# Patient Record
Sex: FEMALE | Race: OTHER | HISPANIC OR LATINO | URBAN - METROPOLITAN AREA
[De-identification: names, ages, dates, MRNs, and addresses within clinical notes are randomized per-mention and may not be internally consistent; named-entity substitution may affect disease eponyms.]

---

## 2018-01-01 ENCOUNTER — OUTPATIENT (OUTPATIENT)
Dept: OUTPATIENT SERVICES | Facility: HOSPITAL | Age: 78
LOS: 1 days | End: 2018-01-01
Payer: MEDICAID

## 2018-01-01 PROCEDURE — G9001: CPT

## 2018-01-03 ENCOUNTER — INPATIENT (INPATIENT)
Facility: HOSPITAL | Age: 78
LOS: 6 days | Discharge: ANOTHER HOSPITAL | End: 2018-01-10
Attending: HOSPITALIST | Admitting: HOSPITALIST
Payer: MEDICARE

## 2018-01-03 VITALS
OXYGEN SATURATION: 100 % | HEART RATE: 68 BPM | RESPIRATION RATE: 18 BRPM | WEIGHT: 134.92 LBS | HEIGHT: 62 IN | DIASTOLIC BLOOD PRESSURE: 67 MMHG | TEMPERATURE: 98 F | SYSTOLIC BLOOD PRESSURE: 160 MMHG

## 2018-01-03 LAB
ALBUMIN SERPL ELPH-MCNC: 2.8 G/DL — LOW (ref 3.3–5)
ALP SERPL-CCNC: 160 U/L — HIGH (ref 40–120)
ALT FLD-CCNC: 17 U/L — SIGNIFICANT CHANGE UP (ref 12–78)
ANION GAP SERPL CALC-SCNC: 8 MMOL/L — SIGNIFICANT CHANGE UP (ref 5–17)
AST SERPL-CCNC: 20 U/L — SIGNIFICANT CHANGE UP (ref 15–37)
BILIRUB SERPL-MCNC: 0.4 MG/DL — SIGNIFICANT CHANGE UP (ref 0.2–1.2)
BUN SERPL-MCNC: 13 MG/DL — SIGNIFICANT CHANGE UP (ref 7–23)
CALCIUM SERPL-MCNC: 8.4 MG/DL — LOW (ref 8.5–10.1)
CHLORIDE SERPL-SCNC: 104 MMOL/L — SIGNIFICANT CHANGE UP (ref 96–108)
CO2 SERPL-SCNC: 28 MMOL/L — SIGNIFICANT CHANGE UP (ref 22–31)
CREAT SERPL-MCNC: 1.29 MG/DL — SIGNIFICANT CHANGE UP (ref 0.5–1.3)
D DIMER BLD IA.RAPID-MCNC: 489 NG/ML DDU — HIGH
FLUAV SPEC QL CULT: NEGATIVE — SIGNIFICANT CHANGE UP
FLUBV AG SPEC QL IA: NEGATIVE — SIGNIFICANT CHANGE UP
GLUCOSE SERPL-MCNC: 101 MG/DL — HIGH (ref 70–99)
HCT VFR BLD CALC: 41.7 % — SIGNIFICANT CHANGE UP (ref 34.5–45)
HGB BLD-MCNC: 13.7 G/DL — SIGNIFICANT CHANGE UP (ref 11.5–15.5)
MCHC RBC-ENTMCNC: 29.9 PG — SIGNIFICANT CHANGE UP (ref 27–34)
MCHC RBC-ENTMCNC: 32.9 GM/DL — SIGNIFICANT CHANGE UP (ref 32–36)
MCV RBC AUTO: 91 FL — SIGNIFICANT CHANGE UP (ref 80–100)
NT-PROBNP SERPL-SCNC: 4257 PG/ML — HIGH (ref 0–450)
PLATELET # BLD AUTO: 245 K/UL — SIGNIFICANT CHANGE UP (ref 150–400)
POTASSIUM SERPL-MCNC: 3.8 MMOL/L — SIGNIFICANT CHANGE UP (ref 3.5–5.3)
POTASSIUM SERPL-SCNC: 3.8 MMOL/L — SIGNIFICANT CHANGE UP (ref 3.5–5.3)
PROT SERPL-MCNC: 7.9 GM/DL — SIGNIFICANT CHANGE UP (ref 6–8.3)
RBC # BLD: 4.58 M/UL — SIGNIFICANT CHANGE UP (ref 3.8–5.2)
RBC # FLD: 13 % — SIGNIFICANT CHANGE UP (ref 11–15)
SODIUM SERPL-SCNC: 140 MMOL/L — SIGNIFICANT CHANGE UP (ref 135–145)
TROPONIN I SERPL-MCNC: 0.05 NG/ML — HIGH (ref 0.01–0.04)
WBC # BLD: 13.2 K/UL — HIGH (ref 3.8–10.5)
WBC # FLD AUTO: 13.2 K/UL — HIGH (ref 3.8–10.5)

## 2018-01-03 PROCEDURE — 99284 EMERGENCY DEPT VISIT MOD MDM: CPT

## 2018-01-03 PROCEDURE — 71045 X-RAY EXAM CHEST 1 VIEW: CPT | Mod: 26

## 2018-01-03 RX ORDER — SODIUM CHLORIDE 9 MG/ML
1000 INJECTION INTRAMUSCULAR; INTRAVENOUS; SUBCUTANEOUS ONCE
Qty: 0 | Refills: 0 | Status: COMPLETED | OUTPATIENT
Start: 2018-01-03 | End: 2018-01-03

## 2018-01-03 RX ADMIN — SODIUM CHLORIDE 1000 MILLILITER(S): 9 INJECTION INTRAMUSCULAR; INTRAVENOUS; SUBCUTANEOUS at 18:54

## 2018-01-03 NOTE — ED PROVIDER NOTE - OBJECTIVE STATEMENT
78 yo F with sob for 3 hours, now, acute onset, no inciting event.  Pt. complains of throat pain (which son thinks is chronic--since prior stroke).  No other complaints, currently.   ROS: negative for fever, cough, headache, abd pain, nausea, vomiting, diarrhea, rash, paresthesia, and weakness.   PMH: HTN, prior CVA with residual L sided weakness, dementia; Meds: forgets names; SH: Denies smoking/drinking/drug use

## 2018-01-03 NOTE — ED PROVIDER NOTE - MEDICAL DECISION MAKING DETAILS
78 yo F with acute sob, concerning for ACS, PE, CHF  -labs, dimer, trop, bnp, ekg, monitor, iv ns  -f/u results, reeval, possible CTA chest

## 2018-01-03 NOTE — ED PROVIDER NOTE - PHYSICAL EXAMINATION
Vitals: WNL  Gen: AAOx3, NAD, sitting comfortably in stretcher, calm, cooperative, pleasant   Head: ncat, perrla, eomi b/l  Neck: supple, no lymphadenopathy, no midline deviation  Heart: rrr, no m/r/g  Lungs: CTA b/l, no rales/ronchi/wheezes  Abd: soft, nontender, non-distended, no rebound or guarding  Ext: no clubbing/cyanosis/edema  Neuro: sensation and muscle strength intact b/l

## 2018-01-03 NOTE — ED PROVIDER NOTE - PROGRESS NOTE DETAILS
Patient signed out by Dr. Borges pending CT. CT reviewed. patient received albuterol, solumedrol. magnesium, abx and lasix. patient now admitted to medicine for further management.

## 2018-01-03 NOTE — ED ADULT TRIAGE NOTE - CHIEF COMPLAINT QUOTE
patient c/o of difficulty breathing started today , patient c/o of cough also no fever , as per EMS patient was on respiratory distress sat was 80% O2, EMS give nebulizer x1 , patient denied chest pain , c/o of sore throat

## 2018-01-03 NOTE — ED PROVIDER NOTE - CARE PLAN
Principal Discharge DX:	Shortness of breath Principal Discharge DX:	PNA (pneumonia)  Secondary Diagnosis:	Pulmonary edema

## 2018-01-04 DIAGNOSIS — J69.0 PNEUMONITIS DUE TO INHALATION OF FOOD AND VOMIT: ICD-10-CM

## 2018-01-04 DIAGNOSIS — I50.9 HEART FAILURE, UNSPECIFIED: ICD-10-CM

## 2018-01-04 DIAGNOSIS — G30.9 ALZHEIMER'S DISEASE, UNSPECIFIED: ICD-10-CM

## 2018-01-04 DIAGNOSIS — I10 ESSENTIAL (PRIMARY) HYPERTENSION: ICD-10-CM

## 2018-01-04 LAB
ANION GAP SERPL CALC-SCNC: 11 MMOL/L — SIGNIFICANT CHANGE UP (ref 5–17)
APPEARANCE UR: CLEAR — SIGNIFICANT CHANGE UP
BACTERIA # UR AUTO: ABNORMAL
BILIRUB UR-MCNC: NEGATIVE — SIGNIFICANT CHANGE UP
BUN SERPL-MCNC: 15 MG/DL — SIGNIFICANT CHANGE UP (ref 7–23)
CALCIUM SERPL-MCNC: 8.1 MG/DL — LOW (ref 8.5–10.1)
CHLORIDE SERPL-SCNC: 104 MMOL/L — SIGNIFICANT CHANGE UP (ref 96–108)
CO2 SERPL-SCNC: 24 MMOL/L — SIGNIFICANT CHANGE UP (ref 22–31)
COLOR SPEC: YELLOW — SIGNIFICANT CHANGE UP
CREAT SERPL-MCNC: 1.41 MG/DL — HIGH (ref 0.5–1.3)
DIFF PNL FLD: ABNORMAL
EPI CELLS # UR: SIGNIFICANT CHANGE UP
GLUCOSE SERPL-MCNC: 185 MG/DL — HIGH (ref 70–99)
GLUCOSE UR QL: NEGATIVE MG/DL — SIGNIFICANT CHANGE UP
KETONES UR-MCNC: NEGATIVE — SIGNIFICANT CHANGE UP
LACTATE SERPL-SCNC: 1 MMOL/L — SIGNIFICANT CHANGE UP (ref 0.7–2)
LEUKOCYTE ESTERASE UR-ACNC: NEGATIVE — SIGNIFICANT CHANGE UP
NITRITE UR-MCNC: NEGATIVE — SIGNIFICANT CHANGE UP
PH UR: 6.5 — SIGNIFICANT CHANGE UP (ref 5–8)
POTASSIUM SERPL-MCNC: 3.4 MMOL/L — LOW (ref 3.5–5.3)
POTASSIUM SERPL-SCNC: 3.4 MMOL/L — LOW (ref 3.5–5.3)
PROT UR-MCNC: 500 MG/DL
SODIUM SERPL-SCNC: 139 MMOL/L — SIGNIFICANT CHANGE UP (ref 135–145)
SP GR SPEC: 1.01 — SIGNIFICANT CHANGE UP (ref 1.01–1.02)
TROPONIN I SERPL-MCNC: 0.04 NG/ML — SIGNIFICANT CHANGE UP (ref 0.01–0.04)
UROBILINOGEN FLD QL: NEGATIVE MG/DL — SIGNIFICANT CHANGE UP
WBC UR QL: SIGNIFICANT CHANGE UP

## 2018-01-04 PROCEDURE — 99223 1ST HOSP IP/OBS HIGH 75: CPT

## 2018-01-04 PROCEDURE — 12345: CPT | Mod: NC

## 2018-01-04 PROCEDURE — 71275 CT ANGIOGRAPHY CHEST: CPT | Mod: 26

## 2018-01-04 RX ORDER — FUROSEMIDE 40 MG
20 TABLET ORAL DAILY
Qty: 0 | Refills: 0 | Status: DISCONTINUED | OUTPATIENT
Start: 2018-01-04 | End: 2018-01-05

## 2018-01-04 RX ORDER — CARVEDILOL PHOSPHATE 80 MG/1
3.12 CAPSULE, EXTENDED RELEASE ORAL EVERY 12 HOURS
Qty: 0 | Refills: 0 | Status: DISCONTINUED | OUTPATIENT
Start: 2018-01-04 | End: 2018-01-10

## 2018-01-04 RX ORDER — FUROSEMIDE 40 MG
20 TABLET ORAL ONCE
Qty: 0 | Refills: 0 | Status: COMPLETED | OUTPATIENT
Start: 2018-01-04 | End: 2018-01-04

## 2018-01-04 RX ORDER — LOSARTAN POTASSIUM 100 MG/1
25 TABLET, FILM COATED ORAL DAILY
Qty: 0 | Refills: 0 | Status: DISCONTINUED | OUTPATIENT
Start: 2018-01-04 | End: 2018-01-08

## 2018-01-04 RX ORDER — BACLOFEN 100 %
10 POWDER (GRAM) MISCELLANEOUS DAILY
Qty: 0 | Refills: 0 | Status: DISCONTINUED | OUTPATIENT
Start: 2018-01-04 | End: 2018-01-10

## 2018-01-04 RX ORDER — CEFTRIAXONE 500 MG/1
1 INJECTION, POWDER, FOR SOLUTION INTRAMUSCULAR; INTRAVENOUS ONCE
Qty: 0 | Refills: 0 | Status: COMPLETED | OUTPATIENT
Start: 2018-01-04 | End: 2018-01-04

## 2018-01-04 RX ORDER — MAGNESIUM SULFATE 500 MG/ML
2 VIAL (ML) INJECTION ONCE
Qty: 0 | Refills: 0 | Status: COMPLETED | OUTPATIENT
Start: 2018-01-04 | End: 2018-01-04

## 2018-01-04 RX ORDER — PIPERACILLIN AND TAZOBACTAM 4; .5 G/20ML; G/20ML
3.38 INJECTION, POWDER, LYOPHILIZED, FOR SOLUTION INTRAVENOUS EVERY 12 HOURS
Qty: 0 | Refills: 0 | Status: DISCONTINUED | OUTPATIENT
Start: 2018-01-04 | End: 2018-01-10

## 2018-01-04 RX ORDER — IPRATROPIUM/ALBUTEROL SULFATE 18-103MCG
3 AEROSOL WITH ADAPTER (GRAM) INHALATION EVERY 6 HOURS
Qty: 0 | Refills: 0 | Status: DISCONTINUED | OUTPATIENT
Start: 2018-01-04 | End: 2018-01-10

## 2018-01-04 RX ORDER — PANTOPRAZOLE SODIUM 20 MG/1
40 TABLET, DELAYED RELEASE ORAL
Qty: 0 | Refills: 0 | Status: DISCONTINUED | OUTPATIENT
Start: 2018-01-04 | End: 2018-01-10

## 2018-01-04 RX ORDER — PIPERACILLIN AND TAZOBACTAM 4; .5 G/20ML; G/20ML
3.38 INJECTION, POWDER, LYOPHILIZED, FOR SOLUTION INTRAVENOUS ONCE
Qty: 0 | Refills: 0 | Status: COMPLETED | OUTPATIENT
Start: 2018-01-04 | End: 2018-01-04

## 2018-01-04 RX ORDER — ATORVASTATIN CALCIUM 80 MG/1
20 TABLET, FILM COATED ORAL AT BEDTIME
Qty: 0 | Refills: 0 | Status: DISCONTINUED | OUTPATIENT
Start: 2018-01-04 | End: 2018-01-10

## 2018-01-04 RX ORDER — ASPIRIN/CALCIUM CARB/MAGNESIUM 324 MG
325 TABLET ORAL ONCE
Qty: 0 | Refills: 0 | Status: COMPLETED | OUTPATIENT
Start: 2018-01-04 | End: 2018-01-04

## 2018-01-04 RX ORDER — ENOXAPARIN SODIUM 100 MG/ML
30 INJECTION SUBCUTANEOUS DAILY
Qty: 0 | Refills: 0 | Status: DISCONTINUED | OUTPATIENT
Start: 2018-01-04 | End: 2018-01-07

## 2018-01-04 RX ORDER — IPRATROPIUM/ALBUTEROL SULFATE 18-103MCG
3 AEROSOL WITH ADAPTER (GRAM) INHALATION
Qty: 0 | Refills: 0 | Status: COMPLETED | OUTPATIENT
Start: 2018-01-04 | End: 2018-01-04

## 2018-01-04 RX ORDER — AZITHROMYCIN 500 MG/1
500 TABLET, FILM COATED ORAL EVERY 24 HOURS
Qty: 0 | Refills: 0 | Status: DISCONTINUED | OUTPATIENT
Start: 2018-01-04 | End: 2018-01-10

## 2018-01-04 RX ORDER — ASPIRIN/CALCIUM CARB/MAGNESIUM 324 MG
81 TABLET ORAL DAILY
Qty: 0 | Refills: 0 | Status: DISCONTINUED | OUTPATIENT
Start: 2018-01-04 | End: 2018-01-07

## 2018-01-04 RX ORDER — AMLODIPINE BESYLATE 2.5 MG/1
2.5 TABLET ORAL DAILY
Qty: 0 | Refills: 0 | Status: DISCONTINUED | OUTPATIENT
Start: 2018-01-04 | End: 2018-01-05

## 2018-01-04 RX ORDER — DONEPEZIL HYDROCHLORIDE 10 MG/1
10 TABLET, FILM COATED ORAL AT BEDTIME
Qty: 0 | Refills: 0 | Status: DISCONTINUED | OUTPATIENT
Start: 2018-01-04 | End: 2018-01-10

## 2018-01-04 RX ADMIN — PANTOPRAZOLE SODIUM 40 MILLIGRAM(S): 20 TABLET, DELAYED RELEASE ORAL at 06:13

## 2018-01-04 RX ADMIN — Medication 40 MILLIGRAM(S): at 13:37

## 2018-01-04 RX ADMIN — Medication 3 MILLILITER(S): at 23:11

## 2018-01-04 RX ADMIN — Medication 125 MILLIGRAM(S): at 01:19

## 2018-01-04 RX ADMIN — Medication 325 MILLIGRAM(S): at 02:05

## 2018-01-04 RX ADMIN — PIPERACILLIN AND TAZOBACTAM 200 GRAM(S): 4; .5 INJECTION, POWDER, LYOPHILIZED, FOR SOLUTION INTRAVENOUS at 05:09

## 2018-01-04 RX ADMIN — Medication 20 MILLIGRAM(S): at 05:10

## 2018-01-04 RX ADMIN — CEFTRIAXONE 100 GRAM(S): 500 INJECTION, POWDER, FOR SOLUTION INTRAMUSCULAR; INTRAVENOUS at 02:36

## 2018-01-04 RX ADMIN — Medication 3 MILLILITER(S): at 01:19

## 2018-01-04 RX ADMIN — DONEPEZIL HYDROCHLORIDE 10 MILLIGRAM(S): 10 TABLET, FILM COATED ORAL at 22:29

## 2018-01-04 RX ADMIN — Medication 3 MILLILITER(S): at 00:58

## 2018-01-04 RX ADMIN — Medication 40 MILLIGRAM(S): at 05:10

## 2018-01-04 RX ADMIN — ENOXAPARIN SODIUM 30 MILLIGRAM(S): 100 INJECTION SUBCUTANEOUS at 12:01

## 2018-01-04 RX ADMIN — PIPERACILLIN AND TAZOBACTAM 25 GRAM(S): 4; .5 INJECTION, POWDER, LYOPHILIZED, FOR SOLUTION INTRAVENOUS at 18:44

## 2018-01-04 RX ADMIN — Medication 3 MILLILITER(S): at 05:10

## 2018-01-04 RX ADMIN — AZITHROMYCIN 255 MILLIGRAM(S): 500 TABLET, FILM COATED ORAL at 02:30

## 2018-01-04 RX ADMIN — Medication 10 MILLIGRAM(S): at 12:01

## 2018-01-04 RX ADMIN — Medication 20 MILLIGRAM(S): at 02:36

## 2018-01-04 RX ADMIN — Medication 50 GRAM(S): at 02:31

## 2018-01-04 RX ADMIN — Medication 3 MILLILITER(S): at 12:43

## 2018-01-04 RX ADMIN — Medication 3 MILLILITER(S): at 01:15

## 2018-01-04 RX ADMIN — ATORVASTATIN CALCIUM 20 MILLIGRAM(S): 80 TABLET, FILM COATED ORAL at 22:29

## 2018-01-04 RX ADMIN — Medication 81 MILLIGRAM(S): at 12:01

## 2018-01-04 NOTE — CONSULT NOTE ADULT - SUBJECTIVE AND OBJECTIVE BOX
CARDIOLOGY CONSULT NOTE    Patient is a 77y Female with a known history of :  Essential hypertension (I10)  Alzheimer's dementia without behavioral disturbance, unspecified timing of dementia onset (G30.9)  Acute on chronic congestive heart failure, unspecified congestive heart failure type (I50.9)  Aspiration pneumonia, unspecified aspiration pneumonia type, unspecified laterality, unspecified part of lung (J69.0)    HPI:  76 y/o Female PMH HTN, prior CVA with residual L sided weakness, dementia with SOB for 3 hours, with acute onset, no inciting event. Pt has had SOB on and off for past few months, according to family. They have also noted occasional difficulty with swallowing.  Family denies fever, cough, headache, abd pain, nausea, vomiting, diarrhea, rash, paresthesia, and weakness.  Denied chest pain/palpitations/syncope.  EKG with inferior and anterolateral TWIs.  On Coreg for ?reason.    < from: CT Angio Chest w/ IV Cont (01.04.18 @ 01:06) >  Suboptimal evaluation of the subsegmental pulmonary arteries. No obvious   pulmonary embolus to the level of the segmental pulmonary arteries.     Nonspecific interlobular septal thickening and groundglass opacities,   predominantly in the upper lobes, which may represent pulmonary edema   given cardiomegaly and pleural effusion. Recommend clinical correlation   to assess underlying pneumonia. Small tree-in-bud/nodular opacities in   the left lobe, suggesting mucus impactedairways.    Marked fluid/air distended esophagus. Differential diagnosis includes   achalasia although distal esophagus lesion/neoplasm cannot be excluded.   Recommend follow-up.      REVIEW OF SYSTEMS:    CONSTITUTIONAL: No fever, weight loss, or fatigue  EYES: No eye pain, visual disturbances, or discharge  ENMT:  No difficulty hearing, tinnitus, vertigo; No sinus or throat pain  NECK: No pain or stiffness  BREASTS: No pain, masses, or nipple discharge  RESPIRATORY: No cough, wheezing, chills or hemoptysis; +shortness of breath  CARDIOVASCULAR: No chest pain, palpitations, dizziness, or leg swelling  GASTROINTESTINAL: difficulty swallowing  GENITOURINARY: No dysuria, frequency, hematuria, or incontinence  NEUROLOGICAL: No headaches, memory loss, loss of strength, numbness, or tremors  SKIN: No itching, burning, rashes, or lesions   LYMPH NODES: No enlarged glands  ENDOCRINE: No heat or cold intolerance; No hair loss  MUSCULOSKELETAL: No joint pain or swelling; No muscle, back, or extremity pain  PSYCHIATRIC: No depression, anxiety, mood swings, or difficulty sleeping  HEME/LYMPH: No easy bruising, or bleeding gums  ALLERGY AND IMMUNOLOGIC: No hives or eczema    MEDICATIONS  (STANDING):  ALBUTerol/ipratropium for Nebulization 3 milliLiter(s) Nebulizer every 6 hours  amLODIPine   Tablet 2.5 milliGRAM(s) Oral daily  aspirin enteric coated 81 milliGRAM(s) Oral daily  atorvastatin 20 milliGRAM(s) Oral at bedtime  azithromycin  IVPB 500 milliGRAM(s) IV Intermittent every 24 hours  baclofen 10 milliGRAM(s) Oral daily  carvedilol 3.125 milliGRAM(s) Oral every 12 hours  donepezil 10 milliGRAM(s) Oral at bedtime  enoxaparin Injectable 30 milliGRAM(s) SubCutaneous daily  furosemide   Injectable 20 milliGRAM(s) IV Push daily  losartan 25 milliGRAM(s) Oral daily  methylPREDNISolone sodium succinate Injectable 40 milliGRAM(s) IV Push every 8 hours  pantoprazole    Tablet 40 milliGRAM(s) Oral before breakfast  piperacillin/tazobactam IVPB. 3.375 Gram(s) IV Intermittent every 12 hours    MEDICATIONS  (PRN):      ALLERGIES: No Known Allergies      FAMILY HISTORY:  No pertinent family history in first degree relatives      PHYSICAL EXAMINATION:  -----------------------------  T(C): 36.9 (01-04-18 @ 11:30), Max: 37.1 (01-04-18 @ 07:27)  HR: 62 (01-04-18 @ 11:30) (62 - 70)  BP: 115/52 (01-04-18 @ 11:30) (98/43 - 160/67)  RR: 18 (01-04-18 @ 11:30) (15 - 18)  SpO2: 98% (01-04-18 @ 11:30) (96% - 100%)  Wt(kg): --    Height (cm): 157.48 (01-03 @ 17:14)  Weight (kg): 61.2 (01-03 @ 17:14)  BMI (kg/m2): 24.7 (01-03 @ 17:14)  BSA (m2): 1.62 (01-03 @ 17:14)    Constitutional: well developed, normal appearance, well groomed, well nourished, no deformities and no acute distress.   Eyes: the conjunctiva exhibited no abnormalities and the eyelids demonstrated no xanthelasmas.   HEENT: normal oral mucosa, no oral pallor and no oral cyanosis.   Neck: normal jugular venous A waves present, normal jugular venous V waves present and no jugular venous stuart A waves.   Pulmonary: decreased BS at bases   Cardiovascular: heart rate and rhythm were normal, normal S1 and S2 and no murmur, gallop, rub, heave or thrill are present.   Abdomen: soft, non-tender, no hepato-splenomegaly and no abdominal mass palpated.   Musculoskeletal: the gait could not be assessed..   Extremities: no clubbing of the fingernails, no localized cyanosis, no petechial hemorrhages and no ischemic changes.   Skin: normal skin color and pigmentation, no rash, no venous stasis, no skin lesions, no skin ulcer and no xanthoma was observed.   Psychiatric: oriented to person, place, and time, the affect was normal, the mood was normal and not feeling anxious.     LABS:   --------  01-04    139  |  104  |  15  ----------------------------<  185<H>  3.4<L>   |  24  |  1.41<H>    Ca    8.1<L>      04 Jan 2018 08:39    TPro  7.9  /  Alb  2.8<L>  /  TBili  0.4  /  DBili  x   /  AST  20  /  ALT  17  /  AlkPhos  160<H>  01-03                         13.7   13.2  )-----------( 245      ( 03 Jan 2018 18:48 )             41.7       01-03 @ 20:10 BNP: 4257 pg/mL    01-04 @ 08:39 CPK total:--, CKMB --, Troponin I - .035 ng/mL  01-03 @ 19:18 CPK total:--, CKMB --, Troponin I - .046 ng/mL<H>

## 2018-01-04 NOTE — H&P ADULT - ASSESSMENT
78 y/o woman with dementia, HTN, CAD, CVA  presents with SOB; per family she has had intermittent SOB, but they deny cough, on exam she is wheezing. Imaging shows CHF and possible pneumonia; she has a distended esophagus which suggests possibility of aspiration.  Will keep pt NPO and she may need EGD in future.

## 2018-01-04 NOTE — CONSULT NOTE ADULT - ASSESSMENT
78 y/o Female PMH HTN, prior CVA with residual L sided weakness, dementia; admitted with SOB on and off for past few months, according to family. They have also noted occasional difficulty with swallowing.  Denied chest pain/palpitations/syncope.  EKG with inferior and anterolateral TWIs.  On Coreg for ?reason.    < from: CT Angio Chest w/ IV Cont (01.04.18 @ 01:06) >  Suboptimal evaluation of the subsegmental pulmonary arteries. No obvious   pulmonary embolus to the level of the segmental pulmonary arteries.     Nonspecific interlobular septal thickening and groundglass opacities,   predominantly in the upper lobes, which may represent pulmonary edema   given cardiomegaly and pleural effusion. Recommend clinical correlation   to assess underlying pneumonia. Small tree-in-bud/nodular opacities in   the left lobe, suggesting mucus impactedairways.    Marked fluid/air distended esophagus. Differential diagnosis includes   achalasia although distal esophagus lesion/neoplasm cannot be excluded.   Recommend follow-up.    -monitor on tele  -trend Saúl  -2D echo to eval LVEF with hx of ?CHF/on Coreg (unknown type)  -cont abx for ?pneumonia noted on chest CT  -recommend GI eval with hx of difficulty swallowing and noted findings on CT  -for ischemic eval when stable

## 2018-01-04 NOTE — H&P ADULT - PROBLEM SELECTOR PLAN 1
cultures blood and sputum, broad spectrum antibiotics, keep NPO for now, head of bed elevated, trend VS, CBC

## 2018-01-04 NOTE — H&P ADULT - HISTORY OF PRESENT ILLNESS
78 y/o Female PMH HTN, prior CVA with residual L sided weakness, dementia with SOB for 3 hours, with acute onset, no inciting event. Pt has had SOB on and off for past few months, according to family. They have also noted occasional difficulty with swallowing.  Family denies fever, cough, headache, abd pain, nausea, vomiting, diarrhea, rash, paresthesia, and weakness.

## 2018-01-04 NOTE — H&P ADULT - NSHPLABSRESULTS_GEN_ALL_CORE
LABS:                        13.7   13.2  )-----------( 245      ( 03 Jan 2018 18:48 )             41.7     01-03    140  |  104  |  13  ----------------------------<  101<H>  3.8   |  28  |  1.29    Ca    8.4<L>      03 Jan 2018 19:18    TPro  7.9  /  Alb  2.8<L>  /  TBili  0.4  /  DBili  x   /  AST  20  /  ALT  17  /  AlkPhos  160<H>  01-03  Troponin I, Serum: .046ng/mL (01.03.18 @ 19:18)  Serum Pro-Brain Natriuretic Peptide (01.03.18 @ 20:10)    Serum Pro-Brain Natriuretic Peptide: 4257 pg/mL  Lactate, Blood: 1.0 mmol/L (01.04.18 @ 02:27)        CAPILLARY BLOOD GLUCOSE      RADIOLOGY & ADDITIONAL TESTS:  < from: CT Angio Chest w/ IV Cont (01.04.18 @ 01:06) >    Suboptimal evaluation of the subsegmental pulmonary arteries. No obvious   pulmonary embolus to the level of the segmental pulmonary arteries.     Nonspecific interlobular septal thickening and ground glass opacities,   predominantly in the upper lobes, which may represent pulmonary edema   given cardiomegaly and pleural effusion. Recommend clinical correlation   to assess underlying pneumonia. Small tree-in-bud/nodular opacities in   the left lobe, suggesting mucus impacted airways.  Marked fluid/air distended esophagus. Differential diagnosis includes   achalasia although distal esophagus lesion/neoplasm cannot be excluded.     < from: Xray Chest 1 View AP- PORTABLE-Urgent (01.03.18 @ 19:06) >    IMPRESSION:  Mild prominence of interstitial markings. Mild cardiomegaly.    Imaging Personally Reviewed:  [ x] YES  [ ] NO  EKG: sinus@62 LVH

## 2018-01-04 NOTE — H&P ADULT - NSHPPHYSICALEXAM_GEN_ALL_CORE
T(C): 36.8 (04 Jan 2018 00:13), Max: 36.9 (03 Jan 2018 20:56)  T(F): 98.2 (04 Jan 2018 00:13), Max: 98.5 (03 Jan 2018 20:56)  HR: 64 (04 Jan 2018 00:13) (64 - 69)  BP: 145/60 (04 Jan 2018 00:13) (145/60 - 160/67)  BP(mean): --  RR: 17 (04 Jan 2018 00:13) (17 - 18)  SpO2: 100% (04 Jan 2018 00:13) (100% - 100%)    PHYSICAL EXAM:  GENERAL: NAD, well-groomed, well-developed  HEAD:  Atraumatic, Normocephalic  EYES: EOMI, PERRLA, conjunctiva and sclera clear  ENMT: No tonsillar erythema, exudates, or enlargement; No lesions  NECK: Supple, + JVD, Normal thyroid  NERVOUS SYSTEM:  Alert non verbal, CN 2-12 intact; Mild left hemiparesis  CHEST/LUNG: bilateral wheezing  HEART: Regular rate and rhythm; No murmurs, rubs, or gallops  ABDOMEN: Soft, Nontender, Nondistended; Bowel sounds present  EXTREMITIES:  + Peripheral Pulses, No clubbing, cyanosis, or edema  LYMPH: No lymphadenopathy noted  SKIN: No rashes or lesions

## 2018-01-04 NOTE — ED ADULT NURSE REASSESSMENT NOTE - NS ED NURSE REASSESS COMMENT FT1
asleep on rounds resp unlabored droplet precautions posted
pt endorsed to [facility] pt A[facility] with hx left sided weakness
pt alert and confused refusing to eat told staff in Malay not hungry
assumed care at this time- patient is awake, refused to answer any of my question, no distress/discomfort observed, seen lying on bed comfortably, with g 22 on the R and infusing well
assumed back the care at this time, patient IV access noted to be swollen on the R upper arm, as per covering MANOLO Griggs no IV dye administered on this IV access during cat scan,  Dr. Lopez aware with order to put ice pack- carried out.
patient BP 98/43- Dr. paige aware with order to hold BP meds due in am- carried out

## 2018-01-04 NOTE — CHART NOTE - NSCHARTNOTEFT_GEN_A_CORE
MEDICINE ATTENDING  Pt adm by lalo this AM; Pt seen/examined at bedside; pt is 77F, dementia, HTN, CAD, hx CVA; adm w/SOB, wheezing, WBC 13.2, abnl trop 0.046, pBNP 4257, UA neg, CTA chest w/no PE, but changes c/f pulm edema, possible PNA, and marked fluid/air distended esophagus - being tx'd aspiration PNA, acute on chronic CHF, for r/o cardiac ischemia  -continue IV abxs w/zosyn, azithromycin, IV Lasix, nebs; will d/c IV steroids as no hx pulm dz  -continue chronic cardiac regimen of asa, Lipitor  -d/c losartan, amlodipine as bp on low side now, and in setting of acute infection MEDICINE ATTENDING  Pt adm by lalo this AM; Pt seen/examined at bedside; pt is 77F, dementia, HTN, CAD, hx CVA; adm w/SOB, wheezing, WBC 13.2, abnl trop 0.046, pBNP 4257, UA neg, CTA chest w/no PE, but changes c/f pulm edema, possible PNA, and marked fluid/air distended esophagus - being tx'd aspiration PNA, acute on chronic CHF, for r/o cardiac ischemia  -continue IV abxs w/zosyn, azithromycin, IV Lasix, nebs; will d/c IV steroids as no hx pulm dz  -continue chronic cardiac regimen of asa, Lipitor - Cardiology consulted  -d/c losartan, amlodipine as bp on low side now, and in setting of acute infection  -maintain NPO status given dilated esophagus on imaging - obtain speech/swallow eval, obtain GI consult - [place] MEDICINE ATTENDING  Pt adm by ambrocioist this AM; Pt seen/examined at bedside; pt is 77F, dementia, HTN, CAD, hx CVA; adm w/SOB, wheezing, WBC 13.2, abnl trop 0.046, pBNP 4257, UA neg, CTA chest w/no PE, but changes c/f pulm edema, possible PNA, and marked fluid/air distended esophagus - being tx'd aspiration PNA, acute on chronic CHF, for r/o cardiac ischemia  -continue IV abxs w/zosyn, azithromycin, IV Lasix, nebs; will d/c IV steroids as no hx pulm dz  -continue chronic cardiac regimen of asa, Lipitor - Cardiology consulted  -d/c losartan, amlodipine as bp on low side now, and in setting of acute infection  -increased Cr - hold losartan, gentle IVFs - repeat labs in AM  -maintain NPO status given dilated esophagus on imaging - obtain speech/swallow eval, obtain GI consult - [place] to see MEDICINE ATTENDING  Pt adm by ambrocioist this AM; Pt seen/examined at bedside; pt is 77F, dementia, HTN, CAD, hx CVA; adm w/SOB, wheezing, WBC 13.2, abnl trop 0.046, pBNP 4257, UA neg, CTA chest w/no PE, but changes c/f pulm edema, possible PNA, and marked fluid/air distended esophagus - being tx'd aspiration PNA, acute on chronic CHF, for r/o cardiac ischemia  -continue IV abxs w/zosyn, azithromycin, IV Lasix, nebs; will d/c IV steroids as no hx pulm dz  -continue chronic cardiac regimen of asa, Lipitor - Cardiology/[facility] following - recomms ischemic w/u when stable  -d/c losartan, amlodipine as bp on low side now, and in setting of acute infection  -increased Cr - hold losartan, gentle IVFs - repeat labs in AM  -maintain NPO status given dilated esophagus on imaging - obtain speech/swallow eval, obtain GI consult - [place] to see

## 2018-01-05 DIAGNOSIS — J69.0 PNEUMONITIS DUE TO INHALATION OF FOOD AND VOMIT: ICD-10-CM

## 2018-01-05 DIAGNOSIS — N17.9 ACUTE KIDNEY FAILURE, UNSPECIFIED: ICD-10-CM

## 2018-01-05 DIAGNOSIS — E87.6 HYPOKALEMIA: ICD-10-CM

## 2018-01-05 DIAGNOSIS — K22.8 OTHER SPECIFIED DISEASES OF ESOPHAGUS: ICD-10-CM

## 2018-01-05 DIAGNOSIS — B34.9 VIRAL INFECTION, UNSPECIFIED: ICD-10-CM

## 2018-01-05 DIAGNOSIS — R74.8 ABNORMAL LEVELS OF OTHER SERUM ENZYMES: ICD-10-CM

## 2018-01-05 LAB — GLUCOSE BLDC GLUCOMTR-MCNC: 99 MG/DL — SIGNIFICANT CHANGE UP (ref 70–99)

## 2018-01-05 PROCEDURE — 74220 X-RAY XM ESOPHAGUS 1CNTRST: CPT | Mod: 26

## 2018-01-05 PROCEDURE — 99233 SBSQ HOSP IP/OBS HIGH 50: CPT

## 2018-01-05 PROCEDURE — 99232 SBSQ HOSP IP/OBS MODERATE 35: CPT

## 2018-01-05 RX ORDER — SODIUM CHLORIDE 9 MG/ML
1000 INJECTION, SOLUTION INTRAVENOUS
Qty: 0 | Refills: 0 | Status: DISCONTINUED | OUTPATIENT
Start: 2018-01-05 | End: 2018-01-08

## 2018-01-05 RX ORDER — FUROSEMIDE 40 MG
20 TABLET ORAL EVERY 12 HOURS
Qty: 0 | Refills: 0 | Status: DISCONTINUED | OUTPATIENT
Start: 2018-01-06 | End: 2018-01-08

## 2018-01-05 RX ORDER — FUROSEMIDE 40 MG
40 TABLET ORAL ONCE
Qty: 0 | Refills: 0 | Status: COMPLETED | OUTPATIENT
Start: 2018-01-05 | End: 2018-01-05

## 2018-01-05 RX ORDER — ACETYLCYSTEINE 200 MG/ML
3 VIAL (ML) MISCELLANEOUS EVERY 6 HOURS
Qty: 0 | Refills: 0 | Status: COMPLETED | OUTPATIENT
Start: 2018-01-05 | End: 2018-01-08

## 2018-01-05 RX ORDER — POTASSIUM CHLORIDE 20 MEQ
10 PACKET (EA) ORAL
Qty: 0 | Refills: 0 | Status: COMPLETED | OUTPATIENT
Start: 2018-01-05 | End: 2018-01-05

## 2018-01-05 RX ADMIN — Medication 100 MILLIEQUIVALENT(S): at 14:55

## 2018-01-05 RX ADMIN — Medication 3 MILLILITER(S): at 18:31

## 2018-01-05 RX ADMIN — SODIUM CHLORIDE 30 MILLILITER(S): 9 INJECTION, SOLUTION INTRAVENOUS at 15:50

## 2018-01-05 RX ADMIN — PIPERACILLIN AND TAZOBACTAM 25 GRAM(S): 4; .5 INJECTION, POWDER, LYOPHILIZED, FOR SOLUTION INTRAVENOUS at 18:11

## 2018-01-05 RX ADMIN — Medication 81 MILLIGRAM(S): at 11:20

## 2018-01-05 RX ADMIN — Medication 3 MILLILITER(S): at 05:46

## 2018-01-05 RX ADMIN — Medication 10 MILLIGRAM(S): at 12:43

## 2018-01-05 RX ADMIN — PANTOPRAZOLE SODIUM 40 MILLIGRAM(S): 20 TABLET, DELAYED RELEASE ORAL at 06:20

## 2018-01-05 RX ADMIN — CARVEDILOL PHOSPHATE 3.12 MILLIGRAM(S): 80 CAPSULE, EXTENDED RELEASE ORAL at 18:12

## 2018-01-05 RX ADMIN — Medication 3 MILLILITER(S): at 11:44

## 2018-01-05 RX ADMIN — PIPERACILLIN AND TAZOBACTAM 25 GRAM(S): 4; .5 INJECTION, POWDER, LYOPHILIZED, FOR SOLUTION INTRAVENOUS at 06:20

## 2018-01-05 RX ADMIN — Medication 40 MILLIGRAM(S): at 18:00

## 2018-01-05 RX ADMIN — ENOXAPARIN SODIUM 30 MILLIGRAM(S): 100 INJECTION SUBCUTANEOUS at 11:20

## 2018-01-05 RX ADMIN — LOSARTAN POTASSIUM 25 MILLIGRAM(S): 100 TABLET, FILM COATED ORAL at 06:20

## 2018-01-05 RX ADMIN — Medication 3 MILLILITER(S): at 18:29

## 2018-01-05 RX ADMIN — SODIUM CHLORIDE 30 MILLILITER(S): 9 INJECTION, SOLUTION INTRAVENOUS at 14:55

## 2018-01-05 RX ADMIN — AMLODIPINE BESYLATE 2.5 MILLIGRAM(S): 2.5 TABLET ORAL at 06:20

## 2018-01-05 RX ADMIN — Medication 100 MILLIEQUIVALENT(S): at 12:39

## 2018-01-05 RX ADMIN — Medication 20 MILLIGRAM(S): at 06:20

## 2018-01-05 RX ADMIN — AZITHROMYCIN 255 MILLIGRAM(S): 500 TABLET, FILM COATED ORAL at 00:35

## 2018-01-05 RX ADMIN — CARVEDILOL PHOSPHATE 3.12 MILLIGRAM(S): 80 CAPSULE, EXTENDED RELEASE ORAL at 06:20

## 2018-01-05 NOTE — CONSULT NOTE ADULT - SUBJECTIVE AND OBJECTIVE BOX
CHIEF COMPLAINT:  Patient is a 77y old  Female who presents with a chief complaint of SOB today. (2018 03:36)      HPI:  76 y/o Female PMH HTN, prior CVA with residual L sided weakness, dementia with SOB for 3 hours, with acute onset, no inciting event. Pt has had SOB on and off for past few months, according to family. They have also noted occasional difficulty with swallowing.  Family denies fever, cough, headache, abd pain, nausea, vomiting, diarrhea, rash, paresthesia, and weakness. Finding of dilated esophagus with possible achalasia. Low-grade troponin release likely from ischemic demand.    ALLERGIES:  No Known Allergies    Home Medications:  amlodipine-benazepril 5 mg-10 mg oral capsule: 1 cap(s) orally once a day (2018 19:01)  Aspirin Low Dose 81 mg oral delayed release tablet: 2 tab(s) orally once a day (2018 19:01)  atorvastatin 10 mg oral tablet: 1 tab(s) orally once a day (2018 19:01)  baclofen 10 mg oral tablet: orally once a day (2018 19:01)  donepezil 10 mg oral tablet: 1 tab(s) orally once a day (at bedtime) (2018 19:01)  metoprolol tartrate 100 mg oral tablet: orally once a day (2018 19:01)  omeprazole 20 mg oral delayed release tablet: 1 tab(s) orally once a day (2018 19:01)  sertraline 50 mg oral tablet: 1 tab(s) orally once a day (2018 19:01)      PAST MEDICAL & SURGICAL HISTORY:  Alzheimers disease  MI, old  CVA (cerebral vascular accident)  HTN (hypertension)  No significant past surgical history      FAMILY HISTORY:  No pertinent family history in first degree relatives      SOCIAL HISTORY:  Nonsmoker    REVIEW OF SYSTEMS:  General:  No wt loss, fevers, chills, night sweats  Eyes:  Good vision, no reported pain  ENT:  No sore throat, pain, runny nose, dysphagia  CV:  No pain, palpitations, hypo/hypertension  Resp:  No cough, tachypnea, wheezing; positive for dyspnea   GI:  No pain, nausea, vomiting, diarrhea, constipation  :  No pain, bleeding, incontinence, nocturia  Muscle:  No pain, weakness  Breast:  No pain, abscess, mass, discharge  Neuro:  No weakness, tingling, memory problems  Psych:  No fatigue, insomnia, mood problems, depression  Endocrine:  No polyuria, polydipsia, cold/heat intolerance  Heme:  No petechiae, ecchymosis, easy bruisability  Skin:  No rash, edema    PHYSICAL EXAM:  Vital Signs:  Vital Signs Last 24 Hrs  T(C): 36.1 (2018 11:52), Max: 37.2 (2018 18:05)  T(F): 97 (2018 11:52), Max: 98.9 (2018 18:05)  HR: 68 (2018 11:52) (68 - 78)  BP: 155/75 (2018 11:52) (108/51 - 155/75)  RR: 18 (2018 11:52) (18 - 21)  SpO2: 100% (2018 11:52) (94% - 100%)  I&O's Summary    2018 07:01  -  2018 07:00  --------------------------------------------------------  IN: 300 mL / OUT: 600 mL / NET: -300 mL    Tele: SR, PVCs  General:  Appears stated age, well-groomed, well-nourished, no distress  HEENT:  NC/AT, patent nares w/ pink mucosa, OP clear w/o lesions, EOMI, conjunctivae clear, no thyromegaly, nodules, adenopathy, no JVD  Chest:  Full & symmetric excursion, no increased effort, breath sounds clear  Cardiovascular:  Regular rhythm, S1, S2, no murmur/rub/S3/S4, no carotid/femoral/abdominal bruit, radial/pedal pulses 2+  Abdomen:  Soft, non-tender, non-distended, normoactive bowel sounds  Extremities: no edema  Skin:  No rash/erythema. Skin is warm/dry  Musculoskeletal:  N/A  Neuro/Psych:  Awake    LABORATORY:                          13.7   13.2  )-----------( 245      ( 2018 18:48 )             41.7     -04    139  |  104  |  15  ----------------------------<  185<H>  3.4<L>   |  24  |  1.41<H>    Ca    8.1<L>      2018 08:39    TPro  7.9  /  Alb  2.8<L>  /  TBili  0.4  /  DBili  x   /  AST  20  /  ALT  17  /  AlkPhos  160<H>  01-03      CARDIAC MARKERS ( 2018 08:39 )  .035 ng/mL / x     / x     / x     / x      CARDIAC MARKERS ( 2018 19:18 )  .046 ng/mL / x     / x     / x     / x          LIVER FUNCTIONS - ( 2018 19:18 )  Alb: 2.8 g/dL / Pro: 7.9 gm/dL / ALK PHOS: 160 U/L / ALT: 17 U/L / AST: 20 U/L / GGT: x             Urinalysis Basic - ( 2018 04:16 )    Color: Yellow / Appearance: Clear / S.010 / pH: x  Gluc: x / Ketone: Negative  / Bili: Negative / Urobili: Negative mg/dL   Blood: x / Protein: 500 mg/dL / Nitrite: Negative   Leuk Esterase: Negative / RBC: x / WBC 0-2   Sq Epi: x / Non Sq Epi: Few / Bacteria: Few      IMAGING:  ecg:    < from: CT Angio Chest w/ IV Cont (18 @ 01:06) >  Suboptimal evaluation of the subsegmental pulmonary arteries. No obvious   pulmonary embolus to the level of the segmental pulmonary arteries.     Nonspecific interlobular septal thickening and groundglass opacities,   predominantly in the upper lobes, which may represent pulmonary edema   given cardiomegaly and pleural effusion. Recommend clinical correlation   to assess underlying pneumonia. Small tree-in-bud/nodular opacities in   the left lobe, suggesting mucus impactedairways.    Marked fluid/air distended esophagus. Differential diagnosis includes   achalasia although distal esophagus lesion/neoplasm cannot be excluded.   Recommend follow-up.    < end of copied text >    < from: Xray Esophagram (18 @ 10:32) >  Dilated esophagus which tapers abruptly at the GE junction. Differential   diagnosis includes achalasia. Aspiration precautions are advised.     These findings were discussed with Dr. Laird at the completion of the   examination.    < end of copied text >    ASSESSMENT:  76 y/o Female PMH HTN, prior CVA with residual L sided weakness, dementia with SOB for 3 hours, with acute onset, no inciting event. Pt has had SOB on and off for past few months, according to family. They have also noted occasional difficulty with swallowing.  Family denies fever, cough, headache, abd pain, nausea, vomiting, diarrhea, rash, paresthesia, and weakness. Finding of dilated esophagus with possible achalasia. Low-grade troponin release likely from ischemic demand.    PLAN:     Tele monitoring.    MEDICATIONS  (STANDING):  ALBUTerol/ipratropium for Nebulization 3 milliLiter(s) Nebulizer every 6 hours  amLODIPine   Tablet 2.5 milliGRAM(s) Oral daily  aspirin enteric coated 81 milliGRAM(s) Oral daily  atorvastatin 20 milliGRAM(s) Oral at bedtime  azithromycin  IVPB 500 milliGRAM(s) IV Intermittent every 24 hours  baclofen 10 milliGRAM(s) Oral daily  carvedilol 3.125 milliGRAM(s) Oral every 12 hours  donepezil 10 milliGRAM(s) Oral at bedtime  enoxaparin Injectable 30 milliGRAM(s) SubCutaneous daily  furosemide   Injectable 20 milliGRAM(s) IV Push daily  losartan 25 milliGRAM(s) Oral daily  pantoprazole    Tablet 40 milliGRAM(s) Oral before breakfast  piperacillin/tazobactam IVPB. 3.375 Gram(s) IV Intermittent every 12 hours  potassium chloride  10 mEq/100 mL IVPB 10 milliEquivalent(s) IV Intermittent every 1 hour    GI management and supportive care.    Marcell Corbin MD, FACC, TIEN, PILY, FACP  Director, Heart Failure Services  [facility]  , Department of Cardiology  [facility] CHIEF COMPLAINT:  Patient is a 77y old  Female who presents with a chief complaint of SOB today. (2018 03:36)      HPI:  76 y/o Female PMH HTN, prior CVA with residual L sided weakness, dementia with SOB for 3 hours, with acute onset, no inciting event. Pt has had SOB on and off for past few months, according to family. They have also noted occasional difficulty with swallowing.  Family denies fever, cough, headache, abd pain, nausea, vomiting, diarrhea, rash, paresthesia, and weakness. Finding of dilated esophagus with possible achalasia. Low-grade troponin release likely from ischemic demand. Resting and now appears more comfortable. Confused at times.    ALLERGIES:  No Known Allergies    Home Medications:  amlodipine-benazepril 5 mg-10 mg oral capsule: 1 cap(s) orally once a day (2018 19:01)  Aspirin Low Dose 81 mg oral delayed release tablet: 2 tab(s) orally once a day (2018 19:01)  atorvastatin 10 mg oral tablet: 1 tab(s) orally once a day (2018 19:01)  baclofen 10 mg oral tablet: orally once a day (2018 19:01)  donepezil 10 mg oral tablet: 1 tab(s) orally once a day (at bedtime) (2018 19:01)  metoprolol tartrate 100 mg oral tablet: orally once a day (2018 19:01)  omeprazole 20 mg oral delayed release tablet: 1 tab(s) orally once a day (2018 19:01)  sertraline 50 mg oral tablet: 1 tab(s) orally once a day (2018 19:01)      PAST MEDICAL & SURGICAL HISTORY:  Alzheimers disease  MI, old  CVA (cerebral vascular accident)  HTN (hypertension)  No significant past surgical history      FAMILY HISTORY:  No pertinent family history in first degree relatives      SOCIAL HISTORY:  Nonsmoker    REVIEW OF SYSTEMS:  General:  No wt loss, fevers, chills, night sweats  Eyes:  Good vision, no reported pain  ENT:  No sore throat, pain, runny nose, dysphagia  CV:  No pain, palpitations, hypo/hypertension  Resp:  No cough, tachypnea, wheezing; positive for dyspnea   GI:  No pain, nausea, vomiting, diarrhea, constipation  :  No pain, bleeding, incontinence, nocturia  Muscle:  No pain, weakness  Breast:  No pain, abscess, mass, discharge  Neuro:  No weakness, tingling, memory problems  Psych:  No fatigue, insomnia, mood problems, depression  Endocrine:  No polyuria, polydipsia, cold/heat intolerance  Heme:  No petechiae, ecchymosis, easy bruisability  Skin:  No rash, edema    PHYSICAL EXAM:  Vital Signs:  Vital Signs Last 24 Hrs  T(C): 36.1 (2018 11:52), Max: 37.2 (2018 18:05)  T(F): 97 (2018 11:52), Max: 98.9 (2018 18:05)  HR: 68 (2018 11:52) (68 - 78)  BP: 155/75 (2018 11:52) (108/51 - 155/75)  RR: 18 (2018 11:52) (18 - 21)  SpO2: 100% (2018 11:52) (94% - 100%)  I&O's Summary    2018 07:01  -  2018 07:00  --------------------------------------------------------  IN: 300 mL / OUT: 600 mL / NET: -300 mL    Tele: SR, PVCs  General:  Appears stated age, well-groomed, well-nourished, no distress  HEENT:  NC/AT, patent nares w/ pink mucosa, OP clear w/o lesions, EOMI, conjunctivae clear, no thyromegaly, nodules, adenopathy, no JVD  Chest:  Full & symmetric excursion, no increased effort, breath sounds clear  Cardiovascular:  Regular rhythm, S1, S2, no murmur/rub/S3/S4, no carotid/femoral/abdominal bruit, radial/pedal pulses 2+  Abdomen:  Soft, non-tender, non-distended, normoactive bowel sounds  Extremities: no edema  Skin:  No rash/erythema. Skin is warm/dry  Musculoskeletal:  N/A  Neuro/Psych:  Awake    LABORATORY:                          13.7   13.2  )-----------( 245      ( 2018 18:48 )             41.7     -04    139  |  104  |  15  ----------------------------<  185<H>  3.4<L>   |  24  |  1.41<H>    Ca    8.1<L>      2018 08:39    TPro  7.9  /  Alb  2.8<L>  /  TBili  0.4  /  DBili  x   /  AST  20  /  ALT  17  /  AlkPhos  160<H>  -03      CARDIAC MARKERS ( 2018 08:39 )  .035 ng/mL / x     / x     / x     / x      CARDIAC MARKERS ( 2018 19:18 )  .046 ng/mL / x     / x     / x     / x          LIVER FUNCTIONS - ( 2018 19:18 )  Alb: 2.8 g/dL / Pro: 7.9 gm/dL / ALK PHOS: 160 U/L / ALT: 17 U/L / AST: 20 U/L / GGT: x             Urinalysis Basic - ( 2018 04:16 )    Color: Yellow / Appearance: Clear / S.010 / pH: x  Gluc: x / Ketone: Negative  / Bili: Negative / Urobili: Negative mg/dL   Blood: x / Protein: 500 mg/dL / Nitrite: Negative   Leuk Esterase: Negative / RBC: x / WBC 0-2   Sq Epi: x / Non Sq Epi: Few / Bacteria: Few      IMAGING:  ecg: SR, inferolateral ST T abnormalities.    < from: CT Angio Chest w/ IV Cont (18 @ 01:06) >  Suboptimal evaluation of the subsegmental pulmonary arteries. No obvious   pulmonary embolus to the level of the segmental pulmonary arteries.     Nonspecific interlobular septal thickening and groundglass opacities,   predominantly in the upper lobes, which may represent pulmonary edema   given cardiomegaly and pleural effusion. Recommend clinical correlation   to assess underlying pneumonia. Small tree-in-bud/nodular opacities in   the left lobe, suggesting mucus impactedairways.    Marked fluid/air distended esophagus. Differential diagnosis includes   achalasia although distal esophagus lesion/neoplasm cannot be excluded.   Recommend follow-up.    < end of copied text >    < from: Xray Esophagram (18 @ 10:32) >  Dilated esophagus which tapers abruptly at the GE junction. Differential   diagnosis includes achalasia. Aspiration precautions are advised.     These findings were discussed with Dr. Laird at the completion of the   examination.    < end of copied text >      ASSESSMENT:  76 y/o Female PMH HTN, prior CVA with residual L sided weakness, dementia with SOB for 3 hours, with acute onset, no inciting event. Pt has had SOB on and off for past few months, according to family. They have also noted occasional difficulty with swallowing.  Family denies fever, cough, headache, abd pain, nausea, vomiting, diarrhea, rash, paresthesia, and weakness. Finding of dilated esophagus with possible achalasia. Low-grade troponin release likely from ischemic demand. Resting and now appears more comfortable. Confused at times.    PLAN:     Tele monitoring.    MEDICATIONS  (STANDING):  ALBUTerol/ipratropium for Nebulization 3 milliLiter(s) Nebulizer every 6 hours  amLODIPine   Tablet 2.5 milliGRAM(s) Oral daily  aspirin enteric coated 81 milliGRAM(s) Oral daily  atorvastatin 20 milliGRAM(s) Oral at bedtime  azithromycin  IVPB 500 milliGRAM(s) IV Intermittent every 24 hours  baclofen 10 milliGRAM(s) Oral daily  carvedilol 3.125 milliGRAM(s) Oral every 12 hours  donepezil 10 milliGRAM(s) Oral at bedtime  enoxaparin Injectable 30 milliGRAM(s) SubCutaneous daily  furosemide   Injectable 20 milliGRAM(s) IV Push daily  losartan 25 milliGRAM(s) Oral daily  pantoprazole    Tablet 40 milliGRAM(s) Oral before breakfast  piperacillin/tazobactam IVPB. 3.375 Gram(s) IV Intermittent every 12 hours  potassium chloride  10 mEq/100 mL IVPB 10 milliEquivalent(s) IV Intermittent every 1 hour    GI management and supportive care.  Echo is pending.    Marcell Corbin MD, FACC, FASE, FASNC, FACP  Director, Heart Failure Services  [facility]  , Department of Cardiology  [employer] [facility] [employer] CHIEF COMPLAINT:  Patient is a 77y old  Female who presents with a chief complaint of SOB today. (2018 03:36)      HPI:  78 y/o Female PMH HTN, prior CVA with residual L sided weakness, dementia with SOB for 3 hours, with acute onset, no inciting event. Pt has had SOB on and off for past few months, according to family. They have also noted occasional difficulty with swallowing.  Family denies fever, cough, headache, abd pain, nausea, vomiting, diarrhea, rash, paresthesia, and weakness. Finding of dilated esophagus with possible achalasia. Low-grade troponin release likely from ischemic demand. Resting and now appears more comfortable. Confused at times.    ALLERGIES:  No Known Allergies    Home Medications:  amlodipine-benazepril 5 mg-10 mg oral capsule: 1 cap(s) orally once a day (2018 19:01)  Aspirin Low Dose 81 mg oral delayed release tablet: 2 tab(s) orally once a day (2018 19:01)  atorvastatin 10 mg oral tablet: 1 tab(s) orally once a day (2018 19:01)  baclofen 10 mg oral tablet: orally once a day (2018 19:01)  donepezil 10 mg oral tablet: 1 tab(s) orally once a day (at bedtime) (2018 19:01)  metoprolol tartrate 100 mg oral tablet: orally once a day (2018 19:01)  omeprazole 20 mg oral delayed release tablet: 1 tab(s) orally once a day (2018 19:01)  sertraline 50 mg oral tablet: 1 tab(s) orally once a day (2018 19:01)      PAST MEDICAL & SURGICAL HISTORY:  Alzheimers disease  MI, old  CVA (cerebral vascular accident)  HTN (hypertension)  No significant past surgical history      FAMILY HISTORY:  No pertinent family history in first degree relatives      SOCIAL HISTORY:  Nonsmoker    REVIEW OF SYSTEMS:  Confused and unable to assess ROS at present.    PHYSICAL EXAM:  Vital Signs:  Vital Signs Last 24 Hrs  T(C): 36.1 (2018 11:52), Max: 37.2 (2018 18:05)  T(F): 97 (2018 11:52), Max: 98.9 (2018 18:05)  HR: 68 (2018 11:52) (68 - 78)  BP: 155/75 (2018 11:52) (108/51 - 155/75)  RR: 18 (2018 11:52) (18 - 21)  SpO2: 100% (2018 11:52) (94% - 100%)  I&O's Summary    2018 07:01  -  2018 07:00  --------------------------------------------------------  IN: 300 mL / OUT: 600 mL / NET: -300 mL    Tele: SR, PVCs  General:  Appears stated age, well-groomed, well-nourished, no distress  HEENT:  NC/AT, patent nares w/ pink mucosa, OP clear w/o lesions, EOMI, conjunctivae clear, no thyromegaly, nodules, adenopathy, no JVD  Chest:  Full & symmetric excursion, no increased effort, breath sounds clear  Cardiovascular:  Regular rhythm, S1, S2, no murmur/rub/S3/S4, no carotid/femoral/abdominal bruit, radial/pedal pulses 2+  Abdomen:  Soft, non-tender, non-distended, normoactive bowel sounds  Extremities: no edema  Skin:  No rash/erythema. Skin is warm/dry  Musculoskeletal:  N/A  Neuro/Psych:  Resting    LABORATORY:                          13.7   13.2  )-----------( 245      ( 2018 18:48 )             41.7     -    139  |  104  |  15  ----------------------------<  185<H>  3.4<L>   |  24  |  1.41<H>    Ca    8.1<L>      2018 08:39    TPro  7.9  /  Alb  2.8<L>  /  TBili  0.4  /  DBili  x   /  AST  20  /  ALT  17  /  AlkPhos  160<H>  01-03      CARDIAC MARKERS ( 2018 08:39 )  .035 ng/mL / x     / x     / x     / x      CARDIAC MARKERS ( 2018 19:18 )  .046 ng/mL / x     / x     / x     / x          LIVER FUNCTIONS - ( 2018 19:18 )  Alb: 2.8 g/dL / Pro: 7.9 gm/dL / ALK PHOS: 160 U/L / ALT: 17 U/L / AST: 20 U/L / GGT: x             Urinalysis Basic - ( 2018 04:16 )    Color: Yellow / Appearance: Clear / S.010 / pH: x  Gluc: x / Ketone: Negative  / Bili: Negative / Urobili: Negative mg/dL   Blood: x / Protein: 500 mg/dL / Nitrite: Negative   Leuk Esterase: Negative / RBC: x / WBC 0-2   Sq Epi: x / Non Sq Epi: Few / Bacteria: Few      IMAGING:  ecg: SR, inferolateral ST T abnormalities.    < from: CT Angio Chest w/ IV Cont (18 @ 01:06) >  Suboptimal evaluation of the subsegmental pulmonary arteries. No obvious   pulmonary embolus to the level of the segmental pulmonary arteries.     Nonspecific interlobular septal thickening and groundglass opacities,   predominantly in the upper lobes, which may represent pulmonary edema   given cardiomegaly and pleural effusion. Recommend clinical correlation   to assess underlying pneumonia. Small tree-in-bud/nodular opacities in   the left lobe, suggesting mucus impactedairways.    Marked fluid/air distended esophagus. Differential diagnosis includes   achalasia although distal esophagus lesion/neoplasm cannot be excluded.   Recommend follow-up.    < end of copied text >    < from: Xray Esophagram (18 @ 10:32) >  Dilated esophagus which tapers abruptly at the GE junction. Differential   diagnosis includes achalasia. Aspiration precautions are advised.     These findings were discussed with Dr. Laird at the completion of the   examination.    < end of copied text >      ASSESSMENT:  78 y/o Female PMH HTN, prior CVA with residual L sided weakness, dementia with SOB for 3 hours, with acute onset, no inciting event. Pt has had SOB on and off for past few months, according to family. They have also noted occasional difficulty with swallowing.  Family denies fever, cough, headache, abd pain, nausea, vomiting, diarrhea, rash, paresthesia, and weakness. Finding of dilated esophagus with possible achalasia. Low-grade troponin release likely from ischemic demand. Resting and now appears more comfortable. Confused at times.    PLAN:     Tele monitoring.    MEDICATIONS  (STANDING):  ALBUTerol/ipratropium for Nebulization 3 milliLiter(s) Nebulizer every 6 hours  amLODIPine   Tablet 2.5 milliGRAM(s) Oral daily  aspirin enteric coated 81 milliGRAM(s) Oral daily  atorvastatin 20 milliGRAM(s) Oral at bedtime  azithromycin  IVPB 500 milliGRAM(s) IV Intermittent every 24 hours  baclofen 10 milliGRAM(s) Oral daily  carvedilol 3.125 milliGRAM(s) Oral every 12 hours  donepezil 10 milliGRAM(s) Oral at bedtime  enoxaparin Injectable 30 milliGRAM(s) SubCutaneous daily  furosemide   Injectable 20 milliGRAM(s) IV Push daily  losartan 25 milliGRAM(s) Oral daily  pantoprazole    Tablet 40 milliGRAM(s) Oral before breakfast  piperacillin/tazobactam IVPB. 3.375 Gram(s) IV Intermittent every 12 hours  potassium chloride  10 mEq/100 mL IVPB 10 milliEquivalent(s) IV Intermittent every 1 hour    GI management and supportive care.  Echo is pending.    Marcell Corbin MD, FACC, PILY CHAUHAN, FACP  Director, Heart Failure Services  [facility]  , Department of Cardiology  [facility] CHIEF COMPLAINT:  Patient is a 77y old  Female who presents with a chief complaint of SOB today. (2018 03:36)      HPI:  78 y/o Female PMH HTN, prior CVA with residual L sided weakness, dementia with SOB for 3 hours, with acute onset, no inciting event. Pt has had SOB on and off for past few months, according to family. They have also noted occasional difficulty with swallowing.  Family denies fever, cough, headache, abd pain, nausea, vomiting, diarrhea, rash, paresthesia, and weakness. Finding of dilated esophagus with possible achalasia. Low-grade troponin release likely from ischemic demand. Resting and now appears more comfortable. Confused at times. No PE by CTA    ALLERGIES:  No Known Allergies    Home Medications:  amlodipine-benazepril 5 mg-10 mg oral capsule: 1 cap(s) orally once a day (2018 19:01)  Aspirin Low Dose 81 mg oral delayed release tablet: 2 tab(s) orally once a day (2018 19:01)  atorvastatin 10 mg oral tablet: 1 tab(s) orally once a day (2018 19:01)  baclofen 10 mg oral tablet: orally once a day (2018 19:01)  donepezil 10 mg oral tablet: 1 tab(s) orally once a day (at bedtime) (2018 19:01)  metoprolol tartrate 100 mg oral tablet: orally once a day (2018 19:01)  omeprazole 20 mg oral delayed release tablet: 1 tab(s) orally once a day (2018 19:01)  sertraline 50 mg oral tablet: 1 tab(s) orally once a day (2018 19:01)      PAST MEDICAL & SURGICAL HISTORY:  Alzheimers disease  MI, old  CVA (cerebral vascular accident)  HTN (hypertension)  No significant past surgical history      FAMILY HISTORY:  No pertinent family history in first degree relatives      SOCIAL HISTORY:  Nonsmoker    REVIEW OF SYSTEMS:  Confused and unable to assess ROS at present.    PHYSICAL EXAM:  Vital Signs:  Vital Signs Last 24 Hrs  T(C): 36.1 (2018 11:52), Max: 37.2 (2018 18:05)  T(F): 97 (2018 11:52), Max: 98.9 (2018 18:05)  HR: 68 (2018 11:52) (68 - 78)  BP: 155/75 (2018 11:52) (108/51 - 155/75)  RR: 18 (2018 11:52) (18 - 21)  SpO2: 100% (2018 11:52) (94% - 100%)  I&O's Summary    2018 07:01  -  2018 07:00  --------------------------------------------------------  IN: 300 mL / OUT: 600 mL / NET: -300 mL    Tele: SR, PVCs  General:  Appears stated age, well-groomed, well-nourished, no distress  HEENT:  NC/AT, patent nares w/ pink mucosa, OP clear w/o lesions, EOMI, conjunctivae clear, no thyromegaly, nodules, adenopathy, no JVD  Chest:  Full & symmetric excursion, no increased effort, breath sounds clear  Cardiovascular:  Regular rhythm, S1, S2, no murmur/rub/S3/S4, no carotid/femoral/abdominal bruit, radial/pedal pulses 2+  Abdomen:  Soft, non-tender, non-distended, normoactive bowel sounds  Extremities: no edema  Skin:  No rash/erythema. Skin is warm/dry  Musculoskeletal:  N/A  Neuro/Psych:  Resting    LABORATORY:                          13.7   13.2  )-----------( 245      ( 2018 18:48 )             41.7     -04    139  |  104  |  15  ----------------------------<  185<H>  3.4<L>   |  24  |  1.41<H>    Ca    8.1<L>      2018 08:39    TPro  7.9  /  Alb  2.8<L>  /  TBili  0.4  /  DBili  x   /  AST  20  /  ALT  17  /  AlkPhos  160<H>  01-03      CARDIAC MARKERS ( 2018 08:39 )  .035 ng/mL / x     / x     / x     / x      CARDIAC MARKERS ( 2018 19:18 )  .046 ng/mL / x     / x     / x     / x          LIVER FUNCTIONS - ( 2018 19:18 )  Alb: 2.8 g/dL / Pro: 7.9 gm/dL / ALK PHOS: 160 U/L / ALT: 17 U/L / AST: 20 U/L / GGT: x             Urinalysis Basic - ( 2018 04:16 )    Color: Yellow / Appearance: Clear / S.010 / pH: x  Gluc: x / Ketone: Negative  / Bili: Negative / Urobili: Negative mg/dL   Blood: x / Protein: 500 mg/dL / Nitrite: Negative   Leuk Esterase: Negative / RBC: x / WBC 0-2   Sq Epi: x / Non Sq Epi: Few / Bacteria: Few      IMAGING:  ecg: SR, inferolateral ST T abnormalities.    < from: CT Angio Chest w/ IV Cont (18 @ 01:06) >  Suboptimal evaluation of the subsegmental pulmonary arteries. No obvious   pulmonary embolus to the level of the segmental pulmonary arteries.     Nonspecific interlobular septal thickening and groundglass opacities,   predominantly in the upper lobes, which may represent pulmonary edema   given cardiomegaly and pleural effusion. Recommend clinical correlation   to assess underlying pneumonia. Small tree-in-bud/nodular opacities in   the left lobe, suggesting mucus impactedairways.    Marked fluid/air distended esophagus. Differential diagnosis includes   achalasia although distal esophagus lesion/neoplasm cannot be excluded.   Recommend follow-up.    < end of copied text >    < from: Xray Esophagram (18 @ 10:32) >  Dilated esophagus which tapers abruptly at the GE junction. Differential   diagnosis includes achalasia. Aspiration precautions are advised.     These findings were discussed with Dr. Laird at the completion of the   examination.    < end of copied text >      < from: CT Angio Chest w/ IV Cont (18 @ 01:06) >  Suboptimal evaluation of the subsegmental pulmonary arteries. No obvious   pulmonary embolus to the level of the segmental pulmonary arteries.     Nonspecific interlobular septal thickening and groundglass opacities,   predominantly in the upper lobes, which may represent pulmonary edema   given cardiomegaly and pleural effusion. Recommend clinical correlation   to assess underlying pneumonia. Small tree-in-bud/nodular opacities in   the left lobe, suggesting mucus impactedairways.    Marked fluid/air distended esophagus. Differential diagnosis includes   achalasia although distal esophagus lesion/neoplasm cannot be excluded.   Recommend follow-up.    < end of copied text >  ASSESSMENT:  78 y/o Female PMH HTN, prior CVA with residual L sided weakness, dementia with SOB for 3 hours, with acute onset, no inciting event. Pt has had SOB on and off for past few months, according to family. They have also noted occasional difficulty with swallowing.  Family denies fever, cough, headache, abd pain, nausea, vomiting, diarrhea, rash, paresthesia, and weakness. Finding of dilated esophagus with possible achalasia. Low-grade troponin release likely from ischemic demand. Resting and now appears more comfortable. Confused at times.    PLAN:     Tele monitoring.    MEDICATIONS  (STANDING):  ALBUTerol/ipratropium for Nebulization 3 milliLiter(s) Nebulizer every 6 hours  amLODIPine   Tablet 2.5 milliGRAM(s) Oral daily  aspirin enteric coated 81 milliGRAM(s) Oral daily  atorvastatin 20 milliGRAM(s) Oral at bedtime  azithromycin  IVPB 500 milliGRAM(s) IV Intermittent every 24 hours  baclofen 10 milliGRAM(s) Oral daily  carvedilol 3.125 milliGRAM(s) Oral every 12 hours  donepezil 10 milliGRAM(s) Oral at bedtime  enoxaparin Injectable 30 milliGRAM(s) SubCutaneous daily  furosemide   Injectable 20 milliGRAM(s) IV Push daily  losartan 25 milliGRAM(s) Oral daily  pantoprazole    Tablet 40 milliGRAM(s) Oral before breakfast  piperacillin/tazobactam IVPB. 3.375 Gram(s) IV Intermittent every 12 hours  potassium chloride  10 mEq/100 mL IVPB 10 milliEquivalent(s) IV Intermittent every 1 hour    GI management and supportive care.  Echo is pending.    Marcell Corbin MD, FACC, FASARUN, PIPERNC, FACP  Director, Heart Failure Services  [facility]  , Department of Cardiology  [employer] This is a Progress Note:    CHIEF COMPLAINT:  Patient is a 77y old  Female who presents with a chief complaint of SOB today. (2018 03:36)      HPI:  76 y/o Female PMH HTN, prior CVA with residual L sided weakness, dementia with SOB for 3 hours, with acute onset, no inciting event. Pt has had SOB on and off for past few months, according to family. They have also noted occasional difficulty with swallowing.  Family denies fever, cough, headache, abd pain, nausea, vomiting, diarrhea, rash, paresthesia, and weakness. Finding of dilated esophagus with possible achalasia. Low-grade troponin release likely from ischemic demand. Resting and now appears more comfortable. Confused at times. No PE by CTA    ALLERGIES:  No Known Allergies    Home Medications:  amlodipine-benazepril 5 mg-10 mg oral capsule: 1 cap(s) orally once a day (2018 19:01)  Aspirin Low Dose 81 mg oral delayed release tablet: 2 tab(s) orally once a day (2018 19:01)  atorvastatin 10 mg oral tablet: 1 tab(s) orally once a day (2018 19:01)  baclofen 10 mg oral tablet: orally once a day (2018 19:01)  donepezil 10 mg oral tablet: 1 tab(s) orally once a day (at bedtime) (2018 19:01)  metoprolol tartrate 100 mg oral tablet: orally once a day (2018 19:01)  omeprazole 20 mg oral delayed release tablet: 1 tab(s) orally once a day (2018 19:01)  sertraline 50 mg oral tablet: 1 tab(s) orally once a day (2018 19:01)      PAST MEDICAL & SURGICAL HISTORY:  Alzheimers disease  MI, old  CVA (cerebral vascular accident)  HTN (hypertension)  No significant past surgical history      FAMILY HISTORY:  No pertinent family history in first degree relatives      SOCIAL HISTORY:  Nonsmoker    REVIEW OF SYSTEMS:  Confused and unable to assess ROS at present.    PHYSICAL EXAM:  Vital Signs:  Vital Signs Last 24 Hrs  T(C): 36.1 (2018 11:52), Max: 37.2 (2018 18:05)  T(F): 97 (2018 11:52), Max: 98.9 (2018 18:05)  HR: 68 (2018 11:52) (68 - 78)  BP: 155/75 (2018 11:52) (108/51 - 155/75)  RR: 18 (2018 11:52) (18 - 21)  SpO2: 100% (2018 11:52) (94% - 100%)  I&O's Summary    2018 07:01  -  2018 07:00  --------------------------------------------------------  IN: 300 mL / OUT: 600 mL / NET: -300 mL    Tele: SR, PVCs  General:  Appears stated age, well-groomed, well-nourished, no distress  HEENT:  NC/AT, patent nares w/ pink mucosa, OP clear w/o lesions, EOMI, conjunctivae clear, no thyromegaly, nodules, adenopathy, no JVD  Chest:  Full & symmetric excursion, no increased effort, breath sounds clear  Cardiovascular:  Regular rhythm, S1, S2, no murmur/rub/S3/S4, no carotid/femoral/abdominal bruit, radial/pedal pulses 2+  Abdomen:  Soft, non-tender, non-distended, normoactive bowel sounds  Extremities: no edema  Skin:  No rash/erythema. Skin is warm/dry  Musculoskeletal:  N/A  Neuro/Psych:  Resting    LABORATORY:                          13.7   13.2  )-----------( 245      ( 2018 18:48 )             41.7     -    139  |  104  |  15  ----------------------------<  185<H>  3.4<L>   |  24  |  1.41<H>    Ca    8.1<L>      2018 08:39    TPro  7.9  /  Alb  2.8<L>  /  TBili  0.4  /  DBili  x   /  AST  20  /  ALT  17  /  AlkPhos  160<H>  01-03      CARDIAC MARKERS ( 2018 08:39 )  .035 ng/mL / x     / x     / x     / x      CARDIAC MARKERS ( 2018 19:18 )  .046 ng/mL / x     / x     / x     / x          LIVER FUNCTIONS - ( 2018 19:18 )  Alb: 2.8 g/dL / Pro: 7.9 gm/dL / ALK PHOS: 160 U/L / ALT: 17 U/L / AST: 20 U/L / GGT: x             Urinalysis Basic - ( 2018 04:16 )    Color: Yellow / Appearance: Clear / S.010 / pH: x  Gluc: x / Ketone: Negative  / Bili: Negative / Urobili: Negative mg/dL   Blood: x / Protein: 500 mg/dL / Nitrite: Negative   Leuk Esterase: Negative / RBC: x / WBC 0-2   Sq Epi: x / Non Sq Epi: Few / Bacteria: Few      IMAGING:  ecg: SR, inferolateral ST T abnormalities.    < from: CT Angio Chest w/ IV Cont (18 @ 01:06) >  Suboptimal evaluation of the subsegmental pulmonary arteries. No obvious   pulmonary embolus to the level of the segmental pulmonary arteries.     Nonspecific interlobular septal thickening and groundglass opacities,   predominantly in the upper lobes, which may represent pulmonary edema   given cardiomegaly and pleural effusion. Recommend clinical correlation   to assess underlying pneumonia. Small tree-in-bud/nodular opacities in   the left lobe, suggesting mucus impactedairways.    Marked fluid/air distended esophagus. Differential diagnosis includes   achalasia although distal esophagus lesion/neoplasm cannot be excluded.   Recommend follow-up.    < end of copied text >    < from: Xray Esophagram (18 @ 10:32) >  Dilated esophagus which tapers abruptly at the GE junction. Differential   diagnosis includes achalasia. Aspiration precautions are advised.     These findings were discussed with Dr. Laird at the completion of the   examination.    < end of copied text >      < from: CT Angio Chest w/ IV Cont (18 @ 01:06) >  Suboptimal evaluation of the subsegmental pulmonary arteries. No obvious   pulmonary embolus to the level of the segmental pulmonary arteries.     Nonspecific interlobular septal thickening and groundglass opacities,   predominantly in the upper lobes, which may represent pulmonary edema   given cardiomegaly and pleural effusion. Recommend clinical correlation   to assess underlying pneumonia. Small tree-in-bud/nodular opacities in   the left lobe, suggesting mucus impactedairways.    Marked fluid/air distended esophagus. Differential diagnosis includes   achalasia although distal esophagus lesion/neoplasm cannot be excluded.   Recommend follow-up.    < end of copied text >  ASSESSMENT:  76 y/o Female PMH HTN, prior CVA with residual L sided weakness, dementia with SOB for 3 hours, with acute onset, no inciting event. Pt has had SOB on and off for past few months, according to family. They have also noted occasional difficulty with swallowing.  Family denies fever, cough, headache, abd pain, nausea, vomiting, diarrhea, rash, paresthesia, and weakness. Finding of dilated esophagus with possible achalasia. Low-grade troponin release likely from ischemic demand. Resting and now appears more comfortable. Confused at times.    PLAN:     Tele monitoring.    MEDICATIONS  (STANDING):  ALBUTerol/ipratropium for Nebulization 3 milliLiter(s) Nebulizer every 6 hours  amLODIPine   Tablet 2.5 milliGRAM(s) Oral daily  aspirin enteric coated 81 milliGRAM(s) Oral daily  atorvastatin 20 milliGRAM(s) Oral at bedtime  azithromycin  IVPB 500 milliGRAM(s) IV Intermittent every 24 hours  baclofen 10 milliGRAM(s) Oral daily  carvedilol 3.125 milliGRAM(s) Oral every 12 hours  donepezil 10 milliGRAM(s) Oral at bedtime  enoxaparin Injectable 30 milliGRAM(s) SubCutaneous daily  furosemide   Injectable 20 milliGRAM(s) IV Push daily  losartan 25 milliGRAM(s) Oral daily  pantoprazole    Tablet 40 milliGRAM(s) Oral before breakfast  piperacillin/tazobactam IVPB. 3.375 Gram(s) IV Intermittent every 12 hours  potassium chloride  10 mEq/100 mL IVPB 10 milliEquivalent(s) IV Intermittent every 1 hour    GI management and supportive care.  Echo is pending.    Marcell Corbin MD, FACC, FASE, FASNC, FACP  Director, Heart Failure Services  [facility]  , Department of Cardiology  [employer]

## 2018-01-05 NOTE — PROGRESS NOTE ADULT - PROBLEM SELECTOR PLAN 3
- as per cards -likely ischemic demand - as per cards -likely ischemic demand  - on ASA,statin,  TTE pending  -d/c lasix 2/2 to worsening renal status, monitor

## 2018-01-05 NOTE — CONSULT NOTE ADULT - ASSESSMENT
HPI:  78 y/o Female PMH HTN, prior CVA with residual L sided weakness, dementia with SOB for 3 hours, with acute onset, no inciting event. Pt has had SOB on and off for past few months, according to family. They have also noted occasional difficulty with swallowing.  Family denies fever, cough, headache, abd pain, nausea, vomiting, diarrhea, rash, paresthesia, and weakness. (04 Jan 2018 03:36)  ----------------------------------------------------------------------------------------------------------------------------------------------------------------------  Consult called for dilated Esophagus. History obtained from daughter The patient has a history of dysphagia with certain solids. It started after the first CVA and worsened after the second CVA in June of 2016. The daughter  denies melena, hematochezia, hematemesis, nausea, vomiting, abdominal pain, constipation, diarrhea, or change in bowel movements. No history of esophageal problems. No weight loss. See Chest CT, Esophogram    ------dilated esophagus c/w  achalasia 1) EGD  2) Keep NPO except meds 3) will need dilatation HPI:  78 y/o Female PMH HTN, prior CVA with residual L sided weakness, dementia with SOB for 3 hours, with acute onset, no inciting event. Pt has had SOB on and off for past few months, according to family. They have also noted occasional difficulty with swallowing.  Family denies fever, cough, headache, abd pain, nausea, vomiting, diarrhea, rash, paresthesia, and weakness. (04 Jan 2018 03:36)  ----------------------------------------------------------------------------------------------------------------------------------------------------------------------  Consult called for dilated Esophagus. History obtained from daughter The patient has a history of dysphagia with certain solids. It started after the first CVA and worsened after the second CVA in June of 2016. The daughter  denies melena, hematochezia, hematemesis, nausea, vomiting, abdominal pain, constipation, diarrhea, or change in bowel movements. No history of esophageal problems. No weight loss. See Chest CT, Esophogram    ------dilated esophagus c/w  achalasia 1) EGD to confirm diagnosis  2) Keep NPO except meds 3) will need dilatation vs myomectomy if EGD confirms the diagnosis.

## 2018-01-05 NOTE — PROGRESS NOTE ADULT - SUBJECTIVE AND OBJECTIVE BOX
Patient is a 77y old  Female who presents with a chief complaint of SOB today. (2018 03:36)       OVERNIGHT EVENTS: difficulty swallowing contrast    MEDICATIONS  (STANDING):  ALBUTerol/ipratropium for Nebulization 3 milliLiter(s) Nebulizer every 6 hours  amLODIPine   Tablet 2.5 milliGRAM(s) Oral daily  aspirin enteric coated 81 milliGRAM(s) Oral daily  atorvastatin 20 milliGRAM(s) Oral at bedtime  azithromycin  IVPB 500 milliGRAM(s) IV Intermittent every 24 hours  baclofen 10 milliGRAM(s) Oral daily  carvedilol 3.125 milliGRAM(s) Oral every 12 hours  donepezil 10 milliGRAM(s) Oral at bedtime  enoxaparin Injectable 30 milliGRAM(s) SubCutaneous daily  furosemide   Injectable 20 milliGRAM(s) IV Push daily  losartan 25 milliGRAM(s) Oral daily  pantoprazole    Tablet 40 milliGRAM(s) Oral before breakfast  piperacillin/tazobactam IVPB. 3.375 Gram(s) IV Intermittent every 12 hours  potassium chloride  10 mEq/100 mL IVPB 10 milliEquivalent(s) IV Intermittent every 1 hour    MEDICATIONS  (PRN):         Vital Signs Last 24 Hrs  T(C): 36.1 (2018 11:52), Max: 37.2 (2018 18:05)  T(F): 97 (2018 11:52), Max: 98.9 (2018 18:05)  HR: 68 (2018 11:52) (68 - 78)  BP: 155/75 (2018 11:52) (108/51 - 155/75)  BP(mean): --  RR: 18 (2018 11:52) (18 - 21)  SpO2: 100% (2018 11:52) (94% - 100%)    PHYSICAL EXAM:  GENERAL: NAD, well-groomed, sitting in chair  HEAD:  Atraumatic, Normocephalic  EYES: EOMI, PERRLA, conjunctiva and sclera clear  ENMT: No tonsillar erythema, exudates, or enlargement; Moist mucous membranes   NECK: Supple, No JVD   NERVOUS SYSTEM:  Alert ,awake, Oriented to herself, confused  CHEST/LUNG: Clear to auscultation  bilaterally; No rales, rhonchi, wheezing, or rubs  HEART: Regular rate and rhythm; No murmurs, rubs, or gallops  ABDOMEN: Soft, Nontender, Nondistended; Bowel sounds present  EXTREMITIES:  2+ Peripheral Pulses, No clubbing, cyanosis, or edema  LYMPH: No lymphadenopathy noted  SKIN: No rashes or lesions    LABS:                        13.7   13.2  )-----------( 245      ( 2018 18:48 )             41.7     -    139  |  104  |  15  ----------------------------<  185<H>  3.4<L>   |  24  |  1.41<H>    Ca    8.1<L>      2018 08:39    TPro  7.9  /  Alb  2.8<L>  /  TBili  0.4  /  DBili  x   /  AST  20  /  ALT  17  /  AlkPhos  160<H>  -       cardiac markers   Urinalysis Basic - ( 2018 04:16 )    Color: Yellow / Appearance: Clear / S.010 / pH: x  Gluc: x / Ketone: Negative  / Bili: Negative / Urobili: Negative mg/dL   Blood: x / Protein: 500 mg/dL / Nitrite: Negative   Leuk Esterase: Negative / RBC: x / WBC 0-2   Sq Epi: x / Non Sq Epi: Few / Bacteria: Few      CAPILLARY BLOOD GLUCOSE        Cultures    RADIOLOGY & ADDITIONAL TESTS: < from: Xray Esophagram (18 @ 10:32) >  Dilated esophagus which tapers abruptly at the GE junction. Differential   diagnosis includes achalasia. Aspiration precautions are advised.       < end of copied text >      Imaging Personally Reviewed:  [ x] YES  [ ] NO    Consultant(s) Notes Reviewed:  [x ] YES  [ ] NO    Care Discussed with Consultants/Other Providers [x ] YES  [ ] NO

## 2018-01-05 NOTE — PROGRESS NOTE ADULT - ASSESSMENT
76 y/o Female PMH HTN, prior CVA with residual L sided weakness, dementia with SOB for 3 hours, with acute onset, no inciting event. Pt has had SOB on and off for past few months, according to family. They have also noted occasional difficulty with swallowing.

## 2018-01-05 NOTE — CONSULT NOTE ADULT - SUBJECTIVE AND OBJECTIVE BOX
HPI:  78 y/o Female PMH HTN, prior CVA with residual L sided weakness, dementia with SOB for 3 hours, with acute onset, no inciting event. Pt has had SOB on and off for past few months, according to family. They have also noted occasional difficulty with swallowing.  Family denies fever, cough, headache, abd pain, nausea, vomiting, diarrhea, rash, paresthesia, and weakness. (2018 03:36)  ----------------------------------------------------------------------------------------------------------------------------------------------------------------------  Consult called for dilated Esophagus. History obtained from daughter The patient has a history of dysphagia with certain solids. It started after the first CVA and worsened after the second CVA in 2016. The daughter  denies melena, hematochezia, hematemesis, nausea, vomiting, abdominal pain, constipation, diarrhea, or change in bowel movements. No history of esophageal problems. No weight loss. See Chest CT, Esophogram        PAST MEDICAL & SURGICAL HISTORY:  Alzheimers disease  MI, old  CVA (cerebral vascular accident)  HTN (hypertension)  No significant past surgical history      MEDICATIONS  (STANDING):  ALBUTerol/ipratropium for Nebulization 3 milliLiter(s) Nebulizer every 6 hours  amLODIPine   Tablet 2.5 milliGRAM(s) Oral daily  aspirin enteric coated 81 milliGRAM(s) Oral daily  atorvastatin 20 milliGRAM(s) Oral at bedtime  azithromycin  IVPB 500 milliGRAM(s) IV Intermittent every 24 hours  baclofen 10 milliGRAM(s) Oral daily  carvedilol 3.125 milliGRAM(s) Oral every 12 hours  donepezil 10 milliGRAM(s) Oral at bedtime  enoxaparin Injectable 30 milliGRAM(s) SubCutaneous daily  furosemide   Injectable 20 milliGRAM(s) IV Push daily  losartan 25 milliGRAM(s) Oral daily  pantoprazole    Tablet 40 milliGRAM(s) Oral before breakfast  piperacillin/tazobactam IVPB. 3.375 Gram(s) IV Intermittent every 12 hours    MEDICATIONS  (PRN):      Allergies    No Known Allergies    Intolerances        FAMILY HISTORY:  No pertinent family history in first degree relatives      REVIEW OF SYSTEMS:  Patient could not give any information - severe confusion    CONSTITUTIONAL: No fever, weight loss, or fatigue  EYES: No eye pain, visual disturbances, or discharge  ENMT:  No difficulty hearing, tinnitus, vertigo; No sinus or throat pain  NECK: No pain or stiffness  BREASTS: No pain, masses, or nipple discharge  RESPIRATORY: No cough, wheezing, chills or hemoptysis; No shortness of breath  CARDIOVASCULAR: No chest pain, palpitations, dizziness, or leg swelling  GASTROINTESTINAL: See above  GENITOURINARY: No dysuria, frequency, hematuria, or incontinence  NEUROLOGICAL: No headaches, memory loss, loss of strength, numbness, or tremors  SKIN: No itching, burning, rashes, or lesions   LYMPH NODES: No enlarged glands  ENDOCRINE: No heat or cold intolerance; No hair loss  MUSCULOSKELETAL: No joint pain or swelling; No muscle, back, or extremity pain  PSYCHIATRIC: No depression, anxiety, mood swings, or difficulty sleeping  HEME/LYMPH: No easy bruising, or bleeding gums  ALLERGY AND IMMUNOLOGIC: No hives or eczema          SOCIAL HISTORY:    FAMILY HISTORY:  No pertinent family history in first degree relatives      Vital Signs Last 24 Hrs  T(C): 36.1 (2018 04:36), Max: 37.2 (2018 18:05)  T(F): 97 (2018 04:36), Max: 98.9 (2018 18:05)  HR: 69 (2018 05:59) (62 - 78)  BP: 118/56 (2018 04:36) (108/51 - 118/56)  BP(mean): --  RR: 18 (2018 04:36) (18 - 21)  SpO2: 96% (2018 05:59) (94% - 98%)    PHYSICAL EXAM:    GENERAL: NAD, well-groomed, well-developed  HEAD:  Atraumatic, Normocephalic  EYES: EOMI, PERRLA, conjunctiva and sclera clear  NECK: Supple, No JVD, Normal thyroid  NERVOUS SYSTEM:  Alert but confused  CHEST/LUNG: Clear to percussion bilaterally; No rales, rhonchi, wheezing, or rubs  HEART: Regular rate and rhythm; No murmurs, rubs, or gallops  ABDOMEN: Soft, Nontender, Nondistended; Bowel sounds present  EXTREMITIES:  2+ Peripheral Pulses, No clubbing, cyanosis, or edema  LYMPH: No lymphadenopathy noted   RECTAL: deferred    SKIN: No rashes or lesions    LABS:                        13.7   13.2  )-----------( 245      ( 2018 18:48 )             41.7       CBC:   @ 18:48  WBC  13.2  HGB 13.7  HCT 41.7 Plate 245  MCV 91.0           2018 08:39    139    |  104    |  15     ----------------------------<  185    3.4     |  24     |  1.41   2018 19:18    140    |  104    |  13     ----------------------------<  101    3.8     |  28     |  1.29     Ca    8.1        2018 08:39  Ca    8.4        2018 19:18    TPro  7.9    /  Alb  2.8    /  TBili  0.4    /  DBili  x      /  AST  20     /  ALT  17     /  AlkPhos  160    2018 19:18      Urinalysis Basic - ( 2018 04:16 )    Color: Yellow / Appearance: Clear / S.010 / pH: x  Gluc: x / Ketone: Negative  / Bili: Negative / Urobili: Negative mg/dL   Blood: x / Protein: 500 mg/dL / Nitrite: Negative   Leuk Esterase: Negative / RBC: x / WBC 0-2   Sq Epi: x / Non Sq Epi: Few / Bacteria: Few          RADIOLOGY & ADDITIONAL STUDIES:  < from: CT Angio Chest w/ IV Cont (18 @ 01:06) >    EXAM:  CT ANGIO CHEST (W)AW IC                            PROCEDURE DATE:  2018          INTERPRETATION:  CLINICAL INFORMATION: Shortness of breath, positive   d-dimer, assess PE.     PROCEDURE: CT angiography of the chest was performed with intravenous   contrast utilizing dedicated PE protocol. 80 mls of Omnipaque-350   administered without complication. 20 ml discarded. Coronal and sagittal   reconstruction images were obtained. Axial MIP images were obtained from   a separate workstation.      COMPARISON: None.    FINDINGS: Artifact from the patient's respiratory motion and arms   degrading images.    LUNGS AND AIRWAYS: Peribronchial thickening with diffuse narrowed   airways. Compressive atelectasis of the lower lobes. Nonspecific   interlobular septal thickening and groundglass opacities, predominantly   in the upper lobes. Small tree-in-bud/nodular opacities in the left lobe,   suggesting mucus impacted airways.  PLEURA: No pneumothorax. Small left > right pleural effusion.  HEART: Mild cardiomegaly. No pericardial effusion. Aortic valve   calcification.  VESSELS:  Suboptimal contrast opacification of the subsegmental pulmonary   arteries. No obvious embolus to the level of the segmental pulmonary   arteries. Atherosclerotic change of the thoracic aorta, great vessel   origin and coronary arteries.  CHEST WALL AND LOWER NECK: Within normal limits.   MEDIASTINUM AND TONY: Subcentimeter mediastinal lymph nodes without   lymphadenopathy. Marked fluid/air distended esophagus.  UPPER ABDOMEN: Diverticulosis near the hepatic flexure. Atrophic right   kidney.  BONES: Degenerative changes/interventional ligament ossification of the   spine. Degenerative changes in bilateral glenohumeral and right   acromioclavicular joints.     IMPRESSION:    Suboptimal evaluation of the subsegmental pulmonary arteries. No obvious   pulmonary embolus to the level of the segmental pulmonary arteries.     Nonspecific interlobular septal thickening and groundglass opacities,   predominantly in the upper lobes, which may represent pulmonary edema   given cardiomegaly and pleural effusion. Recommend clinical correlation   to assess underlying pneumonia. Small tree-in-bud/nodular opacities in   the left lobe, suggesting mucus impactedairways.    Marked fluid/air distended esophagus. Differential diagnosis includes   achalasia although distal esophagus lesion/neoplasm cannot be excluded.   Recommend follow-up.                KEITH GARCIA M.D., ATTENDING RADIOLOGIST  This document has been electronically signed. 2018  1:46AM

## 2018-01-06 LAB
ANION GAP SERPL CALC-SCNC: 10 MMOL/L — SIGNIFICANT CHANGE UP (ref 5–17)
BUN SERPL-MCNC: 18 MG/DL — SIGNIFICANT CHANGE UP (ref 7–23)
CALCIUM SERPL-MCNC: 7.8 MG/DL — LOW (ref 8.5–10.1)
CHLORIDE SERPL-SCNC: 101 MMOL/L — SIGNIFICANT CHANGE UP (ref 96–108)
CO2 SERPL-SCNC: 31 MMOL/L — SIGNIFICANT CHANGE UP (ref 22–31)
CREAT SERPL-MCNC: 1.41 MG/DL — HIGH (ref 0.5–1.3)
GLUCOSE SERPL-MCNC: 90 MG/DL — SIGNIFICANT CHANGE UP (ref 70–99)
HCT VFR BLD CALC: 33 % — LOW (ref 34.5–45)
HGB BLD-MCNC: 11.3 G/DL — LOW (ref 11.5–15.5)
MAGNESIUM SERPL-MCNC: 2.1 MG/DL — SIGNIFICANT CHANGE UP (ref 1.6–2.6)
MCHC RBC-ENTMCNC: 31.1 PG — SIGNIFICANT CHANGE UP (ref 27–34)
MCHC RBC-ENTMCNC: 34.4 GM/DL — SIGNIFICANT CHANGE UP (ref 32–36)
MCV RBC AUTO: 90.5 FL — SIGNIFICANT CHANGE UP (ref 80–100)
PLATELET # BLD AUTO: 220 K/UL — SIGNIFICANT CHANGE UP (ref 150–400)
POTASSIUM SERPL-MCNC: 3 MMOL/L — LOW (ref 3.5–5.3)
POTASSIUM SERPL-SCNC: 3 MMOL/L — LOW (ref 3.5–5.3)
RBC # BLD: 3.64 M/UL — LOW (ref 3.8–5.2)
RBC # FLD: 13.1 % — SIGNIFICANT CHANGE UP (ref 11–15)
SODIUM SERPL-SCNC: 142 MMOL/L — SIGNIFICANT CHANGE UP (ref 135–145)
TROPONIN I SERPL-MCNC: 0.05 NG/ML — HIGH (ref 0.01–0.04)
WBC # BLD: 11.1 K/UL — HIGH (ref 3.8–10.5)
WBC # FLD AUTO: 11.1 K/UL — HIGH (ref 3.8–10.5)

## 2018-01-06 PROCEDURE — 93010 ELECTROCARDIOGRAM REPORT: CPT

## 2018-01-06 PROCEDURE — 71045 X-RAY EXAM CHEST 1 VIEW: CPT | Mod: 26

## 2018-01-06 PROCEDURE — 99233 SBSQ HOSP IP/OBS HIGH 50: CPT

## 2018-01-06 RX ORDER — POTASSIUM CHLORIDE 20 MEQ
20 PACKET (EA) ORAL
Qty: 0 | Refills: 0 | Status: COMPLETED | OUTPATIENT
Start: 2018-01-06 | End: 2018-01-06

## 2018-01-06 RX ADMIN — Medication 3 MILLILITER(S): at 05:59

## 2018-01-06 RX ADMIN — Medication 3 MILLILITER(S): at 00:31

## 2018-01-06 RX ADMIN — Medication 50 MILLIEQUIVALENT(S): at 17:41

## 2018-01-06 RX ADMIN — LOSARTAN POTASSIUM 25 MILLIGRAM(S): 100 TABLET, FILM COATED ORAL at 05:46

## 2018-01-06 RX ADMIN — SODIUM CHLORIDE 30 MILLILITER(S): 9 INJECTION, SOLUTION INTRAVENOUS at 12:12

## 2018-01-06 RX ADMIN — Medication 3 MILLILITER(S): at 17:05

## 2018-01-06 RX ADMIN — Medication 20 MILLIGRAM(S): at 05:45

## 2018-01-06 RX ADMIN — PIPERACILLIN AND TAZOBACTAM 25 GRAM(S): 4; .5 INJECTION, POWDER, LYOPHILIZED, FOR SOLUTION INTRAVENOUS at 17:42

## 2018-01-06 RX ADMIN — Medication 50 MILLIEQUIVALENT(S): at 21:40

## 2018-01-06 RX ADMIN — PIPERACILLIN AND TAZOBACTAM 25 GRAM(S): 4; .5 INJECTION, POWDER, LYOPHILIZED, FOR SOLUTION INTRAVENOUS at 05:46

## 2018-01-06 RX ADMIN — ENOXAPARIN SODIUM 30 MILLIGRAM(S): 100 INJECTION SUBCUTANEOUS at 12:12

## 2018-01-06 RX ADMIN — PANTOPRAZOLE SODIUM 40 MILLIGRAM(S): 20 TABLET, DELAYED RELEASE ORAL at 05:47

## 2018-01-06 RX ADMIN — Medication 3 MILLILITER(S): at 11:06

## 2018-01-06 RX ADMIN — AZITHROMYCIN 255 MILLIGRAM(S): 500 TABLET, FILM COATED ORAL at 00:55

## 2018-01-06 RX ADMIN — Medication 50 MILLIEQUIVALENT(S): at 12:11

## 2018-01-06 RX ADMIN — Medication 3 MILLILITER(S): at 00:35

## 2018-01-06 RX ADMIN — CARVEDILOL PHOSPHATE 3.12 MILLIGRAM(S): 80 CAPSULE, EXTENDED RELEASE ORAL at 05:46

## 2018-01-06 RX ADMIN — Medication 20 MILLIGRAM(S): at 17:44

## 2018-01-06 NOTE — DIETITIAN INITIAL EVALUATION ADULT. - NS AS NUTRI INTERV MEALS SNACK
Adv po diet when medically feasible pending ED 1/8/Other (specify) Adv po diet when medically feasible pending EGD 1/8/Other (specify)

## 2018-01-06 NOTE — DIETITIAN INITIAL EVALUATION ADULT. - OTHER INFO
Pt seen for RN consult 1/4 for difficulty chewing/swallowing. Pt with dilated esophagus & with hx of dysphagia with certain solid food started after 1st CVA & worsened after 2nd CVA in June 2016. Pt presents with dilated Esophagus & pending EGD Monday 1/8.  Unable to obtain wt hx & previous diet hx.  Last BM x 1(1/5).

## 2018-01-06 NOTE — PROGRESS NOTE ADULT - SUBJECTIVE AND OBJECTIVE BOX
Patient is a 77y old  Female who presents with a chief complaint of SOB today. (04 Jan 2018 03:36)      HPI:  76 y/o Female PMH HTN, prior CVA with residual L sided weakness, dementia with SOB for 3 hours, with acute onset, no inciting event. Pt has had SOB on and off for past few months, according to family. They have also noted occasional difficulty with swallowing.  Family denies fever, cough, headache, abd pain, nausea, vomiting, diarrhea, rash, paresthesia, and weakness. (04 Jan 2018 03:36)      INTERVAL HPI/OVERNIGHT EVENTS:  No new c/o    MEDICATIONS  (STANDING):  acetylcysteine 10% Inhalation 3 milliLiter(s) Inhalation every 6 hours  ALBUTerol/ipratropium for Nebulization 3 milliLiter(s) Nebulizer every 6 hours  aspirin enteric coated 81 milliGRAM(s) Oral daily  atorvastatin 20 milliGRAM(s) Oral at bedtime  azithromycin  IVPB 500 milliGRAM(s) IV Intermittent every 24 hours  baclofen 10 milliGRAM(s) Oral daily  carvedilol 3.125 milliGRAM(s) Oral every 12 hours  dextrose 5%. 1000 milliLiter(s) (30 mL/Hr) IV Continuous <Continuous>  donepezil 10 milliGRAM(s) Oral at bedtime  enoxaparin Injectable 30 milliGRAM(s) SubCutaneous daily  furosemide   Injectable 20 milliGRAM(s) IV Push every 12 hours  losartan 25 milliGRAM(s) Oral daily  pantoprazole    Tablet 40 milliGRAM(s) Oral before breakfast  piperacillin/tazobactam IVPB. 3.375 Gram(s) IV Intermittent every 12 hours  potassium chloride  20 mEq/100 mL IVPB 20 milliEquivalent(s) IV Intermittent every 2 hours    MEDICATIONS  (PRN):      FAMILY HISTORY:  No pertinent family history in first degree relatives      Allergies    No Known Allergies    Intolerances        PMH/PSH:  Alzheimers disease  MI, old  CVA (cerebral vascular accident)  HTN (hypertension)  No pertinent past medical history  No significant past surgical history        REVIEW OF SYSTEMS:  Confused, poor historian  CONSTITUTIONAL: No fever, weight loss, or fatigue  EYES: No eye pain, visual disturbances, or discharge  ENMT:  No difficulty hearing, tinnitus, vertigo; No sinus or throat pain  NECK: No pain or stiffness  BREASTS: No pain, masses, or nipple discharge  RESPIRATORY: No cough, wheezing, chills or hemoptysis; No shortness of breath  CARDIOVASCULAR: No chest pain, palpitations, dizziness, or leg swelling  GASTROINTESTINAL: No abdominal or epigastric pain. No nausea, vomiting, or hematemesis; No diarrhea or constipation. No melena or hematochezia.  GENITOURINARY: No dysuria, frequency, hematuria, or incontinence  NEUROLOGICAL: No headaches, memory loss, loss of strength, numbness, or tremors  SKIN: No itching, burning, rashes, or lesions   LYMPH NODES: No enlarged glands  ENDOCRINE: No heat or cold intolerance; No hair loss  MUSCULOSKELETAL: No joint pain or swelling; No muscle, back, or extremity pain  PSYCHIATRIC: No depression, anxiety, mood swings, or difficulty sleeping  HEME/LYMPH: No easy bruising, or bleeding gums  ALLERGY AND IMMUNOLOGIC: No hives or eczema    Vital Signs Last 24 Hrs  T(C): 36.9 (06 Jan 2018 17:10), Max: 37.8 (05 Jan 2018 23:28)  T(F): 98.4 (06 Jan 2018 17:10), Max: 100 (05 Jan 2018 23:28)  HR: 88 (06 Jan 2018 17:10) (70 - 89)  BP: 142/84 (06 Jan 2018 17:10) (131/60 - 167/72)  BP(mean): --  RR: 16 (06 Jan 2018 17:10) (16 - 18)  SpO2: 99% (06 Jan 2018 17:10) (97% - 100%)    PHYSICAL EXAM:  GENERAL: NAD, well-groomed, well-developed  HEAD:  Atraumatic, Normocephalic  EYES: EOMI, PERRLA, conjunctiva and sclera clear  ENMT: No tonsillar erythema, exudates, or enlargement; Moist mucous membranes, Good dentition, No lesions  NECK: Supple, No JVD, Normal thyroid  NERVOUS SYSTEM:  Alert & Oriented X3, Good concentration; Motor Strength 5/5 B/L upper and lower extremities; DTRs 2+ intact and symmetric  CHEST/LUNG: Clear to percussion bilaterally; No rales, rhonchi, wheezing, or rubs  HEART: Regular rate and rhythm; No murmurs, rubs, or gallops  ABDOMEN: Soft, Nontender, Nondistended; Bowel sounds present  EXTREMITIES:  2+ Peripheral Pulses, No clubbing, cyanosis, or edema  LYMPH: No lymphadenopathy noted  SKIN: No rashes or lesions    LAB                          11.3   11.1  )-----------( 220      ( 06 Jan 2018 08:47 )             33.0       CBC:  01-06 @ 08:47  WBC 11.1   Hgb 11.3   Hct 33.0   Plts 220  MCV 90.5  01-03 @ 18:48  WBC 13.2   Hgb 13.7   Hct 41.7   Plts 245  MCV 91.0      Chemistry:  01-06 @ 08:47  Na+ 142  K+ 3.0  Cl- 101  CO2 31  BUN 18  Cr 1.41     01-04 @ 08:39  Na+ 139  K+ 3.4  Cl- 104  CO2 24  BUN 15  Cr 1.41     01-03 @ 19:18  Na+ 140  K+ 3.8  Cl- 104  CO2 28  BUN 13  Cr 1.29         Glucose, Serum: 90 mg/dL (01-06 @ 08:47)  Glucose, Serum: 185 mg/dL (01-04 @ 08:39)  Glucose, Serum: 101 mg/dL (01-03 @ 19:18)      06 Jan 2018 08:47    142    |  101    |  18     ----------------------------<  90     3.0     |  31     |  1.41   04 Jan 2018 08:39    139    |  104    |  15     ----------------------------<  185    3.4     |  24     |  1.41   03 Jan 2018 19:18    140    |  104    |  13     ----------------------------<  101    3.8     |  28     |  1.29     Ca    7.8        06 Jan 2018 08:47  Ca    8.1        04 Jan 2018 08:39  Ca    8.4        03 Jan 2018 19:18  Mg     2.1       06 Jan 2018 08:47    TPro  7.9    /  Alb  2.8    /  TBili  0.4    /  DBili  x      /  AST  20     /  ALT  17     /  AlkPhos  160    03 Jan 2018 19:18              CAPILLARY BLOOD GLUCOSE              RADIOLOGY & ADDITIONAL TESTS:    Imaging Personally Reviewed:  [ ] YES  [ ] NO    Consultant(s) Notes Reviewed:  [ ] YES  [ ] NO    Care Discussed with Consultants/Other Providers [ ] YES  [ ] NO

## 2018-01-06 NOTE — PROGRESS NOTE ADULT - ASSESSMENT
HPI:  76 y/o Female PMH HTN, prior CVA with residual L sided weakness, dementia with SOB for 3 hours, with acute onset, no inciting event. Pt has had SOB on and off for past few months, according to family. They have also noted occasional difficulty with swallowing.  Family denies fever, cough, headache, abd pain, nausea, vomiting, diarrhea, rash, paresthesia, and weakness. (04 Jan 2018 03:36)  ----------------------------------------------------------------------------------------------------------------------------------------------------------------------  Consult called for dilated Esophagus. History obtained from daughter The patient has a history of dysphagia with certain solids. It started after the first CVA and worsened after the second CVA in June of 2016. The daughter  denies melena, hematochezia, hematemesis, nausea, vomiting, abdominal pain, constipation, diarrhea, or change in bowel movements. No history of esophageal problems. No weight loss. See Chest CT, Esophogram    ------dilated esophagus c/w  achalasia 1) EGD to confirm diagnosis  2) Keep NPO except meds 3) will need dilatation vs myomectomy if EGD confirms the diagnosis.  [id number]  --------------------------As above.  Discussed diagnosis and treatment with daughter   Recommended EGD - Discussed R/B/A Consent obtained, DNR rescinded for procedure / PACU only.

## 2018-01-06 NOTE — PROGRESS NOTE ADULT - SUBJECTIVE AND OBJECTIVE BOX
Patient is a 77y old  Female who presents with a chief complaint of SOB today. (04 Jan 2018 03:36)       OVERNIGHT EVENTS: RRT last night for dyspnea     MEDICATIONS  (STANDING):  acetylcysteine 10% Inhalation 3 milliLiter(s) Inhalation every 6 hours  ALBUTerol/ipratropium for Nebulization 3 milliLiter(s) Nebulizer every 6 hours  aspirin enteric coated 81 milliGRAM(s) Oral daily  atorvastatin 20 milliGRAM(s) Oral at bedtime  azithromycin  IVPB 500 milliGRAM(s) IV Intermittent every 24 hours  baclofen 10 milliGRAM(s) Oral daily  carvedilol 3.125 milliGRAM(s) Oral every 12 hours  dextrose 5%. 1000 milliLiter(s) (30 mL/Hr) IV Continuous <Continuous>  donepezil 10 milliGRAM(s) Oral at bedtime  enoxaparin Injectable 30 milliGRAM(s) SubCutaneous daily  furosemide   Injectable 20 milliGRAM(s) IV Push every 12 hours  losartan 25 milliGRAM(s) Oral daily  pantoprazole    Tablet 40 milliGRAM(s) Oral before breakfast  piperacillin/tazobactam IVPB. 3.375 Gram(s) IV Intermittent every 12 hours    MEDICATIONS  (PRN):       Vital Signs Last 24 Hrs  T(C): 36.6 (06 Jan 2018 10:50), Max: 37.8 (05 Jan 2018 23:28)  T(F): 97.9 (06 Jan 2018 10:50), Max: 100 (05 Jan 2018 23:28)  HR: 70 (06 Jan 2018 10:50) (68 - 89)  BP: 131/60 (06 Jan 2018 10:50) (131/60 - 167/72)  BP(mean): --  RR: 17 (06 Jan 2018 10:50) (17 - 18)  SpO2: 100% (06 Jan 2018 10:50) (95% - 100%)     PHYSICAL EXAM:  GENERAL: NAD, well-groomed, sleeping, easily arousable  HEAD:  Atraumatic, Normocephalic  EYES: EOMI, PERRLA, conjunctiva and sclera clear  ENMT: No tonsillar erythema, exudates, or enlargement; Moist mucous membranes   NECK: Supple, No JVD   NERVOUS SYSTEM:  Alert ,awake, Oriented to herself, confused  CHEST/LUNG: +rales   HEART: Regular rate and rhythm; No murmurs, rubs, or gallops  ABDOMEN: Soft, Nontender, Nondistended; Bowel sounds present  EXTREMITIES:  2+ Peripheral Pulses, No clubbing, cyanosis, or edema  LYMPH: No lymphadenopathy noted  SKIN: No rashes or lesions    LABS:                        11.3   11.1  )-----------( 220      ( 06 Jan 2018 08:47 )             33.0     01-06    142  |  101  |  18  ----------------------------<  90  3.0<L>   |  31  |  1.41<H>    Ca    7.8<L>      06 Jan 2018 08:47         cardiac markers Troponin .054  CK --      CAPILLARY BLOOD GLUCOSE      POCT Blood Glucose.: 99 mg/dL (05 Jan 2018 17:46)    Cultures    RADIOLOGY & ADDITIONAL TESTS:    Imaging Personally Reviewed:  [ ] YES  [ ] NO    Consultant(s) Notes Reviewed:  [ ] YES  [ ] NO    Care Discussed with Consultants/Other Providers [ ] YES  [ ] NO

## 2018-01-06 NOTE — DIETITIAN INITIAL EVALUATION ADULT. - PERTINENT LABORATORY DATA
01-06 Na 142 mmol/L Glu 90 mg/dL K+ 3.0 mmol/L<L> Cr  1.41 mg/dL<H> BUN 18 mg/dL Phos n/a   Alb 2.8(1/3)   PAB n/a   Hgb 11.3 g/dL<L> Hct 33.0 %<L>, WBC=11.1(1/6), WVJ=[id number], +RVP

## 2018-01-06 NOTE — PROGRESS NOTE ADULT - PROBLEM SELECTOR PLAN 3
- as per cards -likely ischemic demand  - on ASA,statin,  TTE pending  -on  lasix  20mg IV , monitor renal status

## 2018-01-06 NOTE — DIETITIAN INITIAL EVALUATION ADULT. - PERTINENT MEDS FT
Lovenox, Ecotrin, Zosyn, IVF D5 @30ml/hr, Lasix, Zithromax, Cozaar, Mucomyst, Coreg, Lioresal, Aricept, Protonix, Lipitor, Albuterol

## 2018-01-07 LAB
ANION GAP SERPL CALC-SCNC: 8 MMOL/L — SIGNIFICANT CHANGE UP (ref 5–17)
APTT BLD: 26.3 SEC — LOW (ref 27.5–37.4)
BUN SERPL-MCNC: 15 MG/DL — SIGNIFICANT CHANGE UP (ref 7–23)
CALCIUM SERPL-MCNC: 8.6 MG/DL — SIGNIFICANT CHANGE UP (ref 8.5–10.1)
CHLORIDE SERPL-SCNC: 97 MMOL/L — SIGNIFICANT CHANGE UP (ref 96–108)
CO2 SERPL-SCNC: 33 MMOL/L — HIGH (ref 22–31)
CREAT SERPL-MCNC: 1.4 MG/DL — HIGH (ref 0.5–1.3)
GLUCOSE SERPL-MCNC: 98 MG/DL — SIGNIFICANT CHANGE UP (ref 70–99)
HCT VFR BLD CALC: 38.6 % — SIGNIFICANT CHANGE UP (ref 34.5–45)
HGB BLD-MCNC: 12.9 G/DL — SIGNIFICANT CHANGE UP (ref 11.5–15.5)
INR BLD: 1.18 RATIO — HIGH (ref 0.88–1.16)
LACTATE SERPL-SCNC: 0.8 MMOL/L — SIGNIFICANT CHANGE UP (ref 0.7–2)
MCHC RBC-ENTMCNC: 30.1 PG — SIGNIFICANT CHANGE UP (ref 27–34)
MCHC RBC-ENTMCNC: 33.3 GM/DL — SIGNIFICANT CHANGE UP (ref 32–36)
MCV RBC AUTO: 90.3 FL — SIGNIFICANT CHANGE UP (ref 80–100)
PLATELET # BLD AUTO: 243 K/UL — SIGNIFICANT CHANGE UP (ref 150–400)
POTASSIUM SERPL-MCNC: 3.4 MMOL/L — LOW (ref 3.5–5.3)
POTASSIUM SERPL-SCNC: 3.4 MMOL/L — LOW (ref 3.5–5.3)
PROTHROM AB SERPL-ACNC: 12.9 SEC — HIGH (ref 9.8–12.7)
RBC # BLD: 4.28 M/UL — SIGNIFICANT CHANGE UP (ref 3.8–5.2)
RBC # FLD: 13 % — SIGNIFICANT CHANGE UP (ref 11–15)
SODIUM SERPL-SCNC: 138 MMOL/L — SIGNIFICANT CHANGE UP (ref 135–145)
WBC # BLD: 7.8 K/UL — SIGNIFICANT CHANGE UP (ref 3.8–10.5)
WBC # FLD AUTO: 7.8 K/UL — SIGNIFICANT CHANGE UP (ref 3.8–10.5)

## 2018-01-07 PROCEDURE — 99233 SBSQ HOSP IP/OBS HIGH 50: CPT

## 2018-01-07 RX ORDER — ACETAMINOPHEN 500 MG
650 TABLET ORAL EVERY 6 HOURS
Qty: 0 | Refills: 0 | Status: DISCONTINUED | OUTPATIENT
Start: 2018-01-07 | End: 2018-01-10

## 2018-01-07 RX ORDER — POTASSIUM CHLORIDE 20 MEQ
20 PACKET (EA) ORAL ONCE
Qty: 0 | Refills: 0 | Status: COMPLETED | OUTPATIENT
Start: 2018-01-07 | End: 2018-01-07

## 2018-01-07 RX ADMIN — Medication 3 MILLILITER(S): at 17:08

## 2018-01-07 RX ADMIN — Medication 20 MILLIGRAM(S): at 05:42

## 2018-01-07 RX ADMIN — Medication 3 MILLILITER(S): at 05:48

## 2018-01-07 RX ADMIN — Medication 3 MILLILITER(S): at 05:49

## 2018-01-07 RX ADMIN — Medication 650 MILLIGRAM(S): at 05:37

## 2018-01-07 RX ADMIN — Medication 3 MILLILITER(S): at 00:00

## 2018-01-07 RX ADMIN — AZITHROMYCIN 255 MILLIGRAM(S): 500 TABLET, FILM COATED ORAL at 00:05

## 2018-01-07 RX ADMIN — Medication 50 MILLIEQUIVALENT(S): at 13:05

## 2018-01-07 RX ADMIN — ENOXAPARIN SODIUM 30 MILLIGRAM(S): 100 INJECTION SUBCUTANEOUS at 13:05

## 2018-01-07 RX ADMIN — PIPERACILLIN AND TAZOBACTAM 25 GRAM(S): 4; .5 INJECTION, POWDER, LYOPHILIZED, FOR SOLUTION INTRAVENOUS at 05:42

## 2018-01-07 RX ADMIN — Medication 20 MILLIGRAM(S): at 17:34

## 2018-01-07 RX ADMIN — PIPERACILLIN AND TAZOBACTAM 25 GRAM(S): 4; .5 INJECTION, POWDER, LYOPHILIZED, FOR SOLUTION INTRAVENOUS at 17:35

## 2018-01-07 NOTE — PROGRESS NOTE ADULT - SUBJECTIVE AND OBJECTIVE BOX
Patient is a 77y old  Female who presents with a chief complaint of SOB today. (04 Jan 2018 03:36)       OVERNIGHT EVENTS:    MEDICATIONS  (STANDING):  acetylcysteine 10% Inhalation 3 milliLiter(s) Inhalation every 6 hours  ALBUTerol/ipratropium for Nebulization 3 milliLiter(s) Nebulizer every 6 hours  aspirin enteric coated 81 milliGRAM(s) Oral daily  atorvastatin 20 milliGRAM(s) Oral at bedtime  azithromycin  IVPB 500 milliGRAM(s) IV Intermittent every 24 hours  baclofen 10 milliGRAM(s) Oral daily  carvedilol 3.125 milliGRAM(s) Oral every 12 hours  dextrose 5%. 1000 milliLiter(s) (30 mL/Hr) IV Continuous <Continuous>  donepezil 10 milliGRAM(s) Oral at bedtime  enoxaparin Injectable 30 milliGRAM(s) SubCutaneous daily  furosemide   Injectable 20 milliGRAM(s) IV Push every 12 hours  losartan 25 milliGRAM(s) Oral daily  pantoprazole    Tablet 40 milliGRAM(s) Oral before breakfast  piperacillin/tazobactam IVPB. 3.375 Gram(s) IV Intermittent every 12 hours    MEDICATIONS  (PRN):  acetaminophen  Suppository 650 milliGRAM(s) Rectal every 6 hours PRN For Temp greater than 38 C (100.4 F)     Vital Signs Last 24 Hrs  T(C): 38.1 (07 Jan 2018 05:10), Max: 38.1 (07 Jan 2018 05:10)  T(F): 100.5 (07 Jan 2018 05:10), Max: 100.5 (07 Jan 2018 05:10)  HR: 89 (07 Jan 2018 05:49) (83 - 93)  BP: 150/65 (07 Jan 2018 05:10) (142/84 - 151/68)  BP(mean): --  RR: 17 (07 Jan 2018 05:10) (16 - 17)  SpO2: 99% (07 Jan 2018 05:49) (96% - 99%)     PHYSICAL EXAM:  GENERAL: NAD, well-groomed, sleeping, easily arousable  HEAD:  Atraumatic, Normocephalic  EYES: EOMI, PERRLA, conjunctiva and sclera clear  ENMT: No tonsillar erythema, exudates, or enlargement; Moist mucous membranes   NECK: Supple, No JVD   NERVOUS SYSTEM:  Alert ,awake, Oriented to herself, confused  CHEST/LUNG: CTA b/l   HEART: Regular rate and rhythm; No murmurs, rubs, or gallops  ABDOMEN: Soft, Nontender, Nondistended; Bowel sounds present  EXTREMITIES:  2+ Peripheral Pulses, No clubbing, cyanosis, or edema  LYMPH: No lymphadenopathy noted  SKIN: No rashes or lesions        LABS:                        12.9   7.8   )-----------( 243      ( 07 Jan 2018 07:14 )             38.6     01-07    138  |  97  |  15  ----------------------------<  98  3.4<L>   |  33<H>  |  1.40<H>    Ca    8.6      07 Jan 2018 07:14  Mg     2.1     01-06      PT/INR - ( 07 Jan 2018 07:14 )   PT: 12.9 sec;   INR: 1.18 ratio         PTT - ( 07 Jan 2018 07:14 )  PTT:26.3 sec   cardiac markers     CAPILLARY BLOOD GLUCOSE        Cultures    RADIOLOGY & ADDITIONAL TESTS:    Imaging Personally Reviewed:  [x ] YES  [ ] NO    Consultant(s) Notes Reviewed:  [x ] YES  [ ] NO    Care Discussed with Consultants/Other Providers [x ] YES  [ ] NO

## 2018-01-07 NOTE — PROGRESS NOTE ADULT - ASSESSMENT
HPI:  76 y/o Female PMH HTN, prior CVA with residual L sided weakness, dementia with SOB for 3 hours, with acute onset, no inciting event. Pt has had SOB on and off for past few months, according to family. They have also noted occasional difficulty with swallowing.  Family denies fever, cough, headache, abd pain, nausea, vomiting, diarrhea, rash, paresthesia, and weakness. (04 Jan 2018 03:36)  ----------------------------------------------------------------------------------------------------------------------------------------------------------------------  Consult called for dilated Esophagus. History obtained from daughter The patient has a history of dysphagia with certain solids. It started after the first CVA and worsened after the second CVA in June of 2016. The daughter  denies melena, hematochezia, hematemesis, nausea, vomiting, abdominal pain, constipation, diarrhea, or change in bowel movements. No history of esophageal problems. No weight loss. See Chest CT, Esophogram    ------dilated esophagus c/w  achalasia 1) EGD to confirm diagnosis  2) Keep NPO except meds 3) will need dilatation vs myomectomy if EGD confirms the diagnosis.  [id number]  --------------------------As above.  Discussed diagnosis and treatment with daughter Saturday   Recommended EGD - Discussed R/B/A Consent obtained, DNR rescinded for procedure / PACU only.  PT/INR minimally elevated. Tmax 100.5, For EGD in AM. Anti coagulation held.

## 2018-01-07 NOTE — PROGRESS NOTE ADULT - SUBJECTIVE AND OBJECTIVE BOX
Patient is a 77y old  Female who presents with a chief complaint of SOB today. (04 Jan 2018 03:36)      HPI:  76 y/o Female PMH HTN, prior CVA with residual L sided weakness, dementia with SOB for 3 hours, with acute onset, no inciting event. Pt has had SOB on and off for past few months, according to family. They have also noted occasional difficulty with swallowing.  Family denies fever, cough, headache, abd pain, nausea, vomiting, diarrhea, rash, paresthesia, and weakness. (04 Jan 2018 03:36)      INTERVAL HPI/OVERNIGHT EVENTS:  NPO, no new GI complaints.    MEDICATIONS  (STANDING):  acetylcysteine 10% Inhalation 3 milliLiter(s) Inhalation every 6 hours  ALBUTerol/ipratropium for Nebulization 3 milliLiter(s) Nebulizer every 6 hours  atorvastatin 20 milliGRAM(s) Oral at bedtime  azithromycin  IVPB 500 milliGRAM(s) IV Intermittent every 24 hours  baclofen 10 milliGRAM(s) Oral daily  carvedilol 3.125 milliGRAM(s) Oral every 12 hours  dextrose 5%. 1000 milliLiter(s) (30 mL/Hr) IV Continuous <Continuous>  donepezil 10 milliGRAM(s) Oral at bedtime  furosemide   Injectable 20 milliGRAM(s) IV Push every 12 hours  losartan 25 milliGRAM(s) Oral daily  pantoprazole    Tablet 40 milliGRAM(s) Oral before breakfast  piperacillin/tazobactam IVPB. 3.375 Gram(s) IV Intermittent every 12 hours    MEDICATIONS  (PRN):  acetaminophen  Suppository 650 milliGRAM(s) Rectal every 6 hours PRN For Temp greater than 38 C (100.4 F)      FAMILY HISTORY:  No pertinent family history in first degree relatives      Allergies    No Known Allergies    Intolerances        PMH/PSH:  Alzheimers disease  MI, old  CVA (cerebral vascular accident)  HTN (hypertension)  No pertinent past medical history  No significant past surgical history        REVIEW OF SYSTEMS:  Confused, can't give history  CONSTITUTIONAL: No fever, weight loss, or fatigue  EYES: No eye pain, visual disturbances, or discharge  ENMT:  No difficulty hearing, tinnitus, vertigo; No sinus or throat pain  NECK: No pain or stiffness  BREASTS: No pain, masses, or nipple discharge  RESPIRATORY: No cough, wheezing, chills or hemoptysis; No shortness of breath  CARDIOVASCULAR: No chest pain, palpitations, dizziness, or leg swelling  GASTROINTESTINAL: No abdominal or epigastric pain. No nausea, vomiting, or hematemesis; No diarrhea or constipation. No melena or hematochezia.  GENITOURINARY: No dysuria, frequency, hematuria, or incontinence  NEUROLOGICAL: No headaches, memory loss, loss of strength, numbness, or tremors  SKIN: No itching, burning, rashes, or lesions   LYMPH NODES: No enlarged glands      Vital Signs Last 24 Hrs  T(C): 36.4 (07 Jan 2018 17:05), Max: 38.1 (07 Jan 2018 05:10)  T(F): 97.6 (07 Jan 2018 17:05), Max: 100.5 (07 Jan 2018 05:10)  HR: 82 (07 Jan 2018 17:08) (82 - 95)  BP: 182/85 (07 Jan 2018 17:05) (150/65 - 182/85)  BP(mean): --  RR: 17 (07 Jan 2018 17:05) (17 - 17)  SpO2: 97% (07 Jan 2018 17:08) (96% - 100%)    PHYSICAL EXAM:  GENERAL: NAD, well-groomed, well-developed  HEAD:  Atraumatic, Normocephalic  EYES: EOMI, PERRLA, conjunctiva and sclera clear  NECK: Supple, No JVD, Normal thyroid  NERVOUS SYSTEM:  Alert but confused  CHEST/LUNG: Clear to percussion bilaterally; No rales, rhonchi, wheezing, or rubs  HEART: Regular rate and rhythm; No murmurs, rubs, or gallops  ABDOMEN: Soft, Nontender, Nondistended; Bowel sounds present      LAB                          12.9   7.8   )-----------( 243      ( 07 Jan 2018 07:14 )             38.6       CBC:  01-07 @ 07:14  WBC 7.8   Hgb 12.9   Hct 38.6   Plts 243  MCV 90.3  01-06 @ 08:47  WBC 11.1   Hgb 11.3   Hct 33.0   Plts 220  MCV 90.5  01-03 @ 18:48  WBC 13.2   Hgb 13.7   Hct 41.7   Plts 245  MCV 91.0      Chemistry:  01-07 @ 07:14  Na+ 138  K+ 3.4  Cl- 97  CO2 33  BUN 15  Cr 1.40     01-06 @ 08:47  Na+ 142  K+ 3.0  Cl- 101  CO2 31  BUN 18  Cr 1.41     01-04 @ 08:39  Na+ 139  K+ 3.4  Cl- 104  CO2 24  BUN 15  Cr 1.41     01-03 @ 19:18  Na+ 140  K+ 3.8  Cl- 104  CO2 28  BUN 13  Cr 1.29         Glucose, Serum: 98 mg/dL (01-07 @ 07:14)  Glucose, Serum: 90 mg/dL (01-06 @ 08:47)  Glucose, Serum: 185 mg/dL (01-04 @ 08:39)  Glucose, Serum: 101 mg/dL (01-03 @ 19:18)      07 Jan 2018 07:14    138    |  97     |  15     ----------------------------<  98     3.4     |  33     |  1.40   06 Jan 2018 08:47    142    |  101    |  18     ----------------------------<  90     3.0     |  31     |  1.41   04 Jan 2018 08:39    139    |  104    |  15     ----------------------------<  185    3.4     |  24     |  1.41   03 Jan 2018 19:18    140    |  104    |  13     ----------------------------<  101    3.8     |  28     |  1.29     Ca    8.6        07 Jan 2018 07:14  Ca    7.8        06 Jan 2018 08:47  Ca    8.1        04 Jan 2018 08:39  Ca    8.4        03 Jan 2018 19:18  Mg     2.1       06 Jan 2018 08:47    TPro  7.9    /  Alb  2.8    /  TBili  0.4    /  DBili  x      /  AST  20     /  ALT  17     /  AlkPhos  160    03 Jan 2018 19:18      PT/INR - ( 07 Jan 2018 07:14 )   PT: 12.9 sec;   INR: 1.18 ratio         PTT - ( 07 Jan 2018 07:14 )  PTT:26.3 sec        CAPILLARY BLOOD GLUCOSE              RADIOLOGY & ADDITIONAL TESTS:    Imaging Personally Reviewed:  [ ] YES  [ ] NO    Consultant(s) Notes Reviewed:  [ ] YES  [ ] NO    Care Discussed with Consultants/Other Providers [ ] YES  [ ] NO

## 2018-01-08 DIAGNOSIS — R69 ILLNESS, UNSPECIFIED: ICD-10-CM

## 2018-01-08 LAB
ANION GAP SERPL CALC-SCNC: 9 MMOL/L — SIGNIFICANT CHANGE UP (ref 5–17)
BUN SERPL-MCNC: 23 MG/DL — SIGNIFICANT CHANGE UP (ref 7–23)
CALCIUM SERPL-MCNC: 8.6 MG/DL — SIGNIFICANT CHANGE UP (ref 8.5–10.1)
CHLORIDE SERPL-SCNC: 95 MMOL/L — LOW (ref 96–108)
CO2 SERPL-SCNC: 31 MMOL/L — SIGNIFICANT CHANGE UP (ref 22–31)
CREAT SERPL-MCNC: 2.14 MG/DL — HIGH (ref 0.5–1.3)
GLUCOSE SERPL-MCNC: 106 MG/DL — HIGH (ref 70–99)
HCT VFR BLD CALC: 42 % — SIGNIFICANT CHANGE UP (ref 34.5–45)
HGB BLD-MCNC: 13.8 G/DL — SIGNIFICANT CHANGE UP (ref 11.5–15.5)
MCHC RBC-ENTMCNC: 29.5 PG — SIGNIFICANT CHANGE UP (ref 27–34)
MCHC RBC-ENTMCNC: 32.8 GM/DL — SIGNIFICANT CHANGE UP (ref 32–36)
MCV RBC AUTO: 89.7 FL — SIGNIFICANT CHANGE UP (ref 80–100)
PLATELET # BLD AUTO: 231 K/UL — SIGNIFICANT CHANGE UP (ref 150–400)
POTASSIUM SERPL-MCNC: 3.4 MMOL/L — LOW (ref 3.5–5.3)
POTASSIUM SERPL-SCNC: 3.4 MMOL/L — LOW (ref 3.5–5.3)
RBC # BLD: 4.68 M/UL — SIGNIFICANT CHANGE UP (ref 3.8–5.2)
RBC # FLD: 12.8 % — SIGNIFICANT CHANGE UP (ref 11–15)
SODIUM SERPL-SCNC: 135 MMOL/L — SIGNIFICANT CHANGE UP (ref 135–145)
WBC # BLD: 7.4 K/UL — SIGNIFICANT CHANGE UP (ref 3.8–10.5)
WBC # FLD AUTO: 7.4 K/UL — SIGNIFICANT CHANGE UP (ref 3.8–10.5)

## 2018-01-08 PROCEDURE — 99232 SBSQ HOSP IP/OBS MODERATE 35: CPT

## 2018-01-08 PROCEDURE — 99233 SBSQ HOSP IP/OBS HIGH 50: CPT

## 2018-01-08 RX ORDER — SODIUM CHLORIDE 9 MG/ML
1000 INJECTION, SOLUTION INTRAVENOUS
Qty: 0 | Refills: 0 | Status: DISCONTINUED | OUTPATIENT
Start: 2018-01-08 | End: 2018-01-10

## 2018-01-08 RX ORDER — IPRATROPIUM/ALBUTEROL SULFATE 18-103MCG
3 AEROSOL WITH ADAPTER (GRAM) INHALATION ONCE
Qty: 0 | Refills: 0 | Status: COMPLETED | OUTPATIENT
Start: 2018-01-08 | End: 2018-01-08

## 2018-01-08 RX ORDER — POTASSIUM CHLORIDE 20 MEQ
10 PACKET (EA) ORAL
Qty: 0 | Refills: 0 | Status: COMPLETED | OUTPATIENT
Start: 2018-01-08 | End: 2018-01-08

## 2018-01-08 RX ORDER — HEPARIN SODIUM 5000 [USP'U]/ML
5000 INJECTION INTRAVENOUS; SUBCUTANEOUS EVERY 12 HOURS
Qty: 0 | Refills: 0 | Status: DISCONTINUED | OUTPATIENT
Start: 2018-01-08 | End: 2018-01-10

## 2018-01-08 RX ORDER — POTASSIUM CHLORIDE 20 MEQ
20 PACKET (EA) ORAL ONCE
Qty: 0 | Refills: 0 | Status: DISCONTINUED | OUTPATIENT
Start: 2018-01-08 | End: 2018-01-08

## 2018-01-08 RX ADMIN — Medication 3 MILLILITER(S): at 18:10

## 2018-01-08 RX ADMIN — Medication 100 MILLIEQUIVALENT(S): at 18:13

## 2018-01-08 RX ADMIN — Medication 650 MILLIGRAM(S): at 00:05

## 2018-01-08 RX ADMIN — PIPERACILLIN AND TAZOBACTAM 25 GRAM(S): 4; .5 INJECTION, POWDER, LYOPHILIZED, FOR SOLUTION INTRAVENOUS at 05:41

## 2018-01-08 RX ADMIN — Medication 3 MILLILITER(S): at 14:24

## 2018-01-08 RX ADMIN — AZITHROMYCIN 255 MILLIGRAM(S): 500 TABLET, FILM COATED ORAL at 00:00

## 2018-01-08 RX ADMIN — PIPERACILLIN AND TAZOBACTAM 25 GRAM(S): 4; .5 INJECTION, POWDER, LYOPHILIZED, FOR SOLUTION INTRAVENOUS at 18:17

## 2018-01-08 RX ADMIN — Medication 20 MILLIGRAM(S): at 05:41

## 2018-01-08 RX ADMIN — Medication 3 MILLILITER(S): at 06:22

## 2018-01-08 RX ADMIN — AZITHROMYCIN 255 MILLIGRAM(S): 500 TABLET, FILM COATED ORAL at 23:05

## 2018-01-08 RX ADMIN — Medication 100 MILLIEQUIVALENT(S): at 23:02

## 2018-01-08 RX ADMIN — Medication 3 MILLILITER(S): at 23:25

## 2018-01-08 RX ADMIN — SODIUM CHLORIDE 60 MILLILITER(S): 9 INJECTION, SOLUTION INTRAVENOUS at 23:03

## 2018-01-08 RX ADMIN — Medication 3 MILLILITER(S): at 11:21

## 2018-01-08 RX ADMIN — Medication 3 MILLILITER(S): at 00:22

## 2018-01-08 NOTE — PROGRESS NOTE ADULT - SUBJECTIVE AND OBJECTIVE BOX
Patient is a 77y old  Female who presents with a chief complaint of SOB today. (04 Jan 2018 03:36)      HPI:  76 y/o Female PMH HTN, prior CVA with residual L sided weakness, dementia with SOB for 3 hours, with acute onset, no inciting event. Pt has had SOB on and off for past few months, according to family. They have also noted occasional difficulty with swallowing.  Family denies fever, cough, headache, abd pain, nausea, vomiting, diarrhea, rash, paresthesia, and weakness. (04 Jan 2018 03:36)      INTERVAL HPI/OVERNIGHT EVENTS:  MS unchanged. EGD cancelled by anesthesia because of SOB. No new GI symptons.    MEDICATIONS  (STANDING):  ALBUTerol/ipratropium for Nebulization 3 milliLiter(s) Nebulizer every 6 hours  atorvastatin 20 milliGRAM(s) Oral at bedtime  azithromycin  IVPB 500 milliGRAM(s) IV Intermittent every 24 hours  baclofen 10 milliGRAM(s) Oral daily  carvedilol 3.125 milliGRAM(s) Oral every 12 hours  dextrose 5%. 1000 milliLiter(s) (30 mL/Hr) IV Continuous <Continuous>  donepezil 10 milliGRAM(s) Oral at bedtime  losartan 25 milliGRAM(s) Oral daily  pantoprazole    Tablet 40 milliGRAM(s) Oral before breakfast  piperacillin/tazobactam IVPB. 3.375 Gram(s) IV Intermittent every 12 hours  potassium chloride  10 mEq/100 mL IVPB 10 milliEquivalent(s) IV Intermittent every 1 hour    MEDICATIONS  (PRN):  acetaminophen  Suppository 650 milliGRAM(s) Rectal every 6 hours PRN For Temp greater than 38 C (100.4 F)      FAMILY HISTORY:  No pertinent family history in first degree relatives      Allergies    No Known Allergies    Intolerances        PMH/PSH:  Alzheimers disease  MI, old  CVA (cerebral vascular accident)  HTN (hypertension)  No pertinent past medical history  No significant past surgical history        REVIEW OF SYSTEMS:  Confused, hard to obtain information  CONSTITUTIONAL: No fever, weight loss, or fatigue  EYES: No eye pain, visual disturbances, or discharge  ENMT:  No difficulty hearing, tinnitus, vertigo; No sinus or throat pain  NECK: No pain or stiffness  BREASTS: No pain, masses, or nipple discharge  RESPIRATORY: No cough, wheezing, chills or hemoptysis; No shortness of breath  CARDIOVASCULAR: No chest pain, palpitations, dizziness, or leg swelling  GASTROINTESTINAL: See above  GENITOURINARY: No dysuria, frequency, hematuria, or incontinence  NEUROLOGICAL: No headaches, memory loss, loss of strength, numbness, or tremors  SKIN: No itching, burning, rashes, or lesions   LYMPH NODES: No enlarged glands  ENDOCRINE: No heat or cold intolerance; No hair loss  MUSCULOSKELETAL: No joint pain or swelling; No muscle, back, or extremity pain  PSYCHIATRIC: No depression, anxiety, mood swings, or difficulty sleeping  HEME/LYMPH: No easy bruising, or bleeding gums  ALLERGY AND IMMUNOLOGIC: No hives or eczema    Vital Signs Last 24 Hrs  T(C): 37.1 (08 Jan 2018 16:27), Max: 38.1 (07 Jan 2018 23:24)  T(F): 98.8 (08 Jan 2018 16:27), Max: 100.6 (07 Jan 2018 23:24)  HR: 114 (08 Jan 2018 18:12) (82 - 114)  BP: 177/69 (08 Jan 2018 16:27) (147/89 - 177/69)  BP(mean): --  RR: 17 (08 Jan 2018 16:27) (17 - 18)  SpO2: 96% (08 Jan 2018 18:12) (94% - 100%)    PHYSICAL EXAM:  GENERAL: NAD, well-groomed, well-developed  HEAD:  Atraumatic, Normocephalic  EYES: EOMI, PERRLA, conjunctiva and sclera clear  NECK: Supple, No JVD, Normal thyroid  NERVOUS SYSTEM:  Alert but confused  CHEST/LUNG: Clear to percussion bilaterally; No rales, rhonchi, wheezing, or rubs  HEART: Regular rate and rhythm; No murmurs, rubs, or gallops  ABDOMEN: Soft, Nontender, Nondistended; Bowel sounds present  EXTREMITIES:  2+ Peripheral Pulses, No clubbing, cyanosis, or edema  LYMPH: No lymphadenopathy noted  SKIN: No rashes or lesions    LAB                          13.8   7.4   )-----------( 231      ( 08 Jan 2018 08:32 )             42.0       CBC:  01-08 @ 08:32  WBC 7.4   Hgb 13.8   Hct 42.0   Plts 231  MCV 89.7  01-07 @ 07:14  WBC 7.8   Hgb 12.9   Hct 38.6   Plts 243  MCV 90.3  01-06 @ 08:47  WBC 11.1   Hgb 11.3   Hct 33.0   Plts 220  MCV 90.5  01-03 @ 18:48  WBC 13.2   Hgb 13.7   Hct 41.7   Plts 245  MCV 91.0      Chemistry:  01-08 @ 08:32  Na+ 135  K+ 3.4  Cl- 95  CO2 31  BUN 23  Cr 2.14     01-07 @ 07:14  Na+ 138  K+ 3.4  Cl- 97  CO2 33  BUN 15  Cr 1.40     01-06 @ 08:47  Na+ 142  K+ 3.0  Cl- 101  CO2 31  BUN 18  Cr 1.41     01-04 @ 08:39  Na+ 139  K+ 3.4  Cl- 104  CO2 24  BUN 15  Cr 1.41     01-03 @ 19:18  Na+ 140  K+ 3.8  Cl- 104  CO2 28  BUN 13  Cr 1.29         Glucose, Serum: 106 mg/dL (01-08 @ 08:32)  Glucose, Serum: 98 mg/dL (01-07 @ 07:14)  Glucose, Serum: 90 mg/dL (01-06 @ 08:47)  Glucose, Serum: 185 mg/dL (01-04 @ 08:39)  Glucose, Serum: 101 mg/dL (01-03 @ 19:18)      08 Jan 2018 08:32    135    |  95     |  23     ----------------------------<  106    3.4     |  31     |  2.14   07 Jan 2018 07:14    138    |  97     |  15     ----------------------------<  98     3.4     |  33     |  1.40   06 Jan 2018 08:47    142    |  101    |  18     ----------------------------<  90     3.0     |  31     |  1.41   04 Jan 2018 08:39    139    |  104    |  15     ----------------------------<  185    3.4     |  24     |  1.41   03 Jan 2018 19:18    140    |  104    |  13     ----------------------------<  101    3.8     |  28     |  1.29     Ca    8.6        08 Jan 2018 08:32  Ca    8.6        07 Jan 2018 07:14  Ca    7.8        06 Jan 2018 08:47  Ca    8.1        04 Jan 2018 08:39  Ca    8.4        03 Jan 2018 19:18  Mg     2.1       06 Jan 2018 08:47    TPro  7.9    /  Alb  2.8    /  TBili  0.4    /  DBili  x      /  AST  20     /  ALT  17     /  AlkPhos  160    03 Jan 2018 19:18      PT/INR - ( 07 Jan 2018 07:14 )   PT: 12.9 sec;   INR: 1.18 ratio         PTT - ( 07 Jan 2018 07:14 )  PTT:26.3 sec        CAPILLARY BLOOD GLUCOSE              RADIOLOGY & ADDITIONAL TESTS:    Imaging Personally Reviewed:  [ ] YES  [ ] NO    Consultant(s) Notes Reviewed:  [ ] YES  [ ] NO    Care Discussed with Consultants/Other Providers [ ] YES  [ ] NO

## 2018-01-08 NOTE — PROGRESS NOTE ADULT - ASSESSMENT
78 y/o Female PMH HTN, prior CVA with residual L sided weakness, dementia with SOB for 3 hours, with acute onset, no inciting event. Pt has had SOB on and off for past few months, according to family. They have also noted occasional difficulty with swallowing.

## 2018-01-08 NOTE — PROGRESS NOTE ADULT - ASSESSMENT
76 y/o Female PMH HTN, prior CVA with residual L sided weakness, dementia with SOB for 3 hours, with acute onset, no inciting event. Pt has had SOB on and off for past few months, according to family. They have also noted occasional difficulty with swallowing.   Finding of dilated esophagus with possible achalasia.    -GI management and supportive care, awaiting endoscopy, NPO, IV hydration    Low-grade troponin release likely from ischemic demand.   - c/w statin, holding asa for endoscopy, c/w bbl   -ECHO: normal EF  HTN   - hold off on losartan 2/2 JONY, creat 2.14 today, increased from 1.40  - increase coreg to 6. 25, adjust dose as needed  - add Norvasc, increase dose as needed  JONY   - hold ARB/diuretics for now  Hypokalemia   - supplemented 76 y/o Female PMH HTN, prior CVA with residual L sided weakness, dementia with SOB for 3 hours, with acute onset, no inciting event. Pt has had SOB on and off for past few months, according to family. They have also noted occasional difficulty with swallowing.   Finding of dilated esophagus with possible achalasia.    -GI management and supportive care, awaiting endoscopy, NPO, IV hydration    Low-grade troponin release likely from ischemic demand.   - c/w statin, holding asa for endoscopy, c/w bbl   -ECHO: normal EF  HTN   - hold off on losartan 2/2 JONY, creat 2.14 today, increased from 1.40  - currently not taking any oral meds  - use IV meds for BP control as needed while NPO  JONY   - hold ARB/diuretics for now  Hypokalemia   - supplemented  Viral syndrome.   corona virus +  - nebulizer Rx, supplemental O2 78 y/o Female PMH HTN, prior CVA with residual L sided weakness, dementia.  Admitted with acute on chronic dyspnea and occasional difficulty with swallowing.Likely sec to viral illness, not CHF.     Finding of dilated esophagus with possible achalasia.    -GI management and supportive care.  - Endoscopy deferred due to low grade temps and persistent dyspnea, Continue NPO, holding asa for now at behest of GI.      Low-grade troponin release likely from ischemic demand.   - c/w statin, holding asa for endoscopy, c/w bbl   -ECHO: normal EF  - Patient not candidate for further diagnostic cardiac evaluation if asymptomatic.    HTN   - holding ARB 2/2 JONY, creat 2.14 today, increased from 1.40  - currently NPO await  endo eval.  - use IV bbl and or ACEI  as needed for BP control as needed while NPO    JONY   - hold ARB, d/c diuretics for now    Hypokalemia   - supplemented    Viral syndrome.   corona virus +  - nebulizer Rx, supplemental O2

## 2018-01-08 NOTE — PROGRESS NOTE ADULT - PROBLEM SELECTOR PLAN 3
- as per cards -likely ischemic demand  - on ASA,statin,  TTE pending  -d/c d lasix  20mg IV2/2 to increased Cr , monitor renal status

## 2018-01-08 NOTE — PROGRESS NOTE ADULT - NSHPATTENDINGPLANDISCUSS_GEN_ALL_CORE
pt, daughter
pt's daughter Katelyn Henderson [phone] in length over the phone and answered all Qs
pt

## 2018-01-08 NOTE — PROGRESS NOTE ADULT - ASSESSMENT
HPI:  76 y/o Female PMH HTN, prior CVA with residual L sided weakness, dementia with SOB for 3 hours, with acute onset, no inciting event. Pt has had SOB on and off for past few months, according to family. They have also noted occasional difficulty with swallowing.  Family denies fever, cough, headache, abd pain, nausea, vomiting, diarrhea, rash, paresthesia, and weakness. (04 Jan 2018 03:36)  ----------------------------------------------------------------------------------------------------------------------------------------------------------------------  Consult called for dilated Esophagus. History obtained from daughter The patient has a history of dysphagia with certain solids. It started after the first CVA and worsened after the second CVA in June of 2016. The daughter  denies melena, hematochezia, hematemesis, nausea, vomiting, abdominal pain, constipation, diarrhea, or change in bowel movements. No history of esophageal problems. No weight loss. See Chest CT, Esophogram    ------dilated esophagus c/w  achalasia 1) EGD to confirm diagnosis  2) Keep NPO except meds 3) will need dilatation vs myomectomy if EGD confirms the diagnosis.  [id number]  --------------------------As above.  Discussed diagnosis and treatment with daughter Saturday   Recommended EGD - Discussed R/B/A Consent obtained, DNR rescinded for procedure / PACU only.  PT/INR minimally elevated. Tmax 100.6, EGD postponed because of SOB For EGD in AM. Anti coagulation held.

## 2018-01-08 NOTE — PROGRESS NOTE ADULT - SUBJECTIVE AND OBJECTIVE BOX
Patient is a 77y old  Female who presents with a chief complaint of SOB today. (04 Jan 2018 03:36)       OVERNIGHT EVENTS: egd today    MEDICATIONS  (STANDING):  ALBUTerol/ipratropium for Nebulization 3 milliLiter(s) Nebulizer every 6 hours  atorvastatin 20 milliGRAM(s) Oral at bedtime  azithromycin  IVPB 500 milliGRAM(s) IV Intermittent every 24 hours  baclofen 10 milliGRAM(s) Oral daily  carvedilol 3.125 milliGRAM(s) Oral every 12 hours  dextrose 5%. 1000 milliLiter(s) (30 mL/Hr) IV Continuous <Continuous>  donepezil 10 milliGRAM(s) Oral at bedtime  losartan 25 milliGRAM(s) Oral daily  pantoprazole    Tablet 40 milliGRAM(s) Oral before breakfast  piperacillin/tazobactam IVPB. 3.375 Gram(s) IV Intermittent every 12 hours  potassium chloride  10 mEq/100 mL IVPB 10 milliEquivalent(s) IV Intermittent every 1 hour    MEDICATIONS  (PRN):  acetaminophen  Suppository 650 milliGRAM(s) Rectal every 6 hours PRN For Temp greater than 38 C (100.4 F)    Vital Signs Last 24 Hrs  T(C): 36.6 (08 Jan 2018 11:57), Max: 38.1 (07 Jan 2018 23:24)  T(F): 97.8 (08 Jan 2018 11:57), Max: 100.6 (07 Jan 2018 23:24)  HR: 86 (08 Jan 2018 14:25) (82 - 106)  BP: 157/78 (08 Jan 2018 11:57) (147/89 - 182/85)  BP(mean): --  RR: 17 (08 Jan 2018 11:57) (17 - 18)  SpO2: 95% (08 Jan 2018 14:25) (94% - 100%)   PHYSICAL EXAM:  GENERAL: NAD, well-groomed, sleeping, easily arousable  HEAD:  Atraumatic, Normocephalic  EYES: EOMI, PERRLA, conjunctiva and sclera clear  ENMT: No tonsillar erythema, exudates, or enlargement; Moist mucous membranes   NECK: Supple, No JVD   NERVOUS SYSTEM:  Alert ,awake, Oriented to herself, confused  CHEST/LUNG: scattered rales, coarse breath sounds  HEART: Regular rate and rhythm; No murmurs, rubs, or gallops  ABDOMEN: Soft, Nontender, Nondistended; Bowel sounds present  EXTREMITIES:  2+ Peripheral Pulses, No clubbing, cyanosis, or edema  LYMPH: No lymphadenopathy noted  SKIN: No rashes or lesions      LABS:                        13.8   7.4   )-----------( 231      ( 08 Jan 2018 08:32 )             42.0     01-08    135  |  95<L>  |  23  ----------------------------<  106<H>  3.4<L>   |  31  |  2.14<H>    Ca    8.6      08 Jan 2018 08:32      PT/INR - ( 07 Jan 2018 07:14 )   PT: 12.9 sec;   INR: 1.18 ratio         PTT - ( 07 Jan 2018 07:14 )  PTT:26.3 sec   cardiac markers     CAPILLARY BLOOD GLUCOSE        Cultures    RADIOLOGY & ADDITIONAL TESTS:    Imaging Personally Reviewed:  [ x] YES  [ ] NO    Consultant(s) Notes Reviewed:  [x ] YES  [ ] NO    Care Discussed with Consultants/Other Providers [ x] YES  [ ] NO

## 2018-01-08 NOTE — PROGRESS NOTE ADULT - SUBJECTIVE AND OBJECTIVE BOX
Patient is a 77y old  Female who presents with a chief complaint of SOB today. (04 Jan 2018 03:36)      PAST MEDICAL & SURGICAL HISTORY:  Alzheimers disease  MI, old  CVA (cerebral vascular accident)  HTN (hypertension)  No significant past surgical history      INTERVAL HISTORY: Resting in bed, not in any acute distress   	  MEDICATIONS:  MEDICATIONS  (STANDING):  ALBUTerol/ipratropium for Nebulization 3 milliLiter(s) Nebulizer every 6 hours  atorvastatin 20 milliGRAM(s) Oral at bedtime  azithromycin  IVPB 500 milliGRAM(s) IV Intermittent every 24 hours  baclofen 10 milliGRAM(s) Oral daily  carvedilol 3.125 milliGRAM(s) Oral every 12 hours  dextrose 5%. 1000 milliLiter(s) (30 mL/Hr) IV Continuous <Continuous>  donepezil 10 milliGRAM(s) Oral at bedtime  losartan 25 milliGRAM(s) Oral daily  pantoprazole    Tablet 40 milliGRAM(s) Oral before breakfast  piperacillin/tazobactam IVPB. 3.375 Gram(s) IV Intermittent every 12 hours  potassium chloride  10 mEq/100 mL IVPB 10 milliEquivalent(s) IV Intermittent every 1 hour    MEDICATIONS  (PRN):  acetaminophen  Suppository 650 milliGRAM(s) Rectal every 6 hours PRN For Temp greater than 38 C (100.4 F)      Vitals:  T(F): 98.8 (01-08-18 @ 16:27), Max: 100.6 (01-07-18 @ 23:24)  HR: 98 (01-08-18 @ 16:27) (82 - 106)  BP: 177/69 (01-08-18 @ 16:27) (147/89 - 177/69)  RR: 17 (01-08-18 @ 16:27) (17 - 18)  SpO2: 98% (01-08-18 @ 16:27) (94% - 100%)  Wt(kg): -- 56.6 kg    01-07 @ 07:01  -  01-08 @ 07:00  --------------------------------------------------------  IN:  Total IN: 0 mL    OUT:    Voided: 200 mL  Total OUT: 200 mL    Total NET: -200 mL      PHYSICAL EXAM:  Neuro: Awake, responsive  CV: S1 S2 RRR  Lungs: CTABL  GI: Soft, BS +, ND, NT  Extremities: No edema    TELEMETRY:   	    RADIOLOGY: < from: Xray Chest 1 View AP- PORTABLE-Urgent (01.06.18 @ 08:13) >  The heart size and vascular markings are within normal limits for   technique.      The berenice are prominent. .     There is oral contrast from a recent the esophagram..     IMPRESSION:  Patchy airspace disease in both lungs predominantly perihilar.    < end of copied text >  < from: Xray Esophagram (01.05.18 @ 10:32) >  Dilated esophagus which tapers abruptly at the GE junction. Differential   diagnosis includes achalasia. Aspiration precautions are advised.     < end of copied text >      DIAGNOSTIC TESTING:    [x ] Echocardiogram: < from: TTE Echo Doppler w/o Cont (01.07.18 @ 20:31) >   1. Left ventricular ejection fraction, by visual estimation, is 50 to   55%.   2. Normal left ventricular size and wall thicknesses, with normal   systolic and diastolic function.   3. Normal right ventricular size and function.   4. Mild mitral valve regurgitation.   5. Normal trileaflet aortic valve with normal opening.   6. Sclerotic aortic valve with normal opening.   7. Mildly enlarged left atrium.    < end of copied text >        LABS:	 	    CARDIAC MARKERS:  Troponin I, Serum: .054 ng/mL (01-06 @ 08:47)          08 Jan 2018 08:32    135    |  95     |  23     ----------------------------<  106    3.4     |  31     |  2.14   07 Jan 2018 07:14    138    |  97     |  15     ----------------------------<  98     3.4     |  33     |  1.40   06 Jan 2018 08:47    142    |  101    |  18     ----------------------------<  90     3.0     |  31     |  1.41     Ca    8.6        08 Jan 2018 08:32                            13.8   7.4   )-----------( 231      ( 08 Jan 2018 08:32 )             42.0 ,                       12.9   7.8   )-----------( 243      ( 07 Jan 2018 07:14 )             38.6 ,                       11.3   11.1  )-----------( 220      ( 06 Jan 2018 08:47 )             33.0       INR: 1.18 ratio (01-07 @ 07:14) Patient is a 77y old  Female who presents with a chief complaint of SOB today. (04 Jan 2018 03:36)      PAST MEDICAL & SURGICAL HISTORY:  Alzheimers disease  MI, old  CVA (cerebral vascular accident)  HTN (hypertension)  No significant past surgical history      INTERVAL HISTORY: Resting in bed, with respiratory distress with wheezing   	  MEDICATIONS:  MEDICATIONS  (STANDING):  ALBUTerol/ipratropium for Nebulization 3 milliLiter(s) Nebulizer every 6 hours  atorvastatin 20 milliGRAM(s) Oral at bedtime  azithromycin  IVPB 500 milliGRAM(s) IV Intermittent every 24 hours  baclofen 10 milliGRAM(s) Oral daily  carvedilol 3.125 milliGRAM(s) Oral every 12 hours  dextrose 5%. 1000 milliLiter(s) (30 mL/Hr) IV Continuous <Continuous>  donepezil 10 milliGRAM(s) Oral at bedtime  losartan 25 milliGRAM(s) Oral daily  pantoprazole    Tablet 40 milliGRAM(s) Oral before breakfast  piperacillin/tazobactam IVPB. 3.375 Gram(s) IV Intermittent every 12 hours  potassium chloride  10 mEq/100 mL IVPB 10 milliEquivalent(s) IV Intermittent every 1 hour    MEDICATIONS  (PRN):  acetaminophen  Suppository 650 milliGRAM(s) Rectal every 6 hours PRN For Temp greater than 38 C (100.4 F)      Vitals:  T(F): 98.8 (01-08-18 @ 16:27), Max: 100.6 (01-07-18 @ 23:24)  HR: 98 (01-08-18 @ 16:27) (82 - 106)  BP: 177/69 (01-08-18 @ 16:27) (147/89 - 177/69)  RR: 17 (01-08-18 @ 16:27) (17 - 18)  SpO2: 98% (01-08-18 @ 16:27) (94% - 100%)  Wt(kg): -- 56.6 kg    01-07 @ 07:01  -  01-08 @ 07:00  --------------------------------------------------------  IN:  Total IN: 0 mL    OUT:    Voided: 200 mL  Total OUT: 200 mL    Total NET: -200 mL      PHYSICAL EXAM:  Neuro: Awake, responsive  CV: S1 S2 RRR  Lungs: coarse breath sounds/B/L rhonchi and wheezing  GI: Soft, BS +, ND, NT  Extremities: No edema    TELEMETRY: RSR   	    RADIOLOGY: < from: Xray Chest 1 View AP- PORTABLE-Urgent (01.06.18 @ 08:13) >  The heart size and vascular markings are within normal limits for   technique.      The berenice are prominent. .     There is oral contrast from a recent the esophagram..     IMPRESSION:  Patchy airspace disease in both lungs predominantly perihilar.    < end of copied text >  < from: Xray Esophagram (01.05.18 @ 10:32) >  Dilated esophagus which tapers abruptly at the GE junction. Differential   diagnosis includes achalasia. Aspiration precautions are advised.     < end of copied text >      DIAGNOSTIC TESTING:    [x ] Echocardiogram: < from: TTE Echo Doppler w/o Cont (01.07.18 @ 20:31) >   1. Left ventricular ejection fraction, by visual estimation, is 50 to   55%.   2. Normal left ventricular size and wall thicknesses, with normal   systolic and diastolic function.   3. Normal right ventricular size and function.   4. Mild mitral valve regurgitation.   5. Normal trileaflet aortic valve with normal opening.   6. Sclerotic aortic valve with normal opening.   7. Mildly enlarged left atrium.    < end of copied text >        LABS:	 	    CARDIAC MARKERS:  Troponin I, Serum: .054 ng/mL (01-06 @ 08:47)          08 Jan 2018 08:32    135    |  95     |  23     ----------------------------<  106    3.4     |  31     |  2.14   07 Jan 2018 07:14    138    |  97     |  15     ----------------------------<  98     3.4     |  33     |  1.40   06 Jan 2018 08:47    142    |  101    |  18     ----------------------------<  90     3.0     |  31     |  1.41     Ca    8.6        08 Jan 2018 08:32                            13.8   7.4   )-----------( 231      ( 08 Jan 2018 08:32 )             42.0 ,                       12.9   7.8   )-----------( 243      ( 07 Jan 2018 07:14 )             38.6 ,                       11.3   11.1  )-----------( 220      ( 06 Jan 2018 08:47 )             33.0       INR: 1.18 ratio (01-07 @ 07:14)

## 2018-01-08 NOTE — CONSULT NOTE ADULT - SUBJECTIVE AND OBJECTIVE BOX
Patient                           EBENEZER MCKEON  [id number]   CONTACT Child Blas Mancilla  Self   ALLERGY nka  REASON FOR VISIT   Initial evaluation/Pulmonary consultation requested  1/8/2018 by Dr Rosette Cooper from Dr Metz  Patient examined chart reviewed  HOSPITAL ADMISSION [facility]  Dr Rosette Cooper 1/4/2018   PATIENT CAME  FROM (if information available)    VITALS/LABS  1/8/2017 99f 113 137/70 17 56  1/8/2018 W 7.4 Hb 13.8 Plt 231 Na 135 K 3.4 CO2 31 Cr 2.1   REVIEW OF SYMPTOMS    Able to give ROS  No   PHYSICAL EXAM    HEENT Unremarkable PERRLA atraumatic   RESP Fair air entry EXP prolonged    Harsh breath sound Resp distres mild   CARDIAC S1 S2 No S3     NO JVD    ABDOMEN SOFT BS PRESENT NOT DISTENDED No hepatosplenomegaly PEDAL EDEMA present No calf tenderness  NO rash   GENERAL Not TOXIC looking  HOSPITAL COURSE PROBLEM ASSESSMENT PLAN 1/4/2018 ADMISSION [facility]                                           CORONAVIRUS RESP TRACT INFECTION SUPERIMPOSED ON RECURRENT ASPIRATION PNEUMONIA   1/4 RVP detected           1/4 bc n 1/6 bc n   1/6/2018 CXR Patchy airspace dis both lungs pred perihilar       1/6-1/7-1/8 W 11.1 -7.8-7.4   1/4/2018 CTA Ch 1) Peribronchial thickening with diffuse narrowed airways 2) Compr atelectasis lower lobes 3) Nonsp interlobular septal thickening and ggo lizeth upper lobes 4) small tib  nodular opac l lobe suggesting impacted mucus 5) small l greater pl effsn 6) Mild cm 7) Marked fluid air distended esophagus   Azithro (1/4) Zosyn (1/4)   COPD   Duoneb.4 (1/8)   CAD   atorvastat 20 (1/4) coreg 3.125x2 (1/8)   ESOPHAGEAL STRICTURE AND DYSPHAGIA  1/5/2018 Esophagogram Dilated esophagus which tapers abruptly at the GE junction dd achalasia        RO MI V CLEARANCE RELATED TROPONONIMEA V DEMAND ISCHEMIA   1/3-1/4-1/6 Tr 1 .046 (i) -.035 -.054 (i)   RO CHF  1/7/2018 ECHO ef 50% N r and l vsf Mild mr    JONY   1/3-1/4-1/6-1/7-1/8/2018 Cr 1.29-1.41-1.41-1.40-2.14  GLOBAL ISSUE/BEST PRACTICE:        PROBLEM: Analgesia:     na                        PROBLEM: Sedation:     na               PROBLEM: HOB elevation:   y             PROBLEM: Stress ulcer proph:    protonix 40 (1/4)                      PROBLEM: VTE prophylaxis:      intermittent pneumatic (1/8)                  PROBLEM: Glycemic control:    na   PROBLEM: Nutrition:    npo (1/4)          PROBLEM: Advanced directive: na     PROBLEM: Allergies:  na      TIME SPENT Over 55 minutes aggregate care time spent on encounter; activities included   direct patient care, counseling and/or coordinating care reviewing notes, lab data/ imaging , discussion with multidisciplinary team/ patient  /family. Risks, benefits, alternatives  discussed in detail. Proper antibiotic use issues addressed Questions/concerns  were addressed  as  best as possible   SUMMARY ASSESSMENT PLAN  1/4/2018 ADMISSION [facility]                                      76 y/o Female PMH Alzheimers MI HTN, prior CVA with residual L sided weakness, dementia was admitted 1/4/2018  with SOB for 3 hours, with acute onset, no inciting event. Pt has had SOB on and off for past few months, according to family. They have also noted occasional difficulty with swallowing lizeth after latest cva in 2016  HOSPITAL COURSE   RESP TRACT INFECTION  1) Coronavirus detected 1/4  2) CTA Ch 1/4 cw dialted fluid air level  esophagus and upper lobe ggo  and tib and nodular  l lung opac suggestive of aspiration pneumonia constellation   Pulm consulted 1/8/2018   ACHALASIA EGD planned to confirm diagnosis and plan dilation or myomectomy but was cancelled due to dyspnea   IMPRESSION/RECOMMENDATIONS   PNEUMONIA 1/8 Check pct and Leg If leg neg may DC zithro Doubt atypical Zosyn will cover hcap and aspiration pneumonia Supportive care for coronavirus   ACAHALASIA GI MORALES   RESP Check ABG in am and monitor PO   DVT P 1/8/2018 Suggest hsc   JONY Possibly prerenal dhydration 1/8/2018 Start ivf

## 2018-01-09 ENCOUNTER — RESULT REVIEW (OUTPATIENT)
Age: 78
End: 2018-01-09

## 2018-01-09 LAB
BASE EXCESS BLDA CALC-SCNC: 7.7 MMOL/L — HIGH (ref -2–2)
BLOOD GAS COMMENTS: SIGNIFICANT CHANGE UP
BLOOD GAS COMMENTS: SIGNIFICANT CHANGE UP
BLOOD GAS SOURCE: SIGNIFICANT CHANGE UP
CULTURE RESULTS: SIGNIFICANT CHANGE UP
CULTURE RESULTS: SIGNIFICANT CHANGE UP
HCO3 BLDA-SCNC: 31 MMOL/L — HIGH (ref 21–29)
HCT VFR BLD CALC: 37.3 % — SIGNIFICANT CHANGE UP (ref 34.5–45)
HGB BLD-MCNC: 12.5 G/DL — SIGNIFICANT CHANGE UP (ref 11.5–15.5)
HOROWITZ INDEX BLDA+IHG-RTO: 24 — SIGNIFICANT CHANGE UP
INR BLD: 1.13 RATIO — SIGNIFICANT CHANGE UP (ref 0.88–1.16)
MCHC RBC-ENTMCNC: 29.9 PG — SIGNIFICANT CHANGE UP (ref 27–34)
MCHC RBC-ENTMCNC: 33.6 GM/DL — SIGNIFICANT CHANGE UP (ref 32–36)
MCV RBC AUTO: 88.9 FL — SIGNIFICANT CHANGE UP (ref 80–100)
PCO2 BLDA: 41 MMHG — SIGNIFICANT CHANGE UP (ref 32–46)
PH BLD: 7.5 — HIGH (ref 7.35–7.45)
PLATELET # BLD AUTO: 234 K/UL — SIGNIFICANT CHANGE UP (ref 150–400)
PO2 BLDA: 88 MMHG — SIGNIFICANT CHANGE UP (ref 74–108)
PROCALCITONIN SERPL-MCNC: 0.18 NG/ML — HIGH (ref 0–0.04)
PROTHROM AB SERPL-ACNC: 12.3 SEC — SIGNIFICANT CHANGE UP (ref 9.8–12.7)
RBC # BLD: 4.2 M/UL — SIGNIFICANT CHANGE UP (ref 3.8–5.2)
RBC # FLD: 12.5 % — SIGNIFICANT CHANGE UP (ref 11–15)
SAO2 % BLDA: 96 % — SIGNIFICANT CHANGE UP (ref 92–96)
SPECIMEN SOURCE: SIGNIFICANT CHANGE UP
SPECIMEN SOURCE: SIGNIFICANT CHANGE UP
WBC # BLD: 7.1 K/UL — SIGNIFICANT CHANGE UP (ref 3.8–10.5)
WBC # FLD AUTO: 7.1 K/UL — SIGNIFICANT CHANGE UP (ref 3.8–10.5)

## 2018-01-09 PROCEDURE — 88312 SPECIAL STAINS GROUP 1: CPT | Mod: 26

## 2018-01-09 PROCEDURE — 71045 X-RAY EXAM CHEST 1 VIEW: CPT | Mod: 26

## 2018-01-09 PROCEDURE — 99232 SBSQ HOSP IP/OBS MODERATE 35: CPT

## 2018-01-09 PROCEDURE — 88305 TISSUE EXAM BY PATHOLOGIST: CPT | Mod: 26

## 2018-01-09 PROCEDURE — 99233 SBSQ HOSP IP/OBS HIGH 50: CPT

## 2018-01-09 RX ORDER — SODIUM CHLORIDE 9 MG/ML
1000 INJECTION INTRAMUSCULAR; INTRAVENOUS; SUBCUTANEOUS
Qty: 0 | Refills: 0 | Status: DISCONTINUED | OUTPATIENT
Start: 2018-01-09 | End: 2018-01-09

## 2018-01-09 RX ORDER — HYDRALAZINE HCL 50 MG
5 TABLET ORAL ONCE
Qty: 0 | Refills: 0 | Status: COMPLETED | OUTPATIENT
Start: 2018-01-09 | End: 2018-01-09

## 2018-01-09 RX ORDER — AMLODIPINE BESYLATE 2.5 MG/1
5 TABLET ORAL DAILY
Qty: 0 | Refills: 0 | Status: DISCONTINUED | OUTPATIENT
Start: 2018-01-09 | End: 2018-01-10

## 2018-01-09 RX ORDER — HYDRALAZINE HCL 50 MG
5 TABLET ORAL ONCE
Qty: 0 | Refills: 0 | Status: DISCONTINUED | OUTPATIENT
Start: 2018-01-09 | End: 2018-01-09

## 2018-01-09 RX ADMIN — DONEPEZIL HYDROCHLORIDE 10 MILLIGRAM(S): 10 TABLET, FILM COATED ORAL at 21:54

## 2018-01-09 RX ADMIN — PIPERACILLIN AND TAZOBACTAM 25 GRAM(S): 4; .5 INJECTION, POWDER, LYOPHILIZED, FOR SOLUTION INTRAVENOUS at 18:40

## 2018-01-09 RX ADMIN — Medication 3 MILLILITER(S): at 11:21

## 2018-01-09 RX ADMIN — ATORVASTATIN CALCIUM 20 MILLIGRAM(S): 80 TABLET, FILM COATED ORAL at 21:54

## 2018-01-09 RX ADMIN — Medication 3 MILLILITER(S): at 06:06

## 2018-01-09 RX ADMIN — PANTOPRAZOLE SODIUM 40 MILLIGRAM(S): 20 TABLET, DELAYED RELEASE ORAL at 08:59

## 2018-01-09 RX ADMIN — AZITHROMYCIN 255 MILLIGRAM(S): 500 TABLET, FILM COATED ORAL at 23:28

## 2018-01-09 RX ADMIN — CARVEDILOL PHOSPHATE 3.12 MILLIGRAM(S): 80 CAPSULE, EXTENDED RELEASE ORAL at 05:33

## 2018-01-09 RX ADMIN — HEPARIN SODIUM 5000 UNIT(S): 5000 INJECTION INTRAVENOUS; SUBCUTANEOUS at 05:34

## 2018-01-09 RX ADMIN — Medication 650 MILLIGRAM(S): at 00:47

## 2018-01-09 RX ADMIN — PIPERACILLIN AND TAZOBACTAM 25 GRAM(S): 4; .5 INJECTION, POWDER, LYOPHILIZED, FOR SOLUTION INTRAVENOUS at 05:34

## 2018-01-09 RX ADMIN — HEPARIN SODIUM 5000 UNIT(S): 5000 INJECTION INTRAVENOUS; SUBCUTANEOUS at 18:36

## 2018-01-09 RX ADMIN — Medication 5 MILLIGRAM(S): at 15:10

## 2018-01-09 RX ADMIN — Medication 10 MILLIGRAM(S): at 13:02

## 2018-01-09 RX ADMIN — CARVEDILOL PHOSPHATE 3.12 MILLIGRAM(S): 80 CAPSULE, EXTENDED RELEASE ORAL at 18:36

## 2018-01-09 NOTE — PROGRESS NOTE ADULT - SUBJECTIVE AND OBJECTIVE BOX
Patient is a 77y old  Female who presents with a chief complaint of SOB today. (04 Jan 2018 03:36)      PAST MEDICAL & SURGICAL HISTORY:  Alzheimers disease  MI, old  CVA (cerebral vascular accident)  HTN (hypertension)  No significant past surgical history      INTERVAL HISTORY: resting in bed, not in any acute distress, breathing much better today, s/p endoscopy   	  MEDICATIONS:  MEDICATIONS  (STANDING):  ALBUTerol/ipratropium for Nebulization 3 milliLiter(s) Nebulizer every 6 hours  atorvastatin 20 milliGRAM(s) Oral at bedtime  azithromycin  IVPB 500 milliGRAM(s) IV Intermittent every 24 hours  baclofen 10 milliGRAM(s) Oral daily  carvedilol 3.125 milliGRAM(s) Oral every 12 hours  dextrose 5% + sodium chloride 0.45%. 1000 milliLiter(s) (60 mL/Hr) IV Continuous <Continuous>  donepezil 10 milliGRAM(s) Oral at bedtime  heparin  Injectable 5000 Unit(s) SubCutaneous every 12 hours  pantoprazole    Tablet 40 milliGRAM(s) Oral before breakfast  piperacillin/tazobactam IVPB. 3.375 Gram(s) IV Intermittent every 12 hours    MEDICATIONS  (PRN):  acetaminophen  Suppository 650 milliGRAM(s) Rectal every 6 hours PRN For Temp greater than 38 C (100.4 F)      Vitals:  T(F): 97.9 (01-09-18 @ 17:10), Max: 101 (01-09-18 @ 00:15)  HR: 78 (01-09-18 @ 17:10) (73 - 117)  BP: 145/67 (01-09-18 @ 17:10) (90/49 - 185/75)  RR: 20 (01-09-18 @ 17:10) (16 - 20)  SpO2: 96% (01-09-18 @ 17:10) (95% - 100%)  Wt(kg): -- 56.8 kg    01-08 @ 07:01  -  01-09 @ 07:00  --------------------------------------------------------  IN:    dextrose 5% + sodium chloride 0.45%.: 600 mL    Solution: 250 mL    Solution: 100 mL  Total IN: 950 mL    OUT:  Total OUT: 0 mL    Total NET: 950 mL      PHYSICAL EXAM:  Neuro: Awake, responsive  CV: S1 S2 RRR, + systolic soft murmur   Lungs: coarse breath sounds  GI: Soft, BS +, ND, NT  Extremities: No edema    TELEMETRY: RSR  	      RADIOLOGY:  < from: Xray Chest 1 View AP -PORTABLE-Routine (01.09.18 @ 08:16) >  On present examination poor inspiration crowds the chest. Heart is   magnified by technique.    Perihilar infiltrates again noted but the show significant improvement.    IMPRESSION: Improved perihilar infiltrates.    < end of copied text >      DIAGNOSTIC TESTING:    [ x] Echocardiogram: < from: TTE Echo Doppler w/o Cont (01.07.18 @ 20:31) >   1. Left ventricular ejection fraction, by visual estimation, is 50 to   55%.   2. Normal left ventricular size and wall thicknesses, with normal   systolic and diastolic function.   3. Normal right ventricular size and function.   4. Mild mitral valve regurgitation.   5. Normal trileaflet aortic valve with normal opening.   6. Sclerotic aortic valve with normal opening.   7. Mildly enlarged left atrium.    < end of copied text >        LABS:	 	      08 Jan 2018 08:32    135    |  95     |  23     ----------------------------<  106    3.4     |  31     |  2.14   07 Jan 2018 07:14    138    |  97     |  15     ----------------------------<  98     3.4     |  33     |  1.40     Ca    8.6        08 Jan 2018 08:32                            12.5   7.1   )-----------( 234      ( 09 Jan 2018 08:18 )             37.3 ,                       13.8   7.4   )-----------( 231      ( 08 Jan 2018 08:32 )             42.0 ,                       12.9   7.8   )-----------( 243      ( 07 Jan 2018 07:14 )             38.6     PT/PTT- ( 09 Jan 2018 08:18 )   PT: 12.3 sec;  PTT: x         INR: 1.13 ratio (01-09 @ 08:18)  INR: 1.18 ratio (01-07 @ 07:14)

## 2018-01-09 NOTE — PROGRESS NOTE ADULT - ASSESSMENT
78 y/o Female PMH HTN, prior CVA with residual L sided weakness, dementia.  Admitted with acute on chronic dyspnea and occasional difficulty with swallowing. Likely sec to viral illness     Finding of dilated esophagus with possible achalasia.    -GI management and supportive care.  - s/p Endoscopy, NPO except meds for now    Low-grade troponin release likely from ischemic demand.   - c/w statin, holding asa for endoscopy, ? restart, c/w bbl   -ECHO: normal EF  - Patient not candidate for further diagnostic cardiac evaluation if asymptomatic.    HTN   - holding ARB 2/2 JONY, creat 2.14 today, increased from 1.40  - currently NPO except meds, s/p endoscopy  -  add Norvasc for BP control along with coreg  JONY   - hold ARB and diuretics for now  - BMP in am    Viral syndrome.   corona virus +  - nebulizer Rx, supplemental O2

## 2018-01-09 NOTE — PROGRESS NOTE ADULT - PROBLEM SELECTOR PLAN 5
repleted, monitor electrolytes

## 2018-01-09 NOTE — PROGRESS NOTE ADULT - PROBLEM SELECTOR PLAN 6
-gentle hydration given elevated BNP   -held lasix 2/2 to the same   -monitor renal status
-gentle hydration given elevated BNP   -monitor renal status
-gentle hydration given elevated BNP   -held lasix 2/2 to the same   -monitor renal status
-gentle hydration given elevated BNP   -monitor renal status
-gentle hydration given elevated BNP   -monitor renal status

## 2018-01-09 NOTE — PROGRESS NOTE ADULT - PROBLEM SELECTOR PROBLEM 4
Alzheimer's dementia without behavioral disturbance, unspecified timing of dementia onset

## 2018-01-09 NOTE — PROGRESS NOTE ADULT - PROBLEM SELECTOR PROBLEM 1
Esophageal dilatation

## 2018-01-09 NOTE — PROGRESS NOTE ADULT - SUBJECTIVE AND OBJECTIVE BOX
VITALS/LABS  1/9/2018 afeb 83 176/74 16 97% 56  1/9/2018 W 7.1 Hb 12.5 Plt 234    1/8/2018 W 7.4 Hb 13.8 Plt 231 Na 135 K 3.4 CO2 31 Cr 2.1     REVIEW OF SYMPTOMS    Able to give ROS  No   PHYSICAL EXAM    HEENT Unremarkable PERRLA atraumatic   RESP Fair air entry EXP prolonged    Harsh breath sound Resp distres mild   CARDIAC S1 S2 No S3     NO JVD    ABDOMEN SOFT BS PRESENT NOT DISTENDED No hepatosplenomegaly PEDAL EDEMA present No calf tenderness  NO rash   GENERAL Not TOXIC looking  HOSPITAL COURSE PROBLEM ASSESSMENT PLAN 1/4/2018 ADMISSION [facility]       RESP   1/9/2018 8a 1l 750/41/88                                      CORONAVIRUS RESP TRACT INFECTION SUPERIMPOSED ON RECURRENT ASPIRATION PNEUMONIA   1/4 RVP detected   [place]        1/4 bc n 1/6 bc n 1/7 bc n 1/9 pct .18   1/6/2018 CXR Patchy airspace dis both lungs pred perihilar       1/6-1/7-1/8-1/9 W 11.1 -7.8-7.4-7.1   1/4/2018 CTA Ch 1) Peribronchial thickening with diffuse narrowed airways 2) Compr atelectasis lower lobes 3) Nonsp interlobular septal thickening and ggo lizeth upper lobes 4) small tib  nodular opac l lobe suggesting impacted mucus 5) small l greater pl effsn 6) Mild cm 7) Marked fluid air distended esophagus   1/9/2018 CXR Improved periilar infiltr   Azithro (1/4) Zosyn (1/4)   COPD   Duoneb.4 (1/8)   CAD   atorvastat 20 (1/4) coreg 3.125x2 (1/8)   ESOPHAGEAL STRICTURE AND DYSPHAGIA  1/5/2018 Esophagogram Dilated esophagus which tapers abruptly at the GE junction dd achalasia       1/9 for egd   RO MI V CLEARANCE RELATED TROPONONIMEA V DEMAND ISCHEMIA   1/3-1/4-1/6 Tr 1 .046 (i) -.035 -.054 (i)   RO CHF  1/7/2018 ECHO ef 50% N r and l vsf Mild mr    JONY   1/3-1/4-1/6-1/7-1/8/2018 Cr 1.29-1.41-1.41-1.40-2.14  GLOBAL ISSUE/BEST PRACTICE:        PROBLEM: Analgesia:     na                        PROBLEM: Sedation:     na               PROBLEM: HOB elevation:   y             PROBLEM: Stress ulcer proph:    protonix 40 (1/4)                      PROBLEM: VTE prophylaxis:      intermittent pneumatic (1/8)                  PROBLEM: Glycemic control:    na   PROBLEM: Nutrition:    npo (1/4)          PROBLEM: Advanced directive: na     PROBLEM: Allergies:  na      TIME SPENT Over 25 minutes aggregate care time spent on encounter; activities included   direct patient care, counseling and/or coordinating care reviewing notes, lab data/ imaging , discussion with multidisciplinary team/ patient  /family. Risks, benefits, alternatives  discussed in detail. Proper antibiotic use issues addressed Questions/concerns  were addressed  as  best as possible   SUMMARY ASSESSMENT PLAN  1/4/2018 ADMISSION [facility]                                      76 y/o Female PMH Alzheimers MI HTN, prior CVA with residual L sided weakness, dementia was admitted 1/4/2018  with SOB for 3 hours, with acute onset, no inciting event. Pt has had SOB on and off for past few months, according to family. They have also noted occasional difficulty with swallowing lizeth after latest cva in 2016  HOSPITAL COURSE   RESP TRACT INFECTION  1) Coronavirus detected 1/4  2) CTA Ch 1/4 cw dialted fluid air level  esophagus and upper lobe ggo  and tib and nodular  l lung opac suggestive of aspiration pneumonia constellation   Pulm consulted 1/8/2018   ACHALASIA EGD planned to confirm diagnosis and plan dilation or myomectomy but was cancelled due to dyspnea   IMPRESSION/RECOMMENDATIONS   PNEUMONIA 1/8 Check pct and Leg If leg neg may DC zithro Doubt atypical Zosyn will cover hcap and aspiration pneumonia Supportive care for coronavirus   ACAHALASIA GI MORALES   RESP 1/8/2018 Check ABG in am and monitor PO   DVT P 1/8/2018 Suggest hsc   JONY Possibly prerenal dhydration 1/8/2018 Start ivf  PLAN   1/9 Check leg and dc zithro if neg Cleared for EGD In optimal pulm status to help resolve cause of esophageal dilation as that is causing asp pneumonia Follow renal labs

## 2018-01-09 NOTE — PROGRESS NOTE ADULT - PROBLEM SELECTOR PLAN 1
- Barium swallow results as above  - pt had difficulty swallowing contrast for barium swallow   - Discussed w/ - plan for EGD on monday  - NPO till then , gentle hydration for now D5@30ML/HR
- Barium swallow results as above  - pt had difficulty swallowing contrast for barium swallow   - Discussed w/ - plan for EGD on monday,will need dilatation vs myomectomy if EGD confirms the diagnosis as per GI   - NPO till then , gentle hydration for now D5@30ML/HR
- Barium swallow results as above  - pt had difficulty swallowing contrast for barium swallow   - Discussed w/ - plan for EGD today ,will need dilatation vs myomectomy if EGD confirms the diagnosis as per GI   - NPO till then , gentle hydration for now D5@30ML/HR
- Barium swallow results as above  - pt had difficulty swallowing contrast today AM  - Discussed w/ - plan for EGD on monday  - NPO till then , gentle hydration for now D5@30ML/HR
- Barium swallow results as above  - pt had difficulty swallowing contrast for barium swallow   - Discussed w/ - plan for EGD today ,will need dilatation vs myomectomy if EGD confirms the diagnosis as per GI   - NPO till then , gentle hydration for now D5@30ML/HR

## 2018-01-09 NOTE — PROGRESS NOTE ADULT - PROBLEM SELECTOR PLAN 2
- likely 2/2 to esophageal dilation , also likely cause for dyspnea episode last night(causing ?aspiration )  , responded well to duonebs as per RN report, no distress since then . currently has no complaints  - on Zosyn and zithromax IV  - NPO for now , plan for EGD on monday
- likely 2/2 to esophageal dilation   - on Zosyn and zithromax IV  - NPO for now , plan for EGD on monday
- likely 2/2 to esophageal dilation , also likely cause for dyspnea episode previous night(causing ?aspiration )  , responded well to duonebs, no distress since then . currently has no complaints  - appreciate  (pulm) input  - on Zosyn and zithromax IV  - NPO for now , plan for EGD
- likely 2/2 to esophageal dilation , also likely cause for dyspnea episode previous night(causing ?aspiration )  , responded well to duonebs, no distress since then . currently has no complaints  - on Zosyn and zithromax IV  - NPO for now , plan for EGD on monday
- likely 2/2 to esophageal dilation , also likely cause for dyspnea episode previous night(causing ?aspiration )  , responded well to duonebs, no distress since then . currently has no complaints  - Consulted  (pulm)  - on Zosyn and zithromax IV  - NPO for now , plan for EGD on monday

## 2018-01-09 NOTE — PROGRESS NOTE ADULT - PROBLEM SELECTOR PLAN 7
- corona virus +  - supportive care

## 2018-01-09 NOTE — PROGRESS NOTE ADULT - PROBLEM SELECTOR PLAN 3
- as per cards -likely ischemic demand  - on ASA,statin,  TTE results noted  -d/c d lasix  20mg IV2/2 to increased Cr , monitor renal status

## 2018-01-09 NOTE — PROGRESS NOTE ADULT - PROBLEM SELECTOR PROBLEM 6
JONY (acute kidney injury)

## 2018-01-09 NOTE — PROGRESS NOTE ADULT - SUBJECTIVE AND OBJECTIVE BOX
Upper esophagogastroduodenoscopy/ENDOSCOPY with biopsy    -Informed consent obtained from patient prior to exam.     Indication:  r/o achalasia    Anesthesia: as per anesthesiologist  provided by:    Esophogus: Filled with white liquid c/w oral contrast. GE junction looks normal. Biopsy of distal esophagus taken    Stomach:  mild antral gastritis s/p biopsy    Duodenum:  wnl    The patient tolerated the procedure well.    Findings:  as above, c/w achalasia    Plan:  transfer to [facility]

## 2018-01-09 NOTE — PROGRESS NOTE ADULT - SUBJECTIVE AND OBJECTIVE BOX
Patient is a 77y old  Female who presents with a chief complaint of SOB today. (04 Jan 2018 03:36)       OVERNIGHT EVENTS:    MEDICATIONS  (STANDING):  ALBUTerol/ipratropium for Nebulization 3 milliLiter(s) Nebulizer every 6 hours  atorvastatin 20 milliGRAM(s) Oral at bedtime  azithromycin  IVPB 500 milliGRAM(s) IV Intermittent every 24 hours  baclofen 10 milliGRAM(s) Oral daily  carvedilol 3.125 milliGRAM(s) Oral every 12 hours  dextrose 5% + sodium chloride 0.45%. 1000 milliLiter(s) (60 mL/Hr) IV Continuous <Continuous>  donepezil 10 milliGRAM(s) Oral at bedtime  heparin  Injectable 5000 Unit(s) SubCutaneous every 12 hours  pantoprazole    Tablet 40 milliGRAM(s) Oral before breakfast  piperacillin/tazobactam IVPB. 3.375 Gram(s) IV Intermittent every 12 hours    MEDICATIONS  (PRN):  acetaminophen  Suppository 650 milliGRAM(s) Rectal every 6 hours PRN For Temp greater than 38 C (100.4 F)       Vital Signs Last 24 Hrs  T(C): 36.7 (09 Jan 2018 15:12), Max: 38.3 (09 Jan 2018 00:15)  T(F): 98.1 (09 Jan 2018 15:12), Max: 101 (09 Jan 2018 00:15)  HR: 83 (09 Jan 2018 15:12) (73 - 117)  BP: 176/74 (09 Jan 2018 15:12) (116/66 - 185/75)  BP(mean): --  RR: 16 (09 Jan 2018 15:12) (16 - 18)  SpO2: 97% (09 Jan 2018 15:12) (95% - 99%)       PHYSICAL EXAM:  GENERAL: NAD, well-groomed, sleeping, easily arousable  HEAD:  Atraumatic, Normocephalic  EYES: EOMI, PERRLA, conjunctiva and sclera clear  ENMT: No tonsillar erythema, exudates, or enlargement; Moist mucous membranes   NECK: Supple, No JVD   NERVOUS SYSTEM:  Alert ,awake, Oriented to herself, confused  CHEST/LUNG: scattered rales, coarse breath sounds  HEART: Regular rate and rhythm; No murmurs, rubs, or gallops  ABDOMEN: Soft, Nontender, Nondistended; Bowel sounds present  EXTREMITIES:  2+ Peripheral Pulses, No clubbing, cyanosis, or edema  LYMPH: No lymphadenopathy noted  SKIN: No rashes or lesions      LABS:                        12.5   7.1   )-----------( 234      ( 09 Jan 2018 08:18 )             37.3     01-08    135  |  95<L>  |  23  ----------------------------<  106<H>  3.4<L>   |  31  |  2.14<H>    Ca    8.6      08 Jan 2018 08:32      PT/INR - ( 09 Jan 2018 08:18 )   PT: 12.3 sec;   INR: 1.13 ratio            cardiac markers     CAPILLARY BLOOD GLUCOSE        Cultures    RADIOLOGY & ADDITIONAL TESTS: < from: TTE Echo Doppler w/o Cont (01.07.18 @ 20:31) >   1. Left ventricular ejection fraction, by visual estimation, is 50 to   55%.   2. Normal left ventricular size and wall thicknesses, with normal   systolic and diastolic function.   3. Normal right ventricular size and function.   4. Mild mitral valve regurgitation.   5. Normal trileaflet aortic valve with normal opening.   6. Sclerotic aortic valve with normal opening.   7. Mildly enlarged left atrium.          Imaging Personally Reviewed:  [x ] YES  [ ] NO    Consultant(s) Notes Reviewed:  [x ] YES  [ ] NO    Care Discussed with Consultants/Other Providers [x ] YES  [ ] NO

## 2018-01-09 NOTE — PROGRESS NOTE ADULT - PROBLEM SELECTOR PROBLEM 2
Aspiration pneumonia of both upper lobes, unspecified aspiration pneumonia type

## 2018-01-10 ENCOUNTER — INPATIENT (INPATIENT)
Facility: HOSPITAL | Age: 78
LOS: 14 days | Discharge: HOME HEALTH | End: 2018-01-25
Attending: HOSPITALIST | Admitting: HOSPITALIST
Payer: MEDICARE

## 2018-01-10 VITALS
WEIGHT: 120.37 LBS | OXYGEN SATURATION: 90 % | TEMPERATURE: 98 F | RESPIRATION RATE: 18 BRPM | HEART RATE: 72 BPM | DIASTOLIC BLOOD PRESSURE: 61 MMHG | HEIGHT: 63 IN | SYSTOLIC BLOOD PRESSURE: 154 MMHG

## 2018-01-10 VITALS — WEIGHT: 129.19 LBS

## 2018-01-10 DIAGNOSIS — I63.9 CEREBRAL INFARCTION, UNSPECIFIED: ICD-10-CM

## 2018-01-10 DIAGNOSIS — B34.2 CORONAVIRUS INFECTION, UNSPECIFIED: ICD-10-CM

## 2018-01-10 DIAGNOSIS — J69.0 PNEUMONITIS DUE TO INHALATION OF FOOD AND VOMIT: ICD-10-CM

## 2018-01-10 DIAGNOSIS — Z29.9 ENCOUNTER FOR PROPHYLACTIC MEASURES, UNSPECIFIED: ICD-10-CM

## 2018-01-10 DIAGNOSIS — R09.02 HYPOXEMIA: ICD-10-CM

## 2018-01-10 DIAGNOSIS — E87.6 HYPOKALEMIA: ICD-10-CM

## 2018-01-10 DIAGNOSIS — K22.0 ACHALASIA OF CARDIA: ICD-10-CM

## 2018-01-10 DIAGNOSIS — N17.9 ACUTE KIDNEY FAILURE, UNSPECIFIED: ICD-10-CM

## 2018-01-10 DIAGNOSIS — I10 ESSENTIAL (PRIMARY) HYPERTENSION: ICD-10-CM

## 2018-01-10 DIAGNOSIS — J18.9 PNEUMONIA, UNSPECIFIED ORGANISM: ICD-10-CM

## 2018-01-10 PROBLEM — G30.9 ALZHEIMER'S DISEASE, UNSPECIFIED: Chronic | Status: ACTIVE | Noted: 2018-01-03

## 2018-01-10 PROBLEM — I25.2 OLD MYOCARDIAL INFARCTION: Chronic | Status: ACTIVE | Noted: 2018-01-03

## 2018-01-10 LAB
ALBUMIN SERPL ELPH-MCNC: 2.2 G/DL — LOW (ref 3.3–5)
ALP SERPL-CCNC: 87 U/L — SIGNIFICANT CHANGE UP (ref 40–120)
ALT FLD-CCNC: 12 U/L — SIGNIFICANT CHANGE UP (ref 12–78)
ANION GAP SERPL CALC-SCNC: 8 MMOL/L — SIGNIFICANT CHANGE UP (ref 5–17)
ANION GAP SERPL CALC-SCNC: 9 MMOL/L — SIGNIFICANT CHANGE UP (ref 5–17)
AST SERPL-CCNC: 21 U/L — SIGNIFICANT CHANGE UP (ref 15–37)
BILIRUB SERPL-MCNC: 0.4 MG/DL — SIGNIFICANT CHANGE UP (ref 0.2–1.2)
BUN SERPL-MCNC: 32 MG/DL — HIGH (ref 7–23)
BUN SERPL-MCNC: 32 MG/DL — HIGH (ref 7–23)
CALCIUM SERPL-MCNC: 8.2 MG/DL — LOW (ref 8.5–10.1)
CALCIUM SERPL-MCNC: 8.3 MG/DL — LOW (ref 8.5–10.1)
CHLORIDE SERPL-SCNC: 99 MMOL/L — SIGNIFICANT CHANGE UP (ref 96–108)
CHLORIDE SERPL-SCNC: 99 MMOL/L — SIGNIFICANT CHANGE UP (ref 96–108)
CO2 SERPL-SCNC: 30 MMOL/L — SIGNIFICANT CHANGE UP (ref 22–31)
CO2 SERPL-SCNC: 31 MMOL/L — SIGNIFICANT CHANGE UP (ref 22–31)
CREAT SERPL-MCNC: 2.61 MG/DL — HIGH (ref 0.5–1.3)
CREAT SERPL-MCNC: 2.63 MG/DL — HIGH (ref 0.5–1.3)
GLUCOSE SERPL-MCNC: 104 MG/DL — HIGH (ref 70–99)
GLUCOSE SERPL-MCNC: 108 MG/DL — HIGH (ref 70–99)
HCT VFR BLD CALC: 37.2 % — SIGNIFICANT CHANGE UP (ref 34.5–45)
HGB BLD-MCNC: 12.5 G/DL — SIGNIFICANT CHANGE UP (ref 11.5–15.5)
INR BLD: 1.1 RATIO — SIGNIFICANT CHANGE UP (ref 0.88–1.16)
MCHC RBC-ENTMCNC: 30.2 PG — SIGNIFICANT CHANGE UP (ref 27–34)
MCHC RBC-ENTMCNC: 33.7 GM/DL — SIGNIFICANT CHANGE UP (ref 32–36)
MCV RBC AUTO: 89.8 FL — SIGNIFICANT CHANGE UP (ref 80–100)
PHOSPHATE SERPL-MCNC: 3.4 MG/DL — SIGNIFICANT CHANGE UP (ref 2.5–4.5)
PLATELET # BLD AUTO: 239 K/UL — SIGNIFICANT CHANGE UP (ref 150–400)
POTASSIUM SERPL-MCNC: 3.2 MMOL/L — LOW (ref 3.5–5.3)
POTASSIUM SERPL-MCNC: 3.3 MMOL/L — LOW (ref 3.5–5.3)
POTASSIUM SERPL-SCNC: 3.2 MMOL/L — LOW (ref 3.5–5.3)
POTASSIUM SERPL-SCNC: 3.3 MMOL/L — LOW (ref 3.5–5.3)
PROT SERPL-MCNC: 6.8 GM/DL — SIGNIFICANT CHANGE UP (ref 6–8.3)
PROTHROM AB SERPL-ACNC: 12 SEC — SIGNIFICANT CHANGE UP (ref 9.8–12.7)
RBC # BLD: 4.15 M/UL — SIGNIFICANT CHANGE UP (ref 3.8–5.2)
RBC # FLD: 12.6 % — SIGNIFICANT CHANGE UP (ref 11–15)
SODIUM SERPL-SCNC: 138 MMOL/L — SIGNIFICANT CHANGE UP (ref 135–145)
SODIUM SERPL-SCNC: 138 MMOL/L — SIGNIFICANT CHANGE UP (ref 135–145)
WBC # BLD: 7.4 K/UL — SIGNIFICANT CHANGE UP (ref 3.8–10.5)
WBC # FLD AUTO: 7.4 K/UL — SIGNIFICANT CHANGE UP (ref 3.8–10.5)

## 2018-01-10 PROCEDURE — 99223 1ST HOSP IP/OBS HIGH 75: CPT | Mod: GC

## 2018-01-10 PROCEDURE — 99239 HOSP IP/OBS DSCHRG MGMT >30: CPT

## 2018-01-10 RX ORDER — PIPERACILLIN AND TAZOBACTAM 4; .5 G/20ML; G/20ML
3.38 INJECTION, POWDER, LYOPHILIZED, FOR SOLUTION INTRAVENOUS EVERY 12 HOURS
Qty: 0 | Refills: 0 | Status: COMPLETED | OUTPATIENT
Start: 2018-01-10 | End: 2018-01-12

## 2018-01-10 RX ORDER — ASPIRIN/CALCIUM CARB/MAGNESIUM 324 MG
81 TABLET ORAL DAILY
Qty: 0 | Refills: 0 | Status: DISCONTINUED | OUTPATIENT
Start: 2018-01-10 | End: 2018-01-10

## 2018-01-10 RX ORDER — PANTOPRAZOLE SODIUM 20 MG/1
40 TABLET, DELAYED RELEASE ORAL DAILY
Qty: 0 | Refills: 0 | Status: DISCONTINUED | OUTPATIENT
Start: 2018-01-10 | End: 2018-01-17

## 2018-01-10 RX ORDER — POTASSIUM CHLORIDE 20 MEQ
10 PACKET (EA) ORAL
Qty: 0 | Refills: 0 | Status: COMPLETED | OUTPATIENT
Start: 2018-01-10 | End: 2018-01-10

## 2018-01-10 RX ORDER — BACLOFEN 100 %
0 POWDER (GRAM) MISCELLANEOUS
Qty: 0 | Refills: 0 | COMMUNITY

## 2018-01-10 RX ORDER — AMLODIPINE BESYLATE AND BENAZEPRIL HYDROCHLORIDE 10; 20 MG/1; MG/1
1 CAPSULE ORAL
Qty: 0 | Refills: 0 | COMMUNITY

## 2018-01-10 RX ORDER — SERTRALINE 25 MG/1
1 TABLET, FILM COATED ORAL
Qty: 0 | Refills: 0 | COMMUNITY

## 2018-01-10 RX ORDER — PANTOPRAZOLE SODIUM 20 MG/1
40 TABLET, DELAYED RELEASE ORAL
Qty: 0 | Refills: 0 | Status: DISCONTINUED | OUTPATIENT
Start: 2018-01-10 | End: 2018-01-10

## 2018-01-10 RX ORDER — HEPARIN SODIUM 5000 [USP'U]/ML
5000 INJECTION INTRAVENOUS; SUBCUTANEOUS EVERY 8 HOURS
Qty: 0 | Refills: 0 | Status: DISCONTINUED | OUTPATIENT
Start: 2018-01-10 | End: 2018-01-10

## 2018-01-10 RX ORDER — ATORVASTATIN CALCIUM 80 MG/1
1 TABLET, FILM COATED ORAL
Qty: 0 | Refills: 0 | COMMUNITY

## 2018-01-10 RX ORDER — DONEPEZIL HYDROCHLORIDE 10 MG/1
1 TABLET, FILM COATED ORAL
Qty: 0 | Refills: 0 | DISCHARGE
Start: 2018-01-10

## 2018-01-10 RX ORDER — SODIUM CHLORIDE 9 MG/ML
1000 INJECTION INTRAMUSCULAR; INTRAVENOUS; SUBCUTANEOUS
Qty: 0 | Refills: 0 | Status: DISCONTINUED | OUTPATIENT
Start: 2018-01-10 | End: 2018-01-11

## 2018-01-10 RX ORDER — OMEPRAZOLE 10 MG/1
1 CAPSULE, DELAYED RELEASE ORAL
Qty: 0 | Refills: 0 | COMMUNITY

## 2018-01-10 RX ORDER — POTASSIUM CHLORIDE 20 MEQ
10 PACKET (EA) ORAL
Qty: 0 | Refills: 0 | Status: DISCONTINUED | OUTPATIENT
Start: 2018-01-10 | End: 2018-01-10

## 2018-01-10 RX ORDER — ASPIRIN/CALCIUM CARB/MAGNESIUM 324 MG
2 TABLET ORAL
Qty: 0 | Refills: 0 | COMMUNITY

## 2018-01-10 RX ORDER — POTASSIUM CHLORIDE 20 MEQ
20 PACKET (EA) ORAL
Qty: 0 | Refills: 0 | Status: DISCONTINUED | OUTPATIENT
Start: 2018-01-10 | End: 2018-01-10

## 2018-01-10 RX ORDER — IPRATROPIUM/ALBUTEROL SULFATE 18-103MCG
3 AEROSOL WITH ADAPTER (GRAM) INHALATION EVERY 6 HOURS
Qty: 0 | Refills: 0 | Status: DISCONTINUED | OUTPATIENT
Start: 2018-01-10 | End: 2018-01-25

## 2018-01-10 RX ORDER — DONEPEZIL HYDROCHLORIDE 10 MG/1
1 TABLET, FILM COATED ORAL
Qty: 0 | Refills: 0 | COMMUNITY

## 2018-01-10 RX ORDER — DONEPEZIL HYDROCHLORIDE 10 MG/1
10 TABLET, FILM COATED ORAL AT BEDTIME
Qty: 0 | Refills: 0 | Status: DISCONTINUED | OUTPATIENT
Start: 2018-01-10 | End: 2018-01-10

## 2018-01-10 RX ORDER — ATORVASTATIN CALCIUM 80 MG/1
10 TABLET, FILM COATED ORAL AT BEDTIME
Qty: 0 | Refills: 0 | Status: DISCONTINUED | OUTPATIENT
Start: 2018-01-10 | End: 2018-01-10

## 2018-01-10 RX ORDER — METOPROLOL TARTRATE 50 MG
0 TABLET ORAL
Qty: 0 | Refills: 0 | COMMUNITY

## 2018-01-10 RX ADMIN — CARVEDILOL PHOSPHATE 3.12 MILLIGRAM(S): 80 CAPSULE, EXTENDED RELEASE ORAL at 05:19

## 2018-01-10 RX ADMIN — AMLODIPINE BESYLATE 5 MILLIGRAM(S): 2.5 TABLET ORAL at 05:19

## 2018-01-10 RX ADMIN — SODIUM CHLORIDE 75 MILLILITER(S): 9 INJECTION INTRAMUSCULAR; INTRAVENOUS; SUBCUTANEOUS at 22:45

## 2018-01-10 RX ADMIN — Medication 3 MILLILITER(S): at 11:25

## 2018-01-10 RX ADMIN — DONEPEZIL HYDROCHLORIDE 10 MILLIGRAM(S): 10 TABLET, FILM COATED ORAL at 21:19

## 2018-01-10 RX ADMIN — PIPERACILLIN AND TAZOBACTAM 25 GRAM(S): 4; .5 INJECTION, POWDER, LYOPHILIZED, FOR SOLUTION INTRAVENOUS at 05:19

## 2018-01-10 RX ADMIN — HEPARIN SODIUM 5000 UNIT(S): 5000 INJECTION INTRAVENOUS; SUBCUTANEOUS at 21:19

## 2018-01-10 RX ADMIN — Medication 100 MILLIEQUIVALENT(S): at 22:30

## 2018-01-10 RX ADMIN — ATORVASTATIN CALCIUM 10 MILLIGRAM(S): 80 TABLET, FILM COATED ORAL at 21:18

## 2018-01-10 RX ADMIN — Medication 3 MILLILITER(S): at 23:54

## 2018-01-10 RX ADMIN — Medication 3 MILLILITER(S): at 05:31

## 2018-01-10 RX ADMIN — Medication 3 MILLILITER(S): at 00:27

## 2018-01-10 RX ADMIN — HEPARIN SODIUM 5000 UNIT(S): 5000 INJECTION INTRAVENOUS; SUBCUTANEOUS at 05:19

## 2018-01-10 NOTE — H&P ADULT - NSHPSOCIALHISTORY_GEN_ALL_CORE
Lives at home with her daughters and grandchildren. Was previously in a nursing home from June to November of 2017. Ambulates at home with a walker or a wheelchair. Has a HHA for 8 hours on weekdays. No smoking history or recent alcohol use. From the [place], came to the [place] in the [place]. No recent travel.

## 2018-01-10 NOTE — H&P ADULT - PROBLEM SELECTOR PLAN 8
DVT ppx: HSQ  Diet: NPO for now  Dispo: PT evaluation pending Will replete with IV potassium 10mEq x3 and repeat in AM Symptom management with Duoneb PRN and IVF

## 2018-01-10 NOTE — H&P ADULT - PMH
Alzheimers disease    CVA (cerebral vascular accident)    Depression    HTN (hypertension)    MI, old

## 2018-01-10 NOTE — DISCHARGE NOTE ADULT - HOSPITAL COURSE
78 y/o Female PMH HTN, prior CVA with residual L sided weakness, dementia with SOB for 3 hours, with acute onset, no inciting event. Pt has had SOB on and off for past few months, according to family. They have also noted occasional difficulty with swallowing.   Esophageal dilatation.   : - Barium swallow done   - pt had difficulty swallowing contrast for barium swallow   - Discussed w/ - EGD done   - NPO till then , gentle hydration for now D5@30ML/HR.   Aspiration pneumonia of both upper lobes, unspecified aspiration pneumonia type.   : - likely 2/2 to esophageal dilation , also likely cause for dyspnea episode previous night(causing ?aspiration )  , responded well to duonebs, no distress since then . currently has no complaints  - appreciate  (pul) input  - on Zosyn and zithromax IV  - NPO for now ,s/p EGD. =achlasia    Transfer to [facility] for manometry and Myomectomy by   you will be admitted to

## 2018-01-10 NOTE — H&P ADULT - FAMILY HISTORY
Mother  Still living? Unknown  Family history of stroke, Age at diagnosis: Age Unknown     Child  Still living? Unknown  Family history of essential hypertension, Age at diagnosis: Age Unknown

## 2018-01-10 NOTE — H&P ADULT - GASTROINTESTINAL DETAILS
no distention/no rebound tenderness/bowel sounds normal/nontender/no masses palpable/no rigidity/no guarding/soft

## 2018-01-10 NOTE — DISCHARGE NOTE ADULT - PATIENT PORTAL LINK FT
“You can access the [facility] Patient Portal, offered by [facility], by registering with the following website: [email or web address]”

## 2018-01-10 NOTE — H&P ADULT - PROBLEM SELECTOR PLAN 1
Pt with EGD on January 9th showing findings consistent with achalasia per Dr. Laird at [facility]  - GI evaluation in AM for possible myotomy  - NPO for now  - Aspiration precautions Pt with EGD on January 9th showing findings consistent with achalasia per Dr. Laird at [facility]  - GI evaluation in AM for possible myotomy  - NPO for now, holding non-essential oral medications  - Aspiration precautions Pt with EGD on January 9th showing findings consistent with achalasia per Dr. Laird at [facility]  - GI evaluation in AM for possible myotomy  - NPO for now, holding all non-essential oral medications  - Aspiration precautions

## 2018-01-10 NOTE — H&P ADULT - ASSESSMENT
78 yo F with a history of dementia (AAOx0-1), CVA in 1993 and 2017 with residual L-sided weakness, HTN and depression transferred from [facility] for further management of achalasia. 78 yo F with a history of dementia (AAOx0-1), CVA in 1993 and 2017 with residual L-sided weakness, HTN and depression transferred from [facility] for further management of achalasia. Multifactorial hypoxemia, likely contrast induced nephropathy;

## 2018-01-10 NOTE — DISCHARGE NOTE ADULT - SECONDARY DIAGNOSIS.
Esophageal dilatation Elevated troponin Alzheimer's dementia without behavioral disturbance, unspecified timing of dementia onset JONY (acute kidney injury) Viral syndrome Hypokalemia

## 2018-01-10 NOTE — H&P ADULT - PROBLEM SELECTOR PLAN 7
Will replete with IV potassium 10mEq x3 and repeat in AM Symptom management with Duonebs PRN and IVF BP currently not at goal  - Holding amlodipine 2.5mg for now, can restart once on PO diet

## 2018-01-10 NOTE — H&P ADULT - PROBLEM SELECTOR PLAN 6
Symptom management with Robitussin, Duonebs PRN and IVF BP currently not at goal  - Holding amlodipine 2.5mg for now, can restart once on PO diet Pt with possible vascular dementia from history of 2 prior strokes  - Holding atorvastatin and ASA for now  -Restart ASA if no objections from GI team  -Fall, aspiration precautions

## 2018-01-10 NOTE — PROGRESS NOTE ADULT - SUBJECTIVE AND OBJECTIVE BOX
Chart reviewed: Assessment plan is as below Note will be updated as more  patient data is gathered Chart reviewed: Assessment plan is as below Note will be updated as more  patient data is gathered     VITALS/LABS  1/10/2018 afeb 74 150/60 18 99% 58  1/10/2018 W 7.4 Hb 12.5 Plt 239 INR 1.1 Na 138 K 3.3 CO2 30 Cr 2.6   REVIEW OF SYMPTOMS    Able to give ROS  No   PHYSICAL EXAM    HEENT Unremarkable PERRLA atraumatic   RESP Fair air entry EXP prolonged    Harsh breath sound Resp distres mild   CARDIAC S1 S2 No S3     NO JVD    ABDOMEN SOFT BS PRESENT NOT DISTENDED No hepatosplenomegaly PEDAL EDEMA present No calf tenderness  NO rash   GENERAL Not TOXIC looking  HOSPITAL COURSE PROBLEM ASSESSMENT PLAN 1/4/2018 ADMISSION [facility]       RESP   1/9/2018 8a 1l 750/41/88                                      CORONAVIRUS RESP TRACT INFECTION SUPERIMPOSED ON RECURRENT ASPIRATION PNEUMONIA   1/4 RVP detected   [place]        1/4 bc n 1/6 bc n 1/7 bc n 1/9 pct .18   1/6/2018 CXR Patchy airspace dis both lungs pred perihilar       1/6-1/7-1/8-1/9 W 11.1 -7.8-7.4-7.1   1/4/2018 CTA Ch 1) Peribronchial thickening with diffuse narrowed airways 2) Compr atelectasis lower lobes 3) Nonsp interlobular septal thickening and ggo lizeth upper lobes 4) small tib  nodular opac l lobe suggesting impacted mucus 5) small l greater pl effsn 6) Mild cm 7) Marked fluid air distended esophagus   1/9/2018 CXR Improved periilar infiltr   Azithro (1/4) Zosyn (1/4)   COPD   Duoneb.4 (1/8)   CAD   atorvastat 20 (1/4) coreg 3.125x2 (1/8)   ESOPHAGEAL STRICTURE AND DYSPHAGIA  1/5/2018 Esophagogram Dilated esophagus which tapers abruptly at the GE junction dd achalasia       1/9 for egd  1/9/2018 EGD confirmed Achalasia  plan dilation or myomectomy was done 1/9 and plan is to tx to [facility] for esophageal myomectomy on 1/10    RO MI V CLEARANCE RELATED TROPONONIMEA V DEMAND ISCHEMIA   1/3-1/4-1/6 Tr 1 .046 (i) -.035 -.054 (i)   RO CHF  1/7/2018 ECHO ef 50% N r and l vsf Mild mr    JONY   1/3-1/4-1/6-1/7-1/8-1/10/2018 Cr 1.29-1.41-1.41-1.40-2.14-2.6  GLOBAL ISSUE/BEST PRACTICE:        PROBLEM: Analgesia:     na                        PROBLEM: Sedation:     na               PROBLEM: HOB elevation:   y             PROBLEM: Stress ulcer proph:    protonix 40 (1/4)                      PROBLEM: VTE prophylaxis:      intermittent pneumatic (1/8)                  PROBLEM: Glycemic control:    na   PROBLEM: Nutrition:    npo (1/4)          PROBLEM: Advanced directive: na     PROBLEM: Allergies:  na      TIME SPENT Over 25 minutes aggregate care time spent on encounter; activities included   direct patient care, counseling and/or coordinating care reviewing notes, lab data/ imaging , discussion with multidisciplinary team/ patient  /family. Risks, benefits, alternatives  discussed in detail. Proper antibiotic use issues addressed Questions/concerns  were addressed  as  best as possible   SUMMARY ASSESSMENT PLAN  1/4/2018 ADMISSION [facility]                                      78 y/o Female PMH Alzheimers MI HTN, prior CVA with residual L sided weakness, dementia was admitted 1/4/2018  with SOB for 3 hours, with acute onset, no inciting event. Pt has had SOB on and off for past few months, according to family. They have also noted occasional difficulty with swallowing lizeth after latest cva in 2016  HOSPITAL COURSE   RESP TRACT INFECTION  1) Coronavirus detected 1/4  2) CTA Ch 1/4 cw dialted fluid air level  esophagus and upper lobe ggo  and tib and nodular  l lung opac suggestive of aspiration pneumonia constellation   Pulm consulted 1/8/2018   ACHALASIA EGD  to confirm diagnosis and plan dilation or myomectomy was done 1/9 and plan is to tx to [facility] for esophageal myomectomy  IMPRESSION/RECOMMENDATIONS   PNEUMONIA 1/8 Check pct and Leg If leg neg may DC zithro Doubt atypical Zosyn will cover hcap and aspiration pneumonia Supportive care for coronavirus   ACAHALASIA GI MORALES   RESP 1/8/2018 Check ABG in am and monitor PO 1.9 ABG gas exchange good    DVT P 1/8/2018 Suggest hsc   JONY Possibly prerenal dhydration 1/8/2018 Start ivf  PLAN   1/9 Check leg and dc zithro if neg Cleared for EGD In optimal pulm status to help resolve cause of esophageal dilation as that is causing asp pneumonia Follow renal labs

## 2018-01-10 NOTE — H&P ADULT - PROBLEM SELECTOR PLAN 2
Pt hypoxic to 90% on 2L nasal cannula, not on any home oxygen  - May be 2/2 coronavirus infection versus continued aspiration pneumonitis  - Pt completed treatment with azithromycin and Zosyn at [facility] for possible aspiration pneumonia- will continue to monitor clinically, if pt develops leukocytosis with fever will consider restarting abx  - CTA negative for PE  - Pt does not clinically appear volume overloaded, TTE with normal LV function- low suspicion for CHF  - Supplemental oxygen as needed to keep O2 sat >92% Pt hypoxic to 90% on 2L nasal cannula, not on any home oxygen  - May be 2/2 coronavirus infection versus continued aspiration pneumonitis  - Pt treated with 5 days azithromycin and Zosyn at [facility] for possible aspiration pneumonia- will continue with Zosyn for 2 more days to complete 7 days  - Pt with diffuse rhonchi on lung exam- may be 2/2 coronavirus infection, less likely multifocal pneumonia or pulmonary edema  - CTA negative for PE  - Pt does not clinically appear volume overloaded, TTE with normal LV function- low suspicion for CHF  - Supplemental oxygen as needed to keep O2 sat >92% Pt hypoxic to 90% on 2L nasal cannula, not on any home oxygen  - May be 2/2 coronavirus infection causing viral pneumonia versus superimposed aspiration pneumonitis  - Pt treated with 5 days azithromycin and Zosyn at [facility] for possible aspiration pneumonia- will continue with Zosyn for 2 more days to complete 7 days  - Pt with diffuse rhonchi on lung exam- may be 2/2 coronavirus infection, less likely multifocal pneumonia or pulmonary edema  - CTA negative for PE  - Pt does not clinically appear volume overloaded, TTE with normal LV function- low suspicion for CHF  - Supplemental oxygen as needed to keep O2 sat >92% Pt hypoxemic to 90%, now on 2L nasal cannula sat 97%, not on any home oxygen  - Multifactorial due to coronavirus bronchitis/pneumonia as well as superimposed aspiration PNA/pneumonitis and mucus plugging as evidenced on CTA chest 1/4/18;   - Pt treated with Azithromycin and Zosyn at [facility] since 1/4 for possible superimposed aspiration pneumonia/CAP will continue with Zosyn for 2 more days to complete 7 days; completed 5 days of Azithromycin   - Pt with diffuse rhonchi on lung exam likely due to mucous plugging   - CTA negative for PE  - Pt does not clinically appear volume overloaded, TTE with normal LV function- low suspicion for acute CHF  - Supplemental oxygen as needed to keep O2 sat >92%  - Pulm c/s in am Pt hypoxemic to 90%, now on 2L nasal cannula sat 97%, not on any home oxygen  - Multifactorial due to coronavirus bronchitis/pneumonia as well as superimposed aspiration PNA/pneumonitis and mucus plugging as evidenced on CTA chest 1/4/18  - Pt treated with Azithromycin and Zosyn at [facility] since 1/4 for possible superimposed aspiration pneumonia/CAP will continue with Zosyn for 2 more days to complete 7 days; completed 5 days of Azithromycin   - Recommend pulmonary evaluation in AM  - Pt with diffuse rhonchi on lung exam likely due to mucous plugging   - CTA negative for PE  - Pt does not clinically appear volume overloaded, TTE with normal LV function- low suspicion for acute CHF  - Supplemental oxygen as needed to keep O2 sat >92%  - Pulm c/s in am

## 2018-01-10 NOTE — H&P ADULT - NSHPLABSRESULTS_GEN_ALL_CORE
12.5   7.4   )-----------( 239      ( 10 Ousmane 2018 09:00 )             37.2   01-10    138  |  99  |  32<H>  ----------------------------<  104<H>  3.2<L>   |  31  |  2.63<H>    Ca    8.3<L>      10 Ousmane 2018 10:49  Phos  3.4     01-10    TPro  6.8  /  Alb  2.2<L>  /  TBili  0.4  /  DBili  x   /  AST  21  /  ALT  12  /  AlkPhos  87  01-10    RVP: Coronavirus positive    UA: trace blood, 500 protein, few bacteria    pro-BNP 4257    Blood cultures 1/4 and 1/7 no growth    < from: CT Angio Chest w/ IV Cont (01.04.18 @ 01:06) >      EXAM:  CT ANGIO CHEST (W)AW IC                            PROCEDURE DATE:  01/04/2018          INTERPRETATION:  CLINICAL INFORMATION: Shortness of breath, positive   d-dimer, assess PE.     PROCEDURE: CT angiography of the chest was performed with intravenous   contrast utilizing dedicated PE protocol. 80 mls of Omnipaque-350   administered without complication. 20 ml discarded. Coronal and sagittal   reconstruction images were obtained. Axial MIP images were obtained from   a separate workstation.      COMPARISON: None.    FINDINGS: Artifact from the patient's respiratory motion and arms   degrading images.    LUNGS AND AIRWAYS: Peribronchial thickening with diffuse narrowed   airways. Compressive atelectasis of the lower lobes. Nonspecific   interlobular septal thickening and groundglass opacities, predominantly   in the upper lobes. Small tree-in-bud/nodular opacities in the left lobe,   suggesting mucus impacted airways.  PLEURA: No pneumothorax. Small left > right pleural effusion.  HEART: Mild cardiomegaly. No pericardial effusion. Aortic valve   calcification.  VESSELS:  Suboptimal contrast opacification of the subsegmental pulmonary   arteries. No obvious embolus to the level of the segmental pulmonary   arteries. Atherosclerotic change of the thoracic aorta, great vessel   origin and coronary arteries.  CHEST WALL AND LOWER NECK: Within normal limits.   MEDIASTINUM AND TONY: Subcentimeter mediastinal lymph nodes without   lymphadenopathy. Marked fluid/air distended esophagus.  UPPER ABDOMEN: Diverticulosis near the hepatic flexure. Atrophic right   kidney.  BONES: Degenerative changes/interventional ligament ossification of the   spine. Degenerative changes in bilateral glenohumeral and right   acromioclavicular joints.     IMPRESSION:    Suboptimal evaluation of the subsegmental pulmonary arteries. No obvious   pulmonary embolus to the level of the segmental pulmonary arteries.     Nonspecific interlobular septal thickening and groundglass opacities,   predominantly in the upper lobes, which may represent pulmonary edema   given cardiomegaly and pleural effusion. Recommend clinical correlation   to assess underlying pneumonia. Small tree-in-bud/nodular opacities in   the left lobe, suggesting mucus impactedairways.    Marked fluid/air distended esophagus. Differential diagnosis includes   achalasia although distal esophagus lesion/neoplasm cannot be excluded.   Recommend follow-up. EKG, NSR, 1/6/2017, [facility], 65bpm, qtc 447, Tw inv V2-V6, I, II, aVL, aVF - my reading     RVP: Coronavirus positive    UA: trace blood, 500 protein, few bacteria    pro-BNP 4257    Blood cultures 1/4 and 1/7 no growth    < from: CT Angio Chest w/ IV Cont (01.04.18 @ 01:06) >    EXAM:  CT ANGIO CHEST (W)AW IC 01/04/2018    INTERPRETATION:  CLINICAL INFORMATION: Shortness of breath, positive   d-dimer, assess PE.   PROCEDURE: CT angiography of the chest was performed with intravenous   contrast utilizing dedicated PE protocol. 80 mls of Omnipaque-350   administered without complication. 20 ml discarded. Coronal and sagittal   reconstruction images were obtained. Axial MIP images were obtained from   a separate workstation.    COMPARISON: None.  FINDINGS: Artifact from the patient's respiratory motion and arms   degrading images.  LUNGS AND AIRWAYS: Peribronchial thickening with diffuse narrowed   airways. Compressive atelectasis of the lower lobes. Nonspecific   interlobular septal thickening and groundglass opacities, predominantly   in the upper lobes. Small tree-in-bud/nodular opacities in the left lobe,   suggesting mucus impacted airways.  PLEURA: No pneumothorax. Small left > right pleural effusion.  HEART: Mild cardiomegaly. No pericardial effusion. Aortic valve   calcification.  VESSELS:  Suboptimal contrast opacification of the subsegmental pulmonary   arteries. No obvious embolus to the level of the segmental pulmonary   arteries. Atherosclerotic change of the thoracic aorta, great vessel   origin and coronary arteries.  CHEST WALL AND LOWER NECK: Within normal limits.   MEDIASTINUM AND TONY: Subcentimeter mediastinal lymph nodes without   lymphadenopathy. Marked fluid/air distended esophagus.  UPPER ABDOMEN: Diverticulosis near the hepatic flexure. Atrophic right   kidney.  BONES: Degenerative changes/interventional ligament ossification of the   spine. Degenerative changes in bilateral glenohumeral and right   acromioclavicular joints.   IMPRESSION:  Suboptimal evaluation of the subsegmental pulmonary arteries. No obvious   pulmonary embolus to the level of the segmental pulmonary arteries.   Nonspecific interlobular septal thickening and groundglass opacities,   predominantly in the upper lobes, which may represent pulmonary edema   given cardiomegaly and pleural effusion. Recommend clinical correlation   to assess underlying pneumonia. Small tree-in-bud/nodular opacities in   the left lobe, suggesting mucus impactedairways.  Marked fluid/air distended esophagus. Differential diagnosis includes   achalasia although distal esophagus lesion/neoplasm cannot be excluded.   Recommend follow-up.    TTE, 1/7/18  PHYSICIAN INTERPRETATION:  Left Ventricle: Normal left ventricular size and wall thicknesses, with   normal systolic and diastolic function.  Left ventricular ejection fraction, by visual estimation, is 50 to 55%.  Right Ventricle: Normal right ventricular size and function.  Left Atrium: Mildly enlarged left atrium.  Right Atrium: The right atrium is normal in size.  Mitral Valve: Mild mitral valve regurgitation is seen.  Tricuspid Valve: Mild tricuspid regurgitation is visualized.  Aortic Valve: Normal trileaflet aortic valve with normal opening.   Sclerotic aortic valve with normal opening.  Pulmonic Valve: The pulmonic valve was not well visualized.  Aorta: The aortic root is normal in size and structure.   Summary:   1. Left ventricular ejection fraction, by visual estimation, is 50 to   55%.   2. Normal left ventricular size and wall thicknesses, with normal   systolic and diastolic function.   3. Normal right ventricular size and function.   4. Mild mitral valve regurgitation.   5. Normal trileaflet aortic valve with normal opening.   6. Sclerotic aortic valve with normal opening.   7. Mildly enlarged left atrium.

## 2018-01-10 NOTE — H&P ADULT - HISTORY OF PRESENT ILLNESS
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 Pt is unable to answer questions appropriate and most of the history of obtained from the pt's daughter at bedside.     Pt initially presented to [facility] on January 4th after having acute worsening SOB. Pt had been having SOB on and off for the past few months prior to admission. Pt tested positive for coronavirus and was treated with Lasix for possible pulmonary edema on CXR and Duonebs PRN. CTA angion of the chest was negative for PE but did show a markedly air and fluid filled esophagus Pt had a CXR showing severe narrowing of the esophagus at the GE junction suspicious for achlasia. Pt was also treated with azithromycin and Zosyn for aspiration pneumonia. GI was consulted and pt had an EGD done on 1/10 that was consistent with achalasia and pt was transferred to [facility] for possible myotomy with Dr. Tierney.     Floor vitals: Temp 97.9 HR 72 /61 RR 18 O2 90% on 2L NC. 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 Pt is unable to answer questions appropriate and most of the history of obtained from the pt's daughter at bedside.     Pt initially presented to [facility] on January 4th after having acute worsening SOB. Pt had been having SOB on and off for the past few months prior to admission. Pt tested positive for coronavirus and was treated with Lasix for possible pulmonary edema on CXR and Duonebs PRN. CTA of the chest was negative for PE but did show a markedly air and fluid filled esophagus Pt had a CXR showing severe narrowing of the esophagus at the GE junction suspicious for achlasia. Pt was also treated with Azithromycin and Zosyn for aspiration pneumonia. GI was consulted and pt had an EGD done on 1/10 that was consistent with achalasia and pt was transferred to [facility] for possible myotomy with Dr. Tierney.     Floor vitals: Temp 97.9 HR 72 /61 RR 18 O2 90% on 2L NC. 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 Pt is unable to answer questions appropriate and most of the history of obtained from the pt's daughter at bedside.     Pt initially presented to [facility] on January 4th after having acute worsening SOB. Pt had been having SOB on and off for the past few months prior to admission. Pt tested positive for coronavirus and was treated with Lasix for possible pulmonary edema on CXR and Duonebs PRN. CTA of the chest was negative for PE but did show a markedly air and fluid filled esophagus Pt had a CXR showing severe narrowing of the esophagus at the GE junction suspicious for achlasia. Pt was also treated with Azithromycin and Zosyn for aspiration pneumonia. GI was consulted and pt had an EGD done on 1/10 that was consistent with achalasia and pt was transferred to [facility] for possible myotomy with Dr. Tierney.     Floor vitals: Temp 97.9 HR 72 /61 RR 18 O2 90% on 2L NC.

## 2018-01-10 NOTE — H&P ADULT - MOTOR
5/5 strength RUE, 4/5 strength LUE, 4/5 strength LLE, 5/5 strength RLE grossly 5/5 flexion-extension Rt upper/lower extrs  grossly +4/5 flexion-extension Lt upper extr and 5/5 lower extr

## 2018-01-10 NOTE — PROGRESS NOTE ADULT - ASSESSMENT
HPI:  78 y/o Female PMH HTN, prior CVA with residual L sided weakness, dementia with SOB for 3 hours, with acute onset, no inciting event. Pt has had SOB on and off for past few months, according to family. They have also noted occasional difficulty with swallowing.  Family denies fever, cough, headache, abd pain, nausea, vomiting, diarrhea, rash, paresthesia, and weakness. (04 Jan 2018 03:36)  ----------------------------------------------------------------------------------------------------------------------------------------------------------------------  Consult called for dilated Esophagus. History obtained from daughter The patient has a history of dysphagia with certain solids. It started after the first CVA and worsened after the second CVA in June of 2016. The daughter  denies melena, hematochezia, hematemesis, nausea, vomiting, abdominal pain, constipation, diarrhea, or change in bowel movements. No history of esophageal problems. No weight loss. See Chest CT, Esophogram    ------dilated esophagus c/w  achalasia 1) EGD to confirm diagnosis  2) Keep NPO except meds 3) will need dilatation vs myomectomy if EGD confirms the diagnosis.  [id number]  --------------------------As above.  Discussed diagnosis and treatment with daughter Saturday   Recommended EGD - Discussed R/B/A Consent obtained, DNR rescinded for procedure / PACU only.  PT/INR minimally elevated. Tmax 100.6, EGD postponed because of SOB For EGD in AM. Anti coagulation held.  [id number]---------------------------[id number] c/w Shreya.. Awaiting transfer

## 2018-01-10 NOTE — DISCHARGE NOTE ADULT - PLAN OF CARE
resolve [facility] for [facility] and Myomectomy by   you will be admitted to  Transfer to [facility] for manometry and Myomectomy by   you will be admitted to  supportive care monitor repleted

## 2018-01-10 NOTE — H&P ADULT - PROBLEM SELECTOR PLAN 9
DVT ppx: SCDs for now pending possible GI procedure tomorrow  Diet: NPO for now  Dispo: PT evaluation pending Will replete with IV potassium 10mEq x3 and repeat in AM

## 2018-01-10 NOTE — DISCHARGE NOTE ADULT - MEDICATION SUMMARY - MEDICATIONS TO TAKE
I will START or STAY ON the medications listed below when I get home from the hospital:    atorvastatin 10 mg oral tablet  -- 1 tab(s) by mouth once a day  -- Indication: For Hld    amlodipine-benazepril 5 mg-10 mg oral capsule  -- 1 cap(s) by mouth once a day  -- Indication: For HTN     donepezil 10 mg oral tablet  -- 1 tab(s) by mouth once a day (at bedtime)  -- Indication: For dementia    omeprazole 20 mg oral delayed release tablet  -- 1 tab(s) by mouth once a day  -- Indication: For gerd

## 2018-01-10 NOTE — H&P ADULT - PROBLEM SELECTOR PROBLEM 4
Cerebrovascular accident (CVA), unspecified mechanism Aspiration pneumonia, unspecified aspiration pneumonia type, unspecified laterality, unspecified part of lung

## 2018-01-10 NOTE — H&P ADULT - PROBLEM SELECTOR PLAN 4
Pt with possible vascular dementia from history of 2 prior strokes  - C/w atorvastatin  - ASA listed as previous med, unclear why it was held- will restart Will continue with Zosyn q12h for 2 more days to complete 7 days of treatment  - Monitor for leukocytosis and fever

## 2018-01-10 NOTE — H&P ADULT - MUSCULOSKELETAL
detailed exam no joint swelling/no joint warmth/calf tenderness/diminished strength/no joint erythema/ROM intact/Left calf tenderness details…

## 2018-01-10 NOTE — H&P ADULT - MENTAL STATUS
AAOx0- unable to say her name, knows she is in a hospital, does not know what month or year it is AAO to self, not lethargic, follows simple one-step commands

## 2018-01-10 NOTE — H&P ADULT - PROBLEM SELECTOR PLAN 5
BP currently not at goal  - Will start amlodipine 2.5mg, lower than home dose and uptitrate as needed Pt with possible vascular dementia from history of 2 prior strokes  - Holding atorvastatin and ASA for now Trop elevated in [facility] 1/3 - 1/6; EKG abnormal with Tw inv as above; Pt was evaluated by Cardiology at [facility], per the c/s note, 1/8, Low-grade troponin release likely from ischemic demand, Patient not candidate for further diagnostic cardiac evaluation if asymptomatic.  -TTE, 1/7/18, no acute findings, EF= 50-55%  -Restart ASA if no objections from GI team Trop elevated in [facility] 1/3 - 1/6; EKG abnormal with Tw inv as above; Pt was evaluated by Cardiology at [facility], per the c/s note, 1/8, Low-grade troponin release likely from ischemic demand, Patient not candidate for further diagnostic cardiac evaluation if asymptomatic.  -TTE, 1/7/18, no acute findings, EF= 50-55%  -Restart ASA if no objections from GI team  -Would consider Cardiology c/s regarding post discharge Cardiologic f/u and possible outpt vs inpt w/up vs conservative mgt Trop elevated in [facility] 1/3 - 1/6; EKG abnormal with Tw inv as above; Pt was evaluated by Cardiology at [facility], per the c/s note, 1/8, Low-grade troponin release likely from ischemic demand, Patient not candidate for further diagnostic cardiac evaluation if asymptomatic.  -TTE, 1/7/18, no acute findings, EF= 50-55%  -Restart ASA if no objections from GI team  -Would consider Cardiology c/s regarding post discharge Cardiologic f/u

## 2018-01-10 NOTE — PROGRESS NOTE ADULT - SUBJECTIVE AND OBJECTIVE BOX
Patient is a 77y old  Female who presents with a chief complaint of SOB today. (04 Jan 2018 03:36)      HPI:  76 y/o Female PMH HTN, prior CVA with residual L sided weakness, dementia with SOB for 3 hours, with acute onset, no inciting event. Pt has had SOB on and off for past few months, according to family. They have also noted occasional difficulty with swallowing.  Family denies fever, cough, headache, abd pain, nausea, vomiting, diarrhea, rash, paresthesia, and weakness. (04 Jan 2018 03:36)      INTERVAL HPI/OVERNIGHT EVENTS:  No GI complaints. Had EGD yesterday. Awaiting transfer    MEDICATIONS  (STANDING):  ALBUTerol/ipratropium for Nebulization 3 milliLiter(s) Nebulizer every 6 hours  amLODIPine   Tablet 5 milliGRAM(s) Oral daily  atorvastatin 20 milliGRAM(s) Oral at bedtime  azithromycin  IVPB 500 milliGRAM(s) IV Intermittent every 24 hours  baclofen 10 milliGRAM(s) Oral daily  carvedilol 3.125 milliGRAM(s) Oral every 12 hours  dextrose 5% + sodium chloride 0.45%. 1000 milliLiter(s) (60 mL/Hr) IV Continuous <Continuous>  donepezil 10 milliGRAM(s) Oral at bedtime  heparin  Injectable 5000 Unit(s) SubCutaneous every 12 hours  pantoprazole    Tablet 40 milliGRAM(s) Oral before breakfast  piperacillin/tazobactam IVPB. 3.375 Gram(s) IV Intermittent every 12 hours    MEDICATIONS  (PRN):  acetaminophen  Suppository 650 milliGRAM(s) Rectal every 6 hours PRN For Temp greater than 38 C (100.4 F)      FAMILY HISTORY:  No pertinent family history in first degree relatives      Allergies    No Known Allergies    Intolerances        PMH/PSH:  Alzheimers disease  MI, old  CVA (cerebral vascular accident)  HTN (hypertension)  No pertinent past medical history  No significant past surgical history        REVIEW OF SYSTEMS:  Confused  CONSTITUTIONAL: No fever, weight loss, or fatigue  EYES: No eye pain, visual disturbances, or discharge  ENMT:  No difficulty hearing, tinnitus, vertigo; No sinus or throat pain  NECK: No pain or stiffness  BREASTS: No pain, masses, or nipple discharge  RESPIRATORY: No cough, wheezing, chills or hemoptysis; No shortness of breath  CARDIOVASCULAR: No chest pain, palpitations, dizziness, or leg swelling  GASTROINTESTINAL: See above.  GENITOURINARY: No dysuria, frequency, hematuria, or incontinence  NEUROLOGICAL: No headaches, numbness, or tremors  SKIN: No itching, burning, rashes, or lesions   LYMPH NODES: No enlarged glands  ENDOCRINE: No heat or cold intolerance; No hair loss  MUSCULOSKELETAL: No joint pain or swelling; No muscle, back, or extremity pain  PSYCHIATRIC: No depression, anxiety, mood swings, or difficulty sleeping  HEME/LYMPH: No easy bruising, or bleeding gums  ALLERGY AND IMMUNOLOGIC: No hives or eczema    Vital Signs Last 24 Hrs  T(C): 36.2 (10 Ousmane 2018 05:03), Max: 36.8 (10 Ousmane 2018 00:15)  T(F): 97.2 (10 Ousmane 2018 05:03), Max: 98.2 (10 Ousmane 2018 00:15)  HR: 76 (10 Ousmane 2018 05:32) (73 - 98)  BP: 151/67 (10 Ousmane 2018 05:03) (90/49 - 185/75)  BP(mean): --  RR: 18 (10 Ousmane 2018 05:03) (16 - 20)  SpO2: 96% (10 Ousmane 2018 05:32) (94% - 100%)    PHYSICAL EXAM:  GENERAL: NAD, well-groomed, well-developed  HEAD:  Atraumatic, Normocephalic  EYES: EOMI, PERRLA, conjunctiva and sclera clear  NECK: Supple, No JVD, Normal thyroid  NERVOUS SYSTEM:  Alert but confused   CHEST/LUNG: Clear to percussion bilaterally; No rales, rhonchi, wheezing, or rubs  HEART: Regular rate and rhythm; No murmurs, rubs, or gallops  ABDOMEN: Soft, Nontender, Nondistended; Bowel sounds present  EXTREMITIES:  2+ Peripheral Pulses, No clubbing, cyanosis, or edema  LYMPH: No lymphadenopathy noted  SKIN: No rashes or lesions    LAB                          12.5   7.4   )-----------( 239      ( 10 Ousmane 2018 09:00 )             37.2       CBC:  01-10 @ 09:00  WBC 7.4   Hgb 12.5   Hct 37.2   Plts 239  MCV 89.8  01-09 @ 08:18  WBC 7.1   Hgb 12.5   Hct 37.3   Plts 234  MCV 88.9  01-08 @ 08:32  WBC 7.4   Hgb 13.8   Hct 42.0   Plts 231  MCV 89.7  01-07 @ 07:14  WBC 7.8   Hgb 12.9   Hct 38.6   Plts 243  MCV 90.3  01-06 @ 08:47  WBC 11.1   Hgb 11.3   Hct 33.0   Plts 220  MCV 90.5  01-03 @ 18:48  WBC 13.2   Hgb 13.7   Hct 41.7   Plts 245  MCV 91.0      Chemistry:  01-10 @ 09:00  Na+ 138  K+ 3.3  Cl- 99  CO2 30  BUN 32  Cr 2.61     01-08 @ 08:32  Na+ 135  K+ 3.4  Cl- 95  CO2 31  BUN 23  Cr 2.14     01-07 @ 07:14  Na+ 138  K+ 3.4  Cl- 97  CO2 33  BUN 15  Cr 1.40     01-06 @ 08:47  Na+ 142  K+ 3.0  Cl- 101  CO2 31  BUN 18  Cr 1.41     01-04 @ 08:39  Na+ 139  K+ 3.4  Cl- 104  CO2 24  BUN 15  Cr 1.41     01-03 @ 19:18  Na+ 140  K+ 3.8  Cl- 104  CO2 28  BUN 13  Cr 1.29         Glucose, Serum: 108 mg/dL (01-10 @ 09:00)  Glucose, Serum: 106 mg/dL (01-08 @ 08:32)  Glucose, Serum: 98 mg/dL (01-07 @ 07:14)  Glucose, Serum: 90 mg/dL (01-06 @ 08:47)  Glucose, Serum: 185 mg/dL (01-04 @ 08:39)  Glucose, Serum: 101 mg/dL (01-03 @ 19:18)      10 Ousmane 2018 09:00    138    |  99     |  32     ----------------------------<  108    3.3     |  30     |  2.61   08 Jan 2018 08:32    135    |  95     |  23     ----------------------------<  106    3.4     |  31     |  2.14   07 Jan 2018 07:14    138    |  97     |  15     ----------------------------<  98     3.4     |  33     |  1.40   06 Jan 2018 08:47    142    |  101    |  18     ----------------------------<  90     3.0     |  31     |  1.41   04 Jan 2018 08:39    139    |  104    |  15     ----------------------------<  185    3.4     |  24     |  1.41   03 Jan 2018 19:18    140    |  104    |  13     ----------------------------<  101    3.8     |  28     |  1.29     Ca    8.2        10 Ousmane 2018 09:00  Ca    8.6        08 Jan 2018 08:32  Ca    8.6        07 Jan 2018 07:14  Ca    7.8        06 Jan 2018 08:47  Ca    8.1        04 Jan 2018 08:39  Ca    8.4        03 Jan 2018 19:18  Phos  3.4       10 Ousmane 2018 09:00  Mg     2.1       06 Jan 2018 08:47    TPro  6.8    /  Alb  2.2    /  TBili  0.4    /  DBili  x      /  AST  21     /  ALT  12     /  AlkPhos  87     10 Ousmane 2018 09:00  TPro  7.9    /  Alb  2.8    /  TBili  0.4    /  DBili  x      /  AST  20     /  ALT  17     /  AlkPhos  160    03 Jan 2018 19:18      PT/INR - ( 10 Ousmane 2018 09:00 )   PT: 12.0 sec;   INR: 1.10 ratio                 CAPILLARY BLOOD GLUCOSE              RADIOLOGY & ADDITIONAL TESTS:    Imaging Personally Reviewed:  [ ] YES  [ ] NO    Consultant(s) Notes Reviewed:  [ ] YES  [ ] NO    Care Discussed with Consultants/Other Providers [ ] YES  [ ] NO

## 2018-01-10 NOTE — DISCHARGE NOTE ADULT - CARE PLAN
Principal Discharge DX:	Aspiration pneumonia of both upper lobes, unspecified aspiration pneumonia type  Goal:	resolve  Instructions for follow-up, activity and diet:	Transfer to [facility] for manometry and Myomectomy by   you will be admitted to   Secondary Diagnosis:	Esophageal dilatation  Instructions for follow-up, activity and diet:	Transfer to [facility] for manometry and Myomectomy by   you will be admitted to   Secondary Diagnosis:	Elevated troponin  Secondary Diagnosis:	Alzheimer's dementia without behavioral disturbance, unspecified timing of dementia onset  Instructions for follow-up, activity and diet:	supportive care  Secondary Diagnosis:	JONY (acute kidney injury)  Instructions for follow-up, activity and diet:	monitor  Secondary Diagnosis:	Viral syndrome  Instructions for follow-up, activity and diet:	supportive care  Secondary Diagnosis:	Hypokalemia  Instructions for follow-up, activity and diet:	repleted

## 2018-01-11 DIAGNOSIS — R74.8 ABNORMAL LEVELS OF OTHER SERUM ENZYMES: ICD-10-CM

## 2018-01-11 LAB
BUN SERPL-MCNC: 23 MG/DL — SIGNIFICANT CHANGE UP (ref 7–23)
CALCIUM SERPL-MCNC: 8.4 MG/DL — SIGNIFICANT CHANGE UP (ref 8.4–10.5)
CHLORIDE SERPL-SCNC: 102 MMOL/L — SIGNIFICANT CHANGE UP (ref 98–107)
CO2 SERPL-SCNC: 27 MMOL/L — SIGNIFICANT CHANGE UP (ref 22–31)
CREAT SERPL-MCNC: 1.83 MG/DL — HIGH (ref 0.5–1.3)
GLUCOSE SERPL-MCNC: 82 MG/DL — SIGNIFICANT CHANGE UP (ref 70–99)
HCT VFR BLD CALC: 37.9 % — SIGNIFICANT CHANGE UP (ref 34.5–45)
HGB BLD-MCNC: 12.2 G/DL — SIGNIFICANT CHANGE UP (ref 11.5–15.5)
INR BLD: 1.05 — SIGNIFICANT CHANGE UP (ref 0.88–1.17)
MAGNESIUM SERPL-MCNC: 2.2 MG/DL — SIGNIFICANT CHANGE UP (ref 1.6–2.6)
MCHC RBC-ENTMCNC: 29 PG — SIGNIFICANT CHANGE UP (ref 27–34)
MCHC RBC-ENTMCNC: 32.2 % — SIGNIFICANT CHANGE UP (ref 32–36)
MCV RBC AUTO: 90 FL — SIGNIFICANT CHANGE UP (ref 80–100)
NRBC # FLD: 0 — SIGNIFICANT CHANGE UP
PHOSPHATE SERPL-MCNC: 3.2 MG/DL — SIGNIFICANT CHANGE UP (ref 2.5–4.5)
PLATELET # BLD AUTO: 240 K/UL — SIGNIFICANT CHANGE UP (ref 150–400)
PMV BLD: 9.9 FL — SIGNIFICANT CHANGE UP (ref 7–13)
POTASSIUM SERPL-MCNC: 3.7 MMOL/L — SIGNIFICANT CHANGE UP (ref 3.5–5.3)
POTASSIUM SERPL-SCNC: 3.7 MMOL/L — SIGNIFICANT CHANGE UP (ref 3.5–5.3)
PROTHROM AB SERPL-ACNC: 11.7 SEC — SIGNIFICANT CHANGE UP (ref 9.8–13.1)
RBC # BLD: 4.21 M/UL — SIGNIFICANT CHANGE UP (ref 3.8–5.2)
RBC # FLD: 13 % — SIGNIFICANT CHANGE UP (ref 10.3–14.5)
SODIUM SERPL-SCNC: 143 MMOL/L — SIGNIFICANT CHANGE UP (ref 135–145)
SURGICAL PATHOLOGY FINAL REPORT - CH: SIGNIFICANT CHANGE UP
WBC # BLD: 7.69 K/UL — SIGNIFICANT CHANGE UP (ref 3.8–10.5)
WBC # FLD AUTO: 7.69 K/UL — SIGNIFICANT CHANGE UP (ref 3.8–10.5)

## 2018-01-11 PROCEDURE — 76775 US EXAM ABDO BACK WALL LIM: CPT | Mod: 26

## 2018-01-11 PROCEDURE — 99222 1ST HOSP IP/OBS MODERATE 55: CPT | Mod: GC

## 2018-01-11 PROCEDURE — 76770 US EXAM ABDO BACK WALL COMP: CPT | Mod: 26

## 2018-01-11 PROCEDURE — 99223 1ST HOSP IP/OBS HIGH 75: CPT | Mod: GC

## 2018-01-11 PROCEDURE — 99233 SBSQ HOSP IP/OBS HIGH 50: CPT

## 2018-01-11 RX ORDER — SODIUM CHLORIDE 9 MG/ML
1000 INJECTION, SOLUTION INTRAVENOUS
Qty: 0 | Refills: 0 | Status: DISCONTINUED | OUTPATIENT
Start: 2018-01-11 | End: 2018-01-12

## 2018-01-11 RX ORDER — ASPIRIN/CALCIUM CARB/MAGNESIUM 324 MG
1 TABLET ORAL
Qty: 0 | Refills: 0 | COMMUNITY

## 2018-01-11 RX ORDER — SODIUM CHLORIDE 9 MG/ML
3 INJECTION INTRAMUSCULAR; INTRAVENOUS; SUBCUTANEOUS EVERY 6 HOURS
Qty: 0 | Refills: 0 | Status: DISCONTINUED | OUTPATIENT
Start: 2018-01-11 | End: 2018-01-25

## 2018-01-11 RX ORDER — ATORVASTATIN CALCIUM 80 MG/1
20 TABLET, FILM COATED ORAL AT BEDTIME
Qty: 0 | Refills: 0 | Status: DISCONTINUED | OUTPATIENT
Start: 2018-01-11 | End: 2018-01-25

## 2018-01-11 RX ORDER — SODIUM CHLORIDE 9 MG/ML
1000 INJECTION INTRAMUSCULAR; INTRAVENOUS; SUBCUTANEOUS
Qty: 0 | Refills: 0 | Status: DISCONTINUED | OUTPATIENT
Start: 2018-01-11 | End: 2018-01-11

## 2018-01-11 RX ORDER — METOPROLOL TARTRATE 50 MG
5 TABLET ORAL EVERY 6 HOURS
Qty: 0 | Refills: 0 | Status: DISCONTINUED | OUTPATIENT
Start: 2018-01-11 | End: 2018-01-12

## 2018-01-11 RX ADMIN — Medication 100 MILLIEQUIVALENT(S): at 00:18

## 2018-01-11 RX ADMIN — SODIUM CHLORIDE 50 MILLILITER(S): 9 INJECTION, SOLUTION INTRAVENOUS at 23:24

## 2018-01-11 RX ADMIN — Medication 3 MILLILITER(S): at 03:20

## 2018-01-11 RX ADMIN — SODIUM CHLORIDE 3 MILLILITER(S): 9 INJECTION INTRAMUSCULAR; INTRAVENOUS; SUBCUTANEOUS at 23:02

## 2018-01-11 RX ADMIN — Medication 3 MILLILITER(S): at 22:55

## 2018-01-11 RX ADMIN — Medication 5 MILLIGRAM(S): at 23:23

## 2018-01-11 RX ADMIN — ATORVASTATIN CALCIUM 20 MILLIGRAM(S): 80 TABLET, FILM COATED ORAL at 23:22

## 2018-01-11 RX ADMIN — Medication 3 MILLILITER(S): at 16:24

## 2018-01-11 RX ADMIN — PIPERACILLIN AND TAZOBACTAM 25 GRAM(S): 4; .5 INJECTION, POWDER, LYOPHILIZED, FOR SOLUTION INTRAVENOUS at 05:00

## 2018-01-11 RX ADMIN — Medication 5 MILLIGRAM(S): at 05:00

## 2018-01-11 RX ADMIN — Medication 100 MILLIEQUIVALENT(S): at 03:00

## 2018-01-11 RX ADMIN — Medication 5 MILLIGRAM(S): at 11:56

## 2018-01-11 RX ADMIN — PIPERACILLIN AND TAZOBACTAM 25 GRAM(S): 4; .5 INJECTION, POWDER, LYOPHILIZED, FOR SOLUTION INTRAVENOUS at 17:19

## 2018-01-11 RX ADMIN — SODIUM CHLORIDE 75 MILLILITER(S): 9 INJECTION INTRAMUSCULAR; INTRAVENOUS; SUBCUTANEOUS at 11:56

## 2018-01-11 RX ADMIN — Medication 5 MILLIGRAM(S): at 01:03

## 2018-01-11 RX ADMIN — PANTOPRAZOLE SODIUM 40 MILLIGRAM(S): 20 TABLET, DELAYED RELEASE ORAL at 11:55

## 2018-01-11 RX ADMIN — Medication 5 MILLIGRAM(S): at 17:19

## 2018-01-11 NOTE — PROGRESS NOTE ADULT - ASSESSMENT
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 for management of achalasia, found to have JONY likely 2/2 DELFIN.

## 2018-01-11 NOTE — PHYSICAL THERAPY INITIAL EVALUATION ADULT - GENERAL OBSERVATIONS, REHAB EVAL
Pt encountered in semisupine position, no distress, AxOx1, with +IV, and 2L of O2 through nasal cannula

## 2018-01-11 NOTE — PHYSICAL THERAPY INITIAL EVALUATION ADULT - RANGE OF MOTION EXAMINATION, REHAB EVAL
bilateral lower extremity ROM was WFL (within functional limits)/Right UE ROM was WFL (within functional limits)

## 2018-01-11 NOTE — CONSULT NOTE ADULT - ATTENDING COMMENTS
Patient seen and examined with the GI fellow. Agree with the note as outlined above. Pt with imaging of the esophagus suspicious for achalasia.  Plan for speech and swallow evaluation to ensure her swallowing function is OK. If Ok, then we can consider endoscopic therapy for possible achalasia which includes EGD with BoTox (least invasive) vs dilation vs myotomy.  The issue is that patient will not tolerate a esophageal motility study and thus truly diagnosing achalasia may be difficult. We can do endo-flip and measure the lower esophageal pressure. If speech and swallow functioning is not normal, then we should consider a PEG. Thank you for allowing us to care for this patient.
R atrophic kidney. likely CKD otherwise improving DELFIN. can DC NS.

## 2018-01-11 NOTE — PROGRESS NOTE ADULT - PROBLEM SELECTOR PLAN 2
Pt hypoxemic to 90%, now on 2L nasal cannula sat 97%, not on any home oxygen  - Multifactorial due to coronavirus bronchitis/pneumonia as well as superimposed aspiration PNA/pneumonitis and mucus plugging as evidenced on CTA chest 1/4/18  - Pt treated with Azithromycin and Zosyn at [facility] since 1/4 for possible superimposed aspiration pneumonia/CAP will continue with Zosyn for 1 more days to complete 7 days; completed 5 days of Azithromycin   - Pt with diffuse rhonchi on lung exam likely due to mucous plugging, will start pulm toilet with robutossin, chest PT, hypertonic nebs  - CTA negative for PE  - Pt does not clinically appear volume overloaded, TTE with normal LV function- low suspicion for acute CHF  - Supplemental oxygen as needed to keep O2 sat >92% Pt hypoxemic to 90%, now on 2L nasal cannula sat 97%, not on any home oxygen  - Multifactorial due to coronavirus bronchitis/pneumonia as well as superimposed aspiration PNA/pneumonitis and mucus plugging as evidenced on CTA chest 1/4/18  - Pt treated with Azithromycin and Zosyn at [facility] since 1/4 for possible superimposed aspiration pneumonia/CAP will continue with Zosyn for 1 more days to complete 7 days; completed 5 days of Azithromycin   - Pt with diffuse rhonchi on lung exam likely due to mucous plugging, will start pulm toilet with robutossin (once no longer NPO), chest PT, hypertonic nebs  - CTA negative for PE  - Pt does not clinically appear volume overloaded, TTE with normal LV function- low suspicion for acute CHF  - Supplemental oxygen as needed to keep O2 sat >92%  - S&S eval pending GI recs

## 2018-01-11 NOTE — CONSULT NOTE ADULT - SUBJECTIVE AND OBJECTIVE BOX
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 Pt is unable to answer questions appropriate and most of the history of obtained from the pt's daughter at bedside.     Pt initially presented to [facility] on January 4th after having acute worsening SOB. Pt had been having SOB on and off for the past few months prior to admission. Pt tested positive for coronavirus and was treated with lasix for possible pulmonary edema on CXR and Duonebs PRN. CTA of the chest was negative for PE but did show a markedly air and fluid filled esophagus. Pt had a CXR showing severe narrowing of the esophagus at the GE junction suspicious for achlasia. Pt was also treated with Azithromycin and Zosyn for aspiration pneumonia. GI was consulted and pt had an EGD done on 1/10 that was consistent with achalasia and pt was transferred to [facility] for possible myotomy with Dr. Tierney. Pt found to have JONY on 1/8 following CTA on 1/4 as well as lasix for pulmonary edema at [place]. Unclear urine output, given pt poorly responsive to questioning and family not available by phone.    PAST HISTORY  --------------------------------------------------------------------------------  PAST MEDICAL & SURGICAL HISTORY:  Depression  Alzheimers disease  MI, old  CVA (cerebral vascular accident)  HTN (hypertension)  No significant past surgical history    FAMILY HISTORY:  Family history of essential hypertension (Child)  Family history of stroke (Mother)    PAST SOCIAL HISTORY:    ALLERGIES & MEDICATIONS  --------------------------------------------------------------------------------  Allergies    No Known Allergies    Intolerances    Standing Inpatient Medications  ALBUTerol/ipratropium for Nebulization 3 milliLiter(s) Nebulizer every 6 hours  atorvastatin 20 milliGRAM(s) Oral at bedtime  metoprolol    tartrate Injectable 5 milliGRAM(s) IV Push every 6 hours  pantoprazole  Injectable 40 milliGRAM(s) IV Push daily  piperacillin/tazobactam IVPB. 3.375 Gram(s) IV Intermittent every 12 hours  sodium chloride 0.9%. 1000 milliLiter(s) IV Continuous <Continuous>  sodium chloride 0.9%. 1000 milliLiter(s) IV Continuous <Continuous>  sodium chloride 3%  Inhalation 3 milliLiter(s) Inhalation every 6 hours    PRN Inpatient Medications    REVIEW OF SYSTEMS  --------------------------------------------------------------------------------  Unable to obtain ROS given pt inappropriately responsive to questioning    VITALS/PHYSICAL EXAM  --------------------------------------------------------------------------------  T(C): 36.6 (01-11-18 @ 09:45), Max: 36.8 (01-10-18 @ 20:53)  HR: 68 (01-11-18 @ 11:52) (63 - 83)  BP: 135/72 (01-11-18 @ 11:52) (128/57 - 188/67)  RR: 18 (01-11-18 @ 09:45) (17 - 18)  SpO2: 100% (01-11-18 @ 09:45) (90% - 100%)  Wt(kg): --  Height (cm): 160.02 (01-10-18 @ 16:08)  Weight (kg): 54.6 (01-10-18 @ 16:08)  BMI (kg/m2): 21.3 (01-10-18 @ 16:08)  BSA (m2): 1.56 (01-10-18 @ 16:08)    Physical Exam:  	Gen: NAD, sitting comfortably in chair  	HEENT: MMM  	Pulm: diffuse bilateral crackles  	CV: S1S2, no murmurs/rubs/gallops  	Abd: Soft, +BS   	Ext: no LE edema B/L, limited ROM of the LUE and LLE  	Neuro: awake, A&Ox1  	Skin: warm and dry    LABS/STUDIES  --------------------------------------------------------------------------------              12.2   7.69  >-----------<  240      [01-11-18 @ 06:20]              37.9     143  |  102  |  23  ----------------------------<  82      [01-11-18 @ 06:20]  3.7   |  27  |  1.83        Ca     8.4     [01-11-18 @ 06:20]      Mg     2.2     [01-11-18 @ 06:20]      Phos  3.2     [01-11-18 @ 06:20]    TPro  6.8  /  Alb  2.2  /  TBili  0.4  /  DBili  x   /  AST  21  /  ALT  12  /  AlkPhos  87  [01-10-18 @ 09:00]    PT/INR: PT 11.7 , INR 1.05       [01-11-18 @ 06:20]    Creatinine Trend:  SCr 1.83 [01-11 @ 06:20]  SCr 2.63 [01-10 @ 10:49]  SCr 2.61 [01-10 @ 09:00]  SCr 2.14 [01-08 @ 08:32]  SCr 1.40 [01-07 @ 07:14]    Urinalysis - [01-04-18 @ 04:16]      Color Yellow / Appearance Clear / SG 1.010 / pH 6.5      Gluc Negative / Ketone Negative  / Bili Negative / Urobili Negative       Blood Trace / Protein 500 / Leuk Est Negative / Nitrite Negative      RBC  / WBC 0-2 / Hyaline  / Gran  / Sq Epi  / Non Sq Epi Few / Bacteria Few    Radiology:    US Renal (01.11.18 @ 10:27)  EXAM:  US KIDNEY(S)      PROCEDURE DATE:  Jan 11 2018     INTERPRETATION:  CLINICAL INFORMATION: Acute kidney injury. Aspiration   pneumonia.    COMPARISON: None available.    TECHNIQUE: Sonography of the kidneys and bladder.     FINDINGS:    Right kidney:  5.4 cm. Echogenic parenchyma. 2.3 cm lower pole cyst. No   renal mass, hydronephrosis, or calculi.    Left kidney:  9.3 cm. No renal mass, hydronephrosis or calculi.    Urinary bladder: Within normal limits.    IMPRESSION:     Left kidney within normal limits. Right kidney chronically atrophic.    ARCADIO STUART M.D., ATTENDING RADIOLOGIST  This document has been electronically signed. 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 Pt is unable to answer questions appropriate and most of the history of obtained from the pt's daughter at bedside.     Pt initially presented to [facility] on January 4th after having acute worsening SOB. Pt had been having SOB on and off for the past few months prior to admission. Pt tested positive for coronavirus and was treated with lasix for possible pulmonary edema on CXR and Duonebs PRN. CTA of the chest was negative for PE but did show a markedly air and fluid filled esophagus. Pt had a CXR showing severe narrowing of the esophagus at the GE junction suspicious for achlasia. Pt was also treated with Azithromycin and Zosyn for aspiration pneumonia. GI was consulted and pt had an EGD done on 1/10 that was consistent with achalasia and pt was transferred to [facility] for possible myotomy with Dr. Tierney. Pt found to have JONY on 1/8 following CTA on 1/4 as well as lasix for pulmonary edema at [place]. Unclear urine output, given pt poorly responsive to questioning and family not available by phone.    PAST HISTORY  --------------------------------------------------------------------------------  PAST MEDICAL & SURGICAL HISTORY:  Depression  Alzheimers disease  MI, old  CVA (cerebral vascular accident)  HTN (hypertension)  No significant past surgical history    FAMILY HISTORY:  Family history of essential hypertension (Child)  Family history of stroke (Mother)    PAST SOCIAL HISTORY:    Lives at home with her daughters and grandchildren. Was previously in a nursing home from June to November of 2017. Ambulates at home with a walker or a wheelchair. Has a HHA for 8 hours on weekdays. No smoking history or recent alcohol use. From the [place], came [place] the [place] in the [place]. No recent travel.    ALLERGIES & MEDICATIONS  --------------------------------------------------------------------------------  Allergies    No Known Allergies    Intolerances    Standing Inpatient Medications  ALBUTerol/ipratropium for Nebulization 3 milliLiter(s) Nebulizer every 6 hours  atorvastatin 20 milliGRAM(s) Oral at bedtime  metoprolol    tartrate Injectable 5 milliGRAM(s) IV Push every 6 hours  pantoprazole  Injectable 40 milliGRAM(s) IV Push daily  piperacillin/tazobactam IVPB. 3.375 Gram(s) IV Intermittent every 12 hours  sodium chloride 0.9%. 1000 milliLiter(s) IV Continuous <Continuous>  sodium chloride 0.9%. 1000 milliLiter(s) IV Continuous <Continuous>  sodium chloride 3%  Inhalation 3 milliLiter(s) Inhalation every 6 hours    PRN Inpatient Medications    REVIEW OF SYSTEMS  --------------------------------------------------------------------------------  Unable to obtain ROS given pt inappropriately responsive to questioning    VITALS/PHYSICAL EXAM  --------------------------------------------------------------------------------  T(C): 36.6 (01-11-18 @ 09:45), Max: 36.8 (01-10-18 @ 20:53)  HR: 68 (01-11-18 @ 11:52) (63 - 83)  BP: 135/72 (01-11-18 @ 11:52) (128/57 - 188/67)  RR: 18 (01-11-18 @ 09:45) (17 - 18)  SpO2: 100% (01-11-18 @ 09:45) (90% - 100%)  Wt(kg): --  Height (cm): 160.02 (01-10-18 @ 16:08)  Weight (kg): 54.6 (01-10-18 @ 16:08)  BMI (kg/m2): 21.3 (01-10-18 @ 16:08)  BSA (m2): 1.56 (01-10-18 @ 16:08)    Physical Exam:  	Gen: NAD, sitting comfortably in chair  	HEENT: MMM  	Pulm: diffuse bilateral crackles  	CV: S1S2, no murmurs/rubs/gallops  	Abd: Soft, +BS   	Ext: no LE edema B/L, limited ROM of the LUE and LLE  	Neuro: awake, A&Ox1  	Skin: warm and dry    LABS/STUDIES  --------------------------------------------------------------------------------              12.2   7.69  >-----------<  240      [01-11-18 @ 06:20]              37.9     143  |  102  |  23  ----------------------------<  82      [01-11-18 @ 06:20]  3.7   |  27  |  1.83        Ca     8.4     [01-11-18 @ 06:20]      Mg     2.2     [01-11-18 @ 06:20]      Phos  3.2     [01-11-18 @ 06:20]    TPro  6.8  /  Alb  2.2  /  TBili  0.4  /  DBili  x   /  AST  21  /  ALT  12  /  AlkPhos  87  [01-10-18 @ 09:00]    PT/INR: PT 11.7 , INR 1.05       [01-11-18 @ 06:20]    Creatinine Trend:  SCr 1.83 [01-11 @ 06:20]  SCr 2.63 [01-10 @ 10:49]  SCr 2.61 [01-10 @ 09:00]  SCr 2.14 [01-08 @ 08:32]  SCr 1.40 [01-07 @ 07:14]    Urinalysis - [01-04-18 @ 04:16]      Color Yellow / Appearance Clear / SG 1.010 / pH 6.5      Gluc Negative / Ketone Negative  / Bili Negative / Urobili Negative       Blood Trace / Protein 500 / Leuk Est Negative / Nitrite Negative      RBC  / WBC 0-2 / Hyaline  / Gran  / Sq Epi  / Non Sq Epi Few / Bacteria Few    Radiology:    US Renal (01.11.18 @ 10:27)  EXAM:  US KIDNEY(S)      PROCEDURE DATE:  Jan 11 2018     INTERPRETATION:  CLINICAL INFORMATION: Acute kidney injury. Aspiration   pneumonia.    COMPARISON: None available.    TECHNIQUE: Sonography of the kidneys and bladder.     FINDINGS:    Right kidney:  5.4 cm. Echogenic parenchyma. 2.3 cm lower pole cyst. No   renal mass, hydronephrosis, or calculi.    Left kidney:  9.3 cm. No renal mass, hydronephrosis or calculi.    Urinary bladder: Within normal limits.    IMPRESSION:     Left kidney within normal limits. Right kidney chronically atrophic.    ARCADIO STUART M.D., ATTENDING RADIOLOGIST  This document has been electronically signed. Jan 11 2018 10:56AM

## 2018-01-11 NOTE — PROGRESS NOTE ADULT - PROBLEM SELECTOR PLAN 6
BP currently not at goal  - Holding amlodipine 2.5mg for now, can restart once on PO diet  - on IV metoprolol standing q6h while NPO

## 2018-01-11 NOTE — PHYSICAL THERAPY INITIAL EVALUATION ADULT - ADDITIONAL COMMENTS
Pt is a poor historian 2/2 to cognitive impairment. According to H and P report "Pt Lives at home with her daughters and grandchildren. Was previously in a nursing home from June to November of 2017. Ambulates at home with a cane or a wheelchair. Has a HHA for 8 hours on weekdays" Pt reports that she does not ambulate at home alone.    Pt left comfortable in chair, NAD, all lines intact, all precautions maintained, with call bell in reach, chair alarm on, and RN aware of PT evaluation.

## 2018-01-11 NOTE — PHYSICAL THERAPY INITIAL EVALUATION ADULT - MANUAL MUSCLE TESTING RESULTS, REHAB EVAL
grossly assessed due to/cognitive impairment; Right UE at least 3/5, Right LE at least 3/5, Left LE at least 3-/5, Left shoulder 2/5, Left elbow3-/5, left hand/wrist 2-/5

## 2018-01-11 NOTE — CONSULT NOTE ADULT - SUBJECTIVE AND OBJECTIVE BOX
ADVANCED ENDOSCOPY CONSULT    Chief Complaint:  Patient is a 77y old  Female who presents with a chief complaint of Transfer from [place] for achalasia management (10 Ousmane 2018 20:19)    HPI: 77 year old female with dementia (AAOx1), CVA in 1993 and June 2017 with residual L-sided weakness, HTN and depression presents as a transfer from [facility] for management of achalasia. Pt is unable to answer questions appropriate and most of the history of obtained from the pt's daughter over the phone and the chart. Patient initially presented to [facility] on January 4th after having acute worsening SOB. Pt had been having SOB on and off for the past few months prior to admission. Pt tested positive for coronavirus and was treated with Lasix for possible pulmonary edema on CXR and Duonebs PRN. CTA of the chest was negative for PE but did show a markedly air and fluid filled esophagus Pt had a CXR showing severe narrowing of the esophagus at the GE junction suspicious for achalasia. Pt was also treated with Azithromycin and Zosyn for aspiration pneumonia. GI was consulted and pt had an EGD done on 1/10 that was consistent with "achalasia" and was transferred to [facility] for further care.     Spoke to patient's daughter Katelyn over the phone, who is not the best historian. When I first spoke to her she was eating fine at home, but upon further questioning she states patient is having trouble eating. She eats a regular diet at home. She has had multiple swallowing tests in the past and there has never been any abnormalities after her strokes. She has not lost any weight except in the last week when she was not feeling well, but then upon further questioning the daughter states she have lost weight earlier when she was in a nursing home. The daughter first said there was no pain, then the patient seems to have chest pain when she eats. When she eats she has not had any regurgitation or vomiting but seems to be "choking" when she eats food "without texture."    Spoke to patient in [place] - she reports says "Yo no se" to most questions.    Allergies:  No Known Allergies    Hospital Medications:  ALBUTerol/ipratropium for Nebulization 3 milliLiter(s) Nebulizer every 6 hours  atorvastatin 20 milliGRAM(s) Oral at bedtime  metoprolol    tartrate Injectable 5 milliGRAM(s) IV Push every 6 hours  pantoprazole  Injectable 40 milliGRAM(s) IV Push daily  piperacillin/tazobactam IVPB. 3.375 Gram(s) IV Intermittent every 12 hours  sodium chloride 0.9%. 1000 milliLiter(s) IV Continuous <Continuous>  sodium chloride 0.9%. 1000 milliLiter(s) IV Continuous <Continuous>  sodium chloride 3%  Inhalation 3 milliLiter(s) Inhalation every 6 hours      PMHX/PSHX:  Depression  Alzheimers disease  MI, old  CVA (cerebral vascular accident)  HTN (hypertension)    No significant past surgical history    Family history:  Family history of essential hypertension (Child)  Family history of stroke (Mother)    Social History: No ETOH, smoking or illicit drugs    ROS:     General:  ?wt loss, fevers, chills, night sweats, fatigue,   Eyes:  Good vision, no reported pain  ENT:  No sore throat, pain, runny nose, dysphagia  CV:  No pain, palpitations, hypo/hypertension  Resp:  No dyspnea, cough, tachypnea, wheezing  GI:  See HPI  :  No pain, bleeding, incontinence, nocturia  Muscle:  No pain, weakness  Neuro:  No weakness, tingling, memory problems  Psych:  No fatigue, insomnia, mood problems, depression  Endocrine:  No polyuria, polydipsia, cold/heat intolerance  Heme:  No petechiae, ecchymosis, easy bruisability  Skin:  No rash, edema      PHYSICAL EXAM:     GENERAL:  Appears stated age, well-groomed, well-nourished, no distress  HEENT:  NC/AT,  conjunctivae clear and pink,  no JVD  CHEST: Rhonchi and rales in bilateral lungs  HEART:  Regular rhythm, S1, S2, no murmur  ABDOMEN:  Soft, non-tender, non-distended, normoactive bowel sounds,  no masses  EXTREMITIES:  no cyanosis,clubbing or edema  SKIN:  No rash/erythema   NEURO:  Alert, oriented x 0 - she is unable to tell me her name, where she is, or the year    Vital Signs:  Vital Signs Last 24 Hrs  T(C): 36.6 (11 Jan 2018 09:45), Max: 36.8 (10 Ousmane 2018 20:53)  T(F): 97.9 (11 Jan 2018 09:45), Max: 98.3 (10 Ousmane 2018 20:53)  HR: 68 (11 Jan 2018 11:52) (66 - 83)  BP: 135/72 (11 Jan 2018 11:52) (135/72 - 188/67)  BP(mean): --  RR: 18 (11 Jan 2018 09:45) (17 - 18)  SpO2: 100% (11 Jan 2018 09:45) (90% - 100%)  Daily Height in cm: 160.02 (10 Ousmane 2018 16:08)    Daily     LABS:                        12.2   7.69  )-----------( 240      ( 11 Jan 2018 06:20 )             37.9     01-11    143  |  102  |  23  ----------------------------<  82  3.7   |  27  |  1.83<H>    Ca    8.4      11 Jan 2018 06:20  Phos  3.2     01-11  Mg     2.2     01-11    TPro  6.8  /  Alb  2.2<L>  /  TBili  0.4  /  DBili  x   /  AST  21  /  ALT  12  /  AlkPhos  87  01-10    LIVER FUNCTIONS - ( 10 Ousmane 2018 09:00 )  Alb: 2.2 g/dL / Pro: 6.8 gm/dL / ALK PHOS: 87 U/L / ALT: 12 U/L / AST: 21 U/L / GGT: x           PT/INR - ( 11 Jan 2018 06:20 )   PT: 11.7 SEC;   INR: 1.05             Imaging:  < from: CT Angio Chest w/ IV Cont (01.04.18 @ 01:06) >    EXAM:  CT ANGIO CHEST (W)AW IC                            PROCEDURE DATE:  01/04/2018          INTERPRETATION:  CLINICAL INFORMATION: Shortness of breath, positive   d-dimer, assess PE.     PROCEDURE: CT angiography of the chest was performed with intravenous   contrast utilizing dedicated PE protocol. 80 mls of Omnipaque-350   administered without complication. 20 ml discarded. Coronal and sagittal   reconstruction images were obtained. Axial MIP images were obtained from   a separate workstation.      COMPARISON: None.    FINDINGS: Artifact from the patient's respiratory motion and arms   degrading images.    LUNGS AND AIRWAYS: Peribronchial thickening with diffuse narrowed   airways. Compressive atelectasis of the lower lobes. Nonspecific   interlobular septal thickening and groundglass opacities, predominantly   in the upper lobes. Small tree-in-bud/nodular opacities in the left lobe,   suggesting mucus impacted airways.  PLEURA: No pneumothorax. Small left > right pleural effusion.  HEART: Mild cardiomegaly. No pericardial effusion. Aortic valve   calcification.  VESSELS:  Suboptimal contrast opacification of the subsegmental pulmonary   arteries. No obvious embolus to the level of the segmental pulmonary   arteries. Atherosclerotic change of the thoracic aorta, great vessel   origin and coronary arteries.  CHEST WALL AND LOWER NECK: Within normal limits.   MEDIASTINUM AND TONY: Subcentimeter mediastinal lymph nodes without   lymphadenopathy. Marked fluid/air distended esophagus.  UPPER ABDOMEN: Diverticulosis near the hepatic flexure. Atrophic right   kidney.  BONES: Degenerative changes/interventional ligament ossification of the   spine. Degenerative changes in bilateral glenohumeral and right   acromioclavicular joints.     IMPRESSION:    Suboptimal evaluation of the subsegmental pulmonary arteries. No obvious   pulmonary embolus to the level of the segmental pulmonary arteries.     Nonspecific interlobular septal thickening and groundglass opacities,   predominantly in the upper lobes, which may represent pulmonary edema   given cardiomegaly and pleural effusion. Recommend clinical correlation   to assess underlying pneumonia. Small tree-in-bud/nodular opacities in   the left lobe, suggesting mucus impactedairways.    Marked fluid/air distended esophagus. Differential diagnosis includes   achalasia although distal esophagus lesion/neoplasm cannot be excluded.   Recommend follow-up.                KEITH GARCIA M.D., ATTENDING RADIOLOGIST  This document has been electronically signed. Jan 4 2018  1:46AM                < end of copied text >      < from: TTE Echo Doppler w/o Cont (01.07.18 @ 20:31) >     EXAM:  TTE WO CON COMPLETE W DOPPL         PROCEDURE DATE:  01/07/2018        INTERPRETATION:  REPORT:    TRANSTHORACIC ECHOCARDIOGRAM REPORT           Patient Name:   LINDA SOL Patient Location: [facility] Rec #:  [id number]     Accession #:     [id number]  Account #:                     Height:           61.8 in 157.0 cm  YOB: 1940     Weight:           140.2 lb 63.60 kg  Patient Age:    77 years       BSA:              1.64 m²  Patient Gender: F              BP:      167 /72 mmHg        Date of Exam: 1/7/2018 11:53:59 AM  Sonographer:  Asim Gonzalez     Procedure:   2D Echo/Doppler/Color Doppler Complete.  Indications: Heart failure, unspecified - I50.9  Diagnosis:   Heart failure, unspecified -I50.9           2D AND M-MODE MEASUREMENTS (normal ranges within parentheses):  Left Ventricle:                  Normal   Aorta/Left Atrium:            Normal  IVSd (2D):              0.91 cm (0.7-1.1) Aortic Root (2D):  2.77 cm   (2.4-3.7)  LVPWd (2D):             0.91 cm (0.7-1.1) Left Atrium (2D):  3.17 cm   (1.9-4.0)  LVIDd (2D):             3.17 cm (3.4-5.7)  LVIDs (2D):             2.18 cm  LV FS (2D):             31.2 %   (>25%)  Relative Wall Thickness  0.57    (<0.42)     SPECTRAL DOPPLER ANALYSIS (where applicable):  LVOT Vmax:  LVOT VTI:  LVOT Diameter: 1.78 cm        PHYSICIAN INTERPRETATION:  Left Ventricle: Normal left ventricular size and wall thicknesses, with   normal systolic and diastolic function.  Left ventricular ejection fraction, by visual estimation, is 50 to 55%.  Right Ventricle: Normal right ventricular size and function.  Left Atrium: Mildly enlarged left atrium.  Right Atrium: The right atrium is normal in size.  Mitral Valve: Mild mitral valve regurgitation is seen.  Tricuspid Valve: Mild tricuspid regurgitation is visualized.  Aortic Valve: Normal trileaflet aortic valve with normal opening.   Sclerotic aortic valve with normal opening.  Pulmonic Valve: The pulmonic valve was not well visualized.  Aorta: The aortic root is normal in size and structure.        Summary:   1. Left ventricular ejection fraction, by visual estimation, is 50 to   55%.   2. Normal left ventricular size and wall thicknesses, with normal   systolic and diastolic function.   3. Normal right ventricular size and function.   4. Mild mitral valve regurgitation.   5. Normal trileaflet aortic valve with normal opening.   6. Sclerotic aortic valve with normal opening.   7. Mildly enlarged left atrium.     [id number] David Lopez MD  Electronically signed on 1/8/2018 at 9:11:35 AM          *** Final ***                    DAVID LOPEZ M.D.; Attending Cardiologist  This document has been electronically signed. Jan 7 2018 11:53AM                < end of copied text >

## 2018-01-11 NOTE — PHYSICAL THERAPY INITIAL EVALUATION ADULT - PLANNED THERAPY INTERVENTIONS, PT EVAL
stretching/balance training/bed mobility training/gait training/transfer training/motor coordination training/neuromuscular re-education/ROM/strengthening

## 2018-01-11 NOTE — PROGRESS NOTE ADULT - ASSESSMENT
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 Multifactorial hypoxemia, likely contrast induced nephropathy;

## 2018-01-11 NOTE — PHYSICAL THERAPY INITIAL EVALUATION ADULT - DISCHARGE DISPOSITION, PT EVAL
anticipating home with home PT; family assist; please follow PT notes carefully for further functional assessment; information regarding pt's prior level of function needs to be verified by family member

## 2018-01-11 NOTE — PHYSICAL THERAPY INITIAL EVALUATION ADULT - ACTIVE RANGE OF MOTION EXAMINATION, REHAB EVAL
Left shoulder ~30 degrees, Left elbow lacking ~40 degrees of extension, contracted Left hand at MCP, PIP, DIP joints/Right UE Active ROM was WFL (within functional limits)/bilateral  lower extremity Active ROM was WFL (within functional limits)

## 2018-01-11 NOTE — CONSULT NOTE ADULT - PROBLEM SELECTOR RECOMMENDATION 9
- likely 2/2 contrast induced nephropathy given pt received contrast on 1/4, developed JONY to 2.19 on 1/8 and peaked on 1/10 to 2.6 with improvement to 1.8 today  - c/w maintenance IVF while pt NPO  - avoid nephrotoxic agents  - BMP daily to monitor for improvement. - likely 2/2 contrast induced nephropathy given pt received contrast on 1/4, developed JONY to 2.19 on 1/8 and peaked on 1/10 to 2.6 with improvement to 1.8 today  - maintenance IVF w/ D5W at 50cc/hr while NPO  - avoid nephrotoxic agents  - BMP daily to monitor for improvement

## 2018-01-11 NOTE — CONSULT NOTE ADULT - ASSESSMENT
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 for management of achalasia, found to have JONY likely 2/2 DELFIN, improving.

## 2018-01-11 NOTE — CONSULT NOTE ADULT - ASSESSMENT
Impression:  1. Dysphagia with dilated sigmoid esophagus - it is unclear if patient has oropharyngeal dysphagia (given her strokes and "choking" when eating) vs purely just a motility issue such as achalasia; differential also includes tumor in esophagus (unclear if EGD was complete at OSH)  2. Dyspnea due to URI vs pneumonia    Recommend:  -History is extremely limited due to patient's advanced dementia  -would obtain speech and swallow here to evaluate her upper oral pharyngeal swallowing mechanism; if she has oropharyngeal dysphagia, then she cannot swallow whether or not if she has achalasia. I looked back onto sunrise from last year, no speech/swallow evaluation is available.  -If she passes speech/swallow evaluation, would obtain upper GI esophagram as the patient will be unable to tolerate esophageal motility testing  -Would keep NPO pending speech and swallow evaluation  -If she passes speech and swallow, pending barium study we can offer patient/family options of treatment including Botox (which is temporary), POEM which is more invasive  -Continue Antibiotics for PNA

## 2018-01-11 NOTE — PHYSICAL THERAPY INITIAL EVALUATION ADULT - PERTINENT HX OF CURRENT PROBLEM, REHAB EVAL
78 yo F with a history of dementia (AAOx0-1), CVA in 1993 and 2017 with residual Left-sided weakness, HTN and depression transferred from [facility] for further management of achalasia.

## 2018-01-11 NOTE — PHYSICAL THERAPY INITIAL EVALUATION ADULT - PATIENT PROFILE REVIEW, REHAB EVAL
ACTIVITY: OOB with Assistance; spoke with MANOLO Alberto prior to PT evaluation--> Pt OK for OOB activity/yes

## 2018-01-11 NOTE — PHYSICAL THERAPY INITIAL EVALUATION ADULT - PASSIVE RANGE OF MOTION EXAMINATION, REHAB EVAL
Right UE Passive ROM was WFL (within functional limits)/bilateral lower extremity Passive ROM was WFL (within functional limits)/Left shoulder ~70 degrees, Left elbow lacking ~35 degrees of extension, contracted Left hand at MCP, PIP, DIP joint

## 2018-01-12 DIAGNOSIS — I10 ESSENTIAL (PRIMARY) HYPERTENSION: ICD-10-CM

## 2018-01-12 DIAGNOSIS — J18.9 PNEUMONIA, UNSPECIFIED ORGANISM: ICD-10-CM

## 2018-01-12 DIAGNOSIS — E87.0 HYPEROSMOLALITY AND HYPERNATREMIA: ICD-10-CM

## 2018-01-12 LAB
BUN SERPL-MCNC: 22 MG/DL — SIGNIFICANT CHANGE UP (ref 7–23)
CALCIUM SERPL-MCNC: 8.6 MG/DL — SIGNIFICANT CHANGE UP (ref 8.4–10.5)
CHLORIDE SERPL-SCNC: 106 MMOL/L — SIGNIFICANT CHANGE UP (ref 98–107)
CO2 SERPL-SCNC: 26 MMOL/L — SIGNIFICANT CHANGE UP (ref 22–31)
CREAT SERPL-MCNC: 1.62 MG/DL — HIGH (ref 0.5–1.3)
CULTURE RESULTS: SIGNIFICANT CHANGE UP
CULTURE RESULTS: SIGNIFICANT CHANGE UP
GLUCOSE SERPL-MCNC: 81 MG/DL — SIGNIFICANT CHANGE UP (ref 70–99)
POTASSIUM SERPL-MCNC: 3.6 MMOL/L — SIGNIFICANT CHANGE UP (ref 3.5–5.3)
POTASSIUM SERPL-SCNC: 3.6 MMOL/L — SIGNIFICANT CHANGE UP (ref 3.5–5.3)
SODIUM SERPL-SCNC: 147 MMOL/L — HIGH (ref 135–145)
SPECIMEN SOURCE: SIGNIFICANT CHANGE UP
SPECIMEN SOURCE: SIGNIFICANT CHANGE UP

## 2018-01-12 PROCEDURE — 99233 SBSQ HOSP IP/OBS HIGH 50: CPT | Mod: GC

## 2018-01-12 PROCEDURE — 74230 X-RAY XM SWLNG FUNCJ C+: CPT | Mod: 26

## 2018-01-12 PROCEDURE — 99233 SBSQ HOSP IP/OBS HIGH 50: CPT

## 2018-01-12 PROCEDURE — 74241: CPT | Mod: 26

## 2018-01-12 RX ORDER — LABETALOL HCL 100 MG
10 TABLET ORAL EVERY 6 HOURS
Qty: 0 | Refills: 0 | Status: DISCONTINUED | OUTPATIENT
Start: 2018-01-12 | End: 2018-01-12

## 2018-01-12 RX ORDER — SODIUM CHLORIDE 9 MG/ML
1000 INJECTION, SOLUTION INTRAVENOUS
Qty: 0 | Refills: 0 | Status: DISCONTINUED | OUTPATIENT
Start: 2018-01-12 | End: 2018-01-13

## 2018-01-12 RX ORDER — LABETALOL HCL 100 MG
5 TABLET ORAL EVERY 6 HOURS
Qty: 0 | Refills: 0 | Status: DISCONTINUED | OUTPATIENT
Start: 2018-01-12 | End: 2018-01-12

## 2018-01-12 RX ORDER — CARVEDILOL PHOSPHATE 80 MG/1
3.12 CAPSULE, EXTENDED RELEASE ORAL EVERY 12 HOURS
Qty: 0 | Refills: 0 | Status: DISCONTINUED | OUTPATIENT
Start: 2018-01-12 | End: 2018-01-16

## 2018-01-12 RX ORDER — AMLODIPINE BESYLATE 2.5 MG/1
5 TABLET ORAL DAILY
Qty: 0 | Refills: 0 | Status: DISCONTINUED | OUTPATIENT
Start: 2018-01-12 | End: 2018-01-25

## 2018-01-12 RX ADMIN — SODIUM CHLORIDE 3 MILLILITER(S): 9 INJECTION INTRAMUSCULAR; INTRAVENOUS; SUBCUTANEOUS at 16:23

## 2018-01-12 RX ADMIN — SODIUM CHLORIDE 3 MILLILITER(S): 9 INJECTION INTRAMUSCULAR; INTRAVENOUS; SUBCUTANEOUS at 04:53

## 2018-01-12 RX ADMIN — Medication 10 MILLIGRAM(S): at 15:03

## 2018-01-12 RX ADMIN — SODIUM CHLORIDE 75 MILLILITER(S): 9 INJECTION, SOLUTION INTRAVENOUS at 15:08

## 2018-01-12 RX ADMIN — SODIUM CHLORIDE 3 MILLILITER(S): 9 INJECTION INTRAMUSCULAR; INTRAVENOUS; SUBCUTANEOUS at 09:33

## 2018-01-12 RX ADMIN — PANTOPRAZOLE SODIUM 40 MILLIGRAM(S): 20 TABLET, DELAYED RELEASE ORAL at 15:05

## 2018-01-12 RX ADMIN — SODIUM CHLORIDE 3 MILLILITER(S): 9 INJECTION INTRAMUSCULAR; INTRAVENOUS; SUBCUTANEOUS at 22:51

## 2018-01-12 RX ADMIN — Medication 5 MILLIGRAM(S): at 04:40

## 2018-01-12 RX ADMIN — PIPERACILLIN AND TAZOBACTAM 25 GRAM(S): 4; .5 INJECTION, POWDER, LYOPHILIZED, FOR SOLUTION INTRAVENOUS at 04:43

## 2018-01-12 RX ADMIN — PIPERACILLIN AND TAZOBACTAM 25 GRAM(S): 4; .5 INJECTION, POWDER, LYOPHILIZED, FOR SOLUTION INTRAVENOUS at 18:05

## 2018-01-12 RX ADMIN — Medication 3 MILLILITER(S): at 04:45

## 2018-01-12 RX ADMIN — ATORVASTATIN CALCIUM 20 MILLIGRAM(S): 80 TABLET, FILM COATED ORAL at 22:03

## 2018-01-12 RX ADMIN — Medication 3 MILLILITER(S): at 09:33

## 2018-01-12 RX ADMIN — Medication 3 MILLILITER(S): at 22:45

## 2018-01-12 RX ADMIN — CARVEDILOL PHOSPHATE 3.12 MILLIGRAM(S): 80 CAPSULE, EXTENDED RELEASE ORAL at 18:05

## 2018-01-12 RX ADMIN — Medication 3 MILLILITER(S): at 16:23

## 2018-01-12 NOTE — PROGRESS NOTE ADULT - PROBLEM SELECTOR PLAN 6
BP currently not at goal  - Holding amlodipine 2.5mg for now, can restart once on PO diet  - change to IV labetolol given poorly controlled HTN on metoprolol, uptitrate as indicated

## 2018-01-12 NOTE — DIETITIAN INITIAL EVALUATION ADULT. - PROBLEM SELECTOR PLAN 10
DVT ppx: SCDs for now pending possible  invasive GI procedure tomorrow  Diet: NPO for now  Dispo: PT evaluation pending

## 2018-01-12 NOTE — SWALLOW VFSS/MBS ASSESSMENT ADULT - DIAGNOSTIC IMPRESSIONS
The patient demonstrates a Mild Oropharyngeal Dysphagia characterized by slow mastication and delayed anterior-posterior transport of the bolus for regular solids. Functional oral management noted for puree, nectar-thick, and thin liquids. Intermittent piecemeal deglutition with adequate oral clearance noted. The patient demonstrates delayed initiation of the pharyngeal swallow (with intermittent spillage to the pyriform sinus for thin and nectar-thick liquids), decreased base of tongue retraction, reduced laryngeal elevation, and decreased pharyngeal constriction with areas of trace-mild residue in the hypopharynx (lateral pharyngeal walls vs pyriform sinus) post primary swallows for puree and solids; liquid wash was shown to reduce residue in the hypopharynx. There was no evidence of laryngeal penetration/aspiration before, during or after the swallow for puree, solids, nectar-thick, and thin liquids.

## 2018-01-12 NOTE — SWALLOW VFSS/MBS ASSESSMENT ADULT - RECOMMENDED CONSISTENCY
1) Initiate Mechanical Soft with Thin Liquids pending GI clearance given known h/o achalasia.  2) Aspiration Precautions  3) Dysphagia Precautions: Upright position (90 degrees) during/after eating for 30 minutes; Small bites; Small single cup sips; No straws; Alternate liquids/solids.  4) SLP intervention deemed not warranted at this time.

## 2018-01-12 NOTE — SWALLOW VFSS/MBS ASSESSMENT ADULT - NS SWALLOW VFSS REC ASPIR MON
change of breathing pattern/oral hygiene/fever/position upright (90Y)/cough/gurgly voice/pneumonia/throat clearing/upper respiratory infection

## 2018-01-12 NOTE — PROGRESS NOTE ADULT - ASSESSMENT
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

## 2018-01-12 NOTE — PROGRESS NOTE ADULT - PROBLEM SELECTOR PLAN 2
- Multifactorial due to coronavirus bronchitis as well as superimposed aspiration PNA/pneumonitis and mucus plugging as evidenced on CTA chest 1/4/18  - Pt treated with Azithromycin and Zosyn at [facility] since 1/4 for possible superimposed aspiration pneumonia/CAP will continue with Zosyn for 1 more days to complete 7 days; completed 5 days of Azithromycin   - Pt with diffuse rhonchi on lung exam likely due to mucous plugging, will start pulm toilet with Robitussin (once no longer NPO), chest PT, hypertonic nebs  - CTA negative for PE  - Pt does not clinically appear volume overloaded, TTE with normal LV function- low suspicion for acute CHF  - Supplemental oxygen as needed to keep O2 sat >92%  - S&S eval pending GI recs

## 2018-01-12 NOTE — PROGRESS NOTE ADULT - ASSESSMENT
Impression:  1. Dysphagia with dilated sigmoid esophagus - it is unclear if patient has oropharyngeal dysphagia (given her strokes and "choking" when eating) vs purely just a motility issue such as achalasia; differential also includes tumor in esophagus (unclear if EGD was complete at OSH)  2. Dyspnea due to URI vs pneumonia    Recommend:  -Follow-up speech and swallow evaluation to evaluate upper swallow mechanism for oropharyngeal dysphagia  -If she passes speech and swallow evaluation, will then consider further testing/therapy for likely achalasia (Botox vs POEM, etc).   -Would keep NPO pending speech and swallow evaluation  -Continue Antibiotics for PNA

## 2018-01-12 NOTE — DIETITIAN INITIAL EVALUATION ADULT. - PROBLEM SELECTOR PLAN 5
Trop elevated in [facility] 1/3 - 1/6; EKG abnormal with Tw inv as above; Pt was evaluated by Cardiology at [facility], per the c/s note, 1/8, Low-grade troponin release likely from ischemic demand, Patient not candidate for further diagnostic cardiac evaluation if asymptomatic.  -TTE, 1/7/18, no acute findings, EF= 50-55%  -Restart ASA if no objections from GI team  -Would consider Cardiology c/s regarding post discharge Cardiologic f/u

## 2018-01-12 NOTE — DIETITIAN INITIAL EVALUATION ADULT. - OTHER INFO
Patient seen for chewing and swallowing consult.  Patient observed with daughter at bedside.  Patient currently NPO and pending swallow evaluation.  Patient noted with achalasia.  Daughter reports that patient had long term difficulty swallowing foods, since before second stroke in June 2017.  Bedscale weight of 130.7 pounds, is ~14 pounds (9.7%) lower than UBW from November. Patient is also noted with 1+ edema of the left arm.  Based on these two criteria, patient meets the criteria for malnutrition.  No known food allergies. Patient seen for chewing and swallowing consult.  Patient observed with daughter at bedside.  Patient currently NPO and pending swallow evaluation.  Patient noted with achalasia.  Daughter reports that patient had long term difficulty swallowing foods, since before second stroke in June 2017.  Bedscale weight today of 130.7 pounds, is ~14 pounds (9.7%) lower than UBW from November. Patient is also noted with 1+ edema of the left arm.  Based reduced intake, edema and weight loss, patient meets the criteria for chronic moderate malnutrition.  No known food allergies.

## 2018-01-12 NOTE — DIETITIAN INITIAL EVALUATION ADULT. - PROBLEM SELECTOR PLAN 2
Pt hypoxemic to 90%, now on 2L nasal cannula sat 97%, not on any home oxygen  - Multifactorial due to coronavirus bronchitis/pneumonia as well as superimposed aspiration PNA/pneumonitis and mucus plugging as evidenced on CTA chest 1/4/18  - Pt treated with Azithromycin and Zosyn at [facility] since 1/4 for possible superimposed aspiration pneumonia/CAP will continue with Zosyn for 2 more days to complete 7 days; completed 5 days of Azithromycin   - Recommend pulmonary evaluation in AM  - Pt with diffuse rhonchi on lung exam likely due to mucous plugging   - CTA negative for PE  - Pt does not clinically appear volume overloaded, TTE with normal LV function- low suspicion for acute CHF  - Supplemental oxygen as needed to keep O2 sat >92%  - Pulm c/s in am

## 2018-01-12 NOTE — SWALLOW VFSS/MBS ASSESSMENT ADULT - PHARYNGEAL PHASE COMMENTS
Delayed swallow trigger with the bolus head at the level of the valleculae; Decreased base of tongue retraction; Reduced laryngeal elevation; Decreased pharyngeal constriction. Trace-Mild residue in the hypopharynx post primary swallow. Delayed swallow trigger with the bolus head at the level of the valleculae and inconsistently spilling to the pyriform sinus; Decreased base of tongue retraction; Reduced laryngeal elevation; Decreased pharyngeal constriction. Trace residue in the hypopharynx post primary swallow.

## 2018-01-12 NOTE — PROGRESS NOTE ADULT - ASSESSMENT
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 Multifactorial hypoxemia, likely contrast induced nephropathy;

## 2018-01-12 NOTE — CHART NOTE - NSCHARTNOTEFT_GEN_A_CORE
NUTRITION SERVICES     Upon Nutritional Assessment by the Registered Dietitian your patient was determined to meet criteria/ has evidence of the following diagnosis/diagnoses:  [ ] Mild Protein Calorie Malnutrition   [x ] Moderate Protein Calorie Malnutrition   [ ] Severe Protein Calorie Malnutrition   [ ] Unspecified Protein Calorie Malnutrition   [ ] Underweight / BMI <19  [ ] Morbid Obesity / BMI >40    Findings as based on:  [ x] Comprehensive nutrition assessment   [ ] Nutrition Focused Physical Exam  [ ] Other:       Nutrition Plan/Recommendations:    1)  pending results of swallow evaluation, advance to diet recommended by SLP, 2) if advanced to oral diet, ensure total assistance and feeding encouragement, 3) if patient is advanced to Tube Feeding, recommend Jevity 1.2 building 10ml every 4-6 hours to a goal rate of 51ml/hr for 24 hours, to provide 1224 ml formula, 1468 kcalsm 68g protein and 988 ml free water, with additional flushes per medical team 3) monitor for TF or oral diet tolerance, 4) Monitor weights, labs, BM's, skin integrity, p.o. intake.  5) Please take patient's standing weight when medically feasible and if weights are dramatically different than bedscale, please re-consult to evaluate TF recommendation.    Refer to Initial Dietitian Evaluation or Nutrition Follow-Up Documentation for Nutritional Recommendations.     PROVIDER Section:     By signing this assessment you are acknowledging and agree with the diagnosis/diagnoses assigned by the Registered Dietitian    Comments: NUTRITION SERVICES     Upon Nutritional Assessment by the Registered Dietitian your patient was determined to meet criteria/ has evidence of the following diagnosis/diagnoses:  [ ] Mild Protein Calorie Malnutrition   [x ] Moderate Protein Calorie Malnutrition   [ ] Severe Protein Calorie Malnutrition   [ ] Unspecified Protein Calorie Malnutrition   [ ] Underweight / BMI <19  [ ] Morbid Obesity / BMI >40    Findings as based on:  [ x] Comprehensive nutrition assessment   [ ] Nutrition Focused Physical Exam  [ ] Other:       Nutrition Plan/Recommendations:    1)  pending results of swallow evaluation, advance to diet recommended by SLP, 2) if advanced to oral diet, ensure total assistance and feeding encouragement, 3) if patient is advanced to Tube Feeding, recommend Jevity 1.2 building 10ml every 4-6 hours to a goal rate of 51ml/hr for 24 hours, to provide 1224 ml formula, 1468 kcals, 68g protein and 988 ml free water, with additional flushes per medical team 3) monitor for TF or oral diet tolerance, 4) Monitor weights, labs, BM's, skin integrity, p.o. intake.  5) Please take patient's standing weight when medically feasible and if weights are dramatically different than bedscale, please re-consult to evaluate TF recommendation.    Refer to Initial Dietitian Evaluation or Nutrition Follow-Up Documentation for Nutritional Recommendations.     PROVIDER Section:     By signing this assessment you are acknowledging and agree with the diagnosis/diagnoses assigned by the Registered Dietitian    Comments:

## 2018-01-12 NOTE — DIETITIAN INITIAL EVALUATION ADULT. - ENERGY NEEDS
Ht:  63", Wt:  130.7 pounds,  pounds, %%, BMI 21.1%  1+ Edema of the left arm, skin intact, Wily Score 15  Fluids per medical team.

## 2018-01-12 NOTE — DIETITIAN INITIAL EVALUATION ADULT. - PROBLEM SELECTOR PLAN 3
Creatinine elevated to 2.6, up from baseline of 1.29 on admission to [facility] contrast induced nephropathy;  - Creatinine appears to have acutely worsened 4 days after receiving IV contrast for CT angio of the chest (1/4/18), also Lasix regimen;  - Renal US ordered  - Renal consult in AM  - Pt also previously on Lasix for pulmonary edema  - Maintenance fluids with NS @ 75cc for 12 hours  - Monitor creatinine daily  - Avoid nephrotoxic agents  - Renally dose medications

## 2018-01-12 NOTE — PROGRESS NOTE ADULT - PROBLEM SELECTOR PLAN 2
- likely 2/2 prolonged NPO status  - c/w maintenance IVF w/ D5W at 75cc/hr while NPO  - BMP daily to monitor for improvement

## 2018-01-12 NOTE — SWALLOW BEDSIDE ASSESSMENT ADULT - SWALLOW EVAL: DIAGNOSIS
1. Functional oral stage of the swallow given puree, regular solids, and thin fluids marked by adequate collection, formation, and transfer of the bolus.  2.  Mild-moderate pharyngeal dysphagia given above stated consistencies marked by delay in the trigger of the swallow and reduced laryngeal excursion.  However, no overt s/s of laryngeal penetration or aspiration was noted.

## 2018-01-12 NOTE — DIETITIAN INITIAL EVALUATION ADULT. - FACTORS AFF FOOD INTAKE
persistent constipation/difficulty swallowing/Daughter reports that since before second stroke, patient has had difficulty swallowing foods with consistency of yogurt and applesauce, and better tolerates food such as bread.  Daughter also reports regular constipation and that patient does not have regular bowel movements.

## 2018-01-12 NOTE — DIETITIAN INITIAL EVALUATION ADULT. - PROBLEM SELECTOR PLAN 6
Pt with possible vascular dementia from history of 2 prior strokes  - Holding atorvastatin and ASA for now  -Restart ASA if no objections from GI team  -Fall, aspiration precautions

## 2018-01-12 NOTE — DIETITIAN INITIAL EVALUATION ADULT. - PROBLEM SELECTOR PLAN 4
Will continue with Zosyn q12h for 2 more days to complete 7 days of treatment  - Monitor for leukocytosis and fever

## 2018-01-12 NOTE — DIETITIAN INITIAL EVALUATION ADULT. - NS AS NUTRI INTERV MEALS SNACK
General/healthful diet/1)  pending results of swallow evaluation, advance to diet recommended by SLP, 2) if advanced to oral diet, ensure total assistance and feeding encouragement, 3) if patient is advanced to Tube Feeding, recommend Jevity 1.2 building 10ml every 4-6 hours to a goal rate of 51ml/hr for 24 hours, to provide 1224 ml formula, 1468 kcalsm 68g protein and 988 ml free water, with additional flushes per medical team 3) monitor for TF or oral diet tolerance, 4) Monitor weights, labs, BM's, skin integrity, p.o. intake.  5) Please take patient's standing weight when medically feasible and if weights are dramatically different than bedscale, please re-consult to evaluate TF recommendation. General/healthful diet/1)  pending results of swallow evaluation, advance to diet recommended by SLP, 2) if advanced to oral diet, ensure total assistance and feeding encouragement, 3) if patient is advanced to Tube Feeding, recommend Jevity 1.2 building 10ml every 4-6 hours to a goal rate of 51ml/hr for 24 hours, to provide 1224 ml formula, 1468 kcals, 68g protein and 988 ml free water, with additional flushes per medical team 3) monitor for TF or oral diet tolerance, 4) Monitor weights, labs, BM's, skin integrity, p.o. intake.  5) Please take patient's standing weight when medically feasible and if weights are dramatically different than bedscale, please re-consult to evaluate TF recommendation.

## 2018-01-13 LAB
BUN SERPL-MCNC: 13 MG/DL — SIGNIFICANT CHANGE UP (ref 7–23)
CALCIUM SERPL-MCNC: 8.4 MG/DL — SIGNIFICANT CHANGE UP (ref 8.4–10.5)
CHLORIDE SERPL-SCNC: 97 MMOL/L — LOW (ref 98–107)
CO2 SERPL-SCNC: 27 MMOL/L — SIGNIFICANT CHANGE UP (ref 22–31)
CREAT SERPL-MCNC: 1.44 MG/DL — HIGH (ref 0.5–1.3)
GLUCOSE SERPL-MCNC: 97 MG/DL — SIGNIFICANT CHANGE UP (ref 70–99)
HCT VFR BLD CALC: 33.8 % — LOW (ref 34.5–45)
HGB BLD-MCNC: 11.2 G/DL — LOW (ref 11.5–15.5)
MCHC RBC-ENTMCNC: 28.6 PG — SIGNIFICANT CHANGE UP (ref 27–34)
MCHC RBC-ENTMCNC: 33.1 % — SIGNIFICANT CHANGE UP (ref 32–36)
MCV RBC AUTO: 86.4 FL — SIGNIFICANT CHANGE UP (ref 80–100)
NRBC # FLD: 0 — SIGNIFICANT CHANGE UP
PLATELET # BLD AUTO: 214 K/UL — SIGNIFICANT CHANGE UP (ref 150–400)
PMV BLD: 9.5 FL — SIGNIFICANT CHANGE UP (ref 7–13)
POTASSIUM SERPL-MCNC: 3.1 MMOL/L — LOW (ref 3.5–5.3)
POTASSIUM SERPL-SCNC: 3.1 MMOL/L — LOW (ref 3.5–5.3)
RBC # BLD: 3.91 M/UL — SIGNIFICANT CHANGE UP (ref 3.8–5.2)
RBC # FLD: 12.6 % — SIGNIFICANT CHANGE UP (ref 10.3–14.5)
SODIUM SERPL-SCNC: 135 MMOL/L — SIGNIFICANT CHANGE UP (ref 135–145)
WBC # BLD: 7.23 K/UL — SIGNIFICANT CHANGE UP (ref 3.8–10.5)
WBC # FLD AUTO: 7.23 K/UL — SIGNIFICANT CHANGE UP (ref 3.8–10.5)

## 2018-01-13 PROCEDURE — 99233 SBSQ HOSP IP/OBS HIGH 50: CPT

## 2018-01-13 RX ORDER — ASPIRIN/CALCIUM CARB/MAGNESIUM 324 MG
81 TABLET ORAL DAILY
Qty: 0 | Refills: 0 | Status: DISCONTINUED | OUTPATIENT
Start: 2018-01-13 | End: 2018-01-25

## 2018-01-13 RX ORDER — POTASSIUM CHLORIDE 20 MEQ
40 PACKET (EA) ORAL ONCE
Qty: 0 | Refills: 0 | Status: COMPLETED | OUTPATIENT
Start: 2018-01-13 | End: 2018-01-13

## 2018-01-13 RX ORDER — HEPARIN SODIUM 5000 [USP'U]/ML
5000 INJECTION INTRAVENOUS; SUBCUTANEOUS EVERY 8 HOURS
Qty: 0 | Refills: 0 | Status: DISCONTINUED | OUTPATIENT
Start: 2018-01-13 | End: 2018-01-21

## 2018-01-13 RX ORDER — POTASSIUM CHLORIDE 20 MEQ
10 PACKET (EA) ORAL
Qty: 0 | Refills: 0 | Status: COMPLETED | OUTPATIENT
Start: 2018-01-13 | End: 2018-01-13

## 2018-01-13 RX ADMIN — SODIUM CHLORIDE 3 MILLILITER(S): 9 INJECTION INTRAMUSCULAR; INTRAVENOUS; SUBCUTANEOUS at 17:00

## 2018-01-13 RX ADMIN — AMLODIPINE BESYLATE 5 MILLIGRAM(S): 2.5 TABLET ORAL at 04:15

## 2018-01-13 RX ADMIN — SODIUM CHLORIDE 3 MILLILITER(S): 9 INJECTION INTRAMUSCULAR; INTRAVENOUS; SUBCUTANEOUS at 03:44

## 2018-01-13 RX ADMIN — Medication 3 MILLILITER(S): at 16:44

## 2018-01-13 RX ADMIN — CARVEDILOL PHOSPHATE 3.12 MILLIGRAM(S): 80 CAPSULE, EXTENDED RELEASE ORAL at 04:15

## 2018-01-13 RX ADMIN — Medication 100 MILLIEQUIVALENT(S): at 11:33

## 2018-01-13 RX ADMIN — CARVEDILOL PHOSPHATE 3.12 MILLIGRAM(S): 80 CAPSULE, EXTENDED RELEASE ORAL at 17:10

## 2018-01-13 RX ADMIN — PANTOPRAZOLE SODIUM 40 MILLIGRAM(S): 20 TABLET, DELAYED RELEASE ORAL at 11:34

## 2018-01-13 RX ADMIN — HEPARIN SODIUM 5000 UNIT(S): 5000 INJECTION INTRAVENOUS; SUBCUTANEOUS at 22:32

## 2018-01-13 RX ADMIN — Medication 3 MILLILITER(S): at 09:41

## 2018-01-13 RX ADMIN — SODIUM CHLORIDE 3 MILLILITER(S): 9 INJECTION INTRAMUSCULAR; INTRAVENOUS; SUBCUTANEOUS at 21:41

## 2018-01-13 RX ADMIN — ATORVASTATIN CALCIUM 20 MILLIGRAM(S): 80 TABLET, FILM COATED ORAL at 22:32

## 2018-01-13 RX ADMIN — Medication 3 MILLILITER(S): at 21:37

## 2018-01-13 RX ADMIN — SODIUM CHLORIDE 3 MILLILITER(S): 9 INJECTION INTRAMUSCULAR; INTRAVENOUS; SUBCUTANEOUS at 09:51

## 2018-01-13 RX ADMIN — Medication 40 MILLIEQUIVALENT(S): at 17:10

## 2018-01-13 RX ADMIN — Medication 100 MILLIEQUIVALENT(S): at 10:03

## 2018-01-13 RX ADMIN — Medication 3 MILLILITER(S): at 03:40

## 2018-01-13 RX ADMIN — Medication 100 MILLIEQUIVALENT(S): at 08:05

## 2018-01-13 NOTE — PROGRESS NOTE ADULT - PROBLEM SELECTOR PLAN 2
- Multifactorial due to coronavirus bronchitis as well as superimposed aspiration PNA/pneumonitis and mucus plugging as evidenced on CTA chest 1/4/18  - Pt treated with Azithromycin and Zosyn at [facility] since 1/4 for possible superimposed aspiration pneumonia/CAP completed Zosyn today to complete 7 days; completed 5 days of Azithromycin   - Pt with diffuse rhonchi on lung exam likely due to mucous plugging, will start pulm toilet with Robitussin (once no longer NPO), chest PT, hypertonic nebs  - CTA negative for PE  - Pt does not clinically appear volume overloaded, TTE with normal LV function- low suspicion for acute CHF  - Supplemental oxygen as needed to keep O2 sat >92%  - S&S eval pending GI recs

## 2018-01-13 NOTE — PROGRESS NOTE ADULT - ASSESSMENT
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 Multifactorial hypoxemia, likely contrast induced nephropathy;

## 2018-01-14 LAB
BUN SERPL-MCNC: 9 MG/DL — SIGNIFICANT CHANGE UP (ref 7–23)
CALCIUM SERPL-MCNC: 8.9 MG/DL — SIGNIFICANT CHANGE UP (ref 8.4–10.5)
CHLORIDE SERPL-SCNC: 107 MMOL/L — SIGNIFICANT CHANGE UP (ref 98–107)
CO2 SERPL-SCNC: 25 MMOL/L — SIGNIFICANT CHANGE UP (ref 22–31)
CREAT SERPL-MCNC: 1.64 MG/DL — HIGH (ref 0.5–1.3)
GLUCOSE SERPL-MCNC: 87 MG/DL — SIGNIFICANT CHANGE UP (ref 70–99)
HCT VFR BLD CALC: 36.3 % — SIGNIFICANT CHANGE UP (ref 34.5–45)
HGB BLD-MCNC: 11.9 G/DL — SIGNIFICANT CHANGE UP (ref 11.5–15.5)
MCHC RBC-ENTMCNC: 29 PG — SIGNIFICANT CHANGE UP (ref 27–34)
MCHC RBC-ENTMCNC: 32.8 % — SIGNIFICANT CHANGE UP (ref 32–36)
MCV RBC AUTO: 88.5 FL — SIGNIFICANT CHANGE UP (ref 80–100)
NRBC # FLD: 0 — SIGNIFICANT CHANGE UP
PLATELET # BLD AUTO: 243 K/UL — SIGNIFICANT CHANGE UP (ref 150–400)
PMV BLD: 10.1 FL — SIGNIFICANT CHANGE UP (ref 7–13)
POTASSIUM SERPL-MCNC: 3.9 MMOL/L — SIGNIFICANT CHANGE UP (ref 3.5–5.3)
POTASSIUM SERPL-SCNC: 3.9 MMOL/L — SIGNIFICANT CHANGE UP (ref 3.5–5.3)
RBC # BLD: 4.1 M/UL — SIGNIFICANT CHANGE UP (ref 3.8–5.2)
RBC # FLD: 12.8 % — SIGNIFICANT CHANGE UP (ref 10.3–14.5)
SODIUM SERPL-SCNC: 145 MMOL/L — SIGNIFICANT CHANGE UP (ref 135–145)
WBC # BLD: 7.31 K/UL — SIGNIFICANT CHANGE UP (ref 3.8–10.5)
WBC # FLD AUTO: 7.31 K/UL — SIGNIFICANT CHANGE UP (ref 3.8–10.5)

## 2018-01-14 PROCEDURE — 99233 SBSQ HOSP IP/OBS HIGH 50: CPT

## 2018-01-14 RX ORDER — HYDRALAZINE HCL 50 MG
10 TABLET ORAL ONCE
Qty: 0 | Refills: 0 | Status: COMPLETED | OUTPATIENT
Start: 2018-01-14 | End: 2018-01-14

## 2018-01-14 RX ADMIN — HEPARIN SODIUM 5000 UNIT(S): 5000 INJECTION INTRAVENOUS; SUBCUTANEOUS at 14:05

## 2018-01-14 RX ADMIN — Medication 3 MILLILITER(S): at 16:40

## 2018-01-14 RX ADMIN — Medication 3 MILLILITER(S): at 03:34

## 2018-01-14 RX ADMIN — PANTOPRAZOLE SODIUM 40 MILLIGRAM(S): 20 TABLET, DELAYED RELEASE ORAL at 12:05

## 2018-01-14 RX ADMIN — Medication 3 MILLILITER(S): at 10:46

## 2018-01-14 RX ADMIN — SODIUM CHLORIDE 3 MILLILITER(S): 9 INJECTION INTRAMUSCULAR; INTRAVENOUS; SUBCUTANEOUS at 10:58

## 2018-01-14 RX ADMIN — CARVEDILOL PHOSPHATE 3.12 MILLIGRAM(S): 80 CAPSULE, EXTENDED RELEASE ORAL at 05:11

## 2018-01-14 RX ADMIN — Medication 81 MILLIGRAM(S): at 12:05

## 2018-01-14 RX ADMIN — HEPARIN SODIUM 5000 UNIT(S): 5000 INJECTION INTRAVENOUS; SUBCUTANEOUS at 05:11

## 2018-01-14 RX ADMIN — AMLODIPINE BESYLATE 5 MILLIGRAM(S): 2.5 TABLET ORAL at 05:11

## 2018-01-14 RX ADMIN — Medication 10 MILLIGRAM(S): at 21:52

## 2018-01-14 RX ADMIN — Medication 3 MILLILITER(S): at 21:15

## 2018-01-14 RX ADMIN — ATORVASTATIN CALCIUM 20 MILLIGRAM(S): 80 TABLET, FILM COATED ORAL at 21:53

## 2018-01-14 RX ADMIN — SODIUM CHLORIDE 3 MILLILITER(S): 9 INJECTION INTRAMUSCULAR; INTRAVENOUS; SUBCUTANEOUS at 03:36

## 2018-01-14 RX ADMIN — CARVEDILOL PHOSPHATE 3.12 MILLIGRAM(S): 80 CAPSULE, EXTENDED RELEASE ORAL at 17:53

## 2018-01-14 RX ADMIN — SODIUM CHLORIDE 3 MILLILITER(S): 9 INJECTION INTRAMUSCULAR; INTRAVENOUS; SUBCUTANEOUS at 21:15

## 2018-01-14 RX ADMIN — HEPARIN SODIUM 5000 UNIT(S): 5000 INJECTION INTRAVENOUS; SUBCUTANEOUS at 21:54

## 2018-01-14 RX ADMIN — SODIUM CHLORIDE 3 MILLILITER(S): 9 INJECTION INTRAMUSCULAR; INTRAVENOUS; SUBCUTANEOUS at 16:50

## 2018-01-14 NOTE — PROGRESS NOTE ADULT - ASSESSMENT
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 Multifactorial hypoxemia, likely contrast induced nephropathy;

## 2018-01-14 NOTE — PROGRESS NOTE ADULT - PROBLEM SELECTOR PLAN 2
- Multifactorial due to coronavirus bronchitis as well as superimposed aspiration PNA/pneumonitis and mucus plugging as evidenced on CTA chest 1/4/18  - Pt treated with Azithromycin and Zosyn at [facility] since 1/4 for possible superimposed aspiration pneumonia/CAP completed Zosyn today to complete 7 days; completed 5 days of Azithromycin   - Pt with diffuse rhonchi on lung exam likely due to mucous plugging, will start pulm toilet with Robitussin (once no longer NPO), chest PT, hypertonic nebs  - CTA negative for PE  - Pt does not clinically appear volume overloaded, TTE with normal LV function- low suspicion for acute CHF  - Supplemental oxygen as needed to keep O2 sat >92%  - S&S eval recommends mechanical soft with thin liquid

## 2018-01-15 LAB
BUN SERPL-MCNC: 11 MG/DL — SIGNIFICANT CHANGE UP (ref 7–23)
CALCIUM SERPL-MCNC: 9.2 MG/DL — SIGNIFICANT CHANGE UP (ref 8.4–10.5)
CHLORIDE SERPL-SCNC: 103 MMOL/L — SIGNIFICANT CHANGE UP (ref 98–107)
CO2 SERPL-SCNC: 28 MMOL/L — SIGNIFICANT CHANGE UP (ref 22–31)
CREAT SERPL-MCNC: 1.71 MG/DL — HIGH (ref 0.5–1.3)
GLUCOSE SERPL-MCNC: 92 MG/DL — SIGNIFICANT CHANGE UP (ref 70–99)
HCT VFR BLD CALC: 38.7 % — SIGNIFICANT CHANGE UP (ref 34.5–45)
HGB BLD-MCNC: 12.5 G/DL — SIGNIFICANT CHANGE UP (ref 11.5–15.5)
MCHC RBC-ENTMCNC: 28.3 PG — SIGNIFICANT CHANGE UP (ref 27–34)
MCHC RBC-ENTMCNC: 32.3 % — SIGNIFICANT CHANGE UP (ref 32–36)
MCV RBC AUTO: 87.8 FL — SIGNIFICANT CHANGE UP (ref 80–100)
NRBC # FLD: 0 — SIGNIFICANT CHANGE UP
PLATELET # BLD AUTO: 268 K/UL — SIGNIFICANT CHANGE UP (ref 150–400)
PMV BLD: 9.9 FL — SIGNIFICANT CHANGE UP (ref 7–13)
POTASSIUM SERPL-MCNC: 4.3 MMOL/L — SIGNIFICANT CHANGE UP (ref 3.5–5.3)
POTASSIUM SERPL-SCNC: 4.3 MMOL/L — SIGNIFICANT CHANGE UP (ref 3.5–5.3)
RBC # BLD: 4.41 M/UL — SIGNIFICANT CHANGE UP (ref 3.8–5.2)
RBC # FLD: 13 % — SIGNIFICANT CHANGE UP (ref 10.3–14.5)
SODIUM SERPL-SCNC: 141 MMOL/L — SIGNIFICANT CHANGE UP (ref 135–145)
WBC # BLD: 8.78 K/UL — SIGNIFICANT CHANGE UP (ref 3.8–10.5)
WBC # FLD AUTO: 8.78 K/UL — SIGNIFICANT CHANGE UP (ref 3.8–10.5)

## 2018-01-15 PROCEDURE — 99233 SBSQ HOSP IP/OBS HIGH 50: CPT

## 2018-01-15 RX ORDER — SODIUM CHLORIDE 9 MG/ML
1000 INJECTION INTRAMUSCULAR; INTRAVENOUS; SUBCUTANEOUS
Qty: 0 | Refills: 0 | Status: DISCONTINUED | OUTPATIENT
Start: 2018-01-15 | End: 2018-01-15

## 2018-01-15 RX ORDER — SODIUM CHLORIDE 9 MG/ML
1000 INJECTION INTRAMUSCULAR; INTRAVENOUS; SUBCUTANEOUS
Qty: 0 | Refills: 0 | Status: DISCONTINUED | OUTPATIENT
Start: 2018-01-15 | End: 2018-01-25

## 2018-01-15 RX ADMIN — CARVEDILOL PHOSPHATE 3.12 MILLIGRAM(S): 80 CAPSULE, EXTENDED RELEASE ORAL at 05:11

## 2018-01-15 RX ADMIN — Medication 3 MILLILITER(S): at 03:12

## 2018-01-15 RX ADMIN — Medication 81 MILLIGRAM(S): at 12:00

## 2018-01-15 RX ADMIN — CARVEDILOL PHOSPHATE 3.12 MILLIGRAM(S): 80 CAPSULE, EXTENDED RELEASE ORAL at 17:19

## 2018-01-15 RX ADMIN — HEPARIN SODIUM 5000 UNIT(S): 5000 INJECTION INTRAVENOUS; SUBCUTANEOUS at 12:00

## 2018-01-15 RX ADMIN — SODIUM CHLORIDE 3 MILLILITER(S): 9 INJECTION INTRAMUSCULAR; INTRAVENOUS; SUBCUTANEOUS at 22:19

## 2018-01-15 RX ADMIN — Medication 3 MILLILITER(S): at 10:09

## 2018-01-15 RX ADMIN — ATORVASTATIN CALCIUM 20 MILLIGRAM(S): 80 TABLET, FILM COATED ORAL at 21:19

## 2018-01-15 RX ADMIN — PANTOPRAZOLE SODIUM 40 MILLIGRAM(S): 20 TABLET, DELAYED RELEASE ORAL at 12:00

## 2018-01-15 RX ADMIN — SODIUM CHLORIDE 3 MILLILITER(S): 9 INJECTION INTRAMUSCULAR; INTRAVENOUS; SUBCUTANEOUS at 16:30

## 2018-01-15 RX ADMIN — SODIUM CHLORIDE 75 MILLILITER(S): 9 INJECTION INTRAMUSCULAR; INTRAVENOUS; SUBCUTANEOUS at 15:09

## 2018-01-15 RX ADMIN — HEPARIN SODIUM 5000 UNIT(S): 5000 INJECTION INTRAVENOUS; SUBCUTANEOUS at 05:10

## 2018-01-15 RX ADMIN — Medication 3 MILLILITER(S): at 22:13

## 2018-01-15 RX ADMIN — Medication 3 MILLILITER(S): at 16:30

## 2018-01-15 RX ADMIN — SODIUM CHLORIDE 3 MILLILITER(S): 9 INJECTION INTRAMUSCULAR; INTRAVENOUS; SUBCUTANEOUS at 03:14

## 2018-01-15 RX ADMIN — AMLODIPINE BESYLATE 5 MILLIGRAM(S): 2.5 TABLET ORAL at 05:11

## 2018-01-15 RX ADMIN — SODIUM CHLORIDE 3 MILLILITER(S): 9 INJECTION INTRAMUSCULAR; INTRAVENOUS; SUBCUTANEOUS at 10:09

## 2018-01-15 RX ADMIN — HEPARIN SODIUM 5000 UNIT(S): 5000 INJECTION INTRAVENOUS; SUBCUTANEOUS at 21:19

## 2018-01-15 RX ADMIN — SODIUM CHLORIDE 75 MILLILITER(S): 9 INJECTION INTRAMUSCULAR; INTRAVENOUS; SUBCUTANEOUS at 07:54

## 2018-01-15 RX ADMIN — SODIUM CHLORIDE 75 MILLILITER(S): 9 INJECTION INTRAMUSCULAR; INTRAVENOUS; SUBCUTANEOUS at 21:36

## 2018-01-15 NOTE — PROGRESS NOTE ADULT - ASSESSMENT
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 Multifactorial hypoxemia, likely contrast induced nephropathy;

## 2018-01-15 NOTE — PROGRESS NOTE ADULT - PROBLEM SELECTOR PLAN 2
- Multifactorial due to coronavirus bronchitis as well as superimposed aspiration PNA/pneumonitis and mucus plugging as evidenced on CTA chest 1/4/18  - Pt treated with Azithromycin and Zosyn at [facility] since 1/4 for possible superimposed aspiration pneumonia/CAP completed Zosyn 7 days; completed 5 days of Azithromycin   - Pt with improved lung exam today was started on pulm toilet with Robitussin (once no longer NPO), chest PT, hypertonic nebs earlier this visit   - CTA negative for PE  - Pt does not clinically appear volume overloaded, TTE with normal LV function- low suspicion for acute CHF  - Supplemental oxygen as needed to keep O2 sat >92%  - S&S eval recommends mechanical soft with thin liquid

## 2018-01-15 NOTE — PROGRESS NOTE ADULT - PROBLEM SELECTOR PLAN 6
BP currently not at goal  -restarting amlodipine 2.5mg with PRN IV labetalol BP currently not at goal  -Amlodipine 5mg may need to be uptitrated if remains elevated or a second agent may be added, PRN IV labetalol

## 2018-01-16 LAB
BUN SERPL-MCNC: 8 MG/DL — SIGNIFICANT CHANGE UP (ref 7–23)
CALCIUM SERPL-MCNC: 8.6 MG/DL — SIGNIFICANT CHANGE UP (ref 8.4–10.5)
CHLORIDE SERPL-SCNC: 104 MMOL/L — SIGNIFICANT CHANGE UP (ref 98–107)
CO2 SERPL-SCNC: 22 MMOL/L — SIGNIFICANT CHANGE UP (ref 22–31)
CREAT SERPL-MCNC: 1.59 MG/DL — HIGH (ref 0.5–1.3)
GLUCOSE SERPL-MCNC: 91 MG/DL — SIGNIFICANT CHANGE UP (ref 70–99)
HCT VFR BLD CALC: 38 % — SIGNIFICANT CHANGE UP (ref 34.5–45)
HGB BLD-MCNC: 12.5 G/DL — SIGNIFICANT CHANGE UP (ref 11.5–15.5)
MCHC RBC-ENTMCNC: 28.7 PG — SIGNIFICANT CHANGE UP (ref 27–34)
MCHC RBC-ENTMCNC: 32.9 % — SIGNIFICANT CHANGE UP (ref 32–36)
MCV RBC AUTO: 87.4 FL — SIGNIFICANT CHANGE UP (ref 80–100)
NRBC # FLD: 0 — SIGNIFICANT CHANGE UP
PLATELET # BLD AUTO: 256 K/UL — SIGNIFICANT CHANGE UP (ref 150–400)
PMV BLD: 10.2 FL — SIGNIFICANT CHANGE UP (ref 7–13)
POTASSIUM SERPL-MCNC: 3.8 MMOL/L — SIGNIFICANT CHANGE UP (ref 3.5–5.3)
POTASSIUM SERPL-SCNC: 3.8 MMOL/L — SIGNIFICANT CHANGE UP (ref 3.5–5.3)
RBC # BLD: 4.35 M/UL — SIGNIFICANT CHANGE UP (ref 3.8–5.2)
RBC # FLD: 13 % — SIGNIFICANT CHANGE UP (ref 10.3–14.5)
SODIUM SERPL-SCNC: 142 MMOL/L — SIGNIFICANT CHANGE UP (ref 135–145)
WBC # BLD: 8.71 K/UL — SIGNIFICANT CHANGE UP (ref 3.8–10.5)
WBC # FLD AUTO: 8.71 K/UL — SIGNIFICANT CHANGE UP (ref 3.8–10.5)

## 2018-01-16 PROCEDURE — 99232 SBSQ HOSP IP/OBS MODERATE 35: CPT

## 2018-01-16 PROCEDURE — 99233 SBSQ HOSP IP/OBS HIGH 50: CPT | Mod: GC

## 2018-01-16 RX ORDER — CARVEDILOL PHOSPHATE 80 MG/1
6.25 CAPSULE, EXTENDED RELEASE ORAL EVERY 12 HOURS
Qty: 0 | Refills: 0 | Status: DISCONTINUED | OUTPATIENT
Start: 2018-01-16 | End: 2018-01-25

## 2018-01-16 RX ADMIN — HEPARIN SODIUM 5000 UNIT(S): 5000 INJECTION INTRAVENOUS; SUBCUTANEOUS at 14:41

## 2018-01-16 RX ADMIN — Medication 81 MILLIGRAM(S): at 11:50

## 2018-01-16 RX ADMIN — CARVEDILOL PHOSPHATE 3.12 MILLIGRAM(S): 80 CAPSULE, EXTENDED RELEASE ORAL at 05:22

## 2018-01-16 RX ADMIN — SODIUM CHLORIDE 3 MILLILITER(S): 9 INJECTION INTRAMUSCULAR; INTRAVENOUS; SUBCUTANEOUS at 10:09

## 2018-01-16 RX ADMIN — Medication 3 MILLILITER(S): at 21:21

## 2018-01-16 RX ADMIN — SODIUM CHLORIDE 3 MILLILITER(S): 9 INJECTION INTRAMUSCULAR; INTRAVENOUS; SUBCUTANEOUS at 21:21

## 2018-01-16 RX ADMIN — SODIUM CHLORIDE 75 MILLILITER(S): 9 INJECTION INTRAMUSCULAR; INTRAVENOUS; SUBCUTANEOUS at 05:21

## 2018-01-16 RX ADMIN — HEPARIN SODIUM 5000 UNIT(S): 5000 INJECTION INTRAVENOUS; SUBCUTANEOUS at 05:21

## 2018-01-16 RX ADMIN — SODIUM CHLORIDE 3 MILLILITER(S): 9 INJECTION INTRAMUSCULAR; INTRAVENOUS; SUBCUTANEOUS at 15:03

## 2018-01-16 RX ADMIN — HEPARIN SODIUM 5000 UNIT(S): 5000 INJECTION INTRAVENOUS; SUBCUTANEOUS at 21:04

## 2018-01-16 RX ADMIN — AMLODIPINE BESYLATE 5 MILLIGRAM(S): 2.5 TABLET ORAL at 05:22

## 2018-01-16 RX ADMIN — ATORVASTATIN CALCIUM 20 MILLIGRAM(S): 80 TABLET, FILM COATED ORAL at 21:03

## 2018-01-16 RX ADMIN — Medication 3 MILLILITER(S): at 10:07

## 2018-01-16 RX ADMIN — Medication 3 MILLILITER(S): at 04:47

## 2018-01-16 RX ADMIN — PANTOPRAZOLE SODIUM 40 MILLIGRAM(S): 20 TABLET, DELAYED RELEASE ORAL at 11:50

## 2018-01-16 RX ADMIN — SODIUM CHLORIDE 3 MILLILITER(S): 9 INJECTION INTRAMUSCULAR; INTRAVENOUS; SUBCUTANEOUS at 04:52

## 2018-01-16 RX ADMIN — CARVEDILOL PHOSPHATE 6.25 MILLIGRAM(S): 80 CAPSULE, EXTENDED RELEASE ORAL at 17:29

## 2018-01-16 RX ADMIN — Medication 3 MILLILITER(S): at 15:01

## 2018-01-16 NOTE — PROGRESS NOTE ADULT - ASSESSMENT
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

## 2018-01-16 NOTE — PROGRESS NOTE ADULT - ASSESSMENT
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 Multifactorial hypoxemia, likely contrast induced nephropathy.

## 2018-01-16 NOTE — CHART NOTE - NSCHARTNOTEFT_GEN_A_CORE
GI Chart Note    Will discuss with pt's family regarding POEMS procedure for therapy of achalasia. Definitive plan pending discussion with pt's family.     Douglas Armijo   GI Fellow

## 2018-01-16 NOTE — PROGRESS NOTE ADULT - PROBLEM SELECTOR PLAN 2
- resolved; likely 2/2 prolonged NPO status, pt presently tolerating full liquid diet  - c/w maintenance IVF w/ NS at 75cc/hr for now  - BMP daily to monitor for improvement

## 2018-01-17 LAB
BUN SERPL-MCNC: 10 MG/DL — SIGNIFICANT CHANGE UP (ref 7–23)
CALCIUM SERPL-MCNC: 8.8 MG/DL — SIGNIFICANT CHANGE UP (ref 8.4–10.5)
CHLORIDE SERPL-SCNC: 104 MMOL/L — SIGNIFICANT CHANGE UP (ref 98–107)
CO2 SERPL-SCNC: 23 MMOL/L — SIGNIFICANT CHANGE UP (ref 22–31)
CREAT SERPL-MCNC: 1.62 MG/DL — HIGH (ref 0.5–1.3)
GLUCOSE SERPL-MCNC: 79 MG/DL — SIGNIFICANT CHANGE UP (ref 70–99)
POTASSIUM SERPL-MCNC: 3.8 MMOL/L — SIGNIFICANT CHANGE UP (ref 3.5–5.3)
POTASSIUM SERPL-SCNC: 3.8 MMOL/L — SIGNIFICANT CHANGE UP (ref 3.5–5.3)
SODIUM SERPL-SCNC: 142 MMOL/L — SIGNIFICANT CHANGE UP (ref 135–145)

## 2018-01-17 PROCEDURE — 99232 SBSQ HOSP IP/OBS MODERATE 35: CPT

## 2018-01-17 RX ORDER — PANTOPRAZOLE SODIUM 20 MG/1
40 TABLET, DELAYED RELEASE ORAL
Qty: 0 | Refills: 0 | Status: DISCONTINUED | OUTPATIENT
Start: 2018-01-17 | End: 2018-01-24

## 2018-01-17 RX ORDER — SODIUM CHLORIDE 9 MG/ML
1000 INJECTION INTRAMUSCULAR; INTRAVENOUS; SUBCUTANEOUS
Qty: 0 | Refills: 0 | Status: DISCONTINUED | OUTPATIENT
Start: 2018-01-17 | End: 2018-01-20

## 2018-01-17 RX ADMIN — HEPARIN SODIUM 5000 UNIT(S): 5000 INJECTION INTRAVENOUS; SUBCUTANEOUS at 14:19

## 2018-01-17 RX ADMIN — Medication 3 MILLILITER(S): at 16:46

## 2018-01-17 RX ADMIN — CARVEDILOL PHOSPHATE 6.25 MILLIGRAM(S): 80 CAPSULE, EXTENDED RELEASE ORAL at 17:27

## 2018-01-17 RX ADMIN — SODIUM CHLORIDE 75 MILLILITER(S): 9 INJECTION INTRAMUSCULAR; INTRAVENOUS; SUBCUTANEOUS at 11:42

## 2018-01-17 RX ADMIN — SODIUM CHLORIDE 3 MILLILITER(S): 9 INJECTION INTRAMUSCULAR; INTRAVENOUS; SUBCUTANEOUS at 03:25

## 2018-01-17 RX ADMIN — HEPARIN SODIUM 5000 UNIT(S): 5000 INJECTION INTRAVENOUS; SUBCUTANEOUS at 23:07

## 2018-01-17 RX ADMIN — HEPARIN SODIUM 5000 UNIT(S): 5000 INJECTION INTRAVENOUS; SUBCUTANEOUS at 05:24

## 2018-01-17 RX ADMIN — Medication 3 MILLILITER(S): at 10:51

## 2018-01-17 RX ADMIN — CARVEDILOL PHOSPHATE 6.25 MILLIGRAM(S): 80 CAPSULE, EXTENDED RELEASE ORAL at 05:24

## 2018-01-17 RX ADMIN — PANTOPRAZOLE SODIUM 40 MILLIGRAM(S): 20 TABLET, DELAYED RELEASE ORAL at 11:33

## 2018-01-17 RX ADMIN — Medication 81 MILLIGRAM(S): at 11:33

## 2018-01-17 RX ADMIN — ATORVASTATIN CALCIUM 20 MILLIGRAM(S): 80 TABLET, FILM COATED ORAL at 23:07

## 2018-01-17 RX ADMIN — SODIUM CHLORIDE 3 MILLILITER(S): 9 INJECTION INTRAMUSCULAR; INTRAVENOUS; SUBCUTANEOUS at 16:47

## 2018-01-17 RX ADMIN — Medication 3 MILLILITER(S): at 22:38

## 2018-01-17 RX ADMIN — SODIUM CHLORIDE 3 MILLILITER(S): 9 INJECTION INTRAMUSCULAR; INTRAVENOUS; SUBCUTANEOUS at 22:40

## 2018-01-17 RX ADMIN — Medication 3 MILLILITER(S): at 03:25

## 2018-01-17 RX ADMIN — PANTOPRAZOLE SODIUM 40 MILLIGRAM(S): 20 TABLET, DELAYED RELEASE ORAL at 17:27

## 2018-01-17 RX ADMIN — SODIUM CHLORIDE 3 MILLILITER(S): 9 INJECTION INTRAMUSCULAR; INTRAVENOUS; SUBCUTANEOUS at 11:18

## 2018-01-17 RX ADMIN — AMLODIPINE BESYLATE 5 MILLIGRAM(S): 2.5 TABLET ORAL at 05:24

## 2018-01-17 NOTE — PROGRESS NOTE ADULT - ASSESSMENT
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 Multifactorial hypoxemia, likely contrast induced nephropathy.

## 2018-01-17 NOTE — CHART NOTE - NSCHARTNOTEFT_GEN_A_CORE
GI Chart Note    Spoke with pt's daughter Katelyn at length over the phone regarding POEMS procedure.  Plan for POEMS on Friday (1/19) with Dr. Gregory Magaña.  Full liquid diet today, clear liquid diet tomorrow, NPO after midnight Thursday midnight  PPI BID IV  Anesthesia eval prior to POEMS    Douglas Armijo  GI Fellow

## 2018-01-17 NOTE — PROGRESS NOTE ADULT - PROBLEM SELECTOR PLAN 2
- Multifactorial due to coronavirus bronchitis as well as superimposed aspiration PNA/pneumonitis and mucus plugging as evidenced on CTA chest 1/4/18  - Pt treated with Azithromycin and Zosyn at [facility] since 1/4 for possible superimposed aspiration pneumonia/CAP completed Zosyn 7 days; completed 5 days of Azithromycin   - Pt with improved lung exam s/p pulm toilet   - CTA negative for PE  - Pt does not clinically appear volume overloaded, TTE with normal LV function- low suspicion for acute CHF

## 2018-01-18 LAB
BLD GP AB SCN SERPL QL: NEGATIVE — SIGNIFICANT CHANGE UP
BUN SERPL-MCNC: 9 MG/DL — SIGNIFICANT CHANGE UP (ref 7–23)
CALCIUM SERPL-MCNC: 8.6 MG/DL — SIGNIFICANT CHANGE UP (ref 8.4–10.5)
CHLORIDE SERPL-SCNC: 105 MMOL/L — SIGNIFICANT CHANGE UP (ref 98–107)
CK MB BLD-MCNC: 1.3 NG/ML — SIGNIFICANT CHANGE UP (ref 1–4.7)
CK MB BLD-MCNC: 1.45 NG/ML — SIGNIFICANT CHANGE UP (ref 1–4.7)
CK SERPL-CCNC: 31 U/L — SIGNIFICANT CHANGE UP (ref 25–170)
CK SERPL-CCNC: 41 U/L — SIGNIFICANT CHANGE UP (ref 25–170)
CO2 SERPL-SCNC: 24 MMOL/L — SIGNIFICANT CHANGE UP (ref 22–31)
CREAT SERPL-MCNC: 1.46 MG/DL — HIGH (ref 0.5–1.3)
GLUCOSE SERPL-MCNC: 84 MG/DL — SIGNIFICANT CHANGE UP (ref 70–99)
POTASSIUM SERPL-MCNC: 3.6 MMOL/L — SIGNIFICANT CHANGE UP (ref 3.5–5.3)
POTASSIUM SERPL-SCNC: 3.6 MMOL/L — SIGNIFICANT CHANGE UP (ref 3.5–5.3)
RH IG SCN BLD-IMP: POSITIVE — SIGNIFICANT CHANGE UP
RH IG SCN BLD-IMP: POSITIVE — SIGNIFICANT CHANGE UP
SODIUM SERPL-SCNC: 142 MMOL/L — SIGNIFICANT CHANGE UP (ref 135–145)
TROPONIN T SERPL-MCNC: < 0.06 NG/ML — SIGNIFICANT CHANGE UP (ref 0–0.06)
TROPONIN T SERPL-MCNC: < 0.06 NG/ML — SIGNIFICANT CHANGE UP (ref 0–0.06)

## 2018-01-18 PROCEDURE — 99233 SBSQ HOSP IP/OBS HIGH 50: CPT

## 2018-01-18 PROCEDURE — 99232 SBSQ HOSP IP/OBS MODERATE 35: CPT | Mod: GC

## 2018-01-18 RX ADMIN — CARVEDILOL PHOSPHATE 6.25 MILLIGRAM(S): 80 CAPSULE, EXTENDED RELEASE ORAL at 06:45

## 2018-01-18 RX ADMIN — Medication 3 MILLILITER(S): at 22:42

## 2018-01-18 RX ADMIN — PANTOPRAZOLE SODIUM 40 MILLIGRAM(S): 20 TABLET, DELAYED RELEASE ORAL at 17:35

## 2018-01-18 RX ADMIN — Medication 3 MILLILITER(S): at 04:33

## 2018-01-18 RX ADMIN — SODIUM CHLORIDE 3 MILLILITER(S): 9 INJECTION INTRAMUSCULAR; INTRAVENOUS; SUBCUTANEOUS at 16:18

## 2018-01-18 RX ADMIN — AMLODIPINE BESYLATE 5 MILLIGRAM(S): 2.5 TABLET ORAL at 06:47

## 2018-01-18 RX ADMIN — SODIUM CHLORIDE 75 MILLILITER(S): 9 INJECTION INTRAMUSCULAR; INTRAVENOUS; SUBCUTANEOUS at 06:45

## 2018-01-18 RX ADMIN — CARVEDILOL PHOSPHATE 6.25 MILLIGRAM(S): 80 CAPSULE, EXTENDED RELEASE ORAL at 17:35

## 2018-01-18 RX ADMIN — SODIUM CHLORIDE 3 MILLILITER(S): 9 INJECTION INTRAMUSCULAR; INTRAVENOUS; SUBCUTANEOUS at 22:43

## 2018-01-18 RX ADMIN — HEPARIN SODIUM 5000 UNIT(S): 5000 INJECTION INTRAVENOUS; SUBCUTANEOUS at 21:25

## 2018-01-18 RX ADMIN — HEPARIN SODIUM 5000 UNIT(S): 5000 INJECTION INTRAVENOUS; SUBCUTANEOUS at 06:46

## 2018-01-18 RX ADMIN — Medication 3 MILLILITER(S): at 16:18

## 2018-01-18 RX ADMIN — ATORVASTATIN CALCIUM 20 MILLIGRAM(S): 80 TABLET, FILM COATED ORAL at 21:26

## 2018-01-18 RX ADMIN — Medication 81 MILLIGRAM(S): at 14:40

## 2018-01-18 RX ADMIN — SODIUM CHLORIDE 3 MILLILITER(S): 9 INJECTION INTRAMUSCULAR; INTRAVENOUS; SUBCUTANEOUS at 04:33

## 2018-01-18 RX ADMIN — SODIUM CHLORIDE 3 MILLILITER(S): 9 INJECTION INTRAMUSCULAR; INTRAVENOUS; SUBCUTANEOUS at 10:30

## 2018-01-18 RX ADMIN — PANTOPRAZOLE SODIUM 40 MILLIGRAM(S): 20 TABLET, DELAYED RELEASE ORAL at 06:45

## 2018-01-18 RX ADMIN — HEPARIN SODIUM 5000 UNIT(S): 5000 INJECTION INTRAVENOUS; SUBCUTANEOUS at 14:40

## 2018-01-18 RX ADMIN — Medication 3 MILLILITER(S): at 10:30

## 2018-01-18 NOTE — PROGRESS NOTE ADULT - PROBLEM SELECTOR PLAN 2
- Multifactorial due to coronavirus bronchitis as well as superimposed aspiration PNA/pneumonitis and mucus plugging as evidenced on CTA chest 1/4/18  - Pt treated with Azithromycin and Zosyn at [facility] since 1/4 for possible superimposed aspiration pneumonia/CAP completed Zosyn 7 days; completed 5 days of Azithromycin   - Pt with improved lung exam s/p pulm toilet   - CTA negative for PE  - Pt does not clinically appear volume overloaded, TTE with normal LV function

## 2018-01-18 NOTE — PROGRESS NOTE ADULT - ASSESSMENT
Impression:    1. Dysphagia with dilated esophagus and GE junction narrowing on imaging - likely due to achalasia, has mild oropharyngeal dysphagia on MBS  2. Pneumonia s/p antibiotic course, resp status improved     Recommend:  - clear liquid diet today, NPO after midnight  - PPI BID IV  - anesthesia eval prior to scheduled POEMS procedure tomorrow   - plan discussed with pt's daughter - Katelyn

## 2018-01-18 NOTE — CONSULT NOTE ADULT - SUBJECTIVE AND OBJECTIVE BOX
CHIEF COMPLAINT:Patient is a 77y old  Female who presents with a chief complaint of Transfer from [place] for achalasia management (10 Ousmane 2018 20:19)      HISTORY OF PRESENT ILLNESS:  This is a pleasant gentleman with history as below presented with     PAST MEDICAL & SURGICAL HISTORY:  Depression  Alzheimers disease  MI, old  CVA (cerebral vascular accident)  HTN (hypertension)  No significant past surgical history          MEDICATIONS:  amLODIPine   Tablet 5 milliGRAM(s) Oral daily  aspirin  chewable 81 milliGRAM(s) Oral daily  carvedilol 6.25 milliGRAM(s) Oral every 12 hours  heparin  Injectable 5000 Unit(s) SubCutaneous every 8 hours      ALBUTerol/ipratropium for Nebulization 3 milliLiter(s) Nebulizer every 6 hours  sodium chloride 3%  Inhalation 3 milliLiter(s) Inhalation every 6 hours      pantoprazole  Injectable 40 milliGRAM(s) IV Push two times a day    atorvastatin 20 milliGRAM(s) Oral at bedtime    sodium chloride 0.9%. 1000 milliLiter(s) IV Continuous <Continuous>  sodium chloride 0.9%. 1000 milliLiter(s) IV Continuous <Continuous>      FAMILY HISTORY:  Family history of essential hypertension (Child)  Family history of stroke (Mother)      Non-contributory    SOCIAL HISTORY:    No tobacco, drugs or etoh    Allergies    No Known Allergies    Intolerances    	    REVIEW OF SYSTEMS:  as above  The rest of the 14 points ROS reviewed and except above they are unremarkable.        PHYSICAL EXAM:  T(C): 36.7 (01-18-18 @ 14:27), Max: 36.9 (01-17-18 @ 23:01)  HR: 72 (01-18-18 @ 16:19) (59 - 74)  BP: 115/66 (01-18-18 @ 14:27) (115/66 - 180/78)  RR: 18 (01-18-18 @ 14:27) (18 - 18)  SpO2: 98% (01-18-18 @ 16:19) (94% - 100%)  Wt(kg): --  I&O's Summary      JVP: Normal  Neck: supple  Lung: clear   CV: S1 S2 , Murmur:  Abd: soft  Ext: No edema  neuro: Awake / alert  Psych: flat affect  Skin: normal     LABS/DATA:    TELEMETRY: 	    ECG:  	   	  CARDIAC MARKERS:  Troponin T, Serum: < 0.06 ng/mL (01-18 @ 11:36)      CKMB: 1.30 ng/mL (01-18 @ 11:36)          01-18    142  |  105  |  9   ----------------------------<  84  3.6   |  24  |  1.46<H>    Ca    8.6      18 Jan 2018 05:48      proBNP:   Lipid Profile:   HgA1c:   TSH:     < from: TTE Echo Doppler w/o Cont (01.07.18 @ 20:31) >  Summary:   1. Left ventricular ejection fraction, by visual estimation, is 50 to   55%.   2. Normal left ventricular size and wall thicknesses, with normal   systolic and diastolic function.   3. Normal right ventricular size and function.   4. Mild mitral valve regurgitation.   5. Normal trileaflet aortic valve with normal opening.   6. Sclerotic aortic valve with normal opening.   7. Mildly enlarged left atrium.    < end of copied text >

## 2018-01-18 NOTE — PROGRESS NOTE ADULT - ASSESSMENT
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

## 2018-01-18 NOTE — PROGRESS NOTE ADULT - ASSESSMENT
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 Multifactorial hypoxemia, likely contrast induced nephropathy.

## 2018-01-18 NOTE — CONSULT NOTE ADULT - ASSESSMENT
HTN  stable  cont current meds     elevated trop  resolved  likely in setting of chika and sepsis/pna     Pre Op   Based on current ACC/AHA guidelines, patient history and physical exam, the patient is considered to have elevated risk given her recent elevated trop and abnormal EKG  check 2 sets of enzymes if neg, then will need pharm nuc stress test

## 2018-01-19 LAB
APTT BLD: 42.6 SEC — HIGH (ref 27.5–37.4)
BUN SERPL-MCNC: 10 MG/DL — SIGNIFICANT CHANGE UP (ref 7–23)
CALCIUM SERPL-MCNC: 8.6 MG/DL — SIGNIFICANT CHANGE UP (ref 8.4–10.5)
CHLORIDE SERPL-SCNC: 103 MMOL/L — SIGNIFICANT CHANGE UP (ref 98–107)
CO2 SERPL-SCNC: 21 MMOL/L — LOW (ref 22–31)
CREAT SERPL-MCNC: 1.63 MG/DL — HIGH (ref 0.5–1.3)
GLUCOSE SERPL-MCNC: 90 MG/DL — SIGNIFICANT CHANGE UP (ref 70–99)
HCT VFR BLD CALC: 35 % — SIGNIFICANT CHANGE UP (ref 34.5–45)
HGB BLD-MCNC: 11.8 G/DL — SIGNIFICANT CHANGE UP (ref 11.5–15.5)
INR BLD: 1.09 — SIGNIFICANT CHANGE UP (ref 0.88–1.17)
MCHC RBC-ENTMCNC: 29.9 PG — SIGNIFICANT CHANGE UP (ref 27–34)
MCHC RBC-ENTMCNC: 33.7 % — SIGNIFICANT CHANGE UP (ref 32–36)
MCV RBC AUTO: 88.8 FL — SIGNIFICANT CHANGE UP (ref 80–100)
NRBC # FLD: 0 — SIGNIFICANT CHANGE UP
PLATELET # BLD AUTO: 270 K/UL — SIGNIFICANT CHANGE UP (ref 150–400)
PMV BLD: 10.2 FL — SIGNIFICANT CHANGE UP (ref 7–13)
POTASSIUM SERPL-MCNC: 3.8 MMOL/L — SIGNIFICANT CHANGE UP (ref 3.5–5.3)
POTASSIUM SERPL-SCNC: 3.8 MMOL/L — SIGNIFICANT CHANGE UP (ref 3.5–5.3)
PROTHROM AB SERPL-ACNC: 12.1 SEC — SIGNIFICANT CHANGE UP (ref 9.8–13.1)
RBC # BLD: 3.94 M/UL — SIGNIFICANT CHANGE UP (ref 3.8–5.2)
RBC # FLD: 13.2 % — SIGNIFICANT CHANGE UP (ref 10.3–14.5)
SODIUM SERPL-SCNC: 139 MMOL/L — SIGNIFICANT CHANGE UP (ref 135–145)
WBC # BLD: 9.92 K/UL — SIGNIFICANT CHANGE UP (ref 3.8–10.5)
WBC # FLD AUTO: 9.92 K/UL — SIGNIFICANT CHANGE UP (ref 3.8–10.5)

## 2018-01-19 PROCEDURE — 78452 HT MUSCLE IMAGE SPECT MULT: CPT | Mod: 26

## 2018-01-19 PROCEDURE — 99232 SBSQ HOSP IP/OBS MODERATE 35: CPT

## 2018-01-19 PROCEDURE — 93016 CV STRESS TEST SUPVJ ONLY: CPT | Mod: GC

## 2018-01-19 PROCEDURE — 93018 CV STRESS TEST I&R ONLY: CPT | Mod: GC

## 2018-01-19 RX ORDER — HYDRALAZINE HCL 50 MG
10 TABLET ORAL ONCE
Qty: 0 | Refills: 0 | Status: COMPLETED | OUTPATIENT
Start: 2018-01-19 | End: 2018-01-19

## 2018-01-19 RX ADMIN — HEPARIN SODIUM 5000 UNIT(S): 5000 INJECTION INTRAVENOUS; SUBCUTANEOUS at 21:32

## 2018-01-19 RX ADMIN — AMLODIPINE BESYLATE 5 MILLIGRAM(S): 2.5 TABLET ORAL at 05:32

## 2018-01-19 RX ADMIN — PANTOPRAZOLE SODIUM 40 MILLIGRAM(S): 20 TABLET, DELAYED RELEASE ORAL at 17:44

## 2018-01-19 RX ADMIN — Medication 3 MILLILITER(S): at 22:01

## 2018-01-19 RX ADMIN — PANTOPRAZOLE SODIUM 40 MILLIGRAM(S): 20 TABLET, DELAYED RELEASE ORAL at 05:34

## 2018-01-19 RX ADMIN — CARVEDILOL PHOSPHATE 6.25 MILLIGRAM(S): 80 CAPSULE, EXTENDED RELEASE ORAL at 05:32

## 2018-01-19 RX ADMIN — Medication 10 MILLIGRAM(S): at 01:53

## 2018-01-19 RX ADMIN — CARVEDILOL PHOSPHATE 6.25 MILLIGRAM(S): 80 CAPSULE, EXTENDED RELEASE ORAL at 17:44

## 2018-01-19 RX ADMIN — Medication 3 MILLILITER(S): at 04:14

## 2018-01-19 RX ADMIN — SODIUM CHLORIDE 3 MILLILITER(S): 9 INJECTION INTRAMUSCULAR; INTRAVENOUS; SUBCUTANEOUS at 04:15

## 2018-01-19 RX ADMIN — SODIUM CHLORIDE 3 MILLILITER(S): 9 INJECTION INTRAMUSCULAR; INTRAVENOUS; SUBCUTANEOUS at 22:12

## 2018-01-19 RX ADMIN — ATORVASTATIN CALCIUM 20 MILLIGRAM(S): 80 TABLET, FILM COATED ORAL at 21:32

## 2018-01-19 NOTE — PROGRESS NOTE ADULT - ASSESSMENT
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 Multifactorial hypoxemia, likely contrast induced nephropathy.

## 2018-01-19 NOTE — PROGRESS NOTE ADULT - ASSESSMENT
HTN  change norvasc to nifedipine 90 daily     elevated trop  resolved  likely in setting of chika and sepsis/pna     Pre Op   Based on current ACC/AHA guidelines, patient history and physical exam, the patient is considered to have elevated risk given her recent elevated trop and abnormal EKG  has now neg 2 sets of enzymes; obtain pharm nuc stress test , if no sig ischemia , may proceed to procedure

## 2018-01-19 NOTE — CHART NOTE - NSCHARTNOTEFT_GEN_A_CORE
GI Chart Note    Pt is s/p nuclear stress test this morning. Pre-shrestha report by cardiology fellow - questionable ST changes. Will defer elective POEMS procedure till officially cleared by cardiology (tentatively early next week). Plan was discussed with pt's daughter Katelyn - she is in agreement. Please resume full liquid diet.    Douglas Armijo  GI Fellow GI Chart Note    Pt is s/p nuclear stress test this morning. Pre-shrestha report by cardiology fellow - questionable ST changes. Will defer elective POEMS procedure till officially cleared by cardiology (tentatively early next week). Plan was discussed with pt's daughter Katelyn - she is in agreement. Please resume full liquid diet. Make NPO after midnight Sunday for tentative POEMs procedure on Monday.     Douglas Armijo  GI Fellow

## 2018-01-19 NOTE — PROGRESS NOTE ADULT - PROBLEM SELECTOR PLAN 2
- Multifactorial due to coronavirus bronchitis as well as superimposed aspiration PNA/pneumonitis and mucus plugging as evidenced on CTA chest 1/4/18  - Pt treated with Azithromycin and Zosyn at [facility] since 1/4 for possible superimposed aspiration pneumonia/CAP completed Zosyn 7 days; completed 5 days of Azithromycin   - Pt with improved lung exam s/p pulm toilet   - CTA negative for PE  - Pt does not clinically appear volume overloaded, TTE with normal LV function, stress neg per cards

## 2018-01-20 LAB
BUN SERPL-MCNC: 12 MG/DL — SIGNIFICANT CHANGE UP (ref 7–23)
CALCIUM SERPL-MCNC: 8.7 MG/DL — SIGNIFICANT CHANGE UP (ref 8.4–10.5)
CHLORIDE SERPL-SCNC: 104 MMOL/L — SIGNIFICANT CHANGE UP (ref 98–107)
CO2 SERPL-SCNC: 23 MMOL/L — SIGNIFICANT CHANGE UP (ref 22–31)
CREAT SERPL-MCNC: 2 MG/DL — HIGH (ref 0.5–1.3)
GLUCOSE SERPL-MCNC: 92 MG/DL — SIGNIFICANT CHANGE UP (ref 70–99)
HCT VFR BLD CALC: 35.4 % — SIGNIFICANT CHANGE UP (ref 34.5–45)
HGB BLD-MCNC: 11.7 G/DL — SIGNIFICANT CHANGE UP (ref 11.5–15.5)
MAGNESIUM SERPL-MCNC: 1.4 MG/DL — LOW (ref 1.6–2.6)
MCHC RBC-ENTMCNC: 28.5 PG — SIGNIFICANT CHANGE UP (ref 27–34)
MCHC RBC-ENTMCNC: 33.1 % — SIGNIFICANT CHANGE UP (ref 32–36)
MCV RBC AUTO: 86.3 FL — SIGNIFICANT CHANGE UP (ref 80–100)
NRBC # FLD: 0 — SIGNIFICANT CHANGE UP
PHOSPHATE SERPL-MCNC: 3.3 MG/DL — SIGNIFICANT CHANGE UP (ref 2.5–4.5)
PLATELET # BLD AUTO: 279 K/UL — SIGNIFICANT CHANGE UP (ref 150–400)
PMV BLD: 9.7 FL — SIGNIFICANT CHANGE UP (ref 7–13)
POTASSIUM SERPL-MCNC: 3.9 MMOL/L — SIGNIFICANT CHANGE UP (ref 3.5–5.3)
POTASSIUM SERPL-SCNC: 3.9 MMOL/L — SIGNIFICANT CHANGE UP (ref 3.5–5.3)
RBC # BLD: 4.1 M/UL — SIGNIFICANT CHANGE UP (ref 3.8–5.2)
RBC # FLD: 13.2 % — SIGNIFICANT CHANGE UP (ref 10.3–14.5)
SODIUM SERPL-SCNC: 139 MMOL/L — SIGNIFICANT CHANGE UP (ref 135–145)
WBC # BLD: 7.91 K/UL — SIGNIFICANT CHANGE UP (ref 3.8–10.5)
WBC # FLD AUTO: 7.91 K/UL — SIGNIFICANT CHANGE UP (ref 3.8–10.5)

## 2018-01-20 PROCEDURE — 99232 SBSQ HOSP IP/OBS MODERATE 35: CPT

## 2018-01-20 RX ORDER — MAGNESIUM SULFATE 500 MG/ML
1 VIAL (ML) INJECTION ONCE
Qty: 0 | Refills: 0 | Status: COMPLETED | OUTPATIENT
Start: 2018-01-20 | End: 2018-01-20

## 2018-01-20 RX ORDER — SODIUM CHLORIDE 9 MG/ML
1000 INJECTION INTRAMUSCULAR; INTRAVENOUS; SUBCUTANEOUS
Qty: 0 | Refills: 0 | Status: DISCONTINUED | OUTPATIENT
Start: 2018-01-20 | End: 2018-01-24

## 2018-01-20 RX ADMIN — HEPARIN SODIUM 5000 UNIT(S): 5000 INJECTION INTRAVENOUS; SUBCUTANEOUS at 05:09

## 2018-01-20 RX ADMIN — HEPARIN SODIUM 5000 UNIT(S): 5000 INJECTION INTRAVENOUS; SUBCUTANEOUS at 14:51

## 2018-01-20 RX ADMIN — SODIUM CHLORIDE 75 MILLILITER(S): 9 INJECTION INTRAMUSCULAR; INTRAVENOUS; SUBCUTANEOUS at 14:52

## 2018-01-20 RX ADMIN — PANTOPRAZOLE SODIUM 40 MILLIGRAM(S): 20 TABLET, DELAYED RELEASE ORAL at 17:26

## 2018-01-20 RX ADMIN — AMLODIPINE BESYLATE 5 MILLIGRAM(S): 2.5 TABLET ORAL at 05:09

## 2018-01-20 RX ADMIN — HEPARIN SODIUM 5000 UNIT(S): 5000 INJECTION INTRAVENOUS; SUBCUTANEOUS at 21:57

## 2018-01-20 RX ADMIN — CARVEDILOL PHOSPHATE 6.25 MILLIGRAM(S): 80 CAPSULE, EXTENDED RELEASE ORAL at 17:26

## 2018-01-20 RX ADMIN — PANTOPRAZOLE SODIUM 40 MILLIGRAM(S): 20 TABLET, DELAYED RELEASE ORAL at 05:09

## 2018-01-20 RX ADMIN — Medication 100 GRAM(S): at 12:56

## 2018-01-20 RX ADMIN — ATORVASTATIN CALCIUM 20 MILLIGRAM(S): 80 TABLET, FILM COATED ORAL at 21:56

## 2018-01-20 RX ADMIN — CARVEDILOL PHOSPHATE 6.25 MILLIGRAM(S): 80 CAPSULE, EXTENDED RELEASE ORAL at 05:09

## 2018-01-20 RX ADMIN — Medication 3 MILLILITER(S): at 03:37

## 2018-01-20 RX ADMIN — SODIUM CHLORIDE 3 MILLILITER(S): 9 INJECTION INTRAMUSCULAR; INTRAVENOUS; SUBCUTANEOUS at 03:40

## 2018-01-20 RX ADMIN — Medication 81 MILLIGRAM(S): at 12:59

## 2018-01-20 NOTE — PROGRESS NOTE ADULT - PROBLEM SELECTOR PLAN 2
- Multifactorial due to coronavirus bronchitis as well as superimposed aspiration PNA/pneumonitis and mucus plugging as evidenced on CTA chest 1/4/18  - Pt treated with Azithromycin and Zosyn at [facility] since 1/4 for possible superimposed aspiration pneumonia/CAP completed Zosyn 7 days; completed 5 days of Azithromycin   - Pt with improved lung exam s/p pulm toilet   - CTA negative for PE  - Pt does not clinically appear volume overloaded, TTE with normal LV function, stress neg per cards

## 2018-01-20 NOTE — PROGRESS NOTE ADULT - ASSESSMENT
HTN  stable     elevated trop  resolved  likely in setting of chika and sepsis/pna     Pre Op   Based on current ACC/AHA guidelines, patient history and physical exam, the patient is considered to have elevated risk given her recent elevated trop and abnormal EKG  however stress test is normal   may proceed to planned procedure

## 2018-01-20 NOTE — PROGRESS NOTE ADULT - ASSESSMENT
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 Multifactorial hypoxemia, likely contrast induced nephropathy.

## 2018-01-21 LAB
APTT BLD: 32.1 SEC — SIGNIFICANT CHANGE UP (ref 27.5–37.4)
BUN SERPL-MCNC: 12 MG/DL — SIGNIFICANT CHANGE UP (ref 7–23)
CALCIUM SERPL-MCNC: 8.6 MG/DL — SIGNIFICANT CHANGE UP (ref 8.4–10.5)
CHLORIDE SERPL-SCNC: 106 MMOL/L — SIGNIFICANT CHANGE UP (ref 98–107)
CO2 SERPL-SCNC: 22 MMOL/L — SIGNIFICANT CHANGE UP (ref 22–31)
CREAT SERPL-MCNC: 1.8 MG/DL — HIGH (ref 0.5–1.3)
GLUCOSE SERPL-MCNC: 87 MG/DL — SIGNIFICANT CHANGE UP (ref 70–99)
HCT VFR BLD CALC: 35.1 % — SIGNIFICANT CHANGE UP (ref 34.5–45)
HGB BLD-MCNC: 11.5 G/DL — SIGNIFICANT CHANGE UP (ref 11.5–15.5)
INR BLD: 1.06 — SIGNIFICANT CHANGE UP (ref 0.88–1.17)
MAGNESIUM SERPL-MCNC: 1.7 MG/DL — SIGNIFICANT CHANGE UP (ref 1.6–2.6)
MCHC RBC-ENTMCNC: 28.5 PG — SIGNIFICANT CHANGE UP (ref 27–34)
MCHC RBC-ENTMCNC: 32.8 % — SIGNIFICANT CHANGE UP (ref 32–36)
MCV RBC AUTO: 86.9 FL — SIGNIFICANT CHANGE UP (ref 80–100)
NRBC # FLD: 0 — SIGNIFICANT CHANGE UP
PHOSPHATE SERPL-MCNC: 3.2 MG/DL — SIGNIFICANT CHANGE UP (ref 2.5–4.5)
PLATELET # BLD AUTO: 270 K/UL — SIGNIFICANT CHANGE UP (ref 150–400)
PMV BLD: 10.7 FL — SIGNIFICANT CHANGE UP (ref 7–13)
POTASSIUM SERPL-MCNC: 3.8 MMOL/L — SIGNIFICANT CHANGE UP (ref 3.5–5.3)
POTASSIUM SERPL-SCNC: 3.8 MMOL/L — SIGNIFICANT CHANGE UP (ref 3.5–5.3)
PROTHROM AB SERPL-ACNC: 11.8 SEC — SIGNIFICANT CHANGE UP (ref 9.8–13.1)
RBC # BLD: 4.04 M/UL — SIGNIFICANT CHANGE UP (ref 3.8–5.2)
RBC # FLD: 13.2 % — SIGNIFICANT CHANGE UP (ref 10.3–14.5)
SODIUM SERPL-SCNC: 141 MMOL/L — SIGNIFICANT CHANGE UP (ref 135–145)
WBC # BLD: 8.58 K/UL — SIGNIFICANT CHANGE UP (ref 3.8–10.5)
WBC # FLD AUTO: 8.58 K/UL — SIGNIFICANT CHANGE UP (ref 3.8–10.5)

## 2018-01-21 PROCEDURE — 99232 SBSQ HOSP IP/OBS MODERATE 35: CPT

## 2018-01-21 RX ORDER — HEPARIN SODIUM 5000 [USP'U]/ML
5000 INJECTION INTRAVENOUS; SUBCUTANEOUS EVERY 8 HOURS
Qty: 0 | Refills: 0 | Status: DISCONTINUED | OUTPATIENT
Start: 2018-01-21 | End: 2018-01-21

## 2018-01-21 RX ORDER — HEPARIN SODIUM 5000 [USP'U]/ML
5000 INJECTION INTRAVENOUS; SUBCUTANEOUS EVERY 8 HOURS
Qty: 0 | Refills: 0 | Status: DISCONTINUED | OUTPATIENT
Start: 2018-01-21 | End: 2018-01-22

## 2018-01-21 RX ADMIN — HEPARIN SODIUM 5000 UNIT(S): 5000 INJECTION INTRAVENOUS; SUBCUTANEOUS at 13:12

## 2018-01-21 RX ADMIN — HEPARIN SODIUM 5000 UNIT(S): 5000 INJECTION INTRAVENOUS; SUBCUTANEOUS at 22:09

## 2018-01-21 RX ADMIN — ATORVASTATIN CALCIUM 20 MILLIGRAM(S): 80 TABLET, FILM COATED ORAL at 22:09

## 2018-01-21 RX ADMIN — CARVEDILOL PHOSPHATE 6.25 MILLIGRAM(S): 80 CAPSULE, EXTENDED RELEASE ORAL at 17:26

## 2018-01-21 RX ADMIN — CARVEDILOL PHOSPHATE 6.25 MILLIGRAM(S): 80 CAPSULE, EXTENDED RELEASE ORAL at 05:36

## 2018-01-21 RX ADMIN — Medication 81 MILLIGRAM(S): at 13:12

## 2018-01-21 RX ADMIN — AMLODIPINE BESYLATE 5 MILLIGRAM(S): 2.5 TABLET ORAL at 05:37

## 2018-01-21 RX ADMIN — PANTOPRAZOLE SODIUM 40 MILLIGRAM(S): 20 TABLET, DELAYED RELEASE ORAL at 05:37

## 2018-01-21 RX ADMIN — PANTOPRAZOLE SODIUM 40 MILLIGRAM(S): 20 TABLET, DELAYED RELEASE ORAL at 17:26

## 2018-01-21 RX ADMIN — HEPARIN SODIUM 5000 UNIT(S): 5000 INJECTION INTRAVENOUS; SUBCUTANEOUS at 05:37

## 2018-01-21 NOTE — PROGRESS NOTE ADULT - ASSESSMENT
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 Multifactorial hypoxemia, likely contrast induced nephropathy.

## 2018-01-21 NOTE — PROGRESS NOTE ADULT - PROBLEM SELECTOR PLAN 2
- Multifactorial due to coronavirus bronchitis as well as superimposed aspiration PNA/pneumonitis and mucus plugging as evidenced on CTA chest 1/4/18  - Pt treated with Azithromycin and Zosyn at [facility] since 1/4 for possible superimposed aspiration pneumonia/CAP completed Zosyn 7 days; completed 5 days of Azithromycin   - Pt with improved lung exam s/p pulm toilet   - CTA negative for PE  - Pt does not clinically appear volume overloaded, TTE with normal LV function, stress neg

## 2018-01-22 LAB
BLD GP AB SCN SERPL QL: NEGATIVE — SIGNIFICANT CHANGE UP
BUN SERPL-MCNC: 12 MG/DL — SIGNIFICANT CHANGE UP (ref 7–23)
CALCIUM SERPL-MCNC: 8.7 MG/DL — SIGNIFICANT CHANGE UP (ref 8.4–10.5)
CHLORIDE SERPL-SCNC: 103 MMOL/L — SIGNIFICANT CHANGE UP (ref 98–107)
CO2 SERPL-SCNC: 24 MMOL/L — SIGNIFICANT CHANGE UP (ref 22–31)
CREAT SERPL-MCNC: 1.84 MG/DL — HIGH (ref 0.5–1.3)
GLUCOSE SERPL-MCNC: 82 MG/DL — SIGNIFICANT CHANGE UP (ref 70–99)
HCT VFR BLD CALC: 35 % — SIGNIFICANT CHANGE UP (ref 34.5–45)
HGB BLD-MCNC: 11.1 G/DL — LOW (ref 11.5–15.5)
INR BLD: 1.05 — SIGNIFICANT CHANGE UP (ref 0.88–1.17)
MAGNESIUM SERPL-MCNC: 1.6 MG/DL — SIGNIFICANT CHANGE UP (ref 1.6–2.6)
MCHC RBC-ENTMCNC: 28.2 PG — SIGNIFICANT CHANGE UP (ref 27–34)
MCHC RBC-ENTMCNC: 31.7 % — LOW (ref 32–36)
MCV RBC AUTO: 89.1 FL — SIGNIFICANT CHANGE UP (ref 80–100)
NRBC # FLD: 0 — SIGNIFICANT CHANGE UP
PHOSPHATE SERPL-MCNC: 3.2 MG/DL — SIGNIFICANT CHANGE UP (ref 2.5–4.5)
PLATELET # BLD AUTO: 255 K/UL — SIGNIFICANT CHANGE UP (ref 150–400)
PMV BLD: 10.6 FL — SIGNIFICANT CHANGE UP (ref 7–13)
POTASSIUM SERPL-MCNC: 4.2 MMOL/L — SIGNIFICANT CHANGE UP (ref 3.5–5.3)
POTASSIUM SERPL-SCNC: 4.2 MMOL/L — SIGNIFICANT CHANGE UP (ref 3.5–5.3)
PROTHROM AB SERPL-ACNC: 12.1 SEC — SIGNIFICANT CHANGE UP (ref 9.8–13.1)
RBC # BLD: 3.93 M/UL — SIGNIFICANT CHANGE UP (ref 3.8–5.2)
RBC # FLD: 13.3 % — SIGNIFICANT CHANGE UP (ref 10.3–14.5)
RH IG SCN BLD-IMP: POSITIVE — SIGNIFICANT CHANGE UP
SODIUM SERPL-SCNC: 138 MMOL/L — SIGNIFICANT CHANGE UP (ref 135–145)
WBC # BLD: 9.3 K/UL — SIGNIFICANT CHANGE UP (ref 3.8–10.5)
WBC # FLD AUTO: 9.3 K/UL — SIGNIFICANT CHANGE UP (ref 3.8–10.5)

## 2018-01-22 PROCEDURE — 43245 EGD DILATE STRICTURE: CPT | Mod: 59,GC

## 2018-01-22 PROCEDURE — 99233 SBSQ HOSP IP/OBS HIGH 50: CPT

## 2018-01-22 PROCEDURE — 43499A: CUSTOM

## 2018-01-22 RX ADMIN — CARVEDILOL PHOSPHATE 6.25 MILLIGRAM(S): 80 CAPSULE, EXTENDED RELEASE ORAL at 05:25

## 2018-01-22 RX ADMIN — PANTOPRAZOLE SODIUM 40 MILLIGRAM(S): 20 TABLET, DELAYED RELEASE ORAL at 05:26

## 2018-01-22 RX ADMIN — AMLODIPINE BESYLATE 5 MILLIGRAM(S): 2.5 TABLET ORAL at 05:25

## 2018-01-22 RX ADMIN — ATORVASTATIN CALCIUM 20 MILLIGRAM(S): 80 TABLET, FILM COATED ORAL at 21:48

## 2018-01-22 NOTE — CHART NOTE - NSCHARTNOTEFT_GEN_A_CORE
GI Procedure Note (Full note to follow on sunrise)    Procedure: POEMS    Indication: Achalasia    Findings:  - Dilated and tortuous esophagus with tight GE junction consistent with achalasia    - A mild stenosis was found at the pylorus. Balloon dilation with CRE balloon up to 18mm was performed.   - Preparations were made for peroral endoscopic myotomy (POEM).  The esophagus was cleaned and irrigated using water and suctioned.  Mucosotomy followed by myotomy were performed in a posterior orientation.  First, a submucosal injection of a solution of methylene blue and saline was used to lift the mucosa at the site of the initial mucosotomy.  The initial mucosal incision was made longitudinally starting at 26 cm from the incisors using a HybridKnife I-Type.  Next, the endoscope with a clear cap was used to enter into the submucosal tunnel.  The submucosal tunnel was then further created by continued dissection.  The myotomy was started using an ITknife2 to perform a full thickness myotomy.  Intra procedure bleeding was minimal.  After completion of the myotomy, there was no evidence of bleeding noted on the inspection of the myotomy edges and submucosal tunnel.  The mucosal entrance to the submucosal tunnel was closed using endoscopic clips.     Impression:  - Pyloric stenosis s/p balloon dilatation   - Peroral endoscopic myotomy (POEM) was performed.      Recommendations:  - [facility].   - NPO today.  - PPI BID IV.  - Empiric antibiotics with vanc and zosyn.  - Avoid imaging studies unless signs of clinical decompensation.     oDuglas Armijo  GI fellow

## 2018-01-22 NOTE — PROVIDER CONTACT NOTE (OTHER) - SITUATION
Patients AM medications, NPO
/111
/64
/65
/68
/72
/84
Bp 176/62. due for morning BP meds but is NPO
Patient had high BP
Pt bp 164/70
patients blood pressure elevated to 180/78
pt b/p high after metoprolol IV was given

## 2018-01-22 NOTE — PROVIDER CONTACT NOTE (OTHER) - RECOMMENDATIONS
give BP medications
ADS  made aware
Give due AM medications; continue to monitor; reassess bp 1-2 hrs
No new order; will continue to monitor.
OK to give morning BP meds now
Recheck BP in 1 hr
Will administer Coreg oral.
continue to monitor
give labetalol IV push as ordered, Ng to come to bedside to assess
give morning BP meds
give morning meds with a sip of water
will order hydralazine

## 2018-01-22 NOTE — PROVIDER CONTACT NOTE (OTHER) - ASSESSMENT
asymptomatic
/111. no signs of acute distress
/62. no signs of acute distress
/64. HR 67. no signs of acute distress
/68. no signs of acute distress
/72. no signs of acute distress
BP electronic 179/68
Pt bp 180/84, COREG medication given.
Pt denies any pain or discomfort.
no distress, b/p repeated manually
no distress, b/p repeated manually
patient is alert and awake. denies symptoms of headache or dizziness.

## 2018-01-22 NOTE — PROGRESS NOTE ADULT - ASSESSMENT
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

## 2018-01-22 NOTE — PROVIDER CONTACT NOTE (OTHER) - BACKGROUND
patient admitted with achlasia
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
77 y.o female with a history of dementia, CVA in 1993 and 2017 with residual L sided weakness, HTN and depression.
Admitted with Pneumonia
CVA, PNA, HTN HF
CVA, PNA, HTN HF
admitted for pna
admitted for pna .
admitted with pneumonia
patient admitted pneumonia. history of depression, alzheimer, MI, CVA.

## 2018-01-22 NOTE — PROVIDER CONTACT NOTE (OTHER) - ACTION/TREATMENT ORDERED:
give BP medications
No new order; will continue to monitor.
Will administer Coreg oral.
Will continue to monitor.
Will recheck
as above
continue to monitor and reassess
give due am medications; reassess in 1-2 hrs
give morning BP meds now
give morning bp meds and reassess
give morning meds with a sip of water
will order hydralazine

## 2018-01-22 NOTE — PROGRESS NOTE ADULT - PROBLEM SELECTOR PLAN 2
Resolved, Pt  saturating 100% on RA  Multifactorial due to coronavirus bronchitis as well as superimposed aspiration PNA/pneumonitis and mucus plugging as evidenced on CTA chest 1/4/18  - Pt treated with Azithromycin and Zosyn at [facility] since 1/4 for possible superimposed aspiration pneumonia/CAP completed Zosyn 7 days; completed 5 days of Azithromycin   - Pt with improved lung exam s/p pulm toilet   - CTA negative for PE  - Pt does not clinically appear volume overloaded, TTE with normal LV function, stress neg

## 2018-01-23 LAB
BUN SERPL-MCNC: 13 MG/DL — SIGNIFICANT CHANGE UP (ref 7–23)
CALCIUM SERPL-MCNC: 8.7 MG/DL — SIGNIFICANT CHANGE UP (ref 8.4–10.5)
CHLORIDE SERPL-SCNC: 101 MMOL/L — SIGNIFICANT CHANGE UP (ref 98–107)
CO2 SERPL-SCNC: 24 MMOL/L — SIGNIFICANT CHANGE UP (ref 22–31)
CREAT SERPL-MCNC: 1.74 MG/DL — HIGH (ref 0.5–1.3)
GLUCOSE SERPL-MCNC: 131 MG/DL — HIGH (ref 70–99)
HCT VFR BLD CALC: 35.2 % — SIGNIFICANT CHANGE UP (ref 34.5–45)
HGB BLD-MCNC: 11.7 G/DL — SIGNIFICANT CHANGE UP (ref 11.5–15.5)
MAGNESIUM SERPL-MCNC: 1.5 MG/DL — LOW (ref 1.6–2.6)
MCHC RBC-ENTMCNC: 29 PG — SIGNIFICANT CHANGE UP (ref 27–34)
MCHC RBC-ENTMCNC: 33.2 % — SIGNIFICANT CHANGE UP (ref 32–36)
MCV RBC AUTO: 87.3 FL — SIGNIFICANT CHANGE UP (ref 80–100)
NRBC # FLD: 0 — SIGNIFICANT CHANGE UP
PHOSPHATE SERPL-MCNC: 4.4 MG/DL — SIGNIFICANT CHANGE UP (ref 2.5–4.5)
PLATELET # BLD AUTO: 259 K/UL — SIGNIFICANT CHANGE UP (ref 150–400)
PMV BLD: 10.5 FL — SIGNIFICANT CHANGE UP (ref 7–13)
POTASSIUM SERPL-MCNC: 4.1 MMOL/L — SIGNIFICANT CHANGE UP (ref 3.5–5.3)
POTASSIUM SERPL-SCNC: 4.1 MMOL/L — SIGNIFICANT CHANGE UP (ref 3.5–5.3)
RBC # BLD: 4.03 M/UL — SIGNIFICANT CHANGE UP (ref 3.8–5.2)
RBC # FLD: 13.2 % — SIGNIFICANT CHANGE UP (ref 10.3–14.5)
SODIUM SERPL-SCNC: 139 MMOL/L — SIGNIFICANT CHANGE UP (ref 135–145)
WBC # BLD: 9.6 K/UL — SIGNIFICANT CHANGE UP (ref 3.8–10.5)
WBC # FLD AUTO: 9.6 K/UL — SIGNIFICANT CHANGE UP (ref 3.8–10.5)

## 2018-01-23 PROCEDURE — 99233 SBSQ HOSP IP/OBS HIGH 50: CPT

## 2018-01-23 RX ORDER — MAGNESIUM SULFATE 500 MG/ML
2 VIAL (ML) INJECTION ONCE
Qty: 0 | Refills: 0 | Status: COMPLETED | OUTPATIENT
Start: 2018-01-23 | End: 2018-01-23

## 2018-01-23 RX ORDER — HEPARIN SODIUM 5000 [USP'U]/ML
5000 INJECTION INTRAVENOUS; SUBCUTANEOUS EVERY 8 HOURS
Qty: 0 | Refills: 0 | Status: DISCONTINUED | OUTPATIENT
Start: 2018-01-23 | End: 2018-01-25

## 2018-01-23 RX ORDER — VANCOMYCIN HCL 1 G
750 VIAL (EA) INTRAVENOUS EVERY 24 HOURS
Qty: 0 | Refills: 0 | Status: DISCONTINUED | OUTPATIENT
Start: 2018-01-23 | End: 2018-01-24

## 2018-01-23 RX ORDER — PIPERACILLIN AND TAZOBACTAM 4; .5 G/20ML; G/20ML
3.38 INJECTION, POWDER, LYOPHILIZED, FOR SOLUTION INTRAVENOUS EVERY 12 HOURS
Qty: 0 | Refills: 0 | Status: DISCONTINUED | OUTPATIENT
Start: 2018-01-23 | End: 2018-01-24

## 2018-01-23 RX ADMIN — AMLODIPINE BESYLATE 5 MILLIGRAM(S): 2.5 TABLET ORAL at 05:18

## 2018-01-23 RX ADMIN — HEPARIN SODIUM 5000 UNIT(S): 5000 INJECTION INTRAVENOUS; SUBCUTANEOUS at 21:33

## 2018-01-23 RX ADMIN — HEPARIN SODIUM 5000 UNIT(S): 5000 INJECTION INTRAVENOUS; SUBCUTANEOUS at 13:11

## 2018-01-23 RX ADMIN — ATORVASTATIN CALCIUM 20 MILLIGRAM(S): 80 TABLET, FILM COATED ORAL at 21:33

## 2018-01-23 RX ADMIN — Medication 150 MILLIGRAM(S): at 17:06

## 2018-01-23 RX ADMIN — CARVEDILOL PHOSPHATE 6.25 MILLIGRAM(S): 80 CAPSULE, EXTENDED RELEASE ORAL at 17:07

## 2018-01-23 RX ADMIN — Medication 50 GRAM(S): at 10:07

## 2018-01-23 RX ADMIN — CARVEDILOL PHOSPHATE 6.25 MILLIGRAM(S): 80 CAPSULE, EXTENDED RELEASE ORAL at 05:19

## 2018-01-23 RX ADMIN — PIPERACILLIN AND TAZOBACTAM 25 GRAM(S): 4; .5 INJECTION, POWDER, LYOPHILIZED, FOR SOLUTION INTRAVENOUS at 18:09

## 2018-01-23 RX ADMIN — PANTOPRAZOLE SODIUM 40 MILLIGRAM(S): 20 TABLET, DELAYED RELEASE ORAL at 05:19

## 2018-01-23 RX ADMIN — Medication 81 MILLIGRAM(S): at 11:13

## 2018-01-23 RX ADMIN — PANTOPRAZOLE SODIUM 40 MILLIGRAM(S): 20 TABLET, DELAYED RELEASE ORAL at 17:06

## 2018-01-23 NOTE — PROGRESS NOTE ADULT - ASSESSMENT
HTN  stable     elevated trop  resolved  likely in setting of chika and sepsis/pna , normal stress test

## 2018-01-23 NOTE — PROGRESS NOTE ADULT - ASSESSMENT
IMP:    1. Achalasia s/p POEMS 1/22  2. Pyloric stenosis s/p balloon dilation    PLAN:  - Keep on clears for now  - continue PPI BID IV  - continue vanc/zosyn for now (will treat for 5 days total, can switch over PO antibiotics in next few days)  - avoid imaging studies unless signs of clinical decompensation  - keep head of bed elevated at all times

## 2018-01-23 NOTE — CHART NOTE - NSCHARTNOTEFT_GEN_A_CORE
Spoke with GI, will start on clear diet and advance slowly over the next few days. Will start on Vanco/ Zosyn post-procedure prophylaxis. Will follow GI regarding duration of Abx.     ADS  84114

## 2018-01-23 NOTE — PROGRESS NOTE ADULT - PROBLEM SELECTOR PLAN 2
Resolved, Pt  saturating 100% on RA  Multifactorial due to coronavirus bronchitis as well as superimposed aspiration PNA/pneumonitis and mucus plugging as evidenced on CTA chest 1/4/18  - Pt treated with Azithromycin and Zosyn at [facility] since 1/4 for possible superimposed aspiration pneumonia/CAP completed Zosyn 7 days; completed 5 days of Azithromycin   - Pt with improved lung exam s/p pulm toilet   - CTA negative for PE  - Pt does not clinically appear volume overloaded, TTE with normal LV function, stress neg

## 2018-01-24 ENCOUNTER — TRANSCRIPTION ENCOUNTER (OUTPATIENT)
Age: 78
End: 2018-01-24

## 2018-01-24 LAB
BUN SERPL-MCNC: 17 MG/DL — SIGNIFICANT CHANGE UP (ref 7–23)
CALCIUM SERPL-MCNC: 8.8 MG/DL — SIGNIFICANT CHANGE UP (ref 8.4–10.5)
CHLORIDE SERPL-SCNC: 99 MMOL/L — SIGNIFICANT CHANGE UP (ref 98–107)
CO2 SERPL-SCNC: 24 MMOL/L — SIGNIFICANT CHANGE UP (ref 22–31)
CREAT SERPL-MCNC: 1.92 MG/DL — HIGH (ref 0.5–1.3)
GLUCOSE SERPL-MCNC: 86 MG/DL — SIGNIFICANT CHANGE UP (ref 70–99)
HCT VFR BLD CALC: 32.5 % — LOW (ref 34.5–45)
HGB BLD-MCNC: 10.9 G/DL — LOW (ref 11.5–15.5)
MAGNESIUM SERPL-MCNC: 2.2 MG/DL — SIGNIFICANT CHANGE UP (ref 1.6–2.6)
MCHC RBC-ENTMCNC: 29.4 PG — SIGNIFICANT CHANGE UP (ref 27–34)
MCHC RBC-ENTMCNC: 33.5 % — SIGNIFICANT CHANGE UP (ref 32–36)
MCV RBC AUTO: 87.6 FL — SIGNIFICANT CHANGE UP (ref 80–100)
NRBC # FLD: 0 — SIGNIFICANT CHANGE UP
PHOSPHATE SERPL-MCNC: 3.4 MG/DL — SIGNIFICANT CHANGE UP (ref 2.5–4.5)
PLATELET # BLD AUTO: 236 K/UL — SIGNIFICANT CHANGE UP (ref 150–400)
PMV BLD: 10.4 FL — SIGNIFICANT CHANGE UP (ref 7–13)
POTASSIUM SERPL-MCNC: 4 MMOL/L — SIGNIFICANT CHANGE UP (ref 3.5–5.3)
POTASSIUM SERPL-SCNC: 4 MMOL/L — SIGNIFICANT CHANGE UP (ref 3.5–5.3)
RBC # BLD: 3.71 M/UL — LOW (ref 3.8–5.2)
RBC # FLD: 13.3 % — SIGNIFICANT CHANGE UP (ref 10.3–14.5)
SODIUM SERPL-SCNC: 137 MMOL/L — SIGNIFICANT CHANGE UP (ref 135–145)
WBC # BLD: 9.33 K/UL — SIGNIFICANT CHANGE UP (ref 3.8–10.5)
WBC # FLD AUTO: 9.33 K/UL — SIGNIFICANT CHANGE UP (ref 3.8–10.5)

## 2018-01-24 PROCEDURE — 99232 SBSQ HOSP IP/OBS MODERATE 35: CPT

## 2018-01-24 RX ORDER — PANTOPRAZOLE SODIUM 20 MG/1
1 TABLET, DELAYED RELEASE ORAL
Qty: 0 | Refills: 0 | COMMUNITY

## 2018-01-24 RX ORDER — SODIUM CHLORIDE 9 MG/ML
1000 INJECTION, SOLUTION INTRAVENOUS
Qty: 0 | Refills: 0 | Status: DISCONTINUED | OUTPATIENT
Start: 2018-01-24 | End: 2018-01-25

## 2018-01-24 RX ORDER — AZITHROMYCIN 500 MG/1
500 TABLET, FILM COATED ORAL
Qty: 0 | Refills: 0 | COMMUNITY

## 2018-01-24 RX ORDER — PIPERACILLIN AND TAZOBACTAM 4; .5 G/20ML; G/20ML
3.38 INJECTION, POWDER, LYOPHILIZED, FOR SOLUTION INTRAVENOUS
Qty: 0 | Refills: 0 | COMMUNITY

## 2018-01-24 RX ORDER — PANTOPRAZOLE SODIUM 20 MG/1
40 TABLET, DELAYED RELEASE ORAL
Qty: 0 | Refills: 0 | Status: DISCONTINUED | OUTPATIENT
Start: 2018-01-24 | End: 2018-01-25

## 2018-01-24 RX ORDER — PANTOPRAZOLE SODIUM 20 MG/1
1 TABLET, DELAYED RELEASE ORAL
Qty: 60 | Refills: 0 | OUTPATIENT
Start: 2018-01-24 | End: 2018-02-22

## 2018-01-24 RX ORDER — SODIUM CHLORIDE 9 MG/ML
1000 INJECTION INTRAMUSCULAR; INTRAVENOUS; SUBCUTANEOUS
Qty: 0 | Refills: 0 | Status: DISCONTINUED | OUTPATIENT
Start: 2018-01-24 | End: 2018-01-25

## 2018-01-24 RX ORDER — SODIUM CHLORIDE 9 MG/ML
1000 INJECTION, SOLUTION INTRAVENOUS
Qty: 0 | Refills: 0 | Status: DISCONTINUED | OUTPATIENT
Start: 2018-01-24 | End: 2018-01-24

## 2018-01-24 RX ADMIN — HEPARIN SODIUM 5000 UNIT(S): 5000 INJECTION INTRAVENOUS; SUBCUTANEOUS at 21:07

## 2018-01-24 RX ADMIN — AMLODIPINE BESYLATE 5 MILLIGRAM(S): 2.5 TABLET ORAL at 05:20

## 2018-01-24 RX ADMIN — HEPARIN SODIUM 5000 UNIT(S): 5000 INJECTION INTRAVENOUS; SUBCUTANEOUS at 05:21

## 2018-01-24 RX ADMIN — SODIUM CHLORIDE 50 MILLILITER(S): 9 INJECTION, SOLUTION INTRAVENOUS at 15:13

## 2018-01-24 RX ADMIN — PANTOPRAZOLE SODIUM 40 MILLIGRAM(S): 20 TABLET, DELAYED RELEASE ORAL at 05:21

## 2018-01-24 RX ADMIN — Medication 3 MILLILITER(S): at 22:17

## 2018-01-24 RX ADMIN — SODIUM CHLORIDE 3 MILLILITER(S): 9 INJECTION INTRAMUSCULAR; INTRAVENOUS; SUBCUTANEOUS at 10:20

## 2018-01-24 RX ADMIN — Medication 3 MILLILITER(S): at 10:22

## 2018-01-24 RX ADMIN — SODIUM CHLORIDE 3 MILLILITER(S): 9 INJECTION INTRAMUSCULAR; INTRAVENOUS; SUBCUTANEOUS at 10:21

## 2018-01-24 RX ADMIN — Medication 81 MILLIGRAM(S): at 11:22

## 2018-01-24 RX ADMIN — SODIUM CHLORIDE 3 MILLILITER(S): 9 INJECTION INTRAMUSCULAR; INTRAVENOUS; SUBCUTANEOUS at 06:06

## 2018-01-24 RX ADMIN — SODIUM CHLORIDE 3 MILLILITER(S): 9 INJECTION INTRAMUSCULAR; INTRAVENOUS; SUBCUTANEOUS at 22:32

## 2018-01-24 RX ADMIN — CARVEDILOL PHOSPHATE 6.25 MILLIGRAM(S): 80 CAPSULE, EXTENDED RELEASE ORAL at 17:34

## 2018-01-24 RX ADMIN — HEPARIN SODIUM 5000 UNIT(S): 5000 INJECTION INTRAVENOUS; SUBCUTANEOUS at 13:45

## 2018-01-24 RX ADMIN — CARVEDILOL PHOSPHATE 6.25 MILLIGRAM(S): 80 CAPSULE, EXTENDED RELEASE ORAL at 05:21

## 2018-01-24 RX ADMIN — SODIUM CHLORIDE 75 MILLILITER(S): 9 INJECTION INTRAMUSCULAR; INTRAVENOUS; SUBCUTANEOUS at 11:21

## 2018-01-24 RX ADMIN — PIPERACILLIN AND TAZOBACTAM 25 GRAM(S): 4; .5 INJECTION, POWDER, LYOPHILIZED, FOR SOLUTION INTRAVENOUS at 05:20

## 2018-01-24 RX ADMIN — PANTOPRAZOLE SODIUM 40 MILLIGRAM(S): 20 TABLET, DELAYED RELEASE ORAL at 15:59

## 2018-01-24 RX ADMIN — ATORVASTATIN CALCIUM 20 MILLIGRAM(S): 80 TABLET, FILM COATED ORAL at 21:08

## 2018-01-24 RX ADMIN — Medication 3 MILLILITER(S): at 06:03

## 2018-01-24 NOTE — DISCHARGE NOTE ADULT - PROVIDER TOKENS
TOKEN:'[id number]',TOKEN:'[id number]' TOKEN:'[id number]:'[id number]',FREE:[LAST:[Josr],FIRST:[Kailash],PHONE:[(   )    -],FAX:[(   )    -],ADDRESS:[PCP]]

## 2018-01-24 NOTE — PROGRESS NOTE ADULT - ASSESSMENT
HTN  stable     elevated trop  resolved  likely in setting of chika and sepsis/pna , normal stress test   cont to monitor clinically

## 2018-01-24 NOTE — DISCHARGE NOTE ADULT - MEDICATION SUMMARY - MEDICATIONS TO CHANGE
I will SWITCH the dose or number of times a day I take the medications listed below when I get home from the hospital:    amLODIPine 2.5 mg oral tablet  -- 1 tab(s) by mouth once a day    Protonix 40 mg oral delayed release tablet  -- 1 tab(s) by mouth once a day    carvedilol 3.125 mg oral tablet  -- 1 tab(s) by mouth 2 times a day

## 2018-01-24 NOTE — DISCHARGE NOTE ADULT - HOSPITAL COURSE
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 Multifactorial hypoxemia, likely contrast induced nephropathy.     Achalasia.    Outpatient EGD on 1/9 showing findings consistent with achalasia per Dr. Laird at [facility]  -Aspiration precautions.   -Speech & swallow - passed   -CINE Smooth tapering of the distal esophagus along with absent peristalsis is compatible with achalasia.   If not ready performed, further evaluation with manometry would likely be informative.  -Upper GI series - consistent with achalasia  -1/13 - mechanical soft diet  -1/22 - S/P POEMS with GI   -1/23 - started on post-procedure - Vanco/ Zosyn x 1 day ----> continue with Levaquin to complete 5 day course     Hypoxemia.    -Multifactorial due to Coronavirus bronchitis/ PNA as well as superimposed aspiration PNA/pneumonitis and mucus plugging as evidenced on CTA chest 1/4/18  -CTA negative for PE  -TTE with normal LV function - low suspicion for acute CHF  -Abx since 1/4 (Azithromycin/Zosyn)    -Zosyn x 2 days  -Stable, F/U outpatient PCP     JONY (acute kidney injury).    -Baseline around 1.29  -Cr appears to have acutely worsened 4 days after receiving IV contrast for CT angio of the chest (1/4/18), also Lasix regimen  -Renal US - chronic atrophic kidney   -Nephro Cx - contrast-induced nephropathy (1/4) - IVF, avoid nephrotoxins, BMP daily  -Stable, F/U outpatient PCP     Aspiration pneumonia, unspecified aspiration pneumonia type, unspecified laterality, unspecified part of lung.   -Abx since 1/4 (Azithromycin/Zosyn)    -Zosyn x 2 days  -Stable, F/U outpatient PCP     Elevated troponin.    -Trop elevated in [facility] 1/3 - 1/6; EKG abnormal with Tw inversion as above   -As per Cardio Cx from [facility], low-grade troponin release likely from ischemic demand, Patient not candidate for further diagnostic cardiac evaluation if asymptomatic.  -TTE (1/7) - EF= 50-55%  -1/13 - restart ASA  -Stable, F/U outpatient PCP     Cerebrovascular accident (CVA), unspecified mechanism.   -Possible vascular dementia from Hx of 2 prior strokes  -Fall, aspiration precautions.  -Statin, ASA  -Stable, F/U outpatient PCP     Essential hypertension.    -Norvasc, Coreg   -Stable, F/U outpatient PCP     Coronavirus infection.    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 Multifactorial hypoxemia, likely contrast induced nephropathy.     Achalasia.    Outpatient EGD on 1/9 showing findings consistent with achalasia per Dr. Laird at [facility]  -Aspiration precautions.   -Speech & swallow - passed   -CINE Smooth tapering of the distal esophagus along with absent peristalsis is compatible with achalasia.   If not ready performed, further evaluation with manometry would likely be informative.  -Upper GI series - consistent with achalasia  -1/13 - mechanical soft diet  -1/22 - S/P POEMS with GI   -1/23 - started on post-procedure - Vanco/ Zosyn x 1 day ----> continue with Levaquin to complete 5 day course   -f/u Dr. Martinez outpt     Hypoxemia.    -Multifactorial due to Coronavirus bronchitis/ PNA as well as superimposed aspiration PNA/pneumonitis and mucus plugging as evidenced on CTA chest 1/4/18  -CTA negative for PE  -TTE with normal LV function - low suspicion for acute CHF  -Abx since 1/4 (Azithromycin/Zosyn)    -Zosyn x 2 days  -Stable, F/U outpatient PCP     JONY (acute kidney injury).    -Baseline around 1.29  -Cr appears to have acutely worsened 4 days after receiving IV contrast for CT angio of the chest (1/4/18), also Lasix regimen  -Renal US - chronic atrophic kidney   -Nephro Cx - contrast-induced nephropathy (1/4) - IVF, avoid nephrotoxins, BMP daily  -Stable, F/U outpatient PCP     Aspiration pneumonia, unspecified aspiration pneumonia type, unspecified laterality, unspecified part of lung.   -Abx since 1/4 (Azithromycin/Zosyn)    -Zosyn x 2 days  -Stable, F/U outpatient PCP     Elevated troponin.    -Trop elevated in [facility] 1/3 - 1/6; EKG abnormal with Tw inversion as above   -As per Cardio Cx from [facility], low-grade troponin release likely from ischemic demand, Patient not candidate for further diagnostic cardiac evaluation if asymptomatic.  -TTE (1/7) - EF= 50-55%  -1/13 - restart ASA  -Stable, F/U outpatient PCP     Cerebrovascular accident (CVA), unspecified mechanism.   -Possible vascular dementia from Hx of 2 prior strokes  -Fall, aspiration precautions.  -Statin, ASA  -Stable, F/U outpatient PCP     Essential hypertension.    -Norvasc, Coreg   -Stable, F/U outpatient PCP     Coronavirus infection.    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 Multifactorial hypoxemia, likely contrast induced nephropathy.     Achalasia.    Outpatient EGD on 1/9 showing findings consistent with achalasia per Dr. Laird at [facility]  -Aspiration precautions.   -Speech & swallow - passed   -CINE Smooth tapering of the distal esophagus along with absent peristalsis is compatible with achalasia.   If not ready performed, further evaluation with manometry would likely be informative.  -Upper GI series - consistent with achalasia  -1/13 - mechanical soft diet  -1/22 - S/P POEMS with GI   -1/23 - started on post-procedure - Vanco/ Zosyn x 1 day ----> continue with Levaquin to complete 5 day course   -f/u Dr. Martinez outpt     Hypoxemia.    -Multifactorial due to Coronavirus bronchitis/ PNA as well as superimposed aspiration PNA/pneumonitis and mucus plugging as evidenced on CTA chest 1/4/18  -CTA negative for PE  -TTE with normal LV function - low suspicion for acute CHF  -Abx since 1/4 (Azithromycin/Zosyn)    -Zosyn x 2 days  -Stable, F/U outpatient PCP     JONY (acute kidney injury).    -Baseline around 1.29  -Cr appears to have acutely worsened 4 days after receiving IV contrast for CT angio of the chest (1/4/18), also Lasix regimen  -Renal US - chronic atrophic kidney   -Nephro Cx - contrast-induced nephropathy (1/4) - IVF, avoid nephrotoxins, BMP daily  -Stable, F/U outpatient PCP     Aspiration pneumonia, unspecified aspiration pneumonia type, unspecified laterality, unspecified part of lung.   -Abx since 1/4 (Azithromycin/Zosyn)    -Zosyn x 2 days  -Stable, F/U outpatient PCP     Elevated troponin.    -Trop elevated in [facility] 1/3 - 1/6; EKG abnormal with Tw inversion as above   -As per Cardio Cx from [facility], low-grade troponin release likely from ischemic demand, Patient not candidate for further diagnostic cardiac evaluation if asymptomatic.  -TTE (1/7) - EF= 50-55%  -1/13 - restart ASA  -Stable, F/U outpatient PCP     Cerebrovascular accident (CVA), unspecified mechanism.   -Possible vascular dementia from Hx of 2 prior strokes  -Fall, aspiration precautions.  -Statin, ASA  -Stable, F/U outpatient PCP     Essential hypertension.    -Norvasc, Coreg   -doses increased inpt   -Stable, F/U outpatient PCP     Coronavirus infection.    -Duonebs  -IVF   -Stable, F/U outpatient PCP     Discharge to home with home PT

## 2018-01-24 NOTE — DISCHARGE NOTE ADULT - MEDICATION SUMMARY - MEDICATIONS TO TAKE
I will START or STAY ON the medications listed below when I get home from the hospital:    Aspirin Enteric Coated 325 mg oral delayed release tablet  -- 1 tab(s) by mouth once a day  -- Indication: For Need for prophylactic measure    atorvastatin 20 mg oral tablet  -- 1 tab(s) by mouth once a day  -- Indication: For Hyperlipidemia    carvedilol 6.25 mg oral tablet  -- 1 tab(s) by mouth every 12 hours  -- Indication: For Essential hypertension    ipratropium-albuterol 0.5 mg-2.5 mg/3 mLinhalation solution  -- 3 milliliter(s) inhaled 4 times a day, As Needed  -- Indication: For respiratory assist    amLODIPine 5 mg oral tablet  -- 1 tab(s) by mouth once a day  -- Indication: For Essential hypertension    donepezil 10 mg oral tablet  -- 1 tab(s) by mouth once a day (at bedtime)  -- Indication: For Essential hypertension    baclofen 10 mg oral tablet  -- 1 tab(s) by mouth once a day  -- Indication: For muscle relaxant    pantoprazole 40 mg oral delayed release tablet  -- 1 tab(s) by mouth 2 times a day (before meals)  -- Indication: For gi protection    levoFLOXacin 250 mg oral tablet  -- 1 tab(s) by mouth every 24 hours  -- Indication: For Achalasia

## 2018-01-24 NOTE — DISCHARGE NOTE ADULT - CARE PROVIDER_API CALL
Gregory Magaña), Gastroenterology; Internal Medicine  [address] [place] [address]  Phone: [phone]  Fax: [phone]    Zheng Martinez), Gastroenterology; Internal Medicine  Division of Gastroenterology  [address] [place] [address]  Phone: [phone]  Fax: [phone] Gregory Magaña), Gastroenterology; Internal Medicine  [address] [place] [address]  Phone: [phone]  Fax: [phone]    Zheng Martinez), Gastroenterology; Internal Medicine  Division of Gastroenterology  [address] [place] [address]  Phone: [phone]  Fax: [phone]    Kailash Ovalles  Phone: (   )    -  Fax: (   )    -

## 2018-01-24 NOTE — PROGRESS NOTE ADULT - PROBLEM SELECTOR PLAN 2
Resolved, Pt  saturating 100% on RA  Multifactorial due to coronavirus bronchitis as well as superimposed aspiration PNA/pneumonitis and mucus plugging as evidenced on CTA chest 1/4/18  - Pt treated with Azithromycin and Zosyn at [facility] since 1/4 for possible superimposed aspiration pneumonia/CAP completed Zosyn 7 days; completed 5 days of Azithromycin   - Pt with improved lung exam s/p pulm toilet   - CTA negative for PE  - Pt does not clinically appear volume overloaded, TTE with normal LV function, stress neg

## 2018-01-24 NOTE — PROGRESS NOTE ADULT - ASSESSMENT
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

## 2018-01-24 NOTE — DISCHARGE NOTE ADULT - CARE PROVIDERS DIRECT ADDRESSES
[email or web address],DirectAddress_Unknown

## 2018-01-24 NOTE — DISCHARGE NOTE ADULT - SECONDARY DIAGNOSIS.
JONY (acute kidney injury) Essential hypertension Coronavirus infection Cerebrovascular accident (CVA), unspecified mechanism

## 2018-01-24 NOTE — DISCHARGE NOTE ADULT - PATIENT PORTAL LINK FT
“You can access the Follow[facility] Patient Portal, offered by [facility], by registering with the following website: [email or web address]”

## 2018-01-24 NOTE — DISCHARGE NOTE ADULT - PLAN OF CARE
S/P POEMS 1/22, advance diet as recommended You presented for further management of your dysphagia. You were seen by Gastroenterology and was confirmed to have achalasia. You had a POEMS procedure done 1/22. Continue with antibiotics and Pantoprazole as prescribed. As per Gastroenterology, continue with clear liquid diet x 1 day then change to full liquid diet x 3 days and then slowly advance. Follow up Gastroenterology (Dr. Zheng Martinez or Dr. Gregory Magaña) within 1 week for further management. You presented with abnormal kidney function tests. This may be due to poor oral intake or contrast-induced. You were given IV fluids with improvement. Follow up PCP within 1-2 weeks for repeat CBC/BMP for further management. Stable. Continue blood pressure medication regimen as directed. Monitor for any visual changes, headaches or dizziness.  Monitor blood pressure regularly.  Follow up with your PCP for further management for high blood pressure. You were found to be positive for Coronavirus infection. You were treated symptomatically with improvement. Follow up PCP within 1-2 weeks for further management. Stable. Continue medications as prescribed. Follow up PCP within 1-2 weeks for further management. You were positive for Coronavirus infection. You were treated symptomatically with improvement. Follow up PCP within 1-2 weeks for further management. You presented with abnormal kidney function tests. This may be due to poor oral intake or contrast-induced. You were given IV fluids with improvement. We switched your amlodipine benazapril to amlodipine due to renal functions. Please Follow up PCP Dr. Ovalles within 1-2 weeks for repeat CBC/BMP for further management.

## 2018-01-24 NOTE — DISCHARGE NOTE ADULT - CARE PLAN
Principal Discharge DX:	Achalasia  Goal:	S/P POEMS 1/22, advance diet as recommended  Assessment and plan of treatment:	You presented for further management of your dysphagia. You were seen by Gastroenterology and was confirmed to have achalasia. You had a POEMS procedure done 1/22. Continue with antibiotics and Pantoprazole as prescribed. As per Gastroenterology, continue with clear liquid diet x 1 day then change to full liquid diet x 3 days and then slowly advance. Follow up Gastroenterology (Dr. Zheng Martinez or Dr. Gregory Magaña) within 1 week for further management.  Secondary Diagnosis:	JONY (acute kidney injury)  Assessment and plan of treatment:	You presented with abnormal kidney function tests. This may be due to poor oral intake or contrast-induced. You were given IV fluids with improvement. Follow up PCP within 1-2 weeks for repeat CBC/BMP for further management.  Secondary Diagnosis:	Essential hypertension  Assessment and plan of treatment:	Stable. Continue blood pressure medication regimen as directed. Monitor for any visual changes, headaches or dizziness.  Monitor blood pressure regularly.  Follow up with your PCP for further management for high blood pressure.  Secondary Diagnosis:	Coronavirus infection  Assessment and plan of treatment:	You were found to be positive for Coronavirus infection. You were treated symptomatically with improvement. Follow up PCP within 1-2 weeks for further management.  Secondary Diagnosis:	Cerebrovascular accident (CVA), unspecified mechanism  Assessment and plan of treatment:	Stable. Continue medications as prescribed. Follow up PCP within 1-2 weeks for further management. Principal Discharge DX:	Achalasia  Goal:	S/P POEMS 1/22, advance diet as recommended  Assessment and plan of treatment:	You presented for further management of your dysphagia. You were seen by Gastroenterology and was confirmed to have achalasia. You had a POEMS procedure done 1/22. Continue with antibiotics and Pantoprazole as prescribed. As per Gastroenterology, continue with clear liquid diet x 1 day then change to full liquid diet x 3 days and then slowly advance. Follow up Gastroenterology (Dr. Zheng Martinez or Dr. Gregory Magaña) within 1 week for further management.  Secondary Diagnosis:	JONY (acute kidney injury)  Assessment and plan of treatment:	You presented with abnormal kidney function tests. This may be due to poor oral intake or contrast-induced. You were given IV fluids with improvement. Follow up PCP within 1-2 weeks for repeat CBC/BMP for further management.  Secondary Diagnosis:	Essential hypertension  Assessment and plan of treatment:	Stable. Continue blood pressure medication regimen as directed. Monitor for any visual changes, headaches or dizziness.  Monitor blood pressure regularly.  Follow up with your PCP for further management for high blood pressure.  Secondary Diagnosis:	Coronavirus infection  Assessment and plan of treatment:	You were positive for Coronavirus infection. You were treated symptomatically with improvement. Follow up PCP within 1-2 weeks for further management.  Secondary Diagnosis:	Cerebrovascular accident (CVA), unspecified mechanism  Assessment and plan of treatment:	Stable. Continue medications as prescribed. Follow up PCP within 1-2 weeks for further management. Principal Discharge DX:	Achalasia  Goal:	S/P POEMS 1/22, advance diet as recommended  Assessment and plan of treatment:	You presented for further management of your dysphagia. You were seen by Gastroenterology and was confirmed to have achalasia. You had a POEMS procedure done 1/22. Continue with antibiotics and Pantoprazole as prescribed. As per Gastroenterology, continue with clear liquid diet x 1 day then change to full liquid diet x 3 days and then slowly advance. Follow up Gastroenterology (Dr. Zheng Martinez or Dr. Gregory Magaña) within 1 week for further management.  Secondary Diagnosis:	JONY (acute kidney injury)  Assessment and plan of treatment:	You presented with abnormal kidney function tests. This may be due to poor oral intake or contrast-induced. You were given IV fluids with improvement. We switched your amlodipine benazapril to amlodipine due to renal functions. Please Follow up PCP Dr. Ovalles within 1-2 weeks for repeat CBC/BMP for further management.  Secondary Diagnosis:	Essential hypertension  Assessment and plan of treatment:	Stable. Continue blood pressure medication regimen as directed. Monitor for any visual changes, headaches or dizziness.  Monitor blood pressure regularly.  Follow up with your PCP for further management for high blood pressure.  Secondary Diagnosis:	Coronavirus infection  Assessment and plan of treatment:	You were positive for Coronavirus infection. You were treated symptomatically with improvement. Follow up PCP within 1-2 weeks for further management.  Secondary Diagnosis:	Cerebrovascular accident (CVA), unspecified mechanism  Assessment and plan of treatment:	Stable. Continue medications as prescribed. Follow up PCP within 1-2 weeks for further management.

## 2018-01-24 NOTE — DISCHARGE NOTE ADULT - MEDICATION SUMMARY - MEDICATIONS TO STOP TAKING
I will STOP taking the medications listed below when I get home from the hospital:    amlodipine-benazepril 5 mg-10 mg oral capsule  -- 1 cap(s) by mouth once a day    Zosyn  -- 3.375 gram(s) intravenously every 12 hours    Lovenox 30 mg/0.3 mL injectable solution  -- 30 milligram(s) subcutaneous once a day    azithromycin 500 mg intravenous injection  -- 500 milligram(s) intravenously once a day

## 2018-01-25 VITALS
SYSTOLIC BLOOD PRESSURE: 142 MMHG | TEMPERATURE: 98 F | HEART RATE: 81 BPM | DIASTOLIC BLOOD PRESSURE: 81 MMHG | RESPIRATION RATE: 18 BRPM | OXYGEN SATURATION: 100 %

## 2018-01-25 PROBLEM — Z00.00 ENCOUNTER FOR PREVENTIVE HEALTH EXAMINATION: Status: ACTIVE | Noted: 2018-01-25

## 2018-01-25 LAB
BUN SERPL-MCNC: 12 MG/DL — SIGNIFICANT CHANGE UP (ref 7–23)
CALCIUM SERPL-MCNC: 8.2 MG/DL — LOW (ref 8.4–10.5)
CHLORIDE SERPL-SCNC: 102 MMOL/L — SIGNIFICANT CHANGE UP (ref 98–107)
CO2 SERPL-SCNC: 25 MMOL/L — SIGNIFICANT CHANGE UP (ref 22–31)
CREAT SERPL-MCNC: 1.78 MG/DL — HIGH (ref 0.5–1.3)
GLUCOSE SERPL-MCNC: 87 MG/DL — SIGNIFICANT CHANGE UP (ref 70–99)
HCT VFR BLD CALC: 31.8 % — LOW (ref 34.5–45)
HGB BLD-MCNC: 10.4 G/DL — LOW (ref 11.5–15.5)
MAGNESIUM SERPL-MCNC: 1.8 MG/DL — SIGNIFICANT CHANGE UP (ref 1.6–2.6)
MCHC RBC-ENTMCNC: 29 PG — SIGNIFICANT CHANGE UP (ref 27–34)
MCHC RBC-ENTMCNC: 32.7 % — SIGNIFICANT CHANGE UP (ref 32–36)
MCV RBC AUTO: 88.6 FL — SIGNIFICANT CHANGE UP (ref 80–100)
NRBC # FLD: 0 — SIGNIFICANT CHANGE UP
PHOSPHATE SERPL-MCNC: 2.6 MG/DL — SIGNIFICANT CHANGE UP (ref 2.5–4.5)
PLATELET # BLD AUTO: 223 K/UL — SIGNIFICANT CHANGE UP (ref 150–400)
PMV BLD: 10.3 FL — SIGNIFICANT CHANGE UP (ref 7–13)
POTASSIUM SERPL-MCNC: 4 MMOL/L — SIGNIFICANT CHANGE UP (ref 3.5–5.3)
POTASSIUM SERPL-SCNC: 4 MMOL/L — SIGNIFICANT CHANGE UP (ref 3.5–5.3)
RBC # BLD: 3.59 M/UL — LOW (ref 3.8–5.2)
RBC # FLD: 13.3 % — SIGNIFICANT CHANGE UP (ref 10.3–14.5)
SODIUM SERPL-SCNC: 137 MMOL/L — SIGNIFICANT CHANGE UP (ref 135–145)
WBC # BLD: 8.88 K/UL — SIGNIFICANT CHANGE UP (ref 3.8–10.5)
WBC # FLD AUTO: 8.88 K/UL — SIGNIFICANT CHANGE UP (ref 3.8–10.5)

## 2018-01-25 PROCEDURE — 99239 HOSP IP/OBS DSCHRG MGMT >30: CPT

## 2018-01-25 RX ORDER — ENOXAPARIN SODIUM 100 MG/ML
30 INJECTION SUBCUTANEOUS
Qty: 0 | Refills: 0 | COMMUNITY

## 2018-01-25 RX ORDER — PANTOPRAZOLE SODIUM 20 MG/1
1 TABLET, DELAYED RELEASE ORAL
Qty: 60 | Refills: 0 | OUTPATIENT
Start: 2018-01-25 | End: 2018-02-23

## 2018-01-25 RX ORDER — CARVEDILOL PHOSPHATE 80 MG/1
1 CAPSULE, EXTENDED RELEASE ORAL
Qty: 0 | Refills: 0 | COMMUNITY

## 2018-01-25 RX ORDER — AMLODIPINE BESYLATE 2.5 MG/1
1 TABLET ORAL
Qty: 30 | Refills: 0
Start: 2018-01-25 | End: 2018-02-23

## 2018-01-25 RX ORDER — CARVEDILOL PHOSPHATE 80 MG/1
1 CAPSULE, EXTENDED RELEASE ORAL
Qty: 60 | Refills: 0
Start: 2018-01-25 | End: 2018-02-23

## 2018-01-25 RX ORDER — PANTOPRAZOLE SODIUM 20 MG/1
1 TABLET, DELAYED RELEASE ORAL
Qty: 0 | Refills: 0 | DISCHARGE
Start: 2018-01-25 | End: 2018-02-23

## 2018-01-25 RX ORDER — AMLODIPINE BESYLATE 2.5 MG/1
1 TABLET ORAL
Qty: 0 | Refills: 0 | COMMUNITY

## 2018-01-25 RX ADMIN — HEPARIN SODIUM 5000 UNIT(S): 5000 INJECTION INTRAVENOUS; SUBCUTANEOUS at 05:29

## 2018-01-25 RX ADMIN — PANTOPRAZOLE SODIUM 40 MILLIGRAM(S): 20 TABLET, DELAYED RELEASE ORAL at 16:07

## 2018-01-25 RX ADMIN — CARVEDILOL PHOSPHATE 6.25 MILLIGRAM(S): 80 CAPSULE, EXTENDED RELEASE ORAL at 05:29

## 2018-01-25 RX ADMIN — Medication 81 MILLIGRAM(S): at 11:57

## 2018-01-25 RX ADMIN — Medication 3 MILLILITER(S): at 10:09

## 2018-01-25 RX ADMIN — Medication 3 MILLILITER(S): at 04:15

## 2018-01-25 RX ADMIN — SODIUM CHLORIDE 3 MILLILITER(S): 9 INJECTION INTRAMUSCULAR; INTRAVENOUS; SUBCUTANEOUS at 10:07

## 2018-01-25 RX ADMIN — SODIUM CHLORIDE 3 MILLILITER(S): 9 INJECTION INTRAMUSCULAR; INTRAVENOUS; SUBCUTANEOUS at 04:29

## 2018-01-25 RX ADMIN — AMLODIPINE BESYLATE 5 MILLIGRAM(S): 2.5 TABLET ORAL at 05:28

## 2018-01-25 RX ADMIN — PANTOPRAZOLE SODIUM 40 MILLIGRAM(S): 20 TABLET, DELAYED RELEASE ORAL at 05:29

## 2018-01-25 RX ADMIN — HEPARIN SODIUM 5000 UNIT(S): 5000 INJECTION INTRAVENOUS; SUBCUTANEOUS at 11:57

## 2018-01-25 NOTE — PROGRESS NOTE ADULT - PROBLEM SELECTOR PROBLEM 1
Achalasia
JONY (acute kidney injury)
Achalasia

## 2018-01-25 NOTE — PROGRESS NOTE ADULT - PROBLEM SELECTOR PROBLEM 3
JONY (acute kidney injury)

## 2018-01-25 NOTE — PROGRESS NOTE ADULT - PROBLEM SELECTOR PROBLEM 7
Coronavirus infection

## 2018-01-25 NOTE — PROGRESS NOTE ADULT - PROBLEM SELECTOR PLAN 4
Trop elevated in [facility] 1/3 - 1/6; EKG abnormal with Tw inv per OSH, Pt was evaluated by Cardiology at [facility], per the c/s note, 1/8, Low-grade troponin release likely from ischemic demand, Patient not candidate for further diagnostic cardiac evaluation if asymptomatic.  -TTE, 1/7/18, no acute findings, EF= 50-55%, Per cards stress without ischemia  - c/w ASA  - NST without e/o ischemia, appreciate cards input
Trop elevated in [facility] 1/3 - 1/6; EKG abnormal with Tw inv per OSH, Pt was evaluated by Cardiology at [facility], per the c/s note, 1/8, Low-grade troponin release likely from ischemic demand, Patient not candidate for further diagnostic cardiac evaluation if asymptomatic.  -TTE, 1/7/18, no acute findings, EF= 50-55%  -Restart ASA  -OP cards fu
Trop elevated in [facility] 1/3 - 1/6; EKG abnormal with Tw inv per OSH, Pt was evaluated by Cardiology at [facility], per the c/s note, 1/8, Low-grade troponin release likely from ischemic demand, Patient not candidate for further diagnostic cardiac evaluation if asymptomatic.  -TTE, 1/7/18, no acute findings, EF= 50-55%  -Restart ASA  -OP cards fu
Trop elevated in [facility] 1/3 - 1/6; EKG abnormal with Tw inv per OSH, Pt was evaluated by Cardiology at [facility], per the c/s note, 1/8, Low-grade troponin release likely from ischemic demand, Patient not candidate for further diagnostic cardiac evaluation if asymptomatic.  -TTE, 1/7/18, no acute findings, EF= 50-55%  -Restart ASA  - fu cards clearance
Trop elevated in [facility] 1/3 - 1/6; EKG abnormal with Tw inv per OSH, Pt was evaluated by Cardiology at [facility], per the c/s note, 1/8, Low-grade troponin release likely from ischemic demand, Patient not candidate for further diagnostic cardiac evaluation if asymptomatic.  -TTE, 1/7/18, no acute findings, EF= 50-55%  -Restart ASA if no objections from GI team  -OP cards fu
Trop elevated in [facility] 1/3 - 1/6; EKG abnormal with Tw inv per OSH, Pt was evaluated by Cardiology at [facility], per the c/s note, 1/8, Low-grade troponin release likely from ischemic demand, Patient not candidate for further diagnostic cardiac evaluation if asymptomatic.  -TTE, 1/7/18, no acute findings, EF= 50-55%, NST without ischemia  - c/w ASA,coreg, Lipitor
Trop elevated in [facility] 1/3 - 1/6; EKG abnormal with Tw inv per OSH, Pt was evaluated by Cardiology at [facility], per the c/s note, 1/8, Low-grade troponin release likely from ischemic demand, Patient not candidate for further diagnostic cardiac evaluation if asymptomatic.  -TTE, 1/7/18, no acute findings, EF= 50-55%, NST without ischemia  - c/w ASA
Trop elevated in [facility] 1/3 - 1/6; EKG abnormal with Tw inv per OSH, Pt was evaluated by Cardiology at [facility], per the c/s note, 1/8, Low-grade troponin release likely from ischemic demand, Patient not candidate for further diagnostic cardiac evaluation if asymptomatic.  -TTE, 1/7/18, no acute findings, EF= 50-55%, NST without ischemia  - c/w ASA
Trop elevated in [facility] 1/3 - 1/6; EKG abnormal with Tw inv per OSH, Pt was evaluated by Cardiology at [facility], per the c/s note, 1/8, Low-grade troponin release likely from ischemic demand, Patient not candidate for further diagnostic cardiac evaluation if asymptomatic.  -TTE, 1/7/18, no acute findings, EF= 50-55%, NST without ischemia  - c/w ASA
Trop elevated in [facility] 1/3 - 1/6; EKG abnormal with Tw inv per OSH, Pt was evaluated by Cardiology at [facility], per the c/s note, 1/8, Low-grade troponin release likely from ischemic demand, Patient not candidate for further diagnostic cardiac evaluation if asymptomatic.  -TTE, 1/7/18, no acute findings, EF= 50-55%, NST without ischemia  - c/w ASA
Trop elevated in [facility] 1/3 - 1/6; EKG abnormal with Tw inv per OSH, Pt was evaluated by Cardiology at [facility], per the c/s note, 1/8, Low-grade troponin release likely from ischemic demand, Patient not candidate for further diagnostic cardiac evaluation if asymptomatic.  -TTE, 1/7/18, no acute findings, EF= 50-55%, Per cards stress without ischemia  - c/w ASA
Trop elevated in [facility] 1/3 - 1/6; EKG abnormal with Tw inv per OSH, Pt was evaluated by Cardiology at [facility], per the c/s note, 1/8, Low-grade troponin release likely from ischemic demand, Patient not candidate for further diagnostic cardiac evaluation if asymptomatic.  -TTE, 1/7/18, no acute findings, EF= 50-55%  -Restart ASA if no objections from GI team  -OP cards fu
Trop elevated in [facility] 1/3 - 1/6; EKG abnormal with Tw inv per OSH, Pt was evaluated by Cardiology at [facility], per the c/s note, 1/8, Low-grade troponin release likely from ischemic demand, Patient not candidate for further diagnostic cardiac evaluation if asymptomatic.  -TTE, 1/7/18, no acute findings, EF= 50-55%  -Restart ASA  -OP cards fu
Trop elevated in [facility] 1/3 - 1/6; EKG abnormal with Tw inv per OSH, Pt was evaluated by Cardiology at [facility], per the c/s note, 1/8, Low-grade troponin release likely from ischemic demand, Patient not candidate for further diagnostic cardiac evaluation if asymptomatic.  -TTE, 1/7/18, no acute findings, EF= 50-55%  -Restart ASA if no objections from GI team  -OP cards fu
Trop elevated in [facility] 1/3 - 1/6; EKG abnormal with Tw inv per OSH, Pt was evaluated by Cardiology at [facility], per the c/s note, 1/8, Low-grade troponin release likely from ischemic demand, Patient not candidate for further diagnostic cardiac evaluation if asymptomatic.  -TTE, 1/7/18, no acute findings, EF= 50-55%  -Restart ASA  -OP cards fu

## 2018-01-25 NOTE — PROGRESS NOTE ADULT - PROBLEM SELECTOR PLAN 8
DVT ppx: HSQ TID
DVT ppx: HSQ TID
DVT ppx: SRI MINA, d/w GI fellow and do not have to hold for procedure    Ankur Fox M.D.  Medicine Attending  [phone] ([place]) [phone] ([facility])
DVT ppx: HSQ TID
DVT ppx: HSQ TID  PT- home with home PT
DVT ppx: HSQ TID  PT- home with home PT
DVT ppx: HSQ TID  PT- home with home PT  DC Planning 40 mins
DVT ppx: SCDs for now pending possible  invasive GI procedure tomorrow  Diet: NPO for now
DVT ppx: SCDs for now pending possible  invasive GI procedure tomorrow  Diet: NPO for now  Dispo: PT evaluation pending
DVT ppx: SCDs for now pending possible invasive GI procedure, will d/w GI fellow    Ankur Fox M.D.  Medicine Attending  [phone] ([place]) [phone] ([facility])
DVT ppx: SRI MINA, d/w GI fellow and do not have to hold for procedure    Ankur Fox M.D.  Medicine Attending  [phone] ([place]) [phone] ([facility])

## 2018-01-25 NOTE — PROGRESS NOTE ADULT - ASSESSMENT
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

## 2018-01-25 NOTE — PROGRESS NOTE ADULT - PROBLEM SELECTOR PROBLEM 5
Cerebrovascular accident (CVA), unspecified mechanism

## 2018-01-25 NOTE — PROGRESS NOTE ADULT - PROBLEM SELECTOR PLAN 3
- mild inc in setting of NPO  - gentle IVF  - Avoid nephrotoxic agents  - Renally dose medications  - trend BMP daily
Creatinine stable, was suspected secondary DELFIN  - Avoid nephrotoxic agents  - Renally dose medications  -trend BMP daily
- Creatinine 1.78 , on IVF  now near baseline CDK3  s/p  IVF 1/2 NS at 50/hr   - Avoid nephrotoxic agents  - Renally dose medications  - trend BMP daily  Encourage PO liquids
- Creatinine 1.8, on IVF  now near baseline CDK3  - Avoid nephrotoxic agents  - Renally dose medications  - trend BMP daily
- Creatinine 1.8, on IVF  now near baseline CDK3  - Avoid nephrotoxic agents  - Renally dose medications  - trend BMP daily
- Creatinine 1.8, on IVF  now near baseline CDK3  On IVF 1/2 NS at 50/hr   - Avoid nephrotoxic agents  - Renally dose medications  - trend BMP daily
- mild inc in setting of NPO, improved with IVF, now near baseline CDK3  - Avoid nephrotoxic agents  - Renally dose medications  - trend BMP daily
Creatinine at baseline, was suspected secondary DELFIN  - Avoid nephrotoxic agents  - Renally dose medications  - trend BMP daily
Creatinine at baseline, was suspected secondary DELFIN  - Avoid nephrotoxic agents  - Renally dose medications  - trend BMP daily
Creatinine elevated to 2.6, up from baseline of 1.29 on admission to [facility] contrast induced nephropathy; renal US with right kidney atrophy  - Creatinine  improved with IVF, will continue gentle hydration  - Monitor creatinine daily  - Avoid nephrotoxic agents  - Renally dose medications
Creatinine elevated to 2.6, up from baseline of 1.29 on admission to [facility] contrast induced nephropathy; renal US with right kidney atrophy  - Creatinine  improved with IVF, will continue gentle hydration  - Monitor creatinine daily  - Avoid nephrotoxic agents  - Renally dose medications
Creatinine improving, was suspected secondary DELFIN  - Avoid nephrotoxic agents  - Renally dose medications  -trend BMP daily
Creatinine now downtrending, likely contrast induced nephropathy, nephrology recommendations appreciated  - Avoid nephrotoxic agents  - Renally dose medications
Creatininenow downtrending, likely contrast induced nephropathy, nephrology recommendations appreciated  - Avoid nephrotoxic agents  - Renally dose medications
Creatinine improving, was suspected secondary DELFIN  - Avoid nephrotoxic agents  - Renally dose medications  -trend BMP daily

## 2018-01-25 NOTE — PROGRESS NOTE ADULT - PROBLEM SELECTOR PLAN 5
Pt with possible vascular dementia from history of 2 prior strokes  - Atorvastatin and ASA   -Fall, aspiration precautions
Pt with possible vascular dementia from history of 2 prior strokes  - Atorvastatin and ASA   -Fall, aspiration precautions
Pt with probably vascular dementia from history of 2 prior strokes  - Atorvastatin and ASA   - Fall, aspiration precautions
Pt with possible vascular dementia from history of 2 prior strokes  - Atorvastatin and ASA   -Fall, aspiration precautions
Pt with possible vascular dementia from history of 2 prior strokes  - Atorvastatin and ASA   -Fall, aspiration precautions
Pt with possible vascular dementia from history of 2 prior strokes  - Holding atorvastatin and ASA for now while NPO  -Restart ASA if no objections from GI team  -Fall, aspiration precautions
Pt with possible vascular dementia from history of 2 prior strokes  - Holding atorvastatin and ASA for now while NPO  -Restart ASA if no objections from GI team  -Fall, aspiration precautions
Pt with possible vascular dementia from history of 2 prior strokes  - Holding atorvastatin and ASA for now while NPO  -Restart ASA if no objections from GI team, will d/w fellow  -Fall, aspiration precautions
Pt with probably vascular dementia from history of 2 prior strokes  - Atorvastatin and ASA   - Fall, aspiration precautions

## 2018-01-25 NOTE — PROGRESS NOTE ADULT - PROBLEM SELECTOR PROBLEM 4
Elevated troponin

## 2018-01-25 NOTE — PROGRESS NOTE ADULT - PROVIDER SPECIALTY LIST ADULT
Cardiology
Gastroenterology
Hospitalist
Internal Medicine
Nephrology
Cardiology
Cardiology
Hospitalist
Hospitalist

## 2018-01-25 NOTE — PROGRESS NOTE ADULT - PROBLEM SELECTOR PROBLEM 2
Hypoxemia
Hypoxemia
Hypernatremia
Hypoxemia

## 2018-01-25 NOTE — PROGRESS NOTE ADULT - SUBJECTIVE AND OBJECTIVE BOX
Subjective: Patient seen and examined. No new events except as noted.     SUBJECTIVE/ROS:  No chest pain, dyspnea, palpitation, or dizziness.       MEDICATIONS:  MEDICATIONS  (STANDING):  ALBUTerol/ipratropium for Nebulization 3 milliLiter(s) Nebulizer every 6 hours  amLODIPine   Tablet 5 milliGRAM(s) Oral daily  aspirin  chewable 81 milliGRAM(s) Oral daily  atorvastatin 20 milliGRAM(s) Oral at bedtime  carvedilol 6.25 milliGRAM(s) Oral every 12 hours  heparin  Injectable 5000 Unit(s) SubCutaneous every 8 hours  levoFLOXacin  Tablet 500 milliGRAM(s) Oral every 24 hours  pantoprazole  Injectable 40 milliGRAM(s) IV Push two times a day  sodium chloride 0.9%. 1000 milliLiter(s) (75 mL/Hr) IV Continuous <Continuous>  sodium chloride 3%  Inhalation 3 milliLiter(s) Inhalation every 6 hours      PHYSICAL EXAM:  T(C): 36.3 (01-24-18 @ 04:54), Max: 36.6 (01-23-18 @ 14:15)  HR: 70 (01-24-18 @ 10:22) (63 - 70)  BP: 126/61 (01-24-18 @ 04:54) (126/61 - 130/53)  RR: 17 (01-24-18 @ 04:54) (17 - 18)  SpO2: 97% (01-24-18 @ 10:22) (97% - 100%)  Wt(kg): --  I&O's Summary        JVP: Normal  Neck: supple  Lung: clear   CV: S1 S2 , Murmur:  Abd: soft  Ext: No edema  neuro: Awake / alert  Psych: flat affect  Skin: normal       LABS/DATA:    CARDIAC MARKERS:                                10.9   9.33  )-----------( 236      ( 24 Jan 2018 05:00 )             32.5     01-24    137  |  99  |  17  ----------------------------<  86  4.0   |  24  |  1.92<H>    Ca    8.8      24 Jan 2018 05:00  Phos  3.4     01-24  Mg     2.2     01-24      proBNP:   Lipid Profile:   HgA1c:   TSH:     TELE:  EKG:
Subjective: Patient seen and examined. No new events except as noted.     SUBJECTIVE/ROS:  No chest pain, dyspnea, palpitation, or dizziness.       MEDICATIONS:  MEDICATIONS  (STANDING):  ALBUTerol/ipratropium for Nebulization 3 milliLiter(s) Nebulizer every 6 hours  amLODIPine   Tablet 5 milliGRAM(s) Oral daily  aspirin  chewable 81 milliGRAM(s) Oral daily  atorvastatin 20 milliGRAM(s) Oral at bedtime  carvedilol 6.25 milliGRAM(s) Oral every 12 hours  heparin  Injectable 5000 Unit(s) SubCutaneous every 8 hours  pantoprazole  Injectable 40 milliGRAM(s) IV Push two times a day  sodium chloride 0.9%. 1000 milliLiter(s) (75 mL/Hr) IV Continuous <Continuous>  sodium chloride 0.9%. 1000 milliLiter(s) (75 mL/Hr) IV Continuous <Continuous>  sodium chloride 3%  Inhalation 3 milliLiter(s) Inhalation every 6 hours      PHYSICAL EXAM:  T(C): 36.8 (01-20-18 @ 05:06), Max: 37 (01-19-18 @ 21:16)  HR: 71 (01-20-18 @ 05:06) (65 - 77)  BP: 134/59 (01-20-18 @ 05:06) (133/56 - 153/72)  RR: 18 (01-20-18 @ 05:06) (18 - 18)  SpO2: 98% (01-20-18 @ 05:06) (96% - 99%)  Wt(kg): --  I&O's Summary    Height (cm): 160.02 (01-19 @ 12:48)  Weight (kg): 54.6 (01-19 @ 12:48)  BMI (kg/m2): 21.3 (01-19 @ 12:48)  BSA (m2): 1.56 (01-19 @ 12:48)  JVP: Normal  Neck: supple  Lung: clear   CV: S1 S2 , Murmur:  Abd: soft  Ext: No edema  neuro: Awake / alert  Psych: flat affect  Skin: normal       LABS/DATA:    CARDIAC MARKERS:  CARDIAC MARKERS ( 18 Jan 2018 17:21 )  x     / < 0.06 ng/mL / 41 u/L / 1.45 ng/mL / x      CARDIAC MARKERS ( 18 Jan 2018 11:36 )  x     / < 0.06 ng/mL / 31 u/L / 1.30 ng/mL / x                                    11.7   7.91  )-----------( 279      ( 20 Jan 2018 10:17 )             35.4     01-20    139  |  104  |  12  ----------------------------<  92  3.9   |  23  |  2.00<H>    Ca    8.7      20 Jan 2018 10:17  Phos  3.3     01-20  Mg     1.4     01-20      proBNP:   Lipid Profile:   HgA1c:   TSH:     TELE:  EKG:
ADVANCED ENDOSCOPY PROGRESS NOTE      Chief Complaint:  Patient is a 77y old  Female who presents with a chief complaint of Transfer from [place] for achalasia management (10 Ousmane 2018 20:19)      Interval Events: No events overnight. Speech/swallow evaluation is pending.    Allergies:  No Known Allergies      Hospital Medications:  ALBUTerol/ipratropium for Nebulization 3 milliLiter(s) Nebulizer every 6 hours  atorvastatin 20 milliGRAM(s) Oral at bedtime  dextrose 5%. 1000 milliLiter(s) IV Continuous <Continuous>  metoprolol    tartrate Injectable 5 milliGRAM(s) IV Push every 6 hours  pantoprazole  Injectable 40 milliGRAM(s) IV Push daily  piperacillin/tazobactam IVPB. 3.375 Gram(s) IV Intermittent every 12 hours  sodium chloride 3%  Inhalation 3 milliLiter(s) Inhalation every 6 hours      PMHX/PSHX:  Depression  Alzheimers disease  MI, old  CVA (cerebral vascular accident)  HTN (hypertension)  No pertinent past medical history  No significant past surgical history      Family history:  Family history of essential hypertension (Child)  Family history of stroke (Mother)  No pertinent family history in first degree relatives      ROS:   Unabel to obtain full ROS given dementia      PHYSICAL EXAM:   Vital Signs:  Vital Signs Last 24 Hrs  T(C): 36.4 (12 Jan 2018 04:50), Max: 37.1 (11 Jan 2018 23:20)  T(F): 97.6 (12 Jan 2018 04:50), Max: 98.7 (11 Jan 2018 23:20)  HR: 80 (12 Jan 2018 06:28) (60 - 80)  BP: 172/60 (12 Jan 2018 06:28) (129/58 - 199/63)  BP(mean): --  RR: 18 (12 Jan 2018 04:50) (17 - 20)  SpO2: 100% (12 Jan 2018 04:53) (97% - 100%)  Daily     Daily     GENERAL:  Appears stated age, well-groomed, well-nourished, no distress  HEENT:  NC/AT,  conjunctivae clear, sclera -anicteric  CHEST: Poor effort, rhonchi b/l  HEART:  Regular rhythm, S1, S2, no murmur  ABDOMEN:  Soft, non-tender, non-distended, normoactive bowel sounds  EXTREMITIES:  no cyanosis,clubbing or edema  SKIN:  No rash/erythema  NEURO:  Alert, oriented x 0-1      LABS:                        12.2   7.69  )-----------( 240      ( 11 Jan 2018 06:20 )             37.9     01-12    147<H>  |  106  |  22  ----------------------------<  81  3.6   |  26  |  1.62<H>    Ca    8.6      12 Jan 2018 05:08  Phos  3.2     01-11  Mg     2.2     01-11        PT/INR - ( 11 Jan 2018 06:20 )   PT: 11.7 SEC;   INR: 1.05                  Imaging:
Chief Complaint:  Patient is a 77y old  Female who presents with a chief complaint of Achalasia (24 Jan 2018 13:32)      Interval Events: Afebrile. N abdominal pain/nausea/vomiting. Tolerating clears.     Allergies:  No Known Allergies      Home Medications:    Hospital Medications:  ALBUTerol/ipratropium for Nebulization 3 milliLiter(s) Nebulizer every 6 hours  amLODIPine   Tablet 5 milliGRAM(s) Oral daily  aspirin  chewable 81 milliGRAM(s) Oral daily  atorvastatin 20 milliGRAM(s) Oral at bedtime  carvedilol 6.25 milliGRAM(s) Oral every 12 hours  heparin  Injectable 5000 Unit(s) SubCutaneous every 8 hours  levoFLOXacin  Tablet 250 milliGRAM(s) Oral every 24 hours  pantoprazole    Tablet 40 milliGRAM(s) Oral two times a day before meals  sodium chloride 0.45%. 1000 milliLiter(s) IV Continuous <Continuous>  sodium chloride 0.9%. 1000 milliLiter(s) IV Continuous <Continuous>  sodium chloride 0.9%. 1000 milliLiter(s) IV Continuous <Continuous>  sodium chloride 3%  Inhalation 3 milliLiter(s) Inhalation every 6 hours      PMHX/PSHX:  Depression  Alzheimers disease  MI, old  CVA (cerebral vascular accident)  HTN (hypertension)  No pertinent past medical history  No significant past surgical history      Family history:  Family history of essential hypertension (Child)  Family history of stroke (Mother)  No pertinent family history in first degree relatives      ROS: as per HPI       PHYSICAL EXAM:   Vital Signs:  Vital Signs Last 24 Hrs  T(C): 36.8 (25 Jan 2018 04:39), Max: 36.8 (24 Jan 2018 20:48)  T(F): 98.3 (25 Jan 2018 04:39), Max: 98.3 (25 Jan 2018 04:39)  HR: 68 (25 Jan 2018 04:39) (60 - 73)  BP: 119/61 (25 Jan 2018 04:39) (108/57 - 142/53)  BP(mean): --  RR: 18 (25 Jan 2018 04:39) (17 - 18)  SpO2: 100% (25 Jan 2018 04:39) (97% - 100%)  Daily     Daily     GENERAL:  Appears stated age, well-groomed, well-nourished, no distress  HEENT:  NC/AT,  conjunctivae clear and pink, no thyromegaly, nodules, adenopathy, no JVD, sclera -anicteric  CHEST:  Full & symmetric excursion, no increased effort, breath sounds clear  HEART:  Regular rhythm, S1, S2, no murmur/rub/S3/S4, no abdominal bruit, no edema  ABDOMEN:  Soft, non-tender, non-distended, normoactive bowel sounds,  no masses ,no hepato-splenomegaly, no signs of chronic liver disease  EXTEREMITIES:  no cyanosis,clubbing or edema  SKIN:  No rash/erythema/ecchymoses/petechiae/wounds/abscess/warm/dry  NEURO:  Alert    LABS:                        10.4   8.88  )-----------( 223      ( 25 Jan 2018 05:01 )             31.8     01-25    137  |  102  |  12  ----------------------------<  87  4.0   |  25  |  1.78<H>    Ca    8.2<L>      25 Jan 2018 05:01  Phos  2.6     01-25  Mg     1.8     01-25                Imaging:
Chief Complaint:  Patient is a 77y old  Female who presents with a chief complaint of Transfer from [place] for achalasia management (10 Ousmane 2018 20:19)      Interval Events: Afebrile. N abdominal pain/nausea/vomiting. Tolerating clears.     Allergies:  No Known Allergies      Home Medications:    Hospital Medications:  ALBUTerol/ipratropium for Nebulization 3 milliLiter(s) Nebulizer every 6 hours  amLODIPine   Tablet 5 milliGRAM(s) Oral daily  aspirin  chewable 81 milliGRAM(s) Oral daily  atorvastatin 20 milliGRAM(s) Oral at bedtime  carvedilol 6.25 milliGRAM(s) Oral every 12 hours  heparin  Injectable 5000 Unit(s) SubCutaneous every 8 hours  pantoprazole  Injectable 40 milliGRAM(s) IV Push two times a day  piperacillin/tazobactam IVPB. 3.375 Gram(s) IV Intermittent every 12 hours  sodium chloride 0.9%. 1000 milliLiter(s) IV Continuous <Continuous>  sodium chloride 0.9%. 1000 milliLiter(s) IV Continuous <Continuous>  sodium chloride 3%  Inhalation 3 milliLiter(s) Inhalation every 6 hours  vancomycin  IVPB 750 milliGRAM(s) IV Intermittent every 24 hours      PMHX/PSHX:  Depression  Alzheimers disease  MI, old  CVA (cerebral vascular accident)  HTN (hypertension)  No pertinent past medical history  No significant past surgical history      Family history:  Family history of essential hypertension (Child)  Family history of stroke (Mother)  No pertinent family history in first degree relatives      ROS: as per HPI       PHYSICAL EXAM:   Vital Signs:  Vital Signs Last 24 Hrs  T(C): 36.3 (2018 04:54), Max: 36.6 (2018 14:15)  T(F): 97.4 (2018 04:54), Max: 97.9 (2018 14:15)  HR: 68 (2018 06:14) (63 - 70)  BP: 126/61 (2018 04:54) (126/61 - 130/53)  BP(mean): --  RR: 17 (2018 04:54) (17 - 18)  SpO2: 99% (2018 06:14) (99% - 100%)  Daily     Daily Weight in k.3 (2018 00:29)    GENERAL: Appears stated age, well-groomed, well-nourished, no distress  HEENT:  NC/AT,  conjunctivae clear and pink, no thyromegaly, nodules, adenopathy, no JVD, sclera -anicteric  CHEST:  Full & symmetric excursion, no increased effort, breath sounds clear  HEART:  Regular rhythm, S1, S2, no murmur/rub/S3/S4, no abdominal bruit, no edema  ABDOMEN:  Soft, non-tender, non-distended, normoactive bowel sounds,  no masses ,no hepato-splenomegaly, no signs of chronic liver disease  EXTEREMITIES:  no cyanosis,clubbing or edema  SKIN:  No rash/erythema/ecchymoses/petechiae/wounds/abscess/warm/dry  NEURO:  Alert,    LABS:                        10.9   9.33  )-----------( 236      ( 2018 05:00 )             32.5         137  |  99  |  17  ----------------------------<  86  4.0   |  24  |  1.92<H>    Ca    8.8      2018 05:00  Phos  3.4       Mg     2.2                     Imaging:
Chief Complaint:  Patient is a 77y old  Female who presents with a chief complaint of Transfer from [place] for achalasia management (10 Ousmane 2018 20:19)      Interval Events: Afebrile. No abdominal pain. Tolerating full liquid diet. No nausea/vomiting.     Allergies:  No Known Allergies      Home Medications:    Hospital Medications:  ALBUTerol/ipratropium for Nebulization 3 milliLiter(s) Nebulizer every 6 hours  amLODIPine   Tablet 5 milliGRAM(s) Oral daily  aspirin  chewable 81 milliGRAM(s) Oral daily  atorvastatin 20 milliGRAM(s) Oral at bedtime  carvedilol 6.25 milliGRAM(s) Oral every 12 hours  heparin  Injectable 5000 Unit(s) SubCutaneous every 8 hours  pantoprazole  Injectable 40 milliGRAM(s) IV Push two times a day  sodium chloride 0.9%. 1000 milliLiter(s) IV Continuous <Continuous>  sodium chloride 0.9%. 1000 milliLiter(s) IV Continuous <Continuous>  sodium chloride 3%  Inhalation 3 milliLiter(s) Inhalation every 6 hours      PMHX/PSHX:  Depression  Alzheimers disease  MI, old  CVA (cerebral vascular accident)  HTN (hypertension)  No pertinent past medical history  No significant past surgical history      Family history:  Family history of essential hypertension (Child)  Family history of stroke (Mother)  No pertinent family history in first degree relatives      ROS: as per HPI       PHYSICAL EXAM:   Vital Signs:  Vital Signs Last 24 Hrs  T(C): 36.7 (18 Jan 2018 06:43), Max: 36.9 (17 Jan 2018 14:29)  T(F): 98.1 (18 Jan 2018 06:43), Max: 98.5 (17 Jan 2018 14:29)  HR: 68 (18 Jan 2018 06:43) (59 - 72)  BP: 180/78 (18 Jan 2018 06:43) (128/68 - 180/78)  BP(mean): --  RR: 18 (18 Jan 2018 06:43) (18 - 18)  SpO2: 98% (18 Jan 2018 06:43) (94% - 100%)  Daily     Daily     GENERAL:  NAD  HEENT: sclera -anicteric  CHEST:  breath sounds clear  HEART:  Regular rhythm  ABDOMEN:  Soft, non-tender, non-distended, normoactive bowel sounds  SKIN:  No rash  NEURO:  Alert    LABS:    01-18    142  |  105  |  9   ----------------------------<  84  3.6   |  24  |  1.46<H>    Ca    8.6      18 Jan 2018 05:48                Imaging:
Chief Complaint:  Patient is a 77y old  Female who presents with a chief complaint of Transfer from [place] for achalasia management (10 Ousmane 2018 20:19)      Interval Events: s/p POEMS yesterday. Afebrile. No abdominal pain/nausea/vomiting     Allergies:  No Known Allergies      Home Medications:    Hospital Medications:  ALBUTerol/ipratropium for Nebulization 3 milliLiter(s) Nebulizer every 6 hours  amLODIPine   Tablet 5 milliGRAM(s) Oral daily  aspirin  chewable 81 milliGRAM(s) Oral daily  atorvastatin 20 milliGRAM(s) Oral at bedtime  carvedilol 6.25 milliGRAM(s) Oral every 12 hours  heparin  Injectable 5000 Unit(s) SubCutaneous every 8 hours  pantoprazole  Injectable 40 milliGRAM(s) IV Push two times a day  piperacillin/tazobactam IVPB. 3.375 Gram(s) IV Intermittent every 12 hours  sodium chloride 0.9%. 1000 milliLiter(s) IV Continuous <Continuous>  sodium chloride 0.9%. 1000 milliLiter(s) IV Continuous <Continuous>  sodium chloride 3%  Inhalation 3 milliLiter(s) Inhalation every 6 hours  vancomycin  IVPB 750 milliGRAM(s) IV Intermittent every 24 hours      PMHX/PSHX:  Depression  Alzheimers disease  MI, old  CVA (cerebral vascular accident)  HTN (hypertension)  No pertinent past medical history  No significant past surgical history      Family history:  Family history of essential hypertension (Child)  Family history of stroke (Mother)  No pertinent family history in first degree relatives      ROS: as per HPI       PHYSICAL EXAM:   Vital Signs:  Vital Signs Last 24 Hrs  T(C): 36.6 (23 Jan 2018 14:15), Max: 36.8 (23 Jan 2018 05:17)  T(F): 97.9 (23 Jan 2018 14:15), Max: 98.3 (23 Jan 2018 05:17)  HR: 66 (23 Jan 2018 14:15) (66 - 70)  BP: 130/53 (23 Jan 2018 14:15) (130/53 - 146/75)  BP(mean): --  RR: 18 (23 Jan 2018 14:15) (16 - 18)  SpO2: 100% (23 Jan 2018 14:15) (100% - 100%)  Daily     Daily     GENERAL:  Appears stated age, well-groomed, well-nourished, no distress  HEENT:  NC/AT,  conjunctivae clear and pink, no thyromegaly, nodules, adenopathy, no JVD, sclera -anicteric  CHEST:  Full & symmetric excursion, no increased effort, breath sounds clear  HEART:  Regular rhythm, S1, S2, no murmur/rub/S3/S4, no abdominal bruit, no edema  ABDOMEN:  Soft, non-tender, non-distended, normoactive bowel sounds,  no masses ,no hepato-splenomegaly, no signs of chronic liver disease  EXTEREMITIES:  no cyanosis,clubbing or edema  SKIN:  No rash/erythema/ecchymoses/petechiae/wounds/abscess/warm/dry  NEURO:  Alert, oriented, no asterixis, no tremor, no encephalopathy    LABS:                        11.7   9.60  )-----------( 259      ( 23 Jan 2018 05:30 )             35.2     01-23    139  |  101  |  13  ----------------------------<  131<H>  4.1   |  24  |  1.74<H>    Ca    8.7      23 Jan 2018 05:30  Phos  4.4     01-23  Mg     1.5     01-23        PT/INR - ( 22 Jan 2018 05:15 )   PT: 12.1 SEC;   INR: 1.05                  Imaging:    GI Procedure Note (Full note to follow on sunrise)    Procedure: POEMS    Indication: Achalasia    Findings:  - Dilated and tortuous esophagus with tight GE junction consistent with achalasia    - A mild stenosis was found at the pylorus. Balloon dilation with CRE balloon up to 18mm was performed.   - Preparations were made for peroral endoscopic myotomy (POEM).  The esophagus was cleaned and irrigated using water and suctioned.  Mucosotomy followed by myotomy were performed in a posterior orientation.  First, a submucosal injection of a solution of methylene blue and saline was used to lift the mucosa at the site of the initial mucosotomy.  The initial mucosal incision was made longitudinally starting at 26 cm from the incisors using a HybridKnife I-Type.  Next, the endoscope with a clear cap was used to enter into the submucosal tunnel.  The submucosal tunnel was then further created by continued dissection.  The myotomy was started using an ITknife2 to perform a full thickness myotomy.  Intra procedure bleeding was minimal.  After completion of the myotomy, there was no evidence of bleeding noted on the inspection of the myotomy edges and submucosal tunnel.  The mucosal entrance to the submucosal tunnel was closed using endoscopic clips.     Impression:  - Pyloric stenosis s/p balloon dilatation   - Peroral endoscopic myotomy (POEM) was performed.      Recommendations:  - [facility].   - NPO today.  - PPI BID IV.  - Empiric antibiotics with vanc and zosyn.  - Avoid imaging studies unless signs of clinical decompensation.
Chief Complaint: Patient is a 77y old  Female who presents with a chief complaint of Transfer from [place] for achalasia management (10 Ousmane 2018 20:19)    INTERVAL HPI/OVERNIGHT EVENTS:   [place] translation through [employer] ID [id number] Valente, patient unable to provide HPI due to dementia and encephalopathy.     MEDICATIONS  (STANDING):  ALBUTerol/ipratropium for Nebulization 3 milliLiter(s) Nebulizer every 6 hours  amLODIPine   Tablet 5 milliGRAM(s) Oral daily  atorvastatin 20 milliGRAM(s) Oral at bedtime  carvedilol 3.125 milliGRAM(s) Oral every 12 hours  dextrose 5%. 1000 milliLiter(s) (75 mL/Hr) IV Continuous <Continuous>  pantoprazole  Injectable 40 milliGRAM(s) IV Push daily  potassium chloride    Tablet ER 40 milliEquivalent(s) Oral once  sodium chloride 3%  Inhalation 3 milliLiter(s) Inhalation every 6 hours    Vital Signs Last 24 Hrs  T(C): 36.9 (13 Jan 2018 08:41), Max: 37.2 (12 Jan 2018 20:24)  T(F): 98.5 (13 Jan 2018 08:41), Max: 99 (12 Jan 2018 20:24)  HR: 65 (13 Jan 2018 09:41) (64 - 82)  BP: 152/72 (13 Jan 2018 08:41) (125/84 - 212/65)  BP(mean): --  RR: 18 (13 Jan 2018 08:41) (18 - 18)  SpO2: 98% (13 Jan 2018 09:41) (98% - 100%)    I&O's Detail  CAPILLARY BLOOD GLUCOSE    PHYSICAL EXAM:  GENERAL: NAD  Pulm: CTA, poor inspiratory effort  CV: S1&S2+, RRR without R/M/G  ABDOMEN: Soft, ND/NT, BS+  EXTREMITIES:  2+ peripheral pulses, no appreciable edema in b/l LE  Neuro: Awake, encephalopathic oriented x0-1    LABS:                        11.2   7.23  )-----------( 214      ( 13 Jan 2018 06:00 )             33.8     01-13    135  |  97<L>  |  13  ----------------------------<  97  3.1<L>   |  27  |  1.44<H>    Ca    8.4      13 Jan 2018 06:00    LIVER FUNCTIONS - ( 10 Ousmane 2018 09:00 )  Alb: 2.2 g/dL / Pro: 6.8 gm/dL / ALK PHOS: 87 U/L / ALT: 12 U/L / AST: 21 U/L / GGT: x     / T. Bili 0.4 mg/dL / D. Bili x       Microbiology:    RADIOLOGY & ADDITIONAL TESTS:    Imaging Personally Reviewed:     Consultant(s) Notes Reviewed:    Gastroenterology - will follow up regarding possible procedure for severe achalasia  Nephrology - continue to monitor BMP given hypernatremia yesterday and DELFIN    Care Discussed with Consultants/Other Providers   Gastroenterology fellow Shannon Magana - will resume aspirin and heparin, should not delay GI procedure
Chief Complaint: Patient is a 77y old  Female who presents with a chief complaint of Transfer from [place] for achalasia management (10 Ousmane 2018 20:19)    INTERVAL HPI/OVERNIGHT EVENTS:   Patient states she feels better today, denies active pain or shortness of breath    MEDICATIONS  (STANDING):  ALBUTerol/ipratropium for Nebulization 3 milliLiter(s) Nebulizer every 6 hours  amLODIPine   Tablet 5 milliGRAM(s) Oral daily  atorvastatin 20 milliGRAM(s) Oral at bedtime  carvedilol 3.125 milliGRAM(s) Oral every 12 hours  dextrose 5%. 1000 milliLiter(s) (75 mL/Hr) IV Continuous <Continuous>  pantoprazole  Injectable 40 milliGRAM(s) IV Push daily  potassium chloride    Tablet ER 40 milliEquivalent(s) Oral once  sodium chloride 3%  Inhalation 3 milliLiter(s) Inhalation every 6 hours    Vital Signs Last 24 Hrs  T(C): 36.9 (13 Jan 2018 08:41), Max: 37.2 (12 Jan 2018 20:24)  T(F): 98.5 (13 Jan 2018 08:41), Max: 99 (12 Jan 2018 20:24)  HR: 65 (13 Jan 2018 09:41) (64 - 82)  BP: 152/72 (13 Jan 2018 08:41) (125/84 - 212/65)  BP(mean): --  RR: 18 (13 Jan 2018 08:41) (18 - 18)  SpO2: 98% (13 Jan 2018 09:41) (98% - 100%)    I&O's Detail  CAPILLARY BLOOD GLUCOSE    PHYSICAL EXAM:  GENERAL: NAD  Pulm: CTA +wheeze bilaterally   CV: S1&S2+, RRR without R/M/G  ABDOMEN: Soft, ND/NT, BS+  EXTREMITIES:  2+ peripheral pulses, no appreciable edema in b/l LE  Neuro: Awake, encephalopathic oriented x1    LABS:                        11.2   7.23  )-----------( 214      ( 13 Jan 2018 06:00 )             33.8     01-13    135  |  97<L>  |  13  ----------------------------<  97  3.1<L>   |  27  |  1.44<H>    Ca    8.4      13 Jan 2018 06:00    LIVER FUNCTIONS - ( 10 Ousmane 2018 09:00 )  Alb: 2.2 g/dL / Pro: 6.8 gm/dL / ALK PHOS: 87 U/L / ALT: 12 U/L / AST: 21 U/L / GGT: x     / T. Bili 0.4 mg/dL / D. Bili x       Microbiology:    RADIOLOGY & ADDITIONAL TESTS:    Imaging Personally Reviewed:     Consultant(s) Notes Reviewed:    Gastroenterology - will follow up regarding possible procedure for severe achalasia  Nephrology - continue to monitor BMP given hypernatremia yesterday and DELFIN    Care Discussed with Consultants/Other Providers   Gastroenterology fellow Shannon Magana
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 Patient pending EGD with POEMS procedure.     24 hour events/subjective:  Patient seen and examined at bedside. Says she is only eating a little. Denies any SOB or chest pain.      PAST HISTORY  --------------------------------------------------------------------------------  No significant changes to PMH, PSH, FHx, SHx, unless otherwise noted    ALLERGIES & MEDICATIONS  --------------------------------------------------------------------------------  Allergies    No Known Allergies    Intolerances      Standing Inpatient Medications  ALBUTerol/ipratropium for Nebulization 3 milliLiter(s) Nebulizer every 6 hours  amLODIPine   Tablet 5 milliGRAM(s) Oral daily  aspirin  chewable 81 milliGRAM(s) Oral daily  atorvastatin 20 milliGRAM(s) Oral at bedtime  carvedilol 6.25 milliGRAM(s) Oral every 12 hours  heparin  Injectable 5000 Unit(s) SubCutaneous every 8 hours  pantoprazole  Injectable 40 milliGRAM(s) IV Push two times a day  sodium chloride 0.9%. 1000 milliLiter(s) IV Continuous <Continuous>  sodium chloride 0.9%. 1000 milliLiter(s) IV Continuous <Continuous>  sodium chloride 3%  Inhalation 3 milliLiter(s) Inhalation every 6 hours    PRN Inpatient Medications      REVIEW OF SYSTEMS  --------------------------------------------------------------------------------  Gen: no fatigue  Skin: No rashes  Respiratory: No dyspnea, cough  CV: No chest pain  GI: No abdominal pain    VITALS/PHYSICAL EXAM  --------------------------------------------------------------------------------  T(C): 36.7 (01-18-18 @ 06:43), Max: 36.9 (01-17-18 @ 14:29)  HR: 63 (01-18-18 @ 10:31) (59 - 68)  BP: 180/78 (01-18-18 @ 06:43) (128/68 - 180/78)  RR: 18 (01-18-18 @ 06:43) (18 - 18)  SpO2: 98% (01-18-18 @ 10:31) (94% - 100%)  Wt(kg): --        Physical Exam:  	Gen: NAD, thin female, sitting up in chair  	Pulm: breath sounds CTA b/l  	CV: RRR, S1, S2, no rubs  	Abd: soft, nontender, nondistended  	LE: Warm, no edema  	Neuro: A&ox3  	Skin: Warm, without rashes    LABS/STUDIES  --------------------------------------------------------------------------------    142  |  105  |  9   ----------------------------<  84      [01-18-18 @ 05:48]  3.6   |  24  |  1.46        Ca     8.6     [01-18-18 @ 05:48]          Troponin < 0.06      [01-18-18 @ 11:36]  CK 31      [01-18-18 @ 11:36]    Creatinine Trend:  SCr 1.46 [01-18 @ 05:48]  SCr 1.62 [01-17 @ 05:15]  SCr 1.59 [01-16 @ 05:44]  SCr 1.71 [01-15 @ 06:12]  SCr 1.64 [01-14 @ 06:36]    Urinalysis - [01-04-18 @ 04:16]      Color Yellow / Appearance Clear / SG 1.010 / pH 6.5      Gluc Negative / Ketone Negative  / Bili Negative / Urobili Negative       Blood Trace / Protein 500 / Leuk Est Negative / Nitrite Negative      RBC  / WBC 0-2 / Hyaline  / Gran  / Sq Epi  / Non Sq Epi Few / Bacteria Few
[facility] DIVISION OF KIDNEY DISEASES AND HYPERTENSION -- FOLLOW UP NOTE  --------------------------------------------------------------------------------  Isidra Wright  PAGER [phone]  --------------------------------------------------------------------------------  Chief Complaint: JONY    24 hour events/subjective:    Pt endorses dry cough this AM. No fever, CP, NVD, abd pain, or constipation. Tolerating full liquid diet. Urinating well.    PAST HISTORY  --------------------------------------------------------------------------------  No significant changes to PMH, PSH, FHx, SHx, unless otherwise noted    ALLERGIES & MEDICATIONS  --------------------------------------------------------------------------------  Allergies    No Known Allergies    Intolerances      Standing Inpatient Medications  ALBUTerol/ipratropium for Nebulization 3 milliLiter(s) Nebulizer every 6 hours  amLODIPine   Tablet 5 milliGRAM(s) Oral daily  aspirin  chewable 81 milliGRAM(s) Oral daily  atorvastatin 20 milliGRAM(s) Oral at bedtime  carvedilol 3.125 milliGRAM(s) Oral every 12 hours  heparin  Injectable 5000 Unit(s) SubCutaneous every 8 hours  pantoprazole  Injectable 40 milliGRAM(s) IV Push daily  sodium chloride 0.9%. 1000 milliLiter(s) IV Continuous <Continuous>  sodium chloride 3%  Inhalation 3 milliLiter(s) Inhalation every 6 hours    PRN Inpatient Medications      REVIEW OF SYSTEMS  --------------------------------------------------------------------------------  Review Of Systems:  See HPI. ROS otherwise negative.    VITALS/PHYSICAL EXAM  --------------------------------------------------------------------------------  T(C): 36.8 (18 @ 05:18), Max: 36.8 (01-15-18 @ 14:20)  HR: 66 (18 @ 10:09) (66 - 77)  BP: 150/70 (18 @ 06:22) (148/72 - 180/75)  RR: 17 (18 @ 05:18) (17 - 18)  SpO2: 96% (18 @ 10:09) (96% - 100%)  Wt(kg): --      Daily     Daily Weight in k.9 (2018 05:18)  I&O's Summary          Physical Exam:              Gen: NAD , laying comfortably in bed  	HEENT: anicteric  	Pulm: breath sounds CTA b/l  	CV: RRR, no murmurs/rubs/gallops  	Back: No dependent edema  	Abd: soft, nontender, nondistended  	: No lee  	LE: Warm, no edema  	Neuro: no asterixis  	Skin: Warm, without rashes    LABS/STUDIES  --------------------------------------------------------------------------------              12.5   8.71  >-----------<  256      [18 @ 05:44]              38.0     142  |  104  |  8   ----------------------------<  91      [18 @ 05:44]  3.8   |  22  |  1.59        Ca     8.6     [18 @ 05:44]            Creatinine Trend:  SCr 1.59 [:44]  SCr 1.71 [01-15 @ 06:12]  SCr 1.64 [ @ 06:36]  SCr 1.44 [ @ 06:00]  SCr 1.62 [ 05:08]    Urinalysis - [18 @ 04:16]      Color Yellow / Appearance Clear / SG 1.010 / pH 6.5      Gluc Negative / Ketone Negative  / Bili Negative / Urobili Negative       Blood Trace / Protein 500 / Leuk Est Negative / Nitrite Negative      RBC  / WBC 0-2 / Hyaline  / Gran  / Sq Epi  / Non Sq Epi Few / Bacteria Few              Radiology    < from: US Renal (18 @ 10:27) >  EXAM:  US KIDNEY(S)        PROCEDURE DATE:  2018         INTERPRETATION:  CLINICAL INFORMATION: Acute kidney injury. Aspiration   pneumonia.    COMPARISON: None available.    TECHNIQUE: Sonography of the kidneys and bladder.     FINDINGS:    Right kidney:  5.4 cm. Echogenic parenchyma. 2.3 cm lower pole cyst. No   renal mass, hydronephrosis, or calculi.    Left kidney:  9.3 cm. No renal mass, hydronephrosis or calculi.    Urinary bladder: Within normal limits.    IMPRESSION:     Left kidney within normal limits. Right kidney chronically atrophic.    Isidra Wright  PAGER [phone]
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 Pt is unable to answer questions appropriate and most of the history of obtained from the pt's daughter at bedside.     Pt initially presented to [facility] on January 4th after having acute worsening SOB. Pt had been having SOB on and off for the past few months prior to admission. Pt tested positive for coronavirus and was treated with lasix for possible pulmonary edema on CXR and Duonebs PRN. CTA of the chest was negative for PE but did show a markedly air and fluid filled esophagus. Pt had a CXR showing severe narrowing of the esophagus at the GE junction suspicious for achlasia. Pt was also treated with Azithromycin and Zosyn for aspiration pneumonia. GI was consulted and pt had an EGD done on 1/10 that was consistent with achalasia and pt was transferred to [facility] for possible myotomy with Dr. Tierney. Pt found to have JONY on 1/8 following CTA on 1/4 as well as lasix for pulmonary edema at [place]. Unclear urine output, given pt poorly responsive to questioning and family not available by phone.    PAST HISTORY  --------------------------------------------------------------------------------  PAST MEDICAL & SURGICAL HISTORY:  Depression  Alzheimers disease  MI, old  CVA (cerebral vascular accident)  HTN (hypertension)  No significant past surgical history    FAMILY HISTORY:  Family history of essential hypertension (Child)  Family history of stroke (Mother)    PAST SOCIAL HISTORY:    ALLERGIES & MEDICATIONS  --------------------------------------------------------------------------------  Allergies    No Known Allergies    Intolerances    Standing Inpatient Medications  ALBUTerol/ipratropium for Nebulization 3 milliLiter(s) Nebulizer every 6 hours  atorvastatin 20 milliGRAM(s) Oral at bedtime  metoprolol    tartrate Injectable 5 milliGRAM(s) IV Push every 6 hours  pantoprazole  Injectable 40 milliGRAM(s) IV Push daily  piperacillin/tazobactam IVPB. 3.375 Gram(s) IV Intermittent every 12 hours  sodium chloride 0.9%. 1000 milliLiter(s) IV Continuous <Continuous>  sodium chloride 0.9%. 1000 milliLiter(s) IV Continuous <Continuous>  sodium chloride 3%  Inhalation 3 milliLiter(s) Inhalation every 6 hours    PRN Inpatient Medications    REVIEW OF SYSTEMS  --------------------------------------------------------------------------------  Unable to obtain ROS given pt inappropriately responsive to questioning    VITALS/PHYSICAL EXAM  --------------------------------------------------------------------------------  T(C): 36.6 (01-11-18 @ 09:45), Max: 36.8 (01-10-18 @ 20:53)  HR: 68 (01-11-18 @ 11:52) (63 - 83)  BP: 135/72 (01-11-18 @ 11:52) (128/57 - 188/67)  RR: 18 (01-11-18 @ 09:45) (17 - 18)  SpO2: 100% (01-11-18 @ 09:45) (90% - 100%)  Wt(kg): --  Height (cm): 160.02 (01-10-18 @ 16:08)  Weight (kg): 54.6 (01-10-18 @ 16:08)  BMI (kg/m2): 21.3 (01-10-18 @ 16:08)  BSA (m2): 1.56 (01-10-18 @ 16:08)    Physical Exam:  	Gen: NAD, sitting comfortably in chair  	HEENT: MMM  	Pulm: diffuse bilateral crackles  	CV: S1S2, no murmurs/rubs/gallops  	Abd: Soft, +BS   	Ext: no LE edema B/L, limited ROM of the LUE and LLE  	Neuro: awake, A&Ox1  	Skin: warm and dry    LABS/STUDIES  --------------------------------------------------------------------------------              12.2   7.69  >-----------<  240      [01-11-18 @ 06:20]              37.9     143  |  102  |  23  ----------------------------<  82      [01-11-18 @ 06:20]  3.7   |  27  |  1.83        Ca     8.4     [01-11-18 @ 06:20]      Mg     2.2     [01-11-18 @ 06:20]      Phos  3.2     [01-11-18 @ 06:20]    TPro  6.8  /  Alb  2.2  /  TBili  0.4  /  DBili  x   /  AST  21  /  ALT  12  /  AlkPhos  87  [01-10-18 @ 09:00]    PT/INR: PT 11.7 , INR 1.05       [01-11-18 @ 06:20]    Creatinine Trend:  SCr 1.83 [01-11 @ 06:20]  SCr 2.63 [01-10 @ 10:49]  SCr 2.61 [01-10 @ 09:00]  SCr 2.14 [01-08 @ 08:32]  SCr 1.40 [01-07 @ 07:14]    Urinalysis - [01-04-18 @ 04:16]      Color Yellow / Appearance Clear / SG 1.010 / pH 6.5      Gluc Negative / Ketone Negative  / Bili Negative / Urobili Negative       Blood Trace / Protein 500 / Leuk Est Negative / Nitrite Negative      RBC  / WBC 0-2 / Hyaline  / Gran  / Sq Epi  / Non Sq Epi Few / Bacteria Few    Radiology:    US Renal (01.11.18 @ 10:27)  EXAM:  US KIDNEY(S)      PROCEDURE DATE:  Jan 11 2018     INTERPRETATION:  CLINICAL INFORMATION: Acute kidney injury. Aspiration   pneumonia.    COMPARISON: None available.    TECHNIQUE: Sonography of the kidneys and bladder.     FINDINGS:    Right kidney:  5.4 cm. Echogenic parenchyma. 2.3 cm lower pole cyst. No   renal mass, hydronephrosis, or calculi.    Left kidney:  9.3 cm. No renal mass, hydronephrosis or calculi.    Urinary bladder: Within normal limits.    IMPRESSION:     Left kidney within normal limits. Right kidney chronically atrophic.    ARCADIO STUART M.D., ATTENDING RADIOLOGIST  This document has been electronically signed. Jan 11 2018 10:56AM
Patient is a 77y old  Female who presents with a chief complaint of Transfer from [place] for achalasia management (10 Ousmane 2018 20:19)      SUBJECTIVE / OVERNIGHT EVENTS: Pt without complaints, pleaseant, aox1.    MEDICATIONS  (STANDING):  ALBUTerol/ipratropium for Nebulization 3 milliLiter(s) Nebulizer every 6 hours  atorvastatin 20 milliGRAM(s) Oral at bedtime  metoprolol    tartrate Injectable 5 milliGRAM(s) IV Push every 6 hours  pantoprazole  Injectable 40 milliGRAM(s) IV Push daily  piperacillin/tazobactam IVPB. 3.375 Gram(s) IV Intermittent every 12 hours  sodium chloride 0.9%. 1000 milliLiter(s) (75 mL/Hr) IV Continuous <Continuous>  sodium chloride 0.9%. 1000 milliLiter(s) (75 mL/Hr) IV Continuous <Continuous>    MEDICATIONS  (PRN):      Vital Signs Last 24 Hrs  T(C): 36.6 (01-11-18 @ 09:45), Max: 36.8 (01-10-18 @ 20:53)  T(F): 97.9 (01-11-18 @ 09:45), Max: 98.3 (01-10-18 @ 20:53)  HR: 74 (01-11-18 @ 09:45) (63 - 83)  BP: 168/72 (01-11-18 @ 09:45) (128/57 - 188/67)  BP(mean): --  RR: 18 (01-11-18 @ 09:45) (17 - 18)  SpO2: 100% (01-11-18 @ 09:45) (90% - 100%)  CAPILLARY BLOOD GLUCOSE        I&O's Summary      PHYSICAL EXAM:  GENERAL: NAD, well-nourished  HEENT: mmm, no JVD  CHEST/LUNG: diffuse rhonchi throughout  HEART: RRR, no m/r/g  ABDOMEN: soft, nt, nd  EXTREMITIES:  wwp, no c/c/e  PSYCH: AAOx1  NEUROLOGY: left sided weakness  SKIN: No rashes or lesions    LABS:                        12.2   7.69  )-----------( 240      ( 11 Jan 2018 06:20 )             37.9     01-11    143  |  102  |  23  ----------------------------<  82  3.7   |  27  |  1.83<H>    Ca    8.4      11 Jan 2018 06:20  Phos  3.2     01-11  Mg     2.2     01-11    TPro  6.8  /  Alb  2.2<L>  /  TBili  0.4  /  DBili  x   /  AST  21  /  ALT  12  /  AlkPhos  87  01-10    PT/INR - ( 11 Jan 2018 06:20 )   PT: 11.7 SEC;   INR: 1.05                    RADIOLOGY & ADDITIONAL TESTS:    Imaging Personally Reviewed:    Consultant(s) Notes Reviewed:      Care Discussed with Consultants/Other Providers:
Patient is a 77y old  Female who presents with a chief complaint of Transfer from [place] for achalasia management (10 Ousmane 2018 20:19)      SUBJECTIVE / OVERNIGHT EVENTS:pt seen and examined by me  S/p Pyloric stenosis balloon dilatation and  Peroral endoscopic myotomy yestderday  Diet advanced to full liquid diet  Denies abdominal pain/nausea/vomiting     MEDICATIONS  (STANDING):  ALBUTerol/ipratropium for Nebulization 3 milliLiter(s) Nebulizer every 6 hours  amLODIPine   Tablet 5 milliGRAM(s) Oral daily  aspirin  chewable 81 milliGRAM(s) Oral daily  atorvastatin 20 milliGRAM(s) Oral at bedtime  carvedilol 6.25 milliGRAM(s) Oral every 12 hours  heparin  Injectable 5000 Unit(s) SubCutaneous every 8 hours  pantoprazole    Tablet 40 milliGRAM(s) Oral two times a day before meals  sodium chloride 0.9%. 1000 milliLiter(s) (75 mL/Hr) IV Continuous <Continuous>  sodium chloride 0.9%. 1000 milliLiter(s) (75 mL/Hr) IV Continuous <Continuous>  sodium chloride 3%  Inhalation 3 milliLiter(s) Inhalation every 6 hours    MEDICATIONS  (PRN):      Vital Signs Last 24 Hrs  T(C): 36.3 (24 Jan 2018 04:54), Max: 36.6 (23 Jan 2018 14:15)  T(F): 97.4 (24 Jan 2018 04:54), Max: 97.9 (23 Jan 2018 14:15)  HR: 70 (24 Jan 2018 10:22) (63 - 70)  BP: 126/61 (24 Jan 2018 04:54) (126/61 - 130/53)  BP(mean): --  RR: 17 (24 Jan 2018 04:54) (17 - 18)  SpO2: 97% (24 Jan 2018 10:22) (97% - 100%)    CAPILLARY BLOOD GLUCOSE    PHYSICAL EXAM:  GENERAL: NAD, well-nourished  HEENT: mmm  CHEST/LUNG: CTABL  HEART: RRR, no m/r/g  ABDOMEN: soft, nt, nd  EXTREMITIES:  wwp, no c/c/e  PSYCH: AAOx1  NEUROLOGY: left sided weakness, 3/5, left hand contractures   SKIN: Ecchymoses    LABS:                                          10.9   9.33  )-----------( 236      ( 24 Jan 2018 05:00 )             32.5       01-24    137  |  99  |  17  ----------------------------<  86  4.0   |  24  |  1.92<H>    Ca    8.8      24 Jan 2018 05:00  Phos  3.4     01-24  Mg     2.2     01-24      RADIOLOGY & ADDITIONAL TESTS:    Imaging Personally Reviewed:    Consultant(s) Notes Reviewed:      Care Discussed with Consultants/Other Providers: GI
Patient is a 77y old  Female who presents with a chief complaint of Transfer from [place] for achalasia management (10 Ousmane 2018 20:19)      SUBJECTIVE / OVERNIGHT EVENTS: Pt without complaints, no events. Awaitng procedure today    MEDICATIONS  (STANDING):  ALBUTerol/ipratropium for Nebulization 3 milliLiter(s) Nebulizer every 6 hours  amLODIPine   Tablet 5 milliGRAM(s) Oral daily  aspirin  chewable 81 milliGRAM(s) Oral daily  atorvastatin 20 milliGRAM(s) Oral at bedtime  carvedilol 6.25 milliGRAM(s) Oral every 12 hours  pantoprazole  Injectable 40 milliGRAM(s) IV Push two times a day  sodium chloride 0.9%. 1000 milliLiter(s) (75 mL/Hr) IV Continuous <Continuous>  sodium chloride 0.9%. 1000 milliLiter(s) (75 mL/Hr) IV Continuous <Continuous>  sodium chloride 3%  Inhalation 3 milliLiter(s) Inhalation every 6 hours    MEDICATIONS  (PRN):    Vital Signs Last 24 Hrs  T(C): 36.8 (22 Jan 2018 05:24), Max: 37.1 (21 Jan 2018 15:26)  T(F): 98.2 (22 Jan 2018 05:24), Max: 98.8 (21 Jan 2018 15:26)  HR: 68 (22 Jan 2018 05:24) (67 - 77)  BP: 111/61 (22 Jan 2018 05:24) (111/61 - 139/60)  BP(mean): --  RR: 18 (22 Jan 2018 05:24) (17 - 18)  SpO2: 100% (22 Jan 2018 05:24) (100% - 100%)    CAPILLARY BLOOD GLUCOSE    PHYSICAL EXAM:  GENERAL: NAD, well-nourished  HEENT: mmm  CHEST/LUNG: CTABL  HEART: RRR, no m/r/g  ABDOMEN: soft, nt, nd  EXTREMITIES:  wwp, no c/c/e  PSYCH: AAOx1  NEUROLOGY: left sided weakness, 3/5, left hand contractures   SKIN: Ecchymoses    LABS:                                             11.1   9.30  )-----------( 255      ( 22 Jan 2018 05:15 )             35.0    01-22    138  |  103  |  12  ----------------------------<  82  4.2   |  24  |  1.84<H>    Ca    8.7      22 Jan 2018 05:15  Phos  3.2     01-22  Mg     1.6     01-22          RADIOLOGY & ADDITIONAL TESTS:    Imaging Personally Reviewed:    Consultant(s) Notes Reviewed:      Care Discussed with Consultants/Other Providers:
Patient is a 77y old  Female who presents with a chief complaint of Transfer from [place] for achalasia management (10 Ousmane 2018 20:19)      SUBJECTIVE / OVERNIGHT EVENTS: Pt without complaints, no events.    MEDICATIONS  (STANDING):  ALBUTerol/ipratropium for Nebulization 3 milliLiter(s) Nebulizer every 6 hours  amLODIPine   Tablet 5 milliGRAM(s) Oral daily  aspirin  chewable 81 milliGRAM(s) Oral daily  atorvastatin 20 milliGRAM(s) Oral at bedtime  carvedilol 6.25 milliGRAM(s) Oral every 12 hours  heparin  Injectable 5000 Unit(s) SubCutaneous every 8 hours  pantoprazole  Injectable 40 milliGRAM(s) IV Push daily  sodium chloride 0.9%. 1000 milliLiter(s) (75 mL/Hr) IV Continuous <Continuous>  sodium chloride 0.9%. 1000 milliLiter(s) (75 mL/Hr) IV Continuous <Continuous>  sodium chloride 3%  Inhalation 3 milliLiter(s) Inhalation every 6 hours    MEDICATIONS  (PRN):      Vital Signs Last 24 Hrs  T(C): 36.9 (01-17-18 @ 14:29), Max: 36.9 (01-17-18 @ 14:29)  T(F): 98.5 (01-17-18 @ 14:29), Max: 98.5 (01-17-18 @ 14:29)  HR: 66 (01-17-18 @ 14:29) (66 - 75)  BP: 128/68 (01-17-18 @ 14:29) (128/68 - 179/68)  BP(mean): --  RR: 18 (01-17-18 @ 14:29) (17 - 18)  SpO2: 99% (01-17-18 @ 14:29) (94% - 100%)  CAPILLARY BLOOD GLUCOSE        I&O's Summary      PHYSICAL EXAM:  GENERAL: NAD, well-nourished  HEENT: mmm  CHEST/LUNG: CTABL  HEART: RRR, no m/r/g  ABDOMEN: soft, nt, nd  EXTREMITIES:  wwp, no c/c/e  PSYCH: AAOx1  NEUROLOGY: non-focal  SKIN: No rashes or lesions      LABS:                        12.5   8.71  )-----------( 256      ( 16 Jan 2018 05:44 )             38.0     01-17    142  |  104  |  10  ----------------------------<  79  3.8   |  23  |  1.62<H>    Ca    8.8      17 Jan 2018 05:15                RADIOLOGY & ADDITIONAL TESTS:    Imaging Personally Reviewed:    Consultant(s) Notes Reviewed:      Care Discussed with Consultants/Other Providers:
Patient is a 77y old  Female who presents with a chief complaint of Transfer from [place] for achalasia management (10 Ousmane 2018 20:19)      SUBJECTIVE / OVERNIGHT EVENTS: Pt without complaints, no events.    MEDICATIONS  (STANDING):  ALBUTerol/ipratropium for Nebulization 3 milliLiter(s) Nebulizer every 6 hours  amLODIPine   Tablet 5 milliGRAM(s) Oral daily  aspirin  chewable 81 milliGRAM(s) Oral daily  atorvastatin 20 milliGRAM(s) Oral at bedtime  carvedilol 6.25 milliGRAM(s) Oral every 12 hours  heparin  Injectable 5000 Unit(s) SubCutaneous every 8 hours  pantoprazole  Injectable 40 milliGRAM(s) IV Push two times a day  sodium chloride 0.9%. 1000 milliLiter(s) (75 mL/Hr) IV Continuous <Continuous>  sodium chloride 0.9%. 1000 milliLiter(s) (75 mL/Hr) IV Continuous <Continuous>  sodium chloride 3%  Inhalation 3 milliLiter(s) Inhalation every 6 hours    MEDICATIONS  (PRN):      Vital Signs Last 24 Hrs  T(C): 36.9 (01-21-18 @ 05:34), Max: 36.9 (01-20-18 @ 21:21)  T(F): 98.4 (01-21-18 @ 05:34), Max: 98.5 (01-20-18 @ 21:21)  HR: 79 (01-21-18 @ 05:34) (61 - 79)  BP: 149/64 (01-21-18 @ 05:34) (149/64 - 155/71)  BP(mean): --  RR: 18 (01-21-18 @ 05:34) (18 - 18)  SpO2: 100% (01-21-18 @ 05:34) (98% - 100%)  CAPILLARY BLOOD GLUCOSE        I&O's Summary      PHYSICAL EXAM:  GENERAL: NAD, well-nourished  HEENT: mmm  CHEST/LUNG: CTABL  HEART: RRR, no m/r/g  ABDOMEN: soft, nt, nd  EXTREMITIES:  wwp, no c/c/e  PSYCH: AAOx1  NEUROLOGY: non-focal  SKIN: No rashes or lesions    LABS:                        11.5   8.58  )-----------( 270      ( 21 Jan 2018 06:25 )             35.1     01-21    141  |  106  |  12  ----------------------------<  87  3.8   |  22  |  1.80<H>    Ca    8.6      21 Jan 2018 06:25  Phos  3.2     01-21  Mg     1.7     01-21      PT/INR - ( 21 Jan 2018 06:25 )   PT: 11.8 SEC;   INR: 1.06          PTT - ( 21 Jan 2018 06:25 )  PTT:32.1 SEC          RADIOLOGY & ADDITIONAL TESTS:    Imaging Personally Reviewed:    Consultant(s) Notes Reviewed:      Care Discussed with Consultants/Other Providers:
Patient is a 77y old  Female who presents with a chief complaint of Transfer from [place] for achalasia management (10 Ousmane 2018 20:19)      SUBJECTIVE / OVERNIGHT EVENTS:pt seen and examined by me  S/p Pyloric stenosis balloon dilatation and  Peroral endoscopic myotomy yestderday  Pt on clear liquid diet and asking if her diet can be advanced   Denies abdominal pain/nausea/vomiting     MEDICATIONS  (STANDING):  ALBUTerol/ipratropium for Nebulization 3 milliLiter(s) Nebulizer every 6 hours  amLODIPine   Tablet 5 milliGRAM(s) Oral daily  aspirin  chewable 81 milliGRAM(s) Oral daily  atorvastatin 20 milliGRAM(s) Oral at bedtime  carvedilol 6.25 milliGRAM(s) Oral every 12 hours  heparin  Injectable 5000 Unit(s) SubCutaneous every 8 hours  pantoprazole  Injectable 40 milliGRAM(s) IV Push two times a day  sodium chloride 0.9%. 1000 milliLiter(s) (75 mL/Hr) IV Continuous <Continuous>  sodium chloride 0.9%. 1000 milliLiter(s) (75 mL/Hr) IV Continuous <Continuous>  sodium chloride 3%  Inhalation 3 milliLiter(s) Inhalation every 6 hours    MEDICATIONS  (PRN):    Vital Signs Last 24 Hrs  T(C): 36.8 (23 Jan 2018 05:17), Max: 37 (22 Jan 2018 13:44)  T(F): 98.3 (23 Jan 2018 05:17), Max: 98.6 (22 Jan 2018 13:44)  HR: 70 (23 Jan 2018 05:17) (70 - 85)  BP: 146/75 (23 Jan 2018 05:17) (111/58 - 146/75)  BP(mean): --  RR: 16 (23 Jan 2018 05:17) (16 - 18)  SpO2: 100% (23 Jan 2018 05:17) (100% - 100%)  CAPILLARY BLOOD GLUCOSE    PHYSICAL EXAM:  GENERAL: NAD, well-nourished  HEENT: mmm  CHEST/LUNG: CTABL  HEART: RRR, no m/r/g  ABDOMEN: soft, nt, nd  EXTREMITIES:  wwp, no c/c/e  PSYCH: AAOx1  NEUROLOGY: left sided weakness, 3/5, left hand contractures   SKIN: Ecchymoses    LABS:                                      11.7   9.60  )-----------( 259      ( 23 Jan 2018 05:30 )             35.2     01-23    139  |  101  |  13  ----------------------------<  131<H>  4.1   |  24  |  1.74<H>    Ca    8.7      23 Jan 2018 05:30  Phos  4.4     01-23  Mg     1.5     01-23          RADIOLOGY & ADDITIONAL TESTS:    Imaging Personally Reviewed:    Consultant(s) Notes Reviewed:      Care Discussed with Consultants/Other Providers: GI
Patient is a 77y old  Female who presents with a chief complaint of Transfer from [place] for achalasia management (10 Ousmane 2018 20:19)      SUBJECTIVE / OVERNIGHT EVENTS: Pt without complaints, daughter at bedside, anxious for cine to be completed and plan to be decided.    MEDICATIONS  (STANDING):  ALBUTerol/ipratropium for Nebulization 3 milliLiter(s) Nebulizer every 6 hours  atorvastatin 20 milliGRAM(s) Oral at bedtime  dextrose 5%. 1000 milliLiter(s) (75 mL/Hr) IV Continuous <Continuous>  labetalol Injectable 10 milliGRAM(s) IV Push every 6 hours  pantoprazole  Injectable 40 milliGRAM(s) IV Push daily  piperacillin/tazobactam IVPB. 3.375 Gram(s) IV Intermittent every 12 hours  sodium chloride 3%  Inhalation 3 milliLiter(s) Inhalation every 6 hours    MEDICATIONS  (PRN):      Vital Signs Last 24 Hrs  T(C): 36.4 (01-12-18 @ 04:50), Max: 37.1 (01-11-18 @ 23:20)  T(F): 97.6 (01-12-18 @ 04:50), Max: 98.7 (01-11-18 @ 23:20)  HR: 63 (01-12-18 @ 09:33) (60 - 80)  BP: 172/60 (01-12-18 @ 06:28) (129/58 - 199/63)  BP(mean): --  RR: 18 (01-12-18 @ 04:50) (17 - 20)  SpO2: 100% (01-12-18 @ 04:53) (97% - 100%)  CAPILLARY BLOOD GLUCOSE        I&O's Summary    PHYSICAL EXAM:  GENERAL: NAD, well-nourished  HEENT: mmm, no JVD  CHEST/LUNG: diffuse rhonchi throughout, unchanged  HEART: RRR, no m/r/g  ABDOMEN: soft, nt, nd  EXTREMITIES:  wwp, no c/c/e  PSYCH: AAOx1  NEUROLOGY: left sided weakness  SKIN: No rashes or lesions    LABS:                        12.2   7.69  )-----------( 240      ( 11 Jan 2018 06:20 )             37.9     01-12    147<H>  |  106  |  22  ----------------------------<  81  3.6   |  26  |  1.62<H>    Ca    8.6      12 Jan 2018 05:08  Phos  3.2     01-11  Mg     2.2     01-11      PT/INR - ( 11 Jan 2018 06:20 )   PT: 11.7 SEC;   INR: 1.05                    RADIOLOGY & ADDITIONAL TESTS:    Imaging Personally Reviewed:    Consultant(s) Notes Reviewed:      Care Discussed with Consultants/Other Providers:
Patient is a 77y old  Female who presents with a chief complaint of Transfer from [place] for achalasia management (10 Ousmane 2018 20:19)      SUBJECTIVE / OVERNIGHT EVENTS: Pt without complaints, denies SOB, CP.    MEDICATIONS  (STANDING):  ALBUTerol/ipratropium for Nebulization 3 milliLiter(s) Nebulizer every 6 hours  amLODIPine   Tablet 5 milliGRAM(s) Oral daily  aspirin  chewable 81 milliGRAM(s) Oral daily  atorvastatin 20 milliGRAM(s) Oral at bedtime  carvedilol 6.25 milliGRAM(s) Oral every 12 hours  heparin  Injectable 5000 Unit(s) SubCutaneous every 8 hours  pantoprazole  Injectable 40 milliGRAM(s) IV Push two times a day  sodium chloride 0.9%. 1000 milliLiter(s) (75 mL/Hr) IV Continuous <Continuous>  sodium chloride 0.9%. 1000 milliLiter(s) (75 mL/Hr) IV Continuous <Continuous>  sodium chloride 3%  Inhalation 3 milliLiter(s) Inhalation every 6 hours    MEDICATIONS  (PRN):      Vital Signs Last 24 Hrs  T(C): 36.7 (01-18-18 @ 06:43), Max: 36.9 (01-17-18 @ 14:29)  T(F): 98.1 (01-18-18 @ 06:43), Max: 98.5 (01-17-18 @ 14:29)  HR: 63 (01-18-18 @ 10:31) (59 - 68)  BP: 180/78 (01-18-18 @ 06:43) (128/68 - 180/78)  BP(mean): --  RR: 18 (01-18-18 @ 06:43) (18 - 18)  SpO2: 98% (01-18-18 @ 10:31) (94% - 100%)  CAPILLARY BLOOD GLUCOSE        I&O's Summary      PHYSICAL EXAM:  GENERAL: NAD, well-nourished  HEENT: mmm  CHEST/LUNG: CTABL  HEART: RRR, no m/r/g  ABDOMEN: soft, nt, nd  EXTREMITIES:  wwp, no c/c/e  PSYCH: AAOx1  NEUROLOGY: non-focal  SKIN: No rashes or lesions    LABS:    01-18    142  |  105  |  9   ----------------------------<  84  3.6   |  24  |  1.46<H>    Ca    8.6      18 Jan 2018 05:48                RADIOLOGY & ADDITIONAL TESTS:    Imaging Personally Reviewed:    Consultant(s) Notes Reviewed:      Care Discussed with Consultants/Other Providers:
Patient is a 77y old  Female who presents with a chief complaint of Transfer from [place] for achalasia management (10 Ousmane 2018 20:19)      SUBJECTIVE / OVERNIGHT EVENTS:pt seen and examined by me   S/p Pyloric stenosis balloon dilatation and  Peroral endoscopic myotomy   On clear liquid diet  Tolerating liquid diet   Denies abdominal pain/nausea/vomiting     MEDICATIONS  (STANDING):  ALBUTerol/ipratropium for Nebulization 3 milliLiter(s) Nebulizer every 6 hours  amLODIPine   Tablet 5 milliGRAM(s) Oral daily  aspirin  chewable 81 milliGRAM(s) Oral daily  atorvastatin 20 milliGRAM(s) Oral at bedtime  carvedilol 6.25 milliGRAM(s) Oral every 12 hours  heparin  Injectable 5000 Unit(s) SubCutaneous every 8 hours  levoFLOXacin  Tablet 250 milliGRAM(s) Oral every 24 hours  pantoprazole    Tablet 40 milliGRAM(s) Oral two times a day before meals  sodium chloride 0.45%. 1000 milliLiter(s) (50 mL/Hr) IV Continuous <Continuous>  sodium chloride 0.9%. 1000 milliLiter(s) (75 mL/Hr) IV Continuous <Continuous>  sodium chloride 0.9%. 1000 milliLiter(s) (75 mL/Hr) IV Continuous <Continuous>  sodium chloride 3%  Inhalation 3 milliLiter(s) Inhalation every 6 hours    MEDICATIONS  (PRN):      Vital Signs Last 24 Hrs  T(C): 36.3 (24 Jan 2018 04:54), Max: 36.6 (23 Jan 2018 14:15)  T(F): 97.4 (24 Jan 2018 04:54), Max: 97.9 (23 Jan 2018 14:15)  HR: 70 (24 Jan 2018 10:22) (63 - 70)  BP: 126/61 (24 Jan 2018 04:54) (126/61 - 130/53)  BP(mean): --  RR: 17 (24 Jan 2018 04:54) (17 - 18)  SpO2: 97% (24 Jan 2018 10:22) (97% - 100%)    CAPILLARY BLOOD GLUCOSE    PHYSICAL EXAM:  GENERAL: NAD, well-nourished  HEENT: mmm  CHEST/LUNG: CTABL  HEART: RRR, no m/r/g  ABDOMEN: soft, nt, nd  EXTREMITIES:  wwp, no c/c/e  PSYCH: AAOx1  NEUROLOGY: left sided weakness, 3/5, left hand contractures   SKIN: Ecchymoses    LABS:                                                             10.4   8.88  )-----------( 223      ( 25 Jan 2018 05:01 )             31.8       01-25    137  |  102  |  12  ----------------------------<  87  4.0   |  25  |  1.78<H>    Ca    8.2<L>      25 Jan 2018 05:01  Phos  2.6     01-25  Mg     1.8     01-25      RADIOLOGY & ADDITIONAL TESTS:    Imaging Personally Reviewed:    Consultant(s) Notes Reviewed:      Care Discussed with Consultants/Other Providers: GI
Patient is a 77y old  Female who presents with a chief complaint of Transfer from [place] for achalasia management (10 Ousmane 2018 20:19)      SUBJECTIVE / OVERNIGHT EVENTS: Pt without complaints. Per cards, stress without e/o ischemia, proceeding with procedure.    MEDICATIONS  (STANDING):  ALBUTerol/ipratropium for Nebulization 3 milliLiter(s) Nebulizer every 6 hours  amLODIPine   Tablet 5 milliGRAM(s) Oral daily  aspirin  chewable 81 milliGRAM(s) Oral daily  atorvastatin 20 milliGRAM(s) Oral at bedtime  carvedilol 6.25 milliGRAM(s) Oral every 12 hours  heparin  Injectable 5000 Unit(s) SubCutaneous every 8 hours  pantoprazole  Injectable 40 milliGRAM(s) IV Push two times a day  sodium chloride 0.9%. 1000 milliLiter(s) (75 mL/Hr) IV Continuous <Continuous>  sodium chloride 0.9%. 1000 milliLiter(s) (75 mL/Hr) IV Continuous <Continuous>  sodium chloride 3%  Inhalation 3 milliLiter(s) Inhalation every 6 hours    MEDICATIONS  (PRN):      Vital Signs Last 24 Hrs  T(C): 36.8 (01-19-18 @ 13:06), Max: 36.9 (01-19-18 @ 05:15)  T(F): 98.3 (01-19-18 @ 13:06), Max: 98.4 (01-19-18 @ 05:15)  HR: 65 (01-19-18 @ 13:06) (62 - 74)  BP: 153/72 (01-19-18 @ 13:06) (115/66 - 181/61)  BP(mean): --  RR: 18 (01-19-18 @ 13:06) (18 - 18)  SpO2: 99% (01-19-18 @ 13:06) (97% - 100%)  CAPILLARY BLOOD GLUCOSE        I&O's Summary      PHYSICAL EXAM:  GENERAL: NAD, well-nourished  HEENT: mmm  CHEST/LUNG: CTABL  HEART: RRR, no m/r/g  ABDOMEN: soft, nt, nd  EXTREMITIES:  wwp, no c/c/e  NEUROLOGY: non-focal  SKIN: No rashes or lesions    LABS:                        11.8   9.92  )-----------( 270      ( 19 Jan 2018 05:49 )             35.0     01-19    139  |  103  |  10  ----------------------------<  90  3.8   |  21<L>  |  1.63<H>    Ca    8.6      19 Jan 2018 05:49      PT/INR - ( 19 Jan 2018 05:49 )   PT: 12.1 SEC;   INR: 1.09          PTT - ( 19 Jan 2018 05:49 )  PTT:42.6 SEC  CARDIAC MARKERS ( 18 Jan 2018 17:21 )  x     / < 0.06 ng/mL / 41 u/L / 1.45 ng/mL / x      CARDIAC MARKERS ( 18 Jan 2018 11:36 )  x     / < 0.06 ng/mL / 31 u/L / 1.30 ng/mL / x              RADIOLOGY & ADDITIONAL TESTS:    Imaging Personally Reviewed:    Consultant(s) Notes Reviewed:      Care Discussed with Consultants/Other Providers:
Subjective: Patient seen and examined. No new events except as noted.     SUBJECTIVE/ROS:  No chest pain, dyspnea, palpitation, or dizziness.       MEDICATIONS:  MEDICATIONS  (STANDING):  ALBUTerol/ipratropium for Nebulization 3 milliLiter(s) Nebulizer every 6 hours  amLODIPine   Tablet 5 milliGRAM(s) Oral daily  aspirin  chewable 81 milliGRAM(s) Oral daily  atorvastatin 20 milliGRAM(s) Oral at bedtime  carvedilol 6.25 milliGRAM(s) Oral every 12 hours  heparin  Injectable 5000 Unit(s) SubCutaneous every 8 hours  pantoprazole  Injectable 40 milliGRAM(s) IV Push two times a day  sodium chloride 0.9%. 1000 milliLiter(s) (75 mL/Hr) IV Continuous <Continuous>  sodium chloride 0.9%. 1000 milliLiter(s) (75 mL/Hr) IV Continuous <Continuous>  sodium chloride 3%  Inhalation 3 milliLiter(s) Inhalation every 6 hours      PHYSICAL EXAM:  T(C): 36.9 (01-21-18 @ 05:34), Max: 36.9 (01-20-18 @ 21:21)  HR: 79 (01-21-18 @ 05:34) (61 - 79)  BP: 149/64 (01-21-18 @ 05:34) (149/64 - 155/71)  RR: 18 (01-21-18 @ 05:34) (18 - 18)  SpO2: 100% (01-21-18 @ 05:34) (98% - 100%)  Wt(kg): --  I&O's Summary        Appearance: Normal	  HEENT:   Normal oral mucosa, PERRL, EOMI	  Cardiovascular: Normal S1 S2,    Murmur:   Neck: JVP normal  Respiratory: Lungs clear to auscultation  Gastrointestinal:  Soft, Non-tender, + BS	  Skin: normal   Neuro: No gross deficits.   Psychiatry:  Mood & affect appropriate  Ext: No edema      LABS/DATA:    CARDIAC MARKERS:  CARDIAC MARKERS ( 18 Jan 2018 17:21 )  x     / < 0.06 ng/mL / 41 u/L / 1.45 ng/mL / x      CARDIAC MARKERS ( 18 Jan 2018 11:36 )  x     / < 0.06 ng/mL / 31 u/L / 1.30 ng/mL / x                                    11.5   8.58  )-----------( 270      ( 21 Jan 2018 06:25 )             35.1     01-21    141  |  106  |  12  ----------------------------<  87  3.8   |  22  |  1.80<H>    Ca    8.6      21 Jan 2018 06:25  Phos  3.2     01-21  Mg     1.7     01-21      proBNP:   Lipid Profile:   HgA1c:   TSH:     TELE:  EKG:
Subjective: Patient seen and examined. No new events except as noted.     SUBJECTIVE/ROS:  No chest pain, dyspnea, palpitation, or dizziness.       MEDICATIONS:  MEDICATIONS  (STANDING):  ALBUTerol/ipratropium for Nebulization 3 milliLiter(s) Nebulizer every 6 hours  amLODIPine   Tablet 5 milliGRAM(s) Oral daily  aspirin  chewable 81 milliGRAM(s) Oral daily  atorvastatin 20 milliGRAM(s) Oral at bedtime  carvedilol 6.25 milliGRAM(s) Oral every 12 hours  pantoprazole  Injectable 40 milliGRAM(s) IV Push two times a day  sodium chloride 0.9%. 1000 milliLiter(s) (75 mL/Hr) IV Continuous <Continuous>  sodium chloride 0.9%. 1000 milliLiter(s) (75 mL/Hr) IV Continuous <Continuous>  sodium chloride 3%  Inhalation 3 milliLiter(s) Inhalation every 6 hours      PHYSICAL EXAM:  T(C): 36.8 (01-22-18 @ 05:24), Max: 37.1 (01-21-18 @ 15:26)  HR: 68 (01-22-18 @ 05:24) (67 - 77)  BP: 111/61 (01-22-18 @ 05:24) (111/61 - 139/60)  RR: 18 (01-22-18 @ 05:24) (17 - 18)  SpO2: 100% (01-22-18 @ 05:24) (100% - 100%)  Wt(kg): --  I&O's Summary        Appearance: Normal	  HEENT:   Normal oral mucosa, PERRL, EOMI	  Cardiovascular: Normal S1 S2,    Murmur:   Neck: JVP normal  Respiratory: Lungs clear to auscultation  Gastrointestinal:  Soft, Non-tender, + BS	  Skin: normal   Neuro: No gross deficits.   Psychiatry:  Mood & affect appropriate  Ext: No edema      LABS/DATA:    CARDIAC MARKERS:                                11.1   9.30  )-----------( 255      ( 22 Jan 2018 05:15 )             35.0     01-22    138  |  103  |  12  ----------------------------<  82  4.2   |  24  |  1.84<H>    Ca    8.7      22 Jan 2018 05:15  Phos  3.2     01-22  Mg     1.6     01-22      proBNP:   Lipid Profile:   HgA1c:   TSH:     TELE:  EKG:
Subjective: Patient seen and examined. No new events except as noted.     SUBJECTIVE/ROS:  feels ok       MEDICATIONS:  MEDICATIONS  (STANDING):  ALBUTerol/ipratropium for Nebulization 3 milliLiter(s) Nebulizer every 6 hours  amLODIPine   Tablet 5 milliGRAM(s) Oral daily  aspirin  chewable 81 milliGRAM(s) Oral daily  atorvastatin 20 milliGRAM(s) Oral at bedtime  carvedilol 6.25 milliGRAM(s) Oral every 12 hours  heparin  Injectable 5000 Unit(s) SubCutaneous every 8 hours  pantoprazole  Injectable 40 milliGRAM(s) IV Push two times a day  sodium chloride 0.9%. 1000 milliLiter(s) (75 mL/Hr) IV Continuous <Continuous>  sodium chloride 0.9%. 1000 milliLiter(s) (75 mL/Hr) IV Continuous <Continuous>  sodium chloride 3%  Inhalation 3 milliLiter(s) Inhalation every 6 hours      PHYSICAL EXAM:  T(C): 36.9 (01-19-18 @ 05:15), Max: 36.9 (01-19-18 @ 05:15)  HR: 62 (01-19-18 @ 05:15) (62 - 74)  BP: 178/60 (01-19-18 @ 06:57) (115/66 - 181/61)  RR: 18 (01-19-18 @ 05:15) (18 - 18)  SpO2: 100% (01-19-18 @ 05:15) (97% - 100%)  Wt(kg): --  I&O's Summary      JVP: Normal  Neck: supple  Lung: clear   CV: S1 S2 , Murmur:  Abd: soft  Ext: No edema  neuro: Awake / alert  Psych: flat affect  Skin: normal       LABS/DATA:    CARDIAC MARKERS:  CARDIAC MARKERS ( 18 Jan 2018 17:21 )  x     / < 0.06 ng/mL / 41 u/L / 1.45 ng/mL / x      CARDIAC MARKERS ( 18 Jan 2018 11:36 )  x     / < 0.06 ng/mL / 31 u/L / 1.30 ng/mL / x                                    11.8   9.92  )-----------( 270      ( 19 Jan 2018 05:49 )             35.0     01-19    139  |  103  |  10  ----------------------------<  90  3.8   |  21<L>  |  1.63<H>    Ca    8.6      19 Jan 2018 05:49      proBNP:   Lipid Profile:   HgA1c:   TSH:     TELE:  EKG:
Subjective: Patient seen and examined. No new events except as noted.     SUBJECTIVE/ROS:  s/p POEMs  No chest pain, dyspnea, palpitation, or dizziness.       MEDICATIONS:  MEDICATIONS  (STANDING):  ALBUTerol/ipratropium for Nebulization 3 milliLiter(s) Nebulizer every 6 hours  amLODIPine   Tablet 5 milliGRAM(s) Oral daily  aspirin  chewable 81 milliGRAM(s) Oral daily  atorvastatin 20 milliGRAM(s) Oral at bedtime  carvedilol 6.25 milliGRAM(s) Oral every 12 hours  pantoprazole  Injectable 40 milliGRAM(s) IV Push two times a day  sodium chloride 0.9%. 1000 milliLiter(s) (75 mL/Hr) IV Continuous <Continuous>  sodium chloride 0.9%. 1000 milliLiter(s) (75 mL/Hr) IV Continuous <Continuous>  sodium chloride 3%  Inhalation 3 milliLiter(s) Inhalation every 6 hours      PHYSICAL EXAM:  T(C): 36.8 (01-23-18 @ 05:17), Max: 37 (01-22-18 @ 13:44)  HR: 70 (01-23-18 @ 05:17) (70 - 85)  BP: 146/75 (01-23-18 @ 05:17) (111/58 - 146/75)  RR: 16 (01-23-18 @ 05:17) (16 - 18)  SpO2: 100% (01-23-18 @ 05:17) (100% - 100%)  Wt(kg): --  I&O's Summary        Appearance: Normal	  HEENT:   Normal oral mucosa, PERRL, EOMI	  Cardiovascular: Normal S1 S2,    Murmur:   Neck: JVP normal  Respiratory: Lungs clear to auscultation  Gastrointestinal:  Soft, Non-tender, + BS	  Skin: normal   Neuro: No gross deficits.   Psychiatry:  Mood & affect appropriate  Ext: No edema      LABS/DATA:    CARDIAC MARKERS:                                11.7   9.60  )-----------( 259      ( 23 Jan 2018 05:30 )             35.2     01-23    139  |  101  |  13  ----------------------------<  131<H>  4.1   |  24  |  1.74<H>    Ca    8.7      23 Jan 2018 05:30  Phos  4.4     01-23  Mg     1.5     01-23      proBNP:   Lipid Profile:   HgA1c:   TSH:     TELE:  EKG:
[facility] DIVISION OF KIDNEY DISEASES AND HYPERTENSION -- FOLLOW UP NOTE  --------------------------------------------------------------------------------  Chief Complaint: OJNY    24 hour events/subjective:    Pt continues to have phlegm and cough. Daughter at bedside frustrated because she wants myotomy done, explained that there is some further testing that GI is planning on doing. No CP, N/V, abd pain, fevers/chills. Pt still NPO, but urinating well.    PAST HISTORY  --------------------------------------------------------------------------------  No significant changes to PMH, PSH, FHx, SHx, unless otherwise noted    ALLERGIES & MEDICATIONS  --------------------------------------------------------------------------------  Allergies    No Known Allergies    Standing Inpatient Medications  ALBUTerol/ipratropium for Nebulization 3 milliLiter(s) Nebulizer every 6 hours  atorvastatin 20 milliGRAM(s) Oral at bedtime  dextrose 5%. 1000 milliLiter(s) IV Continuous <Continuous>  labetalol Injectable 10 milliGRAM(s) IV Push every 6 hours  pantoprazole  Injectable 40 milliGRAM(s) IV Push daily  piperacillin/tazobactam IVPB. 3.375 Gram(s) IV Intermittent every 12 hours  sodium chloride 3%  Inhalation 3 milliLiter(s) Inhalation every 6 hours    REVIEW OF SYSTEMS  --------------------------------------------------------------------------------  Review Of Systems: (obtained from daughter as she has been with patient and explains that patient has relayed information to her)  Constitutional: [ ] Fever [ ] Chills [ ] Fatigue [ ] Weight change   HEENT: [ ] Blurred vision [ ] Eye Pain [ ] Headache [ ] Runny nose [ ] Sore Throat   Respiratory: [x] Cough [x] Wheezing [x] Shortness of breath  Cardiovascular: [ ] Chest Pain [ ] Palpitations [ ] SCHNEIDER [ ] PND [ ] Orthopnea  Gastrointestinal: [ ] Abdominal Pain [ ] Diarrhea [ ] Constipation [ ] Hemorrhoids [ ] Nausea [ ] Vomiting  Genitourinary: [ ] Nocturia [ ] Dysuria [ ] Incontinence  Extremities: [ ] Swelling [ ] Joint Pain  Neurologic: [ ] Focal deficit [ ] Paresthesias [ ] Syncope  Lymphatic: [ ] Swelling [ ] Lymphadenopathy   Skin: [ ] Rash [ ] Ecchymoses [ ] Wounds [ ] Lesions  Psychiatry: [ ] Depression [ ] Suicidal/Homicidal Ideation [ ] Anxiety [ ] Sleep Disturbances  [x] 10 point review of systems is otherwise negative except as mentioned above  [ ]Unable to obtain    All other systems were reviewed and are negative, except as noted.    VITALS/PHYSICAL EXAM  --------------------------------------------------------------------------------  T(C): 36.4 (01-12-18 @ 04:50), Max: 37.1 (01-11-18 @ 23:20)  HR: 63 (01-12-18 @ 09:33) (60 - 80)  BP: 172/60 (01-12-18 @ 06:28) (129/58 - 199/63)  RR: 18 (01-12-18 @ 04:50) (17 - 20)  SpO2: 100% (01-12-18 @ 04:53) (97% - 100%)  Wt(kg): --    Height (cm): 160.02 (01-10-18 @ 16:08)  Weight (kg): 54.6 (01-10-18 @ 16:08)  BMI (kg/m2): 21.3 (01-10-18 @ 16:08)  BSA (m2): 1.56 (01-10-18 @ 16:08)  Daily     Daily   I&O's Summary    Physical Exam:              Gen: NAD , sitting comfortably in bed  	HEENT: anicteric  	Pulm: bilateral crackles and expiratory wheezing  	CV: RRR, no murmurs/rubs/gallops  	Back: No dependent edema  	Abd: soft, nontender, nondistended  	: No lee  	LE: Warm, no edema  	Neuro: no asterixis  	Skin: Warm, without rashes    LABS/STUDIES  --------------------------------------------------------------------------------              12.2   7.69  >-----------<  240      [01-11-18 @ 06:20]              37.9     147  |  106  |  22  ----------------------------<  81      [01-12-18 @ 05:08]  3.6   |  26  |  1.62        Ca     8.6     [01-12-18 @ 05:08]      Mg     2.2     [01-11-18 @ 06:20]      Phos  3.2     [01-11-18 @ 06:20]    PT/INR: PT 11.7 , INR 1.05       [01-11-18 @ 06:20]    Creatinine Trend:  SCr 1.62 [01-12 @ 05:08]  SCr 1.83 [01-11 @ 06:20]  SCr 2.63 [01-10 @ 10:49]  SCr 2.61 [01-10 @ 09:00]  SCr 2.14 [01-08 @ 08:32]    Urinalysis - [01-04-18 @ 04:16]      Color Yellow / Appearance Clear / SG 1.010 / pH 6.5      Gluc Negative / Ketone Negative  / Bili Negative / Urobili Negative       Blood Trace / Protein 500 / Leuk Est Negative / Nitrite Negative      RBC  / WBC 0-2 / Hyaline  / Gran  / Sq Epi  / Non Sq Epi Few / Bacteria Few    Radiology    US Renal (01.11.18 @ 10:27)  EXAM:  US KIDNEY(S)      PROCEDURE DATE:  Jan 11 2018     INTERPRETATION:  CLINICAL INFORMATION: Acute kidney injury. Aspiration   pneumonia.    COMPARISON: None available.    TECHNIQUE: Sonography of the kidneys and bladder.     FINDINGS:    Right kidney:  5.4 cm. Echogenic parenchyma. 2.3 cm lower pole cyst. No   renal mass, hydronephrosis, or calculi.    Left kidney:  9.3 cm. No renal mass, hydronephrosis or calculi.    Urinary bladder: Within normal limits.    IMPRESSION:     Left kidney within normal limits. Right kidney chronically atrophic.
Patient is a 77y old  Female who presents with a chief complaint of Transfer from [place] for achalasia management (10 Ousmane 2018 20:19)      SUBJECTIVE / OVERNIGHT EVENTS: Pt without complaints, denies pain or SOB.    MEDICATIONS  (STANDING):  ALBUTerol/ipratropium for Nebulization 3 milliLiter(s) Nebulizer every 6 hours  amLODIPine   Tablet 5 milliGRAM(s) Oral daily  aspirin  chewable 81 milliGRAM(s) Oral daily  atorvastatin 20 milliGRAM(s) Oral at bedtime  carvedilol 6.25 milliGRAM(s) Oral every 12 hours  heparin  Injectable 5000 Unit(s) SubCutaneous every 8 hours  pantoprazole  Injectable 40 milliGRAM(s) IV Push daily  sodium chloride 0.9%. 1000 milliLiter(s) (75 mL/Hr) IV Continuous <Continuous>  sodium chloride 3%  Inhalation 3 milliLiter(s) Inhalation every 6 hours    MEDICATIONS  (PRN):      Vital Signs Last 24 Hrs  T(C): 36.7 (01-16-18 @ 13:54), Max: 36.8 (01-15-18 @ 14:20)  T(F): 98.1 (01-16-18 @ 13:54), Max: 98.2 (01-15-18 @ 14:20)  HR: 79 (01-16-18 @ 13:54) (66 - 79)  BP: 143/69 (01-16-18 @ 13:54) (143/69 - 180/75)  BP(mean): --  RR: 18 (01-16-18 @ 13:54) (17 - 18)  SpO2: 99% (01-16-18 @ 13:54) (96% - 100%)  CAPILLARY BLOOD GLUCOSE        I&O's Summary      PHYSICAL EXAM:  GENERAL: NAD, well-nourished  HEENT: mmm  CHEST/LUNG: CTABL improved from prior  HEART: RRR, no m/r/g  ABDOMEN: soft, nt, nd  EXTREMITIES:  wwp, no c/c/e  PSYCH: AAOx1  NEUROLOGY: non-focal  SKIN: No rashes or lesions    LABS:                        12.5   8.71  )-----------( 256      ( 16 Jan 2018 05:44 )             38.0     01-16    142  |  104  |  8   ----------------------------<  91  3.8   |  22  |  1.59<H>    Ca    8.6      16 Jan 2018 05:44                RADIOLOGY & ADDITIONAL TESTS:    Imaging Personally Reviewed:    Consultant(s) Notes Reviewed:      Care Discussed with Consultants/Other Providers:
Chief Complaint: Patient is a 77y old  Female who presents with a chief complaint of Transfer from [place] for achalasia management (10 Ousmane 2018 20:19)    INTERVAL HPI/OVERNIGHT EVENTS:   Patient denies any acute issues, does endorse occasional odynophagia    MEDICATIONS  (STANDING):  ALBUTerol/ipratropium for Nebulization 3 milliLiter(s) Nebulizer every 6 hours  amLODIPine   Tablet 5 milliGRAM(s) Oral daily  atorvastatin 20 milliGRAM(s) Oral at bedtime  carvedilol 3.125 milliGRAM(s) Oral every 12 hours  dextrose 5%. 1000 milliLiter(s) (75 mL/Hr) IV Continuous <Continuous>  pantoprazole  Injectable 40 milliGRAM(s) IV Push daily  potassium chloride    Tablet ER 40 milliEquivalent(s) Oral once  sodium chloride 3%  Inhalation 3 milliLiter(s) Inhalation every 6 hours    Vital Signs Last 24 Hrs  T(C): 36.9 (13 Jan 2018 08:41), Max: 37.2 (12 Jan 2018 20:24)  T(F): 98.5 (13 Jan 2018 08:41), Max: 99 (12 Jan 2018 20:24)  HR: 65 (13 Jan 2018 09:41) (64 - 82)  BP: 152/72 (13 Jan 2018 08:41) (125/84 - 212/65)  BP(mean): --  RR: 18 (13 Jan 2018 08:41) (18 - 18)  SpO2: 98% (13 Jan 2018 09:41) (98% - 100%)    I&O's Detail  CAPILLARY BLOOD GLUCOSE    PHYSICAL EXAM:  GENERAL: NAD  Pulm: CTA b/l no longer wheezing  CV: S1&S2+, RRR without R/M/G  ABDOMEN: Soft, ND/NT, BS+  EXTREMITIES:  2+ peripheral pulses, no appreciable edema in b/l LE  Neuro: Awake, encephalopathic oriented x1    LABS:                        11.2   7.23  )-----------( 214      ( 13 Jan 2018 06:00 )             33.8     01-13    135  |  97<L>  |  13  ----------------------------<  97  3.1<L>   |  27  |  1.44<H>    Ca    8.4      13 Jan 2018 06:00    LIVER FUNCTIONS - ( 10 Ousmane 2018 09:00 )  Alb: 2.2 g/dL / Pro: 6.8 gm/dL / ALK PHOS: 87 U/L / ALT: 12 U/L / AST: 21 U/L / GGT: x     / T. Bili 0.4 mg/dL / D. Bili x       Microbiology:    RADIOLOGY & ADDITIONAL TESTS:    Imaging Personally Reviewed:     Consultant(s) Notes Reviewed:      Care Discussed with Consultants/Other Providers   Gastroenterology fellow Shannon Magana
Patient is a 77y old  Female who presents with a chief complaint of Transfer from [place] for achalasia management (10 Ousmane 2018 20:19)      SUBJECTIVE / OVERNIGHT EVENTS: Pt without complaints. No events.    MEDICATIONS  (STANDING):  ALBUTerol/ipratropium for Nebulization 3 milliLiter(s) Nebulizer every 6 hours  amLODIPine   Tablet 5 milliGRAM(s) Oral daily  aspirin  chewable 81 milliGRAM(s) Oral daily  atorvastatin 20 milliGRAM(s) Oral at bedtime  carvedilol 6.25 milliGRAM(s) Oral every 12 hours  heparin  Injectable 5000 Unit(s) SubCutaneous every 8 hours  pantoprazole  Injectable 40 milliGRAM(s) IV Push two times a day  sodium chloride 0.9%. 1000 milliLiter(s) (75 mL/Hr) IV Continuous <Continuous>  sodium chloride 0.9%. 1000 milliLiter(s) (75 mL/Hr) IV Continuous <Continuous>  sodium chloride 3%  Inhalation 3 milliLiter(s) Inhalation every 6 hours    MEDICATIONS  (PRN):      Vital Signs Last 24 Hrs  T(C): 36.8 (01-20-18 @ 05:06), Max: 37 (01-19-18 @ 21:16)  T(F): 98.2 (01-20-18 @ 05:06), Max: 98.6 (01-19-18 @ 21:16)  HR: 71 (01-20-18 @ 05:06) (70 - 77)  BP: 134/59 (01-20-18 @ 05:06) (133/56 - 144/71)  BP(mean): --  RR: 18 (01-20-18 @ 05:06) (18 - 18)  SpO2: 98% (01-20-18 @ 05:06) (96% - 98%)  CAPILLARY BLOOD GLUCOSE        I&O's Summary      PHYSICAL EXAM:  GENERAL: NAD, well-nourished  HEENT: mmm  CHEST/LUNG: CTABL  HEART: RRR, no m/r/g  ABDOMEN: soft, nt, nd  EXTREMITIES:  wwp, no c/c/e  NEUROLOGY: non-focal  SKIN: No rashes or lesions    LABS:                        11.7   7.91  )-----------( 279      ( 20 Jan 2018 10:17 )             35.4     01-20    139  |  104  |  12  ----------------------------<  92  3.9   |  23  |  2.00<H>    Ca    8.7      20 Jan 2018 10:17  Phos  3.3     01-20  Mg     1.4     01-20      PT/INR - ( 19 Jan 2018 05:49 )   PT: 12.1 SEC;   INR: 1.09          PTT - ( 19 Jan 2018 05:49 )  PTT:42.6 SEC  CARDIAC MARKERS ( 18 Jan 2018 17:21 )  x     / < 0.06 ng/mL / 41 u/L / 1.45 ng/mL / x              RADIOLOGY & ADDITIONAL TESTS:    Imaging Personally Reviewed:    Consultant(s) Notes Reviewed:      Care Discussed with Consultants/Other Providers:

## 2018-01-25 NOTE — PROGRESS NOTE ADULT - PROBLEM SELECTOR PROBLEM 6
Essential hypertension

## 2018-01-25 NOTE — PROGRESS NOTE ADULT - PROBLEM SELECTOR PLAN 7
Symptom management with Duoneb PRN and IVF
Improved  Symptom management with Duoneb PRN
Symptom management with Duoneb PRN and IVF

## 2018-01-25 NOTE — PROGRESS NOTE ADULT - ASSESSMENT
IMP:    1. Achalasia s/p POEMS 1/22  2. Pyloric stenosis s/p balloon dilation    PLAN:  - keep on clear liquid diet today, advance to full liquids tomorrow x 3 days, then advance gradually as tolerated  - PPI BID PO  - Levaquin 500mg daily - total antibiotic course 5 days   - IV hydration  - avoid imaging studies unless signs of clinical decompensation  - keep head of bed elevated at all times   - followup outpatient with Dr.Calvin Martinez/Dr.Petros Magaña next Tuesday

## 2018-01-25 NOTE — PROGRESS NOTE ADULT - PROBLEM SELECTOR PLAN 2
Resolved, Pt  saturating 100% on RA  Multifactorial due to coronavirus bronchitis as well as superimposed aspiration PNA/pneumonitis and mucus plugging as evidenced on CTA chest 1/4/18  - Pt treated with Azithromycin and Zosyn at [facility] since 1/4 for possible superimposed aspiration pneumonia/CAP completed Zosyn 7 days; completed 5 days of Azithromycin   - Pt with improved lung exam s/p pulm toilet   - CTA negative for PE  - Pt does not clinically appear volume overloaded, TTE with normal LV function, stress neg

## 2018-01-30 ENCOUNTER — APPOINTMENT (OUTPATIENT)
Dept: GASTROENTEROLOGY | Facility: CLINIC | Age: 78
End: 2018-01-30
Payer: MEDICAID

## 2018-01-30 VITALS
HEART RATE: 68 BPM | OXYGEN SATURATION: 100 % | DIASTOLIC BLOOD PRESSURE: 76 MMHG | TEMPERATURE: 97.5 F | RESPIRATION RATE: 16 BRPM | SYSTOLIC BLOOD PRESSURE: 146 MMHG | WEIGHT: 121 LBS

## 2018-01-30 DIAGNOSIS — F01.50 VASCULAR DEMENTIA W/OUT BEHAVIORAL DISTURBANCE: ICD-10-CM

## 2018-01-30 DIAGNOSIS — K31.1 ADULT HYPERTROPHIC PYLORIC STENOSIS: ICD-10-CM

## 2018-01-30 DIAGNOSIS — I10 ESSENTIAL (PRIMARY) HYPERTENSION: ICD-10-CM

## 2018-01-30 DIAGNOSIS — K22.0 ACHALASIA OF CARDIA: ICD-10-CM

## 2018-01-30 PROCEDURE — 99204 OFFICE O/P NEW MOD 45 MIN: CPT

## 2018-02-16 PROBLEM — K31.1 PYLORIC STENOSIS: Status: ACTIVE | Noted: 2018-02-16

## 2018-02-16 PROBLEM — F01.50 VASCULAR DEMENTIA: Status: ACTIVE | Noted: 2018-02-16

## 2018-02-16 PROBLEM — K22.0 ACHALASIA: Status: ACTIVE | Noted: 2018-02-16

## 2018-02-16 RX ORDER — PANTOPRAZOLE 40 MG/1
40 TABLET, DELAYED RELEASE ORAL
Refills: 0 | Status: ACTIVE | COMMUNITY
Start: 2018-02-16

## 2018-02-16 RX ORDER — CARVEDILOL 3.12 MG/1
3.12 TABLET, FILM COATED ORAL
Refills: 0 | Status: ACTIVE | COMMUNITY
Start: 2018-02-16

## 2018-02-16 RX ORDER — ATORVASTATIN CALCIUM 20 MG/1
20 TABLET, FILM COATED ORAL
Refills: 0 | Status: ACTIVE | COMMUNITY
Start: 2018-02-16

## 2018-02-16 RX ORDER — AMLODIPINE BESYLATE 5 MG/1
5 TABLET ORAL
Refills: 0 | Status: ACTIVE | COMMUNITY
Start: 2018-02-16

## 2018-02-16 RX ORDER — DONEPEZIL HYDROCHLORIDE 10 MG/1
10 TABLET ORAL
Refills: 0 | Status: ACTIVE | COMMUNITY
Start: 2018-02-16

## 2018-02-16 RX ORDER — BACLOFEN 10 MG/1
10 TABLET ORAL
Refills: 0 | Status: ACTIVE | COMMUNITY
Start: 2018-02-16

## 2018-02-16 RX ORDER — LEVOFLOXACIN 250 MG/1
250 TABLET, FILM COATED ORAL
Refills: 0 | Status: ACTIVE | COMMUNITY
Start: 2018-02-16

## 2018-04-25 NOTE — PATIENT PROFILE ADULT. - MEDICATION ADMINISTRATION INFO, PROFILE
Wears Reading Glasses/Partially impaired: cannot see medication labels or newsprint, but can see obstacles in path, and the surrounding layout; can count fingers at arm's length difficulty swallowing pills

## 2018-05-01 ENCOUNTER — OUTPATIENT (OUTPATIENT)
Dept: OUTPATIENT SERVICES | Facility: HOSPITAL | Age: 78
LOS: 1 days | End: 2018-05-01
Payer: MEDICAID

## 2018-05-01 PROCEDURE — G9001: CPT

## 2018-05-03 DIAGNOSIS — R69 ILLNESS, UNSPECIFIED: ICD-10-CM

## 2018-06-28 NOTE — PROGRESS NOTE ADULT - PROBLEM SELECTOR PLAN 6
Patient called and wants something in place of Metoprolol. Patient states to expensive. BP currently not at goal  - Holding amlodipine 2.5mg for now, can restart once on PO diet  - change to IV labetolol given poorly controlled HTN on metoprolol, uptitrate as indicated

## 2019-04-03 ENCOUNTER — INPATIENT (INPATIENT)
Facility: HOSPITAL | Age: 79
LOS: 1 days | Discharge: HOME | End: 2019-04-05
Attending: INTERNAL MEDICINE | Admitting: INTERNAL MEDICINE
Payer: MEDICARE

## 2019-04-03 VITALS
DIASTOLIC BLOOD PRESSURE: 93 MMHG | TEMPERATURE: 99 F | OXYGEN SATURATION: 98 % | SYSTOLIC BLOOD PRESSURE: 158 MMHG | HEART RATE: 76 BPM | RESPIRATION RATE: 18 BRPM

## 2019-04-03 DIAGNOSIS — R06.02 SHORTNESS OF BREATH: ICD-10-CM

## 2019-04-03 DIAGNOSIS — I63.9 CEREBRAL INFARCTION, UNSPECIFIED: ICD-10-CM

## 2019-04-03 DIAGNOSIS — Z29.9 ENCOUNTER FOR PROPHYLACTIC MEASURES, UNSPECIFIED: ICD-10-CM

## 2019-04-03 DIAGNOSIS — I10 ESSENTIAL (PRIMARY) HYPERTENSION: ICD-10-CM

## 2019-04-03 DIAGNOSIS — J96.92 RESPIRATORY FAILURE, UNSPECIFIED WITH HYPERCAPNIA: ICD-10-CM

## 2019-04-03 PROBLEM — F32.9 MAJOR DEPRESSIVE DISORDER, SINGLE EPISODE, UNSPECIFIED: Chronic | Status: ACTIVE | Noted: 2018-01-10

## 2019-04-03 LAB
ALBUMIN SERPL ELPH-MCNC: 3.3 G/DL — SIGNIFICANT CHANGE UP (ref 3.3–5)
ALP SERPL-CCNC: 148 U/L — HIGH (ref 40–120)
ALT FLD-CCNC: 13 U/L — SIGNIFICANT CHANGE UP (ref 4–33)
ANION GAP SERPL CALC-SCNC: 10 MMO/L — SIGNIFICANT CHANGE UP (ref 7–14)
AST SERPL-CCNC: 51 U/L — HIGH (ref 4–32)
B PERT DNA SPEC QL NAA+PROBE: NOT DETECTED — SIGNIFICANT CHANGE UP
BASE EXCESS BLDV CALC-SCNC: 1.5 MMOL/L — SIGNIFICANT CHANGE UP
BASOPHILS # BLD AUTO: 0.03 K/UL — SIGNIFICANT CHANGE UP (ref 0–0.2)
BASOPHILS NFR BLD AUTO: 0.3 % — SIGNIFICANT CHANGE UP (ref 0–2)
BILIRUB SERPL-MCNC: 0.3 MG/DL — SIGNIFICANT CHANGE UP (ref 0.2–1.2)
BLOOD GAS VENOUS - CREATININE: 1 MG/DL — SIGNIFICANT CHANGE UP (ref 0.5–1.3)
BUN SERPL-MCNC: 14 MG/DL — SIGNIFICANT CHANGE UP (ref 7–23)
C PNEUM DNA SPEC QL NAA+PROBE: NOT DETECTED — SIGNIFICANT CHANGE UP
CALCIUM SERPL-MCNC: 8.8 MG/DL — SIGNIFICANT CHANGE UP (ref 8.4–10.5)
CHLORIDE BLDV-SCNC: 102 MMOL/L — SIGNIFICANT CHANGE UP (ref 96–108)
CHLORIDE SERPL-SCNC: 99 MMOL/L — SIGNIFICANT CHANGE UP (ref 98–107)
CO2 SERPL-SCNC: 25 MMOL/L — SIGNIFICANT CHANGE UP (ref 22–31)
CREAT SERPL-MCNC: 1.17 MG/DL — SIGNIFICANT CHANGE UP (ref 0.5–1.3)
EOSINOPHIL # BLD AUTO: 0.45 K/UL — SIGNIFICANT CHANGE UP (ref 0–0.5)
EOSINOPHIL NFR BLD AUTO: 4.4 % — SIGNIFICANT CHANGE UP (ref 0–6)
FLUAV H1 2009 PAND RNA SPEC QL NAA+PROBE: NOT DETECTED — SIGNIFICANT CHANGE UP
FLUAV H1 RNA SPEC QL NAA+PROBE: NOT DETECTED — SIGNIFICANT CHANGE UP
FLUAV H3 RNA SPEC QL NAA+PROBE: NOT DETECTED — SIGNIFICANT CHANGE UP
FLUAV SUBTYP SPEC NAA+PROBE: NOT DETECTED — SIGNIFICANT CHANGE UP
FLUBV RNA SPEC QL NAA+PROBE: NOT DETECTED — SIGNIFICANT CHANGE UP
GAS PNL BLDV: 133 MMOL/L — LOW (ref 136–146)
GLUCOSE BLDV-MCNC: 109 — HIGH (ref 70–99)
GLUCOSE SERPL-MCNC: 108 MG/DL — HIGH (ref 70–99)
HADV DNA SPEC QL NAA+PROBE: NOT DETECTED — SIGNIFICANT CHANGE UP
HCO3 BLDV-SCNC: 24 MMOL/L — SIGNIFICANT CHANGE UP (ref 20–27)
HCOV PNL SPEC NAA+PROBE: SIGNIFICANT CHANGE UP
HCT VFR BLD CALC: 36.4 % — SIGNIFICANT CHANGE UP (ref 34.5–45)
HCT VFR BLDV CALC: 33.8 % — LOW (ref 34.5–45)
HGB BLD-MCNC: 11.1 G/DL — LOW (ref 11.5–15.5)
HGB BLDV-MCNC: 10.9 G/DL — LOW (ref 11.5–15.5)
HMPV RNA SPEC QL NAA+PROBE: NOT DETECTED — SIGNIFICANT CHANGE UP
HPIV1 RNA SPEC QL NAA+PROBE: NOT DETECTED — SIGNIFICANT CHANGE UP
HPIV2 RNA SPEC QL NAA+PROBE: NOT DETECTED — SIGNIFICANT CHANGE UP
HPIV3 RNA SPEC QL NAA+PROBE: NOT DETECTED — SIGNIFICANT CHANGE UP
HPIV4 RNA SPEC QL NAA+PROBE: NOT DETECTED — SIGNIFICANT CHANGE UP
IMM GRANULOCYTES NFR BLD AUTO: 0.4 % — SIGNIFICANT CHANGE UP (ref 0–1.5)
LACTATE BLDV-MCNC: 1.6 MMOL/L — SIGNIFICANT CHANGE UP (ref 0.5–2)
LYMPHOCYTES # BLD AUTO: 1.04 K/UL — SIGNIFICANT CHANGE UP (ref 1–3.3)
LYMPHOCYTES # BLD AUTO: 10.1 % — LOW (ref 13–44)
MCHC RBC-ENTMCNC: 26.1 PG — LOW (ref 27–34)
MCHC RBC-ENTMCNC: 30.5 % — LOW (ref 32–36)
MCV RBC AUTO: 85.6 FL — SIGNIFICANT CHANGE UP (ref 80–100)
MONOCYTES # BLD AUTO: 1.23 K/UL — HIGH (ref 0–0.9)
MONOCYTES NFR BLD AUTO: 11.9 % — SIGNIFICANT CHANGE UP (ref 2–14)
NEUTROPHILS # BLD AUTO: 7.51 K/UL — HIGH (ref 1.8–7.4)
NEUTROPHILS NFR BLD AUTO: 72.9 % — SIGNIFICANT CHANGE UP (ref 43–77)
NRBC # FLD: 0 K/UL — SIGNIFICANT CHANGE UP (ref 0–0)
NT-PROBNP SERPL-SCNC: 1466 PG/ML — SIGNIFICANT CHANGE UP
PCO2 BLDV: 54 MMHG — HIGH (ref 41–51)
PH BLDV: 7.32 PH — SIGNIFICANT CHANGE UP (ref 7.32–7.43)
PLATELET # BLD AUTO: 301 K/UL — SIGNIFICANT CHANGE UP (ref 150–400)
PMV BLD: 10.4 FL — SIGNIFICANT CHANGE UP (ref 7–13)
PO2 BLDV: 31 MMHG — LOW (ref 35–40)
POTASSIUM BLDV-SCNC: 5.3 MMOL/L — HIGH (ref 3.4–4.5)
POTASSIUM SERPL-MCNC: 5.9 MMOL/L — HIGH (ref 3.5–5.3)
POTASSIUM SERPL-SCNC: 5.9 MMOL/L — HIGH (ref 3.5–5.3)
PROT SERPL-MCNC: 7.7 G/DL — SIGNIFICANT CHANGE UP (ref 6–8.3)
RBC # BLD: 4.25 M/UL — SIGNIFICANT CHANGE UP (ref 3.8–5.2)
RBC # FLD: 14.9 % — HIGH (ref 10.3–14.5)
RSV RNA SPEC QL NAA+PROBE: NOT DETECTED — SIGNIFICANT CHANGE UP
RV+EV RNA SPEC QL NAA+PROBE: DETECTED — HIGH
SAO2 % BLDV: 44.7 % — LOW (ref 60–85)
SODIUM SERPL-SCNC: 134 MMOL/L — LOW (ref 135–145)
TROPONIN T, HIGH SENSITIVITY: 12 NG/L — SIGNIFICANT CHANGE UP (ref ?–14)
TROPONIN T, HIGH SENSITIVITY: 13 NG/L — SIGNIFICANT CHANGE UP (ref ?–14)
WBC # BLD: 10.3 K/UL — SIGNIFICANT CHANGE UP (ref 3.8–10.5)
WBC # FLD AUTO: 10.3 K/UL — SIGNIFICANT CHANGE UP (ref 3.8–10.5)

## 2019-04-03 PROCEDURE — 71045 X-RAY EXAM CHEST 1 VIEW: CPT | Mod: 26

## 2019-04-03 PROCEDURE — 99223 1ST HOSP IP/OBS HIGH 75: CPT | Mod: GC

## 2019-04-03 RX ORDER — PANTOPRAZOLE SODIUM 20 MG/1
40 TABLET, DELAYED RELEASE ORAL
Qty: 0 | Refills: 0 | Status: DISCONTINUED | OUTPATIENT
Start: 2019-04-03 | End: 2019-04-05

## 2019-04-03 RX ORDER — AMLODIPINE BESYLATE 2.5 MG/1
5 TABLET ORAL DAILY
Qty: 0 | Refills: 0 | Status: DISCONTINUED | OUTPATIENT
Start: 2019-04-03 | End: 2019-04-05

## 2019-04-03 RX ORDER — DONEPEZIL HYDROCHLORIDE 10 MG/1
10 TABLET, FILM COATED ORAL AT BEDTIME
Qty: 0 | Refills: 0 | Status: DISCONTINUED | OUTPATIENT
Start: 2019-04-03 | End: 2019-04-05

## 2019-04-03 RX ORDER — BACLOFEN 100 %
10 POWDER (GRAM) MISCELLANEOUS DAILY
Qty: 0 | Refills: 0 | Status: DISCONTINUED | OUTPATIENT
Start: 2019-04-03 | End: 2019-04-03

## 2019-04-03 RX ORDER — BACLOFEN 100 %
1 POWDER (GRAM) MISCELLANEOUS
Qty: 0 | Refills: 0 | COMMUNITY

## 2019-04-03 RX ORDER — IPRATROPIUM/ALBUTEROL SULFATE 18-103MCG
3 AEROSOL WITH ADAPTER (GRAM) INHALATION EVERY 6 HOURS
Qty: 0 | Refills: 0 | Status: DISCONTINUED | OUTPATIENT
Start: 2019-04-03 | End: 2019-04-05

## 2019-04-03 RX ORDER — ATORVASTATIN CALCIUM 80 MG/1
20 TABLET, FILM COATED ORAL AT BEDTIME
Qty: 0 | Refills: 0 | Status: DISCONTINUED | OUTPATIENT
Start: 2019-04-03 | End: 2019-04-05

## 2019-04-03 RX ORDER — ENOXAPARIN SODIUM 100 MG/ML
40 INJECTION SUBCUTANEOUS DAILY
Qty: 0 | Refills: 0 | Status: DISCONTINUED | OUTPATIENT
Start: 2019-04-03 | End: 2019-04-05

## 2019-04-03 RX ORDER — IPRATROPIUM/ALBUTEROL SULFATE 18-103MCG
3 AEROSOL WITH ADAPTER (GRAM) INHALATION ONCE
Qty: 0 | Refills: 0 | Status: COMPLETED | OUTPATIENT
Start: 2019-04-03 | End: 2019-04-03

## 2019-04-03 RX ORDER — ASPIRIN/CALCIUM CARB/MAGNESIUM 324 MG
324 TABLET ORAL ONCE
Qty: 0 | Refills: 0 | Status: COMPLETED | OUTPATIENT
Start: 2019-04-03 | End: 2019-04-03

## 2019-04-03 RX ORDER — CARVEDILOL PHOSPHATE 80 MG/1
6.25 CAPSULE, EXTENDED RELEASE ORAL EVERY 12 HOURS
Qty: 0 | Refills: 0 | Status: DISCONTINUED | OUTPATIENT
Start: 2019-04-03 | End: 2019-04-05

## 2019-04-03 RX ORDER — HYDRALAZINE HCL 50 MG
10 TABLET ORAL ONCE
Qty: 0 | Refills: 0 | Status: DISCONTINUED | OUTPATIENT
Start: 2019-04-03 | End: 2019-04-03

## 2019-04-03 RX ORDER — ALBUTEROL 90 UG/1
2 AEROSOL, METERED ORAL EVERY 6 HOURS
Qty: 0 | Refills: 0 | Status: DISCONTINUED | OUTPATIENT
Start: 2019-04-03 | End: 2019-04-03

## 2019-04-03 RX ORDER — ESCITALOPRAM OXALATE 10 MG/1
10 TABLET, FILM COATED ORAL DAILY
Qty: 0 | Refills: 0 | Status: DISCONTINUED | OUTPATIENT
Start: 2019-04-03 | End: 2019-04-05

## 2019-04-03 RX ORDER — ASPIRIN/CALCIUM CARB/MAGNESIUM 324 MG
325 TABLET ORAL DAILY
Qty: 0 | Refills: 0 | Status: DISCONTINUED | OUTPATIENT
Start: 2019-04-03 | End: 2019-04-05

## 2019-04-03 RX ADMIN — Medication 3 MILLILITER(S): at 16:01

## 2019-04-03 RX ADMIN — Medication 40 MILLIGRAM(S): at 16:01

## 2019-04-03 RX ADMIN — Medication 324 MILLIGRAM(S): at 16:01

## 2019-04-03 RX ADMIN — Medication 3 MILLILITER(S): at 15:12

## 2019-04-03 NOTE — CONSULT NOTE ADULT - ASSESSMENT
Assessment    78-F with no allergy or asthma history here with acute shortness of breath and stridor without clear etiology  Distressed and tachypneic off bipap even momentarily    COncern for vocal cord dysfunction or edema though no tongue or lip lynda    Recs  ENT for urgent eval   Patient on bipap and feels very uncomfortable off it  Would not admit to floor with pulse ox at this time given lack of clarity in the etiology of her dyspnea  Expect RCU bed to be available tonight vs MICU depending on clinical course and ENT findings.     d/w MICU overnight team.

## 2019-04-03 NOTE — CONSULT NOTE ADULT - ATTENDING COMMENTS
79 yo woman with h/o dementia and CVA's, presenting with resp distress and stridor, found to have L vc paralysis by ENT eval.  Pt's daughter notes pt has had a "cold" over the last couple of days.  Pt seen and examined on BiPAP; resting in bed, comfortable in no resp distress, baseline MS as per family; oxygenating well; VSS  + entero/rhino virus  Resp distress and stridor likely 2/2 chronic L vc paralysis, exascerbated by rhinoviral infection  She is comfortable and stable on BiPAP  Would continue BiPAP on current settings (10/5) and wean off as tolerated; taper FiO2 to maintain O2 sat >90%  She does not require MICU admission   Please reconsult us if her condition deteriorates

## 2019-04-03 NOTE — H&P ADULT - NSHPLABSRESULTS_GEN_ALL_CORE
LABS:  CBC Full  -  ( 03 Apr 2019 15:44 )  WBC Count : 10.30 K/uL  Hemoglobin : 11.1 g/dL  Hematocrit : 36.4 %  Platelet Count - Automated : 301 K/uL  Mean Cell Volume : 85.6 fL  Mean Cell Hemoglobin : 26.1 pg  Mean Cell Hemoglobin Concentration : 30.5 %  Auto Neutrophil # : 7.51 K/uL  Auto Lymphocyte # : 1.04 K/uL  Auto Monocyte # : 1.23 K/uL  Auto Eosinophil # : 0.45 K/uL  Auto Basophil # : 0.03 K/uL  Auto Neutrophil % : 72.9 %  Auto Lymphocyte % : 10.1 %  Auto Monocyte % : 11.9 %  Auto Eosinophil % : 4.4 %  Auto Basophil % : 0.3 %    134<L>  |  99     |  14     ----------------------------<  108<H>    03 Apr 2019 15:44  5.9<H>   |  25     |  1.17         Ca 8.8           03 Apr 2019 15:44        TPro  7.7    /  Alb  3.3    /  TBili  0.3    /  DBili  x      /  AST  51<H>  /  ALT  13     /  AlkPhos  148<H>  03 Apr 2019 15:44    < from: Xray Chest 1 View- PORTABLE-Urgent (04.03.19 @ 15:23) >    EXAM:  Semiupright frontal chest from 4/3/2019 at 1523. Compared to prior study   from 1/9/2018.    Rotated left.    IMPRESSION:  Right basilar obliquely oriented focus of linear subsegmental atelectasis   or scar. Clear remaining visualized lungs. No pleural effusions or   pneumothorax.    Stable cardiac and mediastinal silhouettes including aortic   calcifications.    Trachea midline.    Generalized osteopenia again noted.      < end of copied text > 04-03    134<L>  |  99  |  14  ----------------------------<  108<H>  5.9<H>   |  25  |  1.17    Ca    8.8      03 Apr 2019 15:44    TPro  7.7  /  Alb  3.3  /  TBili  0.3  /  DBili  x   /  AST  51<H>  /  ALT  13  /  AlkPhos  148<H>  04-03                        11.1   10.30 )-----------( 301      ( 03 Apr 2019 15:44 )             36.4     01:00 - VBG - pH: 7.41  | pCO2: 39    | pO2: 104   | Lactate:        15:44 - VBG - pH: 7.32  | pCO2: 54    | pO2: 31    | Lactate: 1.6      Troponin T, High Sensitivity: 13 ng/L (04.03.19 @ 18:30)  Troponin T, High Sensitivity: 12 ng/L (04.03.19 @ 15:44)    Serum Pro-Brain Natriuretic Peptide: 1466 pg/mL pg/mL (04.03.19 @ 15:44)    Entero/Rhinovirus (RapRVP): Detected (04.03.19 @ 16:35)    < from: Xray Chest 1 View- PORTABLE-Urgent (04.03.19 @ 15:23) >  EXAM: Semiupright frontal chest from 4/3/2019 at 1523. Compared to prior study from 1/9/2018. Rotated left.  IMPRESSION: Right basilar obliquely oriented focus of linear subsegmental atelectasis or scar. Clear remaining visualized lungs. No pleural effusions or   pneumothorax. Stable cardiac and mediastinal silhouettes including aortic calcifications. Trachea midline. Generalized osteopenia again noted.  < end of copied text >    EKG personally reviewed - NSR, TWI in I, II, aVF, V3-V6, QTc 450ms grossly unchanged from prior in alpha from 2018

## 2019-04-03 NOTE — ED PROVIDER NOTE - CLINICAL SUMMARY MEDICAL DECISION MAKING FREE TEXT BOX
78f w hx CAD s/p MI, HTN, CVA w residual left-sided weakness, dementia, depression, achalasia here for sudden-onset wheezing and SOB that started this afternoon. Appears ill in respiratory distress w diffuse biphasic wheezing and use of accessory muscles. Cardiac exam unrem. No other sx or obvious etiologic factors to suggest anaphylaxis. Possibly cardiogenic though pt w/o significant peripheral edema. Possible pna/URI. Will check labs, cxr, treat w nebs and steroids, adm  See progress notes for further clinical course

## 2019-04-03 NOTE — CONSULT NOTE ADULT - SUBJECTIVE AND OBJECTIVE BOX
78 year old female with hx of CVA PW SOB.   Called to evaluate vocal cords for possible edema  vs upper airway obstruction. Pt C/O sob and cough for 2 days.   Denies any fevers, chills, rigors or sweats.     AVSS, P02 100% on CPAP  HEENT:  Mouth: no Floor of mouth edema,   No injection, No exudates,     Scope:  No base of tongue edema or masses  epiglottis sharp  Left VC paresis/hypomobile  Right VC mobile and compensating for left.

## 2019-04-03 NOTE — ED PROVIDER NOTE - PROGRESS NOTE DETAILS
Elizabeth Goldberger PGY-2: pt still w significant wheezing after nebs. States feels slightly better but still appears w resp distress. Will call for BiPAP Elizabeth Goldberger PGY-2: pt appears more comfortable after ~30 m bipap, still wheezing though decreased Elizabeth Goldberger PGY-2: seen by MICU/pulm, who find pt to be have more stridor than wheezing. Recommended ent involving to scope, will call for cs DD ED ATTF p/w SOB and significant dyspnea.  Hyperacute onset, was OK initially.  On bipap for work of breathing.  TBA.  breathing more easily on bipap.  pulm/micu   eval heard stridor, recc ent scope. Elizabeth Goldberger PGY-2: scoped by ENT, who did not speak to ed staff but note indicates found some degree of vocal cord paralysis that appeared chronic "no acute ent intervention warranted". Called for verbal confirmation but resident was scrubbed, awaiting callback. In meantime called MICU back, who state are trying to find out if RCU bed available and will call back w more info Elizabeth Goldberger PGY-2: d/w MICU attg, who states pt appaers appropriate for regular floor at this time, pulm to follow  spoke to hospitalist, said ok to adm, but requests bp lowering in case of cardiogenic component

## 2019-04-03 NOTE — H&P ADULT - NSHPSOCIALHISTORY_GEN_ALL_CORE
Never smoker  No ETOH  Lives with daughter Never smoker  No ETOH  Lives with daughter  , has 2 living children who are co-HCP: Katelyn Henderson (Vicky) and Ana Odonnell (Maggie)

## 2019-04-03 NOTE — H&P ADULT - PROBLEM SELECTOR PLAN 3
-c/w amlodipine + coreg with hold parameters Essential hypertension  -c/w amlodipine + coreg with hold parameters

## 2019-04-03 NOTE — H&P ADULT - NSICDXPASTMEDICALHX_GEN_ALL_CORE_FT
PAST MEDICAL HISTORY:  Alzheimers disease     CVA (cerebral vascular accident)     Depression     HTN (hypertension)     MI, old

## 2019-04-03 NOTE — H&P ADULT - ASSESSMENT
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

## 2019-04-03 NOTE — CONSULT NOTE ADULT - SUBJECTIVE AND OBJECTIVE BOX
CHIEF COMPLAINT:  asthma    HPI:    78yF with no history of asthma, presenting with sudden onset shortness of breath and stridorous breathing.   Patient says she was feeling at baseline when she suddenly felt like it was hard to breathe -- this was while she was drinking morning coffee.     Takes nebs at home, but denies any history of asthma or prior breathing problems    She has no history of asthma   No allergies at all  No recent abnormal exposures  No new or unusual foods or medications      PAST MEDICAL & SURGICAL HISTORY:  Depression  Alzheimers disease  MI, old  CVA (cerebral vascular accident)  HTN (hypertension)  No significant past surgical history      FAMILY HISTORY:  Family history of essential hypertension (Child)  Family history of stroke (Mother)      SOCIAL HISTORY:  Smoking: [X ] Never Smoked [ ] Former Smoker (__ packs x ___ years) [ ] Current Smoker  (__ packs x ___ years)  Substance Use: [X ] Never Used [ ] Used ____  EtOH Use:X  Marital Status: [X ] Single [ ]  [ ]  [ ]   Sexual History:   Occupation:  Recent Travel:  Country of Birth:  Advance Directives:    Allergies    No Known Allergies    Intolerances        HOME MEDICATIONS:  Home Medications:  Aspirin Enteric Coated 325 mg oral delayed release tablet: 1 tab(s) orally once a day (11 Jan 2018 00:05)  atorvastatin 20 mg oral tablet: 1 tab(s) orally once a day (10 Ousmane 2018 21:53)  baclofen 10 mg oral tablet: 1 tab(s) orally once a day (10 Ousmane 2018 22:02)  donepezil 10 mg oral tablet: 1 tab(s) orally once a day (at bedtime) (10 Ousmane 2018 13:39)  ipratropium-albuterol 0.5 mg-2.5 mg/3 mLinhalation solution: 3 milliliter(s) inhaled 4 times a day, As Needed (10 Ousmane 2018 21:56)      REVIEW OF SYSTEMS:  Constitutional: [ X] negative [ ] fevers [ ] chills [ ] weight loss [ ] weight gain  HEENT: [X ] negative [ ] dry eyes [ ] eye irritation [ ] postnasal drip [ ] nasal congestion  CV: [ ] negative  [ ] chest pain [ ] orthopnea [ ] palpitations [ ] murmur  Resp: [ ] negative [ ] cough [ ] shortness of breath [ ] dyspnea [ ] wheezing [ ] sputum [ ] hemoptysis  GI: [ ] negative [ ] nausea [ ] vomiting [ ] diarrhea [ ] constipation [ ] abd pain [ ] dysphagia   : [ ] negative [ ] dysuria [ ] nocturia [ ] hematuria [ ] increased urinary frequency  Musculoskeletal: [ ] negative [ ] back pain [ ] myalgias [ ] arthralgias [ ] fracture  Skin: [ ] negative [ ] rash [ ] itch  Neurological: [ ] negative [ ] headache [ ] dizziness [ ] syncope [ ] weakness [ ] numbness  Psychiatric: [ ] negative [ ] anxiety [ ] depression  Endocrine: [ ] negative [ ] diabetes [ ] thyroid problem  Hematologic/Lymphatic: [ ] negative [ ] anemia [ ] bleeding problem  Allergic/Immunologic: [ ] negative [ ] itchy eyes [ ] nasal discharge [ ] hives [ ] angioedema  [ ] All other systems negative  [ ] Unable to assess ROS because ________    OBJECTIVE:  ICU Vital Signs Last 24 Hrs  T(C): 37.1 (03 Apr 2019 16:01), Max: 37.2 (03 Apr 2019 14:30)  T(F): 98.7 (03 Apr 2019 16:01), Max: 98.9 (03 Apr 2019 14:30)  HR: 73 (03 Apr 2019 16:01) (69 - 76)  BP: 184/66 (03 Apr 2019 16:01) (158/93 - 184/66)  RR: 29 (03 Apr 2019 16:01) (18 - 29)  SpO2: 100% (03 Apr 2019 16:01) (98% - 100%)      PHYSICAL EXAM:  General:  ill appearing woman on bipap, tachypneic  Respiratory: increased rate, using accessory muscles  There is loud expiratory stridor  no true wheezing now or other adventitious breath sounds  Air entry in all fields.  Cardiovascular:   Abdomen:   Extremities:   Skin:   Neurological:  Psychiatry:    HOSPITAL MEDICATIONS:  Standing Meds:      PRN Meds:      LABS:                        11.1   10.30 )-----------( 301      ( 03 Apr 2019 15:44 )             36.4     Hgb Trend: 11.1<--  04-03    134<L>  |  99  |  14  ----------------------------<  108<H>  5.9<H>   |  25  |  1.17    Ca    8.8      03 Apr 2019 15:44    TPro  7.7  /  Alb  3.3  /  TBili  0.3  /  DBili  x   /  AST  51<H>  /  ALT  13  /  AlkPhos  148<H>  04-03    Creatinine Trend: 1.17<--        Venous Blood Gas:  04-03 @ 15:44  7.32/54/31/24/44.7  VBG Lactate: 1.6      MICROBIOLOGY:       RADIOLOGY:  [ ] Reviewed and interpreted by me    PULMONARY FUNCTION TESTS:    EKG:

## 2019-04-03 NOTE — H&P ADULT - PROBLEM SELECTOR PLAN 4
-dvt ppx: hsq  -diet NPO while on BIPAP, then can start PO  -dispo: PT consult  Rogers Fox M.D.  Internal Medicine PGY-2  Pager: [phone]/ BETITO [phone]  After 7 PM on weekdays and 12 PM on weekends page [phone] c/w donepezil

## 2019-04-03 NOTE — CONSULT NOTE ADULT - SUBJECTIVE AND OBJECTIVE BOX
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 Patient is currently AAOX0, so history is obtained from patient's daughter at the bedside.     Per family, patient was noted to have increasing work of breathing and wheezing this morning and was thus brought to the ED. She denies fever or chills. Denies nasal congestion or sore throat. Patient has no history of asthma, no known allergies, no new or unusual foods or medications. Patient lives with her daughter at home and her daughter has been having cough and viral symptoms for the past 1 week. No recent travel. Patient is currently awake and alert.     Patient had past admission in January 2017 initially presented to [facility] after having acute worsening SOB. She was found to be positive for coronavirus and was treated with Lasix for possible pulmonary edema on CXR and Duonebs PRN. CTA of the chest was negative for PE but did show a markedly air and fluid filled esophagus. CXR showed severe narrowing of the esophagus at the GE junction suspicious for achalasia. Patient was also treated with Azithromycin and Zosyn for aspiration pneumonia. GI was consulted and patient underwent POEMS with GI on 1/22 for achalasia. TTE was performed during the admission, which showed EF 50-55%, mild mitral valve regurg, mildly enlarged LA.     In the ED, initial vitals were Temp 98.9, HR 70-76, -180/60-90, RR 18-29. Patient was started on BIPAP in the ED. She received Aspirin 324 mg, Solumedrol 40 mg, Duoneb x3. Patient was started on BIPAP in the ED, currently on 12/6 40%, with O2 saturation 100%.       PAST MEDICAL & SURGICAL HISTORY:  Depression  Alzheimers disease  MI, old  CVA (cerebral vascular accident)  HTN (hypertension)  No significant past surgical history      FAMILY HISTORY:  Family history of essential hypertension (Child)  Family history of stroke      SOCIAL HISTORY:  Tobacco use: none  Alcohol: none  Other drug use: none      REVIEW OF SYSTEMS:  Limited due to patient's mental status      No Known Allergies    Home Medications:     MEDICATIONS  (STANDING):  hydrALAZINE Injectable 10 milliGRAM(s) IV Push once    MEDICATIONS  (PRN):        OBJECTIVE:     Vital Signs Last 24 Hrs  T(C): 37.1 (03 Apr 2019 16:01), Max: 37.2 (03 Apr 2019 14:30)  T(F): 98.7 (03 Apr 2019 16:01), Max: 98.9 (03 Apr 2019 14:30)  HR: 70 (03 Apr 2019 19:48) (69 - 76)  BP: 160/65 (03 Apr 2019 19:48) (158/93 - 184/66)  BP(mean): --  RR: 28 (03 Apr 2019 19:48) (18 - 29)  SpO2: 100% (03 Apr 2019 19:48) (98% - 100%)    PHYSICAL EXAM:  General: Alert and cooperative. Not in acute stress. On BIPAP.   Head: Normocephalic   Eyes: PERRLA, clear conjunctiva. EOMI, no ptosis.    Neck: No lymphadenopathy   Respiratory: Bilateral lung with inspiratory and expiratory wheezing.   Cardiovascular: S1/S2 auscultated, no murmur, or gallop. Rhythm is regular. There is no peripheral edema   Abdomen: Soft, non-tender, nondistended, no guarding or rebound tenderness. Active bowel sounds in all 4 quadrants.     Extremities: No significant deformity or joint abnormality. Peripheral pulses intact.  No peripheral edema   Skin: Intact, no rash   Neurological: AOAx1 (oriented to herself only). Strength intact.         I&O's Detail      LABS:                        11.1   10.30 )-----------( 301      ( 03 Apr 2019 15:44 )             36.4     Hgb Trend: 11.1<--  04-03    134<L>  |  99  |  14  ----------------------------<  108<H>  5.9<H>   |  25  |  1.17    Ca    8.8      03 Apr 2019 15:44    TPro  7.7  /  Alb  3.3  /  TBili  0.3  /  DBili  x   /  AST  51<H>  /  ALT  13  /  AlkPhos  148<H>  04-03    Creatinine Trend: 1.17<--

## 2019-04-03 NOTE — H&P ADULT - ATTENDING COMMENTS
Agree with resident H&P and plan as edited above.  Daughter no longer at bedside, attempted to communicate with patient via [place]  #[phone], patient with what appears to be word-finding difficulties.  Notes from Pulm, ENT, MICU appreciated.  MICU resident and medicine resident discussed GOC with daughter who was at bedside earlier, noted patient was DNR/DNI.  No HCP in chart, I personally reviewed the scanned alpha documents from prior admissions and was unable to find a HCP or GOC form within scanned documents, not in [employer] either.  Per floor RN, daughter instructed to bring in HCP in AM.  Patient reportedly with 3 daughters, she seems to endorse that 2 of her daughters help her make medical decisions however due to apparent word finding difficulties I was unable to make out their names.  MOLST in chart to be completed in AM once able to clarify HCP or discuss with all adult children.  Was able to miles from prior records that patient is  and lives with her daughter.  Patient currently in NAD on BiPAP, continue to monitor.  Will attempt to reach out to her daughters later this AM as well. Agree with resident H&P and plan as edited above.  Daughter no longer at bedside, attempted to communicate with patient via [place]  #[phone], patient with what appears to be word-finding difficulties.  Notes from Pulm, ENT, MICU appreciated. Patient currently in NAD on BiPAP, continue to monitor.    Addendum:  Spoke with patient's 2 living children this AM - Katelyn Henderson and Anawhit Odonnell via phone at [phone] and [phone] respectively, they confirm they are co-HCP for Ms. Arteaga and both agree that the patient would want to be DNR/DNI, MOLST form completed and in chart.  Daughters state that Ms. Arteaga has word-finding difficulties at baseline, patient refers to her daughters as "Lori" and "Sofia."

## 2019-04-03 NOTE — CONSULT NOTE ADULT - ASSESSMENT
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 MICU consulted for new BIPAP.     Hypercapnic respiratory failure   - Likely secondary to enterovirus  - Continue with standing Duoneb, antitussives and chest PT for secretion mobilization   - Continue with BIPAP PRN and de-escalate to NC as tolerated  - ENT noted to have left VC paresis/hypomobile and R VC mobile and compensating for left, likely secondary to history of CVA    Code status  - Discussed code status with patient's daughter at the bedside  - She would like the patient to be DNR/DNI  - Primary team to fill out MOLST form    Patient at this time does not meet MICU care. Plan discussed with the MICU attending and the primary team.     Nikki Tate PGY-2  Internal medicine MICU resident  Pager [phone]

## 2019-04-03 NOTE — H&P ADULT - PROBLEM SELECTOR PLAN 6
-dvt ppx: hsq  -diet NPO while on BIPAP, then can start PO  -dispo: PT consult  Rogers Fox M.D.  Internal Medicine PGY-2  Pager: [phone]/ BETITO [phone]  After 7 PM on weekdays and 12 PM on weekends page [phone]

## 2019-04-03 NOTE — H&P ADULT - NSHPREVIEWOFSYSTEMS_GEN_ALL_CORE
Cannot obtain due to respiratory distress- limited verbal ability Limited due to respiratory distress- limited verbal ability  Denies cough, chest pain, palpitations, nausea, vomiting, abdominal pain Limited due to respiratory distress- limited verbal ability  Denies cough, chest pain, palpitations, nausea, vomiting, abdominal pain.  Patient ambulates with walker, daughters note a fall from seated position approximately 2 months ago without head trauma/LOC, previously had home PT services, which ended. Limited due to respiratory distress- limited verbal ability  Denies cough, chest pain, palpitations, nausea, vomiting, abdominal pain.  Patient ambulates with walker, daughters note a fall from seated position approximately 2 months ago without head trauma/LOC, previously had home PT services, which ended.   Per daughters - eats regular diet, deny dysphagia

## 2019-04-03 NOTE — CONSULT NOTE ADULT - PROBLEM SELECTOR RECOMMENDATION 9
1. Follow up chest xray to rule PNA  2. Swallow study to assess for aspiration risk and diet recommendations  3. Vocal cord paresis most likely old. Will Discuss with ENT resident. No ENT intervention at this time.

## 2019-04-03 NOTE — H&P ADULT - NSHPPHYSICALEXAM_GEN_ALL_CORE
GENERAL: No acute distress, well-developed female, on BIPAP, appears alert  HEAD:  Atraumatic, Normocephalic  ENT: EOMI, PERRLA, conjunctiva and sclera clear, Oral mucosa appears moist  CHEST/LUNG: Anterior expiratory wheezes, course breath sounds b/l  HEART: Regular rate and rhythm; No murmurs, rubs, or gallops  ABDOMEN: Soft, Nontender, Nondistended; Bowel sounds present, no organomegaly  EXTREMITIES:  2+ Peripheral Pulses, No clubbing, cyanosis, or edema  PSYCH: AAOx3  NEUROLOGY: non-focal, cranial nerves intact  SKIN: Normal color, No rashes or lesions GENERAL: No acute distress, well-developed female, on BIPAP, appears alert  HEAD:  Atraumatic, Normocephalic  ENT: EOMI, PERRLA, conjunctiva and sclera clear, Oral mucosa appears moist  CHEST/LUNG: Anterior expiratory wheezes, course breath sounds b/l [attending exam - on BiPAP, NAD, not tachypneic, denies shortness of breath at present, diffuse rhonchi on exam, no rales]  HEART: Regular rate and rhythm; No murmurs, rubs, or gallops  ABDOMEN: Soft, Nontender, Nondistended; Bowel sounds present, no organomegaly  EXTREMITIES:  2+ Peripheral Pulses, No clubbing, cyanosis, or edema  PSYCH: AAOx3 [attending exam A&Oxself, thought she was in a house, unsure of date]  NEUROLOGY: non-focal, cranial nerves intact  SKIN: Normal color, No rashes or lesions

## 2019-04-03 NOTE — ED PROVIDER NOTE - OBJECTIVE STATEMENT
78f w hx CAD s/p MI, HTN, CVA w residual left-sided weakness, dementia, depression, achalasia here for sudden-onset wheezing and SOB that started this afternoon. When asked about chest pain states is unsure whether has. Denies any new foods, has no allergies; no rash, no v/d. No fever or cough. No hx asthma or copd, lifetime non-smoker. Most recent echo w EF 50%. Most of hx obtained from daughter at bedside as pt w severe resp distress.

## 2019-04-03 NOTE — ED PROVIDER NOTE - ATTENDING CONTRIBUTION TO CARE
Dr. Giles:  I have personally performed a face to face bedside history and physical examination of this patient. I have discussed the history, examination, review of systems, assessment and plan of management with the resident. I have reviewed the electronic medical record and amended it to reflect my history, review of systems, physical exam, assessment and plan.    78F h/o CVA, HTN, ?MI, brought in with respiratory distress.  Pt woke up this morning in usual state of health and then suddenly became SOB and c/o chest tightness.  Denies fever/chills, n/v/d.  +Coughing recently.    Exam:  - sitting straight up, appears anxious  - +stridor, transmitted upper airway sounds  - rrr  - abd soft ntnd    A/P   -consider mucus plugging, r/o ACS   -cbc, cmp, trop, vbg  - cxr

## 2019-04-03 NOTE — H&P ADULT - PROBLEM SELECTOR PLAN 2
-c/w asa History of CVA  -c/w asa, statin  -fall precautions, aspiration precautions  -S&S eval per ENT recs

## 2019-04-03 NOTE — CONSULT NOTE ADULT - ATTENDING COMMENTS
I have seen and examined the patient. I agree with the above history, physical exam, and plan of care except for as detailed below.    77 y/o F w/prior stroke, no history of lung disease, presenting with acute onset respiratory distress found to have pronounced upper airway expiratory sounds. Using accessory muscles to breathe. Concern for vocal cord/upper airway pathology as etiology of respiratory distress.    - ENT eval  - Continue BiPAP  - Close observation I have seen and examined the patient. I agree with the above history, physical exam, and plan of care except for as detailed below.    79 y/o F w/prior stroke, no history of lung disease, presenting with acute onset respiratory distress found to have pronounced upper airway expiratory sounds. Using accessory muscles to breathe. Concern for vocal cord/upper airway pathology as etiology of respiratory distress. Doubt reactive airway disease as patient without prior history of lung disease and no exposure that would lead to suspicion for acute disease.    - ENT eval  - Continue BiPAP  - Close observation

## 2019-04-03 NOTE — H&P ADULT - HISTORY OF PRESENT ILLNESS
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 Patient is currently AAOX0, so history is obtained from patient's daughter at the bedside.     Per family, patient was noted to have increasing work of breathing and wheezing this morning and was thus brought to the ED. She denies fever or chills. Denies nasal congestion or sore throat. Patient has no history of asthma, no known allergies, no new or unusual foods or medications. Patient lives with her daughter at home and her daughter has been having cough and viral symptoms for the past 1 week. No recent travel. Patient is currently awake and alert.     Patient had past admission in January 2017 initially presented to [facility] after having acute worsening SOB. She was found to be positive for coronavirus and was treated with Lasix for possible pulmonary edema on CXR and Duonebs PRN. CTA of the chest was negative for PE but did show a markedly air and fluid filled esophagus. CXR showed severe narrowing of the esophagus at the GE junction suspicious for achalasia. Patient was also treated with Azithromycin and Zosyn for aspiration pneumonia. GI was consulted and patient underwent POEMS with GI on 1/22 for achalasia. TTE was performed during the admission, which showed EF 50-55%, mild mitral valve regurg, mildly enlarged LA.     In the ED, initial vitals were Temp 98.9, HR 70-76, -180/60-90, RR 18-29. Patient was started on BIPAP in the ED. She received Aspirin 324 mg, Solumedrol 40 mg, Duoneb x3. Patient was started on BIPAP in the ED, currently on 12/6 40%, with O2 saturation 100%.     MICU consulted for new BIPAP.   ENT consulted to r/p coval cord edema vs. airway obstruction- scope shows L. 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 Patient is currently AAOX2; history is obtained from patient's daughter at the bedside.     Per daughter was noted to have increasing work of breathing and wheezing this morning and was thus brought to the ED. She denies fever or chills. Denies nasal congestion or sore throat. Patient has no history of asthma, no known allergies, no new or unusual foods or medications. Patient lives with her daughter at home and her daughter has been having cough and viral symptoms for the past 1 week. No recent travel. Patient is currently awake and alert.      In the ED, initial vitals were Temp 98.9, HR 70-76, -180/60-90, RR 18-29. Patient was started on BIPAP in the ED. She received Aspirin 325mg, Solumedrol 40 mg, Duoneb x3. Patient was started on BIPAP in the ED, currently on 12/6 5%, with O2 saturation 100%.     MICU consulted for new BIPAP.   ENT consulted to r/p coval cord edema vs. airway obstruction- scope shows L. 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 Patient is currently AAOX2; history is obtained from patient's daughter at the bedside.     Per daughter was noted to have increasing work of breathing and wheezing this monthrning and was thus brought to the ED. She denies fever or chills. Denies nasal congestion or sore throat. Patient has no history of asthma, no known allergies, no new or unusual foods or medications. Patient lives with her daughter at home and her daughter has been having cough and viral symptoms for the past 1 week. No recent travel. Patient is currently awake and alert.      In the ED, initial vitals were Temp 98.9, HR 70-76, -180/60-90, RR 18-29. Patient was started on BIPAP in the ED. She received Aspirin 325mg, Solumedrol 40 mg, Duoneb x3. Patient was started on BIPAP in the ED, currently on 12/6 5%, with O2 saturation 100%.     MICU consulted for new BIPAP.   ENT consulted to r/p coval cord edema vs. airway obstruction- scope shows L. vocal cord paresis/hypomobility ?2.2 CVA in past

## 2019-04-03 NOTE — ED ADULT NURSE NOTE - OBJECTIVE STATEMENT
Pt received in spot 24, A&OX3, NAD with hx of dementia (baseline A&Ox3 with periods of confusion).  Welsh speaking, translation services offered, pt prefers daughter for translation.  c/o SOB that started this afternoon.  As per daughter, pt was fine this morning, went to [employer] and when she came home she suddenly felt SOB.  Audible wheezing noted, tachypneic.  O2 maintaining 100% on duoneb.  Pt denies any chest pain/palpitations.  NSR on monitor.  Denies any fever/chills/cough.  Pt endorsing throat pain, but minimal.  Labs sent.  Medicated as per MD orders.  Pt placed on bipap.  Will continue to monitor.

## 2019-04-03 NOTE — H&P ADULT - PROBLEM SELECTOR PLAN 1
-2/2 rhino/enterovirus, may be c/b vocal cord paralysis from remote hx CVA  -monitor on BIPAP, patient satting well and in no distress  -titrate off BIPAP to NC in AM  -keep NPO while on BIPAP  -standing duonebs q 6 -2/2 rhino/enterovirus, may be c/b vocal cord paralysis from remote hx CVA  -monitor on BIPAP, patient satting well and in no distress  -titrate off BIPAP to NC in AM  -keep NPO while on BIPAP  -standing duonebs q 6  -s/p solumedrol 40 mg, can start prednisone 50 mg PO tomorrow, continue daily for 3 doses to treat for reactive airway disease acute hypercapneic respiratory failure, improved on BiPAP  -2/2 rhino/enterovirus, may be c/b vocal cord paralysis from remote hx CVA  -monitor on BIPAP, patient satting well and in no distress  -titrate off BIPAP to NC in AM  -keep NPO while on BIPAP  -standing duonebs q 6  -s/p solumedrol 40 mg, can start prednisone 50 mg PO tomorrow, continue daily for 3 doses to treat for possible component of reactive airway disease  -appreciate pulm and MICU recs

## 2019-04-04 DIAGNOSIS — F32.9 MAJOR DEPRESSIVE DISORDER, SINGLE EPISODE, UNSPECIFIED: ICD-10-CM

## 2019-04-04 DIAGNOSIS — F03.90 UNSPECIFIED DEMENTIA WITHOUT BEHAVIORAL DISTURBANCE: ICD-10-CM

## 2019-04-04 LAB
ALBUMIN SERPL ELPH-MCNC: 2.9 G/DL — LOW (ref 3.3–5)
ALP SERPL-CCNC: 126 U/L — HIGH (ref 40–120)
ALT FLD-CCNC: 7 U/L — SIGNIFICANT CHANGE UP (ref 4–33)
ANION GAP SERPL CALC-SCNC: 14 MMO/L — SIGNIFICANT CHANGE UP (ref 7–14)
AST SERPL-CCNC: 12 U/L — SIGNIFICANT CHANGE UP (ref 4–32)
BASE EXCESS BLDV CALC-SCNC: 0.3 MMOL/L — SIGNIFICANT CHANGE UP
BASOPHILS # BLD AUTO: 0.01 K/UL — SIGNIFICANT CHANGE UP (ref 0–0.2)
BASOPHILS NFR BLD AUTO: 0.2 % — SIGNIFICANT CHANGE UP (ref 0–2)
BILIRUB SERPL-MCNC: < 0.2 MG/DL — LOW (ref 0.2–1.2)
BUN SERPL-MCNC: 16 MG/DL — SIGNIFICANT CHANGE UP (ref 7–23)
CALCIUM SERPL-MCNC: 8.8 MG/DL — SIGNIFICANT CHANGE UP (ref 8.4–10.5)
CHLORIDE SERPL-SCNC: 104 MMOL/L — SIGNIFICANT CHANGE UP (ref 98–107)
CO2 SERPL-SCNC: 22 MMOL/L — SIGNIFICANT CHANGE UP (ref 22–31)
CREAT SERPL-MCNC: 1.3 MG/DL — SIGNIFICANT CHANGE UP (ref 0.5–1.3)
EOSINOPHIL # BLD AUTO: 0.01 K/UL — SIGNIFICANT CHANGE UP (ref 0–0.5)
EOSINOPHIL NFR BLD AUTO: 0.2 % — SIGNIFICANT CHANGE UP (ref 0–6)
GAS PNL BLDV: 135 MMOL/L — LOW (ref 136–146)
GLUCOSE BLDV-MCNC: 160 — HIGH (ref 70–99)
GLUCOSE SERPL-MCNC: 124 MG/DL — HIGH (ref 70–99)
HCO3 BLDV-SCNC: 25 MMOL/L — SIGNIFICANT CHANGE UP (ref 20–27)
HCT VFR BLD CALC: 29.6 % — LOW (ref 34.5–45)
HCT VFR BLDV CALC: 32.8 % — LOW (ref 34.5–45)
HGB BLD-MCNC: 9.3 G/DL — LOW (ref 11.5–15.5)
HGB BLDV-MCNC: 10.6 G/DL — LOW (ref 11.5–15.5)
IMM GRANULOCYTES NFR BLD AUTO: 1.1 % — SIGNIFICANT CHANGE UP (ref 0–1.5)
LYMPHOCYTES # BLD AUTO: 0.82 K/UL — LOW (ref 1–3.3)
LYMPHOCYTES # BLD AUTO: 15.3 % — SIGNIFICANT CHANGE UP (ref 13–44)
MAGNESIUM SERPL-MCNC: 2.1 MG/DL — SIGNIFICANT CHANGE UP (ref 1.6–2.6)
MCHC RBC-ENTMCNC: 26.3 PG — LOW (ref 27–34)
MCHC RBC-ENTMCNC: 31.4 % — LOW (ref 32–36)
MCV RBC AUTO: 83.6 FL — SIGNIFICANT CHANGE UP (ref 80–100)
MONOCYTES # BLD AUTO: 0.2 K/UL — SIGNIFICANT CHANGE UP (ref 0–0.9)
MONOCYTES NFR BLD AUTO: 3.7 % — SIGNIFICANT CHANGE UP (ref 2–14)
NEUTROPHILS # BLD AUTO: 4.26 K/UL — SIGNIFICANT CHANGE UP (ref 1.8–7.4)
NEUTROPHILS NFR BLD AUTO: 79.5 % — HIGH (ref 43–77)
NRBC # FLD: 0.02 K/UL — SIGNIFICANT CHANGE UP (ref 0–0)
PCO2 BLDV: 39 MMHG — LOW (ref 41–51)
PH BLDV: 7.41 PH — SIGNIFICANT CHANGE UP (ref 7.32–7.43)
PHOSPHATE SERPL-MCNC: 4.8 MG/DL — HIGH (ref 2.5–4.5)
PLATELET # BLD AUTO: 257 K/UL — SIGNIFICANT CHANGE UP (ref 150–400)
PMV BLD: 9.8 FL — SIGNIFICANT CHANGE UP (ref 7–13)
PO2 BLDV: 104 MMHG — HIGH (ref 35–40)
POTASSIUM BLDV-SCNC: 3.8 MMOL/L — SIGNIFICANT CHANGE UP (ref 3.4–4.5)
POTASSIUM SERPL-MCNC: 4 MMOL/L — SIGNIFICANT CHANGE UP (ref 3.5–5.3)
POTASSIUM SERPL-SCNC: 4 MMOL/L — SIGNIFICANT CHANGE UP (ref 3.5–5.3)
PROT SERPL-MCNC: 6.3 G/DL — SIGNIFICANT CHANGE UP (ref 6–8.3)
RBC # BLD: 3.54 M/UL — LOW (ref 3.8–5.2)
RBC # FLD: 14.6 % — HIGH (ref 10.3–14.5)
SAO2 % BLDV: 98.2 % — HIGH (ref 60–85)
SODIUM SERPL-SCNC: 140 MMOL/L — SIGNIFICANT CHANGE UP (ref 135–145)
WBC # BLD: 5.36 K/UL — SIGNIFICANT CHANGE UP (ref 3.8–10.5)
WBC # FLD AUTO: 5.36 K/UL — SIGNIFICANT CHANGE UP (ref 3.8–10.5)

## 2019-04-04 PROCEDURE — 12345: CPT | Mod: NC,GC

## 2019-04-04 PROCEDURE — 99223 1ST HOSP IP/OBS HIGH 75: CPT | Mod: GC

## 2019-04-04 PROCEDURE — 99233 SBSQ HOSP IP/OBS HIGH 50: CPT

## 2019-04-04 PROCEDURE — 70491 CT SOFT TISSUE NECK W/DYE: CPT | Mod: 26

## 2019-04-04 RX ORDER — SODIUM CHLORIDE 9 MG/ML
1000 INJECTION, SOLUTION INTRAVENOUS
Qty: 0 | Refills: 0 | Status: DISCONTINUED | OUTPATIENT
Start: 2019-04-04 | End: 2019-04-04

## 2019-04-04 RX ADMIN — DONEPEZIL HYDROCHLORIDE 10 MILLIGRAM(S): 10 TABLET, FILM COATED ORAL at 22:25

## 2019-04-04 RX ADMIN — Medication 3 MILLILITER(S): at 03:58

## 2019-04-04 RX ADMIN — Medication 3 MILLILITER(S): at 21:33

## 2019-04-04 RX ADMIN — ENOXAPARIN SODIUM 40 MILLIGRAM(S): 100 INJECTION SUBCUTANEOUS at 15:42

## 2019-04-04 RX ADMIN — ATORVASTATIN CALCIUM 20 MILLIGRAM(S): 80 TABLET, FILM COATED ORAL at 22:25

## 2019-04-04 RX ADMIN — Medication 325 MILLIGRAM(S): at 15:41

## 2019-04-04 RX ADMIN — Medication 3 MILLILITER(S): at 09:40

## 2019-04-04 RX ADMIN — Medication 3 MILLILITER(S): at 15:47

## 2019-04-04 RX ADMIN — CARVEDILOL PHOSPHATE 6.25 MILLIGRAM(S): 80 CAPSULE, EXTENDED RELEASE ORAL at 19:29

## 2019-04-04 RX ADMIN — ESCITALOPRAM OXALATE 10 MILLIGRAM(S): 10 TABLET, FILM COATED ORAL at 15:42

## 2019-04-04 NOTE — PROGRESS NOTE ADULT - ASSESSMENT
Assessment  78yF with history of stroke, now here with one day of dyspnea.   Patient had acute left vocal cord dysfunction brought on by viral infection.   Probably had old left VCD      Recs Assessment  78yF with history of stroke, now here with one day of dyspnea.   Patient had acute left vocal cord dysfunction brought on by viral infection.   Might have had old left VCD   Not clear that she has asthma  Now much improved    Recs  Continue steroids and nebulizers. Would definitely continue ipratropium, not clear if she needs albuterol, too.   Bipap as needed for symptoms, but she is better this morning, significantly improved from yesterday

## 2019-04-04 NOTE — CHART NOTE - NSCHARTNOTEFT_GEN_A_CORE
Discussed with daughter/HCP regarding patient's diet and swallow evaluation. Daughter was made aware of her possible swallowing difficulties. She was explained the risk of aspiration, pna, and death that may occur if the patient is given oral nutrition or hydration. The daughter acknowledged the risks but would still like to feed her mother soft foods given her age and the fact that she has had a stroke for many years and was eating well yesterday.     Abhi Kruse MD PGY1

## 2019-04-04 NOTE — SWALLOW BEDSIDE ASSESSMENT ADULT - SLP PERTINENT HISTORY OF CURRENT PROBLEM
Pt is a 79 y/o F who presents with wheezing and SOB. Pt found to be in respiratory failure 2/2 rhinoenterovirus. 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

## 2019-04-04 NOTE — PROGRESS NOTE ADULT - ATTENDING COMMENTS
pt appears not to be in any distress at this time - wheezing on expiratory noted.  continue with steroids.   appreciate ent and pulm recs.  CT neck today.

## 2019-04-04 NOTE — PHYSICAL THERAPY INITIAL EVALUATION ADULT - PERTINENT HX OF CURRENT PROBLEM, REHAB EVAL
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

## 2019-04-04 NOTE — PROGRESS NOTE ADULT - PROBLEM SELECTOR PLAN 1
acute hypercapneic respiratory failure, improved on BiPAP  -2/2 rhino/enterovirus, may be c/b vocal cord paralysis from remote hx CVA  -monitor on BIPAP, patient satting well and in no distress  -titrate off BIPAP to NC in AM. now on RA satting well  -keep NPO while on BIPAP  -standing duonebs q 6  -s/p solumedrol 40 mg, can start prednisone 50 mg PO tomorrow, continue daily for 3 doses to treat for possible component of reactive airway disease  -appreciate pulm and MICU recs  - repeating ABG in afternoon.

## 2019-04-04 NOTE — PHYSICAL THERAPY INITIAL EVALUATION ADULT - RANGE OF MOTION EXAMINATION, REHAB EVAL
bilateral lower extremity ROM was WFL (within functional limits)/LUE flexion contracture/Right UE ROM was WFL (within functional limits)

## 2019-04-04 NOTE — PROGRESS NOTE ADULT - SUBJECTIVE AND OBJECTIVE BOX
=========================================  CONTACT INFO  Abhi Kruse M.D., PGY-1  Pager: [facility]- [phone] [facility]- [phone]    Mon-Fri: pager covered by day team 7am-7pm;   ***Academic conferences 12pm-1pm- page ONLY if URGENT or if Consultant  /[place]: see chart, primary physician assigned available 7am-12pm  Sat/[place] Coverage 12pm-7pm: [facility]- page [phone] for Team1-4, [facility] - pager forwarded to covering Resident  For Night coverage 7pm-7am: [facility]- page [phone] Team1-3, page [phone] Team4 & Care Model; [facility]- page [phone] for Red, [phone] for Blue  =========================================    Patient is a 78y old  Female who presents with a chief complaint of SOB and wheezing (04 Apr 2019 08:23)      SUBJECTIVE / OVERNIGHT EVENTS:  Patient seen and examined at bedside. This morning, she is resting comfortably in bed. Pt complains of SOB.  used--ID# [id number]. Patient aox2. Otherwise denies, chest pain, abd pain.    She reports:  [  ] CP  [ x ] SOB  [  ] Fever  [  ] N /  [  ] V  [  ] Diarrhea  [] None of the above    Other Review of Systems Negative.    MEDICATIONS  (STANDING):  ALBUTerol/ipratropium for Nebulization. 3 milliLiter(s) Nebulizer every 6 hours  amLODIPine   Tablet 5 milliGRAM(s) Oral daily  aspirin enteric coated 325 milliGRAM(s) Oral daily  atorvastatin 20 milliGRAM(s) Oral at bedtime  carvedilol 6.25 milliGRAM(s) Oral every 12 hours  donepezil 10 milliGRAM(s) Oral at bedtime  enoxaparin Injectable 40 milliGRAM(s) SubCutaneous daily  escitalopram 10 milliGRAM(s) Oral daily  pantoprazole    Tablet 40 milliGRAM(s) Oral before breakfast  predniSONE   Tablet 50 milliGRAM(s) Oral daily    MEDICATIONS  (PRN):      OBJECTIVE:    Vital Signs Last 24 Hrs  T(C): 36.4 (04 Apr 2019 06:53), Max: 37.2 (03 Apr 2019 14:30)  T(F): 97.6 (04 Apr 2019 06:53), Max: 98.9 (03 Apr 2019 14:30)  HR: 85 (04 Apr 2019 11:29) (69 - 85)  BP: 135/75 (04 Apr 2019 06:53) (125/66 - 184/66)  BP(mean): --  RR: 18 (04 Apr 2019 06:53) (18 - 29)  SpO2: 98% (04 Apr 2019 11:29) (98% - 100%)     CAPILLARY BLOOD GLUCOSE        I&O's Summary    03 Apr 2019 07:01  -  04 Apr 2019 07:00  --------------------------------------------------------  IN: 0 mL / OUT: 100 mL / NET: -100 mL        PHYSICAL EXAM:  GENERAL: NAD, well-developed  HEAD:  Atraumatic, Normocephalic  NECK: Supple, No JVD  CHEST/LUNG: expiratory wheeze, coarse breath sounds b/l  HEART: Regular rate and rhythm; No murmurs, rubs, or gallops  ABDOMEN: Soft, Nontender, Nondistended; Bowel sounds present  EXTREMITIES:  2+ Peripheral Pulses, No clubbing, cyanosis, or edema  PSYCH: AAOx2  NEUROLOGY: non-focal  SKIN: No rashes or lesions    LABS:                        9.3    5.36  )-----------( 257      ( 04 Apr 2019 07:17 )             29.6     Auto Eosinophil # 0.01  / Auto Eosinophil % 0.2   / Auto Neutrophil # 4.26  / Auto Neutrophil % 79.5  / BANDS % x                            11.1   10.30 )-----------( 301      ( 03 Apr 2019 15:44 )             36.4     Auto Eosinophil # 0.45  / Auto Eosinophil % 4.4   / Auto Neutrophil # 7.51  / Auto Neutrophil % 72.9  / BANDS % x        04-04    140  |  104  |  16  ----------------------------<  124<H>  4.0   |  22  |  1.30  04-03    134<L>  |  99  |  14  ----------------------------<  108<H>  5.9<H>   |  25  |  1.17    Ca    8.8      04 Apr 2019 07:17  Mg     2.1     04-04  Phos  4.8     04-04  TPro  6.3  /  Alb  2.9<L>  /  TBili  < 0.2<L>  /  DBili  x   /  AST  12  /  ALT  7   /  AlkPhos  126<H>  04-04  TPro  7.7  /  Alb  3.3  /  TBili  0.3  /  DBili  x   /  AST  51<H>  /  ALT  13  /  AlkPhos  148<H>  04-03                  ABG:     VBG: ( 01:00 ) - VBG - pH: 7.41  | pCO2: 39    | pO2: 104   | Lactate:        ( 15:44 ) - VBG - pH: 7.32  | pCO2: 54    | pO2: 31    | Lactate: 1.6        Microbiology:        Care Discussed with Consultants/Other Providers:    RADIOLOGY & ADDITIONAL TESTS:  (Imaging Personally Reviewed)

## 2019-04-04 NOTE — PROVIDER CONTACT NOTE (OTHER) - SITUATION
Pt. refused PM medication saying she cannot swallow. When daughter was at bedside earlier, daughter stated that pt. takes medication whole at home.

## 2019-04-04 NOTE — SWALLOW BEDSIDE ASSESSMENT ADULT - COMMENTS
Pt received upright in bed, awake/alert, on room air. Pt is primarily Green[place]ic-speaking, but  phone not used this session due to baseline dementia (AAOx0-1) and h/o aphasia s/p CVA. Pt followed simple commands with use of visual cues. CXR 4/3 revealed "Right basilar obliquely oriented focus of linear subsegmental atelectasis or scar. Clear remaining visualized lungs. No pleural effusions or pneumothorax." Pt's WBC is WFL, no fever.  Pt presents with mildly hoarse vocal quality. ENT scoped pt 4/3 which revealed L vocal fold paresis/hypomobile, which was deemed likely old. ENT recommended pt complete swallow study to assess for aspiration risk and diet recommendations.

## 2019-04-04 NOTE — SWALLOW BEDSIDE ASSESSMENT ADULT - ASR SWALLOW ASPIRATION MONITOR
gurgly voice/fever/oral hygiene/throat clearing/upper respiratory infection/change of breathing pattern/position upright (90Y)/cough/pneumonia

## 2019-04-04 NOTE — SWALLOW BEDSIDE ASSESSMENT ADULT - DIET PRIOR TO ADMI
Pt completed MBS in 1/2018, at which time pt was recommended mechanical soft solids with thin liquids.

## 2019-04-04 NOTE — SWALLOW BEDSIDE ASSESSMENT ADULT - SWALLOW EVAL: DIAGNOSIS
1. Pt presents with functional oral phase when given puree, nectar, and thin liquids marked by adequate utensil stripping and bolus containment, timely bolus manipulation and transfer, and adequate oral clearance post swallow. 2. Pt presents with mild to mod pharyngeal dysphagia when given puree, nectar, and thin liquids marked by suspected delayed swallow onset, laryngeal elevation to palpation, multiple swallows per bolus suggestive of pharyngeal residue, intermittent cough response, and increased work of breathing post swallow. 3. Given presentation at bedside, known L VF paresis, and comorbidities, recommend MBS to further assess oropharyngeal swallow mechanism and determine PO candidacy. Oral nutrition/hydration is contraindicated; consider short-term non-oral means of nutrition/hydration.

## 2019-04-04 NOTE — PROGRESS NOTE ADULT - ATTENDING COMMENTS
I have seen and examined the patient. I agree with the above history, physical exam, and plan of care except for as detailed below.    77 y/o F w/prior stroke, no history of lung disease, presenting with acute onset respiratory distress found to have pronounced upper airway expiratory sounds. Found to have L vocal cord paresis on ENT evaluation. Unclear etiology. Possible acute process? Patient also found to be entero/rhinovirus positive. Breathing significantly improved today, patient is now comfortable without positive pressure ventilation.    - CT neck to evaluate for cause of vocal cord paralysis  - Bronchodilators for possible reactive airway disease in setting of viral infection  - Outpatient PFTs

## 2019-04-04 NOTE — SWALLOW BEDSIDE ASSESSMENT ADULT - PHARYNGEAL PHASE
Delayed cough post oral intake/Multiple swallows/Delayed pharyngeal swallow/increased work of breathing

## 2019-04-04 NOTE — PHYSICAL THERAPY INITIAL EVALUATION ADULT - ADDITIONAL COMMENTS
Patient is a poor historian; aphasic, confused, and Sinhala speaking (unable to use  phone). Patient reports she lives with her daughters and uses a rollator at baseline. As per SW and previous physical therapy evaluation; patient has home health aides 8 hours per day and lives with daughters and granddaughters, never home alone and ambulates with assistance at all times

## 2019-04-04 NOTE — PROGRESS NOTE ADULT - SUBJECTIVE AND OBJECTIVE BOX
CHIEF COMPLAINT:  shortness of breath    Interval Events:  Patient was on bilevel overnight  Taken off bipap this morning and feels well  Now more rhonchorous    I spoke to the patient in [place]    REVIEW OF SYSTEMS:  Constitutional: [ ] negative [ ] fevers [ ] chills [ ] weight loss [ ] weight gain  HEENT: [ ] negative [ ] dry eyes [ ] eye irritation [ ] postnasal drip [ ] nasal congestion  CV: [ ] negative  [ ] chest pain [ ] orthopnea [ ] palpitations [ ] murmur  Resp: [ ] negative [ ] cough [ ] shortness of breath [ ] dyspnea [ ] wheezing [ ] sputum [ ] hemoptysis  GI: [ ] negative [ ] nausea [ ] vomiting [ ] diarrhea [ ] constipation [ ] abd pain [ ] dysphagia   : [ ] negative [ ] dysuria [ ] nocturia [ ] hematuria [ ] increased urinary frequency  Musculoskeletal: [ ] negative [ ] back pain [ ] myalgias [ ] arthralgias [ ] fracture  Skin: [ ] negative [ ] rash [ ] itch  Neurological: [ ] negative [ ] headache [ ] dizziness [ ] syncope [ ] weakness [ ] numbness  Psychiatric: [ ] negative [ ] anxiety [ ] depression  Endocrine: [ ] negative [ ] diabetes [ ] thyroid problem  Hematologic/Lymphatic: [ ] negative [ ] anemia [ ] bleeding problem  Allergic/Immunologic: [ ] negative [ ] itchy eyes [ ] nasal discharge [ ] hives [ ] angioedema  [ ] All other systems negative  [ ] Unable to assess ROS because ________    OBJECTIVE:  ICU Vital Signs Last 24 Hrs  T(C): 36.4 (04 Apr 2019 06:53), Max: 37.2 (03 Apr 2019 14:30)  T(F): 97.6 (04 Apr 2019 06:53), Max: 98.9 (03 Apr 2019 14:30)  HR: 79 (04 Apr 2019 07:31) (69 - 80)  BP: 135/75 (04 Apr 2019 06:53) (125/66 - 184/66)  RR: 18 (04 Apr 2019 06:53) (18 - 29)  SpO2: 99% (04 Apr 2019 07:31) (98% - 100%)        04-03 @ 07:01  -  04-04 @ 07:00  --------------------------------------------------------  IN: 0 mL / OUT: 100 mL / NET: -100 mL      CAPILLARY BLOOD GLUCOSE      PHYSICAL EXAM:  General: ill appearing woman, but comfortable  Respiratory: normal effort on room air  There are diffuse rhonchi,   minimal expiratory wheeze now, no stridor  Cardiovascular: rate regular, normal s1 and s2  Abdomen: thin, soft, NT ND  Neurological: awake, attentive, conversant in [place]  A little confused, but able to make her wishes known  left arm weakness and mild contraction, right side normal      HOSPITAL MEDICATIONS:  MEDICATIONS  (STANDING):  ALBUTerol/ipratropium for Nebulization. 3 milliLiter(s) Nebulizer every 6 hours  amLODIPine   Tablet 5 milliGRAM(s) Oral daily  aspirin enteric coated 325 milliGRAM(s) Oral daily  atorvastatin 20 milliGRAM(s) Oral at bedtime  carvedilol 6.25 milliGRAM(s) Oral every 12 hours  donepezil 10 milliGRAM(s) Oral at bedtime  enoxaparin Injectable 40 milliGRAM(s) SubCutaneous daily  escitalopram 10 milliGRAM(s) Oral daily  pantoprazole    Tablet 40 milliGRAM(s) Oral before breakfast  predniSONE   Tablet 50 milliGRAM(s) Oral daily    MEDICATIONS  (PRN):      LABS:                        9.3    5.36  )-----------( 257      ( 04 Apr 2019 07:17 )             29.6     Hgb Trend: 9.3<--, 11.1<--  04-03    134<L>  |  99  |  14  ----------------------------<  108<H>  5.9<H>   |  25  |  1.17    Ca    8.8      03 Apr 2019 15:44    TPro  7.7  /  Alb  3.3  /  TBili  0.3  /  DBili  x   /  AST  51<H>  /  ALT  13  /  AlkPhos  148<H>  04-03    Creatinine Trend: 1.17<--        Venous Blood Gas:  04-04 @ 01:00  7.41/39/104/25/98.2  VBG Lactate: --  Venous Blood Gas:  04-03 @ 15:44  7.32/54/31/24/44.7  VBG Lactate: 1.6

## 2019-04-05 ENCOUNTER — TRANSCRIPTION ENCOUNTER (OUTPATIENT)
Age: 79
End: 2019-04-05

## 2019-04-05 VITALS
HEART RATE: 75 BPM | SYSTOLIC BLOOD PRESSURE: 142 MMHG | OXYGEN SATURATION: 96 % | TEMPERATURE: 100 F | RESPIRATION RATE: 18 BRPM | DIASTOLIC BLOOD PRESSURE: 64 MMHG

## 2019-04-05 LAB
ANION GAP SERPL CALC-SCNC: 10 MMO/L — SIGNIFICANT CHANGE UP (ref 7–14)
BUN SERPL-MCNC: 16 MG/DL — SIGNIFICANT CHANGE UP (ref 7–23)
CALCIUM SERPL-MCNC: 8.7 MG/DL — SIGNIFICANT CHANGE UP (ref 8.4–10.5)
CHLORIDE SERPL-SCNC: 104 MMOL/L — SIGNIFICANT CHANGE UP (ref 98–107)
CO2 SERPL-SCNC: 24 MMOL/L — SIGNIFICANT CHANGE UP (ref 22–31)
CREAT SERPL-MCNC: 1.36 MG/DL — HIGH (ref 0.5–1.3)
GLUCOSE SERPL-MCNC: 87 MG/DL — SIGNIFICANT CHANGE UP (ref 70–99)
HCT VFR BLD CALC: 28.3 % — LOW (ref 34.5–45)
HGB BLD-MCNC: 8.7 G/DL — LOW (ref 11.5–15.5)
MAGNESIUM SERPL-MCNC: 2.1 MG/DL — SIGNIFICANT CHANGE UP (ref 1.6–2.6)
MCHC RBC-ENTMCNC: 26.3 PG — LOW (ref 27–34)
MCHC RBC-ENTMCNC: 30.7 % — LOW (ref 32–36)
MCV RBC AUTO: 85.5 FL — SIGNIFICANT CHANGE UP (ref 80–100)
NRBC # FLD: 0 K/UL — SIGNIFICANT CHANGE UP (ref 0–0)
PHOSPHATE SERPL-MCNC: 3.6 MG/DL — SIGNIFICANT CHANGE UP (ref 2.5–4.5)
PLATELET # BLD AUTO: 249 K/UL — SIGNIFICANT CHANGE UP (ref 150–400)
PMV BLD: 10.1 FL — SIGNIFICANT CHANGE UP (ref 7–13)
POTASSIUM SERPL-MCNC: 3.8 MMOL/L — SIGNIFICANT CHANGE UP (ref 3.5–5.3)
POTASSIUM SERPL-SCNC: 3.8 MMOL/L — SIGNIFICANT CHANGE UP (ref 3.5–5.3)
RBC # BLD: 3.31 M/UL — LOW (ref 3.8–5.2)
RBC # FLD: 14.9 % — HIGH (ref 10.3–14.5)
SODIUM SERPL-SCNC: 138 MMOL/L — SIGNIFICANT CHANGE UP (ref 135–145)
WBC # BLD: 7.83 K/UL — SIGNIFICANT CHANGE UP (ref 3.8–10.5)
WBC # FLD AUTO: 7.83 K/UL — SIGNIFICANT CHANGE UP (ref 3.8–10.5)

## 2019-04-05 PROCEDURE — 74220 X-RAY XM ESOPHAGUS 1CNTRST: CPT | Mod: 26

## 2019-04-05 PROCEDURE — 99239 HOSP IP/OBS DSCHRG MGMT >30: CPT

## 2019-04-05 RX ADMIN — Medication 325 MILLIGRAM(S): at 12:34

## 2019-04-05 RX ADMIN — CARVEDILOL PHOSPHATE 6.25 MILLIGRAM(S): 80 CAPSULE, EXTENDED RELEASE ORAL at 05:41

## 2019-04-05 RX ADMIN — ENOXAPARIN SODIUM 40 MILLIGRAM(S): 100 INJECTION SUBCUTANEOUS at 12:34

## 2019-04-05 RX ADMIN — Medication 50 MILLIGRAM(S): at 05:41

## 2019-04-05 RX ADMIN — Medication 3 MILLILITER(S): at 03:49

## 2019-04-05 RX ADMIN — AMLODIPINE BESYLATE 5 MILLIGRAM(S): 2.5 TABLET ORAL at 05:41

## 2019-04-05 RX ADMIN — PANTOPRAZOLE SODIUM 40 MILLIGRAM(S): 20 TABLET, DELAYED RELEASE ORAL at 06:06

## 2019-04-05 RX ADMIN — ESCITALOPRAM OXALATE 10 MILLIGRAM(S): 10 TABLET, FILM COATED ORAL at 12:34

## 2019-04-05 NOTE — DISCHARGE NOTE PROVIDER - NSDCCPCAREPLAN_GEN_ALL_CORE_FT
PRINCIPAL DISCHARGE DIAGNOSIS  Diagnosis: Acute hypercapnic respiratory failure  Assessment and Plan of Treatment: You had difficult breathing and you were retaining carbon dioxide. You improved and did not require oxygen while at rest or while walking. Please return to the ER if you experience worsening shortness of breath or chest pain.      SECONDARY DISCHARGE DIAGNOSES  Diagnosis: Enterovirus infection  Assessment and Plan of Treatment: You had a viral nfection. If you experience fevers, chills, or shaking, please seek medical care immediately. Please follow up with your primary care doctor in 1-2 weeks.

## 2019-04-05 NOTE — PROGRESS NOTE ADULT - PROBLEM SELECTOR PLAN 6
-dvt ppx: hsq  -diet NPO while on BIPAP, then can start PO. STarting D5 NS for ivf while NPO.  -dispo: PT consult
-dvt ppx: hsq  -c/w mech soft diet. PO.   -dispo: PT recommending dc home as pt has 24/7 aides/family care

## 2019-04-05 NOTE — DISCHARGE NOTE PROVIDER - HOSPITAL COURSE
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 Patient is currently AAOX2; history is obtained from patient's daughter at the bedside.         In the ED, initial vitals were Temp 98.9, HR 70-76, -180/60-90, RR 18-29. Patient was started on BIPAP in the ED. She received Aspirin 325mg, Solumedrol 40 mg, Duoneb x3. Patient was started on BIPAP in the ED, currently on 12/6 5%, with O2 saturation 100%.         MICU consulted for new BIPAP.     ENT consulted to r/p vocal cord edema vs. airway obstruction- scope shows L. vocal cord paresis/hypomobility ?2.2 CVA in past. RVP was positive for enterovirus        Pt admitted to medicine and weaned to NC and then to RA. She had wheezing on exam and was given 50mg of prednisone. Given vocal cord paralysis, speech and swallow evaluated and recommended esophagram consistent with known achalasia. She is now clinically improved and satting 99% on RA while ambulating. She will be discharged with prednisone 40mg x5 days. 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 Patient is currently AAOX2; history is obtained from patient's daughter at the bedside.         In the ED, initial vitals were Temp 98.9, HR 70-76, -180/60-90, RR 18-29. Patient was started on BIPAP in the ED. She received Aspirin 325mg, Solumedrol 40 mg, Duoneb x3. Patient was started on BIPAP in the ED, currently on 12/6 5%, with O2 saturation 100%.         MICU consulted for new BIPAP.     ENT consulted to r/p vocal cord edema vs. airway obstruction- scope shows L. vocal cord paresis/hypomobility ?2.2 CVA in past. RVP was positive for enterovirus        Pt admitted to medicine and weaned to NC and then to RA. She had wheezing on exam and was given 50mg of prednisone. Given vocal cord paralysis, speech and swallow evaluated and recommended esophagram which showed esophageal dismotility consistent with known achalasia. She is now clinically improved and satting 99% on RA while ambulating. She will be discharged with prednisone 40mg x5 days. 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 Patient is currently AAOX2; history is obtained from patient's daughter at the bedside.         In the ED, initial vitals were Temp 98.9, HR 70-76, -180/60-90, RR 18-29. Patient was started on BIPAP in the ED. She received Aspirin 325mg, Solumedrol 40 mg, Duoneb x3. Patient was started on BIPAP in the ED, currently on 12/6 5%, with O2 saturation 100%.         MICU consulted for new BIPAP.     ENT consulted to r/p vocal cord edema vs. airway obstruction- scope shows L. vocal cord paresis/hypomobility ?2.2 CVA in past. RVP was positive for enterovirus        Pt admitted to medicine and weaned to NC and then to RA. She had wheezing on exam and was given 50mg of prednisone. Given vocal cord paralysis, speech and swallow evaluated and recommended esophagram which showed esophageal dismotility consistent with known achalasia. She is now clinically improved and satting 99% on RA while ambulating. She will be discharged with prednisone 40mg x5 days and outpt follow up with her PMD Dr. Potter.

## 2019-04-05 NOTE — PROGRESS NOTE ADULT - PROBLEM SELECTOR PLAN 3
Essential hypertension  -c/w amlodipine + coreg with hold parameters
Essential hypertension  -c/w amlodipine + coreg with hold parameters

## 2019-04-05 NOTE — PROGRESS NOTE ADULT - PROBLEM SELECTOR PLAN 1
acute hypercapneic respiratory failure, improved on BiPAP  -2/2 rhino/enterovirus, may be c/b vocal cord paralysis from remote hx CVA  - satting well on room air  -titrate off BIPAP to NC in AM. now on RA satting well  -standing duonebs q 6  -s/p solumedrol 40 mg, c/w prednisone 50 mg PO (received 1/3 doses)  -appreciate pulm and MICU recs  - repeating ABG in afternoon. acute hypercapneic respiratory failure, improved on BiPAP  -2/2 rhino/enterovirus, may be c/b vocal cord paralysis from remote hx CVA  - satting well on room air  - now on RA satting well  -dannie prn  -s/p solumedrol 40 mg, c/w prednisone 50 mg PO (received 1/3 doses)  -appreciate pulm and MICU recs

## 2019-04-05 NOTE — DISCHARGE NOTE NURSING/CASE MANAGEMENT/SOCIAL WORK - NSDCDPATPORTLINK_GEN_ALL_CORE
You can access the [facility] Portal, offered by [facility], by registering with the following website: [email or web address]

## 2019-04-05 NOTE — PROGRESS NOTE ADULT - PROBLEM SELECTOR PLAN 2
History of CVA  -c/w asa, statin  -fall precautions, aspiration precautions  -S&S eval recommends NPO for now, esophagram to further evaluate.   - will hydrate with IVF
History of CVA  -c/w asa, statin  -fall precautions, aspiration precautions  -S&S eval recommends NPO however family wants to c/w pleasure feeds, esophagram to further evaluate for official diet recs  - will hydrate with IVF

## 2019-04-05 NOTE — DISCHARGE NOTE NURSING/CASE MANAGEMENT/SOCIAL WORK - NSDCPEPTSTRK_GEN_ALL_CORE
Stroke warning signs and symptoms/Signs and symptoms of stroke/Prescribed medications/Stroke support groups for patients, families, and friends/Need for follow up after discharge/Call 911 for stroke/Risk factors for stroke/Stroke education booklet

## 2019-04-05 NOTE — PROGRESS NOTE ADULT - ATTENDING COMMENTS
would continue with 5 day total course of prednisone 40 mg.  pt to follow closely with PMD on monday am.  plan of care and outpt follow up discussed with daughter.  discharge planning and coordination ~ 45mins. cinesophagram pending.  would continue with 5 day total course of prednisone 40 mg.  pt to follow closely with PMD on monday am.  plan of care and outpt follow up discussed with daughter.  discharge planning and coordination ~ 45mins.

## 2019-04-05 NOTE — PROGRESS NOTE ADULT - SUBJECTIVE AND OBJECTIVE BOX
=========================================  CONTACT INFO  Abhi Kruse M.D., PGY-1  Pager: NS- [phone] [facility]- [phone]    Mon-Fri: pager covered by day team 7am-7pm;   ***Academic conferences 12pm-1pm- page ONLY if URGENT or if Consultant  /[place]: see chart, primary physician assigned available 7am-12pm  Sat/[place] Coverage 12pm-7pm: NS- page [phone] for Team1-4, [facility] - pager forwarded to covering Resident  For Night coverage 7pm-7am: NS- page [phone] Team1-3, page [phone] Team4 & Care Model; [facility]- page [phone] for Red, [phone] for Blue  =========================================    Patient is a 78y old  Female who presents with a chief complaint of SOB and wheezing (04 Apr 2019 13:41)      SUBJECTIVE / OVERNIGHT EVENTS:  Patient seen and examined at bedside. This morning, she is resting comfortably in bed and reports no new issues or overnight events.     She reports:  [  ] CP  [  ] SOB  [  ] Fever  [  ] N /  [  ] V  [  ] Diarrhea  [X] None of the above    Other Review of Systems Negative.    MEDICATIONS  (STANDING):  ALBUTerol/ipratropium for Nebulization. 3 milliLiter(s) Nebulizer every 6 hours  amLODIPine   Tablet 5 milliGRAM(s) Oral daily  aspirin enteric coated 325 milliGRAM(s) Oral daily  atorvastatin 20 milliGRAM(s) Oral at bedtime  carvedilol 6.25 milliGRAM(s) Oral every 12 hours  donepezil 10 milliGRAM(s) Oral at bedtime  enoxaparin Injectable 40 milliGRAM(s) SubCutaneous daily  escitalopram 10 milliGRAM(s) Oral daily  pantoprazole    Tablet 40 milliGRAM(s) Oral before breakfast  predniSONE   Tablet 50 milliGRAM(s) Oral daily    MEDICATIONS  (PRN):      OBJECTIVE:    Vital Signs Last 24 Hrs  T(C): 37.5 (05 Apr 2019 05:39), Max: 37.5 (05 Apr 2019 05:39)  T(F): 99.5 (05 Apr 2019 05:39), Max: 99.5 (05 Apr 2019 05:39)  HR: 75 (05 Apr 2019 05:39) (70 - 85)  BP: 142/64 (05 Apr 2019 05:39) (127/74 - 142/70)  BP(mean): --  RR: 18 (05 Apr 2019 05:39) (18 - 18)  SpO2: 96% (05 Apr 2019 05:39) (93% - 98%)     CAPILLARY BLOOD GLUCOSE        I&O's Summary      PHYSICAL EXAM:  GENERAL: NAD, well-developed  HEAD:  Atraumatic, Normocephalic  EYES: EOMI, PERRLA, conjunctiva and sclera clear  NECK: Supple, No JVD  CHEST/LUNG: Clear to auscultation bilaterally; No wheeze  HEART: Regular rate and rhythm; No murmurs, rubs, or gallops  ABDOMEN: Soft, Nontender, Nondistended; Bowel sounds present  EXTREMITIES:  2+ Peripheral Pulses, No clubbing, cyanosis, or edema  PSYCH: AAOx3  NEUROLOGY: non-focal  SKIN: No rashes or lesions    LABS:                        8.7    7.83  )-----------( 249      ( 05 Apr 2019 07:15 )             28.3     Auto Eosinophil # x     / Auto Eosinophil % x     / Auto Neutrophil # x     / Auto Neutrophil % x     / BANDS % x                            9.3    5.36  )-----------( 257      ( 04 Apr 2019 07:17 )             29.6     Auto Eosinophil # 0.01  / Auto Eosinophil % 0.2   / Auto Neutrophil # 4.26  / Auto Neutrophil % 79.5  / BANDS % x                            11.1   10.30 )-----------( 301      ( 03 Apr 2019 15:44 )             36.4     Auto Eosinophil # 0.45  / Auto Eosinophil % 4.4   / Auto Neutrophil # 7.51  / Auto Neutrophil % 72.9  / BANDS % x        04-04    140  |  104  |  16  ----------------------------<  124<H>  4.0   |  22  |  1.30  04-03    134<L>  |  99  |  14  ----------------------------<  108<H>  5.9<H>   |  25  |  1.17    Ca    8.8      04 Apr 2019 07:17  Mg     2.1     04-04  Phos  4.8     04-04  TPro  6.3  /  Alb  2.9<L>  /  TBili  < 0.2<L>  /  DBili  x   /  AST  12  /  ALT  7   /  AlkPhos  126<H>  04-04  TPro  7.7  /  Alb  3.3  /  TBili  0.3  /  DBili  x   /  AST  51<H>  /  ALT  13  /  AlkPhos  148<H>  04-03                  ABG:     VBG:     Microbiology:        Care Discussed with Consultants/Other Providers:    RADIOLOGY & ADDITIONAL TESTS:  (Imaging Personally Reviewed) =========================================  CONTACT INFO  Abhi Kruse M.D., PGY-1  Pager: NS- [phone] [facility]- [phone]    Mon-Fri: pager covered by day team 7am-7pm;   ***Academic conferences 12pm-1pm- page ONLY if URGENT or if Consultant  /[place]: see chart, primary physician assigned available 7am-12pm  Sat/[place] Coverage 12pm-7pm: NS- page [phone] for Team1-4, [facility] - pager forwarded to covering Resident  For Night coverage 7pm-7am: NS- page [phone] Team1-3, page [phone] Team4 & Care Model; [facility]- page [phone] for Red, [phone] for Blue  =========================================    Patient is a 78y old  Female who presents with a chief complaint of SOB and wheezing (04 Apr 2019 13:41)      SUBJECTIVE / OVERNIGHT EVENTS:  Patient seen and examined at bedside. This morning, she is resting comfortably in bed and reports no new issues or overnight events.     She reports:  [  ] CP  [  ] SOB  [  ] Fever  [  ] N /  [  ] V  [  ] Diarrhea  [X] None of the above    Other Review of Systems Negative.    MEDICATIONS  (STANDING):  ALBUTerol/ipratropium for Nebulization. 3 milliLiter(s) Nebulizer every 6 hours  amLODIPine   Tablet 5 milliGRAM(s) Oral daily  aspirin enteric coated 325 milliGRAM(s) Oral daily  atorvastatin 20 milliGRAM(s) Oral at bedtime  carvedilol 6.25 milliGRAM(s) Oral every 12 hours  donepezil 10 milliGRAM(s) Oral at bedtime  enoxaparin Injectable 40 milliGRAM(s) SubCutaneous daily  escitalopram 10 milliGRAM(s) Oral daily  pantoprazole    Tablet 40 milliGRAM(s) Oral before breakfast  predniSONE   Tablet 50 milliGRAM(s) Oral daily    MEDICATIONS  (PRN):      OBJECTIVE:    Vital Signs Last 24 Hrs  T(C): 37.5 (05 Apr 2019 05:39), Max: 37.5 (05 Apr 2019 05:39)  T(F): 99.5 (05 Apr 2019 05:39), Max: 99.5 (05 Apr 2019 05:39)  HR: 75 (05 Apr 2019 05:39) (70 - 85)  BP: 142/64 (05 Apr 2019 05:39) (127/74 - 142/70)  BP(mean): --  RR: 18 (05 Apr 2019 05:39) (18 - 18)  SpO2: 96% (05 Apr 2019 05:39) (93% - 98%)       PHYSICAL EXAM:  GENERAL: NAD, well-developed  HEAD:  Atraumatic, Normocephalic  EYES: EOMI, conjunctiva and sclera clear  NECK: Supple, No JVD  CHEST/LUNG: mild expiratory wheezing present   HEART: Regular rate and rhythm; No murmurs, rubs, or gallops  ABDOMEN: Soft, Nontender, Nondistended; Bowel sounds present  EXTREMITIES:  2+ Peripheral Pulses, No clubbing, cyanosis, or edema  PSYCH: AAOx3  NEUROLOGY: non-focal  SKIN: No rashes or lesions    LABS:                        8.7    7.83  )-----------( 249      ( 05 Apr 2019 07:15 )             28.3     Auto Eosinophil # x     / Auto Eosinophil % x     / Auto Neutrophil # x     / Auto Neutrophil % x     / BANDS % x                            9.3    5.36  )-----------( 257      ( 04 Apr 2019 07:17 )             29.6     Auto Eosinophil # 0.01  / Auto Eosinophil % 0.2   / Auto Neutrophil # 4.26  / Auto Neutrophil % 79.5  / BANDS % x                            11.1   10.30 )-----------( 301      ( 03 Apr 2019 15:44 )             36.4     Auto Eosinophil # 0.45  / Auto Eosinophil % 4.4   / Auto Neutrophil # 7.51  / Auto Neutrophil % 72.9  / BANDS % x        04-04    140  |  104  |  16  ----------------------------<  124<H>  4.0   |  22  |  1.30  04-03    134<L>  |  99  |  14  ----------------------------<  108<H>  5.9<H>   |  25  |  1.17    Ca    8.8      04 Apr 2019 07:17  Mg     2.1     04-04  Phos  4.8     04-04  TPro  6.3  /  Alb  2.9<L>  /  TBili  < 0.2<L>  /  DBili  x   /  AST  12  /  ALT  7   /  AlkPhos  126<H>  04-04  TPro  7.7  /  Alb  3.3  /  TBili  0.3  /  DBili  x   /  AST  51<H>  /  ALT  13  /  AlkPhos  148<H>  04-03

## 2019-08-20 NOTE — ED ADULT NURSE NOTE - INTERPRETATION SERVICES DECLINED
Patient is resting comfortably in bed with eyes closed. Easily aroused. VSS Patient/Caregiver requests family/friend to interpret.

## 2019-08-26 NOTE — PROGRESS NOTE ADULT - PROBLEM SELECTOR PLAN 2
Visit with pt for d/c planning.  Pt is paraplegic, bedbound, lives with his caretaker and cousin Brandy.  Pt receives PCA through medicaid waiver program 40 hrs a week.  At time of d/c pt plans to return home with current assistance.  Pt uses medicaid transportation for appts.      Discharge planning brochure provided. White board updated with CM name & contact info.  Pt encouraged to call with any questions or needs. CM will continue to follow patient throughout the transitions of care, and assist with any discharge needs.       08/26/19 3737   Discharge Assessment   Assessment Type Discharge Planning Assessment   Confirmed/corrected address and phone number on facesheet? Yes   Assessment information obtained from? Patient   Communicated expected length of stay with patient/caregiver yes   Prior to hospitilization cognitive status: Alert/Oriented   Current cognitive status: Alert/Oriented   Current Functional Status: Completely Dependent   Lives With alone   Able to Return to Prior Arrangements yes   Is patient able to care for self after discharge? No   Who are your caregiver(s) and their phone number(s)?   (Brandy (cousin and PCA) )   Readmission Within the Last 30 Days no previous admission in last 30 days   Patient currently being followed by outpatient case management? No   Patient currently receives any other outside agency services? No   Equipment Currently Used at Home hospital bed;lift device  (trapeze)   Do you have any problems affording any of your prescribed medications? No   Is the patient taking medications as prescribed? yes   Does the patient have transportation home? Yes   Transportation Anticipated   (Medicaid transportation)   Discharge Plan A Home Health   DME Needed Upon Discharge  none   Patient/Family in Agreement with Plan yes      - Multifactorial due to coronavirus bronchitis as well as superimposed aspiration PNA/pneumonitis and mucus plugging as evidenced on CTA chest 1/4/18  - Pt treated with Azithromycin and Zosyn at [facility] since 1/4 for possible superimposed aspiration pneumonia/CAP completed Zosyn 7 days; completed 5 days of Azithromycin   - Pt with improved lung exam s/p pulm toilet   - CTA negative for PE  - Pt does not clinically appear volume overloaded, TTE with normal LV function- low suspicion for acute CHF

## 2019-09-28 NOTE — H&P ADULT - PROBLEM/PLAN-2
105 Ventricular Rate BPM  105 Atrial Rate BPM  128 P-R Interval ms  62 QRS Duration ms  308 Q-T Interval ms  407 QTC Calculation(Bazett) ms  58 P Axis degrees  105 R Axis degrees  31 T Axis degrees  Sinus tachycardia  Possible Left atrial enlargement  Rightward axis  Low voltage QRS  Borderline ECG  No previous ECGs available  Confirmed by SAIGE KUMAR ([id number]) on 9/26/2019 4:46:42 PM   DISPLAY PLAN FREE TEXT

## 2019-11-08 NOTE — PROGRESS NOTE ADULT - PROVIDER SPECIALTY LIST ADULT
Cardiology
Cardiology
Gastroenterology
Hospitalist
Pulmonology
Pulmonology
Hospitalist
Hospitalist
Fall with Harm Risk

## 2019-12-01 ENCOUNTER — INPATIENT (INPATIENT)
Facility: HOSPITAL | Age: 79
LOS: 9 days | Discharge: NURSING, REHAB OR LONG-TERM CARE | End: 2019-12-11
Attending: INTERNAL MEDICINE | Admitting: INTERNAL MEDICINE
Payer: MEDICARE

## 2019-12-01 VITALS
RESPIRATION RATE: 16 BRPM | TEMPERATURE: 98 F | HEART RATE: 88 BPM | OXYGEN SATURATION: 100 % | DIASTOLIC BLOOD PRESSURE: 93 MMHG | SYSTOLIC BLOOD PRESSURE: 142 MMHG

## 2019-12-01 LAB
ALBUMIN SERPL ELPH-MCNC: 3 G/DL — LOW (ref 3.3–5)
ALP SERPL-CCNC: 80 U/L — SIGNIFICANT CHANGE UP (ref 40–120)
ALT FLD-CCNC: 12 U/L — SIGNIFICANT CHANGE UP (ref 4–33)
ANION GAP SERPL CALC-SCNC: 16 MMO/L — HIGH (ref 7–14)
ANISOCYTOSIS BLD QL: SLIGHT — SIGNIFICANT CHANGE UP
APPEARANCE UR: CLEAR — SIGNIFICANT CHANGE UP
APTT BLD: 19.2 SEC — LOW (ref 27.5–36.3)
AST SERPL-CCNC: 16 U/L — SIGNIFICANT CHANGE UP (ref 4–32)
B PERT DNA SPEC QL NAA+PROBE: NOT DETECTED — SIGNIFICANT CHANGE UP
BACTERIA # UR AUTO: SIGNIFICANT CHANGE UP
BASE EXCESS BLDV CALC-SCNC: -5.5 MMOL/L — SIGNIFICANT CHANGE UP
BASE EXCESS BLDV CALC-SCNC: 0.7 MMOL/L — SIGNIFICANT CHANGE UP
BASE EXCESS BLDV CALC-SCNC: 3.9 MMOL/L — SIGNIFICANT CHANGE UP
BASOPHILS # BLD AUTO: 0.03 K/UL — SIGNIFICANT CHANGE UP (ref 0–0.2)
BASOPHILS NFR BLD AUTO: 0.1 % — SIGNIFICANT CHANGE UP (ref 0–2)
BASOPHILS NFR SPEC: 0 % — SIGNIFICANT CHANGE UP (ref 0–2)
BILIRUB SERPL-MCNC: 0.4 MG/DL — SIGNIFICANT CHANGE UP (ref 0.2–1.2)
BILIRUB UR-MCNC: NEGATIVE — SIGNIFICANT CHANGE UP
BLASTS # FLD: 0 % — SIGNIFICANT CHANGE UP (ref 0–0)
BLD GP AB SCN SERPL QL: NEGATIVE — SIGNIFICANT CHANGE UP
BLOOD GAS VENOUS - CREATININE: 1.4 MG/DL — HIGH (ref 0.5–1.3)
BLOOD GAS VENOUS - CREATININE: 1.74 MG/DL — HIGH (ref 0.5–1.3)
BLOOD GAS VENOUS - CREATININE: 2.1 MG/DL — HIGH (ref 0.5–1.3)
BLOOD GAS VENOUS - FIO2: 21 — SIGNIFICANT CHANGE UP
BLOOD UR QL VISUAL: HIGH
BUN SERPL-MCNC: 50 MG/DL — HIGH (ref 7–23)
BURR CELLS BLD QL SMEAR: PRESENT — SIGNIFICANT CHANGE UP
C PNEUM DNA SPEC QL NAA+PROBE: NOT DETECTED — SIGNIFICANT CHANGE UP
CALCIUM SERPL-MCNC: 8.3 MG/DL — LOW (ref 8.4–10.5)
CHLORIDE BLDV-SCNC: 106 MMOL/L — SIGNIFICANT CHANGE UP (ref 96–108)
CHLORIDE BLDV-SCNC: 109 MMOL/L — HIGH (ref 96–108)
CHLORIDE BLDV-SCNC: 99 MMOL/L — SIGNIFICANT CHANGE UP (ref 96–108)
CHLORIDE SERPL-SCNC: 105 MMOL/L — SIGNIFICANT CHANGE UP (ref 98–107)
CO2 SERPL-SCNC: 24 MMOL/L — SIGNIFICANT CHANGE UP (ref 22–31)
COLOR SPEC: SIGNIFICANT CHANGE UP
CREAT SERPL-MCNC: 1.99 MG/DL — HIGH (ref 0.5–1.3)
DACRYOCYTES BLD QL SMEAR: SLIGHT — SIGNIFICANT CHANGE UP
EOSINOPHIL # BLD AUTO: 0.03 K/UL — SIGNIFICANT CHANGE UP (ref 0–0.5)
EOSINOPHIL NFR BLD AUTO: 0.1 % — SIGNIFICANT CHANGE UP (ref 0–6)
EOSINOPHIL NFR FLD: 0 % — SIGNIFICANT CHANGE UP (ref 0–6)
FLUAV H1 2009 PAND RNA SPEC QL NAA+PROBE: NOT DETECTED — SIGNIFICANT CHANGE UP
FLUAV H1 RNA SPEC QL NAA+PROBE: NOT DETECTED — SIGNIFICANT CHANGE UP
FLUAV H3 RNA SPEC QL NAA+PROBE: NOT DETECTED — SIGNIFICANT CHANGE UP
FLUAV SUBTYP SPEC NAA+PROBE: NOT DETECTED — SIGNIFICANT CHANGE UP
FLUBV RNA SPEC QL NAA+PROBE: NOT DETECTED — SIGNIFICANT CHANGE UP
GAS PNL BLDV: 125 MMOL/L — LOW (ref 136–146)
GAS PNL BLDV: 142 MMOL/L — SIGNIFICANT CHANGE UP (ref 136–146)
GAS PNL BLDV: 142 MMOL/L — SIGNIFICANT CHANGE UP (ref 136–146)
GIANT PLATELETS BLD QL SMEAR: PRESENT — SIGNIFICANT CHANGE UP
GLUCOSE BLDV-MCNC: 129 MG/DL — HIGH (ref 70–99)
GLUCOSE BLDV-MCNC: 142 MG/DL — HIGH (ref 70–99)
GLUCOSE BLDV-MCNC: 530 MG/DL — CRITICAL HIGH (ref 70–99)
GLUCOSE SERPL-MCNC: 138 MG/DL — HIGH (ref 70–99)
GLUCOSE UR-MCNC: 50 — SIGNIFICANT CHANGE UP
HADV DNA SPEC QL NAA+PROBE: NOT DETECTED — SIGNIFICANT CHANGE UP
HCO3 BLDV-SCNC: 20 MMOL/L — SIGNIFICANT CHANGE UP (ref 20–27)
HCO3 BLDV-SCNC: 24 MMOL/L — SIGNIFICANT CHANGE UP (ref 20–27)
HCO3 BLDV-SCNC: 26 MMOL/L — SIGNIFICANT CHANGE UP (ref 20–27)
HCOV PNL SPEC NAA+PROBE: SIGNIFICANT CHANGE UP
HCT VFR BLD CALC: 34.2 % — LOW (ref 34.5–45)
HCT VFR BLDV CALC: 22.1 % — LOW (ref 34.5–45)
HCT VFR BLDV CALC: 23.1 % — LOW (ref 34.5–45)
HCT VFR BLDV CALC: 34.9 % — SIGNIFICANT CHANGE UP (ref 34.5–45)
HGB BLD-MCNC: 11.2 G/DL — LOW (ref 11.5–15.5)
HGB BLDV-MCNC: 11.3 G/DL — LOW (ref 11.5–15.5)
HGB BLDV-MCNC: 7.1 G/DL — LOW (ref 11.5–15.5)
HGB BLDV-MCNC: 7.4 G/DL — LOW (ref 11.5–15.5)
HMPV RNA SPEC QL NAA+PROBE: NOT DETECTED — SIGNIFICANT CHANGE UP
HPIV1 RNA SPEC QL NAA+PROBE: NOT DETECTED — SIGNIFICANT CHANGE UP
HPIV2 RNA SPEC QL NAA+PROBE: NOT DETECTED — SIGNIFICANT CHANGE UP
HPIV3 RNA SPEC QL NAA+PROBE: NOT DETECTED — SIGNIFICANT CHANGE UP
HPIV4 RNA SPEC QL NAA+PROBE: NOT DETECTED — SIGNIFICANT CHANGE UP
HYALINE CASTS # UR AUTO: SIGNIFICANT CHANGE UP
IMM GRANULOCYTES NFR BLD AUTO: 1 % — SIGNIFICANT CHANGE UP (ref 0–1.5)
INR BLD: 1.1 — SIGNIFICANT CHANGE UP (ref 0.88–1.17)
KETONES UR-MCNC: NEGATIVE — SIGNIFICANT CHANGE UP
LACTATE BLDV-MCNC: 1.8 MMOL/L — SIGNIFICANT CHANGE UP (ref 0.5–2)
LACTATE BLDV-MCNC: 1.9 MMOL/L — SIGNIFICANT CHANGE UP (ref 0.5–2)
LACTATE BLDV-MCNC: 2.7 MMOL/L — HIGH (ref 0.5–2)
LEUKOCYTE ESTERASE UR-ACNC: NEGATIVE — SIGNIFICANT CHANGE UP
LYMPHOCYTES # BLD AUTO: 1.89 K/UL — SIGNIFICANT CHANGE UP (ref 1–3.3)
LYMPHOCYTES # BLD AUTO: 8.1 % — LOW (ref 13–44)
LYMPHOCYTES NFR SPEC AUTO: 2.6 % — LOW (ref 13–44)
MACROCYTES BLD QL: SLIGHT — SIGNIFICANT CHANGE UP
MCHC RBC-ENTMCNC: 29 PG — SIGNIFICANT CHANGE UP (ref 27–34)
MCHC RBC-ENTMCNC: 32.7 % — SIGNIFICANT CHANGE UP (ref 32–36)
MCV RBC AUTO: 88.6 FL — SIGNIFICANT CHANGE UP (ref 80–100)
METAMYELOCYTES # FLD: 0 % — SIGNIFICANT CHANGE UP (ref 0–1)
MICROCYTES BLD QL: SLIGHT — SIGNIFICANT CHANGE UP
MONOCYTES # BLD AUTO: 1.18 K/UL — HIGH (ref 0–0.9)
MONOCYTES NFR BLD AUTO: 5 % — SIGNIFICANT CHANGE UP (ref 2–14)
MONOCYTES NFR BLD: 1.7 % — LOW (ref 2–9)
MYELOCYTES NFR BLD: 0 % — SIGNIFICANT CHANGE UP (ref 0–0)
NEUTROPHIL AB SER-ACNC: 93.9 % — HIGH (ref 43–77)
NEUTROPHILS # BLD AUTO: 20.03 K/UL — HIGH (ref 1.8–7.4)
NEUTROPHILS NFR BLD AUTO: 85.7 % — HIGH (ref 43–77)
NEUTS BAND # BLD: 0 % — SIGNIFICANT CHANGE UP (ref 0–6)
NITRITE UR-MCNC: NEGATIVE — SIGNIFICANT CHANGE UP
NRBC # FLD: 0 K/UL — SIGNIFICANT CHANGE UP (ref 0–0)
OTHER - HEMATOLOGY %: 0 — SIGNIFICANT CHANGE UP
OVALOCYTES BLD QL SMEAR: SLIGHT — SIGNIFICANT CHANGE UP
PCO2 BLDV: 40 MMHG — LOW (ref 41–51)
PCO2 BLDV: 49 MMHG — SIGNIFICANT CHANGE UP (ref 41–51)
PCO2 BLDV: 55 MMHG — HIGH (ref 41–51)
PH BLDV: 7.3 PH — LOW (ref 7.32–7.43)
PH BLDV: 7.31 PH — LOW (ref 7.32–7.43)
PH BLDV: 7.39 PH — SIGNIFICANT CHANGE UP (ref 7.32–7.43)
PH UR: 6.5 — SIGNIFICANT CHANGE UP (ref 5–8)
PLATELET # BLD AUTO: 311 K/UL — SIGNIFICANT CHANGE UP (ref 150–400)
PLATELET COUNT - ESTIMATE: NORMAL — SIGNIFICANT CHANGE UP
PMV BLD: 10.5 FL — SIGNIFICANT CHANGE UP (ref 7–13)
PO2 BLDV: 45 MMHG — HIGH (ref 35–40)
PO2 BLDV: < 24 MMHG — LOW (ref 35–40)
PO2 BLDV: < 24 MMHG — LOW (ref 35–40)
POIKILOCYTOSIS BLD QL AUTO: SLIGHT — SIGNIFICANT CHANGE UP
POTASSIUM BLDV-SCNC: 2.1 MMOL/L — CRITICAL LOW (ref 3.4–4.5)
POTASSIUM BLDV-SCNC: 2.7 MMOL/L — CRITICAL LOW (ref 3.4–4.5)
POTASSIUM BLDV-SCNC: 2.9 MMOL/L — CRITICAL LOW (ref 3.4–4.5)
POTASSIUM SERPL-MCNC: 3.1 MMOL/L — LOW (ref 3.5–5.3)
POTASSIUM SERPL-SCNC: 3.1 MMOL/L — LOW (ref 3.5–5.3)
PROMYELOCYTES # FLD: 0 % — SIGNIFICANT CHANGE UP (ref 0–0)
PROT SERPL-MCNC: 6.9 G/DL — SIGNIFICANT CHANGE UP (ref 6–8.3)
PROT UR-MCNC: 600 — HIGH
PROTHROM AB SERPL-ACNC: 12.3 SEC — SIGNIFICANT CHANGE UP (ref 9.8–13.1)
RBC # BLD: 3.86 M/UL — SIGNIFICANT CHANGE UP (ref 3.8–5.2)
RBC # FLD: 15 % — HIGH (ref 10.3–14.5)
RBC CASTS # UR COMP ASSIST: SIGNIFICANT CHANGE UP (ref 0–?)
RH IG SCN BLD-IMP: POSITIVE — SIGNIFICANT CHANGE UP
RSV RNA SPEC QL NAA+PROBE: NOT DETECTED — SIGNIFICANT CHANGE UP
RV+EV RNA SPEC QL NAA+PROBE: NOT DETECTED — SIGNIFICANT CHANGE UP
SAO2 % BLDV: 23.7 % — LOW (ref 60–85)
SAO2 % BLDV: 27.8 % — LOW (ref 60–85)
SAO2 % BLDV: 72 % — SIGNIFICANT CHANGE UP (ref 60–85)
SCHISTOCYTES BLD QL AUTO: SLIGHT — SIGNIFICANT CHANGE UP
SODIUM SERPL-SCNC: 145 MMOL/L — SIGNIFICANT CHANGE UP (ref 135–145)
SP GR SPEC: 1.02 — SIGNIFICANT CHANGE UP (ref 1–1.04)
SQUAMOUS # UR AUTO: SIGNIFICANT CHANGE UP
TARGETS BLD QL SMEAR: SLIGHT — SIGNIFICANT CHANGE UP
UROBILINOGEN FLD QL: NORMAL — SIGNIFICANT CHANGE UP
VARIANT LYMPHS # BLD: 1.8 % — SIGNIFICANT CHANGE UP
WBC # BLD: 23.39 K/UL — HIGH (ref 3.8–10.5)
WBC # FLD AUTO: 23.39 K/UL — HIGH (ref 3.8–10.5)
WBC UR QL: SIGNIFICANT CHANGE UP (ref 0–?)

## 2019-12-01 PROCEDURE — 71045 X-RAY EXAM CHEST 1 VIEW: CPT | Mod: 26

## 2019-12-01 PROCEDURE — 74176 CT ABD & PELVIS W/O CONTRAST: CPT | Mod: 26

## 2019-12-01 RX ORDER — FENTANYL CITRATE 50 UG/ML
50 INJECTION INTRAVENOUS ONCE
Refills: 0 | Status: DISCONTINUED | OUTPATIENT
Start: 2019-12-01 | End: 2019-12-01

## 2019-12-01 RX ORDER — PANTOPRAZOLE SODIUM 20 MG/1
80 TABLET, DELAYED RELEASE ORAL ONCE
Refills: 0 | Status: COMPLETED | OUTPATIENT
Start: 2019-12-01 | End: 2019-12-01

## 2019-12-01 RX ORDER — POTASSIUM CHLORIDE 20 MEQ
10 PACKET (EA) ORAL
Refills: 0 | Status: COMPLETED | OUTPATIENT
Start: 2019-12-01 | End: 2019-12-01

## 2019-12-01 RX ORDER — CARVEDILOL PHOSPHATE 80 MG/1
6.25 CAPSULE, EXTENDED RELEASE ORAL ONCE
Refills: 0 | Status: COMPLETED | OUTPATIENT
Start: 2019-12-01 | End: 2019-12-01

## 2019-12-01 RX ORDER — VANCOMYCIN HCL 1 G
1000 VIAL (EA) INTRAVENOUS ONCE
Refills: 0 | Status: COMPLETED | OUTPATIENT
Start: 2019-12-01 | End: 2019-12-01

## 2019-12-01 RX ORDER — HYDROMORPHONE HYDROCHLORIDE 2 MG/ML
0.5 INJECTION INTRAMUSCULAR; INTRAVENOUS; SUBCUTANEOUS ONCE
Refills: 0 | Status: DISCONTINUED | OUTPATIENT
Start: 2019-12-01 | End: 2019-12-01

## 2019-12-01 RX ORDER — SODIUM CHLORIDE 9 MG/ML
1000 INJECTION INTRAMUSCULAR; INTRAVENOUS; SUBCUTANEOUS ONCE
Refills: 0 | Status: COMPLETED | OUTPATIENT
Start: 2019-12-01 | End: 2019-12-01

## 2019-12-01 RX ORDER — PIPERACILLIN AND TAZOBACTAM 4; .5 G/20ML; G/20ML
3.38 INJECTION, POWDER, LYOPHILIZED, FOR SOLUTION INTRAVENOUS ONCE
Refills: 0 | Status: COMPLETED | OUTPATIENT
Start: 2019-12-01 | End: 2019-12-01

## 2019-12-01 RX ORDER — PANTOPRAZOLE SODIUM 20 MG/1
8 TABLET, DELAYED RELEASE ORAL
Qty: 80 | Refills: 0 | Status: DISCONTINUED | OUTPATIENT
Start: 2019-12-01 | End: 2019-12-06

## 2019-12-01 RX ORDER — ONDANSETRON 8 MG/1
4 TABLET, FILM COATED ORAL ONCE
Refills: 0 | Status: COMPLETED | OUTPATIENT
Start: 2019-12-01 | End: 2019-12-01

## 2019-12-01 RX ADMIN — Medication 10 MILLIEQUIVALENT(S): at 20:41

## 2019-12-01 RX ADMIN — PANTOPRAZOLE SODIUM 80 MILLIGRAM(S): 20 TABLET, DELAYED RELEASE ORAL at 18:24

## 2019-12-01 RX ADMIN — Medication 250 MILLIGRAM(S): at 20:40

## 2019-12-01 RX ADMIN — FENTANYL CITRATE 50 MICROGRAM(S): 50 INJECTION INTRAVENOUS at 19:03

## 2019-12-01 RX ADMIN — SODIUM CHLORIDE 1000 MILLILITER(S): 9 INJECTION INTRAMUSCULAR; INTRAVENOUS; SUBCUTANEOUS at 22:16

## 2019-12-01 RX ADMIN — PIPERACILLIN AND TAZOBACTAM 200 GRAM(S): 4; .5 INJECTION, POWDER, LYOPHILIZED, FOR SOLUTION INTRAVENOUS at 19:16

## 2019-12-01 RX ADMIN — SODIUM CHLORIDE 1000 MILLILITER(S): 9 INJECTION INTRAMUSCULAR; INTRAVENOUS; SUBCUTANEOUS at 18:24

## 2019-12-01 RX ADMIN — Medication 1000 MILLIGRAM(S): at 22:16

## 2019-12-01 RX ADMIN — Medication 100 MILLIEQUIVALENT(S): at 22:16

## 2019-12-01 RX ADMIN — ONDANSETRON 4 MILLIGRAM(S): 8 TABLET, FILM COATED ORAL at 18:24

## 2019-12-01 RX ADMIN — HYDROMORPHONE HYDROCHLORIDE 0.5 MILLIGRAM(S): 2 INJECTION INTRAMUSCULAR; INTRAVENOUS; SUBCUTANEOUS at 21:54

## 2019-12-01 RX ADMIN — Medication 100 MILLIEQUIVALENT(S): at 19:35

## 2019-12-01 RX ADMIN — Medication 10 MILLIEQUIVALENT(S): at 23:30

## 2019-12-01 RX ADMIN — Medication 100 MILLIEQUIVALENT(S): at 23:30

## 2019-12-01 RX ADMIN — SODIUM CHLORIDE 1000 MILLILITER(S): 9 INJECTION INTRAMUSCULAR; INTRAVENOUS; SUBCUTANEOUS at 19:36

## 2019-12-01 RX ADMIN — HYDROMORPHONE HYDROCHLORIDE 0.5 MILLIGRAM(S): 2 INJECTION INTRAMUSCULAR; INTRAVENOUS; SUBCUTANEOUS at 21:39

## 2019-12-01 NOTE — ED ADULT TRIAGE NOTE - CHIEF COMPLAINT QUOTE
Pt c/o coffee brown emesis once on Tuesday , and multiple times today with increasing weakness.  Denies chest pain, sob, dizziness

## 2019-12-01 NOTE — ED PROVIDER NOTE - PHYSICAL EXAMINATION
Dr Workman  pt looks uncomfortable, weak. dry mm. pale conjunctiva.   normal S1-S2  coarse bs bl. no retractions.   soft nontender abdomen. no  rebound. no guarding. no sign of trauma. no CVAT  no pedal edema. no calf tenderness. normal pulses bilateral feet.  right arm weaker than left at baseline. pt uses a walker normally.

## 2019-12-01 NOTE — ED PROVIDER NOTE - CARE PLAN
79.5 77.1 Principal Discharge DX:	GI bleed  Secondary Diagnosis:	Acute kidney injury  Secondary Diagnosis:	Hypokalemia

## 2019-12-01 NOTE — ED PROVIDER NOTE - PROGRESS NOTE DETAILS
pt now endorsing abdominal pain. will order ct abdomen/pelvis Josh-PGY1: pt received at sign-out, seen and evaluated at bedside.  Pt sitting comfortably in NAD. Abdominal exam notable to epigastric tenderness, no point tenderness, abdomen nondistended, no peritoneal signs. Will continue to assess. CT pending. Lucks-PGY1: most recent VBG results inconsistent with prior labs with significant changes.  Likely drawn from IV line and not accurate sample.  Will repeat test. Josh-PGY1: Discussed patient with GI fellow, will see patient in the morning or sooner if any hemodynamic instability.  Reassessed patient, pain improved, abdominal exam improved with minimal tenderness. Josh-PGY1: Hb 8.0 on recent CBC, blood pressure still elevated.  Pt reevaluated at bedside in NAD.  MAR aware, states he will discuss with his attending.

## 2019-12-01 NOTE — ED PROVIDER NOTE - OBJECTIVE STATEMENT
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 no BRBPR. no abdominal pain. Feeling very weak and dizzy. no cp or sob. pt was just discharged from another hospital, was admitted for a week for SOB/URI. during admission  had one episode of black vomit 5 days ago, pt refused endoscopy. Didn't have any further vomiting until a day ago. yesterday had one episode of "regular non bloody vomit" today had several episodes of coffee ground vomit.

## 2019-12-02 DIAGNOSIS — F32.9 MAJOR DEPRESSIVE DISORDER, SINGLE EPISODE, UNSPECIFIED: ICD-10-CM

## 2019-12-02 DIAGNOSIS — Z02.9 ENCOUNTER FOR ADMINISTRATIVE EXAMINATIONS, UNSPECIFIED: ICD-10-CM

## 2019-12-02 DIAGNOSIS — K92.0 HEMATEMESIS: ICD-10-CM

## 2019-12-02 DIAGNOSIS — I63.9 CEREBRAL INFARCTION, UNSPECIFIED: ICD-10-CM

## 2019-12-02 DIAGNOSIS — Z29.9 ENCOUNTER FOR PROPHYLACTIC MEASURES, UNSPECIFIED: ICD-10-CM

## 2019-12-02 DIAGNOSIS — G30.9 ALZHEIMER'S DISEASE, UNSPECIFIED: ICD-10-CM

## 2019-12-02 DIAGNOSIS — E87.6 HYPOKALEMIA: ICD-10-CM

## 2019-12-02 DIAGNOSIS — N17.9 ACUTE KIDNEY FAILURE, UNSPECIFIED: ICD-10-CM

## 2019-12-02 DIAGNOSIS — I10 ESSENTIAL (PRIMARY) HYPERTENSION: ICD-10-CM

## 2019-12-02 DIAGNOSIS — K92.2 GASTROINTESTINAL HEMORRHAGE, UNSPECIFIED: ICD-10-CM

## 2019-12-02 LAB
ALBUMIN SERPL ELPH-MCNC: 2 G/DL — LOW (ref 3.3–5)
ALP SERPL-CCNC: 53 U/L — SIGNIFICANT CHANGE UP (ref 40–120)
ALT FLD-CCNC: 11 U/L — SIGNIFICANT CHANGE UP (ref 4–33)
ANION GAP SERPL CALC-SCNC: 10 MMO/L — SIGNIFICANT CHANGE UP (ref 7–14)
AST SERPL-CCNC: 13 U/L — SIGNIFICANT CHANGE UP (ref 4–32)
BASE EXCESS BLDV CALC-SCNC: 0.6 MMOL/L — SIGNIFICANT CHANGE UP
BASOPHILS # BLD AUTO: 0.02 K/UL — SIGNIFICANT CHANGE UP (ref 0–0.2)
BASOPHILS NFR BLD AUTO: 0.1 % — SIGNIFICANT CHANGE UP (ref 0–2)
BILIRUB SERPL-MCNC: 0.3 MG/DL — SIGNIFICANT CHANGE UP (ref 0.2–1.2)
BLOOD GAS VENOUS - CREATININE: 1.84 MG/DL — HIGH (ref 0.5–1.3)
BLOOD GAS VENOUS - FIO2: 21 — SIGNIFICANT CHANGE UP
BUN SERPL-MCNC: 40 MG/DL — HIGH (ref 7–23)
CALCIUM SERPL-MCNC: 7 MG/DL — LOW (ref 8.4–10.5)
CHLORIDE BLDV-SCNC: 107 MMOL/L — SIGNIFICANT CHANGE UP (ref 96–108)
CHLORIDE SERPL-SCNC: 109 MMOL/L — HIGH (ref 98–107)
CO2 SERPL-SCNC: 22 MMOL/L — SIGNIFICANT CHANGE UP (ref 22–31)
CREAT SERPL-MCNC: 1.81 MG/DL — HIGH (ref 0.5–1.3)
EOSINOPHIL # BLD AUTO: 0.01 K/UL — SIGNIFICANT CHANGE UP (ref 0–0.5)
EOSINOPHIL NFR BLD AUTO: 0 % — SIGNIFICANT CHANGE UP (ref 0–6)
GAS PNL BLDV: 141 MMOL/L — SIGNIFICANT CHANGE UP (ref 136–146)
GLUCOSE BLDV-MCNC: 97 MG/DL — SIGNIFICANT CHANGE UP (ref 70–99)
GLUCOSE SERPL-MCNC: 102 MG/DL — HIGH (ref 70–99)
HCO3 BLDV-SCNC: 24 MMOL/L — SIGNIFICANT CHANGE UP (ref 20–27)
HCT VFR BLD CALC: 22.4 % — LOW (ref 34.5–45)
HCT VFR BLD CALC: 23.3 % — LOW (ref 34.5–45)
HCT VFR BLD CALC: 27.6 % — LOW (ref 34.5–45)
HCT VFR BLDV CALC: 23.7 % — LOW (ref 34.5–45)
HGB BLD-MCNC: 7.6 G/DL — LOW (ref 11.5–15.5)
HGB BLD-MCNC: 8 G/DL — LOW (ref 11.5–15.5)
HGB BLD-MCNC: 9.3 G/DL — LOW (ref 11.5–15.5)
HGB BLDV-MCNC: 7.6 G/DL — LOW (ref 11.5–15.5)
IMM GRANULOCYTES NFR BLD AUTO: 0.9 % — SIGNIFICANT CHANGE UP (ref 0–1.5)
LACTATE BLDV-MCNC: 1.4 MMOL/L — SIGNIFICANT CHANGE UP (ref 0.5–2)
LYMPHOCYTES # BLD AUTO: 1.78 K/UL — SIGNIFICANT CHANGE UP (ref 1–3.3)
LYMPHOCYTES # BLD AUTO: 8.3 % — LOW (ref 13–44)
MAGNESIUM SERPL-MCNC: 1.4 MG/DL — LOW (ref 1.6–2.6)
MCHC RBC-ENTMCNC: 30.1 PG — SIGNIFICANT CHANGE UP (ref 27–34)
MCHC RBC-ENTMCNC: 30.2 PG — SIGNIFICANT CHANGE UP (ref 27–34)
MCHC RBC-ENTMCNC: 30.3 PG — SIGNIFICANT CHANGE UP (ref 27–34)
MCHC RBC-ENTMCNC: 33.7 % — SIGNIFICANT CHANGE UP (ref 32–36)
MCHC RBC-ENTMCNC: 33.9 % — SIGNIFICANT CHANGE UP (ref 32–36)
MCHC RBC-ENTMCNC: 34.3 % — SIGNIFICANT CHANGE UP (ref 32–36)
MCV RBC AUTO: 88.3 FL — SIGNIFICANT CHANGE UP (ref 80–100)
MCV RBC AUTO: 88.9 FL — SIGNIFICANT CHANGE UP (ref 80–100)
MCV RBC AUTO: 89.3 FL — SIGNIFICANT CHANGE UP (ref 80–100)
MONOCYTES # BLD AUTO: 1.39 K/UL — HIGH (ref 0–0.9)
MONOCYTES NFR BLD AUTO: 6.5 % — SIGNIFICANT CHANGE UP (ref 2–14)
NEUTROPHILS # BLD AUTO: 18.13 K/UL — HIGH (ref 1.8–7.4)
NEUTROPHILS NFR BLD AUTO: 84.2 % — HIGH (ref 43–77)
NRBC # FLD: 0 K/UL — SIGNIFICANT CHANGE UP (ref 0–0)
PCO2 BLDV: 49 MMHG — SIGNIFICANT CHANGE UP (ref 41–51)
PH BLDV: 7.34 PH — SIGNIFICANT CHANGE UP (ref 7.32–7.43)
PHOSPHATE SERPL-MCNC: 2.5 MG/DL — SIGNIFICANT CHANGE UP (ref 2.5–4.5)
PLATELET # BLD AUTO: 194 K/UL — SIGNIFICANT CHANGE UP (ref 150–400)
PLATELET # BLD AUTO: 203 K/UL — SIGNIFICANT CHANGE UP (ref 150–400)
PLATELET # BLD AUTO: 223 K/UL — SIGNIFICANT CHANGE UP (ref 150–400)
PMV BLD: 10 FL — SIGNIFICANT CHANGE UP (ref 7–13)
PMV BLD: 10.3 FL — SIGNIFICANT CHANGE UP (ref 7–13)
PMV BLD: 10.5 FL — SIGNIFICANT CHANGE UP (ref 7–13)
PO2 BLDV: < 24 MMHG — LOW (ref 35–40)
POTASSIUM BLDV-SCNC: 2.9 MMOL/L — CRITICAL LOW (ref 3.4–4.5)
POTASSIUM SERPL-MCNC: 3.1 MMOL/L — LOW (ref 3.5–5.3)
POTASSIUM SERPL-SCNC: 3.1 MMOL/L — LOW (ref 3.5–5.3)
PROT SERPL-MCNC: 4.8 G/DL — LOW (ref 6–8.3)
RBC # BLD: 2.52 M/UL — LOW (ref 3.8–5.2)
RBC # BLD: 2.64 M/UL — LOW (ref 3.8–5.2)
RBC # BLD: 3.09 M/UL — LOW (ref 3.8–5.2)
RBC # FLD: 15 % — HIGH (ref 10.3–14.5)
RBC # FLD: 15 % — HIGH (ref 10.3–14.5)
RBC # FLD: 15.2 % — HIGH (ref 10.3–14.5)
SAO2 % BLDV: 35.6 % — LOW (ref 60–85)
SODIUM SERPL-SCNC: 141 MMOL/L — SIGNIFICANT CHANGE UP (ref 135–145)
WBC # BLD: 18.58 K/UL — HIGH (ref 3.8–10.5)
WBC # BLD: 20.64 K/UL — HIGH (ref 3.8–10.5)
WBC # BLD: 21.53 K/UL — HIGH (ref 3.8–10.5)
WBC # FLD AUTO: 18.58 K/UL — HIGH (ref 3.8–10.5)
WBC # FLD AUTO: 20.64 K/UL — HIGH (ref 3.8–10.5)
WBC # FLD AUTO: 21.53 K/UL — HIGH (ref 3.8–10.5)

## 2019-12-02 PROCEDURE — 99223 1ST HOSP IP/OBS HIGH 75: CPT | Mod: GC

## 2019-12-02 RX ORDER — ATORVASTATIN CALCIUM 80 MG/1
20 TABLET, FILM COATED ORAL AT BEDTIME
Refills: 0 | Status: DISCONTINUED | OUTPATIENT
Start: 2019-12-02 | End: 2019-12-11

## 2019-12-02 RX ORDER — POTASSIUM CHLORIDE 20 MEQ
40 PACKET (EA) ORAL ONCE
Refills: 0 | Status: COMPLETED | OUTPATIENT
Start: 2019-12-02 | End: 2019-12-02

## 2019-12-02 RX ORDER — ESCITALOPRAM OXALATE 10 MG/1
10 TABLET, FILM COATED ORAL DAILY
Refills: 0 | Status: DISCONTINUED | OUTPATIENT
Start: 2019-12-02 | End: 2019-12-11

## 2019-12-02 RX ORDER — ONDANSETRON 8 MG/1
4 TABLET, FILM COATED ORAL ONCE
Refills: 0 | Status: COMPLETED | OUTPATIENT
Start: 2019-12-02 | End: 2019-12-02

## 2019-12-02 RX ORDER — DONEPEZIL HYDROCHLORIDE 10 MG/1
10 TABLET, FILM COATED ORAL AT BEDTIME
Refills: 0 | Status: DISCONTINUED | OUTPATIENT
Start: 2019-12-02 | End: 2019-12-11

## 2019-12-02 RX ORDER — IPRATROPIUM/ALBUTEROL SULFATE 18-103MCG
3 AEROSOL WITH ADAPTER (GRAM) INHALATION
Qty: 0 | Refills: 0 | DISCHARGE

## 2019-12-02 RX ORDER — MAGNESIUM SULFATE 500 MG/ML
2 VIAL (ML) INJECTION ONCE
Refills: 0 | Status: COMPLETED | OUTPATIENT
Start: 2019-12-02 | End: 2019-12-02

## 2019-12-02 RX ORDER — CARVEDILOL PHOSPHATE 80 MG/1
3.12 CAPSULE, EXTENDED RELEASE ORAL EVERY 12 HOURS
Refills: 0 | Status: DISCONTINUED | OUTPATIENT
Start: 2019-12-02 | End: 2019-12-03

## 2019-12-02 RX ORDER — ASPIRIN/CALCIUM CARB/MAGNESIUM 324 MG
1 TABLET ORAL
Qty: 0 | Refills: 0 | DISCHARGE

## 2019-12-02 RX ORDER — CARVEDILOL PHOSPHATE 80 MG/1
6.25 CAPSULE, EXTENDED RELEASE ORAL EVERY 12 HOURS
Refills: 0 | Status: DISCONTINUED | OUTPATIENT
Start: 2019-12-02 | End: 2019-12-02

## 2019-12-02 RX ADMIN — CARVEDILOL PHOSPHATE 3.12 MILLIGRAM(S): 80 CAPSULE, EXTENDED RELEASE ORAL at 18:18

## 2019-12-02 RX ADMIN — CARVEDILOL PHOSPHATE 6.25 MILLIGRAM(S): 80 CAPSULE, EXTENDED RELEASE ORAL at 00:25

## 2019-12-02 RX ADMIN — DONEPEZIL HYDROCHLORIDE 10 MILLIGRAM(S): 10 TABLET, FILM COATED ORAL at 21:37

## 2019-12-02 RX ADMIN — PANTOPRAZOLE SODIUM 10 MG/HR: 20 TABLET, DELAYED RELEASE ORAL at 00:27

## 2019-12-02 RX ADMIN — ATORVASTATIN CALCIUM 20 MILLIGRAM(S): 80 TABLET, FILM COATED ORAL at 21:36

## 2019-12-02 RX ADMIN — CARVEDILOL PHOSPHATE 3.12 MILLIGRAM(S): 80 CAPSULE, EXTENDED RELEASE ORAL at 06:15

## 2019-12-02 RX ADMIN — ESCITALOPRAM OXALATE 10 MILLIGRAM(S): 10 TABLET, FILM COATED ORAL at 13:37

## 2019-12-02 RX ADMIN — ONDANSETRON 4 MILLIGRAM(S): 8 TABLET, FILM COATED ORAL at 03:44

## 2019-12-02 RX ADMIN — Medication 50 GRAM(S): at 07:06

## 2019-12-02 NOTE — H&P ADULT - HISTORY OF PRESENT ILLNESS
Mrs. 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    Patient has dementia + residual aphasia, and unable to give complete history. History obtained from daughter and son in law.  She was recently admitted to [place] from 11/22 to 11/29 for URI and was treated w/ 7 day course of IV CTX and Azithromycin in the hospital. During the hospital course, patient had a coffee ground emesis on Tuesday (11/24), yet after the one episode patient did not have recurrent event. As there was no more events, patient and family were instructed to follow up as outpatient for EGD, and patient had follow up appointment w/ PCP on coming Monday. However, on 12/1, patient had multiple episodes of coffee ground emesis and 1 episode of black stool. There was no red blood. Per family, patient reported abdominal pain and was fatigued throughout the day. She reported throat pain after vomiting per son-in-law. She did not really report weakness or dizziness as she was lying all day. Her PO intake has been very poor since the admission at OSH. She denies any CP, palpitation, SOB, visual disturbance.    In ED, Temp 36.9, HR 80s to 90s, /70, down to 173/71 after home dose coreg. RR 16, SpO2 98% on RA.  Received K+ supplement, IV push PPI 80mg, Zofran, Dilaudid, Fentanyl, Coreg, 2L IVF bolus, Vanco and Zosyn. Mrs. 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    Patient has dementia + residual aphasia, and unable to give complete history. History obtained from daughter and son in law.  She was recently admitted to [place] from 11/22 to 11/29 for URI and was treated w/ 7 day course of IV CTX and Azithromycin in the hospital. During the hospital course, patient had a coffee ground emesis on Tuesday (11/24), yet after the one episode patient did not have recurrent event. As there was no more events, patient and family were instructed to follow up as outpatient for EGD, and patient had follow up appointment w/ PCP on coming Monday. However, on 12/1, patient had multiple episodes of coffee ground emesis and 1 episode of black stool. There was no red blood. Per family, patient reported abdominal pain and was fatigued throughout the day. She reported throat pain after vomiting per son-in-law. She did not really report weakness or dizziness as she was lying all day. Her PO intake has been very poor since the admission at OSH. She denies any CP, palpitation, SOB, visual disturbance.    In ED, Temp 36.9, HR 80s to 90s, /70, down to 173/71 after home dose coreg. RR 16, SpO2 98% on RA.  Received K+ supplement, IV push PPI 80mg, Zofran, Dilaudid, Fentanyl, Coreg, 2L IVF bolus, Vanco and Zosyn.

## 2019-12-02 NOTE — H&P ADULT - PROBLEM SELECTOR PLAN 8
- DVT ppx: SCD  - Diet: NPO  - Transitions of Care Status:  1.  Name of PCP: Marylin Bowden  2.  PCP Contacted on Admission: [ ] Y    [x ] N  - night admission  3.  PCP contacted at Discharge: [ ] Y    [ ] N    [ ] N/A  4.  Post-Discharge Appointment Date and Location:  5.  Summary of Handoff given to PCP:

## 2019-12-02 NOTE — H&P ADULT - NSHPPHYSICALEXAM_GEN_ALL_CORE
Vital Signs Last 24 Hrs  T(C): 36.9 (02 Dec 2019 00:20), Max: 36.9 (02 Dec 2019 00:20)  T(F): 98.5 (02 Dec 2019 00:20), Max: 98.5 (02 Dec 2019 00:20)  HR: 89 (02 Dec 2019 01:22) (45 - 97)  BP: 173/71 (02 Dec 2019 01:22) (142/93 - 250/70)  BP(mean): --  RR: 16 (02 Dec 2019 00:20) (16 - 17)  SpO2: 98% (02 Dec 2019 00:20) (97% - 100%)      PHYSICAL EXAM:    Constitutional: NAD. elderly lady lying comfortably on room air.   HEENT: AT/NC, EOMI, dry mucous membrane. Left facial droop. Supple neck;  Respiratory: CTAB anteriorly. no increase in WOB  Cardiovascular: RRR, Systolic murmur. 2+ distal pulses. Cap refill ~3 seconds.   Gastrointestinal: soft; ND, TTP on left side of abdomen. +BS  Extremities: no cyanosis; no pedal edema.  Neurological: Alert. Awake; responds to pain; responds to verbal commands; sensation grossly intact: Left facial droop. Left side weakness. Able to raise arm and leg against gravity, yet left side weak against resistance. Unable to open left hands. .   Psychiatric: unable to assess Vital Signs Last 24 Hrs  T(C): 36.9 (02 Dec 2019 00:20), Max: 36.9 (02 Dec 2019 00:20)  T(F): 98.5 (02 Dec 2019 00:20), Max: 98.5 (02 Dec 2019 00:20)  HR: 89 (02 Dec 2019 01:22) (45 - 97)  BP: 173/71 (02 Dec 2019 01:22) (142/93 - 250/70)  BP(mean): --  RR: 16 (02 Dec 2019 00:20) (16 - 17)  SpO2: 98% (02 Dec 2019 00:20) (97% - 100%)    Constitutional: NAD. elderly lady lying comfortably on room air.   HEENT: EOMI, dry mucous membrane. Left facial droop. Supple neck;  Respiratory: CTAB anteriorly. no increase in WOB  Cardiovascular: RRR, Systolic murmur. 2+ distal pulses. Cap refill ~3 seconds.   Gastrointestinal: soft; ND, TTP on left side of abdomen. +BS  Extremities: no cyanosis; no pedal edema.  Neurological: Alert. Awake; responds to pain; responds to verbal commands; sensation grossly intact: Left facial droop. Left side weakness. Able to raise arm and leg against gravity, yet left side weak against resistance. Unable to open left hands. .   Psychiatric: unable to assess  Skin: warm, dry, no rash

## 2019-12-02 NOTE — H&P ADULT - PROBLEM SELECTOR PLAN 1
UGIB w/ coffee ground emesis and melena. Hemoglobin drop from 11 to 8 in ED.  - Patient hemodynamically stable for now.  - s/p IV pantoprazole 80mg push in ED.  - c/w pantoprazole gtt.  - NPO for now  - UGIB w/ coffee ground emesis and melena. Hemoglobin drop from 11 to 8 in ED.  - Patient hemodynamically stable for now.  - s/p IV pantoprazole 80mg push in ED.  - c/w pantoprazole gtt.  - NPO for now  - monitor VS q4h  - trend H/H  - GI consult UGIB w/ coffee ground emesis and melena. Hemoglobin drop from 11 to 8 in ED and subsequent coffee ground emesis.  - Patient hemodynamically stable for now.  - s/p IV pantoprazole 80mg push in ED.  - c/w pantoprazole gtt.  - NPO for now  - monitor VS q4h  - trend H/H  - 1u pRBC ordered for ongoing GIB.  - GI consult UGIB w/ coffee ground emesis and melena. Hemoglobin drop from 11 to 8 in ED and subsequent coffee ground emesis.  - Patient hemodynamically stable for now.  - s/p IV pantoprazole 80mg push in ED.  - c/w pantoprazole gtt.  - NPO for now  - monitor VS q4h  - trend H/H  - 1u pRBC ordered for ongoing GIB.  - keep type and screen active.   - GI consult

## 2019-12-02 NOTE — PROVIDER CONTACT NOTE (OTHER) - BACKGROUND
(PMH) MI, old  (PMH) CVA (cerebral vascular accident)  (PMH) HTN (hypertension)  (Principal DC/DX) GI bleed

## 2019-12-02 NOTE — H&P ADULT - PROBLEM SELECTOR PLAN 9
1.  Name of PCP: Marylin Potter  2.  PCP Contacted on Admission: [ ] Y    [X] N    3.  PCP contacted at Discharge: [ ] Y    [ ] N    [ ] N/A  4.  Post-Discharge Appointment Date and Location:  5.  Summary of Handoff given to PCP:

## 2019-12-02 NOTE — H&P ADULT - NSHPSOCIALHISTORY_GEN_ALL_CORE
lives with daughter and son in law.  Uses walker at baseline  Never smoked, no etoh or rec drug use.

## 2019-12-02 NOTE — H&P ADULT - NSHPREVIEWOFSYSTEMS_GEN_ALL_CORE
ROS limited due to patient's medical condition    CONSTITUTIONAL: Fatigue. No fever.  EYES: no visual change.  ENMT: throat pain.  RESPIRATORY: No shortness of breath, cough, wheezing.  CARDIOVASCULAR: No chest pain, palpitations, dizziness, or leg swelling  GASTROINTESTINAL: Left abdominal pain. melena, coffee ground emesis.  GENITOURINARY: No dysuria  NEUROLOGICAL: loss of strength of left side.  LYMPH NODES: No enlarged glands  PSYCHIATRIC: Denies depression, anxiety  HEME/LYMPH: No easy bruising, or bleeding gums  ALLERGY AND IMMUNOLOGIC: No hives or eczema ROS limited due to patient's medical condition as above    CONSTITUTIONAL: Fatigue. No fever.  EYES: no visual change.  ENMT: throat pain.  RESPIRATORY: No shortness of breath, cough, wheezing.  CARDIOVASCULAR: No chest pain, palpitations, dizziness, or leg swelling  GASTROINTESTINAL: Left abdominal pain. melena, coffee ground emesis.  GENITOURINARY: No dysuria  NEUROLOGICAL: loss of strength of left side.  LYMPH NODES: No enlarged glands  PSYCHIATRIC: Denies depression, anxiety  HEME/LYMPH: No easy bruising, or bleeding gums  ALLERGY AND IMMUNOLOGIC: No hives or eczema

## 2019-12-02 NOTE — H&P ADULT - PROBLEM SELECTOR PLAN 3
HTN w/ SBP upto 200. down to 170 after Coreg x1.  - continue to monitor VS q4h  - start Coreg on lower dose of 3.125 for blood pressure control given history of recurrent CVA. Yet monitor VS and stop Coreg if BP drops.

## 2019-12-02 NOTE — PHYSICAL THERAPY INITIAL EVALUATION ADULT - GENERAL OBSERVATIONS, REHAB EVAL
Patient received semi supine in a stretcher in the ED , aphasic, (+) IV , unable to use  phone due to patient is confused and unable to communicate well. Pt's RN Nelda able to communicate in [place].

## 2019-12-02 NOTE — CONSULT NOTE ADULT - SUBJECTIVE AND OBJECTIVE BOX
Patient is a 79y old  Female who presents with a chief complaint of UGIB (02 Dec 2019 03:06)    79 year old female seen in the ED with coffee ground emesis.  Patient with dementia :  History obtained form chart  Patient has dementia + residual aphasia, and unable to give complete history. History obtained from daughter and son in law.  She was recently admitted to [place] from 11/22 to 11/29 for URI and was treated w/ 7 day course of IV CTX and Azithromycin in the hospital. During the hospital course, patient had a coffee ground emesis on Tuesday (11/24), yet after the one episode patient did not have recurrent event. As there was no more events, patient and family were instructed to follow up as outpatient for EGD, and patient had follow up appointment w/ PCP on coming Monday. However, on 12/1, patient had multiple episodes of coffee ground emesis and 1 episode of black stool. There was no red blood. Per family, patient reported abdominal pain and was fatigued throughout the day. She reported throat pain after vomiting per son-in-law. She did not really report weakness or dizziness as she was lying all day. Her PO intake has been very poor since the admission at OSH. She denies any CP, palpitation, SOB, visual disturbance.        REVIEW OF SYSTEMS  N/A   Allergies    No Known Allergies    Intolerances      MEDICATIONS  (STANDING):  atorvastatin 20 milliGRAM(s) Oral at bedtime  carvedilol 3.125 milliGRAM(s) Oral every 12 hours  donepezil 10 milliGRAM(s) Oral at bedtime  escitalopram 10 milliGRAM(s) Oral daily  pantoprazole Infusion 8 mG/Hr (10 mL/Hr) IV Continuous <Continuous>    MEDICATIONS  (PRN):      PAST MEDICAL & SURGICAL HISTORY:  Depression  Alzheimers disease  MI, old  CVA (cerebral vascular accident)  HTN (hypertension)  No significant past surgical history    FAMILY HISTORY:  Family history of essential hypertension (Child)  Family history of stroke    Social History: No hsitory of : Tobacco use, IVDA, EToH  ______________________________________________________________________________________    PHYSICAL EXAM    Daily     Daily   BMI: 24.2 (04-03 @ 23:03)  Change in Weight:  Vital Signs Last 24 Hrs  T(C): 36.9 (02 Dec 2019 13:57), Max: 37 (02 Dec 2019 06:12)  T(F): 98.4 (02 Dec 2019 13:57), Max: 98.6 (02 Dec 2019 06:12)  HR: 74 (02 Dec 2019 15:53) (45 - 97)  BP: 150/77 (02 Dec 2019 15:53) (138/61 - 250/70)  BP(mean): --  RR: 18 (02 Dec 2019 15:53) (16 - 18)  SpO2: 100% (02 Dec 2019 15:53) (97% - 100%)    General:  Well developed, well nourished, alert and active, no pallor, NAD.  HEENT:    Normal appearance of conjunctiva, ears, nose, lips, oropharynx, and oral mucosa, anicteric.  Neck:  No masses, no asymmetry.  Lymph Nodes:  No lymphadenopathy.   Cardiovascular:  RRR normal S1/S2, no murmur.  Respiratory:  CTA B/L, normal respiratory effort.   Abdominal:   soft, no masses or tenderness, normoactive BS, NT/ND, no HSM.  Extremities:   No clubbing or cyanosis, normal capillary refill, no edema.     _______________________________________________________________________________________________  Lab Results:                          9.3    20.64 )-----------( 194      ( 02 Dec 2019 14:55 )             27.6     12-02    141  |  109<H>  |  40<H>  ----------------------------<  102<H>  3.1<L>   |  22  |  1.81<H>    Ca    7.0<L>      02 Dec 2019 05:30  Phos  2.5     12-02  Mg     1.4     12-02    TPro  4.8<L>  /  Alb  2.0<L>  /  TBili  0.3  /  DBili  x   /  AST  13  /  ALT  11  /  AlkPhos  53  12-02    LIVER FUNCTIONS - ( 02 Dec 2019 05:30 )  Alb: 2.0 g/dL / Pro: 4.8 g/dL / ALK PHOS: 53 u/L / ALT: 11 u/L / AST: 13 u/L / GGT: x           PT/INR - ( 01 Dec 2019 17:55 )   PT: 12.3 SEC;   INR: 1.10          PTT - ( 01 Dec 2019 17:55 )  PTT:19.2 SEC        Stool Results:          RADIOLOGY RESULTS:  < from: CT Abdomen and Pelvis No Cont (12.01.19 @ 21:02) >    EXAM:  CT ABDOMEN AND PELVIS        PROCEDURE DATE:  Dec  1 2019         INTERPRETATION:  CLINICAL INFORMATION: CKD. Abdominal pain.     COMPARISON: Chest CTA 1/4/2018.    PROCEDURE:   CT of the Abdomen and Pelvis was performed without intravenous contrast.   Intravenous contrast: None.  Oral contrast: positive contrast was administered.  Sagittal and coronal reformats were performed.    FINDINGS: Evaluation of the abdominal/pelvic organs, viscera and   vasculature is limited without intravenous contrast. Artifact from the   patient's arm degrades images.    LOWER CHEST: Nonspecific interlobular septal thickening. Persistent left   pleural effusion with atelectasis. Resolved right pleural effusion.   Aortic valve, mitral annular and coronary artery calcification. Findings   reflecting anemia.    LIVER: Unremarkable.  GALLBLADDER/BILE DUCTS: No intrahepatic or extrahepatic biliary   dilatation. Cholelithiasis.  PANCREAS: Unremarkable.  SPLEEN: Unremarkable.    ADRENALS: Unremarkable.  KIDNEYS/URETERS: Again noted, atrophic right kidney with a cyst.   Duplicated left renal collecting system. Prominent left collecting system   with nonspecific left perinephric stranding. No hydroureteronephrosis or   radiopaque urinary tract stone.  BLADDER: Partially distended.  REPRODUCTIVE ORGANS: Unremarkable.    BOWEL: No bowel obstruction. Unremarkable appendix. Fluid-filled colon.  PERITONEUM: No drainable fluid collection or free air.  VESSELS: Atherosclerosis.   RETROPERITONEUM: No lymphadenopathy.    ABDOMINAL WALL/SOFT TISSUES: Small fat-containing umbilical and inguinal   hernias. Left buttock injection granuloma.  BONES: Degenerative changes of the spine. Grade 1 anterolisthesis of L5   on S1. Stable anterior wedging of T11 and T12. New T10 superior endplate   depression.     IMPRESSION:     Nonspecific left perinephric stranding and prominent collecting system.   No hydroureteronephrosis or radiopaque urinary tract stone. Recommend   clinical correlation to assess urinary tract infection.    Cholelithiasis. Recommend clinical correlation to assess acute   cholecystitis.    Fluid-filled colon, which can be seen in the setting of colitis.   Recommend clinical correlation.    Persistent left pleural effusion with atelectasis.Nonspecific   interlobular septal thickening, which can be seen in pulmonary   edema/aggressive fluid resuscitation. Recommend clinical correlation to   assess underlying pneumonia.    Additional findings as described.              GALE STACY M.D., RADIOLOGY RESIDENT  This document has been electronically signed.  KEITH GARCIA M.D., ATTENDING RADIOLOGIST  This document has been electronically signed. Dec  1 2019 10:29PM                  < end of copied text >    SURGICAL PATHOLOGY:

## 2019-12-02 NOTE — H&P ADULT - ASSESSMENT
Mrs. 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

## 2019-12-02 NOTE — PHYSICAL THERAPY INITIAL EVALUATION ADULT - IMPAIRMENTS CONTRIBUTING TO GAIT DEVIATIONS, PT EVAL
ataxic/impaired coordination/abnormal muscle tone/decreased strength/cognition/narrow base of support/impaired balance/decreased flexibility

## 2019-12-02 NOTE — H&P ADULT - PROBLEM SELECTOR PLAN 4
- c/w home donepezil Hypokalemia with no hypokalemic EKG changes  - potassium repletion in ED.  - repeat CMP STAT ordered. Will follow and replete as needed.

## 2019-12-02 NOTE — H&P ADULT - NSICDXFAMILYHX_GEN_ALL_CORE_FT
FAMILY HISTORY:  Family history of stroke    Child  Still living? Unknown  Family history of essential hypertension, Age at diagnosis: Age Unknown

## 2019-12-02 NOTE — H&P ADULT - PROBLEM SELECTOR PLAN 7
- DVT ppx: SCD  - Diet: NPO  - Transitions of Care Status:  1.  Name of PCP: Marylin Bowden  2.  PCP Contacted on Admission: [ ] Y    [x ] N  - night admission  3.  PCP contacted at Discharge: [ ] Y    [ ] N    [ ] N/A  4.  Post-Discharge Appointment Date and Location:  5.  Summary of Handoff given to PCP: Multiple CVA w/ residual aphasia/dysarthria and left sided weakness.  - c/w home statin.   - holding ASA in setting of GIB  - aspiration precaution  - fall precaution.  - PT/OT

## 2019-12-02 NOTE — PHYSICAL THERAPY INITIAL EVALUATION ADULT - LIVES WITH, PROFILE
at home , has Home Health Aide services as per previous chart review ,patient is unable to provide information due to being a poor historian./children

## 2019-12-02 NOTE — H&P ADULT - PROBLEM SELECTOR PLAN 6
Multiple CVA w/ residual aphasia/dysarthria and left sided weakness.  - c/w home statin.   - holding ASA in setting of GIB  - aspiration precaution  - fall precaution.  - PT/OT - c/w home antidepressant

## 2019-12-02 NOTE — PHYSICAL THERAPY INITIAL EVALUATION ADULT - GAIT DEVIATIONS NOTED, PT EVAL
scissoring gait/decreased weight-shifting ability/decreased step length/decreased mell/footdrop/decreased velocity of limb motion

## 2019-12-02 NOTE — H&P ADULT - NSHPLABSRESULTS_GEN_ALL_CORE
8.0    21.53 )-----------( 223      ( 01 Dec 2019 23:47 )             23.3           145  |  105  |  50<H>  ----------------------------<  138<H>  3.1<L>   |  24  |  1.99<H>    Ca    8.3<L>      01 Dec 2019 17:55    TPro  6.9  /  Alb  3.0<L>  /  TBili  0.4  /  DBili  x   /  AST  16  /  ALT  12  /  AlkPhos  80                Urinalysis Basic - ( 01 Dec 2019 18:26 )    Color: LIGHT YELLOW / Appearance: CLEAR / S.018 / pH: 6.5  Gluc: 50 / Ketone: NEGATIVE  / Bili: NEGATIVE / Urobili: NORMAL   Blood: MODERATE / Protein: 600 / Nitrite: NEGATIVE   Leuk Esterase: NEGATIVE / RBC: 5-10 / WBC 6-11   Sq Epi: MODERATE / Non Sq Epi: x / Bacteria: FEW        PT/INR - ( 01 Dec 2019 17:55 )   PT: 12.3 SEC;   INR: 1.10          PTT - ( 01 Dec 2019 17:55 )  PTT:19.2 SEC    Lactate Trend            CAPILLARY BLOOD GLUCOSE                RADIOLOGY, EKG & ADDITIONAL TESTS: Reviewed. 8.0    21.53 )-----------( 223      ( 01 Dec 2019 23:47 )             23.3         145  |  105  |  50<H>  ----------------------------<  138<H>  3.1<L>   |  24  |  1.99<H>    Ca    8.3<L>      01 Dec 2019 17:55    TPro  6.9  /  Alb  3.0<L>  /  TBili  0.4  /  DBili  x   /  AST  16  /  ALT  12  /  AlkPhos  80      Urinalysis Basic - ( 01 Dec 2019 18:26 )    Color: LIGHT YELLOW / Appearance: CLEAR / S.018 / pH: 6.5  Gluc: 50 / Ketone: NEGATIVE  / Bili: NEGATIVE / Urobili: NORMAL   Blood: MODERATE / Protein: 600 / Nitrite: NEGATIVE   Leuk Esterase: NEGATIVE / RBC: 5-10 / WBC 6-11   Sq Epi: MODERATE / Non Sq Epi: x / Bacteria: FEW    PT/INR - ( 01 Dec 2019 17:55 )   PT: 12.3 SEC;   INR: 1.10       PTT - ( 01 Dec 2019 17:55 )  PTT:19.2 SEC    Lactate Trend    CAPILLARY BLOOD GLUCOSE    RADIOLOGY, EKG & ADDITIONAL TESTS: Reviewed.

## 2019-12-02 NOTE — CONSULT NOTE ADULT - ASSESSMENT
79 year old female with coffee ground emesis and possible UGIB.    Seen in ED     Plan:  UGIB:  No melena since last night   Hemoglobin remains stable   already on PPI drip   Plan for EGD tomorrow (on add on list)  NPO Post midnight   Monitor CBC and keep hemoglobin > 8 given age   IV access       Dementia   Will need consent form family     Colitis on CT :  Clinically no evidence of colitis   Hold on anbx ofr now

## 2019-12-02 NOTE — H&P ADULT - PROBLEM SELECTOR PLAN 2
Acute kidney injury in setting of GIB and poor PO Intake.  - Cr baseline 1.1-1.3  - Cr on admission 1.99  - s/p IVF hydration in ED w/ bolus.  - holding off on mIVF for tonight in setting of hypertension.   - monitor Cr  - avoid nephrotoxic meds and renally dose meds.

## 2019-12-03 LAB
ANION GAP SERPL CALC-SCNC: 11 MMO/L — SIGNIFICANT CHANGE UP (ref 7–14)
BASOPHILS # BLD AUTO: 0.01 K/UL — SIGNIFICANT CHANGE UP (ref 0–0.2)
BASOPHILS NFR BLD AUTO: 0.1 % — SIGNIFICANT CHANGE UP (ref 0–2)
BUN SERPL-MCNC: 43 MG/DL — HIGH (ref 7–23)
CALCIUM SERPL-MCNC: 7.5 MG/DL — LOW (ref 8.4–10.5)
CHLORIDE SERPL-SCNC: 111 MMOL/L — HIGH (ref 98–107)
CO2 SERPL-SCNC: 22 MMOL/L — SIGNIFICANT CHANGE UP (ref 22–31)
CREAT SERPL-MCNC: 2.18 MG/DL — HIGH (ref 0.5–1.3)
EOSINOPHIL # BLD AUTO: 0.12 K/UL — SIGNIFICANT CHANGE UP (ref 0–0.5)
EOSINOPHIL NFR BLD AUTO: 0.7 % — SIGNIFICANT CHANGE UP (ref 0–6)
GLUCOSE SERPL-MCNC: 79 MG/DL — SIGNIFICANT CHANGE UP (ref 70–99)
HCT VFR BLD CALC: 23.5 % — LOW (ref 34.5–45)
HCT VFR BLD CALC: 26.5 % — LOW (ref 34.5–45)
HGB BLD-MCNC: 8.1 G/DL — LOW (ref 11.5–15.5)
HGB BLD-MCNC: 8.9 G/DL — LOW (ref 11.5–15.5)
IMM GRANULOCYTES NFR BLD AUTO: 0.7 % — SIGNIFICANT CHANGE UP (ref 0–1.5)
LYMPHOCYTES # BLD AUTO: 1.16 K/UL — SIGNIFICANT CHANGE UP (ref 1–3.3)
LYMPHOCYTES # BLD AUTO: 6.5 % — LOW (ref 13–44)
MAGNESIUM SERPL-MCNC: 1.9 MG/DL — SIGNIFICANT CHANGE UP (ref 1.6–2.6)
MCHC RBC-ENTMCNC: 30.4 PG — SIGNIFICANT CHANGE UP (ref 27–34)
MCHC RBC-ENTMCNC: 30.5 PG — SIGNIFICANT CHANGE UP (ref 27–34)
MCHC RBC-ENTMCNC: 33.6 % — SIGNIFICANT CHANGE UP (ref 32–36)
MCHC RBC-ENTMCNC: 34.5 % — SIGNIFICANT CHANGE UP (ref 32–36)
MCV RBC AUTO: 88.3 FL — SIGNIFICANT CHANGE UP (ref 80–100)
MCV RBC AUTO: 90.4 FL — SIGNIFICANT CHANGE UP (ref 80–100)
MONOCYTES # BLD AUTO: 1.41 K/UL — HIGH (ref 0–0.9)
MONOCYTES NFR BLD AUTO: 7.9 % — SIGNIFICANT CHANGE UP (ref 2–14)
NEUTROPHILS # BLD AUTO: 14.92 K/UL — HIGH (ref 1.8–7.4)
NEUTROPHILS NFR BLD AUTO: 84.1 % — HIGH (ref 43–77)
NRBC # FLD: 0 K/UL — SIGNIFICANT CHANGE UP (ref 0–0)
NRBC # FLD: 0 K/UL — SIGNIFICANT CHANGE UP (ref 0–0)
PLATELET # BLD AUTO: 167 K/UL — SIGNIFICANT CHANGE UP (ref 150–400)
PLATELET # BLD AUTO: 186 K/UL — SIGNIFICANT CHANGE UP (ref 150–400)
PMV BLD: 10 FL — SIGNIFICANT CHANGE UP (ref 7–13)
PMV BLD: 10.3 FL — SIGNIFICANT CHANGE UP (ref 7–13)
POTASSIUM SERPL-MCNC: 3.1 MMOL/L — LOW (ref 3.5–5.3)
POTASSIUM SERPL-MCNC: 4.5 MMOL/L — SIGNIFICANT CHANGE UP (ref 3.5–5.3)
POTASSIUM SERPL-SCNC: 3.1 MMOL/L — LOW (ref 3.5–5.3)
POTASSIUM SERPL-SCNC: 4.5 MMOL/L — SIGNIFICANT CHANGE UP (ref 3.5–5.3)
PROCALCITONIN SERPL-MCNC: 0.33 NG/ML — HIGH (ref 0.02–0.1)
RBC # BLD: 2.66 M/UL — LOW (ref 3.8–5.2)
RBC # BLD: 2.93 M/UL — LOW (ref 3.8–5.2)
RBC # FLD: 15.6 % — HIGH (ref 10.3–14.5)
RBC # FLD: 15.6 % — HIGH (ref 10.3–14.5)
SODIUM SERPL-SCNC: 144 MMOL/L — SIGNIFICANT CHANGE UP (ref 135–145)
SPECIMEN SOURCE: SIGNIFICANT CHANGE UP
WBC # BLD: 17.75 K/UL — HIGH (ref 3.8–10.5)
WBC # BLD: 27.52 K/UL — HIGH (ref 3.8–10.5)
WBC # FLD AUTO: 17.75 K/UL — HIGH (ref 3.8–10.5)
WBC # FLD AUTO: 27.52 K/UL — HIGH (ref 3.8–10.5)

## 2019-12-03 RX ORDER — PIPERACILLIN AND TAZOBACTAM 4; .5 G/20ML; G/20ML
3.38 INJECTION, POWDER, LYOPHILIZED, FOR SOLUTION INTRAVENOUS EVERY 8 HOURS
Refills: 0 | Status: DISCONTINUED | OUTPATIENT
Start: 2019-12-03 | End: 2019-12-03

## 2019-12-03 RX ORDER — CARVEDILOL PHOSPHATE 80 MG/1
3.12 CAPSULE, EXTENDED RELEASE ORAL ONCE
Refills: 0 | Status: COMPLETED | OUTPATIENT
Start: 2019-12-03 | End: 2019-12-03

## 2019-12-03 RX ORDER — CARVEDILOL PHOSPHATE 80 MG/1
6.25 CAPSULE, EXTENDED RELEASE ORAL EVERY 12 HOURS
Refills: 0 | Status: DISCONTINUED | OUTPATIENT
Start: 2019-12-04 | End: 2019-12-11

## 2019-12-03 RX ORDER — SODIUM CHLORIDE 9 MG/ML
1000 INJECTION, SOLUTION INTRAVENOUS
Refills: 0 | Status: DISCONTINUED | OUTPATIENT
Start: 2019-12-03 | End: 2019-12-05

## 2019-12-03 RX ORDER — PIPERACILLIN AND TAZOBACTAM 4; .5 G/20ML; G/20ML
3.38 INJECTION, POWDER, LYOPHILIZED, FOR SOLUTION INTRAVENOUS EVERY 12 HOURS
Refills: 0 | Status: DISCONTINUED | OUTPATIENT
Start: 2019-12-04 | End: 2019-12-10

## 2019-12-03 RX ORDER — PIPERACILLIN AND TAZOBACTAM 4; .5 G/20ML; G/20ML
3.38 INJECTION, POWDER, LYOPHILIZED, FOR SOLUTION INTRAVENOUS ONCE
Refills: 0 | Status: COMPLETED | OUTPATIENT
Start: 2019-12-03 | End: 2019-12-03

## 2019-12-03 RX ORDER — SODIUM CHLORIDE 9 MG/ML
1000 INJECTION INTRAMUSCULAR; INTRAVENOUS; SUBCUTANEOUS
Refills: 0 | Status: DISCONTINUED | OUTPATIENT
Start: 2019-12-03 | End: 2019-12-03

## 2019-12-03 RX ORDER — POTASSIUM CHLORIDE 20 MEQ
10 PACKET (EA) ORAL
Refills: 0 | Status: COMPLETED | OUTPATIENT
Start: 2019-12-03 | End: 2019-12-03

## 2019-12-03 RX ADMIN — Medication 100 MILLIEQUIVALENT(S): at 10:30

## 2019-12-03 RX ADMIN — DONEPEZIL HYDROCHLORIDE 10 MILLIGRAM(S): 10 TABLET, FILM COATED ORAL at 22:26

## 2019-12-03 RX ADMIN — PANTOPRAZOLE SODIUM 10 MG/HR: 20 TABLET, DELAYED RELEASE ORAL at 09:07

## 2019-12-03 RX ADMIN — PANTOPRAZOLE SODIUM 10 MG/HR: 20 TABLET, DELAYED RELEASE ORAL at 00:09

## 2019-12-03 RX ADMIN — CARVEDILOL PHOSPHATE 3.12 MILLIGRAM(S): 80 CAPSULE, EXTENDED RELEASE ORAL at 14:20

## 2019-12-03 RX ADMIN — Medication 100 MILLIEQUIVALENT(S): at 11:33

## 2019-12-03 RX ADMIN — PIPERACILLIN AND TAZOBACTAM 200 GRAM(S): 4; .5 INJECTION, POWDER, LYOPHILIZED, FOR SOLUTION INTRAVENOUS at 21:21

## 2019-12-03 RX ADMIN — CARVEDILOL PHOSPHATE 3.12 MILLIGRAM(S): 80 CAPSULE, EXTENDED RELEASE ORAL at 17:48

## 2019-12-03 RX ADMIN — CARVEDILOL PHOSPHATE 3.12 MILLIGRAM(S): 80 CAPSULE, EXTENDED RELEASE ORAL at 05:18

## 2019-12-03 RX ADMIN — ESCITALOPRAM OXALATE 10 MILLIGRAM(S): 10 TABLET, FILM COATED ORAL at 11:33

## 2019-12-03 RX ADMIN — Medication 100 MILLIEQUIVALENT(S): at 12:36

## 2019-12-03 RX ADMIN — ATORVASTATIN CALCIUM 20 MILLIGRAM(S): 80 TABLET, FILM COATED ORAL at 22:27

## 2019-12-03 NOTE — PROGRESS NOTE ADULT - PROBLEM SELECTOR PLAN 7
Multiple CVA w/ residual aphasia/dysarthria and left sided weakness.  - c/w home statin.   - holding ASA in setting of GIB  - aspiration precaution  - fall precaution.  - PT/OT

## 2019-12-03 NOTE — CHART NOTE - NSCHARTNOTEFT_GEN_A_CORE
PA note   Pt returned from endoscopy due to temp per endoscopy of 101.4. On return to floor pts oral temp was found to be 98.6 .  Will monitor temps tonight and advance diet as tolerated.  Billie Cruz PAC

## 2019-12-03 NOTE — PROVIDER CONTACT NOTE (OTHER) - ASSESSMENT
patient asymptomatic of BP. denies chest pain, SOB, palpitations, headache, dizziness, lightheadedness. patient A&O2, disoriented to time and situation. no acute changes in assessment at this time.

## 2019-12-03 NOTE — PROGRESS NOTE ADULT - SUBJECTIVE AND OBJECTIVE BOX
Patient is a 79y old  Female who presents with a chief complaint of UGIB (03 Dec 2019 17:11)      SUBJECTIVE / OVERNIGHT EVENTS:  pt had fever in endoscopy. procedure was cancelled    MEDICATIONS  (STANDING):  atorvastatin 20 milliGRAM(s) Oral at bedtime  carvedilol 3.125 milliGRAM(s) Oral every 12 hours  donepezil 10 milliGRAM(s) Oral at bedtime  escitalopram 10 milliGRAM(s) Oral daily  pantoprazole Infusion 8 mG/Hr (10 mL/Hr) IV Continuous <Continuous>  piperacillin/tazobactam IVPB. 3.375 Gram(s) IV Intermittent once  piperacillin/tazobactam IVPB.. 3.375 Gram(s) IV Intermittent every 8 hours    MEDICATIONS  (PRN):      Vital Signs Last 24 Hrs  T(C): 37.1 (03 Dec 2019 17:44), Max: 38.3 (03 Dec 2019 16:53)  T(F): 98.7 (03 Dec 2019 17:44), Max: 101 (03 Dec 2019 16:53)  HR: 81 (03 Dec 2019 17:44) (79 - 93)  BP: 158/88 (03 Dec 2019 17:44) (125/66 - 198/82)  BP(mean): --  RR: 17 (03 Dec 2019 17:44) (16 - 26)  SpO2: 100% (03 Dec 2019 17:44) (98% - 100%)  CAPILLARY BLOOD GLUCOSE        I&O's Summary    03 Dec 2019 07:01  -  03 Dec 2019 19:20  --------------------------------------------------------  IN: 450 mL / OUT: 0 mL / NET: 450 mL        PHYSICAL EXAM:  GENERAL: NAD, well-developed  HEAD:  Atraumatic, Normocephalic  EYES: EOMI, PERRLA, conjunctiva and sclera clear  NECK: Supple, No JVD  CHEST/LUNG: rina rhonchi  HEART: Regular rate and rhythm; No murmurs, rubs, or gallops  ABDOMEN: Soft, Nontender, Nondistended; Bowel sounds present  EXTREMITIES:  2+ Peripheral Pulses, No clubbing, cyanosis, or edema    NEUROLOGY: left hemiparesis  SKIN: No rashes or lesions    LABS:                        8.1    17.75 )-----------( 167      ( 03 Dec 2019 07:55 )             23.5     12-03    x   |  x   |  x   ----------------------------<  x   4.5   |  x   |  x     Ca    7.5<L>      03 Dec 2019 07:55  Phos  2.5     12-02  Mg     1.9     12-03    TPro  4.8<L>  /  Alb  2.0<L>  /  TBili  0.3  /  DBili  x   /  AST  13  /  ALT  11  /  AlkPhos  53  12-02              RADIOLOGY & ADDITIONAL TESTS:    Imaging Personally Reviewed:    Consultant(s) Notes Reviewed:      Care Discussed with Consultants/Other Providers:

## 2019-12-03 NOTE — PROGRESS NOTE ADULT - ASSESSMENT
Patient came down to endo. Found to have a temp of 101. 4. Mild tachypneic but saturating 100 % on oxygen.   Case canceled for further workup.   Advance to diet   Monitor CBC   Consider  repeat attempt at EGD  if still clinically indicted   Advanced care planning was discussed with patient and family.  Advanced care planning forms were reviewed and discussed.  Risks, benefits and alternatives of gastroenterologic procedures were discussed in detail and all questions were answered.    I was physically present for the key portions of the evaluation and management (E/M) service provided.  The patient was personally seen and examined at bedside.    Thank you for your consultation and allowing  me to participate in the care of your patients. If you have further questions please contact me at [phone].     Arnaldo Gan M.D.       _________________________________________________________________________________________________  [facility]  [address] [place] [address]  Office: [phone]    Elie Chávez PA-C  ___________________________________________________________________________________________________

## 2019-12-03 NOTE — PROGRESS NOTE ADULT - SUBJECTIVE AND OBJECTIVE BOX
Summary:   79y  Female      Subjective:   Febrile     Objective:    MEDICATIONS  (STANDING):  atorvastatin 20 milliGRAM(s) Oral at bedtime  carvedilol 3.125 milliGRAM(s) Oral every 12 hours  donepezil 10 milliGRAM(s) Oral at bedtime  escitalopram 10 milliGRAM(s) Oral daily  pantoprazole Infusion 8 mG/Hr (10 mL/Hr) IV Continuous <Continuous>    MEDICATIONS  (PRN):              Vital Signs Last 24 Hrs  T(C): 38.3 (03 Dec 2019 16:53), Max: 38.3 (03 Dec 2019 16:53)  T(F): 101 (03 Dec 2019 16:53), Max: 101 (03 Dec 2019 16:53)  HR: 83 (03 Dec 2019 16:53) (78 - 93)  BP: 196/82 (03 Dec 2019 16:53) (125/66 - 198/82)  BP(mean): --  RR: 20 (03 Dec 2019 16:53) (16 - 26)  SpO2: 100% (03 Dec 2019 16:53) (98% - 100%)      General:  Well developed, well nourished, alert and active, no pallor, NAD.  HEENT:    Normal appearance of conjunctiva, ears, nose, lips, oropharynx, and oral mucosa, anicteric.  Neck:  No masses, no asymmetry.  Lymph Nodes:  No lymphadenopathy.   Cardiovascular:  RRR normal S1/S2, no murmur.  Respiratory:  CTA B/L, normal respiratory effort.   Abdominal:   soft, no masses or tenderness, normoactive BS, NT/ND, no HSM.  Extremities:   No clubbing or cyanosis, normal capillary refill, no edema.   Skin:   No rash, jaundice, lesions, eczema.   Musculoskeletal:  No joint swelling, erythema or tenderness.   Neuro: No focal deficits.   Other:       LABS:                        8.1    17.75 )-----------( 167      ( 03 Dec 2019 07:55 )             23.5     12-    x   |  x   |  x   ----------------------------<  x   4.5   |  x   |  x     Ca    7.5<L>      03 Dec 2019 07:55  Phos  2.5     12  Mg     1.9         TPro  4.8<L>  /  Alb  2.0<L>  /  TBili  0.3  /  DBili  x   /  AST  13  /  ALT  11  /  AlkPhos  53  12-02    PT/INR - ( 01 Dec 2019 17:55 )   PT: 12.3 SEC;   INR: 1.10          PTT - ( 01 Dec 2019 17:55 )  PTT:19.2 SEC  Urinalysis Basic - ( 01 Dec 2019 18:26 )    Color: LIGHT YELLOW / Appearance: CLEAR / S.018 / pH: 6.5  Gluc: 50 / Ketone: NEGATIVE  / Bili: NEGATIVE / Urobili: NORMAL   Blood: MODERATE / Protein: 600 / Nitrite: NEGATIVE   Leuk Esterase: NEGATIVE / RBC: 5-10 / WBC 6-11   Sq Epi: MODERATE / Non Sq Epi: x / Bacteria: FEW        RADIOLOGY & ADDITIONAL TESTS:

## 2019-12-03 NOTE — PROGRESS NOTE ADULT - PROBLEM SELECTOR PLAN 1
UGIB w/ coffee ground emesis and melena. Hemoglobin drop from 11 to 8 in ED and subsequent coffee ground emesis.  - Patient hemodynamically stable for now.  - s/p IV pantoprazole 80mg push in ED.  - c/w pantoprazole gtt.  - clear liquids  - monitor VS q4h  - trend H/H  - 1u pRBC ordered for ongoing GIB.  - keep type and screen active.   - GI consult

## 2019-12-03 NOTE — PROGRESS NOTE ADULT - ASSESSMENT
Mrs. 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    fever with rhonchi and elevated wbc  - ct of chest to r/o pna  - check procalcitonin  - check blood cultures  - start zosyn

## 2019-12-03 NOTE — PROGRESS NOTE ADULT - PROBLEM SELECTOR PLAN 2
Acute kidney injury in setting of GIB and poor PO Intake.  - Cr baseline 1.1-1.3  - Cr on admission 1.99  - s/p IVF hydration in ED w/ bolus.  - monitor Cr  - avoid nephrotoxic meds and renally dose meds.  - iv fluids

## 2019-12-03 NOTE — PROGRESS NOTE ADULT - SUBJECTIVE AND OBJECTIVE BOX
Anesthesia note  79 y.o. female scheduled for Egd today.   Pt temp 101. Pt tachypneic with abdominal breathing and substernal notch retraction.   Pt on NC@2l with no relief. Lungs-bilateral rhonchi, left side> right side. Last CXR 12/1- clear lungs  d/w surgeon, case deferred today  Recommendations: Pulmonary consult/ followup CXR/ Optimalization  Thank you,

## 2019-12-04 LAB
-  AMPICILLIN: SIGNIFICANT CHANGE UP
-  CIPROFLOXACIN: SIGNIFICANT CHANGE UP
-  DAPTOMYCIN: SIGNIFICANT CHANGE UP
-  LINEZOLID: SIGNIFICANT CHANGE UP
-  NITROFURANTOIN: SIGNIFICANT CHANGE UP
-  TETRACYCLINE: SIGNIFICANT CHANGE UP
-  VANCOMYCIN: SIGNIFICANT CHANGE UP
ANION GAP SERPL CALC-SCNC: 11 MMO/L — SIGNIFICANT CHANGE UP (ref 7–14)
BACTERIA UR CULT: SIGNIFICANT CHANGE UP
BUN SERPL-MCNC: 42 MG/DL — HIGH (ref 7–23)
CALCIUM SERPL-MCNC: 8 MG/DL — LOW (ref 8.4–10.5)
CHLORIDE SERPL-SCNC: 111 MMOL/L — HIGH (ref 98–107)
CO2 SERPL-SCNC: 21 MMOL/L — LOW (ref 22–31)
CREAT SERPL-MCNC: 2.48 MG/DL — HIGH (ref 0.5–1.3)
GLUCOSE SERPL-MCNC: 104 MG/DL — HIGH (ref 70–99)
HCT VFR BLD CALC: 24.9 % — LOW (ref 34.5–45)
HGB BLD-MCNC: 8 G/DL — LOW (ref 11.5–15.5)
MCHC RBC-ENTMCNC: 30.3 PG — SIGNIFICANT CHANGE UP (ref 27–34)
MCHC RBC-ENTMCNC: 32.1 % — SIGNIFICANT CHANGE UP (ref 32–36)
MCV RBC AUTO: 94.3 FL — SIGNIFICANT CHANGE UP (ref 80–100)
METHOD TYPE: SIGNIFICANT CHANGE UP
NRBC # FLD: 0 K/UL — SIGNIFICANT CHANGE UP (ref 0–0)
ORGANISM # SPEC MICROSCOPIC CNT: SIGNIFICANT CHANGE UP
ORGANISM # SPEC MICROSCOPIC CNT: SIGNIFICANT CHANGE UP
PLATELET # BLD AUTO: 150 K/UL — SIGNIFICANT CHANGE UP (ref 150–400)
PMV BLD: 10.8 FL — SIGNIFICANT CHANGE UP (ref 7–13)
POTASSIUM SERPL-MCNC: 3.8 MMOL/L — SIGNIFICANT CHANGE UP (ref 3.5–5.3)
POTASSIUM SERPL-SCNC: 3.8 MMOL/L — SIGNIFICANT CHANGE UP (ref 3.5–5.3)
RBC # BLD: 2.64 M/UL — LOW (ref 3.8–5.2)
RBC # FLD: 15.9 % — HIGH (ref 10.3–14.5)
SODIUM SERPL-SCNC: 143 MMOL/L — SIGNIFICANT CHANGE UP (ref 135–145)
SPECIMEN SOURCE: SIGNIFICANT CHANGE UP
WBC # BLD: 22.74 K/UL — HIGH (ref 3.8–10.5)
WBC # FLD AUTO: 22.74 K/UL — HIGH (ref 3.8–10.5)

## 2019-12-04 PROCEDURE — 71250 CT THORAX DX C-: CPT | Mod: 26

## 2019-12-04 PROCEDURE — 99222 1ST HOSP IP/OBS MODERATE 55: CPT

## 2019-12-04 RX ADMIN — DONEPEZIL HYDROCHLORIDE 10 MILLIGRAM(S): 10 TABLET, FILM COATED ORAL at 23:51

## 2019-12-04 RX ADMIN — PANTOPRAZOLE SODIUM 10 MG/HR: 20 TABLET, DELAYED RELEASE ORAL at 18:59

## 2019-12-04 RX ADMIN — PIPERACILLIN AND TAZOBACTAM 25 GRAM(S): 4; .5 INJECTION, POWDER, LYOPHILIZED, FOR SOLUTION INTRAVENOUS at 23:51

## 2019-12-04 RX ADMIN — PANTOPRAZOLE SODIUM 10 MG/HR: 20 TABLET, DELAYED RELEASE ORAL at 05:33

## 2019-12-04 RX ADMIN — CARVEDILOL PHOSPHATE 6.25 MILLIGRAM(S): 80 CAPSULE, EXTENDED RELEASE ORAL at 17:32

## 2019-12-04 RX ADMIN — ESCITALOPRAM OXALATE 10 MILLIGRAM(S): 10 TABLET, FILM COATED ORAL at 12:29

## 2019-12-04 RX ADMIN — CARVEDILOL PHOSPHATE 6.25 MILLIGRAM(S): 80 CAPSULE, EXTENDED RELEASE ORAL at 05:59

## 2019-12-04 RX ADMIN — SODIUM CHLORIDE 75 MILLILITER(S): 9 INJECTION, SOLUTION INTRAVENOUS at 05:36

## 2019-12-04 RX ADMIN — ATORVASTATIN CALCIUM 20 MILLIGRAM(S): 80 TABLET, FILM COATED ORAL at 23:51

## 2019-12-04 RX ADMIN — PIPERACILLIN AND TAZOBACTAM 25 GRAM(S): 4; .5 INJECTION, POWDER, LYOPHILIZED, FOR SOLUTION INTRAVENOUS at 11:06

## 2019-12-04 RX ADMIN — SODIUM CHLORIDE 75 MILLILITER(S): 9 INJECTION, SOLUTION INTRAVENOUS at 18:59

## 2019-12-04 NOTE — CONSULT NOTE ADULT - ASSESSMENT
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    JONY on ckd stage 3  baseline ~ 1.1-1.3, multiple JONY in the past  scr on admission 1.99 worsen today  check urine cr, na, kidney us  JONY possible ATN possible sec to anemia  currently on zosyn, r/o AIN  on IVF currently. 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    JONY on ckd stage 3  baseline ~ 1.1-1.3, multiple JONY in the past  scr on admission 1.99 worsen today  check urine cr, na  CT abd 12/1 showed atropic right kidney, no hydro  JONY possible ATN possible sec to anemia  currently on zosyn, r/o AIN  on IVF currently. monitor fluid status closely  check cbc with diff in the am  monitor bmp  avoid nephrotoxic agents  renal dose medication    proteinuria/hematuria  + UTI  will need to repeat UA post treatment  will do vasculitis work up if persistent    anemia  GIB  f/u gi  monitor hb  transfuse per team    Hypocalcemia  sec to low alb  check vit d 25  monitor

## 2019-12-04 NOTE — CONSULT NOTE ADULT - SUBJECTIVE AND OBJECTIVE BOX
Patient is a 79y old  Female who presents with a chief complaint of UGIB (04 Dec 2019 13:55)    HPI:  Mrs. 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    Patient has dementia + residual aphasia, and unable to give complete history. History obtained from daughter and son in law.  She was recently admitted to [facility] from 11/22 to 11/29 for URI and was treated w/ 7 day course of IV CTX and Azithromycin in the hospital. During the hospital course, patient had a coffee ground emesis on Tuesday (11/24), yet after the one episode patient did not have recurrent event. As there was no more events, patient and family were instructed to follow up as outpatient for EGD, and patient had follow up appointment w/ PCP on coming Monday. However, on 12/1, patient had multiple episodes of coffee ground emesis and 1 episode of black stool. There was no red blood. Per family, patient reported abdominal pain and was fatigued throughout the day. She reported throat pain after vomiting per son-in-law. She did not really report weakness or dizziness as she was lying all day. Her PO intake has been very poor since the admission at OSH. She denies any CP, palpitation, SOB, visual disturbance.    In ED, Temp 36.9, HR 80s to 90s, /70, down to 173/71 after home dose coreg. RR 16, SpO2 98% on RA.  Received K+ supplement, IV push PPI 80mg, Zofran, Dilaudid, Fentanyl, Coreg, 2L IVF bolus, Vanco and Zosyn. (02 Dec 2019 03:06)    HPI and Hospital course reviewed. 80 y/o F PMHx of demenia, previous CVAs with residual weakness and aphasia was brought in for evaluation of potential UGIB after repeat episode of coffee ground emesis. Pt     prior hospital charts reviewed [  ]  primary team notes reviewed [  ]  other consultant notes reviewed [  ]  PAST MEDICAL & SURGICAL HISTORY:  Depression  Alzheimers disease  MI, old  CVA (cerebral vascular accident)  HTN (hypertension)  No significant past surgical history    Allergies  No Known Allergies    ANTIMICROBIALS (past 90 days)  MEDICATIONS  (STANDING):  piperacillin/tazobactam IVPB.   200 mL/Hr IV Intermittent (12-01-19 @ 19:16)    piperacillin/tazobactam IVPB.   200 mL/Hr IV Intermittent (12-03-19 @ 21:21)    piperacillin/tazobactam IVPB..   25 mL/Hr IV Intermittent (12-04-19 @ 11:06)    vancomycin  IVPB   250 mL/Hr IV Intermittent (12-01-19 @ 20:40)      ANTIMICROBIALS:    piperacillin/tazobactam IVPB.. 3.375 every 12 hours    OTHER MEDS: MEDICATIONS  (STANDING):  atorvastatin 20 at bedtime  carvedilol 6.25 every 12 hours  donepezil 10 at bedtime  escitalopram 10 daily  pantoprazole Infusion 8 <Continuous>    SOCIAL HISTORY:   hx smoking  non-smoker    FAMILY HISTORY:  Family history of essential hypertension (Child)  Family history of stroke    REVIEW OF SYSTEMS  [  ] ROS unobtainable because:    [  ] All other systems negative except as noted below:	    Constitutional:  [ ] fever [ ] chills  [ ] weight loss  [ ] weakness  Skin:  [ ] rash [ ] phlebitis	  Eyes: [ ] icterus [ ] pain  [ ] discharge	  ENMT: [ ] sore throat  [ ] thrush [ ] ulcers [ ] exudates  Respiratory: [ ] dyspnea [ ] hemoptysis [ ] cough [ ] sputum	  Cardiovascular:  [ ] chest pain [ ] palpitations [ ] edema	  Gastrointestinal:  [ ] nausea [ ] vomiting [ ] diarrhea [ ] constipation [ ] pain	  Genitourinary:  [ ] dysuria [ ] frequency [ ] hematuria [ ] discharge [ ] flank pain  [ ] incontinence  Musculoskeletal:  [ ] myalgias [ ] arthralgias [ ] arthritis  [ ] back pain  Neurological:  [ ] headache [ ] seizures  [ ] confusion/altered mental status  Psychiatric:  [ ] anxiety [ ] depression	  Hematology/Lymphatics:  [ ] lymphadenopathy  Endocrine:  [ ] adrenal [ ] thyroid  Allergic/Immunologic:	 [ ] transplant [ ] seasonal    Vital Signs Last 24 Hrs  T(F): 97.8 (12-04-19 @ 14:35), Max: 101 (12-03-19 @ 16:53)  Vital Signs Last 24 Hrs  HR: 80 (12-04-19 @ 17:30) (80 - 98)  BP: 140/84 (12-04-19 @ 17:30) (140/84 - 172/80)  RR: 17 (12-04-19 @ 14:35)  SpO2: 98% (12-04-19 @ 14:35) (96% - 98%)  Wt(kg): --    PHYSICAL EXAM:  General: non-toxic  HEAD/EYES: anicteric, PERRL  ENT:  supple  Cardiovascular:   S1, S2  Respiratory:  clear bilaterally  GI:  soft, non-tender, normal bowel sounds  :  no CVA tenderness   Musculoskeletal:  no synovitis  Neurologic:  grossly non-focal  Skin:  no rash  Lymph: no lymphadenopathy  Psychiatric:  appropriate affect  Vascular:  no phlebitis                            8.0    22.74 )-----------( 150      ( 04 Dec 2019 10:05 )             24.9     12-04    143  |  111<H>  |  42<H>  ----------------------------<  104<H>  3.8   |  21<L>  |  2.48<H>    Ca    8.0<L>      04 Dec 2019 10:05  Mg     1.9     12-03      MICROBIOLOGY:  Culture - Urine (collected 02 Dec 2019 00:10)  Source: URINE MIDSTREAM  Preliminary Report (03 Dec 2019 12:11):    ENTFC^Enterococcus faecium    COLONY COUNT: > = 100,000 CFU/ML  Final Report (04 Dec 2019 15:00):    TESTS: URINE CULTURE    CALLED TO:ELIZABETH BRYSON RN/Y    READ BACK (2 Patient Identifiers) (Y/N): YES    READ BACK VALUES (Y/N): YES    DATE / TIME CALLED: 12/04/19 1456    CALLED BY: NATE ANSARI  Organism: Enterococcus faecium VRE (04 Dec 2019 15:00)  Organism: Enterococcus faecium VRE  COLONY COUNT: > = 100,000 CFU/ML (04 Dec 2019 15:00)      -  Ampicillin: R >8 RONALD      -  Ciprofloxacin: R >2 RONALD      -  Daptomycin: S 4 RONALD      -  Linezolid: S <=0.5 RONALD      -  Nitrofurantoin: I 64 RONALD      -  Tetra/Doxy: R >8 RONALD      -  Vancomycin: R >16 RONALD      Method Type: POSITIVE RONALD 29                  RADIOLOGY:  imaging below personally reviewed Patient is a 79y old  Female who presents with a chief complaint of UGIB (04 Dec 2019 13:55)    HPI:  Mrs. 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    Patient has dementia + residual aphasia, and unable to give complete history. History obtained from daughter and son in law.  She was recently admitted to [place] from 11/22 to 11/29 for URI and was treated w/ 7 day course of IV CTX and Azithromycin in the hospital. During the hospital course, patient had a coffee ground emesis on Tuesday (11/24), yet after the one episode patient did not have recurrent event. As there was no more events, patient and family were instructed to follow up as outpatient for EGD, and patient had follow up appointment w/ PCP on coming Monday. However, on 12/1, patient had multiple episodes of coffee ground emesis and 1 episode of black stool. There was no red blood. Per family, patient reported abdominal pain and was fatigued throughout the day. She reported throat pain after vomiting per son-in-law. She did not really report weakness or dizziness as she was lying all day. Her PO intake has been very poor since the admission at OSH. She denies any CP, palpitation, SOB, visual disturbance.    In ED, Temp 36.9, HR 80s to 90s, /70, down to 173/71 after home dose coreg. RR 16, SpO2 98% on RA.  Received K+ supplement, IV push PPI 80mg, Zofran, Dilaudid, Fentanyl, Coreg, 2L IVF bolus, Vanco and Zosyn. (02 Dec 2019 03:06)    HPI and Hospital course reviewed. 80 y/o F PMHx of demenia, previous CVAs with residual weakness and aphasia was brought in for evaluation of potential UGIB after repeat episode of coffee ground emesis. Pt was recently admitted to OSH and appears to have been treated for CAP with CTX/Azithromcyin. Episode of coffee ground emesis during that hospitalization, recurred on 12/1 after discharge so family brought patient to ER for GI evaluation. Pt with persistent leukocytosis, afebrile until spiked to 101 in endoscopy suite yesterday, procedure cancelled. Initialy CXR in ER negative, UA with minimal pyuria and few bacteria, CT A/P with several findings which could correlate with infection in the appropriate clinical context. After spiking yesterday pt was started on Zosyn. Urine now growing VRE.         prior hospital charts reviewed [x  ]  primary team notes reviewed [ x ]  other consultant notes reviewed [  x]  PAST MEDICAL & SURGICAL HISTORY:  Depression  Alzheimers disease  MI, old  CVA (cerebral vascular accident)  HTN (hypertension)  No significant past surgical history    Allergies  No Known Allergies    ANTIMICROBIALS (past 90 days)  MEDICATIONS  (STANDING):  piperacillin/tazobactam IVPB.   200 mL/Hr IV Intermittent (12-01-19 @ 19:16)    piperacillin/tazobactam IVPB.   200 mL/Hr IV Intermittent (12-03-19 @ 21:21)    piperacillin/tazobactam IVPB..   25 mL/Hr IV Intermittent (12-04-19 @ 11:06)    vancomycin  IVPB   250 mL/Hr IV Intermittent (12-01-19 @ 20:40)      ANTIMICROBIALS:    piperacillin/tazobactam IVPB.. 3.375 every 12 hours    OTHER MEDS: MEDICATIONS  (STANDING):  atorvastatin 20 at bedtime  carvedilol 6.25 every 12 hours  donepezil 10 at bedtime  escitalopram 10 daily  pantoprazole Infusion 8 <Continuous>    SOCIAL HISTORY:   unable to obtain d/t mental status    FAMILY HISTORY:  Family history of essential hypertension (Child)  Family history of stroke    REVIEW OF SYSTEMS  [x  ] ROS unobtainable because:  nable to obtain d/t mental status  [  ] All other systems negative except as noted below:	    Constitutional:  [ ] fever [ ] chills  [ ] weight loss  [ ] weakness  Skin:  [ ] rash [ ] phlebitis	  Eyes: [ ] icterus [ ] pain  [ ] discharge	  ENMT: [ ] sore throat  [ ] thrush [ ] ulcers [ ] exudates  Respiratory: [ ] dyspnea [ ] hemoptysis [ ] cough [ ] sputum	  Cardiovascular:  [ ] chest pain [ ] palpitations [ ] edema	  Gastrointestinal:  [ ] nausea [ ] vomiting [ ] diarrhea [ ] constipation [ ] pain	  Genitourinary:  [ ] dysuria [ ] frequency [ ] hematuria [ ] discharge [ ] flank pain  [ ] incontinence  Musculoskeletal:  [ ] myalgias [ ] arthralgias [ ] arthritis  [ ] back pain  Neurological:  [ ] headache [ ] seizures  [ ] confusion/altered mental status  Psychiatric:  [ ] anxiety [ ] depression	  Hematology/Lymphatics:  [ ] lymphadenopathy  Endocrine:  [ ] adrenal [ ] thyroid  Allergic/Immunologic:	 [ ] transplant [ ] seasonal    Vital Signs Last 24 Hrs  T(F): 97.8 (12-04-19 @ 14:35), Max: 101 (12-03-19 @ 16:53)  Vital Signs Last 24 Hrs  HR: 80 (12-04-19 @ 17:30) (80 - 98)  BP: 140/84 (12-04-19 @ 17:30) (140/84 - 172/80)  RR: 17 (12-04-19 @ 14:35)  SpO2: 98% (12-04-19 @ 14:35) (96% - 98%)  Wt(kg): --    PHYSICAL EXAM:  General: AAO to person, uncomfortable appearing with gurgling breath sounds  HEAD/EYES: anicteric, PERRL  ENT:  supple  Cardiovascular:   S1, S2 no m/r/g  Respiratory:  course throughout  GI:  soft, non-tender, normal bowel sounds  :  no CVA tenderness   Musculoskeletal:  no synovitis  Neurologic:  grossly non-focal  Skin:  no rash  Lymph: no lymphadenopathy  Psychiatric:  appropriate affect  Vascular:  no phlebitis                            8.0    22.74 )-----------( 150      ( 04 Dec 2019 10:05 )             24.9     12-04    143  |  111<H>  |  42<H>  ----------------------------<  104<H>  3.8   |  21<L>  |  2.48<H>    Ca    8.0<L>      04 Dec 2019 10:05  Mg     1.9     12-03      MICROBIOLOGY:  Culture - Urine (collected 02 Dec 2019 00:10)  Source: URINE MIDSTREAM  Preliminary Report (03 Dec 2019 12:11):    ENTFC^Enterococcus faecium    COLONY COUNT: > = 100,000 CFU/ML  Final Report (04 Dec 2019 15:00):    TESTS: URINE CULTURE    CALLED TO:ELIZABETH BRYSON RN/Y    READ BACK (2 Patient Identifiers) (Y/N): YES    READ BACK VALUES (Y/N): YES    DATE / TIME CALLED: 12/04/19 1456    CALLED BY: NATE ANSARI  Organism: Enterococcus faecium VRE (04 Dec 2019 15:00)  Organism: Enterococcus faecium VRE  COLONY COUNT: > = 100,000 CFU/ML (04 Dec 2019 15:00)      -  Ampicillin: R >8 RONALD      -  Ciprofloxacin: R >2 RONALD      -  Daptomycin: S 4 RONALD      -  Linezolid: S <=0.5 RONALD      -  Nitrofurantoin: I 64 RONALD      -  Tetra/Doxy: R >8 RONALD      -  Vancomycin: R >16 RONALD      Method Type: POSITIVE RONALD 29                  RADIOLOGY:  < from: CT Abdomen and Pelvis No Cont (12.01.19 @ 21:02) >    IMPRESSION:     Nonspecific left perinephric stranding and prominent collecting system.   No hydroureteronephrosis or radiopaque urinary tract stone. Recommend   clinical correlation to assess urinary tract infection.    Cholelithiasis. Recommend clinical correlation to assess acute   cholecystitis.    Fluid-filled colon, which can be seen in the setting of colitis.   Recommend clinical correlation.    Persistent left pleural effusion with atelectasis.Nonspecific   interlobular septal thickening, which can be seen in pulmonary   edema/aggressive fluid resuscitation. Recommend clinical correlation to   assess underlying pneumonia.    Additional findings as described.      < from: Xray Chest 1 View AP/PA (12.01.19 @ 20:29) >      IMPRESSION: Clear lungs.      < end of copied text >

## 2019-12-04 NOTE — PROGRESS NOTE ADULT - SUBJECTIVE AND OBJECTIVE BOX
Patient is a 79y old  Female who presents with a chief complaint of UGIB (04 Dec 2019 17:47)      SUBJECTIVE / OVERNIGHT EVENTS:  coughing     MEDICATIONS  (STANDING):  atorvastatin 20 milliGRAM(s) Oral at bedtime  carvedilol 6.25 milliGRAM(s) Oral every 12 hours  donepezil 10 milliGRAM(s) Oral at bedtime  escitalopram 10 milliGRAM(s) Oral daily  pantoprazole Infusion 8 mG/Hr (10 mL/Hr) IV Continuous <Continuous>  piperacillin/tazobactam IVPB.. 3.375 Gram(s) IV Intermittent every 12 hours  sodium chloride 0.45%. 1000 milliLiter(s) (75 mL/Hr) IV Continuous <Continuous>    MEDICATIONS  (PRN):      Vital Signs Last 24 Hrs  T(C): 36.6 (04 Dec 2019 14:35), Max: 37.4 (03 Dec 2019 21:02)  T(F): 97.8 (04 Dec 2019 14:35), Max: 99.4 (03 Dec 2019 21:02)  HR: 80 (04 Dec 2019 17:30) (80 - 98)  BP: 140/84 (04 Dec 2019 17:30) (140/84 - 172/80)  BP(mean): 88 (04 Dec 2019 14:35) (88 - 88)  RR: 17 (04 Dec 2019 14:35) (17 - 18)  SpO2: 98% (04 Dec 2019 14:35) (96% - 98%)  CAPILLARY BLOOD GLUCOSE        I&O's Summary    03 Dec 2019 07:01  -  04 Dec 2019 07:00  --------------------------------------------------------  IN: 450 mL / OUT: 0 mL / NET: 450 mL        PHYSICAL EXAM:  GENERAL: NAD, well-developed  HEAD:  Atraumatic, Normocephalic  EYES: EOMI, PERRLA, conjunctiva and sclera clear  NECK: Supple, No JVD  CHEST/LUNG: rina rhonchi  HEART: Regular rate and rhythm; No murmurs, rubs, or gallops  ABDOMEN: Soft, Nontender, Nondistended; Bowel sounds present  EXTREMITIES:  2+ Peripheral Pulses, No clubbing, cyanosis, or edema  PSYCH: AAOx3  NEUROLOGY: non-focal  SKIN: No rashes or lesions    LABS:                        8.0    22.74 )-----------( 150      ( 04 Dec 2019 10:05 )             24.9     12-04    143  |  111<H>  |  42<H>  ----------------------------<  104<H>  3.8   |  21<L>  |  2.48<H>    Ca    8.0<L>      04 Dec 2019 10:05  Mg     1.9     12-03                RADIOLOGY & ADDITIONAL TESTS:    Imaging Personally Reviewed:    Consultant(s) Notes Reviewed:      Care Discussed with Consultants/Other Providers:

## 2019-12-04 NOTE — CONSULT NOTE ADULT - SUBJECTIVE AND OBJECTIVE BOX
[facility]ROLOGY PRACTICE   MD JACOBY YAN DO MARYANNE SOURIAL, NAHID BUCHANAN    TEL:  OFFICE: [phone]  DR MCCLOUD CELL: [phone]  DR. FRANCIS CELL: [phone]  DR. MORALEZ CELL: [phone]  HELLEN SOILS CELL: [phone]    HPI:  79 year old woman with history of dementia (AAOX0-1), CVA in  and  with residual L sided weakness and aphasia, HTN, achalasia s/p POEMS (2018), who presented tot [facility] w/ 1day history of coffee ground emesis and melena.  history from chart, as patient has aphasia due to CVA    She was recently admitted to [place] from  to  for URI and was treated w/ 7 day course of IV CTX and Azithromycin in the hospital. During the hospital course, patient had a coffee ground emesis on Tuesday (), yet after the one episode patient did not have recurrent event. As there was no more events, patient and family were instructed to follow up as outpatient for EGD, and patient had follow up appointment w/ PCP on coming Monday. However, on , patient had multiple episodes of coffee ground emesis and 1 episode of black stool.     nephrology consulted for elevated scr, upon review baseline ~ 1.1-1.3, has multiple JONY in the past.       Allergies:  No Known Allergies      PAST MEDICAL & SURGICAL HISTORY:  Depression  Alzheimers disease  MI, old  CVA (cerebral vascular accident)  HTN (hypertension)  No significant past surgical history      Home Medications Reviewed    Hospital Medications:   MEDICATIONS  (STANDING):  atorvastatin 20 milliGRAM(s) Oral at bedtime  carvedilol 6.25 milliGRAM(s) Oral every 12 hours  donepezil 10 milliGRAM(s) Oral at bedtime  escitalopram 10 milliGRAM(s) Oral daily  pantoprazole Infusion 8 mG/Hr (10 mL/Hr) IV Continuous <Continuous>  piperacillin/tazobactam IVPB.. 3.375 Gram(s) IV Intermittent every 12 hours  sodium chloride 0.45%. 1000 milliLiter(s) (75 mL/Hr) IV Continuous <Continuous>      SOCIAL HISTORY:  Denies ETOh, Smoking,     FAMILY HISTORY:  Family history of essential hypertension (Child)  Family history of stroke      REVIEW OF SYSTEMS:  CONSTITUTIONAL: No weakness, fevers or chills  EYES/ENT: No visual changes;  No vertigo or throat pain   NECK: No pain or stiffness  RESPIRATORY: No cough, wheezing, hemoptysis; No shortness of breath  CARDIOVASCULAR: No chest pain or palpitations.  GASTROINTESTINAL: see hpi  GENITOURINARY: No dysuria, frequency, foamy urine, urinary urgency, incontinence or hematuria  NEUROLOGICAL: residual L sided weakness and aphasia  SKIN: No itching, burning, rashes, or lesions   VASCULAR: No bilateral lower extremity edema.   All other review of systems is negative unless indicated above.    VITALS:  T(F): 98.9 (19 @ 05:54), Max: 101 (19 @ 16:53)  HR: 87 (19 @ 05:54)  BP: 144/71 (19 @ 05:54)  RR: 18 (19 @ 05:54)  SpO2: 97% (19 @ 05:54)  Wt(kg): --     @ 07:01  -   @ 07:00  --------------------------------------------------------  IN: 450 mL / OUT: 0 mL / NET: 450 mL      Height (cm): 149.9 ( @ 00:27)  Weight (kg): 47 ( 00:27)  BMI (kg/m2): 20.9 ( @ 00:27)  BSA (m2): 1.39 ( @ 00:27)    PHYSICAL EXAM:  Constitutional: NAD  HEENT: anicteric sclera, oropharynx clear, MMM  Neck: No JVD  Respiratory: rhonchi left   Cardiovascular: S1, S2, RRR  Gastrointestinal: BS+, soft, NT/ND  Extremities: No cyanosis or clubbing. No peripheral edema  Neurological: residual L sided weakness and aphasia  Psychiatric: Normal mood, normal affect  : No CVA tenderness. No lee.   Skin: No rashes      LABS:  12-04    143  |  111<H>  |  42<H>  ----------------------------<  104<H>  3.8   |  21<L>  |  2.48<H>    Ca    8.0<L>      04 Dec 2019 10:05  Mg     1.9     12-03      Creatinine Trend: 2.48 <--, 2.18 <--, 1.81 <--, 1.99 <--                        8.0    22.74 )-----------( 150      ( 04 Dec 2019 10:05 )             24.9     Urine Studies:  Urinalysis Basic - ( 01 Dec 2019 18:26 )    Color: LIGHT YELLOW / Appearance: CLEAR / S.018 / pH: 6.5  Gluc: 50 / Ketone: NEGATIVE  / Bili: NEGATIVE / Urobili: NORMAL   Blood: MODERATE / Protein: 600 / Nitrite: NEGATIVE   Leuk Esterase: NEGATIVE / RBC: 5-10 / WBC 6-11   Sq Epi: MODERATE / Non Sq Epi:  / Bacteria: FEW          RADIOLOGY & ADDITIONAL STUDIES:

## 2019-12-04 NOTE — CHART NOTE - NSCHARTNOTEFT_GEN_A_CORE
Notified by RN that Ucx positive for VRE, Pt put on Contact Isolation,   Case discussed with attending, ID C/s Called, Continue to Monitor Pt Closely.

## 2019-12-04 NOTE — PROGRESS NOTE ADULT - PROBLEM SELECTOR PLAN 1
UGIB w/ coffee ground emesis and melena.   - Patient hemodynamically stable for now.  - s/p IV pantoprazole 80mg push in ED.  - c/w pantoprazole gtt.  - soft diet  - GI consult  - EGD when stable

## 2019-12-04 NOTE — CONSULT NOTE ADULT - ASSESSMENT
. 80 y/o F PMHx of demenia, previous CVAs with residual weakness and aphasia was brought in for evaluation of potential UGIB after repeat episode of coffee ground emesis, significant leukocytosis and spiked fever during endoscopy with c/f aspiration, also growing VRE in urine.    SIRS or Sepsis  -unclear if actively infected, leukocytosis could be reactive to UGIB. However pt spiked fever during endoscopy, aspiration risk, would continue to cover empirically with Zosyn  -agree with pulmonary imaging to assess for aspiration pneumonia  -f/u bcx  -d/t minimal pyuria on UA, no CVA ttp, do not believe pt has UTI/pyelo. VRE finding likely represents asymptomatic bacteriuria   -procal elevated

## 2019-12-04 NOTE — PROGRESS NOTE ADULT - ASSESSMENT
Mrs. 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    sepsis with fever with rhonchi and elevated wbc  - ct of chest to r/o pna  - check procalcitonin  - check blood cultures  - start zosyn  - seen by ID  - f/u wbc

## 2019-12-04 NOTE — CONSULT NOTE ADULT - ATTENDING COMMENTS
I was physically present for the key portions of the evaluation and management (E/M) service provided.  The patient was personally seen and examined at bedside.    Thank you for your consultation and allowing  me to participate in the care of your patients. If you have further questions please contact me at [phone].     Arnaldo Gan M.D.       _________________________________________________________________________________________________  [facility]  [address] [place] [address]  Office: [phone]    Elie Chávez PA-C  ___________________________________________________________________________________________________
79 year old woman with history of dementia (AAOX0-1), CVA in 1993 and 2017 with residual L sided weakness and aphasia, HTN, achalasia s/p POEMS (Jan 2018), admitted 12/1/19 hospital w/ 1day history of coffee ground emesis and melena.  She has had one episode of fever with leukocytosis  Urine growing VRE but no flank tenderness or pyruria.  Appears uncomfortable, tachypneic, RUE weakness with diffuse rhonchi    suggest  Zosyn

## 2019-12-04 NOTE — PROGRESS NOTE ADULT - ASSESSMENT
79 year old female with pneumonia and GI bleed.       GI bleed  Darian active t and screen   CBC Q 24 .   CBC trending down again alrhough no black stools   She ahs been afebrile since yesterday although her WBC remians elevated. IF she is

## 2019-12-04 NOTE — PROGRESS NOTE ADULT - SUBJECTIVE AND OBJECTIVE BOX
Summary:   79y  Female      Subjective:     Patient seen at bedside She appears comfortable   Objective:    MEDICATIONS  (STANDING):  atorvastatin 20 milliGRAM(s) Oral at bedtime  carvedilol 6.25 milliGRAM(s) Oral every 12 hours  donepezil 10 milliGRAM(s) Oral at bedtime  escitalopram 10 milliGRAM(s) Oral daily  pantoprazole Infusion 8 mG/Hr (10 mL/Hr) IV Continuous <Continuous>  piperacillin/tazobactam IVPB.. 3.375 Gram(s) IV Intermittent every 12 hours  sodium chloride 0.45%. 1000 milliLiter(s) (75 mL/Hr) IV Continuous <Continuous>    MEDICATIONS  (PRN):              Vital Signs Last 24 Hrs  T(C): 37.2 (04 Dec 2019 05:54), Max: 38.3 (03 Dec 2019 16:53)  T(F): 98.9 (04 Dec 2019 05:54), Max: 101 (03 Dec 2019 16:53)  HR: 87 (04 Dec 2019 05:54) (79 - 98)  BP: 144/71 (04 Dec 2019 05:54) (144/71 - 198/82)  BP(mean): --  RR: 18 (04 Dec 2019 05:54) (17 - 26)  SpO2: 97% (04 Dec 2019 05:54) (96% - 100%)      General:   NAD.  HEENT:    Normal appearance of conjunctiva, ears, nose, lips, oropharynx, and oral mucosa, anicteric.  Neck:  No masses, no asymmetry.  Lymph Nodes:  No lymphadenopathy.   Cardiovascular:  RRR normal S1/S2, no murmur.  Respiratory:  CTA B/L, normal respiratory effort.   Abdominal:   soft, no masses or tenderness, normoactive BS, NT/ND, no HSM.  Extremities:   No clubbing or cyanosis, normal capillary refill, no edema.   Skin:   No rash, jaundice, lesions, eczema.         LABS:                        8.0    22.74 )-----------( 150      ( 04 Dec 2019 10:05 )             24.9     12-04    143  |  111<H>  |  42<H>  ----------------------------<  104<H>  3.8   |  21<L>  |  2.48<H>    Ca    8.0<L>      04 Dec 2019 10:05  Mg     1.9     12-03            RADIOLOGY & ADDITIONAL TESTS:

## 2019-12-05 LAB
ANION GAP SERPL CALC-SCNC: 12 MMO/L — SIGNIFICANT CHANGE UP (ref 7–14)
BASOPHILS # BLD AUTO: 0.01 K/UL — SIGNIFICANT CHANGE UP (ref 0–0.2)
BASOPHILS NFR BLD AUTO: 0.1 % — SIGNIFICANT CHANGE UP (ref 0–2)
BLD GP AB SCN SERPL QL: NEGATIVE — SIGNIFICANT CHANGE UP
BUN SERPL-MCNC: 40 MG/DL — HIGH (ref 7–23)
CALCIUM SERPL-MCNC: 7.9 MG/DL — LOW (ref 8.4–10.5)
CHLORIDE SERPL-SCNC: 106 MMOL/L — SIGNIFICANT CHANGE UP (ref 98–107)
CO2 SERPL-SCNC: 21 MMOL/L — LOW (ref 22–31)
CREAT SERPL-MCNC: 2.74 MG/DL — HIGH (ref 0.5–1.3)
EOSINOPHIL # BLD AUTO: 0.07 K/UL — SIGNIFICANT CHANGE UP (ref 0–0.5)
EOSINOPHIL NFR BLD AUTO: 0.4 % — SIGNIFICANT CHANGE UP (ref 0–6)
GLUCOSE SERPL-MCNC: 105 MG/DL — HIGH (ref 70–99)
HCT VFR BLD CALC: 23.5 % — LOW (ref 34.5–45)
HGB BLD-MCNC: 7.6 G/DL — LOW (ref 11.5–15.5)
IMM GRANULOCYTES NFR BLD AUTO: 0.7 % — SIGNIFICANT CHANGE UP (ref 0–1.5)
LYMPHOCYTES # BLD AUTO: 1.19 K/UL — SIGNIFICANT CHANGE UP (ref 1–3.3)
LYMPHOCYTES # BLD AUTO: 6.1 % — LOW (ref 13–44)
MAGNESIUM SERPL-MCNC: 1.8 MG/DL — SIGNIFICANT CHANGE UP (ref 1.6–2.6)
MCHC RBC-ENTMCNC: 30.5 PG — SIGNIFICANT CHANGE UP (ref 27–34)
MCHC RBC-ENTMCNC: 32.3 % — SIGNIFICANT CHANGE UP (ref 32–36)
MCV RBC AUTO: 94.4 FL — SIGNIFICANT CHANGE UP (ref 80–100)
MONOCYTES # BLD AUTO: 1.67 K/UL — HIGH (ref 0–0.9)
MONOCYTES NFR BLD AUTO: 8.6 % — SIGNIFICANT CHANGE UP (ref 2–14)
NEUTROPHILS # BLD AUTO: 16.42 K/UL — HIGH (ref 1.8–7.4)
NEUTROPHILS NFR BLD AUTO: 84.1 % — HIGH (ref 43–77)
NRBC # FLD: 0 K/UL — SIGNIFICANT CHANGE UP (ref 0–0)
PLATELET # BLD AUTO: 139 K/UL — LOW (ref 150–400)
PMV BLD: 11 FL — SIGNIFICANT CHANGE UP (ref 7–13)
POTASSIUM SERPL-MCNC: 3.3 MMOL/L — LOW (ref 3.5–5.3)
POTASSIUM SERPL-SCNC: 3.3 MMOL/L — LOW (ref 3.5–5.3)
RBC # BLD: 2.49 M/UL — LOW (ref 3.8–5.2)
RBC # FLD: 15.8 % — HIGH (ref 10.3–14.5)
RH IG SCN BLD-IMP: POSITIVE — SIGNIFICANT CHANGE UP
SODIUM SERPL-SCNC: 137 MMOL/L — SIGNIFICANT CHANGE UP (ref 135–145)
WBC # BLD: 19.49 K/UL — HIGH (ref 3.8–10.5)
WBC # FLD AUTO: 19.49 K/UL — HIGH (ref 3.8–10.5)

## 2019-12-05 PROCEDURE — 99232 SBSQ HOSP IP/OBS MODERATE 35: CPT

## 2019-12-05 RX ORDER — POTASSIUM CHLORIDE 20 MEQ
40 PACKET (EA) ORAL ONCE
Refills: 0 | Status: COMPLETED | OUTPATIENT
Start: 2019-12-05 | End: 2019-12-05

## 2019-12-05 RX ADMIN — ESCITALOPRAM OXALATE 10 MILLIGRAM(S): 10 TABLET, FILM COATED ORAL at 17:27

## 2019-12-05 RX ADMIN — CARVEDILOL PHOSPHATE 6.25 MILLIGRAM(S): 80 CAPSULE, EXTENDED RELEASE ORAL at 07:23

## 2019-12-05 RX ADMIN — Medication 40 MILLIEQUIVALENT(S): at 08:38

## 2019-12-05 RX ADMIN — DONEPEZIL HYDROCHLORIDE 10 MILLIGRAM(S): 10 TABLET, FILM COATED ORAL at 23:08

## 2019-12-05 RX ADMIN — PIPERACILLIN AND TAZOBACTAM 25 GRAM(S): 4; .5 INJECTION, POWDER, LYOPHILIZED, FOR SOLUTION INTRAVENOUS at 08:33

## 2019-12-05 RX ADMIN — CARVEDILOL PHOSPHATE 6.25 MILLIGRAM(S): 80 CAPSULE, EXTENDED RELEASE ORAL at 17:27

## 2019-12-05 RX ADMIN — PIPERACILLIN AND TAZOBACTAM 25 GRAM(S): 4; .5 INJECTION, POWDER, LYOPHILIZED, FOR SOLUTION INTRAVENOUS at 22:04

## 2019-12-05 RX ADMIN — ATORVASTATIN CALCIUM 20 MILLIGRAM(S): 80 TABLET, FILM COATED ORAL at 23:08

## 2019-12-05 NOTE — PROGRESS NOTE ADULT - ATTENDING COMMENTS
I was physically present for the key portions of the evaluation and management (E/M) service provided.  The patient was personally seen and examined at bedside.    Thank you for your consultation and allowing  me to participate in the care of your patients. If you have further questions please contact me at [phone].     Arnaldo Gan M.D.       _________________________________________________________________________________________________  [facility]  [address] [place] [address]  Office: [phone]    Elie Chávez PA-C  ___________________________________________________________________________________________________

## 2019-12-05 NOTE — PROGRESS NOTE ADULT - ASSESSMENT
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  She has had one episode of fever with leukocytosis  Urine growing VRE but no flank tenderness or pyruria.    improved exam today  modest decrease in leukocytosis    Pneumonia  UGI bleed  leukocytosis  Acute on CKD-3    Suggest  Continue Zosyn  12.3-->

## 2019-12-05 NOTE — PROGRESS NOTE ADULT - SUBJECTIVE AND OBJECTIVE BOX
Patient is a 79y old  Female who presents with a chief complaint of UGIB (05 Dec 2019 16:44)      SUBJECTIVE / OVERNIGHT EVENTS: No chest pain. No shortness of breath. No complaints. No events overnight.     MEDICATIONS  (STANDING):  atorvastatin 20 milliGRAM(s) Oral at bedtime  carvedilol 6.25 milliGRAM(s) Oral every 12 hours  donepezil 10 milliGRAM(s) Oral at bedtime  escitalopram 10 milliGRAM(s) Oral daily  pantoprazole Infusion 8 mG/Hr (10 mL/Hr) IV Continuous <Continuous>  piperacillin/tazobactam IVPB.. 3.375 Gram(s) IV Intermittent every 12 hours    MEDICATIONS  (PRN):      Vital Signs Last 24 Hrs  T(C): 36.7 (05 Dec 2019 16:50), Max: 36.9 (05 Dec 2019 15:35)  T(F): 98 (05 Dec 2019 16:50), Max: 98.5 (05 Dec 2019 15:35)  HR: 75 (05 Dec 2019 16:50) (66 - 89)  BP: 158/85 (05 Dec 2019 17:00) (108/62 - 190/95)  BP(mean): --  RR: 20 (05 Dec 2019 16:50) (18 - 20)  SpO2: 100% (05 Dec 2019 16:50) (98% - 100%)  CAPILLARY BLOOD GLUCOSE        I&O's Summary    04 Dec 2019 07:01  -  05 Dec 2019 07:00  --------------------------------------------------------  IN: 1010 mL / OUT: 0 mL / NET: 1010 mL        PHYSICAL EXAM:  GENERAL: NAD, well-developed  HEAD:  Atraumatic, Normocephalic  EYES: EOMI, PERRLA, conjunctiva and sclera clear  NECK: Supple, No JVD  CHEST/LUNG: rina rhonchi  HEART: Regular rate and rhythm; No murmurs, rubs, or gallops  ABDOMEN: Soft, Nontender, Nondistended; Bowel sounds present  EXTREMITIES:  2+ Peripheral Pulses, No clubbing, cyanosis, or edema  PSYCH: AAOx3  NEUROLOGY: non-focal  SKIN: No rashes or lesions    LABS:                        7.6    19.49 )-----------( 139      ( 05 Dec 2019 04:43 )             23.5     12-05    137  |  106  |  40<H>  ----------------------------<  105<H>  3.3<L>   |  21<L>  |  2.74<H>    Ca    7.9<L>      05 Dec 2019 04:43  Mg     1.8     12-05                RADIOLOGY & ADDITIONAL TESTS:    Imaging Personally Reviewed:    Consultant(s) Notes Reviewed:      Care Discussed with Consultants/Other Providers:

## 2019-12-05 NOTE — PROGRESS NOTE ADULT - ASSESSMENT
79 year old woman with history of dementia (AAOX0-1), CVA in 1993 and 2017 with residual L sided weakness and aphasia, HTN, achalasia s/p POEMS (Jan 2018), who presented tot [facility] w/ 1day history of coffee ground emesis and melena.    JONY on ckd stage 3  baseline ~ 1.1-1.3, multiple JONY in the past  scr on admission 1.99 worsen today  check urine cr, na  CT abd 12/1 showed atropic right kidney, no hydro  JONY possible ATN possible sec to anemia  currently on zosyn, AIN less likely as no esopinophila seen  renal function not improving with IVF, pt is eating well. d/c IVF  monitor bmp  avoid nephrotoxic agents  renal dose medication    proteinuria/hematuria  + UTI  will need to repeat UA post treatment  In view of worsen renal function proteinuria and hematuria will do vasculitis work up   Vasculitis w/u for proteinuria ( check hep b, hep c, hiv, rpr, c3, c4, sarita, anca, spep, sif, immuno light chain)     anemia  GIB  f/u gi  monitor hb  transfuse per team    Hypocalcemia  sec to low alb  check vit d 25  monitor    hypokalemia  likely sec to hydration  supplemented  monitor

## 2019-12-05 NOTE — PROGRESS NOTE ADULT - PROBLEM SELECTOR PLAN 1
UGIB w/ coffee ground emesis and melena.   - Patient hemodynamically stable for now.  - cont iv ppi  - soft diet  - GI consult  - EGD when stable

## 2019-12-05 NOTE — PROGRESS NOTE ADULT - SUBJECTIVE AND OBJECTIVE BOX
Summary:   79y  Female      Subjective:     Resting comfortable. Pending one unit PRBC   Objective:    MEDICATIONS  (STANDING):  atorvastatin 20 milliGRAM(s) Oral at bedtime  carvedilol 6.25 milliGRAM(s) Oral every 12 hours  donepezil 10 milliGRAM(s) Oral at bedtime  escitalopram 10 milliGRAM(s) Oral daily  pantoprazole Infusion 8 mG/Hr (10 mL/Hr) IV Continuous <Continuous>  piperacillin/tazobactam IVPB.. 3.375 Gram(s) IV Intermittent every 12 hours    MEDICATIONS  (PRN):              Vital Signs Last 24 Hrs  T(C): 36.9 (05 Dec 2019 15:35), Max: 36.9 (05 Dec 2019 15:35)  T(F): 98.5 (05 Dec 2019 15:35), Max: 98.5 (05 Dec 2019 15:35)  HR: 75 (05 Dec 2019 15:35) (66 - 89)  BP: 138/87 (05 Dec 2019 15:35) (108/62 - 144/81)  BP(mean): --  RR: 20 (05 Dec 2019 15:35) (18 - 20)  SpO2: 100% (05 Dec 2019 15:35) (98% - 100%)      General:  Well developed, well nourished, alert and active, no pallor, NAD.  HEENT:    Normal appearance of conjunctiva, ears, nose, lips, oropharynx, and oral mucosa, anicteric.  Neck:  No masses, no asymmetry.  Lymph Nodes:  No lymphadenopathy.   Cardiovascular:  RRR normal S1/S2, no murmur.  Respiratory:  CTA B/L, normal respiratory effort.   Abdominal:   soft, no masses or tenderness, normoactive BS, NT/ND, no HSM.  Extremities:   No clubbing or cyanosis, normal capillary refill, no edema.   Skin:   No rash, jaundice, lesions, eczema.   Musculoskeletal:  No joint swelling, erythema or tenderness.   Neuro: No focal deficits.   Other:       LABS:                        7.6    19.49 )-----------( 139      ( 05 Dec 2019 04:43 )             23.5     12-05    137  |  106  |  40<H>  ----------------------------<  105<H>  3.3<L>   |  21<L>  |  2.74<H>    Ca    7.9<L>      05 Dec 2019 04:43  Mg     1.8     12-05            RADIOLOGY & ADDITIONAL TESTS:    < from: CT Chest No Cont (12.04.19 @ 19:40) >    EXAM:  CT CHEST        PROCEDURE DATE:  Dec  4 2019         INTERPRETATION:  CLINICAL INFORMATION:  pneumonia, abnormal chest   radiograph, shortness of breath    COMPARISON: CT CT chest 1/4/2018    PROCEDURE:   CT of the Chest was performed without intravenous contrast.  Sagittal and coronal reformats were performed.      FINDINGS: The quality of the images are significantly degraded by   respiratory motion.    AIRWAYS, LUNGS and PLEURA: Retained secretions within the bilateral main   bronchi and obliteration of bilateral lower lobe bronchi.    Atelectasis of basilar left lower lobe. Scattered bilateral nodular   opacities within all lobes likely multifocal infection.    Moderate right and small left pleural effusions.    MEDIASTINUM AND TONY: No lymphadenopathy. The esophagus remains severely   dilated.    HEART AND VESSELS: The heart is enlarged. The left atrium is severely   dilated. No pericardial effusion. Thoracic aorta and pulmonary artery are   normal in diameter. Calcific coronary and aortic atherosclerosis.    VISUALIZED UPPER ABDOMEN: The right kidney is atrophic. Compensatory   hypertrophy of the left knee. Aortic atherosclerosis.    CHEST WALL AND BONES: No aggressive osseous lesion.     LOWER NECK: Within normal limits.    IMPRESSION:     Retained secretions within the bilateral main bronchi and obliteration of   bilateral lower lobe bronchi. Atelectasis/consolidation of basilar left   lower lobe.    Bilateral nodular opacities likely representing multifocal infection.    Moderate left and small right pleural effusions.    The esophagus is dilated.                      DAYANA MURPHY M.D., ATTENDING RADIOLOGIST  This document has been electronically signed. Dec  5 2019  8:18AM                  < end of copied text >

## 2019-12-05 NOTE — PROGRESS NOTE ADULT - SUBJECTIVE AND OBJECTIVE BOX
[facility]   MD JACOBY YAN, DO RAO MORALEZ, NAHID BUCHANAN    TEL:  OFFICE: [phone]  DR MCCLOUD CELL: [phone]  DR. FRANCIS CELL: [phone]  DR. MORALEZ CELL: [phone]  HELLEN SOLIS CELL: [phone]        Patient is a 79y old  Female who presents with a chief complaint of UGIB (04 Dec 2019 18:39)      Patient seen and examined at bedside. +cough    VITALS:  T(F): 98.3 (12-05-19 @ 12:00), Max: 98.3 (12-05-19 @ 06:41)  HR: 66 (12-05-19 @ 12:00)  BP: 108/62 (12-05-19 @ 12:00)  RR: 18 (12-05-19 @ 12:00)  SpO2: 99% (12-05-19 @ 12:00)  Wt(kg): --    12-04 @ 07:01  -  12-05 @ 07:00  --------------------------------------------------------  IN: 1010 mL / OUT: 0 mL / NET: 1010 mL          PHYSICAL EXAM:  Constitutional: NAD  Neck: No JVD  Respiratory: + rhonchi  Cardiovascular: S1, S2, RRR  Gastrointestinal: BS+, soft, NT/ND  Extremities: No peripheral edema    Hospital Medications:   MEDICATIONS  (STANDING):  atorvastatin 20 milliGRAM(s) Oral at bedtime  carvedilol 6.25 milliGRAM(s) Oral every 12 hours  donepezil 10 milliGRAM(s) Oral at bedtime  escitalopram 10 milliGRAM(s) Oral daily  pantoprazole Infusion 8 mG/Hr (10 mL/Hr) IV Continuous <Continuous>  piperacillin/tazobactam IVPB.. 3.375 Gram(s) IV Intermittent every 12 hours      LABS:  12-05    137  |  106  |  40<H>  ----------------------------<  105<H>  3.3<L>   |  21<L>  |  2.74<H>    Ca    7.9<L>      05 Dec 2019 04:43  Mg     1.8     12-05      Creatinine Trend: 2.74 <--, 2.48 <--, 2.18 <--, 1.81 <--, 1.99 <--                                7.6    19.49 )-----------( 139      ( 05 Dec 2019 04:43 )             23.5     Urine Studies:  Urinalysis - [12-01-19 @ 18:26]      Color LIGHT YELLOW / Appearance CLEAR / SG 1.018 / pH 6.5      Gluc 50 / Ketone NEGATIVE  / Bili NEGATIVE / Urobili NORMAL       Blood MODERATE / Protein 600 / Leuk Est NEGATIVE / Nitrite NEGATIVE      RBC 5-10 / WBC 6-11 / Hyaline 1+ / Gran  / Sq Epi MODERATE / Non Sq Epi  / Bacteria FEW            RADIOLOGY & ADDITIONAL STUDIES:

## 2019-12-05 NOTE — PROGRESS NOTE ADULT - ASSESSMENT
Mrs. 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    sepsis with fever with rhonchi and elevated wbc likely secondary to aspiration pneunonia  - ct of chest positive for pna  - elevated procalcitonin  - blood cultures NGTD  - cont zosyn  - seen by ID  - f/u wbc

## 2019-12-05 NOTE — PROGRESS NOTE ADULT - ASSESSMENT
79 year old female with UGIB, Pneumonia , pleural effusions. She has not had any further episodes of coffee grounds.  She has a significant history of POEM last year.     Plan:     Coffee ground  emesis   Receiving once unit of PRBC   No evidenced of active bleeding   PPI BID   Will continue to hold on EGD (not an anseth. candidate at this time)  Monitor CBC daily   Avoid NSAIDs       PNA  Aspiration precautions  ABX as per primary team     Advanced care planning was discussed with patient and family.  Advanced care planning forms were reviewed and discussed.  Risks, benefits and alternatives of gastroenterologic procedures were discussed in detail and all questions were answered.

## 2019-12-05 NOTE — PROGRESS NOTE ADULT - SUBJECTIVE AND OBJECTIVE BOX
Follow Up:  fever    Interval History/ROS: denies coughing  Allergies  No Known Allergies      ANTIMICROBIALS:  piperacillin/tazobactam IVPB.. 3.375 every 12 hours      OTHER MEDS:  MEDICATIONS  (STANDING):  atorvastatin 20 at bedtime  carvedilol 6.25 every 12 hours  donepezil 10 at bedtime  escitalopram 10 daily  pantoprazole Infusion 8 <Continuous>      Vital Signs Last 24 Hrs  T(C): 36.9 (05 Dec 2019 15:35), Max: 36.9 (05 Dec 2019 15:35)  T(F): 98.5 (05 Dec 2019 15:35), Max: 98.5 (05 Dec 2019 15:35)  HR: 75 (05 Dec 2019 15:35) (66 - 89)  BP: 138/87 (05 Dec 2019 15:35) (108/62 - 144/81)  BP(mean): --  RR: 20 (05 Dec 2019 15:35) (18 - 20)  SpO2: 100% (05 Dec 2019 15:35) (98% - 100%)    PHYSICAL EXAM:  General: WN/WD, Non-toxic  Neurology: A&Ox3, left hemiparesis, flat right nasolabial fold  Respiratory: tachypneic with fine rhonchi (improved exam since 12/4)   CV: RRR, S1S2, no murmurs, rubs or gallops  Abdominal: Soft, Non-tender, non-distended  Extremities: No edema,   Line Sites: Clear  Skin: No rash                        7.6    19.49 )-----------( 139      ( 05 Dec 2019 04:43 )             23.5   WBC Count: 19.49 (12-05 @ 04:43)  WBC Count: 22.74 (12-04 @ 10:05)  WBC Count: 27.52 (12-03 @ 20:52)  WBC Count: 17.75 (12-03 @ 07:55)  WBC Count: 20.64 (12-02 @ 14:55)  WBC Count: 18.58 (12-02 @ 05:30)  WBC Count: 21.53 (12-01 @ 23:47)  WBC Count: 23.39 (12-01 @ 17:55)    12-05    137  |  106  |  40<H>  ----------------------------<  105<H>  3.3<L>   |  21<L>  |  2.74<H>  Creatinine: 2.74 (12-05 @ 04:43)    Creatinine: 2.48 (12-04 @ 10:05)    Creatinine: 2.18 (12-03 @ 07:55)    Creatinine: 1.81 (12-02 @ 05:30)    Creatinine: 1.99 (12-01 @ 17:55)    Ca    7.9<L>      05 Dec 2019 04:43  Mg     1.8     12-05      MICROBIOLOGY:  BLOOD PERIPHERAL  12-03-19 --  --  --      URINE MIDSTREAM  12-02-19 --  --  Enterococcus faecium VRE    RADIOLOGY:  < from: CT Chest No Cont (12.04.19 @ 19:40) >  FINDINGS: The quality of the images are significantly degraded by   respiratory motion.    AIRWAYS, LUNGS and PLEURA: Retained secretions within the bilateral main   bronchi and obliteration of bilateral lower lobe bronchi.    Atelectasis of basilar left lower lobe. Scattered bilateral nodular   opacities within all lobes likely multifocal infection.    Moderate right and small left pleural effusions.    MEDIASTINUM AND TONY: No lymphadenopathy. The esophagus remains severely   dilated.    HEART AND VESSELS: The heart is enlarged. The left atrium is severely   dilated. No pericardial effusion. Thoracic aorta and pulmonary artery are   normal in diameter. Calcific coronary and aortic atherosclerosis.    VISUALIZED UPPER ABDOMEN: The right kidney is atrophic. Compensatory   hypertrophy of the left knee. Aortic atherosclerosis.    CHEST WALL AND BONES: No aggressive osseous lesion.     LOWER NECK: Within normal limits.    IMPRESSION:     Retained secretions within the bilateral main bronchi and obliteration of   bilateral lower lobe bronchi. Atelectasis/consolidation of basilar left   lower lobe.    Bilateral nodular opacities likely representing multifocal infection.    Moderate left and small right pleural effusions.    The esophagus is dilated.    < end of copied text >      Martin Downing MD; Division of Infectious Disease; Pager: [phone]; nights and weekends: [phone]

## 2019-12-05 NOTE — PROVIDER CONTACT NOTE (CHANGE IN STATUS NOTIFICATION) - ASSESSMENT
PT receiving transfusion at 1 hour and 15 minute belkis. Denies pain, headache or shortness of breath.

## 2019-12-06 LAB
ANION GAP SERPL CALC-SCNC: 13 MMO/L — SIGNIFICANT CHANGE UP (ref 7–14)
BASOPHILS # BLD AUTO: 0.02 K/UL — SIGNIFICANT CHANGE UP (ref 0–0.2)
BASOPHILS NFR BLD AUTO: 0.1 % — SIGNIFICANT CHANGE UP (ref 0–2)
BUN SERPL-MCNC: 38 MG/DL — HIGH (ref 7–23)
C3 SERPL-MCNC: 102.8 MG/DL — SIGNIFICANT CHANGE UP (ref 90–180)
C4 SERPL-MCNC: 29.3 MG/DL — SIGNIFICANT CHANGE UP (ref 10–40)
CALCIUM SERPL-MCNC: 8.1 MG/DL — LOW (ref 8.4–10.5)
CHLORIDE SERPL-SCNC: 107 MMOL/L — SIGNIFICANT CHANGE UP (ref 98–107)
CO2 SERPL-SCNC: 19 MMOL/L — LOW (ref 22–31)
CREAT ?TM UR-MCNC: 219.3 MG/DL — SIGNIFICANT CHANGE UP
CREAT SERPL-MCNC: 3.11 MG/DL — HIGH (ref 0.5–1.3)
EOSINOPHIL # BLD AUTO: 0.06 K/UL — SIGNIFICANT CHANGE UP (ref 0–0.5)
EOSINOPHIL NFR BLD AUTO: 0.4 % — SIGNIFICANT CHANGE UP (ref 0–6)
GLUCOSE SERPL-MCNC: 119 MG/DL — HIGH (ref 70–99)
HBV SURFACE AG SER-ACNC: NEGATIVE — SIGNIFICANT CHANGE UP
HCT VFR BLD CALC: 29.3 % — LOW (ref 34.5–45)
HCV AB S/CO SERPL IA: 0.07 S/CO — SIGNIFICANT CHANGE UP (ref 0–0.99)
HCV AB SERPL-IMP: SIGNIFICANT CHANGE UP
HGB BLD-MCNC: 9.5 G/DL — LOW (ref 11.5–15.5)
HIV COMBO RESULT: SIGNIFICANT CHANGE UP
HIV1+2 AB SPEC QL: SIGNIFICANT CHANGE UP
IMM GRANULOCYTES NFR BLD AUTO: 0.7 % — SIGNIFICANT CHANGE UP (ref 0–1.5)
LYMPHOCYTES # BLD AUTO: 0.84 K/UL — LOW (ref 1–3.3)
LYMPHOCYTES # BLD AUTO: 5.9 % — LOW (ref 13–44)
MAGNESIUM SERPL-MCNC: 1.9 MG/DL — SIGNIFICANT CHANGE UP (ref 1.6–2.6)
MCHC RBC-ENTMCNC: 30.6 PG — SIGNIFICANT CHANGE UP (ref 27–34)
MCHC RBC-ENTMCNC: 32.4 % — SIGNIFICANT CHANGE UP (ref 32–36)
MCV RBC AUTO: 94.5 FL — SIGNIFICANT CHANGE UP (ref 80–100)
MONOCYTES # BLD AUTO: 0.95 K/UL — HIGH (ref 0–0.9)
MONOCYTES NFR BLD AUTO: 6.7 % — SIGNIFICANT CHANGE UP (ref 2–14)
NEUTROPHILS # BLD AUTO: 12.15 K/UL — HIGH (ref 1.8–7.4)
NEUTROPHILS NFR BLD AUTO: 86.2 % — HIGH (ref 43–77)
NRBC # FLD: 0 K/UL — SIGNIFICANT CHANGE UP (ref 0–0)
PLATELET # BLD AUTO: 145 K/UL — LOW (ref 150–400)
PMV BLD: 11 FL — SIGNIFICANT CHANGE UP (ref 7–13)
POTASSIUM SERPL-MCNC: 3.6 MMOL/L — SIGNIFICANT CHANGE UP (ref 3.5–5.3)
POTASSIUM SERPL-SCNC: 3.6 MMOL/L — SIGNIFICANT CHANGE UP (ref 3.5–5.3)
PROCALCITONIN SERPL-MCNC: 0.59 NG/ML — HIGH (ref 0.02–0.1)
PROT SERPL-MCNC: 5.8 G/DL — LOW (ref 6–8.3)
PROT UR-MCNC: > 600 MG/DL — SIGNIFICANT CHANGE UP
RBC # BLD: 3.1 M/UL — LOW (ref 3.8–5.2)
RBC # FLD: 15.3 % — HIGH (ref 10.3–14.5)
SODIUM SERPL-SCNC: 139 MMOL/L — SIGNIFICANT CHANGE UP (ref 135–145)
SODIUM UR-SCNC: < 20 MMOL/L — SIGNIFICANT CHANGE UP
WBC # BLD: 14.12 K/UL — HIGH (ref 3.8–10.5)
WBC # FLD AUTO: 14.12 K/UL — HIGH (ref 3.8–10.5)

## 2019-12-06 PROCEDURE — 99232 SBSQ HOSP IP/OBS MODERATE 35: CPT

## 2019-12-06 PROCEDURE — 84165 PROTEIN E-PHORESIS SERUM: CPT | Mod: 26

## 2019-12-06 PROCEDURE — 86334 IMMUNOFIX E-PHORESIS SERUM: CPT | Mod: 26

## 2019-12-06 RX ORDER — PANTOPRAZOLE SODIUM 20 MG/1
40 TABLET, DELAYED RELEASE ORAL
Refills: 0 | Status: DISCONTINUED | OUTPATIENT
Start: 2019-12-06 | End: 2019-12-11

## 2019-12-06 RX ADMIN — PANTOPRAZOLE SODIUM 40 MILLIGRAM(S): 20 TABLET, DELAYED RELEASE ORAL at 16:59

## 2019-12-06 RX ADMIN — PIPERACILLIN AND TAZOBACTAM 25 GRAM(S): 4; .5 INJECTION, POWDER, LYOPHILIZED, FOR SOLUTION INTRAVENOUS at 08:46

## 2019-12-06 RX ADMIN — CARVEDILOL PHOSPHATE 6.25 MILLIGRAM(S): 80 CAPSULE, EXTENDED RELEASE ORAL at 06:09

## 2019-12-06 RX ADMIN — CARVEDILOL PHOSPHATE 6.25 MILLIGRAM(S): 80 CAPSULE, EXTENDED RELEASE ORAL at 16:59

## 2019-12-06 RX ADMIN — ESCITALOPRAM OXALATE 10 MILLIGRAM(S): 10 TABLET, FILM COATED ORAL at 08:47

## 2019-12-06 RX ADMIN — PIPERACILLIN AND TAZOBACTAM 25 GRAM(S): 4; .5 INJECTION, POWDER, LYOPHILIZED, FOR SOLUTION INTRAVENOUS at 22:15

## 2019-12-06 RX ADMIN — ATORVASTATIN CALCIUM 20 MILLIGRAM(S): 80 TABLET, FILM COATED ORAL at 21:57

## 2019-12-06 RX ADMIN — DONEPEZIL HYDROCHLORIDE 10 MILLIGRAM(S): 10 TABLET, FILM COATED ORAL at 21:57

## 2019-12-06 NOTE — PROGRESS NOTE ADULT - ASSESSMENT
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  She has had one episode of fever with leukocytosis  Urine growing VRE but no flank tenderness or pyuria.    improved clinical appearance  decreased leukocytosis    Pneumonia  UGI bleed - not actively bleeding  leukocytosis  Acute on CKD-3 -progressive increase in Creat  Asymptomatic VRE bacteruria  aspiration precautions    Suggest  Continue Zosyn  12/3-->    Continue IV Zosyn through at least 12/8 then po Cefpodoxime through 12/10 if stable    Please call ID if needed over weekend

## 2019-12-06 NOTE — PROGRESS NOTE ADULT - SUBJECTIVE AND OBJECTIVE BOX
[facility]   MD JACOBY YAN, DO RAO MORALEZ, NAHID BUCHANAN    TEL:  OFFICE: [phone]  DR MCCLOUD CELL: [phone]  DR. FRANCIS CELL: [phone]  DR. MORALEZ CELL: [phone]  HELLEN SOLIS CELL: [phone]        Patient is a 79y old  Female who presents with a chief complaint of UGIB (06 Dec 2019 11:22)      Patient seen and examined at bedside. No chest pain/sob    VITALS:  T(F): 98 (12-06-19 @ 06:06), Max: 98.5 (12-05-19 @ 15:35)  HR: 81 (12-06-19 @ 06:06)  BP: 127/64 (12-06-19 @ 06:06)  RR: 18 (12-05-19 @ 23:05)  SpO2: 100% (12-06-19 @ 06:06)  Wt(kg): --        PHYSICAL EXAM:  Constitutional: NAD  Neck: No JVD  Respiratory: CTAB, no wheezes, rales or rhonchi  Cardiovascular: S1, S2, RRR  Gastrointestinal: BS+, soft, NT/ND  Extremities: No peripheral edema    Hospital Medications:   MEDICATIONS  (STANDING):  atorvastatin 20 milliGRAM(s) Oral at bedtime  carvedilol 6.25 milliGRAM(s) Oral every 12 hours  donepezil 10 milliGRAM(s) Oral at bedtime  escitalopram 10 milliGRAM(s) Oral daily  pantoprazole  Injectable 40 milliGRAM(s) IV Push two times a day  piperacillin/tazobactam IVPB.. 3.375 Gram(s) IV Intermittent every 12 hours      LABS:  12-06    139  |  107  |  38<H>  ----------------------------<  119<H>  3.6   |  19<L>  |  3.11<H>    Ca    8.1<L>      06 Dec 2019 10:00  Mg     1.9     12-06    TPro  5.8<L>  /  Alb      /  TBili      /  DBili      /  AST      /  ALT      /  AlkPhos      12-06    Creatinine Trend: 3.11 <--, 2.74 <--, 2.48 <--, 2.18 <--, 1.81 <--, 1.99 <--                                9.5    14.12 )-----------( 145      ( 06 Dec 2019 10:04 )             29.3     Urine Studies:  Urinalysis - [12-01-19 @ 18:26]      Color LIGHT YELLOW / Appearance CLEAR / SG 1.018 / pH 6.5      Gluc 50 / Ketone NEGATIVE  / Bili NEGATIVE / Urobili NORMAL       Blood MODERATE / Protein 600 / Leuk Est NEGATIVE / Nitrite NEGATIVE      RBC 5-10 / WBC 6-11 / Hyaline 1+ / Gran  / Sq Epi MODERATE / Non Sq Epi  / Bacteria FEW        HBsAg NEGATIVE      [12-06-19 @ 10:04]    C3 Complement 102.8      [12-06-19 @ 10:04]  C4 Complement 29.3      [12-06-19 @ 10:04]    RADIOLOGY & ADDITIONAL STUDIES:

## 2019-12-06 NOTE — PROGRESS NOTE ADULT - SUBJECTIVE AND OBJECTIVE BOX
Patient is a 79y old  Female who presents with a chief complaint of UGIB (06 Dec 2019 12:21)      SUBJECTIVE / OVERNIGHT EVENTS:  No chest pain. No shortness of breath. No complaints. No events overnight.     MEDICATIONS  (STANDING):  atorvastatin 20 milliGRAM(s) Oral at bedtime  carvedilol 6.25 milliGRAM(s) Oral every 12 hours  donepezil 10 milliGRAM(s) Oral at bedtime  escitalopram 10 milliGRAM(s) Oral daily  pantoprazole  Injectable 40 milliGRAM(s) IV Push two times a day  piperacillin/tazobactam IVPB.. 3.375 Gram(s) IV Intermittent every 12 hours    MEDICATIONS  (PRN):      Vital Signs Last 24 Hrs  T(C): 36.3 (06 Dec 2019 13:20), Max: 36.9 (05 Dec 2019 15:35)  T(F): 97.3 (06 Dec 2019 13:20), Max: 98.5 (05 Dec 2019 15:35)  HR: 81 (06 Dec 2019 13:20) (70 - 81)  BP: 115/77 (06 Dec 2019 13:20) (115/77 - 190/95)  BP(mean): --  RR: 20 (06 Dec 2019 13:20) (18 - 20)  SpO2: 100% (06 Dec 2019 13:20) (100% - 100%)  CAPILLARY BLOOD GLUCOSE        I&O's Summary      PHYSICAL EXAM:  GENERAL: NAD, well-developed  HEAD:  Atraumatic, Normocephalic  EYES: EOMI, PERRLA, conjunctiva and sclera clear  NECK: Supple, No JVD  CHEST/LUNG: Clear to auscultation bilaterally; No wheeze  HEART: Regular rate and rhythm; No murmurs, rubs, or gallops  ABDOMEN: Soft, Nontender, Nondistended; Bowel sounds present  EXTREMITIES:  2+ Peripheral Pulses, No clubbing, cyanosis, or edema  NEUROLOGY: left sided weakness  SKIN: No rashes or lesions    LABS:                        9.5    14.12 )-----------( 145      ( 06 Dec 2019 10:04 )             29.3     12-06    139  |  107  |  38<H>  ----------------------------<  119<H>  3.6   |  19<L>  |  3.11<H>    Ca    8.1<L>      06 Dec 2019 10:00  Mg     1.9     12-06    TPro  5.8<L>  /  Alb  x   /  TBili  x   /  DBili  x   /  AST  x   /  ALT  x   /  AlkPhos  x   12-06              RADIOLOGY & ADDITIONAL TESTS:    Imaging Personally Reviewed:    Consultant(s) Notes Reviewed:      Care Discussed with Consultants/Other Providers:

## 2019-12-06 NOTE — PROGRESS NOTE ADULT - ASSESSMENT
79 year old female with UGIB, Pneumonia , pleural effusions. She has not had any further episodes of coffee grounds.  She has a significant history of POEM last year.     Plan:     Coffee ground  emesis   s/p 1 unit of PRBC 12/5 with adequate response   No evidence of active bleeding   PPI BID   Will continue to hold on EGD (not an anseth. candidate at this time)  Monitor CBC daily   Avoid NSAIDs       PNA  Aspiration precautions  ABX as per primary team     Advanced care planning was discussed with patient and family.  Advanced care planning forms were reviewed and discussed.  Risks, benefits and alternatives of gastroenterologic procedures were discussed in detail and all questions were answered.

## 2019-12-06 NOTE — PROGRESS NOTE ADULT - SUBJECTIVE AND OBJECTIVE BOX
INTERVAL HPI/OVERNIGHT EVENTS:    pt seen and examined. She denies abdominal pain, n/v  no further coffee ground emesis   s/p 1 unit prbc with adequate response    MEDICATIONS  (STANDING):  atorvastatin 20 milliGRAM(s) Oral at bedtime  carvedilol 6.25 milliGRAM(s) Oral every 12 hours  donepezil 10 milliGRAM(s) Oral at bedtime  escitalopram 10 milliGRAM(s) Oral daily  pantoprazole Infusion 8 mG/Hr (10 mL/Hr) IV Continuous <Continuous>  piperacillin/tazobactam IVPB.. 3.375 Gram(s) IV Intermittent every 12 hours    MEDICATIONS  (PRN):      Allergies    No Known Allergies    Intolerances        Review of Systems:    General:  No wt loss, fevers, chills, night sweats,fatigue,   Eyes:  Good vision, no reported pain  ENT:  No sore throat, pain, runny nose, dysphagia  CV:  No pain, palpitations, hypo/hypertension  Resp:  No dyspnea, cough, tachypnea, wheezing  GI:  No pain, No nausea, No vomiting, No diarrhea, No constipation, No weight loss, No fever, No pruritis, No rectal bleeding, No melena, No dysphagia  :  No pain, bleeding, incontinence, nocturia  Muscle:  No pain, weakness  Neuro:  No weakness, tingling, memory problems  Psych:  No fatigue, insomnia, mood problems, depression  Endocrine:  No polyuria, polydypsia, cold/heat intolerance  Heme:  No petechiae, ecchymosis, easy bruisability  Skin:  No rash, tattoos, scars, edema      Vital Signs Last 24 Hrs  T(C): 36.7 (06 Dec 2019 06:06), Max: 36.9 (05 Dec 2019 15:35)  T(F): 98 (06 Dec 2019 06:06), Max: 98.5 (05 Dec 2019 15:35)  HR: 81 (06 Dec 2019 06:06) (66 - 81)  BP: 127/64 (06 Dec 2019 06:06) (108/62 - 190/95)  BP(mean): --  RR: 18 (05 Dec 2019 23:05) (18 - 20)  SpO2: 100% (06 Dec 2019 06:06) (99% - 100%)    PHYSICAL EXAM:    Constitutional: NAD, well-developed  HEENT: EOMI, throat clear  Neck: No LAD, supple  Respiratory: CTA and P  Cardiovascular: S1 and S2, RRR, no M  Gastrointestinal: BS+, soft, NT/ND, neg HSM,  Extremities: No peripheral edema, neg clubing, cyanosis  Vascular: 2+ peripheral pulses  Neurological: A/O x 3, no focal deficits  Psychiatric: Normal mood, normal affect  Skin: No rashes      LABS:                        9.5    14.12 )-----------( 145      ( 06 Dec 2019 10:04 )             29.3     12-06    139  |  107  |  38<H>  ----------------------------<  119<H>  3.6   |  19<L>  |  3.11<H>    Ca    8.1<L>      06 Dec 2019 10:00  Mg     1.9     12-06    TPro  5.8<L>  /  Alb  x   /  TBili  x   /  DBili  x   /  AST  x   /  ALT  x   /  AlkPhos  x   12-06          RADIOLOGY & ADDITIONAL TESTS:

## 2019-12-06 NOTE — PROGRESS NOTE ADULT - ASSESSMENT
Mrs. 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    sepsis with fever with rhonchi and elevated wbc likely secondary to aspiration pneunonia  - ct of chest positive for pna  - elevated procalcitonin  - blood cultures NGTD  - cont zosyn  - seen by ID  - f/u wbc    JONY on ckd stage 3  - baseline ~ 1.1-1.3, multiple JONY in the past  - scr on admission 1.99 worsen today  - please sent urine cr and Na  - CT abd 12/1 showed atropic right kidney, no hydro  - JONY possible ATN possible sec to anemia  - currently on zosyn, AIN less likely as no esopinophila seen  - renal function not improving with IVF, pt is eating well. d/c IVF  - monitor bmp  - avoid nephrotoxic agents  - renal dose medication    anemia  - s/p transfusion  - monitor H&H    upper gi bleed  - EGD when stable  - c/w ppi

## 2019-12-06 NOTE — PROGRESS NOTE ADULT - ASSESSMENT
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    JONY on ckd stage 3  baseline ~ 1.1-1.3, multiple OJNY in the past  scr on admission 1.99 worsen today  please sent urine cr and Na  CT abd 12/1 showed atropic right kidney, no hydro  JONY possible ATN possible sec to anemia  currently on zosyn, AIN less likely as no esopinophila seen  renal function not improving with IVF, pt is eating well. 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    JONY on ckd stage 3  baseline ~ 1.1-1.3, multiple JONY in the past  scr on admission 1.99   Scr worsen today  please sent urine cr and Na  CT abd 12/1 showed atropic right kidney, no hydro  JONY possible ATN possible sec to anemia  currently on zosyn, AIN less likely as no esopinophila seen  renal function not improving with IVF, pt is eating well. d/c IVF  monitor bmp  avoid nephrotoxic agents  renal dose medication    proteinuria/hematuria  + UTI  will need to repeat UA post treatment  In view of worsen renal function proteinuria and hematuria will do vasculitis work up   Vasculitis w/u for proteinuria ( check hep b, hep c, hiv, rpr, c3, c4, sarita, anca, spep, sif, immuno light chain)     anemia  GIB  f/u gi  monitor hb  transfuse per team    Hypocalcemia  sec to low alb  check vit d 25  monitor    hypokalemia  likely sec to hydration  supplement as needed   monitor

## 2019-12-06 NOTE — PROGRESS NOTE ADULT - SUBJECTIVE AND OBJECTIVE BOX
Follow Up:  Pneumonia    Interval History/ROS: improved appearance, notes decreased cough/SOB    Allergies  No Known Allergies    ANTIMICROBIALS:  piperacillin/tazobactam IVPB.. 3.375 every 12 hours      OTHER MEDS:  MEDICATIONS  (STANDING):  atorvastatin 20 at bedtime  carvedilol 6.25 every 12 hours  donepezil 10 at bedtime  escitalopram 10 daily  pantoprazole  Injectable 40 two times a day      Vital Signs Last 24 Hrs  T(C): 36.3 (06 Dec 2019 13:20), Max: 36.7 (05 Dec 2019 16:50)  T(F): 97.3 (06 Dec 2019 13:20), Max: 98.1 (05 Dec 2019 18:52)  HR: 81 (06 Dec 2019 13:20) (70 - 81)  BP: 115/77 (06 Dec 2019 13:20) (115/77 - 190/95)  BP(mean): --  RR: 20 (06 Dec 2019 13:20) (18 - 20)  SpO2: 100% (06 Dec 2019 13:20) (100% - 100%)    PHYSICAL EXAM:  General: improved general appearance.  NAD, Non-toxic  Neurology: A&Ox3,rue paresis  Respiratory: fine crackles anterior chest  CV: RRR, S1S2, no murmurs, rubs or gallops  Abdominal: Soft, Non-tender, non-distended, normal bowel sounds  Extremities: No edema,  Line Sites: Clear  Skin: No rash                        9.5    14.12 )-----------( 145      ( 06 Dec 2019 10:04 )             29.3   WBC Count: 14.12 (12-06 @ 10:04)  WBC Count: 19.49 (12-05 @ 04:43)  WBC Count: 22.74 (12-04 @ 10:05)  WBC Count: 27.52 (12-03 @ 20:52)  WBC Count: 17.75 (12-03 @ 07:55)  WBC Count: 20.64 (12-02 @ 14:55)  WBC Count: 18.58 (12-02 @ 05:30)  WBC Count: 21.53 (12-01 @ 23:47)  WBC Count: 23.39 (12-01 @ 17:55)    12-06    139  |  107  |  38<H>  ----------------------------<  119<H>  3.6   |  19<L>  |  3.11<H>  Creatinine: 3.11 (12-06 @ 10:00)    Creatinine: 2.74 (12-05 @ 04:43)    Creatinine: 2.48 (12-04 @ 10:05)    Creatinine: 2.18 (12-03 @ 07:55)    Creatinine: 1.81 (12-02 @ 05:30)    Creatinine: 1.99 (12-01 @ 17:55)        Ca    8.1<L>      06 Dec 2019 10:00  Mg     1.9     12-06    TPro  5.8<L>  /  Alb  x   /  TBili  x   /  DBili  x   /  AST  x   /  ALT  x   /  AlkPhos  x   12-06    MICROBIOLOGY:  BLOOD PERIPHERAL  12-03-19 --  --  --      URINE MIDSTREAM  12-02-19 --  --  Enterococcus faecium VRE    RADIOLOGY:  < from: CT Chest No Cont (12.04.19 @ 19:40) >  Retained secretions within the bilateral main bronchi and obliteration of   bilateral lower lobe bronchi. Atelectasis/consolidation of basilar left   lower lobe.    Bilateral nodular opacities likely representing multifocal infection.    Moderate left and small right pleural effusions.    The esophagus is dilated.    < end of copied text >      Martin Downing MD; Division of Infectious Disease; Pager: [phone]; nights and weekends: [phone]

## 2019-12-07 LAB
24R-OH-CALCIDIOL SERPL-MCNC: 23.4 NG/ML — LOW (ref 30–80)
ANION GAP SERPL CALC-SCNC: 13 MMO/L — SIGNIFICANT CHANGE UP (ref 7–14)
BASOPHILS # BLD AUTO: 0.02 K/UL — SIGNIFICANT CHANGE UP (ref 0–0.2)
BASOPHILS NFR BLD AUTO: 0.2 % — SIGNIFICANT CHANGE UP (ref 0–2)
BUN SERPL-MCNC: 40 MG/DL — HIGH (ref 7–23)
CALCIUM SERPL-MCNC: 8 MG/DL — LOW (ref 8.4–10.5)
CHLORIDE SERPL-SCNC: 106 MMOL/L — SIGNIFICANT CHANGE UP (ref 98–107)
CO2 SERPL-SCNC: 19 MMOL/L — LOW (ref 22–31)
CREAT SERPL-MCNC: 3.28 MG/DL — HIGH (ref 0.5–1.3)
EOSINOPHIL # BLD AUTO: 0.09 K/UL — SIGNIFICANT CHANGE UP (ref 0–0.5)
EOSINOPHIL NFR BLD AUTO: 0.8 % — SIGNIFICANT CHANGE UP (ref 0–6)
GLUCOSE SERPL-MCNC: 85 MG/DL — SIGNIFICANT CHANGE UP (ref 70–99)
HCT VFR BLD CALC: 27 % — LOW (ref 34.5–45)
HGB BLD-MCNC: 8.7 G/DL — LOW (ref 11.5–15.5)
IMM GRANULOCYTES NFR BLD AUTO: 0.4 % — SIGNIFICANT CHANGE UP (ref 0–1.5)
KAPPA FREE LIGHT CHAINS, SERUM: 10.54 MG/DL — HIGH (ref 0.33–1.94)
LAMBDA FREE LIGHT CHAINS, SERUM: 8.81 MG/DL — HIGH (ref 0.57–2.63)
LYMPHOCYTES # BLD AUTO: 1.13 K/UL — SIGNIFICANT CHANGE UP (ref 1–3.3)
LYMPHOCYTES # BLD AUTO: 10.1 % — LOW (ref 13–44)
MAGNESIUM SERPL-MCNC: 1.8 MG/DL — SIGNIFICANT CHANGE UP (ref 1.6–2.6)
MCHC RBC-ENTMCNC: 29.8 PG — SIGNIFICANT CHANGE UP (ref 27–34)
MCHC RBC-ENTMCNC: 32.2 % — SIGNIFICANT CHANGE UP (ref 32–36)
MCV RBC AUTO: 92.5 FL — SIGNIFICANT CHANGE UP (ref 80–100)
MONOCYTES # BLD AUTO: 0.81 K/UL — SIGNIFICANT CHANGE UP (ref 0–0.9)
MONOCYTES NFR BLD AUTO: 7.2 % — SIGNIFICANT CHANGE UP (ref 2–14)
NEUTROPHILS # BLD AUTO: 9.09 K/UL — HIGH (ref 1.8–7.4)
NEUTROPHILS NFR BLD AUTO: 81.3 % — HIGH (ref 43–77)
NRBC # FLD: 0 K/UL — SIGNIFICANT CHANGE UP (ref 0–0)
PHOSPHATE SERPL-MCNC: 3.8 MG/DL — SIGNIFICANT CHANGE UP (ref 2.5–4.5)
PLATELET # BLD AUTO: 145 K/UL — LOW (ref 150–400)
PMV BLD: 11.1 FL — SIGNIFICANT CHANGE UP (ref 7–13)
POTASSIUM SERPL-MCNC: 3.6 MMOL/L — SIGNIFICANT CHANGE UP (ref 3.5–5.3)
POTASSIUM SERPL-SCNC: 3.6 MMOL/L — SIGNIFICANT CHANGE UP (ref 3.5–5.3)
RBC # BLD: 2.92 M/UL — LOW (ref 3.8–5.2)
RBC # FLD: 14.9 % — HIGH (ref 10.3–14.5)
SODIUM SERPL-SCNC: 138 MMOL/L — SIGNIFICANT CHANGE UP (ref 135–145)
T PALLIDUM AB TITR SER: NEGATIVE — SIGNIFICANT CHANGE UP
WBC # BLD: 11.18 K/UL — HIGH (ref 3.8–10.5)
WBC # FLD AUTO: 11.18 K/UL — HIGH (ref 3.8–10.5)

## 2019-12-07 RX ORDER — CHOLECALCIFEROL (VITAMIN D3) 125 MCG
1000 CAPSULE ORAL DAILY
Refills: 0 | Status: DISCONTINUED | OUTPATIENT
Start: 2019-12-07 | End: 2019-12-11

## 2019-12-07 RX ADMIN — ESCITALOPRAM OXALATE 10 MILLIGRAM(S): 10 TABLET, FILM COATED ORAL at 09:13

## 2019-12-07 RX ADMIN — PIPERACILLIN AND TAZOBACTAM 25 GRAM(S): 4; .5 INJECTION, POWDER, LYOPHILIZED, FOR SOLUTION INTRAVENOUS at 09:13

## 2019-12-07 RX ADMIN — PIPERACILLIN AND TAZOBACTAM 25 GRAM(S): 4; .5 INJECTION, POWDER, LYOPHILIZED, FOR SOLUTION INTRAVENOUS at 21:37

## 2019-12-07 RX ADMIN — DONEPEZIL HYDROCHLORIDE 10 MILLIGRAM(S): 10 TABLET, FILM COATED ORAL at 21:36

## 2019-12-07 RX ADMIN — PANTOPRAZOLE SODIUM 40 MILLIGRAM(S): 20 TABLET, DELAYED RELEASE ORAL at 17:09

## 2019-12-07 RX ADMIN — PANTOPRAZOLE SODIUM 40 MILLIGRAM(S): 20 TABLET, DELAYED RELEASE ORAL at 05:13

## 2019-12-07 RX ADMIN — CARVEDILOL PHOSPHATE 6.25 MILLIGRAM(S): 80 CAPSULE, EXTENDED RELEASE ORAL at 17:09

## 2019-12-07 RX ADMIN — CARVEDILOL PHOSPHATE 6.25 MILLIGRAM(S): 80 CAPSULE, EXTENDED RELEASE ORAL at 05:13

## 2019-12-07 RX ADMIN — ATORVASTATIN CALCIUM 20 MILLIGRAM(S): 80 TABLET, FILM COATED ORAL at 21:36

## 2019-12-07 NOTE — PROVIDER CONTACT NOTE (OTHER) - SITUATION
Was notified by the Physical Therapist that the patient was not feeling well while ambulating. Physical Therapist reported that the patients BP was: 120/101.

## 2019-12-07 NOTE — PROGRESS NOTE ADULT - SUBJECTIVE AND OBJECTIVE BOX
INTERVAL HPI/OVERNIGHT EVENTS:    No new overnight event.  No N/V/D.   MEDICATIONS  (STANDING):  atorvastatin 20 milliGRAM(s) Oral at bedtime  carvedilol 6.25 milliGRAM(s) Oral every 12 hours  donepezil 10 milliGRAM(s) Oral at bedtime  escitalopram 10 milliGRAM(s) Oral daily  pantoprazole  Injectable 40 milliGRAM(s) IV Push two times a day  piperacillin/tazobactam IVPB.. 3.375 Gram(s) IV Intermittent every 12 hours    MEDICATIONS  (PRN):      Allergies    No Known Allergies    Intolerances        Review of Systems:    General:  No wt loss, fevers, chills, night sweats,fatigue,   Eyes:  Good vision, no reported pain  ENT:  No sore throat, pain, runny nose, dysphagia  CV:  No pain, palpitatioins, hypo/hypertension  Resp:  No dyspnea, cough, tachypnea, wheezing  GI:  No pain, No nausea, No vomiting, No diarrhea, No constipatiion, No weight loss, No fever, No pruritis, No rectal bleeding, No tarry stools, No dysphagia,  :  No pain, bleeding, incontinence, nocturia  Muscle:  No pain, weakness  Neuro:  No weakness, tingling, memory problems  Psych:  No fatigue, insomnia, mood problems, depression  Endocrine:  No polyuria, polydypsia, cold/heat intolerance  Heme:  No petechiae, ecchymosis, easy bruisability  Skin:  No rash, tattoos, scars, edema      Vital Signs Last 24 Hrs  T(C): 36.8 (07 Dec 2019 12:30), Max: 37.1 (06 Dec 2019 20:47)  T(F): 98.3 (07 Dec 2019 12:30), Max: 98.8 (06 Dec 2019 20:47)  HR: 73 (07 Dec 2019 12:30) (73 - 98)  BP: 152/80 (07 Dec 2019 12:30) (139/73 - 182/99)  BP(mean): 100 (07 Dec 2019 12:30) (100 - 121)  RR: 18 (07 Dec 2019 12:30) (16 - 19)  SpO2: 100% (07 Dec 2019 12:30) (98% - 100%)    PHYSICAL EXAM:    Constitutional: NAD, well-developed  HEENT: EOMI, throat clear  Neck: No LAD, supple  Respiratory: CTA and P  Cardiovascular: S1 and S2, RRR, no M  Gastrointestinal: BS+, soft, NT/ND, neg HSM,  Extremities: No peripheral edema, neg clubing, cyanosis  Vascular: 2+ peripheral pulses  Neurological: A/O x 3, no focal deficits  Psychiatric: Normal mood, normal affect  Skin: No rashes      LABS:                        8.7    11.18 )-----------( 145      ( 07 Dec 2019 06:38 )             27.0     12-07    138  |  106  |  40<H>  ----------------------------<  85  3.6   |  19<L>  |  3.28<H>    Ca    8.0<L>      07 Dec 2019 06:38  Phos  3.8     12-07  Mg     1.8     12-07    TPro  5.8<L>  /  Alb  x   /  TBili  x   /  DBili  x   /  AST  x   /  ALT  x   /  AlkPhos  x   12-06          RADIOLOGY & ADDITIONAL TESTS:

## 2019-12-07 NOTE — PROGRESS NOTE ADULT - SUBJECTIVE AND OBJECTIVE BOX
Patient is a 79y old  Female who presents with a chief complaint of UGIB (07 Dec 2019 16:42)      SUBJECTIVE / OVERNIGHT EVENTS:  No chest pain. No shortness of breath. No complaints. No events overnight.     MEDICATIONS  (STANDING):  atorvastatin 20 milliGRAM(s) Oral at bedtime  carvedilol 6.25 milliGRAM(s) Oral every 12 hours  donepezil 10 milliGRAM(s) Oral at bedtime  escitalopram 10 milliGRAM(s) Oral daily  pantoprazole  Injectable 40 milliGRAM(s) IV Push two times a day  piperacillin/tazobactam IVPB.. 3.375 Gram(s) IV Intermittent every 12 hours    MEDICATIONS  (PRN):      Vital Signs Last 24 Hrs  T(C): 36.8 (07 Dec 2019 12:30), Max: 37.1 (06 Dec 2019 20:47)  T(F): 98.3 (07 Dec 2019 12:30), Max: 98.8 (06 Dec 2019 20:47)  HR: 73 (07 Dec 2019 12:30) (73 - 98)  BP: 152/80 (07 Dec 2019 12:30) (139/73 - 182/99)  BP(mean): 100 (07 Dec 2019 12:30) (100 - 121)  RR: 18 (07 Dec 2019 12:30) (16 - 19)  SpO2: 100% (07 Dec 2019 12:30) (98% - 100%)  CAPILLARY BLOOD GLUCOSE        I&O's Summary    06 Dec 2019 07:01  -  07 Dec 2019 07:00  --------------------------------------------------------  IN: 1240 mL / OUT: 200 mL / NET: 1040 mL        PHYSICAL EXAM:  GENERAL: NAD, well-developed  HEAD:  Atraumatic, Normocephalic  EYES: EOMI, PERRLA, conjunctiva and sclera clear  NECK: Supple, No JVD  CHEST/LUNG: Clear to auscultation bilaterally; No wheeze  HEART: Regular rate and rhythm; No murmurs, rubs, or gallops  ABDOMEN: Soft, Nontender, Nondistended; Bowel sounds present  EXTREMITIES:  2+ Peripheral Pulses, No clubbing, cyanosis, or edema    NEUROLOGY: left hemiparesis  SKIN: No rashes or lesions    LABS:                        8.7    11.18 )-----------( 145      ( 07 Dec 2019 06:38 )             27.0     12-07    138  |  106  |  40<H>  ----------------------------<  85  3.6   |  19<L>  |  3.28<H>    Ca    8.0<L>      07 Dec 2019 06:38  Phos  3.8     12-07  Mg     1.8     12-07    TPro  5.8<L>  /  Alb  x   /  TBili  x   /  DBili  x   /  AST  x   /  ALT  x   /  AlkPhos  x   12-06              RADIOLOGY & ADDITIONAL TESTS:    Imaging Personally Reviewed:    Consultant(s) Notes Reviewed:      Care Discussed with Consultants/Other Providers:

## 2019-12-07 NOTE — PROVIDER CONTACT NOTE (OTHER) - ASSESSMENT
Re-checked patients BP and documented above. Patient was not exhibiting any signs or symptoms of distress at this time.

## 2019-12-08 LAB
ANION GAP SERPL CALC-SCNC: 15 MMO/L — HIGH (ref 7–14)
BACTERIA BLD CULT: SIGNIFICANT CHANGE UP
BASOPHILS # BLD AUTO: 0.02 K/UL — SIGNIFICANT CHANGE UP (ref 0–0.2)
BASOPHILS NFR BLD AUTO: 0.2 % — SIGNIFICANT CHANGE UP (ref 0–2)
BUN SERPL-MCNC: 40 MG/DL — HIGH (ref 7–23)
CALCIUM SERPL-MCNC: 8.5 MG/DL — SIGNIFICANT CHANGE UP (ref 8.4–10.5)
CHLORIDE SERPL-SCNC: 103 MMOL/L — SIGNIFICANT CHANGE UP (ref 98–107)
CO2 SERPL-SCNC: 18 MMOL/L — LOW (ref 22–31)
CREAT SERPL-MCNC: 3.29 MG/DL — HIGH (ref 0.5–1.3)
EOSINOPHIL # BLD AUTO: 0.13 K/UL — SIGNIFICANT CHANGE UP (ref 0–0.5)
EOSINOPHIL NFR BLD AUTO: 1.1 % — SIGNIFICANT CHANGE UP (ref 0–6)
GLUCOSE SERPL-MCNC: 86 MG/DL — SIGNIFICANT CHANGE UP (ref 70–99)
HCT VFR BLD CALC: 29.3 % — LOW (ref 34.5–45)
HGB BLD-MCNC: 9.8 G/DL — LOW (ref 11.5–15.5)
IMM GRANULOCYTES NFR BLD AUTO: 0.6 % — SIGNIFICANT CHANGE UP (ref 0–1.5)
LYMPHOCYTES # BLD AUTO: 1.28 K/UL — SIGNIFICANT CHANGE UP (ref 1–3.3)
LYMPHOCYTES # BLD AUTO: 11 % — LOW (ref 13–44)
MAGNESIUM SERPL-MCNC: 1.8 MG/DL — SIGNIFICANT CHANGE UP (ref 1.6–2.6)
MCHC RBC-ENTMCNC: 30.9 PG — SIGNIFICANT CHANGE UP (ref 27–34)
MCHC RBC-ENTMCNC: 33.4 % — SIGNIFICANT CHANGE UP (ref 32–36)
MCV RBC AUTO: 92.4 FL — SIGNIFICANT CHANGE UP (ref 80–100)
MONOCYTES # BLD AUTO: 0.74 K/UL — SIGNIFICANT CHANGE UP (ref 0–0.9)
MONOCYTES NFR BLD AUTO: 6.4 % — SIGNIFICANT CHANGE UP (ref 2–14)
NEUTROPHILS # BLD AUTO: 9.39 K/UL — HIGH (ref 1.8–7.4)
NEUTROPHILS NFR BLD AUTO: 80.7 % — HIGH (ref 43–77)
NRBC # FLD: 0 K/UL — SIGNIFICANT CHANGE UP (ref 0–0)
PHOSPHATE SERPL-MCNC: 4.4 MG/DL — SIGNIFICANT CHANGE UP (ref 2.5–4.5)
PLATELET # BLD AUTO: 170 K/UL — SIGNIFICANT CHANGE UP (ref 150–400)
PMV BLD: 10.8 FL — SIGNIFICANT CHANGE UP (ref 7–13)
POTASSIUM SERPL-MCNC: 4.1 MMOL/L — SIGNIFICANT CHANGE UP (ref 3.5–5.3)
POTASSIUM SERPL-SCNC: 4.1 MMOL/L — SIGNIFICANT CHANGE UP (ref 3.5–5.3)
RBC # BLD: 3.17 M/UL — LOW (ref 3.8–5.2)
RBC # FLD: 14.5 % — SIGNIFICANT CHANGE UP (ref 10.3–14.5)
SODIUM SERPL-SCNC: 136 MMOL/L — SIGNIFICANT CHANGE UP (ref 135–145)
WBC # BLD: 11.63 K/UL — HIGH (ref 3.8–10.5)
WBC # FLD AUTO: 11.63 K/UL — HIGH (ref 3.8–10.5)

## 2019-12-08 RX ORDER — HYDRALAZINE HCL 50 MG
25 TABLET ORAL ONCE
Refills: 0 | Status: COMPLETED | OUTPATIENT
Start: 2019-12-08 | End: 2019-12-08

## 2019-12-08 RX ADMIN — PIPERACILLIN AND TAZOBACTAM 25 GRAM(S): 4; .5 INJECTION, POWDER, LYOPHILIZED, FOR SOLUTION INTRAVENOUS at 08:56

## 2019-12-08 RX ADMIN — ESCITALOPRAM OXALATE 10 MILLIGRAM(S): 10 TABLET, FILM COATED ORAL at 08:56

## 2019-12-08 RX ADMIN — PANTOPRAZOLE SODIUM 40 MILLIGRAM(S): 20 TABLET, DELAYED RELEASE ORAL at 05:02

## 2019-12-08 RX ADMIN — Medication 25 MILLIGRAM(S): at 22:36

## 2019-12-08 RX ADMIN — PIPERACILLIN AND TAZOBACTAM 25 GRAM(S): 4; .5 INJECTION, POWDER, LYOPHILIZED, FOR SOLUTION INTRAVENOUS at 22:25

## 2019-12-08 RX ADMIN — CARVEDILOL PHOSPHATE 6.25 MILLIGRAM(S): 80 CAPSULE, EXTENDED RELEASE ORAL at 17:14

## 2019-12-08 RX ADMIN — CARVEDILOL PHOSPHATE 6.25 MILLIGRAM(S): 80 CAPSULE, EXTENDED RELEASE ORAL at 05:02

## 2019-12-08 RX ADMIN — Medication 1000 UNIT(S): at 08:56

## 2019-12-08 RX ADMIN — PANTOPRAZOLE SODIUM 40 MILLIGRAM(S): 20 TABLET, DELAYED RELEASE ORAL at 17:14

## 2019-12-08 RX ADMIN — ATORVASTATIN CALCIUM 20 MILLIGRAM(S): 80 TABLET, FILM COATED ORAL at 22:25

## 2019-12-08 RX ADMIN — DONEPEZIL HYDROCHLORIDE 10 MILLIGRAM(S): 10 TABLET, FILM COATED ORAL at 22:25

## 2019-12-08 NOTE — PROGRESS NOTE ADULT - SUBJECTIVE AND OBJECTIVE BOX
Patient is a 79y old  Female who presents with a chief complaint of UGIB (08 Dec 2019 16:47)      SUBJECTIVE / OVERNIGHT EVENTS:  No chest pain. No shortness of breath. No complaints. No events overnight.     MEDICATIONS  (STANDING):  atorvastatin 20 milliGRAM(s) Oral at bedtime  carvedilol 6.25 milliGRAM(s) Oral every 12 hours  cholecalciferol 1000 Unit(s) Oral daily  donepezil 10 milliGRAM(s) Oral at bedtime  escitalopram 10 milliGRAM(s) Oral daily  pantoprazole  Injectable 40 milliGRAM(s) IV Push two times a day  piperacillin/tazobactam IVPB.. 3.375 Gram(s) IV Intermittent every 12 hours    MEDICATIONS  (PRN):      Vital Signs Last 24 Hrs  T(C): 36.7 (08 Dec 2019 17:10), Max: 36.7 (07 Dec 2019 21:35)  T(F): 98 (08 Dec 2019 17:10), Max: 98 (07 Dec 2019 21:35)  HR: 83 (08 Dec 2019 17:10) (71 - 83)  BP: 176/84 (08 Dec 2019 17:10) (152/80 - 186/90)  BP(mean): 100 (08 Dec 2019 12:50) (100 - 100)  RR: 19 (08 Dec 2019 17:10) (16 - 19)  SpO2: 97% (08 Dec 2019 17:10) (96% - 98%)  CAPILLARY BLOOD GLUCOSE        I&O's Summary    07 Dec 2019 07:01  -  08 Dec 2019 07:00  --------------------------------------------------------  IN: 1280 mL / OUT: 700 mL / NET: 580 mL    08 Dec 2019 07:01  -  08 Dec 2019 21:30  --------------------------------------------------------  IN: 700 mL / OUT: 700 mL / NET: 0 mL        PHYSICAL EXAM:  GENERAL: NAD, well-developed  HEAD:  Atraumatic, Normocephalic  EYES: EOMI, PERRLA, conjunctiva and sclera clear  NECK: Supple, No JVD  CHEST/LUNG: Clear to auscultation bilaterally; No wheeze  HEART: Regular rate and rhythm; No murmurs, rubs, or gallops  ABDOMEN: Soft, Nontender, Nondistended; Bowel sounds present  EXTREMITIES:  2+ Peripheral Pulses, No clubbing, cyanosis, or edema    NEUROLOGY: left hemiparesis  SKIN: No rashes or lesions    LABS:                        9.8    11.63 )-----------( 170      ( 08 Dec 2019 05:15 )             29.3     12-08    136  |  103  |  40<H>  ----------------------------<  86  4.1   |  18<L>  |  3.29<H>    Ca    8.5      08 Dec 2019 05:15  Phos  4.4     12-08  Mg     1.8     12-08                RADIOLOGY & ADDITIONAL TESTS:    Imaging Personally Reviewed:    Consultant(s) Notes Reviewed:      Care Discussed with Consultants/Other Providers:

## 2019-12-08 NOTE — PROGRESS NOTE ADULT - ASSESSMENT
Mrs. 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    sepsis with fever with rhonchi and elevated wbc likely secondary to aspiration pneunonia  - ct of chest positive for pna  - elevated procalcitonin  - blood cultures NGTD  - cont zosyn day #6  - seen by ID  - f/u wbc

## 2019-12-08 NOTE — PROGRESS NOTE ADULT - ASSESSMENT
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    JONY on ckd stage 3  JONY possible due to sepsis. Rule out obstruction with bladder scan.  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  - currently on zosyn, AIN less likely as no eosinophila seen  - monitor bmp  - avoid nephrotoxic agents  - Repeat UA    Proteinuria.  Etiology is unclear, needs work up.  - C3/C4, PAULY.  - Protein electrophoresis.    Anemia  GIB  f/u gi  monitor hb  transfuse per team    Hypocalcemia  Her albumin is low.  Please monitor.

## 2019-12-08 NOTE — PROGRESS NOTE ADULT - SUBJECTIVE AND OBJECTIVE BOX
LINDA SOL  79y  Patient is a 79y old  Female who presents with a chief complaint of UGIB (07 Dec 2019 16:46)    HPI:  This is a patient with CKI on CKD in the setting of  SIRS. Not able to give a good history.     HEALTH ISSUES - PROBLEM Dx:  Discharge planning issues: Discharge planning issues  Coffee ground emesis: Coffee ground emesis  Hypokalemia: Hypokalemia  Prophylactic measure: Prophylactic measure  CVA (cerebral vascular accident): CVA (cerebral vascular accident)  Depression: Depression  Alzheimers disease: Alzheimers disease  HTN (hypertension): HTN (hypertension)  Acute kidney injury: Acute kidney injury  GI bleed: GI bleed        MEDICATIONS  (STANDING):  atorvastatin 20 milliGRAM(s) Oral at bedtime  carvedilol 6.25 milliGRAM(s) Oral every 12 hours  cholecalciferol 1000 Unit(s) Oral daily  donepezil 10 milliGRAM(s) Oral at bedtime  escitalopram 10 milliGRAM(s) Oral daily  pantoprazole  Injectable 40 milliGRAM(s) IV Push two times a day  piperacillin/tazobactam IVPB.. 3.375 Gram(s) IV Intermittent every 12 hours    MEDICATIONS  (PRN):    Vital Signs Last 24 Hrs  T(C): 36.7 (08 Dec 2019 12:50), Max: 36.8 (07 Dec 2019 16:59)  T(F): 98 (08 Dec 2019 12:50), Max: 98.3 (07 Dec 2019 16:59)  HR: 75 (08 Dec 2019 12:50) (71 - 80)  BP: 152/80 (08 Dec 2019 12:50) (152/80 - 186/90)  BP(mean): 100 (08 Dec 2019 12:50) (100 - 102)  RR: 18 (08 Dec 2019 12:50) (16 - 18)  SpO2: 98% (08 Dec 2019 12:50) (96% - 100%)  Daily     Daily     PHYSICAL EXAM:  Constitutional:  She appears comfortable and not distressed. Not diaphoretic.    Neck:  The thyroid is normal. Trachea is midline.     Respiratory: The lungs show right sided rhonchi. No dullness and expansion is normal.    Cardiovascular: S1 and S2 are normal. No mummurs, rubs or gallops are present.    Gastrointestinal: The abdomen is soft. No tenderness is present. No masses are present. Bowel sounds are normal.    Genitourinary: The bladder is not distended. No CVA tenderness is present.    Extremities: No edema is noted. Cellulitis of right forearm.    Neurological: She appears confused.    Skin: Echymoses of right upper forearm.    Lymph Nodes: No lymphadenopathy is present.                            9.8    11.63 )-----------( 170      ( 08 Dec 2019 05:15 )             29.3     12-08    136  |  103  |  40<H>  ----------------------------<  86  4.1   |  18<L>  |  3.29<H>    Ca    8.5      08 Dec 2019 05:15  Phos  4.4     12-08  Mg     1.8     12-08    Sodium, Random Urine (12.06.19 @ 22:22)    Sodium, Random Urine: < 20: Reference range not established for this test mmol/L    Urinalysis (12.01.19 @ 18:26)    Color: LIGHT YELLOW    Urine Appearance: CLEAR    Glucose: 50    Bilirubin: NEGATIVE    Ketone - Urine: NEGATIVE    Specific Gravity: 1.018    Blood: MODERATE    pH - Urine: 6.5    Protein, Urine: 600    Urobilinogen: NORMAL    Nitrite: NEGATIVE    Leukocyte Esterase Concentration: NEGATIVE    Red Blood Cell - Urine: 5-10    White Blood Cell - Urine: 6-11    Hyaline Casts: 1+    Bacteria: FEW    Squamous Epithelial: MODERATE    Protein, Random Urine (12.06.19 @ 22:22)    Protein, Random Urine: > 600 mg/dL    Creatinine, Random Urine (12.06.19 @ 22:22)    Creatinine, Random Urine: 219.30 mg/dL    < from: CT Chest No Cont (12.04.19 @ 19:40) >    Retained secretions within the bilateral main bronchi and obliteration of   bilateral lower lobe bronchi. Atelectasis/consolidation of basilar left   lower lobe.    Bilateral nodular opacities likely representing multifocal infection.    Moderate left and small right pleural effusions.    < end of copied text >  < from: CT Abdomen and Pelvis No Cont (12.01.19 @ 21:02) >  KIDNEYS/URETERS: Again noted, atrophic right kidney with a cyst.   Duplicated left renal collecting system. Prominent left collecting system   with nonspecific left perinephric stranding. No hydroureteronephrosis or   radiopaque urinary tract stone.  BLADDER: Partially distended.  REPRODUCTIVE ORGANS: Unremarkable.    < end of copied text >

## 2019-12-08 NOTE — PROGRESS NOTE ADULT - SUBJECTIVE AND OBJECTIVE BOX
INTERVAL HPI/OVERNIGHT EVENTS:    No new overnight event.  No N/V/D.      MEDICATIONS  (STANDING):  atorvastatin 20 milliGRAM(s) Oral at bedtime  carvedilol 6.25 milliGRAM(s) Oral every 12 hours  cholecalciferol 1000 Unit(s) Oral daily  donepezil 10 milliGRAM(s) Oral at bedtime  escitalopram 10 milliGRAM(s) Oral daily  pantoprazole  Injectable 40 milliGRAM(s) IV Push two times a day  piperacillin/tazobactam IVPB.. 3.375 Gram(s) IV Intermittent every 12 hours    MEDICATIONS  (PRN):      Allergies    No Known Allergies    Intolerances        Review of Systems:    General:  No wt loss, fevers, chills, night sweats,fatigue,   Eyes:  Good vision, no reported pain  ENT:  No sore throat, pain, runny nose, dysphagia  CV:  No pain, palpitatioins, hypo/hypertension  Resp:  No dyspnea, cough, tachypnea, wheezing  GI:  No pain, No nausea, No vomiting, No diarrhea, No constipatiion, No weight loss, No fever, No pruritis, No rectal bleeding, No tarry stools, No dysphagia,  :  No pain, bleeding, incontinence, nocturia  Muscle:  No pain, weakness  Neuro:  No weakness, tingling, memory problems  Psych:  No fatigue, insomnia, mood problems, depression  Endocrine:  No polyuria, polydypsia, cold/heat intolerance  Heme:  No petechiae, ecchymosis, easy bruisability  Skin:  No rash, tattoos, scars, edema      Vital Signs Last 24 Hrs  T(C): 36.7 (08 Dec 2019 12:50), Max: 36.8 (07 Dec 2019 16:59)  T(F): 98 (08 Dec 2019 12:50), Max: 98.3 (07 Dec 2019 16:59)  HR: 75 (08 Dec 2019 12:50) (71 - 80)  BP: 152/80 (08 Dec 2019 12:50) (152/80 - 186/90)  BP(mean): 100 (08 Dec 2019 12:50) (100 - 102)  RR: 18 (08 Dec 2019 12:50) (16 - 18)  SpO2: 98% (08 Dec 2019 12:50) (96% - 100%)    PHYSICAL EXAM:    Constitutional: NAD, well-developed  HEENT: EOMI, throat clear  Neck: No LAD, supple  Respiratory: CTA and P  Cardiovascular: S1 and S2, RRR, no M  Gastrointestinal: BS+, soft, NT/ND, neg HSM,  Extremities: No peripheral edema, neg clubing, cyanosis  Vascular: 2+ peripheral pulses  Neurological: A/O x 3, no focal deficits  Psychiatric: Normal mood, normal affect  Skin: No rashes      LABS:                        9.8    11.63 )-----------( 170      ( 08 Dec 2019 05:15 )             29.3     12-08    136  |  103  |  40<H>  ----------------------------<  86  4.1   |  18<L>  |  3.29<H>    Ca    8.5      08 Dec 2019 05:15  Phos  4.4     12-08  Mg     1.8     12-08            RADIOLOGY & ADDITIONAL TESTS:

## 2019-12-09 DIAGNOSIS — Z71.89 OTHER SPECIFIED COUNSELING: ICD-10-CM

## 2019-12-09 DIAGNOSIS — J18.9 PNEUMONIA, UNSPECIFIED ORGANISM: ICD-10-CM

## 2019-12-09 LAB
ANION GAP SERPL CALC-SCNC: 13 MMO/L — SIGNIFICANT CHANGE UP (ref 7–14)
BASOPHILS # BLD AUTO: 0.03 K/UL — SIGNIFICANT CHANGE UP (ref 0–0.2)
BASOPHILS NFR BLD AUTO: 0.3 % — SIGNIFICANT CHANGE UP (ref 0–2)
BUN SERPL-MCNC: 35 MG/DL — HIGH (ref 7–23)
C-ANCA SER-ACNC: NEGATIVE — SIGNIFICANT CHANGE UP
CALCIUM SERPL-MCNC: 8.3 MG/DL — LOW (ref 8.4–10.5)
CHLORIDE SERPL-SCNC: 104 MMOL/L — SIGNIFICANT CHANGE UP (ref 98–107)
CO2 SERPL-SCNC: 20 MMOL/L — LOW (ref 22–31)
CREAT SERPL-MCNC: 3.27 MG/DL — HIGH (ref 0.5–1.3)
EOSINOPHIL # BLD AUTO: 0.13 K/UL — SIGNIFICANT CHANGE UP (ref 0–0.5)
EOSINOPHIL NFR BLD AUTO: 1.2 % — SIGNIFICANT CHANGE UP (ref 0–6)
GLUCOSE SERPL-MCNC: 102 MG/DL — HIGH (ref 70–99)
HCT VFR BLD CALC: 30.2 % — LOW (ref 34.5–45)
HGB BLD-MCNC: 10.2 G/DL — LOW (ref 11.5–15.5)
IMM GRANULOCYTES NFR BLD AUTO: 0.6 % — SIGNIFICANT CHANGE UP (ref 0–1.5)
KAPPA/LAMBDA FREE LIGHT CHAIN RATIO, SERUM: 1.2 — SIGNIFICANT CHANGE UP
LYMPHOCYTES # BLD AUTO: 0.95 K/UL — LOW (ref 1–3.3)
LYMPHOCYTES # BLD AUTO: 8.8 % — LOW (ref 13–44)
MAGNESIUM SERPL-MCNC: 1.8 MG/DL — SIGNIFICANT CHANGE UP (ref 1.6–2.6)
MCHC RBC-ENTMCNC: 30.5 PG — SIGNIFICANT CHANGE UP (ref 27–34)
MCHC RBC-ENTMCNC: 33.8 % — SIGNIFICANT CHANGE UP (ref 32–36)
MCV RBC AUTO: 90.4 FL — SIGNIFICANT CHANGE UP (ref 80–100)
MONOCYTES # BLD AUTO: 0.65 K/UL — SIGNIFICANT CHANGE UP (ref 0–0.9)
MONOCYTES NFR BLD AUTO: 6 % — SIGNIFICANT CHANGE UP (ref 2–14)
NEUTROPHILS # BLD AUTO: 9.02 K/UL — HIGH (ref 1.8–7.4)
NEUTROPHILS NFR BLD AUTO: 83.1 % — HIGH (ref 43–77)
NRBC # FLD: 0 K/UL — SIGNIFICANT CHANGE UP (ref 0–0)
P-ANCA SER-ACNC: NEGATIVE — SIGNIFICANT CHANGE UP
PHOSPHATE SERPL-MCNC: 3.8 MG/DL — SIGNIFICANT CHANGE UP (ref 2.5–4.5)
PLATELET # BLD AUTO: 192 K/UL — SIGNIFICANT CHANGE UP (ref 150–400)
PMV BLD: 10.3 FL — SIGNIFICANT CHANGE UP (ref 7–13)
POTASSIUM SERPL-MCNC: 3.5 MMOL/L — SIGNIFICANT CHANGE UP (ref 3.5–5.3)
POTASSIUM SERPL-SCNC: 3.5 MMOL/L — SIGNIFICANT CHANGE UP (ref 3.5–5.3)
RBC # BLD: 3.34 M/UL — LOW (ref 3.8–5.2)
RBC # FLD: 14.6 % — HIGH (ref 10.3–14.5)
SODIUM SERPL-SCNC: 137 MMOL/L — SIGNIFICANT CHANGE UP (ref 135–145)
WBC # BLD: 10.85 K/UL — HIGH (ref 3.8–10.5)
WBC # FLD AUTO: 10.85 K/UL — HIGH (ref 3.8–10.5)

## 2019-12-09 PROCEDURE — 99232 SBSQ HOSP IP/OBS MODERATE 35: CPT

## 2019-12-09 RX ORDER — HYDRALAZINE HCL 50 MG
25 TABLET ORAL THREE TIMES A DAY
Refills: 0 | Status: DISCONTINUED | OUTPATIENT
Start: 2019-12-09 | End: 2019-12-09

## 2019-12-09 RX ORDER — HYDRALAZINE HCL 50 MG
50 TABLET ORAL THREE TIMES A DAY
Refills: 0 | Status: DISCONTINUED | OUTPATIENT
Start: 2019-12-09 | End: 2019-12-11

## 2019-12-09 RX ORDER — HYDRALAZINE HCL 50 MG
5 TABLET ORAL ONCE
Refills: 0 | Status: DISCONTINUED | OUTPATIENT
Start: 2019-12-09 | End: 2019-12-09

## 2019-12-09 RX ORDER — HYDRALAZINE HCL 50 MG
25 TABLET ORAL ONCE
Refills: 0 | Status: COMPLETED | OUTPATIENT
Start: 2019-12-09 | End: 2019-12-09

## 2019-12-09 RX ADMIN — PIPERACILLIN AND TAZOBACTAM 25 GRAM(S): 4; .5 INJECTION, POWDER, LYOPHILIZED, FOR SOLUTION INTRAVENOUS at 09:15

## 2019-12-09 RX ADMIN — CARVEDILOL PHOSPHATE 6.25 MILLIGRAM(S): 80 CAPSULE, EXTENDED RELEASE ORAL at 06:01

## 2019-12-09 RX ADMIN — PANTOPRAZOLE SODIUM 40 MILLIGRAM(S): 20 TABLET, DELAYED RELEASE ORAL at 06:01

## 2019-12-09 RX ADMIN — ESCITALOPRAM OXALATE 10 MILLIGRAM(S): 10 TABLET, FILM COATED ORAL at 13:13

## 2019-12-09 RX ADMIN — Medication 50 MILLIGRAM(S): at 21:10

## 2019-12-09 RX ADMIN — Medication 1000 UNIT(S): at 13:13

## 2019-12-09 RX ADMIN — PIPERACILLIN AND TAZOBACTAM 25 GRAM(S): 4; .5 INJECTION, POWDER, LYOPHILIZED, FOR SOLUTION INTRAVENOUS at 21:09

## 2019-12-09 RX ADMIN — ATORVASTATIN CALCIUM 20 MILLIGRAM(S): 80 TABLET, FILM COATED ORAL at 21:10

## 2019-12-09 RX ADMIN — DONEPEZIL HYDROCHLORIDE 10 MILLIGRAM(S): 10 TABLET, FILM COATED ORAL at 21:09

## 2019-12-09 RX ADMIN — Medication 25 MILLIGRAM(S): at 13:13

## 2019-12-09 RX ADMIN — CARVEDILOL PHOSPHATE 6.25 MILLIGRAM(S): 80 CAPSULE, EXTENDED RELEASE ORAL at 17:09

## 2019-12-09 RX ADMIN — Medication 25 MILLIGRAM(S): at 17:58

## 2019-12-09 RX ADMIN — PANTOPRAZOLE SODIUM 40 MILLIGRAM(S): 20 TABLET, DELAYED RELEASE ORAL at 17:09

## 2019-12-09 NOTE — PROGRESS NOTE ADULT - SUBJECTIVE AND OBJECTIVE BOX
Follow Up:  pneumonia    Interval History/ROS: sleepy - wakes to report improvement    Allergies  No Known Allergies    ANTIMICROBIALS:  piperacillin/tazobactam IVPB.. 3.375 every 12 hours      OTHER MEDS:  MEDICATIONS  (STANDING):  atorvastatin 20 at bedtime  carvedilol 6.25 every 12 hours  donepezil 10 at bedtime  escitalopram 10 daily  pantoprazole  Injectable 40 two times a day      Vital Signs Last 24 Hrs  T(C): 36.9 (09 Dec 2019 05:58), Max: 36.9 (08 Dec 2019 22:16)  T(F): 98.5 (09 Dec 2019 05:58), Max: 98.5 (09 Dec 2019 05:58)  HR: 78 (09 Dec 2019 07:28) (75 - 83)  BP: 180/78 (09 Dec 2019 07:28) (152/80 - 225/92)  BP(mean): 100 (08 Dec 2019 12:50) (100 - 100)  RR: 16 (09 Dec 2019 05:58) (16 - 19)  SpO2: 100% (09 Dec 2019 05:58) (97% - 100%)    PHYSICAL EXAM:  General: NAD, Non-toxic  Neurology: A&O right hemiparesis  Respiratory: tahcypnea, fine crackle  CV: RRR, S1S2, no murmurs, rubs or gallops  Abdominal: Soft, Non-tender, non-distended, normal bowel sounds  Extremities: No edema,  Line Sites: Clear  Skin: No rash                        10.2   10.85 )-----------( 192      ( 09 Dec 2019 05:43 )             30.2   WBC Count: 10.85 (12-09 @ 05:43)  WBC Count: 11.63 (12-08 @ 05:15)  WBC Count: 11.18 (12-07 @ 06:38)  WBC Count: 14.12 (12-06 @ 10:04)  WBC Count: 19.49 (12-05 @ 04:43)    12-09    137  |  104  |  35<H>  ----------------------------<  102<H>  3.5   |  20<L>  |  3.27<H>  Creatinine: 3.27 (12-09 @ 05:43)    Creatinine: 3.29 (12-08 @ 05:15)    Creatinine: 3.28 (12-07 @ 06:38)    Creatinine: 3.11 (12-06 @ 10:00)    Creatinine: 2.74 (12-05 @ 04:43)      Ca    8.3<L>      09 Dec 2019 05:43  Phos  3.8     12-09  Mg     1.8     12-09      MICROBIOLOGY:  BLOOD PERIPHERAL  12-03-19 --  --  --      URINE MIDSTREAM  12-02-19 --  --  Enterococcus faecium VRE    RADIOLOGY:  < from: CT Chest No Cont (12.04.19 @ 19:40) >  Retained secretions within the bilateral main bronchi and obliteration of   bilateral lower lobe bronchi. Atelectasis/consolidation of basilar left   lower lobe.    Bilateral nodular opacities likely representing multifocal infection.    Moderate left and small right pleural effusions.    The esophagus is dilated.    < end of copied text >      Martin Downing MD; Division of Infectious Disease; Pager: [phone]; nights and weekends: [phone]

## 2019-12-09 NOTE — PROGRESS NOTE ADULT - ASSESSMENT
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    JONY on ckd stage 3  baseline ~ 1.1-1.3, multiple JONY in the past  scr on admission 1.99   scr peaked 3.29, stable today  CT abd 12/1 showed atropic right kidney, no hydro  JONY possible ATN possible sec to anemia, FEna<1% suggested of prerenal however scr did not improve with IVF, now off ivf  currently on zosyn, AIN less likely as no esopinophila seen  monitor bmp  avoid nephrotoxic agents  renal dose medication    proteinuria/hematuria  + UTI  spot urine/cr >3g  repeat urine p/c   In view of worsen renal function proteinuria and hematuria will do vasculitis work up   hep b, hep c hiv, rpr neg, c3 c4 wnl, k/L ratio wnl  pending sarita, anca, spep, sif    anemia  GIB  f/u gi  monitor hb  transfuse per team    Hypocalcemia  sec to low alb   vit d 25 23.4. 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    JONY on ckd stage 3  baseline ~ 1.1-1.3, multiple JONY in the past  scr on admission 1.99   scr peaked 3.29, stable today  CT abd 12/1 showed atropic right kidney, no hydro  JONY possible ATN possible sec to anemia, FEna<1% suggested of prerenal however scr did not improve with IVF, now off ivf  currently on zosyn, AIN less likely as no esopinophila seen  monitor bmp  avoid nephrotoxic agents  renal dose medication    Proteinuria/hematuria  + UTI  spot urine/cr >3g  repeat urine p/c   In view of worsen renal function with proteinuria + hematuria will do vasculitis work up   hep b, hep c hiv, rpr neg, c3 c4 wnl, k/L ratio wnl  pending sarita, anca, spep, sif    Anemia  GIB  f/u gi  monitor hb  transfuse per team    Hypocalcemia  sec to low alb   vit d 25 23.4. on oral supplement  monitor    Hypokalemia  likely sec to hydration  supplement as needed   monitor    HTN  uncontrolled  start hydralazine 25 tid up titrate as needed

## 2019-12-09 NOTE — DIETITIAN INITIAL EVALUATION ADULT. - PERTINENT MEDS FT
MEDICATIONS  (STANDING):  atorvastatin 20 milliGRAM(s) Oral at bedtime  carvedilol 6.25 milliGRAM(s) Oral every 12 hours  cholecalciferol 1000 Unit(s) Oral daily  donepezil 10 milliGRAM(s) Oral at bedtime  escitalopram 10 milliGRAM(s) Oral daily  hydrALAZINE 25 milliGRAM(s) Oral three times a day  pantoprazole  Injectable 40 milliGRAM(s) IV Push two times a day  piperacillin/tazobactam IVPB.. 3.375 Gram(s) IV Intermittent every 12 hours

## 2019-12-09 NOTE — DIETITIAN INITIAL EVALUATION ADULT. - OTHER INFO
Initial Dietitian Evaluation 2/2 to extended length of stay. Mrs. 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  Pt is [place]-speaking.  This RDN able to communicate fluently with patient in [place].  Patient denies any nausea/vomiting/diarrhea/constipation or difficulty chewing and swallowing. Per Pt and RN,  PO intake said to be fair to good at this time.  She is not able to discuss her usual weight or confirm/deny weight loss.  RDN note from prior admission documents weight of 59.2 kgs 1/12/18.  Question weight obtained on this admission 12/4 of 47 kgs.  Weight from 12/7 documented to be 61 kgs.  PO intake encouraged.  Diet education is not appropriate at this time give pt's cognition.

## 2019-12-09 NOTE — PROGRESS NOTE ADULT - SUBJECTIVE AND OBJECTIVE BOX
INTERVAL HPI/OVERNIGHT EVENTS:    doing well this morning   no abd pain   no episodes of nausea or vomiting   did not like her breakfast     MEDICATIONS  (STANDING):  atorvastatin 20 milliGRAM(s) Oral at bedtime  carvedilol 6.25 milliGRAM(s) Oral every 12 hours  cholecalciferol 1000 Unit(s) Oral daily  donepezil 10 milliGRAM(s) Oral at bedtime  escitalopram 10 milliGRAM(s) Oral daily  pantoprazole  Injectable 40 milliGRAM(s) IV Push two times a day  piperacillin/tazobactam IVPB.. 3.375 Gram(s) IV Intermittent every 12 hours    MEDICATIONS  (PRN):      Allergies    No Known Allergies    Intolerances        Review of Systems:    General:  No wt loss, fevers, chills, night sweats, fatigue   Eyes:  Good vision, no reported pain  ENT:  No sore throat, pain, runny nose, dysphagia  CV:  No pain, palpitations, hypo/hypertension  Resp:  No dyspnea, cough, tachypnea, wheezing  GI:  No pain, No nausea, No vomiting, No diarrhea, No constipation, No weight loss, No fever, No pruritis, No rectal bleeding, No melena, No dysphagia  :  No pain, bleeding, incontinence, nocturia  Muscle:  No pain, weakness  Neuro:  No weakness, tingling, memory problems  Psych:  No fatigue, insomnia, mood problems, depression  Endocrine:  No polyuria, polydypsia, cold/heat intolerance  Heme:  No petechiae, ecchymosis, easy bruisability  Skin:  No rash, tattoos, scars, edema      Vital Signs Last 24 Hrs  T(C): 36.9 (09 Dec 2019 05:58), Max: 36.9 (08 Dec 2019 22:16)  T(F): 98.5 (09 Dec 2019 05:58), Max: 98.5 (09 Dec 2019 05:58)  HR: 78 (09 Dec 2019 07:28) (75 - 83)  BP: 180/78 (09 Dec 2019 07:28) (152/80 - 225/92)  BP(mean): 100 (08 Dec 2019 12:50) (100 - 100)  RR: 16 (09 Dec 2019 05:58) (16 - 19)  SpO2: 100% (09 Dec 2019 05:58) (97% - 100%)    PHYSICAL EXAM:    Constitutional: NAD  HEENT: EOMI, throat clear  Neck: No LAD, supple  Respiratory: CTA and P  Cardiovascular: S1 and S2, RRR, no M  Gastrointestinal: BS+, soft, NT/ND, neg HSM,  Extremities: No peripheral edema, neg clubbing, cyanosis  Vascular: 2+ peripheral pulses  Neurological: A/O x 3, no focal deficits  Psychiatric: Normal mood, normal affect  Skin: No rashes      LABS:                        10.2   10.85 )-----------( 192      ( 09 Dec 2019 05:43 )             30.2     12-09    137  |  104  |  35<H>  ----------------------------<  102<H>  3.5   |  20<L>  |  3.27<H>    Ca    8.3<L>      09 Dec 2019 05:43  Phos  3.8     12-09  Mg     1.8     12-09            RADIOLOGY & ADDITIONAL TESTS:

## 2019-12-09 NOTE — PROGRESS NOTE ADULT - SUBJECTIVE AND OBJECTIVE BOX
[facility]   MD JACOBY YAN, DO RAO MORALEZ, NAHID BUCHANAN    TEL:  OFFICE: [phone]  DR MCCLOUD CELL: [phone]  DR. FRANCIS CELL: [phone]  DR. MORALEZ CELL: [phone]  HELLEN SOLIS CELL: [phone]        Patient is a 79y old  Female who presents with a chief complaint of UGIB (09 Dec 2019 12:11)      Patient seen and examined at bedside.    VITALS:  T(F): 98.5 (12-09-19 @ 05:58), Max: 98.5 (12-09-19 @ 05:58)  HR: 78 (12-09-19 @ 07:28)  BP: 180/78 (12-09-19 @ 07:28)  RR: 16 (12-09-19 @ 05:58)  SpO2: 100% (12-09-19 @ 05:58)  Wt(kg): --    12-08 @ 07:01  -  12-09 @ 07:00  --------------------------------------------------------  IN: 1050 mL / OUT: 1400 mL / NET: -350 mL    12-09 @ 07:01  -  12-09 @ 12:25  --------------------------------------------------------  IN: 240 mL / OUT: 0 mL / NET: 240 mL          PHYSICAL EXAM:  Constitutional: NAD  Neck: No JVD  Respiratory: diffused wheeze  Cardiovascular: S1, S2, RRR  Gastrointestinal: BS+, soft, NT/ND  Extremities: No peripheral edema    Hospital Medications:   MEDICATIONS  (STANDING):  atorvastatin 20 milliGRAM(s) Oral at bedtime  carvedilol 6.25 milliGRAM(s) Oral every 12 hours  cholecalciferol 1000 Unit(s) Oral daily  donepezil 10 milliGRAM(s) Oral at bedtime  escitalopram 10 milliGRAM(s) Oral daily  pantoprazole  Injectable 40 milliGRAM(s) IV Push two times a day  piperacillin/tazobactam IVPB.. 3.375 Gram(s) IV Intermittent every 12 hours      LABS:  12-09    137  |  104  |  35<H>  ----------------------------<  102<H>  3.5   |  20<L>  |  3.27<H>    Ca    8.3<L>      09 Dec 2019 05:43  Phos  3.8     12-09  Mg     1.8     12-09      Creatinine Trend: 3.27 <--, 3.29 <--, 3.28 <--, 3.11 <--, 2.74 <--, 2.48 <--, 2.18 <--    Phosphorus Level, Serum: 3.8 mg/dL (12-09 @ 05:43)                              10.2   10.85 )-----------( 192      ( 09 Dec 2019 05:43 )             30.2     Urine Studies:  Urinalysis - [12-01-19 @ 18:26]      Color LIGHT YELLOW / Appearance CLEAR / SG 1.018 / pH 6.5      Gluc 50 / Ketone NEGATIVE  / Bili NEGATIVE / Urobili NORMAL       Blood MODERATE / Protein 600 / Leuk Est NEGATIVE / Nitrite NEGATIVE      RBC 5-10 / WBC 6-11 / Hyaline 1+ / Gran  / Sq Epi MODERATE / Non Sq Epi  / Bacteria FEW    Urine Creatinine 219.30      [12-06-19 @ 22:22]  Urine Protein > 600      [12-06-19 @ 22:22]  Urine Sodium < 20      [12-06-19 @ 22:22]    Vitamin D (25OH) 23.4      [12-07-19 @ 06:38]    HBsAg NEGATIVE      [12-06-19 @ 10:04]  HCV 0.07, Nonreactive Hepatitis C AB  S/CO Ratio                        Interpretation  < 1.00                                   Non-Reactive  1.00 - 4.99                         Weakly-Reactive  >= 5.00                                Reactive  Non-Reactive: Aperson with a non-reactive HCV antibody  result is considered uninfected.  No further action is  needed unless recent infection is suspected.  In these  cases, consider repeat testing later to detect  seroconversion..  Weakly-Reactive: HCV antibody test is abnormal, HCV RNA  Qualitative test will follow.  Reactive: HCV antibody test is abnormal, HCV RNA  Qualitative test will follow.  Note: HCV antibody testing is performed on the [employer]   system.      [12-06-19 @ 10:04]    C3 Complement 102.8      [12-06-19 @ 10:04]  C4 Complement 29.3      [12-06-19 @ 10:04]  Syphilis Screen (Treponema Pallidum Ab) Negative      [12-06-19 @ 10:04]  Free Light Chains: kappa 10.54, lambda 8.81, ratio = --      [12-06 @ 10:04]    RADIOLOGY & ADDITIONAL STUDIES:

## 2019-12-09 NOTE — PROGRESS NOTE ADULT - SUBJECTIVE AND OBJECTIVE BOX
Patient is a 79y old  Female who presents with a chief complaint of UGIB (09 Dec 2019 12:40)      SUBJECTIVE / OVERNIGHT EVENTS:  No chest pain. No shortness of breath. No complaints. No events overnight.     MEDICATIONS  (STANDING):  atorvastatin 20 milliGRAM(s) Oral at bedtime  carvedilol 6.25 milliGRAM(s) Oral every 12 hours  cholecalciferol 1000 Unit(s) Oral daily  donepezil 10 milliGRAM(s) Oral at bedtime  escitalopram 10 milliGRAM(s) Oral daily  hydrALAZINE 25 milliGRAM(s) Oral three times a day  pantoprazole  Injectable 40 milliGRAM(s) IV Push two times a day  piperacillin/tazobactam IVPB.. 3.375 Gram(s) IV Intermittent every 12 hours    MEDICATIONS  (PRN):      Vital Signs Last 24 Hrs  T(C): 36.5 (09 Dec 2019 13:03), Max: 36.9 (08 Dec 2019 22:16)  T(F): 97.7 (09 Dec 2019 13:03), Max: 98.5 (09 Dec 2019 05:58)  HR: 72 (09 Dec 2019 13:03) (72 - 83)  BP: 180/95 (09 Dec 2019 13:03) (159/64 - 225/92)  BP(mean): --  RR: 16 (09 Dec 2019 13:03) (16 - 19)  SpO2: 100% (09 Dec 2019 13:03) (97% - 100%)  CAPILLARY BLOOD GLUCOSE        I&O's Summary    08 Dec 2019 07:01  -  09 Dec 2019 07:00  --------------------------------------------------------  IN: 1050 mL / OUT: 1400 mL / NET: -350 mL    09 Dec 2019 07:01  -  09 Dec 2019 14:58  --------------------------------------------------------  IN: 240 mL / OUT: 0 mL / NET: 240 mL        PHYSICAL EXAM:  GENERAL: NAD, well-developed  HEAD:  Atraumatic, Normocephalic  EYES: EOMI, PERRLA, conjunctiva and sclera clear  NECK: Supple, No JVD  CHEST/LUNG: Clear to auscultation bilaterally; No wheeze  HEART: Regular rate and rhythm; No murmurs, rubs, or gallops  ABDOMEN: Soft, Nontender, Nondistended; Bowel sounds present  EXTREMITIES:  2+ Peripheral Pulses, No clubbing, cyanosis, or edema    NEUROLOGY: right sided weakness  SKIN: No rashes or lesions    LABS:                        10.2   10.85 )-----------( 192      ( 09 Dec 2019 05:43 )             30.2     12-09    137  |  104  |  35<H>  ----------------------------<  102<H>  3.5   |  20<L>  |  3.27<H>    Ca    8.3<L>      09 Dec 2019 05:43  Phos  3.8     12-09  Mg     1.8     12-09                RADIOLOGY & ADDITIONAL TESTS:    Imaging Personally Reviewed:    Consultant(s) Notes Reviewed:      Care Discussed with Consultants/Other Providers:

## 2019-12-09 NOTE — PROGRESS NOTE ADULT - ASSESSMENT
79 year old female with UGIB, Pneumonia , pleural effusions. She has not had any further episodes of coffee grounds.  She has a significant history of POEM last year.

## 2019-12-09 NOTE — PROGRESS NOTE ADULT - ASSESSMENT
Mrs. 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    sepsis with fever with rhonchi and elevated wbc likely secondary to aspiration pneumonia  - ct of chest positive for pna  - elevated procalcitonin  - blood cultures NGTD  - cont zosyn day #7  - Complete antibiotics 12/10   - seen by ID  - f/u wbc

## 2019-12-09 NOTE — PROGRESS NOTE ADULT - PROBLEM SELECTOR PLAN 2
JONY on ckd stage 3  baseline ~ 1.1-1.3, multiple JONY in the past  scr on admission 1.99   scr peaked 3.29, stable today  CT abd 12/1 showed atropic right kidney, no hydro  JONY possible ATN possible sec to anemia, FEna<1% suggested of prerenal however scr did not improve with IVF, now off ivf  currently on zosyn, AIN less likely as no esopinophila seen  monitor bmp  avoid nephrotoxic agents  renal dose medication  proteinuria  spot urine/cr >3g  repeat urine p/c   In view of worsen renal function proteinuria and hematuria will do vasculitis work up   hep b, hep c hiv, rpr neg, c3 c4 wnl, k/L ratio wnl  pending sarita, anca, spep, sif I was scratched by a patient while at work injury to right arm

## 2019-12-09 NOTE — DIETITIAN INITIAL EVALUATION ADULT. - ADD RECOMMEND
1- Continue current diet order, which remains appropriate at this time. 2- Consider the provision of Ensure Enlive 240mls 1x daily (350 kcal, 20g protein). 3 Monitor weights, labs, BM's, skin integrity, p.o. intake. 4- Please Encourage po intake, assist with meals and menu selections, provide alternatives PRN. 5- Monitor weights, labs, BM's, skin integrity, p.o. intake.

## 2019-12-09 NOTE — PROGRESS NOTE ADULT - PROBLEM SELECTOR PLAN 1
now resolved   s/p 1 unit of PRBC 12/5 with adequate response   No evidence of active bleeding   continue PPI BID   Will continue to hold on EGD (not an anseth. candidate at this time)  Monitor CBC daily   Avoid NSAIDs   diet as tolerated

## 2019-12-09 NOTE — PROGRESS NOTE ADULT - ASSESSMENT
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  She has had one episode of fever with leukocytosis  Urine growing VRE but no flank tenderness or pyuria.    Pneumonia - clinically improved  UGI bleed - not actively bleeding  leukocytosis resolved  Acute on CKD-3 -progressive increase in Creat  Asymptomatic VRE bacteruria  aspiration precautions    Suggest  Continue Zosyn  12/3-->    Complete antibiotics 12/10

## 2019-12-09 NOTE — DIETITIAN INITIAL EVALUATION ADULT. - PERTINENT LABORATORY DATA
12-09 Na137 mmol/L Glu 102 mg/dL<H> K+ 3.5 mmol/L Cr  3.27 mg/dL<H> BUN 35 mg/dL<H> 12-09 Phos 3.8 mg/dL

## 2019-12-10 LAB
ANA TITR SER: NEGATIVE — SIGNIFICANT CHANGE UP
ANION GAP SERPL CALC-SCNC: 14 MMO/L — SIGNIFICANT CHANGE UP (ref 7–14)
BUN SERPL-MCNC: 36 MG/DL — HIGH (ref 7–23)
CALCIUM SERPL-MCNC: 8.4 MG/DL — SIGNIFICANT CHANGE UP (ref 8.4–10.5)
CHLORIDE SERPL-SCNC: 103 MMOL/L — SIGNIFICANT CHANGE UP (ref 98–107)
CO2 SERPL-SCNC: 19 MMOL/L — LOW (ref 22–31)
CREAT SERPL-MCNC: 3.15 MG/DL — HIGH (ref 0.5–1.3)
GAS PNL BLDMV: SIGNIFICANT CHANGE UP
GLUCOSE SERPL-MCNC: 100 MG/DL — HIGH (ref 70–99)
HCT VFR BLD CALC: 29.8 % — LOW (ref 34.5–45)
HGB BLD-MCNC: 10.1 G/DL — LOW (ref 11.5–15.5)
MAGNESIUM SERPL-MCNC: 1.7 MG/DL — SIGNIFICANT CHANGE UP (ref 1.6–2.6)
MCHC RBC-ENTMCNC: 30.4 PG — SIGNIFICANT CHANGE UP (ref 27–34)
MCHC RBC-ENTMCNC: 33.9 % — SIGNIFICANT CHANGE UP (ref 32–36)
MCV RBC AUTO: 89.8 FL — SIGNIFICANT CHANGE UP (ref 80–100)
NRBC # FLD: 0 K/UL — SIGNIFICANT CHANGE UP (ref 0–0)
PLATELET # BLD AUTO: 206 K/UL — SIGNIFICANT CHANGE UP (ref 150–400)
PMV BLD: 10.4 FL — SIGNIFICANT CHANGE UP (ref 7–13)
POTASSIUM SERPL-MCNC: 3.4 MMOL/L — LOW (ref 3.5–5.3)
POTASSIUM SERPL-SCNC: 3.4 MMOL/L — LOW (ref 3.5–5.3)
RBC # BLD: 3.32 M/UL — LOW (ref 3.8–5.2)
RBC # FLD: 14.6 % — HIGH (ref 10.3–14.5)
SODIUM SERPL-SCNC: 136 MMOL/L — SIGNIFICANT CHANGE UP (ref 135–145)
WBC # BLD: 10.78 K/UL — HIGH (ref 3.8–10.5)
WBC # FLD AUTO: 10.78 K/UL — HIGH (ref 3.8–10.5)

## 2019-12-10 RX ORDER — SODIUM CHLORIDE 0.65 %
1 AEROSOL, SPRAY (ML) NASAL
Refills: 0 | Status: DISCONTINUED | OUTPATIENT
Start: 2019-12-10 | End: 2019-12-11

## 2019-12-10 RX ORDER — POTASSIUM CHLORIDE 20 MEQ
20 PACKET (EA) ORAL ONCE
Refills: 0 | Status: COMPLETED | OUTPATIENT
Start: 2019-12-10 | End: 2019-12-10

## 2019-12-10 RX ORDER — IPRATROPIUM/ALBUTEROL SULFATE 18-103MCG
3 AEROSOL WITH ADAPTER (GRAM) INHALATION ONCE
Refills: 0 | Status: COMPLETED | OUTPATIENT
Start: 2019-12-10 | End: 2019-12-10

## 2019-12-10 RX ADMIN — DONEPEZIL HYDROCHLORIDE 10 MILLIGRAM(S): 10 TABLET, FILM COATED ORAL at 21:12

## 2019-12-10 RX ADMIN — Medication 3 MILLILITER(S): at 21:56

## 2019-12-10 RX ADMIN — CARVEDILOL PHOSPHATE 6.25 MILLIGRAM(S): 80 CAPSULE, EXTENDED RELEASE ORAL at 17:37

## 2019-12-10 RX ADMIN — Medication 20 MILLIEQUIVALENT(S): at 17:37

## 2019-12-10 RX ADMIN — ESCITALOPRAM OXALATE 10 MILLIGRAM(S): 10 TABLET, FILM COATED ORAL at 16:07

## 2019-12-10 RX ADMIN — CARVEDILOL PHOSPHATE 6.25 MILLIGRAM(S): 80 CAPSULE, EXTENDED RELEASE ORAL at 05:05

## 2019-12-10 RX ADMIN — Medication 50 MILLIGRAM(S): at 16:07

## 2019-12-10 RX ADMIN — PIPERACILLIN AND TAZOBACTAM 25 GRAM(S): 4; .5 INJECTION, POWDER, LYOPHILIZED, FOR SOLUTION INTRAVENOUS at 08:45

## 2019-12-10 RX ADMIN — PANTOPRAZOLE SODIUM 40 MILLIGRAM(S): 20 TABLET, DELAYED RELEASE ORAL at 17:37

## 2019-12-10 RX ADMIN — Medication 1000 UNIT(S): at 16:07

## 2019-12-10 RX ADMIN — Medication 50 MILLIGRAM(S): at 21:12

## 2019-12-10 RX ADMIN — ATORVASTATIN CALCIUM 20 MILLIGRAM(S): 80 TABLET, FILM COATED ORAL at 21:12

## 2019-12-10 RX ADMIN — PANTOPRAZOLE SODIUM 40 MILLIGRAM(S): 20 TABLET, DELAYED RELEASE ORAL at 05:06

## 2019-12-10 RX ADMIN — Medication 50 MILLIGRAM(S): at 05:05

## 2019-12-10 NOTE — PROGRESS NOTE ADULT - PROBLEM SELECTOR PLAN 2
baseline ~ 1.1-1.3, multiple JONY in the past  scr on admission 1.99   scr peaked 3.29, improving today  CT abd 12/1 showed atropic right kidney, no hydro  JONY possible ATN possible sec to anemia, FEna<1% suggested of prerenal however scr did not improve with IVF, now off ivf  currently on zosyn, AIN less likely as no esopinophila seen  monitor bmp  avoid nephrotoxic agents  renal dose medication

## 2019-12-10 NOTE — PROGRESS NOTE ADULT - ASSESSMENT
Mrs. 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    sepsis with fever with rhonchi and elevated wbc likely secondary to aspiration pneumonia  - ct of chest positive for pna  - elevated procalcitonin  - blood cultures NGTD  - stop zosyn completedday #7  - Complete antibiotics 12/10   - seen by ID  - f/u wbc

## 2019-12-10 NOTE — PROGRESS NOTE ADULT - SUBJECTIVE AND OBJECTIVE BOX
Patient is a 79y old  Female who presents with a chief complaint of UGIB (10 Dec 2019 13:40)      SUBJECTIVE / OVERNIGHT EVENTS: nad.  No chest pain. No shortness of breath. No complaints. No events overnight.     MEDICATIONS  (STANDING):  atorvastatin 20 milliGRAM(s) Oral at bedtime  carvedilol 6.25 milliGRAM(s) Oral every 12 hours  cholecalciferol 1000 Unit(s) Oral daily  donepezil 10 milliGRAM(s) Oral at bedtime  escitalopram 10 milliGRAM(s) Oral daily  hydrALAZINE 50 milliGRAM(s) Oral three times a day  pantoprazole  Injectable 40 milliGRAM(s) IV Push two times a day    MEDICATIONS  (PRN):      Vital Signs Last 24 Hrs  T(C): 36.4 (10 Dec 2019 21:10), Max: 36.4 (10 Dec 2019 05:05)  T(F): 97.6 (10 Dec 2019 21:10), Max: 97.6 (10 Dec 2019 05:05)  HR: 69 (10 Dec 2019 21:10) (69 - 73)  BP: 150/74 (10 Dec 2019 21:10) (119/64 - 160/83)  BP(mean): --  RR: 16 (10 Dec 2019 21:10) (16 - 18)  SpO2: 99% (10 Dec 2019 21:10) (97% - 100%)  CAPILLARY BLOOD GLUCOSE        I&O's Summary    09 Dec 2019 07:01  -  10 Dec 2019 07:00  --------------------------------------------------------  IN: 800 mL / OUT: 400 mL / NET: 400 mL        PHYSICAL EXAM:  GENERAL: NAD, well-developed  HEAD:  Atraumatic, Normocephalic  EYES: EOMI, PERRLA, conjunctiva and sclera clear  NECK: Supple, No JVD  CHEST/LUNG: Clear to auscultation bilaterally; No wheeze  HEART: Regular rate and rhythm; No murmurs, rubs, or gallops  ABDOMEN: Soft, Nontender, Nondistended; Bowel sounds present  EXTREMITIES:  2+ Peripheral Pulses, No clubbing, cyanosis, or edema  PSYCH: AAOx3  NEUROLOGY: non-focal  SKIN: No rashes or lesions    LABS:                        10.1   10.78 )-----------( 206      ( 10 Dec 2019 05:45 )             29.8     12-10    136  |  103  |  36<H>  ----------------------------<  100<H>  3.4<L>   |  19<L>  |  3.15<H>    Ca    8.4      10 Dec 2019 05:45  Phos  3.8     12-09  Mg     1.7     12-10                RADIOLOGY & ADDITIONAL TESTS:    Imaging Personally Reviewed:    Consultant(s) Notes Reviewed:      Care Discussed with Consultants/Other Providers:

## 2019-12-10 NOTE — PROGRESS NOTE ADULT - SUBJECTIVE AND OBJECTIVE BOX
INTERVAL HPI/OVERNIGHT EVENTS:    no hematemesis  no n/v  tolerating breakfast well when seen this am     MEDICATIONS  (STANDING):  atorvastatin 20 milliGRAM(s) Oral at bedtime  carvedilol 6.25 milliGRAM(s) Oral every 12 hours  cholecalciferol 1000 Unit(s) Oral daily  donepezil 10 milliGRAM(s) Oral at bedtime  escitalopram 10 milliGRAM(s) Oral daily  hydrALAZINE 50 milliGRAM(s) Oral three times a day  pantoprazole  Injectable 40 milliGRAM(s) IV Push two times a day  piperacillin/tazobactam IVPB.. 3.375 Gram(s) IV Intermittent every 12 hours  potassium chloride   Powder 20 milliEquivalent(s) Oral once    MEDICATIONS  (PRN):      Allergies    No Known Allergies    Intolerances        Review of Systems:    General:  No wt loss, fevers, chills, night sweats, fatigue   Eyes:  Good vision, no reported pain  ENT:  No sore throat, pain, runny nose, dysphagia  CV:  No pain, palpitations, hypo/hypertension  Resp:  No dyspnea, cough, tachypnea, wheezing  GI:  No pain, No nausea, No vomiting, No diarrhea, No constipation, No weight loss, No fever, No pruritis, No rectal bleeding, No melena, No dysphagia  :  No pain, bleeding, incontinence, nocturia  Muscle:  No pain, weakness  Neuro:  No weakness, tingling, memory problems  Psych:  No fatigue, insomnia, mood problems, depression  Endocrine:  No polyuria, polydypsia, cold/heat intolerance  Heme:  No petechiae, ecchymosis, easy bruisability  Skin:  No rash, tattoos, scars, edema      Vital Signs Last 24 Hrs  T(C): 36.4 (10 Dec 2019 05:05), Max: 36.8 (09 Dec 2019 20:44)  T(F): 97.6 (10 Dec 2019 05:05), Max: 98.2 (09 Dec 2019 20:44)  HR: 71 (10 Dec 2019 05:05) (70 - 79)  BP: 158/77 (10 Dec 2019 05:05) (135/82 - 213/106)  BP(mean): --  RR: 18 (10 Dec 2019 05:05) (18 - 18)  SpO2: 100% (10 Dec 2019 05:05) (97% - 100%)    PHYSICAL EXAM:    Constitutional: NAD  HEENT: EOMI, throat clear  Neck: No LAD, supple  Respiratory: CTA and P  Cardiovascular: S1 and S2, RRR, no M  Gastrointestinal: BS+, soft, NT/ND, neg HSM,  Extremities: No peripheral edema, neg clubbing, cyanosis  Vascular: 2+ peripheral pulses  Neurological: A/O x 3, no focal deficits  Psychiatric: Normal mood, normal affect  Skin: No rashes      LABS:                        10.1   10.78 )-----------( 206      ( 10 Dec 2019 05:45 )             29.8     12-10    136  |  103  |  36<H>  ----------------------------<  100<H>  3.4<L>   |  19<L>  |  3.15<H>    Ca    8.4      10 Dec 2019 05:45  Phos  3.8     12-09  Mg     1.7     12-10            RADIOLOGY & ADDITIONAL TESTS:

## 2019-12-10 NOTE — PROGRESS NOTE ADULT - ASSESSMENT
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    JONY on ckd stage 3  baseline ~ 1.1-1.3, multiple JONY in the past  scr on admission 1.99   scr peaked 3.29, improving today  CT abd 12/1 showed atropic right kidney, no hydro  JONY possible ATN possible sec to anemia, FEna<1% suggested of prerenal however scr did not improve with IVF, now off ivf  currently on zosyn, AIN less likely as no esopinophila seen  monitor bmp  avoid nephrotoxic agents  renal dose medication    Proteinuria/hematuria  + UTI  spot urine/cr >3g  repeat urine p/c   In view of worsen renal function with proteinuria + hematuria will do vasculitis work up   hep b, hep c hiv, rpr neg, c3 c4 wnl, k/L ratio wnl, anca neg  pending sarita, spep, sif    Anemia  GIB  f/u gi  monitor hb  transfuse per team    Hypocalcemia  sec to low alb   vit d 25 23.4. on oral supplement  monitor    Hypokalemia  likely sec to hydration  supplement as needed   monitor    HTN  uncontrolled  hydralazine increased  monitor

## 2019-12-10 NOTE — PROGRESS NOTE ADULT - SUBJECTIVE AND OBJECTIVE BOX
[facility] [facility]   MD JACOBY YAN, DO RAO MORALEZ, NAHID BUCHANAN    TEL:  OFFICE: [phone]  DR MCCLOUD CELL: [phone]  DR. FRANCIS CELL: [phone]  DR. MORALEZ CELL: [phone]  HELLEN SOLIS CELL: [phone]        Patient is a 79y old  Female who presents with a chief complaint of UGIB (09 Dec 2019 14:57)      Patient seen and examined at bedside. No chest pain/sob    VITALS:  T(F): 97.6 (12-10-19 @ 05:05), Max: 98.2 (12-09-19 @ 20:44)  HR: 71 (12-10-19 @ 05:05)  BP: 158/77 (12-10-19 @ 05:05)  RR: 18 (12-10-19 @ 05:05)  SpO2: 100% (12-10-19 @ 05:05)  Wt(kg): --    12-09 @ 07:01  -  12-10 @ 07:00  --------------------------------------------------------  IN: 800 mL / OUT: 400 mL / NET: 400 mL          PHYSICAL EXAM:  Constitutional: NAD  Neck: No JVD  Respiratory: rhonchi  Cardiovascular: S1, S2, RRR  Gastrointestinal: BS+, soft, NT/ND  Extremities: No peripheral edema    Hospital Medications:   MEDICATIONS  (STANDING):  atorvastatin 20 milliGRAM(s) Oral at bedtime  carvedilol 6.25 milliGRAM(s) Oral every 12 hours  cholecalciferol 1000 Unit(s) Oral daily  donepezil 10 milliGRAM(s) Oral at bedtime  escitalopram 10 milliGRAM(s) Oral daily  hydrALAZINE 50 milliGRAM(s) Oral three times a day  pantoprazole  Injectable 40 milliGRAM(s) IV Push two times a day  piperacillin/tazobactam IVPB.. 3.375 Gram(s) IV Intermittent every 12 hours  potassium chloride   Powder 20 milliEquivalent(s) Oral once      LABS:  12-10    136  |  103  |  36<H>  ----------------------------<  100<H>  3.4<L>   |  19<L>  |  3.15<H>    Ca    8.4      10 Dec 2019 05:45  Phos  3.8     12-09  Mg     1.7     12-10      Creatinine Trend: 3.15 <--, 3.27 <--, 3.29 <--, 3.28 <--, 3.11 <--, 2.74 <--, 2.48 <--                                10.1   10.78 )-----------( 206      ( 10 Dec 2019 05:45 )             29.8     Urine Studies:  Urinalysis - [12-01-19 @ 18:26]      Color LIGHT YELLOW / Appearance CLEAR / SG 1.018 / pH 6.5      Gluc 50 / Ketone NEGATIVE  / Bili NEGATIVE / Urobili NORMAL       Blood MODERATE / Protein 600 / Leuk Est NEGATIVE / Nitrite NEGATIVE      RBC 5-10 / WBC 6-11 / Hyaline 1+ / Gran  / Sq Epi MODERATE / Non Sq Epi  / Bacteria FEW    Urine Creatinine 219.30      [12-06-19 @ 22:22]  Urine Protein > 600      [12-06-19 @ 22:22]  Urine Sodium < 20      [12-06-19 @ 22:22]    Vitamin D (25OH) 23.4      [12-07-19 @ 06:38]    HBsAg NEGATIVE      [12-06-19 @ 10:04]  HCV 0.07, Nonreactive Hepatitis C AB  S/CO Ratio                        Interpretation  < 1.00                                   Non-Reactive  1.00 - 4.99                         Weakly-Reactive  >= 5.00                                Reactive  Non-Reactive: Aperson with a non-reactive HCV antibody  result is considered uninfected.  No further action is  needed unless recent infection is suspected.  In these  cases, consider repeat testing later to detect  seroconversion..  Weakly-Reactive: HCV antibody test is abnormal, HCV RNA  Qualitative test will follow.  Reactive: HCV antibody test is abnormal, HCV RNA  Qualitative test will follow.  Note: HCV antibody testing is performed on the [employer]   system.      [12-06-19 @ 10:04]    C3 Complement 102.8      [12-06-19 @ 10:04]  C4 Complement 29.3      [12-06-19 @ 10:04]  ANCA: cANCA Negative, pANCA Negative, atypical ANCA --      [12-06-19 @ 10:04]  Syphilis Screen (Treponema Pallidum Ab) Negative      [12-06-19 @ 10:04]  Free Light Chains: kappa 10.54, lambda 8.81, ratio = 1.20      [12-06 @ 10:04]    RADIOLOGY & ADDITIONAL STUDIES:

## 2019-12-11 ENCOUNTER — TRANSCRIPTION ENCOUNTER (OUTPATIENT)
Age: 79
End: 2019-12-11

## 2019-12-11 VITALS — HEART RATE: 79 BPM | SYSTOLIC BLOOD PRESSURE: 120 MMHG | DIASTOLIC BLOOD PRESSURE: 68 MMHG

## 2019-12-11 LAB
ANION GAP SERPL CALC-SCNC: 14 MMO/L — SIGNIFICANT CHANGE UP (ref 7–14)
BASOPHILS # BLD AUTO: 0.02 K/UL — SIGNIFICANT CHANGE UP (ref 0–0.2)
BASOPHILS NFR BLD AUTO: 0.2 % — SIGNIFICANT CHANGE UP (ref 0–2)
BUN SERPL-MCNC: 34 MG/DL — HIGH (ref 7–23)
CALCIUM SERPL-MCNC: 8.2 MG/DL — LOW (ref 8.4–10.5)
CHLORIDE SERPL-SCNC: 104 MMOL/L — SIGNIFICANT CHANGE UP (ref 98–107)
CO2 SERPL-SCNC: 18 MMOL/L — LOW (ref 22–31)
CREAT SERPL-MCNC: 3.12 MG/DL — HIGH (ref 0.5–1.3)
EOSINOPHIL # BLD AUTO: 0.09 K/UL — SIGNIFICANT CHANGE UP (ref 0–0.5)
EOSINOPHIL NFR BLD AUTO: 1 % — SIGNIFICANT CHANGE UP (ref 0–6)
GLUCOSE SERPL-MCNC: 97 MG/DL — SIGNIFICANT CHANGE UP (ref 70–99)
HCT VFR BLD CALC: 29.2 % — LOW (ref 34.5–45)
HGB BLD-MCNC: 9.6 G/DL — LOW (ref 11.5–15.5)
IMM GRANULOCYTES NFR BLD AUTO: 0.4 % — SIGNIFICANT CHANGE UP (ref 0–1.5)
LYMPHOCYTES # BLD AUTO: 0.94 K/UL — LOW (ref 1–3.3)
LYMPHOCYTES # BLD AUTO: 10 % — LOW (ref 13–44)
MAGNESIUM SERPL-MCNC: 1.6 MG/DL — SIGNIFICANT CHANGE UP (ref 1.6–2.6)
MCHC RBC-ENTMCNC: 30.1 PG — SIGNIFICANT CHANGE UP (ref 27–34)
MCHC RBC-ENTMCNC: 32.9 % — SIGNIFICANT CHANGE UP (ref 32–36)
MCV RBC AUTO: 91.5 FL — SIGNIFICANT CHANGE UP (ref 80–100)
MONOCYTES # BLD AUTO: 0.5 K/UL — SIGNIFICANT CHANGE UP (ref 0–0.9)
MONOCYTES NFR BLD AUTO: 5.3 % — SIGNIFICANT CHANGE UP (ref 2–14)
NEUTROPHILS # BLD AUTO: 7.79 K/UL — HIGH (ref 1.8–7.4)
NEUTROPHILS NFR BLD AUTO: 83.1 % — HIGH (ref 43–77)
NRBC # FLD: 0 K/UL — SIGNIFICANT CHANGE UP (ref 0–0)
PHOSPHATE SERPL-MCNC: 4.1 MG/DL — SIGNIFICANT CHANGE UP (ref 2.5–4.5)
PLATELET # BLD AUTO: 198 K/UL — SIGNIFICANT CHANGE UP (ref 150–400)
PMV BLD: 10.4 FL — SIGNIFICANT CHANGE UP (ref 7–13)
POTASSIUM SERPL-MCNC: 3.8 MMOL/L — SIGNIFICANT CHANGE UP (ref 3.5–5.3)
POTASSIUM SERPL-SCNC: 3.8 MMOL/L — SIGNIFICANT CHANGE UP (ref 3.5–5.3)
RBC # BLD: 3.19 M/UL — LOW (ref 3.8–5.2)
RBC # FLD: 15 % — HIGH (ref 10.3–14.5)
SODIUM SERPL-SCNC: 136 MMOL/L — SIGNIFICANT CHANGE UP (ref 135–145)
WBC # BLD: 9.38 K/UL — SIGNIFICANT CHANGE UP (ref 3.8–10.5)
WBC # FLD AUTO: 9.38 K/UL — SIGNIFICANT CHANGE UP (ref 3.8–10.5)

## 2019-12-11 PROCEDURE — 99232 SBSQ HOSP IP/OBS MODERATE 35: CPT

## 2019-12-11 RX ORDER — HYDRALAZINE HCL 50 MG
1 TABLET ORAL
Qty: 0 | Refills: 0 | DISCHARGE
Start: 2019-12-11

## 2019-12-11 RX ORDER — ESCITALOPRAM OXALATE 10 MG/1
1 TABLET, FILM COATED ORAL
Qty: 0 | Refills: 0 | DISCHARGE
Start: 2019-12-11

## 2019-12-11 RX ORDER — SODIUM CHLORIDE 0.65 %
0 AEROSOL, SPRAY (ML) NASAL
Qty: 0 | Refills: 0 | DISCHARGE
Start: 2019-12-11

## 2019-12-11 RX ORDER — CARVEDILOL PHOSPHATE 80 MG/1
6.25 CAPSULE, EXTENDED RELEASE ORAL ONCE
Refills: 0 | Status: COMPLETED | OUTPATIENT
Start: 2019-12-11 | End: 2019-12-11

## 2019-12-11 RX ORDER — ESCITALOPRAM OXALATE 10 MG/1
1 TABLET, FILM COATED ORAL
Qty: 0 | Refills: 0 | DISCHARGE

## 2019-12-11 RX ORDER — ATORVASTATIN CALCIUM 80 MG/1
1 TABLET, FILM COATED ORAL
Qty: 0 | Refills: 0 | DISCHARGE
Start: 2019-12-11

## 2019-12-11 RX ORDER — ATORVASTATIN CALCIUM 80 MG/1
1 TABLET, FILM COATED ORAL
Qty: 0 | Refills: 0 | DISCHARGE

## 2019-12-11 RX ORDER — MAGNESIUM OXIDE 400 MG ORAL TABLET 241.3 MG
1 TABLET ORAL
Qty: 0 | Refills: 0 | DISCHARGE
Start: 2019-12-11

## 2019-12-11 RX ORDER — POTASSIUM CHLORIDE 20 MEQ
40 PACKET (EA) ORAL EVERY 4 HOURS
Refills: 0 | Status: DISCONTINUED | OUTPATIENT
Start: 2019-12-11 | End: 2019-12-11

## 2019-12-11 RX ORDER — CARVEDILOL PHOSPHATE 80 MG/1
12.5 CAPSULE, EXTENDED RELEASE ORAL EVERY 12 HOURS
Refills: 0 | Status: DISCONTINUED | OUTPATIENT
Start: 2019-12-11 | End: 2019-12-11

## 2019-12-11 RX ORDER — CHOLECALCIFEROL (VITAMIN D3) 125 MCG
1000 CAPSULE ORAL
Qty: 0 | Refills: 0 | DISCHARGE
Start: 2019-12-11

## 2019-12-11 RX ORDER — CARVEDILOL PHOSPHATE 80 MG/1
1 CAPSULE, EXTENDED RELEASE ORAL
Qty: 0 | Refills: 0 | DISCHARGE
Start: 2019-12-11

## 2019-12-11 RX ORDER — MAGNESIUM OXIDE 400 MG ORAL TABLET 241.3 MG
400 TABLET ORAL
Refills: 0 | Status: DISCONTINUED | OUTPATIENT
Start: 2019-12-11 | End: 2019-12-11

## 2019-12-11 RX ORDER — DONEPEZIL HYDROCHLORIDE 10 MG/1
1 TABLET, FILM COATED ORAL
Qty: 0 | Refills: 0 | DISCHARGE
Start: 2019-12-11

## 2019-12-11 RX ADMIN — MAGNESIUM OXIDE 400 MG ORAL TABLET 400 MILLIGRAM(S): 241.3 TABLET ORAL at 13:51

## 2019-12-11 RX ADMIN — Medication 50 MILLIGRAM(S): at 05:50

## 2019-12-11 RX ADMIN — Medication 50 MILLIGRAM(S): at 13:53

## 2019-12-11 RX ADMIN — Medication 1 SPRAY(S): at 13:51

## 2019-12-11 RX ADMIN — CARVEDILOL PHOSPHATE 6.25 MILLIGRAM(S): 80 CAPSULE, EXTENDED RELEASE ORAL at 05:50

## 2019-12-11 RX ADMIN — ESCITALOPRAM OXALATE 10 MILLIGRAM(S): 10 TABLET, FILM COATED ORAL at 13:51

## 2019-12-11 RX ADMIN — Medication 1 SPRAY(S): at 16:39

## 2019-12-11 RX ADMIN — CARVEDILOL PHOSPHATE 6.25 MILLIGRAM(S): 80 CAPSULE, EXTENDED RELEASE ORAL at 15:06

## 2019-12-11 RX ADMIN — Medication 1 SPRAY(S): at 09:17

## 2019-12-11 RX ADMIN — PANTOPRAZOLE SODIUM 40 MILLIGRAM(S): 20 TABLET, DELAYED RELEASE ORAL at 05:50

## 2019-12-11 RX ADMIN — Medication 1000 UNIT(S): at 13:51

## 2019-12-11 NOTE — PROGRESS NOTE ADULT - SUBJECTIVE AND OBJECTIVE BOX
Follow Up:  pneumonia    Interval History/ROS: better, denies cough    Allergies  No Known Allergies    ANTIMICROBIALS:      OTHER MEDS:  MEDICATIONS  (STANDING):  atorvastatin 20 at bedtime  carvedilol 12.5 every 12 hours  donepezil 10 at bedtime  escitalopram 10 daily  hydrALAZINE 50 three times a day  pantoprazole  Injectable 40 two times a day      Vital Signs Last 24 Hrs  T(C): 36.8 (11 Dec 2019 13:50), Max: 37.1 (11 Dec 2019 05:49)  T(F): 98.3 (11 Dec 2019 13:50), Max: 98.7 (11 Dec 2019 05:49)  HR: 77 (11 Dec 2019 13:50) (69 - 82)  BP: 175/79 (11 Dec 2019 13:50) (119/64 - 175/79)  BP(mean): --  RR: 17 (11 Dec 2019 13:50) (16 - 18)  SpO2: 98% (11 Dec 2019 05:49) (95% - 99%)    PHYSICAL EXAM:  General: WN/WD NAD, Non-toxic  Neurology: flat Nasolabial fold, left hemiparesis  Respiratory: Clear to auscultation bilaterally  CV: RRR, S1S2, no murmurs, rubs or gallops  Abdominal: Soft, Non-tender, non-distended  Extremities: No edema  Line Sites: Clear  Skin: No rash                        9.6    9.38  )-----------( 198      ( 11 Dec 2019 07:00 )             29.2   WBC Count: 9.38 (12-11 @ 07:00)  WBC Count: 10.78 (12-10 @ 05:45)  WBC Count: 10.85 (12-09 @ 05:43)  WBC Count: 11.63 (12-08 @ 05:15)  WBC Count: 11.18 (12-07 @ 06:38)       12-11    136  |  104  |  34<H>  ----------------------------<  97  3.8   |  18<L>  |  3.12<H>    Ca    8.2<L>      11 Dec 2019 07:00  Phos  4.1     12-11  Mg     1.6     12-11    MICROBIOLOGY:  BLOOD PERIPHERAL  12-03-19 --  --  --      URINE MIDSTREAM  12-02-19 --  --  Enterococcus faecium VRE    RADIOLOGY:  < from: CT Chest No Cont (12.04.19 @ 19:40) >  Retained secretions within the bilateral main bronchi and obliteration of   bilateral lower lobe bronchi. Atelectasis/consolidation of basilar left   lower lobe.    Bilateral nodular opacities likely representing multifocal infection.    Moderate left and small right pleural effusions.    The esophagus is dilated.    < end of copied text >      Martin Downing MD; Division of Infectious Disease; Pager: [phone]; nights and weekends: [phone]

## 2019-12-11 NOTE — DISCHARGE NOTE PROVIDER - PROVIDER TOKENS
PROVIDER:[TOKEN:[[id number]]] PROVIDER:[TOKEN:[[id number]]],PROVIDER:[TOKEN:[[id number]],FOLLOWUP:[1 week]]

## 2019-12-11 NOTE — PROGRESS NOTE ADULT - PROBLEM SELECTOR PLAN 2
baseline ~ 1.1-1.3, multiple JONY in the past  scr on admission 1.99   scr peaked 3.29, improving today  CT abd 12/1 showed atropic right kidney, no hydro  JONY possible ATN possible sec to anemia, FEna<1% suggested of prerenal however scr did not improve with IVF, now off ivf  currently on zosyn, AIN less likely as no esopinophila seen  monitor bmp  avoid nephrotoxic agents  renal dose medication  cleared for discharge by nephrology

## 2019-12-11 NOTE — DISCHARGE NOTE NURSING/CASE MANAGEMENT/SOCIAL WORK - NSDCPEPTSTRK_GEN_ALL_CORE
Stroke warning signs and symptoms/Signs and symptoms of stroke/Need for follow up after discharge/Call 911 for stroke/Prescribed medications/Risk factors for stroke/Stroke support groups for patients, families, and friends/Stroke education booklet

## 2019-12-11 NOTE — PROVIDER CONTACT NOTE (OTHER) - ASSESSMENT
Patient bp 180. Nonverbal indicators of pain chest pain, heart palpitations or distress absent. Patients states she has no pain Patient bp 182/80. Nonverbal indicators of pain chest pain, heart palpitations or distress absent. Patients states she has no pain

## 2019-12-11 NOTE — PROGRESS NOTE ADULT - SUBJECTIVE AND OBJECTIVE BOX
INTERVAL HPI/OVERNIGHT EVENTS:    no hematemesis  no n/v      MEDICATIONS  (STANDING):  atorvastatin 20 milliGRAM(s) Oral at bedtime  carvedilol 6.25 milliGRAM(s) Oral every 12 hours  cholecalciferol 1000 Unit(s) Oral daily  donepezil 10 milliGRAM(s) Oral at bedtime  escitalopram 10 milliGRAM(s) Oral daily  hydrALAZINE 50 milliGRAM(s) Oral three times a day  pantoprazole  Injectable 40 milliGRAM(s) IV Push two times a day  piperacillin/tazobactam IVPB.. 3.375 Gram(s) IV Intermittent every 12 hours  potassium chloride   Powder 20 milliEquivalent(s) Oral once    MEDICATIONS  (PRN):      Allergies    No Known Allergies    Intolerances        Review of Systems:    General:  No wt loss, fevers, chills, night sweats, fatigue   Eyes:  Good vision, no reported pain  ENT:  No sore throat, pain, runny nose, dysphagia  CV:  No pain, palpitations, hypo/hypertension  Resp:  No dyspnea, cough, tachypnea, wheezing  GI:  No pain, No nausea, No vomiting, No diarrhea, No constipation, No weight loss, No fever, No pruritis, No rectal bleeding, No melena, No dysphagia  :  No pain, bleeding, incontinence, nocturia  Muscle:  No pain, weakness  Neuro:  No weakness, tingling, memory problems  Psych:  No fatigue, insomnia, mood problems, depression  Endocrine:  No polyuria, polydypsia, cold/heat intolerance  Heme:  No petechiae, ecchymosis, easy bruisability  Skin:  No rash, tattoos, scars, edema      Vital Signs Last 24 Hrs  T(C): 36.4 (10 Dec 2019 05:05), Max: 36.8 (09 Dec 2019 20:44)  T(F): 97.6 (10 Dec 2019 05:05), Max: 98.2 (09 Dec 2019 20:44)  HR: 71 (10 Dec 2019 05:05) (70 - 79)  BP: 158/77 (10 Dec 2019 05:05) (135/82 - 213/106)  BP(mean): --  RR: 18 (10 Dec 2019 05:05) (18 - 18)  SpO2: 100% (10 Dec 2019 05:05) (97% - 100%)    PHYSICAL EXAM:    Constitutional: NAD  HEENT: EOMI, throat clear  Neck: No LAD, supple  Respiratory: CTA and P  Cardiovascular: S1 and S2, RRR, no M  Gastrointestinal: BS+, soft, NT/ND, neg HSM,  Extremities: No peripheral edema, neg clubbing, cyanosis  Vascular: 2+ peripheral pulses  Neurological: A/O x 3, no focal deficits  Psychiatric: Normal mood, normal affect  Skin: No rashes      LABS:                        10.1   10.78 )-----------( 206      ( 10 Dec 2019 05:45 )             29.8     12-10    136  |  103  |  36<H>  ----------------------------<  100<H>  3.4<L>   |  19<L>  |  3.15<H>    Ca    8.4      10 Dec 2019 05:45  Phos  3.8     12-09  Mg     1.7     12-10            RADIOLOGY & ADDITIONAL TESTS:

## 2019-12-11 NOTE — DISCHARGE NOTE PROVIDER - CARE PROVIDER_API CALL
Rao Borges (DO)  Gastroenterology; Internal Medicine  [address] [place] [address]  Phone: [phone]  Fax: [phone]  Follow Up Time: Rao Borges (DO)  Gastroenterology; Internal Medicine  [address] [place] [address]  Phone: [phone]  Fax: [phone]  Follow Up Time:     Ever Borges (DO)  Nephrology  [address] [place] [address]  Phone: [phone]  Fax: [phone]  Follow Up Time: 1 week

## 2019-12-11 NOTE — PROGRESS NOTE ADULT - PROBLEM SELECTOR PLAN 1
now resolved   s/p 1 unit of PRBC 12/5 with adequate response   No evidence of active bleeding   continue PPI BID   Will continue to hold on EGD (not an anseth. candidate at this time)  Monitor CBC daily   Avoid NSAIDs   diet as tolerated  Cleared for discharge with ASA if medically needed. Would suggest concomitant PPI therapy .

## 2019-12-11 NOTE — PROGRESS NOTE ADULT - SUBJECTIVE AND OBJECTIVE BOX
[facility] [place] [facility]PHROLOGY PRACTICE   MD JACOBY YAN, DO RAO MORALEZ, NAHID BUCHANAN    TEL:  OFFICE: [phone]  DR MCCLOUD CELL: [phone]  DR. FRANCIS CELL: [phone]  DR. MORALEZ CELL: [phone]  HELLEN SOLIS CELL: [phone]        Patient is a 79y old  Female who presents with a chief complaint of UGIB (10 Dec 2019 21:18)      Patient seen and examined at bedside. No chest pain/sob    VITALS:  T(F): 98.7 (12-11-19 @ 05:49), Max: 98.7 (12-11-19 @ 05:49)  HR: 82 (12-11-19 @ 05:49)  BP: 150/69 (12-11-19 @ 05:49)  RR: 18 (12-11-19 @ 05:49)  SpO2: 98% (12-11-19 @ 05:49)  Wt(kg): --    12-10 @ 07:01  -  12-11 @ 07:00  --------------------------------------------------------  IN: 200 mL / OUT: 600 mL / NET: -400 mL          PHYSICAL EXAM:  Constitutional: NAD  Neck: No JVD  Respiratory: CTAB, no wheezes, rales or rhonchi  Cardiovascular: S1, S2, RRR  Gastrointestinal: BS+, soft, NT/ND  Extremities: No peripheral edema    Hospital Medications:   MEDICATIONS  (STANDING):  atorvastatin 20 milliGRAM(s) Oral at bedtime  carvedilol 6.25 milliGRAM(s) Oral every 12 hours  cholecalciferol 1000 Unit(s) Oral daily  donepezil 10 milliGRAM(s) Oral at bedtime  escitalopram 10 milliGRAM(s) Oral daily  hydrALAZINE 50 milliGRAM(s) Oral three times a day  magnesium oxide 400 milliGRAM(s) Oral three times a day with meals  pantoprazole  Injectable 40 milliGRAM(s) IV Push two times a day  potassium chloride    Tablet ER 40 milliEquivalent(s) Oral every 4 hours  sodium chloride 0.65% Nasal 1 Spray(s) Both Nostrils five times a day      LABS:  12-11    136  |  104  |  34<H>  ----------------------------<  97  3.8   |  18<L>  |  3.12<H>    Ca    8.2<L>      11 Dec 2019 07:00  Phos  4.1     12-11  Mg     1.6     12-11      Creatinine Trend: 3.12 <--, 3.15 <--, 3.27 <--, 3.29 <--, 3.28 <--, 3.11 <--, 2.74 <--    Phosphorus Level, Serum: 4.1 mg/dL (12-11 @ 07:00)                              9.6    9.38  )-----------( 198      ( 11 Dec 2019 07:00 )             29.2     Urine Studies:  Urinalysis - [12-01-19 @ 18:26]      Color LIGHT YELLOW / Appearance CLEAR / SG 1.018 / pH 6.5      Gluc 50 / Ketone NEGATIVE  / Bili NEGATIVE / Urobili NORMAL       Blood MODERATE / Protein 600 / Leuk Est NEGATIVE / Nitrite NEGATIVE      RBC 5-10 / WBC 6-11 / Hyaline 1+ / Gran  / Sq Epi MODERATE / Non Sq Epi  / Bacteria FEW    Urine Creatinine 219.30      [12-06-19 @ 22:22]  Urine Protein > 600      [12-06-19 @ 22:22]  Urine Sodium < 20      [12-06-19 @ 22:22]    Vitamin D (25OH) 23.4      [12-07-19 @ 06:38]    HBsAg NEGATIVE      [12-06-19 @ 10:04]  HCV 0.07, Nonreactive Hepatitis C AB  S/CO Ratio                        Interpretation  < 1.00                                   Non-Reactive  1.00 - 4.99                         Weakly-Reactive  >= 5.00                                Reactive  Non-Reactive: Aperson with a non-reactive HCV antibody  result is considered uninfected.  No further action is  needed unless recent infection is suspected.  In these  cases, consider repeat testing later to detect  seroconversion..  Weakly-Reactive: HCV antibody test is abnormal, HCV RNA  Qualitative test will follow.  Reactive: HCV antibody test is abnormal, HCV RNA  Qualitative test will follow.  Note: HCV antibody testing is performed on the Abbott   system.      [12-06-19 @ 10:04]    PAULY: titer Negative, pattern --      [12-06-19 @ 10:04]  C3 Complement 102.8      [12-06-19 @ 10:04]  C4 Complement 29.3      [12-06-19 @ 10:04]  ANCA: cANCA Negative, pANCA Negative, atypical ANCA --      [12-06-19 @ 10:04]  Syphilis Screen (Treponema Pallidum Ab) Negative      [12-06-19 @ 10:04]  Free Light Chains: kappa 10.54, lambda 8.81, ratio = 1.20      [12-06 @ 10:04]    RADIOLOGY & ADDITIONAL STUDIES:

## 2019-12-11 NOTE — DISCHARGE NOTE PROVIDER - NSDCMRMEDTOKEN_GEN_ALL_CORE_FT
aspirin 81 mg oral tablet, chewable: 1 tab(s) orally once a day  atorvastatin 20 mg oral tablet: 1 tab(s) orally once a day (at bedtime)  carvedilol 12.5 mg oral tablet: 1 tab(s) orally every 12 hours  cholecalciferol oral tablet: 1000 unit(s) orally once a day  donepezil 10 mg oral tablet: 1 tab(s) orally once a day (at bedtime)  escitalopram 10 mg oral tablet: 1 tab(s) orally once a day  ferrous sulfate 325 mg (65 mg elemental iron) oral tablet: 1 tab(s) orally 3 times a day  hydrALAZINE 50 mg oral tablet: 1 tab(s) orally 3 times a day  magnesium oxide 400 mg (241.3 mg elemental magnesium) oral tablet: 1 tab(s) orally 3 times a day (with meals) for 2 days   Protonix 40 mg oral delayed release tablet: 1 tab(s) orally 2 times a day  sodium chloride 0.65% nasal spray: nasal 4 times a day

## 2019-12-11 NOTE — PROGRESS NOTE ADULT - ASSESSMENT
Mrs. 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    sepsis with fever with rhonchi and elevated wbc likely secondary to aspiration pneumonia  - ct of chest positive for pna  - elevated procalcitonin  - blood cultures NGTD  -  zosyn completed day #7  - Complete antibiotics 12/10   - seen by ID  - f/u wbc

## 2019-12-11 NOTE — CHART NOTE - NSCHARTNOTEFT_GEN_A_CORE
Case d/w medical attending - Patient is stable for transfer to rehab  Cleared by nephrology - for follow up as outpt   d/w GI attending - will continue protonix BID for 1 month & then daily , ok to resume aspirin on discharge

## 2019-12-11 NOTE — PROGRESS NOTE ADULT - ASSESSMENT
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  She had one episode of fever with leukocytosis  Urine grew VRE on 12/2 but no flank tenderness or pyuria.    Pneumonia - clinically improved  UGI bleed - not actively bleeding  leukocytosis resolved   CKD-3 -  Asymptomatic VRE bacteruria    Completed Zosyn  12/3--> 12/10  overall clinical improvement     Suggest  Influenza/Pneumococcal vaccinations if not up to date  no objection to discharge if otherwise stable  Signing off case - Please reconsult ID if needed

## 2019-12-11 NOTE — PROVIDER CONTACT NOTE (OTHER) - ACTION/TREATMENT ORDERED:
Continue to monitor
Provider aware, will put in new orders. Will continue to monitor.
Administer coreg as ordered and reassess BP in 1 hr.
Administer medication and monitor.
Will continue to monitor.
pending coreg. will continue to monitor.
will continue to monitor
will continue to monitor
will recheck BP in 1 hour.

## 2019-12-11 NOTE — PROGRESS NOTE ADULT - SUBJECTIVE AND OBJECTIVE BOX
Patient is a 79y old  Female who presents with a chief complaint of UGIB (11 Dec 2019 12:48)      SUBJECTIVE / OVERNIGHT EVENTS:  No chest pain. No shortness of breath. No complaints. No events overnight.     MEDICATIONS  (STANDING):  atorvastatin 20 milliGRAM(s) Oral at bedtime  carvedilol 12.5 milliGRAM(s) Oral every 12 hours  cholecalciferol 1000 Unit(s) Oral daily  donepezil 10 milliGRAM(s) Oral at bedtime  escitalopram 10 milliGRAM(s) Oral daily  hydrALAZINE 50 milliGRAM(s) Oral three times a day  magnesium oxide 400 milliGRAM(s) Oral three times a day with meals  pantoprazole  Injectable 40 milliGRAM(s) IV Push two times a day  sodium chloride 0.65% Nasal 1 Spray(s) Both Nostrils five times a day    MEDICATIONS  (PRN):      Vital Signs Last 24 Hrs  T(C): 36.8 (11 Dec 2019 13:50), Max: 37.1 (11 Dec 2019 05:49)  T(F): 98.3 (11 Dec 2019 13:50), Max: 98.7 (11 Dec 2019 05:49)  HR: 77 (11 Dec 2019 13:50) (69 - 82)  BP: 175/79 (11 Dec 2019 13:50) (119/64 - 175/79)  BP(mean): --  RR: 17 (11 Dec 2019 13:50) (16 - 18)  SpO2: 98% (11 Dec 2019 05:49) (95% - 99%)  CAPILLARY BLOOD GLUCOSE        I&O's Summary    10 Dec 2019 07:01  -  11 Dec 2019 07:00  --------------------------------------------------------  IN: 200 mL / OUT: 600 mL / NET: -400 mL        PHYSICAL EXAM:  GENERAL: NAD, well-developed  HEAD:  Atraumatic, Normocephalic  EYES: EOMI, PERRLA, conjunctiva and sclera clear  NECK: Supple, No JVD  CHEST/LUNG: Clear to auscultation bilaterally; No wheeze  HEART: Regular rate and rhythm; No murmurs, rubs, or gallops  ABDOMEN: Soft, Nontender, Nondistended; Bowel sounds present  EXTREMITIES:  2+ Peripheral Pulses, No clubbing, cyanosis, or edema    NEUROLOGY: left sided weakness  SKIN: No rashes or lesions    LABS:                        9.6    9.38  )-----------( 198      ( 11 Dec 2019 07:00 )             29.2     12-11    136  |  104  |  34<H>  ----------------------------<  97  3.8   |  18<L>  |  3.12<H>    Ca    8.2<L>      11 Dec 2019 07:00  Phos  4.1     12-11  Mg     1.6     12-11                RADIOLOGY & ADDITIONAL TESTS:    Imaging Personally Reviewed:    Consultant(s) Notes Reviewed:      Care Discussed with Consultants/Other Providers:

## 2019-12-11 NOTE — DISCHARGE NOTE PROVIDER - NSDCCPCAREPLAN_GEN_ALL_CORE_FT
PRINCIPAL DISCHARGE DIAGNOSIS  Diagnosis: GI bleed  Assessment and Plan of Treatment: GI following - high risk for EGD . Hemoglobin stable at this time   - Continue protonix 40mg Po BID x1 month then 40mg PO daily   - may resume aspirin , monitor CBC   Follow up with GI within 2 weeks of discharge - please call for an appointment      SECONDARY DISCHARGE DIAGNOSES  Diagnosis: Hypokalemia  Assessment and Plan of Treatment: supplemented    Diagnosis: Acute kidney injury  Assessment and Plan of Treatment: vasculitis workup ordered  monitor serum creatinine levels   Follow up with nephrology as an outpt

## 2019-12-11 NOTE — PROGRESS NOTE ADULT - PROBLEM SELECTOR PLAN 1
UGIB w/ coffee ground emesis and melena.   - Patient hemodynamically stable for now.  - cont iv ppi  - soft diet  - GI consult  - no plan for EGD as pt is poor candidate for anesthesia and bleeding has stopped

## 2019-12-11 NOTE — DISCHARGE NOTE NURSING/CASE MANAGEMENT/SOCIAL WORK - PATIENT PORTAL LINK FT
You can access the FollowMyHealth Patient Portal offered by [facility] by registering at the following website: [email or web address]. By joining [facility]’s FollowMyHealth portal, you will also be able to view your health information using other applications (apps) compatible with our system.

## 2019-12-11 NOTE — DISCHARGE NOTE PROVIDER - HOSPITAL COURSE
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        JONY on ckd stage 3    baseline ~ 1.1-1.3, multiple JONY in the past    scr on admission 1.99     scr peaked 3.29, improving today    CT abd 12/1 showed atropic right kidney, no hydro    JONY possible ATN possible sec to anemia, FEna<1% suggested of prerenal however scr did not improve with IVF, now off ivf    currently on zosyn, AIN less likely as no esopinophila seen    monitor bmp    avoid nephrotoxic agents    renal dose medication        Pneumonia-     - likely due to aspiration     - completed course of Zosyn on 12/11     Blood cultures negative     Infectious disease consulted during admission . Patient now clinically improved    leukocytosis resolved    Acute on CKD-3 -progressive increase in Creat    Asymptomatic VRE bacteruria,     Maintainaspiration precautions        Proteinuria/hematuria    + UTI    spot urine/cr >3g    repeat urine p/c     In view of worsen renal function with proteinuria + hematuria will do vasculitis work up     hep b, hep c hiv, rpr neg, c3 c4 wnl, k/L ratio wnl, anca neg, sif neg    pending sarita, spep        Anemia likely due to GIB    Gastroenterology consulted - Dr. Woo-     coffee ground emesis.- now resolved     s/p 1 unit of PRBC 12/5 with adequate response, No evidence of active bleeding     continue PPI BID     Will continue to hold on EGD (not an anseth. candidate at this time)    Monitor CBC , Avoid NSAIDs, diet as tolerated.             Hypocalcemia    sec to low alb,  vit d 25 23.4. on oral supplement monitor        Hypokalemia- likely sec to hydration    supplemented, level ok today    monitor basic metabolic panel as an outpt        HTN    suboptimal    increase coreg 12.5 bid    monitor

## 2019-12-11 NOTE — PROVIDER CONTACT NOTE (OTHER) - ASSESSMENT
Patient bp 175/79. Nonverbal indicators of pain chest pain, heart palpitations or distress absent. Patients states she has no pain

## 2019-12-11 NOTE — DISCHARGE NOTE NURSING/CASE MANAGEMENT/SOCIAL WORK - NSDPFAC_GEN_ALL_CORE
[facility]: [address] [place] [address] (p: [phone])

## 2019-12-11 NOTE — PROGRESS NOTE ADULT - REASON FOR ADMISSION
UGIB

## 2019-12-12 LAB — GAS PNL BLDMV: SIGNIFICANT CHANGE UP

## 2019-12-19 ENCOUNTER — OUTPATIENT (OUTPATIENT)
Dept: OUTPATIENT SERVICES | Facility: HOSPITAL | Age: 79
LOS: 1 days | Discharge: HOME | End: 2019-12-19
Payer: MEDICARE

## 2019-12-19 DIAGNOSIS — N26.1 ATROPHY OF KIDNEY (TERMINAL): ICD-10-CM

## 2019-12-19 DIAGNOSIS — D63.1 ANEMIA IN CHRONIC KIDNEY DISEASE: ICD-10-CM

## 2019-12-19 PROCEDURE — 76775 US EXAM ABDO BACK WALL LIM: CPT | Mod: 26

## 2019-12-30 ENCOUNTER — OUTPATIENT (OUTPATIENT)
Dept: OUTPATIENT SERVICES | Facility: HOSPITAL | Age: 79
LOS: 1 days | Discharge: HOME | End: 2019-12-30
Payer: MEDICARE

## 2019-12-30 DIAGNOSIS — R10.13 EPIGASTRIC PAIN: ICD-10-CM

## 2019-12-30 PROCEDURE — 76700 US EXAM ABDOM COMPLETE: CPT | Mod: 26

## 2019-12-31 ENCOUNTER — OUTPATIENT (OUTPATIENT)
Dept: OUTPATIENT SERVICES | Facility: HOSPITAL | Age: 79
LOS: 1 days | Discharge: HOME | End: 2019-12-31
Payer: MEDICARE

## 2019-12-31 ENCOUNTER — APPOINTMENT (OUTPATIENT)
Dept: RADIOLOGY | Facility: HOSPITAL | Age: 79
End: 2019-12-31

## 2019-12-31 DIAGNOSIS — R10.30 LOWER ABDOMINAL PAIN, UNSPECIFIED: ICD-10-CM

## 2019-12-31 PROCEDURE — 74018 RADEX ABDOMEN 1 VIEW: CPT | Mod: 26

## 2020-01-01 ENCOUNTER — OUTPATIENT (OUTPATIENT)
Dept: OUTPATIENT SERVICES | Facility: HOSPITAL | Age: 80
LOS: 1 days | Discharge: HOME | End: 2020-01-01
Payer: MEDICARE

## 2020-01-01 ENCOUNTER — INPATIENT (INPATIENT)
Facility: HOSPITAL | Age: 80
LOS: 30 days | End: 2020-02-01
Attending: FAMILY MEDICINE | Admitting: FAMILY MEDICINE
Payer: MEDICARE

## 2020-01-01 VITALS
SYSTOLIC BLOOD PRESSURE: 164 MMHG | RESPIRATION RATE: 24 BRPM | DIASTOLIC BLOOD PRESSURE: 78 MMHG | TEMPERATURE: 98 F | HEART RATE: 78 BPM | HEIGHT: 58 IN | OXYGEN SATURATION: 98 %

## 2020-01-01 DIAGNOSIS — J91.8 PLEURAL EFFUSION IN OTHER CONDITIONS CLASSIFIED ELSEWHERE: ICD-10-CM

## 2020-01-01 LAB
ALBUMIN SERPL ELPH-MCNC: 2.2 G/DL — LOW (ref 3.3–5)
ALP SERPL-CCNC: 148 U/L — HIGH (ref 40–120)
ALT FLD-CCNC: 57 U/L — SIGNIFICANT CHANGE UP (ref 12–78)
ANION GAP SERPL CALC-SCNC: 10 MMOL/L — SIGNIFICANT CHANGE UP (ref 5–17)
APPEARANCE UR: ABNORMAL
APTT BLD: 29.5 SEC — SIGNIFICANT CHANGE UP (ref 27.5–36.3)
AST SERPL-CCNC: 28 U/L — SIGNIFICANT CHANGE UP (ref 15–37)
BACTERIA # UR AUTO: ABNORMAL
BASE EXCESS BLDA CALC-SCNC: 0.9 MMOL/L — SIGNIFICANT CHANGE UP (ref -2–2)
BASOPHILS # BLD AUTO: 0.02 K/UL — SIGNIFICANT CHANGE UP (ref 0–0.2)
BASOPHILS NFR BLD AUTO: 0.2 % — SIGNIFICANT CHANGE UP (ref 0–2)
BILIRUB SERPL-MCNC: 0.2 MG/DL — SIGNIFICANT CHANGE UP (ref 0.2–1.2)
BILIRUB UR-MCNC: NEGATIVE — SIGNIFICANT CHANGE UP
BLOOD GAS COMMENTS: SIGNIFICANT CHANGE UP
BLOOD GAS COMMENTS: SIGNIFICANT CHANGE UP
BLOOD GAS SOURCE: SIGNIFICANT CHANGE UP
BUN SERPL-MCNC: 52 MG/DL — HIGH (ref 7–23)
CALCIUM SERPL-MCNC: 8.6 MG/DL — SIGNIFICANT CHANGE UP (ref 8.5–10.1)
CHLORIDE SERPL-SCNC: 106 MMOL/L — SIGNIFICANT CHANGE UP (ref 96–108)
CK MB BLD-MCNC: 4.4 % — HIGH (ref 0–3.5)
CK MB BLD-MCNC: 7 % — HIGH (ref 0–3.5)
CK MB CFR SERPL CALC: 2.1 NG/ML — SIGNIFICANT CHANGE UP (ref 0.5–3.6)
CK MB CFR SERPL CALC: 2.2 NG/ML — SIGNIFICANT CHANGE UP (ref 0.5–3.6)
CK SERPL-CCNC: 30 U/L — SIGNIFICANT CHANGE UP (ref 26–192)
CK SERPL-CCNC: 50 U/L — SIGNIFICANT CHANGE UP (ref 26–192)
CO2 SERPL-SCNC: 24 MMOL/L — SIGNIFICANT CHANGE UP (ref 22–31)
COLOR SPEC: YELLOW — SIGNIFICANT CHANGE UP
COMMENT - URINE: SIGNIFICANT CHANGE UP
CREAT SERPL-MCNC: 2.99 MG/DL — HIGH (ref 0.5–1.3)
DIFF PNL FLD: NEGATIVE — SIGNIFICANT CHANGE UP
EOSINOPHIL # BLD AUTO: 0.02 K/UL — SIGNIFICANT CHANGE UP (ref 0–0.5)
EOSINOPHIL NFR BLD AUTO: 0.2 % — SIGNIFICANT CHANGE UP (ref 0–6)
EPI CELLS # UR: SIGNIFICANT CHANGE UP
FLU A RESULT: SIGNIFICANT CHANGE UP
FLU A RESULT: SIGNIFICANT CHANGE UP
FLUAV AG NPH QL: SIGNIFICANT CHANGE UP
FLUBV AG NPH QL: SIGNIFICANT CHANGE UP
GLUCOSE SERPL-MCNC: 125 MG/DL — HIGH (ref 70–99)
GLUCOSE UR QL: NEGATIVE MG/DL — SIGNIFICANT CHANGE UP
HCO3 BLDA-SCNC: 26 MMOL/L — SIGNIFICANT CHANGE UP (ref 21–29)
HCT VFR BLD CALC: 28 % — LOW (ref 34.5–45)
HGB BLD-MCNC: 9 G/DL — LOW (ref 11.5–15.5)
HOROWITZ INDEX BLDA+IHG-RTO: 40 — SIGNIFICANT CHANGE UP
IMM GRANULOCYTES NFR BLD AUTO: 0.6 % — SIGNIFICANT CHANGE UP (ref 0–1.5)
INR BLD: 1.04 RATIO — SIGNIFICANT CHANGE UP (ref 0.88–1.16)
KETONES UR-MCNC: NEGATIVE — SIGNIFICANT CHANGE UP
LACTATE SERPL-SCNC: 0.7 MMOL/L — SIGNIFICANT CHANGE UP (ref 0.7–2)
LEUKOCYTE ESTERASE UR-ACNC: ABNORMAL
LYMPHOCYTES # BLD AUTO: 0.55 K/UL — LOW (ref 1–3.3)
LYMPHOCYTES # BLD AUTO: 4.6 % — LOW (ref 13–44)
MCHC RBC-ENTMCNC: 30.7 PG — SIGNIFICANT CHANGE UP (ref 27–34)
MCHC RBC-ENTMCNC: 32.1 GM/DL — SIGNIFICANT CHANGE UP (ref 32–36)
MCV RBC AUTO: 95.6 FL — SIGNIFICANT CHANGE UP (ref 80–100)
MONOCYTES # BLD AUTO: 0.73 K/UL — SIGNIFICANT CHANGE UP (ref 0–0.9)
MONOCYTES NFR BLD AUTO: 6.1 % — SIGNIFICANT CHANGE UP (ref 2–14)
NEUTROPHILS # BLD AUTO: 10.65 K/UL — HIGH (ref 1.8–7.4)
NEUTROPHILS NFR BLD AUTO: 88.3 % — HIGH (ref 43–77)
NITRITE UR-MCNC: NEGATIVE — SIGNIFICANT CHANGE UP
NRBC # BLD: 0 /100 WBCS — SIGNIFICANT CHANGE UP (ref 0–0)
NT-PROBNP SERPL-SCNC: 9036 PG/ML — HIGH (ref 0–450)
PCO2 BLDA: 43 MMHG — SIGNIFICANT CHANGE UP (ref 32–46)
PH BLD: 7.39 — SIGNIFICANT CHANGE UP (ref 7.35–7.45)
PH UR: 5 — SIGNIFICANT CHANGE UP (ref 5–8)
PLATELET # BLD AUTO: 161 K/UL — SIGNIFICANT CHANGE UP (ref 150–400)
PO2 BLDA: 209 MMHG — HIGH (ref 74–108)
POTASSIUM SERPL-MCNC: 4 MMOL/L — SIGNIFICANT CHANGE UP (ref 3.5–5.3)
POTASSIUM SERPL-SCNC: 4 MMOL/L — SIGNIFICANT CHANGE UP (ref 3.5–5.3)
PROT SERPL-MCNC: 6.5 GM/DL — SIGNIFICANT CHANGE UP (ref 6–8.3)
PROT UR-MCNC: 500 MG/DL
PROTHROM AB SERPL-ACNC: 11.7 SEC — SIGNIFICANT CHANGE UP (ref 10–12.9)
RBC # BLD: 2.93 M/UL — LOW (ref 3.8–5.2)
RBC # FLD: 17.2 % — HIGH (ref 10.3–14.5)
RBC CASTS # UR COMP ASSIST: SIGNIFICANT CHANGE UP /HPF (ref 0–4)
RSV RESULT: DETECTED
RSV RNA RESP QL NAA+PROBE: DETECTED
SAO2 % BLDA: 100 % — HIGH (ref 92–96)
SODIUM SERPL-SCNC: 140 MMOL/L — SIGNIFICANT CHANGE UP (ref 135–145)
SP GR SPEC: 1.01 — SIGNIFICANT CHANGE UP (ref 1.01–1.02)
TROPONIN I SERPL-MCNC: 0.07 NG/ML — HIGH (ref 0.01–0.04)
TROPONIN I SERPL-MCNC: 0.07 NG/ML — HIGH (ref 0.01–0.04)
UROBILINOGEN FLD QL: NEGATIVE MG/DL — SIGNIFICANT CHANGE UP
WBC # BLD: 12.04 K/UL — HIGH (ref 3.8–10.5)
WBC # FLD AUTO: 12.04 K/UL — HIGH (ref 3.8–10.5)
WBC UR QL: SIGNIFICANT CHANGE UP

## 2020-01-01 PROCEDURE — 93010 ELECTROCARDIOGRAM REPORT: CPT

## 2020-01-01 PROCEDURE — 71045 X-RAY EXAM CHEST 1 VIEW: CPT | Mod: 26

## 2020-01-01 PROCEDURE — 99285 EMERGENCY DEPT VISIT HI MDM: CPT

## 2020-01-01 PROCEDURE — 71045 X-RAY EXAM CHEST 1 VIEW: CPT | Mod: 26,77

## 2020-01-01 RX ORDER — FERROUS SULFATE 325(65) MG
325 TABLET ORAL THREE TIMES A DAY
Refills: 0 | Status: DISCONTINUED | OUTPATIENT
Start: 2020-01-01 | End: 2020-01-09

## 2020-01-01 RX ORDER — ESCITALOPRAM OXALATE 10 MG/1
10 TABLET, FILM COATED ORAL DAILY
Refills: 0 | Status: DISCONTINUED | OUTPATIENT
Start: 2020-01-01 | End: 2020-01-09

## 2020-01-01 RX ORDER — IPRATROPIUM/ALBUTEROL SULFATE 18-103MCG
3 AEROSOL WITH ADAPTER (GRAM) INHALATION
Refills: 0 | Status: COMPLETED | OUTPATIENT
Start: 2020-01-01 | End: 2020-01-01

## 2020-01-01 RX ORDER — HYDRALAZINE HCL 50 MG
50 TABLET ORAL ONCE
Refills: 0 | Status: COMPLETED | OUTPATIENT
Start: 2020-01-01 | End: 2020-01-01

## 2020-01-01 RX ORDER — PIPERACILLIN AND TAZOBACTAM 4; .5 G/20ML; G/20ML
3.38 INJECTION, POWDER, LYOPHILIZED, FOR SOLUTION INTRAVENOUS ONCE
Refills: 0 | Status: COMPLETED | OUTPATIENT
Start: 2020-01-01 | End: 2020-01-01

## 2020-01-01 RX ORDER — CHOLECALCIFEROL (VITAMIN D3) 125 MCG
1000 CAPSULE ORAL DAILY
Refills: 0 | Status: DISCONTINUED | OUTPATIENT
Start: 2020-01-01 | End: 2020-01-09

## 2020-01-01 RX ORDER — SENNA PLUS 8.6 MG/1
1 TABLET ORAL AT BEDTIME
Refills: 0 | Status: DISCONTINUED | OUTPATIENT
Start: 2020-01-01 | End: 2020-01-09

## 2020-01-01 RX ORDER — LEVOTHYROXINE SODIUM 125 MCG
50 TABLET ORAL DAILY
Refills: 0 | Status: DISCONTINUED | OUTPATIENT
Start: 2020-01-02 | End: 2020-01-09

## 2020-01-01 RX ORDER — ACETAMINOPHEN 500 MG
650 TABLET ORAL EVERY 6 HOURS
Refills: 0 | Status: DISCONTINUED | OUTPATIENT
Start: 2020-01-01 | End: 2020-01-09

## 2020-01-01 RX ORDER — DONEPEZIL HYDROCHLORIDE 10 MG/1
10 TABLET, FILM COATED ORAL AT BEDTIME
Refills: 0 | Status: DISCONTINUED | OUTPATIENT
Start: 2020-01-01 | End: 2020-01-09

## 2020-01-01 RX ORDER — SODIUM CHLORIDE 0.65 %
1 AEROSOL, SPRAY (ML) NASAL THREE TIMES A DAY
Refills: 0 | Status: DISCONTINUED | OUTPATIENT
Start: 2020-01-01 | End: 2020-01-30

## 2020-01-01 RX ORDER — LEVOTHYROXINE SODIUM 125 MCG
1 TABLET ORAL
Qty: 0 | Refills: 0 | DISCHARGE

## 2020-01-01 RX ORDER — ENOXAPARIN SODIUM 100 MG/ML
30 INJECTION SUBCUTANEOUS DAILY
Refills: 0 | Status: DISCONTINUED | OUTPATIENT
Start: 2020-01-01 | End: 2020-01-01

## 2020-01-01 RX ORDER — ATORVASTATIN CALCIUM 80 MG/1
20 TABLET, FILM COATED ORAL AT BEDTIME
Refills: 0 | Status: DISCONTINUED | OUTPATIENT
Start: 2020-01-01 | End: 2020-01-09

## 2020-01-01 RX ORDER — CARVEDILOL PHOSPHATE 80 MG/1
12.5 CAPSULE, EXTENDED RELEASE ORAL EVERY 12 HOURS
Refills: 0 | Status: DISCONTINUED | OUTPATIENT
Start: 2020-01-01 | End: 2020-01-09

## 2020-01-01 RX ORDER — HYDRALAZINE HCL 50 MG
50 TABLET ORAL THREE TIMES A DAY
Refills: 0 | Status: DISCONTINUED | OUTPATIENT
Start: 2020-01-01 | End: 2020-01-09

## 2020-01-01 RX ORDER — PANTOPRAZOLE SODIUM 20 MG/1
40 TABLET, DELAYED RELEASE ORAL
Refills: 0 | Status: DISCONTINUED | OUTPATIENT
Start: 2020-01-01 | End: 2020-01-09

## 2020-01-01 RX ORDER — IPRATROPIUM/ALBUTEROL SULFATE 18-103MCG
3 AEROSOL WITH ADAPTER (GRAM) INHALATION EVERY 6 HOURS
Refills: 0 | Status: DISCONTINUED | OUTPATIENT
Start: 2020-01-01 | End: 2020-01-03

## 2020-01-01 RX ORDER — HEPARIN SODIUM 5000 [USP'U]/ML
5000 INJECTION INTRAVENOUS; SUBCUTANEOUS EVERY 12 HOURS
Refills: 0 | Status: DISCONTINUED | OUTPATIENT
Start: 2020-01-01 | End: 2020-01-09

## 2020-01-01 RX ORDER — SENNA PLUS 8.6 MG/1
1 TABLET ORAL
Qty: 0 | Refills: 0 | DISCHARGE

## 2020-01-01 RX ORDER — SUCRALFATE 1 G
1 TABLET ORAL
Refills: 0 | Status: DISCONTINUED | OUTPATIENT
Start: 2020-01-01 | End: 2020-01-09

## 2020-01-01 RX ORDER — FUROSEMIDE 40 MG
60 TABLET ORAL ONCE
Refills: 0 | Status: COMPLETED | OUTPATIENT
Start: 2020-01-01 | End: 2020-01-01

## 2020-01-01 RX ORDER — FERROUS SULFATE 325(65) MG
1 TABLET ORAL
Qty: 0 | Refills: 0 | DISCHARGE

## 2020-01-01 RX ORDER — CEFTRIAXONE 500 MG/1
1000 INJECTION, POWDER, FOR SOLUTION INTRAMUSCULAR; INTRAVENOUS EVERY 24 HOURS
Refills: 0 | Status: DISCONTINUED | OUTPATIENT
Start: 2020-01-01 | End: 2020-01-04

## 2020-01-01 RX ORDER — ASPIRIN/CALCIUM CARB/MAGNESIUM 324 MG
1 TABLET ORAL
Qty: 0 | Refills: 0 | DISCHARGE

## 2020-01-01 RX ORDER — PANTOPRAZOLE SODIUM 20 MG/1
1 TABLET, DELAYED RELEASE ORAL
Qty: 0 | Refills: 0 | DISCHARGE

## 2020-01-01 RX ORDER — SUCRALFATE 1 G
1 TABLET ORAL
Qty: 0 | Refills: 0 | DISCHARGE

## 2020-01-01 RX ADMIN — Medication 3 MILLILITER(S): at 23:26

## 2020-01-01 RX ADMIN — Medication 3 MILLILITER(S): at 13:55

## 2020-01-01 RX ADMIN — CARVEDILOL PHOSPHATE 12.5 MILLIGRAM(S): 80 CAPSULE, EXTENDED RELEASE ORAL at 18:05

## 2020-01-01 RX ADMIN — PIPERACILLIN AND TAZOBACTAM 200 GRAM(S): 4; .5 INJECTION, POWDER, LYOPHILIZED, FOR SOLUTION INTRAVENOUS at 15:15

## 2020-01-01 RX ADMIN — ATORVASTATIN CALCIUM 20 MILLIGRAM(S): 80 TABLET, FILM COATED ORAL at 23:52

## 2020-01-01 RX ADMIN — Medication 3 MILLILITER(S): at 13:40

## 2020-01-01 RX ADMIN — Medication 60 MILLIGRAM(S): at 13:53

## 2020-01-01 RX ADMIN — DONEPEZIL HYDROCHLORIDE 10 MILLIGRAM(S): 10 TABLET, FILM COATED ORAL at 23:52

## 2020-01-01 RX ADMIN — Medication 325 MILLIGRAM(S): at 23:53

## 2020-01-01 RX ADMIN — Medication 1 GRAM(S): at 18:05

## 2020-01-01 RX ADMIN — SENNA PLUS 1 TABLET(S): 8.6 TABLET ORAL at 23:52

## 2020-01-01 RX ADMIN — CEFTRIAXONE 100 MILLIGRAM(S): 500 INJECTION, POWDER, FOR SOLUTION INTRAMUSCULAR; INTRAVENOUS at 20:02

## 2020-01-01 RX ADMIN — Medication 3 MILLILITER(S): at 13:30

## 2020-01-01 RX ADMIN — Medication 1 SPRAY(S): at 23:53

## 2020-01-01 RX ADMIN — Medication 50 MILLIGRAM(S): at 20:02

## 2020-01-01 RX ADMIN — Medication 1 GRAM(S): at 23:56

## 2020-01-01 RX ADMIN — Medication 3 MILLILITER(S): at 19:00

## 2020-01-01 NOTE — ED PROVIDER NOTE - CONSTITUTIONAL, MLM
normal... Well appearing, awake, alert, oriented to person, place, time/situation and in no apparent distress. Speaking in clear full sentences but + nasal flaring no shoulders retractions no diaphoresis

## 2020-01-01 NOTE — ED ADULT NURSE REASSESSMENT NOTE - NS ED NURSE REASSESS COMMENT FT1
bipap down to 30% per respiratory pt with improvement noted in respiratory rate/effort vss on monitor admitted tele awaiting bed availability iv abx initiated and infusing without difficulty

## 2020-01-01 NOTE — ED PROVIDER NOTE - OBJECTIVE STATEMENT
79 years old female from [facility] rehab c/o coughing sob for three to four days. Pt is sent here to rule out chf vs pneumonia. Pt is alert and oriented to person only unable to give detail hx due to hx of Alzheimer according to the [facility] pt had sat of 91 to 92 %,

## 2020-01-01 NOTE — ED ADULT NURSE NOTE - OBJECTIVE STATEMENT
received er bed 8 c/o increased shortness of breath over past several days worse today sent from [facility] for sat < 95% on r/a b/l rales/wheezing/rhonchi audible at [facility] rehab s/p treatment of gi bleed and pna alert and oriented to person/place respirations labored o2 at 2l/min in place

## 2020-01-01 NOTE — ED ADULT NURSE NOTE - PMH
Alzheimers disease    Constipation    CVA (cerebral vascular accident)    Depression    GI bleed    HTN (hypertension)    Hyperlipidemia    Hypothyroidism    Iron deficiency anemia    Major depressive disorder    MI, old

## 2020-01-01 NOTE — CONSULT NOTE ADULT - SUBJECTIVE AND OBJECTIVE BOX
Patient is a 79y old  Female who presents with a chief complaint of SOB.    HPI: 79 year female with HTN, HLD, MI hx, CVA, Gi bleed, Anemia Depression and Alzheimer's dementia.  Sent from [facility] due to SOB. Possibility of pneumonia vs CHF was raised.   In ED with Respiratory distress required BIPAP support and RVP positive for RSV. Pt not able to provide more details due to dementia. Information from transfer papers and ED physician.  Nods no when asked for smoking.    PAST MEDICAL & SURGICAL HISTORY:  Hypothyroidism  Constipation  Iron deficiency anemia  Major depressive disorder  Hyperlipidemia  GI bleed  Depression  Alzheimers disease  MI, old  CVA (cerebral vascular accident)  HTN (hypertension)  No significant past surgical history    FAMILY HISTORY:  Family history of essential hypertension (Child)  Family history of stroke    SOCIAL HISTORY: no    Allergies  No Known Allergies    MEDICATIONS  (STANDING):  atorvastatin 20 milliGRAM(s) Oral at bedtime  carvedilol 12.5 milliGRAM(s) Oral every 12 hours  cholecalciferol 1000 Unit(s) Oral daily  donepezil 10 milliGRAM(s) Oral at bedtime  escitalopram 10 milliGRAM(s) Oral daily  ferrous    sulfate 325 milliGRAM(s) Oral three times a day  hydrALAZINE 50 milliGRAM(s) Oral three times a day  pantoprazole    Tablet 40 milliGRAM(s) Oral before breakfast  senna 1 Tablet(s) Oral at bedtime  sodium chloride 0.65% Nasal 1 Spray(s) Both Nostrils three times a day  sucralfate 1 Gram(s) Oral four times a day    MEDICATIONS  (PRN):  acetaminophen   Tablet .. 650 milliGRAM(s) Oral every 6 hours PRN Temp greater or equal to 38C (100.4F), Mild Pain (1 - 3)    REVIEW OF SYSTEMS:   not able to provide.    MACRA & MIPS:  Vaccines - Influenza: yes and Pneumovax: yes  Tobacco:  no  Blood Pressure Screening / Control of: 164/78  Current Medications Reviewed: yes    Vital Signs Last 24 Hrs  T(C): 36.4 (2020 13:52), Max: 36.7 (2020 12:51)  T(F): 97.6 (2020 13:52), Max: 98 (2020 12:51)  HR: 61 (2020 16:32) (61 - 78)  BP: 144/72 (2020 15:15) (144/72 - 164/78)  BP(mean): --  RR: 10 (2020 15:15) (10 - 24)  SpO2: 100% (2020 16:32) (98% - 100%)    PHYSICAL EXAM:  GEN:         Awake, responsive and comfortable.  HEENT:      BIPAP  RESP:        decreased air entry.  CVS:           Regular rate and rhythm.   ABD:         Soft, non-tender, non-distended;   SKIN:           Warm and dry.  EXTR:            No clubbing, cyanosis or edema  CNS:             dementia  PSYCH:          dementia.    LABS:                        9.0    12.04 )-----------( 161      ( 2020 13:53 )             28.0         140  |  106  |  52<H>  ----------------------------<  125<H>  4.0   |  24  |  2.99<H>    Ca    8.6      2020 13:53    TPro  6.5  /  Alb  2.2<L>  /  TBili  0.2  /  DBili  x   /  AST  28  /  ALT  57  /  AlkPhos  148<H>      PT/INR - ( 2020 13:53 )   PT: 11.7 sec;   INR: 1.04 ratio      PTT - ( 2020 13:53 )  PTT:29.5 sec   @ 14:09  pH: 7.39  pCO2: 43  pO2: 209  SaO2: 100    Urinalysis Basic - ( 2020 14:28 )    Color: Yellow / Appearance: Slightly Turbid / S.015 / pH: x  Gluc: x / Ketone: Negative  / Bili: Negative / Urobili: Negative mg/dL   Blood: x / Protein: 500 mg/dL / Nitrite: Negative   Leuk Esterase: Trace / RBC: 0-2 /HPF / WBC 3-5   Sq Epi: x / Non Sq Epi: Few / Bacteria: Many    EKG: sinus rhythm    RADIOLOGY & ADDITIONAL STUDIES:  < from: Xray Chest 1 View AP/PA. (20 @ 09:21) >    EXAM:  XR CHEST AP OR PA 1V                          PROCEDURE DATE:  2020      INTERPRETATION:  PROCEDURE: AP view of the chest.    CLINICAL INFORMATION: Pleural effusion.    COMPARISON: 2019.    FINDINGS:    Lungs: There is a small left pleural effusion.  Heart: The heart is top normal in size.  Mediastinum: Atherosclerotic calcifications are seen in the aorta.    IMPRESSION:    Small left pleural effusion.    MARILEE BAUER M.D., ATTENDING RADIOLOGIST  This document has been electronically signed. 2020  9:27AM      ASSESSMENT AND PLAN:  ·	Acute Respiratory distress.  ·	RSV tracheobronchitis.  ·	Left pleural effusion.  ·	Anemia.  ·	Leukocytosis.  ·	Renal Insuffiencey.  ·	CVA by history.  ·	HTN.  ·	HLD.  ·	Alzheimer's dementia.    Supplemental O2 with as needed BIPAP.  Add nebulizer.  Follow H & H and renal function.  BP control.

## 2020-01-01 NOTE — ED PROVIDER NOTE - PROGRESS NOTE DETAILS
Dr. Stone who care for pt at [facility] is notified and admit pt.

## 2020-01-01 NOTE — ED ADULT NURSE NOTE - NSIMPLEMENTINTERV_GEN_ALL_ED
Implemented All Fall with Harm Risk Interventions:  [place] to call system. Call bell, personal items and telephone within reach. Instruct patient to call for assistance. Room bathroom lighting operational. Non-slip footwear when patient is off stretcher. Physically safe environment: no spills, clutter or unnecessary equipment. Stretcher in lowest position, wheels locked, appropriate side rails in place. Provide visual cue, wrist band, yellow gown, etc. Monitor gait and stability. Monitor for mental status changes and reorient to person, place, and time. Review medications for side effects contributing to fall risk. Reinforce activity limits and safety measures with patient and family. Provide visual clues: red socks.

## 2020-01-02 LAB
ALBUMIN SERPL ELPH-MCNC: 2 G/DL — LOW (ref 3.3–5)
ALP SERPL-CCNC: 115 U/L — SIGNIFICANT CHANGE UP (ref 40–120)
ALT FLD-CCNC: 43 U/L — SIGNIFICANT CHANGE UP (ref 12–78)
ANION GAP SERPL CALC-SCNC: 13 MMOL/L — SIGNIFICANT CHANGE UP (ref 5–17)
AST SERPL-CCNC: 20 U/L — SIGNIFICANT CHANGE UP (ref 15–37)
BILIRUB SERPL-MCNC: 0.2 MG/DL — SIGNIFICANT CHANGE UP (ref 0.2–1.2)
BUN SERPL-MCNC: 52 MG/DL — HIGH (ref 7–23)
CALCIUM SERPL-MCNC: 8.3 MG/DL — LOW (ref 8.5–10.1)
CHLORIDE SERPL-SCNC: 105 MMOL/L — SIGNIFICANT CHANGE UP (ref 96–108)
CK MB BLD-MCNC: 5.2 % — HIGH (ref 0–3.5)
CK MB CFR SERPL CALC: 1.3 NG/ML — SIGNIFICANT CHANGE UP (ref 0.5–3.6)
CK SERPL-CCNC: 25 U/L — LOW (ref 26–192)
CO2 SERPL-SCNC: 23 MMOL/L — SIGNIFICANT CHANGE UP (ref 22–31)
CREAT SERPL-MCNC: 3.04 MG/DL — HIGH (ref 0.5–1.3)
GLUCOSE SERPL-MCNC: 86 MG/DL — SIGNIFICANT CHANGE UP (ref 70–99)
HCT VFR BLD CALC: 25.3 % — LOW (ref 34.5–45)
HGB BLD-MCNC: 8 G/DL — LOW (ref 11.5–15.5)
MAGNESIUM SERPL-MCNC: 2 MG/DL — SIGNIFICANT CHANGE UP (ref 1.6–2.6)
MCHC RBC-ENTMCNC: 30.8 PG — SIGNIFICANT CHANGE UP (ref 27–34)
MCHC RBC-ENTMCNC: 31.6 GM/DL — LOW (ref 32–36)
MCV RBC AUTO: 97.3 FL — SIGNIFICANT CHANGE UP (ref 80–100)
NRBC # BLD: 0 /100 WBCS — SIGNIFICANT CHANGE UP (ref 0–0)
PLATELET # BLD AUTO: 121 K/UL — LOW (ref 150–400)
POTASSIUM SERPL-MCNC: 3.5 MMOL/L — SIGNIFICANT CHANGE UP (ref 3.5–5.3)
POTASSIUM SERPL-SCNC: 3.5 MMOL/L — SIGNIFICANT CHANGE UP (ref 3.5–5.3)
PROT SERPL-MCNC: 5.5 GM/DL — LOW (ref 6–8.3)
RBC # BLD: 2.6 M/UL — LOW (ref 3.8–5.2)
RBC # FLD: 17.2 % — HIGH (ref 10.3–14.5)
SODIUM SERPL-SCNC: 141 MMOL/L — SIGNIFICANT CHANGE UP (ref 135–145)
TROPONIN I SERPL-MCNC: 0.08 NG/ML — HIGH (ref 0.01–0.04)
TSH SERPL-MCNC: 123 UIU/ML — HIGH (ref 0.36–3.74)
WBC # BLD: 7.25 K/UL — SIGNIFICANT CHANGE UP (ref 3.8–10.5)
WBC # FLD AUTO: 7.25 K/UL — SIGNIFICANT CHANGE UP (ref 3.8–10.5)

## 2020-01-02 RX ORDER — CEFTRIAXONE 500 MG/1
1 INJECTION, POWDER, FOR SOLUTION INTRAMUSCULAR; INTRAVENOUS
Qty: 0 | Refills: 0 | DISCHARGE
Start: 2020-01-02

## 2020-01-02 RX ORDER — HEPARIN SODIUM 5000 [USP'U]/ML
5000 INJECTION INTRAVENOUS; SUBCUTANEOUS
Qty: 0 | Refills: 0 | DISCHARGE
Start: 2020-01-02

## 2020-01-02 RX ORDER — ACETAMINOPHEN 500 MG
2 TABLET ORAL
Qty: 0 | Refills: 0 | DISCHARGE
Start: 2020-01-02

## 2020-01-02 RX ORDER — DOCUSATE SODIUM 100 MG
2 CAPSULE ORAL
Qty: 0 | Refills: 0 | DISCHARGE

## 2020-01-02 RX ORDER — IPRATROPIUM/ALBUTEROL SULFATE 18-103MCG
3 AEROSOL WITH ADAPTER (GRAM) INHALATION
Qty: 0 | Refills: 0 | DISCHARGE
Start: 2020-01-02

## 2020-01-02 RX ADMIN — Medication 325 MILLIGRAM(S): at 05:51

## 2020-01-02 RX ADMIN — DONEPEZIL HYDROCHLORIDE 10 MILLIGRAM(S): 10 TABLET, FILM COATED ORAL at 21:45

## 2020-01-02 RX ADMIN — Medication 1 GRAM(S): at 11:08

## 2020-01-02 RX ADMIN — Medication 1000 UNIT(S): at 11:08

## 2020-01-02 RX ADMIN — Medication 1 GRAM(S): at 05:51

## 2020-01-02 RX ADMIN — SENNA PLUS 1 TABLET(S): 8.6 TABLET ORAL at 21:44

## 2020-01-02 RX ADMIN — CARVEDILOL PHOSPHATE 12.5 MILLIGRAM(S): 80 CAPSULE, EXTENDED RELEASE ORAL at 16:56

## 2020-01-02 RX ADMIN — PANTOPRAZOLE SODIUM 40 MILLIGRAM(S): 20 TABLET, DELAYED RELEASE ORAL at 06:08

## 2020-01-02 RX ADMIN — ATORVASTATIN CALCIUM 20 MILLIGRAM(S): 80 TABLET, FILM COATED ORAL at 21:45

## 2020-01-02 RX ADMIN — HEPARIN SODIUM 5000 UNIT(S): 5000 INJECTION INTRAVENOUS; SUBCUTANEOUS at 16:56

## 2020-01-02 RX ADMIN — Medication 325 MILLIGRAM(S): at 21:45

## 2020-01-02 RX ADMIN — Medication 3 MILLILITER(S): at 05:38

## 2020-01-02 RX ADMIN — Medication 50 MILLIGRAM(S): at 11:09

## 2020-01-02 RX ADMIN — Medication 3 MILLILITER(S): at 17:05

## 2020-01-02 RX ADMIN — Medication 50 MICROGRAM(S): at 05:51

## 2020-01-02 RX ADMIN — ESCITALOPRAM OXALATE 10 MILLIGRAM(S): 10 TABLET, FILM COATED ORAL at 11:08

## 2020-01-02 RX ADMIN — Medication 1 GRAM(S): at 16:56

## 2020-01-02 RX ADMIN — Medication 50 MILLIGRAM(S): at 05:51

## 2020-01-02 RX ADMIN — Medication 1 SPRAY(S): at 21:44

## 2020-01-02 RX ADMIN — Medication 50 MILLIGRAM(S): at 21:45

## 2020-01-02 RX ADMIN — Medication 3 MILLILITER(S): at 11:19

## 2020-01-02 RX ADMIN — CEFTRIAXONE 100 MILLIGRAM(S): 500 INJECTION, POWDER, FOR SOLUTION INTRAMUSCULAR; INTRAVENOUS at 20:40

## 2020-01-02 RX ADMIN — Medication 1 SPRAY(S): at 05:51

## 2020-01-02 RX ADMIN — Medication 325 MILLIGRAM(S): at 11:09

## 2020-01-02 RX ADMIN — Medication 3 MILLILITER(S): at 23:45

## 2020-01-02 RX ADMIN — HEPARIN SODIUM 5000 UNIT(S): 5000 INJECTION INTRAVENOUS; SUBCUTANEOUS at 05:52

## 2020-01-02 NOTE — SWALLOW BEDSIDE ASSESSMENT ADULT - SWALLOW EVAL: DIAGNOSIS
Pt presented with mild oropharyngeal dysphagia characterized by mildly prolonged mastication, mildly reduced bolus formation/manipulation, mildly decreased AP transport with min lingual stasis s/p swallow. Pt cleared residue with a liquid wash independently. Pt with suspected timely pharyngeal swallow trigger and mildly reduced laryngeal elevation. No overt s/s of aspiration/penetration noted across all solid/liquid trials.

## 2020-01-02 NOTE — PROGRESS NOTE ADULT - SUBJECTIVE AND OBJECTIVE BOX
INTERVAL HPI:  79 year female with HTN, HLD, MI hx, CVA, Gi bleed, Anemia Depression and Alzheimer's dementia.  Sent from [facility] due to SOB. Possibility of pneumonia vs CHF was raised.   In ED with Respiratory distress required BIPAP support and RVP positive for RSV. Pt not able to provide more details due to dementia. Information from transfer papers and ED physician.  Nods no when asked for smoking.    OVERNIGHT EVENTS:  More awake and responsive. Feels better.    Vital Signs Last 24 Hrs  T(C): 37.1 (2020 10:57), Max: 37.1 (2020 10:57)  T(F): 98.8 (2020 10:57), Max: 98.8 (2020 10:57)  HR: 76 (2020 12:27) (52 - 78)  BP: 119/66 (2020 10:57) (100/49 - 180/81)  BP(mean): --  RR: 18 (2020 10:57) (10 - 24)  SpO2: 98% (2020 12:27) (97% - 100%)    PHYSICAL EXAM:  GEN:         Awake, responsive and comfortable.  HEENT:    Normal.    RESP:      no wheezing.  CVS:         Regular rate and rhythm.   ABD:         Soft, non-tender, non-distended;     MEDICATIONS  (STANDING):  albuterol/ipratropium for Nebulization 3 milliLiter(s) Nebulizer every 6 hours  atorvastatin 20 milliGRAM(s) Oral at bedtime  carvedilol 12.5 milliGRAM(s) Oral every 12 hours  cefTRIAXone   IVPB 1000 milliGRAM(s) IV Intermittent every 24 hours  cholecalciferol 1000 Unit(s) Oral daily  donepezil 10 milliGRAM(s) Oral at bedtime  escitalopram 10 milliGRAM(s) Oral daily  ferrous    sulfate 325 milliGRAM(s) Oral three times a day  heparin  Injectable 5000 Unit(s) SubCutaneous every 12 hours  hydrALAZINE 50 milliGRAM(s) Oral three times a day  levothyroxine 50 MICROGram(s) Oral daily  pantoprazole    Tablet 40 milliGRAM(s) Oral before breakfast  senna 1 Tablet(s) Oral at bedtime  sodium chloride 0.65% Nasal 1 Spray(s) Both Nostrils three times a day  sucralfate 1 Gram(s) Oral four times a day    MEDICATIONS  (PRN):  acetaminophen   Tablet .. 650 milliGRAM(s) Oral every 6 hours PRN Temp greater or equal to 38C (100.4F), Mild Pain (1 - 3)    LABS:                        8.0    7.25  )-----------( 121      ( 2020 07:06 )             25.3         141  |  105  |  52<H>  ----------------------------<  86  3.5   |  23  |  3.04<H>    Ca    8.3<L>      2020 07:06  Mg     2.0         TPro  5.5<L>  /  Alb  2.0<L>  /  TBili  0.2  /  DBili  x   /  AST  20  /  ALT  43  /  AlkPhos  115      PT/INR - ( 2020 13:53 )   PT: 11.7 sec;   INR: 1.04 ratio      PTT - ( 2020 13:53 )  PTT:29.5 sec   @ 14:09  pH: 7.39  pCO2: 43  pO2: 209  SaO2: 100    Urinalysis Basic - ( 2020 14:28 )    Color: Yellow / Appearance: Slightly Turbid / S.015 / pH: x  Gluc: x / Ketone: Negative  / Bili: Negative / Urobili: Negative mg/dL   Blood: x / Protein: 500 mg/dL / Nitrite: Negative   Leuk Esterase: Trace / RBC: 0-2 /HPF / WBC 3-5   Sq Epi: x / Non Sq Epi: Few / Bacteria: Many  ASSESSMENT AND PLAN:  ·	Acute Respiratory distress.  ·	RSV tracheobronchitis.  ·	Left pleural effusion.  ·	Hypothyroidism with very high TSH.  ·	Anemia.  ·	Leukocytosis.  ·	Renal Insuffiencey.  ·	CVA by history.  ·	HTN.  ·	HLD.  ·	Alzheimer's dementia.    Supplemental O2 with as needed BIPAP.  Continue nebulizer.  Empiric antibiotics.

## 2020-01-02 NOTE — SWALLOW BEDSIDE ASSESSMENT ADULT - H & P REVIEW
yes/79 year old female from [facility] rehab c/o coughing sob for three to four days. Pt is sent here to rule out chf vs pneumonia. Pt is alert and oriented to person only unable to give detail hx due to hx of Alzheimer according to the [facility] pt had sat of 91 to 92 %,

## 2020-01-02 NOTE — H&P ADULT - NSICDXPASTMEDICALHX_GEN_ALL_CORE_FT
PAST MEDICAL HISTORY:  Alzheimers disease     Constipation     CVA (cerebral vascular accident)     Depression     GI bleed     HTN (hypertension)     Hyperlipidemia     Hypothyroidism     Iron deficiency anemia     Major depressive disorder     MI, old

## 2020-01-02 NOTE — SWALLOW BEDSIDE ASSESSMENT ADULT - SLP GENERAL OBSERVATIONS
Pt seen bedside alert, cooperative and oriented x1 (to self). Pt with baseline dementia and hx is significant for non fluent aphasia and apraxia; however able to follow commands given visual cues. Pt answered simple yes/no questions.

## 2020-01-02 NOTE — H&P ADULT - HISTORY OF PRESENT ILLNESS
· HPI Objective Statement: 79 years old female from [facility] rehab c/o coughing sob for three to four days. Pt is sent here to rule out chf vs pneumonia. Pt is alert and oriented to person only unable to give detail hx due to hx of Alzheimer according to the [facility] pt had sat of 91 to 92 %,	    PAST MEDICAL/SURGICAL/FAMILY/SOCIAL HISTORY:    Past Medical History:  Alzheimers disease    CVA (cerebral vascular accident)    Depression    HTN (hypertension)    MI, old.  recently Dx c hypothyroidism, TSH > 111 started Levothyroxin 50mcg qd as per Dr Vigil

## 2020-01-02 NOTE — H&P ADULT - ASSESSMENT
URI RSV  Acute trachobronchitis  CKD  Anemia  CAD  Dementia  P  IV AB  Check ECHO  pulmonary to f/u  Nephrology to f/u  gentle hydration  nebulizer  swallow eval

## 2020-01-02 NOTE — CONSULT NOTE ADULT - SUBJECTIVE AND OBJECTIVE BOX
Nephrology Consultation: MD EBENEZER Krause LINDA  79y    history obtained from patient's dtr, who said she has not seen a nephrologist in the past.     HPI:  · HPI Objective Statement: 79 years old female from [facility] rehab c/o coughing sob for three to four days. Pt is sent here to rule out chf vs pneumonia. Pt is alert and oriented to person only unable to give detail hx due to hx of Alzheimer according to the [facility] pt had sat of 91 to 92 %. As per the dtr the PMD told her that mom has CKD and need to see a nephrologist. There is no history of NSAIDS. use. She has not been on ACEI or ARB.     PAST MEDICAL/SURGICAL/FAMILY/SOCIAL HISTORY:    Past Medical History:  Alzheimers disease    CVA (cerebral vascular accident)    Depression    HTN (hypertension)    MI, old.        PAST MEDICAL & SURGICAL HISTORY:  Hypothyroidism  Constipation  Iron deficiency anemia  Major depressive disorder  Hyperlipidemia  GI bleed  Depression  Alzheimers disease  MI, old  CVA (cerebral vascular accident)  HTN (hypertension)  No significant past surgical history      Allergies    No Known Allergies    Intolerances        Home Medications:  acetaminophen 325 mg oral tablet: 2 tab(s) orally every 6 hours, As needed, Temp greater or equal to 38C (100.4F), Mild Pain (1 - 3) (2020 11:45)  atorvastatin 20 mg oral tablet: 1 tab(s) orally once a day (at bedtime) (:)  carvedilol 12.5 mg oral tablet: 1 tab(s) orally every 12 hours (:)  cefTRIAXone 1 g intravenous injection: 1 gram(s) intravenous every 24 hours (:45)  cholecalciferol oral tablet: 1000 unit(s) orally once a day (:)  donepezil 10 mg oral tablet: 1 tab(s) orally once a day (at bedtime) (:)  escitalopram 10 mg oral tablet: 1 tab(s) orally once a day (:)  ferrous sulfate 325 mg (65 mg elemental iron) oral tablet: 1 tab(s) orally 3 times a day (:)  heparin: 5000 unit(s) subcutaneous 2 times a day (2020 11:45)  hydrALAZINE 50 mg oral tablet: 1 tab(s) orally 3 times a day (2020 13:26)  ipratropium-albuterol 0.5 mg-2.5 mg/3 mLinhalation solution: 3 milliliter(s) inhaled every 6 hours (2020 11:45)  levothyroxine 50 mcg (0.05 mg) oral tablet: 1 tab(s) orally once a day (2020 13:26)  Protonix 40 mg oral delayed release tablet: 1 tab(s) orally 2 times a day (:)  Senna 8.6 mg oral tablet: 1 tab(s) orally once a day (at bedtime) (2020 13:)  sodium chloride 0.65% nasal spray: nasal 4 times a day (:)  sucralfate 1 g oral tablet: 1 tab(s) orally 4 times a day (before meals and at bedtime) (:)        FAMILY HISTORY:  Family history of essential hypertension (Child)  Family history of stroke      SOCIAL HISTORY:    REVIEW OF SYSTEMS:    Constitutional: No fever, weight loss or fatigue  Eyes: No eye pain, visual disturbances, or discharge  ENT:  No difficulty hearing, tinnitus, vertigo; No sinus or throat pain  Neck: No pain or stiffness  Breasts: No pain, masses or nipple discharge  Respiratory: No cough, wheezing, chills or hemoptysis  Cardiovascular: No chest pain, palpitations, shortness of breath, dizziness or leg swelling  Gastrointestinal: No abdominal or epigastric pain. No nausea, vomiting or hematemesis; No diarrhea or constipation. No melena or hematochezia.  Genitourinary: No dysuria, frequency, hematuria or incontinence  Rectal: No pain, hemorrhoids or incontinence  Neurological: No headaches, memory loss, loss of strength, numbness or tremors  Skin: No itching, burning, rashes or lesions   Lymph Nodes: No enlarged glands  Endocrine: No heat or cold intolerance; No hair loss  Musculoskeletal: No joint pain or swelling; No muscle, back or extremity pain  Psychiatric: No depression, anxiety, mood swings or difficulty sleeping  Heme/Lymph: No easy bruising or bleeding gums  Allergy and Immunologic: No hives or eczema    current medications:    acetaminophen   Tablet .. 650 milliGRAM(s) Oral every 6 hours PRN  albuterol/ipratropium for Nebulization 3 milliLiter(s) Nebulizer every 6 hours  atorvastatin 20 milliGRAM(s) Oral at bedtime  carvedilol 12.5 milliGRAM(s) Oral every 12 hours  cefTRIAXone   IVPB 1000 milliGRAM(s) IV Intermittent every 24 hours  cholecalciferol 1000 Unit(s) Oral daily  donepezil 10 milliGRAM(s) Oral at bedtime  escitalopram 10 milliGRAM(s) Oral daily  ferrous    sulfate 325 milliGRAM(s) Oral three times a day  heparin  Injectable 5000 Unit(s) SubCutaneous every 12 hours  hydrALAZINE 50 milliGRAM(s) Oral three times a day  levothyroxine 50 MICROGram(s) Oral daily  pantoprazole    Tablet 40 milliGRAM(s) Oral before breakfast  senna 1 Tablet(s) Oral at bedtime  sodium chloride 0.65% Nasal 1 Spray(s) Both Nostrils three times a day  sucralfate 1 Gram(s) Oral four times a day      Vital Signs Last 24 Hrs  T(C): 37.1 (2020 10:57), Max: 37.1 (2020 10:57)  T(F): 98.8 (2020 10:57), Max: 98.8 (2020 10:57)  HR: 76 (2020 12:27) (49 - 76)  BP: 119/66 (2020 10:57) (100/49 - 180/81)  BP(mean): --  RR: 18 (2020 10:57) (10 - 18)  SpO2: 98% (2020 12:27) (97% - 100%)    PHYSICAL EXAM:    Constitutional: NAD, well-groomed, well-developed  HEENT: PERRLA, EOMI, Normal Hearing, MMM  Neck: No LAD, No JVD  Back: Normal spine flexure, No CVA tenderness  Respiratory: CTAB/L   Cardiovascular: S1 and S2, RRR, no M/G/R  Gastrointestinal: BS+, soft, NT/ND  Extremities: No peripheral edema  Vascular: 2+ peripheral pulses  Neurological: A/O x 3, no focal deficits  Skin: No rashes      LABS:                        8.0    7.25  )-----------( 121      ( 2020 07:06 )             25.3     01-02    141  |  105  |  52<H>  ----------------------------<  86  3.5   |  23  |  3.04<H>    Ca    8.3<L>      2020 07:06  Mg     2.0         TPro  5.5<L>  /  Alb  2.0<L>  /  TBili  0.2  /  DBili  x   /  AST  20  /  ALT  43  /  AlkPhos  115      PT/INR - ( 2020 13:53 )   PT: 11.7 sec;   INR: 1.04 ratio         PTT - ( 2020 13:53 )  PTT:29.5 sec  Urinalysis Basic - ( 2020 14:28 )    Color: Yellow / Appearance: Slightly Turbid / S.015 / pH: x  Gluc: x / Ketone: Negative  / Bili: Negative / Urobili: Negative mg/dL   Blood: x / Protein: 500 mg/dL / Nitrite: Negative   Leuk Esterase: Trace / RBC: 0-2 /HPF / WBC 3-5   Sq Epi: x / Non Sq Epi: Few / Bacteria: Many      Creatinine Trend: 3.04<--, 2.99<--, 3.12<--, 3.15<--, 3.27<--, 3.29<--    MICROBIOLOGY:  RECENT CULTURES:        RADIOLOGY & ADDITIONAL STUDIES:    < from: Xray Chest 1 View-PORTABLE IMMEDIATE (20 @ 16:24) >  EXAM:  XR CHEST PORTABLE IMMED 1V                            PROCEDURE DATE:  2020          INTERPRETATION:    DATE OF STUDY: 2020 at 3:53PM    PRIOR:Earlier 2020 chest.    CLINICAL INDICATION: Shortness of breath    TECHNIQUE: portable chest.    FINDINGS:   Thoracic aortic atheromatous changes and ectasia are stable.  The heart is top normal in size.  No florid central congestive changes.  Small bilateral pleural effusions noted, left more than right.  Bibasilar airspace hazinessmay be due to atelectasis-but underlying pneumonia not excluded in the right clinical setting.  No pneumothorax.  No acute bony findings.    IMPRESSION:   1. Small bilateral pleural effusions,  2. Increasing bibasilar atelectasis and/or pneumonia, asabove.

## 2020-01-02 NOTE — SWALLOW BEDSIDE ASSESSMENT ADULT - COMMENTS
Pt completed MBS in 1/2018, at which time pt was recommended mechanical soft solids with thin liquids; however, pt tolerating soft solids (dysphagia 3) at [facility].    Chest X-Ray: Findings: Bibasilar airspace haziness may be due to atelectasis-but underlying pneumonia not excluded in the right clinical setting. No pneumothorax. No acute bony findings. IMPRESSION:  1. Small bilateral pleural effusions, 2. Increasing bibasilar atelectasis and/or pneumonia, as above.

## 2020-01-02 NOTE — SWALLOW BEDSIDE ASSESSMENT ADULT - SLP PERTINENT HISTORY OF CURRENT PROBLEM
Pt's PMHx is significant for dementia, CVA in 1993 and 2017 with residual L sided weakness and aphasia, HTN, achalasia, aspiration PNA.

## 2020-01-02 NOTE — SWALLOW BEDSIDE ASSESSMENT ADULT - SWALLOW EVAL: RECOMMENDED FEEDING/EATING TECHNIQUES
alternate food with liquid/position upright (90 degrees)/small sips/bites/allow for swallow between intakes/maintain upright posture during/after eating for 30 mins

## 2020-01-02 NOTE — SWALLOW BEDSIDE ASSESSMENT ADULT - PHARYNGEAL PHASE
Within functional limits Within functional limits/Multiple swallows mildly decreased laryngeal elevation/Within functional limits/Decreased laryngeal elevation

## 2020-01-02 NOTE — CONSULT NOTE ADULT - ASSESSMENT
79 female with a history of dementia, CVA with hemiparesis HTN, CKD stage 5 with anemia now admitted with cough, SOB and leg edema. She is now being treated for possible PNA.   Pulmonary note appreciated. Will continue present medications and will give lasix on PRN basis.

## 2020-01-03 LAB
-  AMPICILLIN: SIGNIFICANT CHANGE UP
-  CIPROFLOXACIN: SIGNIFICANT CHANGE UP
-  DAPTOMYCIN: SIGNIFICANT CHANGE UP
-  LEVOFLOXACIN: SIGNIFICANT CHANGE UP
-  LINEZOLID: SIGNIFICANT CHANGE UP
-  NITROFURANTOIN: SIGNIFICANT CHANGE UP
-  TETRACYCLINE: SIGNIFICANT CHANGE UP
-  VANCOMYCIN: SIGNIFICANT CHANGE UP
ANION GAP SERPL CALC-SCNC: 8 MMOL/L — SIGNIFICANT CHANGE UP (ref 5–17)
BUN SERPL-MCNC: 52 MG/DL — HIGH (ref 7–23)
CALCIUM SERPL-MCNC: 8.4 MG/DL — LOW (ref 8.5–10.1)
CHLORIDE SERPL-SCNC: 108 MMOL/L — SIGNIFICANT CHANGE UP (ref 96–108)
CO2 SERPL-SCNC: 26 MMOL/L — SIGNIFICANT CHANGE UP (ref 22–31)
CREAT SERPL-MCNC: 3.02 MG/DL — HIGH (ref 0.5–1.3)
CULTURE RESULTS: SIGNIFICANT CHANGE UP
GLUCOSE SERPL-MCNC: 112 MG/DL — HIGH (ref 70–99)
HCT VFR BLD CALC: 27.6 % — LOW (ref 34.5–45)
HGB BLD-MCNC: 8.8 G/DL — LOW (ref 11.5–15.5)
MCHC RBC-ENTMCNC: 30.7 PG — SIGNIFICANT CHANGE UP (ref 27–34)
MCHC RBC-ENTMCNC: 31.9 GM/DL — LOW (ref 32–36)
MCV RBC AUTO: 96.2 FL — SIGNIFICANT CHANGE UP (ref 80–100)
METHOD TYPE: SIGNIFICANT CHANGE UP
NRBC # BLD: 0 /100 WBCS — SIGNIFICANT CHANGE UP (ref 0–0)
ORGANISM # SPEC MICROSCOPIC CNT: SIGNIFICANT CHANGE UP
PLATELET # BLD AUTO: 134 K/UL — LOW (ref 150–400)
POTASSIUM SERPL-MCNC: 3.6 MMOL/L — SIGNIFICANT CHANGE UP (ref 3.5–5.3)
POTASSIUM SERPL-SCNC: 3.6 MMOL/L — SIGNIFICANT CHANGE UP (ref 3.5–5.3)
RBC # BLD: 2.87 M/UL — LOW (ref 3.8–5.2)
RBC # FLD: 17.2 % — HIGH (ref 10.3–14.5)
SODIUM SERPL-SCNC: 142 MMOL/L — SIGNIFICANT CHANGE UP (ref 135–145)
SPECIMEN SOURCE: SIGNIFICANT CHANGE UP
T3FREE SERPL-MCNC: 1.07 PG/ML — LOW (ref 1.8–4.6)
T4 AB SER-ACNC: 3.3 UG/DL — LOW (ref 4.6–12)
T4 FREE SERPL-MCNC: 0.6 NG/DL — LOW (ref 0.9–1.8)
WBC # BLD: 12.19 K/UL — HIGH (ref 3.8–10.5)
WBC # FLD AUTO: 12.19 K/UL — HIGH (ref 3.8–10.5)

## 2020-01-03 PROCEDURE — 93306 TTE W/DOPPLER COMPLETE: CPT | Mod: 26

## 2020-01-03 RX ORDER — IPRATROPIUM/ALBUTEROL SULFATE 18-103MCG
3 AEROSOL WITH ADAPTER (GRAM) INHALATION EVERY 4 HOURS
Refills: 0 | Status: DISCONTINUED | OUTPATIENT
Start: 2020-01-03 | End: 2020-01-13

## 2020-01-03 RX ADMIN — Medication 325 MILLIGRAM(S): at 22:24

## 2020-01-03 RX ADMIN — CEFTRIAXONE 100 MILLIGRAM(S): 500 INJECTION, POWDER, FOR SOLUTION INTRAMUSCULAR; INTRAVENOUS at 22:15

## 2020-01-03 RX ADMIN — SENNA PLUS 1 TABLET(S): 8.6 TABLET ORAL at 22:24

## 2020-01-03 RX ADMIN — CARVEDILOL PHOSPHATE 12.5 MILLIGRAM(S): 80 CAPSULE, EXTENDED RELEASE ORAL at 16:15

## 2020-01-03 RX ADMIN — Medication 3 MILLILITER(S): at 17:09

## 2020-01-03 RX ADMIN — Medication 50 MILLIGRAM(S): at 22:24

## 2020-01-03 RX ADMIN — ATORVASTATIN CALCIUM 20 MILLIGRAM(S): 80 TABLET, FILM COATED ORAL at 22:24

## 2020-01-03 RX ADMIN — Medication 1 GRAM(S): at 16:16

## 2020-01-03 RX ADMIN — HEPARIN SODIUM 5000 UNIT(S): 5000 INJECTION INTRAVENOUS; SUBCUTANEOUS at 05:03

## 2020-01-03 RX ADMIN — Medication 325 MILLIGRAM(S): at 05:03

## 2020-01-03 RX ADMIN — Medication 3 MILLILITER(S): at 22:22

## 2020-01-03 RX ADMIN — Medication 1 GRAM(S): at 05:03

## 2020-01-03 RX ADMIN — Medication 1 GRAM(S): at 12:03

## 2020-01-03 RX ADMIN — CARVEDILOL PHOSPHATE 12.5 MILLIGRAM(S): 80 CAPSULE, EXTENDED RELEASE ORAL at 05:02

## 2020-01-03 RX ADMIN — Medication 40 MILLIGRAM(S): at 13:00

## 2020-01-03 RX ADMIN — Medication 50 MILLIGRAM(S): at 12:05

## 2020-01-03 RX ADMIN — Medication 1 SPRAY(S): at 22:24

## 2020-01-03 RX ADMIN — HEPARIN SODIUM 5000 UNIT(S): 5000 INJECTION INTRAVENOUS; SUBCUTANEOUS at 16:15

## 2020-01-03 RX ADMIN — Medication 50 MILLIGRAM(S): at 05:03

## 2020-01-03 RX ADMIN — Medication 3 MILLILITER(S): at 11:01

## 2020-01-03 RX ADMIN — Medication 1 GRAM(S): at 00:30

## 2020-01-03 RX ADMIN — DONEPEZIL HYDROCHLORIDE 10 MILLIGRAM(S): 10 TABLET, FILM COATED ORAL at 22:24

## 2020-01-03 RX ADMIN — Medication 40 MILLIGRAM(S): at 16:16

## 2020-01-03 RX ADMIN — Medication 3 MILLILITER(S): at 05:02

## 2020-01-03 RX ADMIN — Medication 1 SPRAY(S): at 05:02

## 2020-01-03 RX ADMIN — ESCITALOPRAM OXALATE 10 MILLIGRAM(S): 10 TABLET, FILM COATED ORAL at 12:03

## 2020-01-03 RX ADMIN — Medication 1000 UNIT(S): at 12:04

## 2020-01-03 RX ADMIN — Medication 50 MICROGRAM(S): at 05:02

## 2020-01-03 RX ADMIN — Medication 325 MILLIGRAM(S): at 12:05

## 2020-01-03 NOTE — DIETITIAN INITIAL EVALUATION ADULT. - DIET TYPE
low sodium/+ Ensure Clear 8 oz 2x/day (480 rosa, 16 gm pro), assist c feeding/dysphagia 3, soft, thin liquids

## 2020-01-03 NOTE — DIETITIAN INITIAL EVALUATION ADULT. - ENERGY NEEDS
Height (cm): 147.3 (01-02)  Weight (kg): 61.3 (01-02)  BMI (kg/m2): 28.3 (01-02)  IBW:  45.3 kg         % IBW: 135%            UBW:  ?           %UBW: ?

## 2020-01-03 NOTE — PROGRESS NOTE ADULT - SUBJECTIVE AND OBJECTIVE BOX
INTERVAL HPI:  79 year female with HTN, HLD, MI hx, CVA, Gi bleed, Anemia Depression and Alzheimer's dementia.  Sent from [facility] due to SOB. Possibility of pneumonia vs CHF was raised.   In ED with Respiratory distress required BIPAP support and RVP positive for RSV. Pt not able to provide more details due to dementia. Information from transfer papers and ED physician.  Nods no when asked for smoking.    OVERNIGHT EVENTS:  Reports sob    Vital Signs Last 24 Hrs  T(C): 36.4 (03 Jan 2020 16:30), Max: 36.5 (03 Jan 2020 11:36)  T(F): 97.5 (03 Jan 2020 16:30), Max: 97.7 (03 Jan 2020 11:36)  HR: 92 (03 Jan 2020 17:19) (74 - 99)  BP: 180/78 (03 Jan 2020 16:30) (125/69 - 185/80)  BP(mean): --  RR: 18 (03 Jan 2020 16:30) (18 - 19)  SpO2: 98% (03 Jan 2020 17:19) (96% - 100%)    PHYSICAL EXAM:  GEN:         Awake, responsive and comfortable.  HEENT:    Normal.    RESP:       wheezing.  CVS:          Regular rate and rhythm.   ABD:         Soft, non-tender, non-distended;     MEDICATIONS  (STANDING):  albuterol/ipratropium for Nebulization 3 milliLiter(s) Nebulizer every 6 hours  atorvastatin 20 milliGRAM(s) Oral at bedtime  carvedilol 12.5 milliGRAM(s) Oral every 12 hours  cefTRIAXone   IVPB 1000 milliGRAM(s) IV Intermittent every 24 hours  cholecalciferol 1000 Unit(s) Oral daily  donepezil 10 milliGRAM(s) Oral at bedtime  escitalopram 10 milliGRAM(s) Oral daily  ferrous    sulfate 325 milliGRAM(s) Oral three times a day  heparin  Injectable 5000 Unit(s) SubCutaneous every 12 hours  hydrALAZINE 50 milliGRAM(s) Oral three times a day  levothyroxine 50 MICROGram(s) Oral daily  methylPREDNISolone sodium succinate Injectable 40 milliGRAM(s) IV Push every 6 hours  pantoprazole    Tablet 40 milliGRAM(s) Oral before breakfast  senna 1 Tablet(s) Oral at bedtime  sodium chloride 0.65% Nasal 1 Spray(s) Both Nostrils three times a day  sucralfate 1 Gram(s) Oral four times a day    MEDICATIONS  (PRN):  acetaminophen   Tablet .. 650 milliGRAM(s) Oral every 6 hours PRN Temp greater or equal to 38C (100.4F), Mild Pain (1 - 3)    LABS:                        8.8    12.19 )-----------( 134      ( 03 Jan 2020 08:36 )             27.6     01-03    142  |  108  |  52<H>  ----------------------------<  112<H>  3.6   |  26  |  3.02<H>    Ca    8.4<L>      03 Jan 2020 08:36  Mg     2.0     01-02    TPro  5.5<L>  /  Alb  2.0<L>  /  TBili  0.2  /  DBili  x   /  AST  20  /  ALT  43  /  AlkPhos  115  01-02 01-01 @ 14:09  pH: 7.39  pCO2: 43  pO2: 209  SaO2: 100  ASSESSMENT AND PLAN:  ·	Acute Respiratory distress.  ·	RSV tracheobronchitis.  ·	Left pleural effusion.  ·	Hypothyroidism with very high TSH.  ·	Anemia.  ·	Leukocytosis.  ·	Renal Insuffiencey.  ·	CVA by history.  ·	HTN.  ·	HLD.  ·	Alzheimer's dementia.    Change nebulizer to every 4 hours as still wheezing.  Continue steroids and antibiotics.  Supplemental O2 with PRN BIPAP.

## 2020-01-03 NOTE — DIETITIAN INITIAL EVALUATION ADULT. - PHYSICAL APPEARANCE
debilitated/underweight/other (specify)/BMI=28.3(01/02/20), 3+ edema of legs, 4+ edema of left arm noted Nutrition focused physical exam conducted; Subcutaneous fat Exam;  [ Moderate   ]  Orbital fat pads region,  [ Moderate  ]Buccal fat region,  [  Moderate, right arm ]triceps region, [  WNL ]ribs region.  Muscle Exam; [ Mild   ]temples region, [ Moderate  ]clavicle region, [ Moderate  ]shoulder region, [  Unable ]Scapula region, [ Moderate, right hand ]Interosseous region, [ Unable   ]thigh region, [ Unable  ]Calf region

## 2020-01-03 NOTE — PROGRESS NOTE ADULT - SUBJECTIVE AND OBJECTIVE BOX
Patient is a 79y old  Female who presents with a chief complaint of sob cough (2020 15:39)      INTERVAL HPI/OVERNIGHT EVENTS: some improvement tolerates NC 3L  AA answering questions, "no better"    MEDICATIONS  (STANDING):  albuterol/ipratropium for Nebulization 3 milliLiter(s) Nebulizer every 6 hours  atorvastatin 20 milliGRAM(s) Oral at bedtime  carvedilol 12.5 milliGRAM(s) Oral every 12 hours  cefTRIAXone   IVPB 1000 milliGRAM(s) IV Intermittent every 24 hours  cholecalciferol 1000 Unit(s) Oral daily  donepezil 10 milliGRAM(s) Oral at bedtime  escitalopram 10 milliGRAM(s) Oral daily  ferrous    sulfate 325 milliGRAM(s) Oral three times a day  heparin  Injectable 5000 Unit(s) SubCutaneous every 12 hours  hydrALAZINE 50 milliGRAM(s) Oral three times a day  levothyroxine 50 MICROGram(s) Oral daily  methylPREDNISolone sodium succinate Injectable 40 milliGRAM(s) IV Push every 6 hours  pantoprazole    Tablet 40 milliGRAM(s) Oral before breakfast  senna 1 Tablet(s) Oral at bedtime  sodium chloride 0.65% Nasal 1 Spray(s) Both Nostrils three times a day  sucralfate 1 Gram(s) Oral four times a day    MEDICATIONS  (PRN):  acetaminophen   Tablet .. 650 milliGRAM(s) Oral every 6 hours PRN Temp greater or equal to 38C (100.4F), Mild Pain (1 - 3)      Allergies    No Known Allergies    Intolerances        REVIEW OF SYSTEMS:      no CP no SOB no N/V/C/D  Cough        Vital Signs Last 24 Hrs  T(C): 36.1 (2020 05:26), Max: 37.1 (2020 10:57)  T(F): 97 (2020 05:26), Max: 98.8 (2020 10:57)  HR: 74 (2020 08:23) (66 - 90)  BP: 125/69 (2020 05:26) (119/66 - 165/91)  BP(mean): --  RR: 18 (2020 05:26) (18 - 19)  SpO2: 97% (2020 08:23) (97% - 100%)    PHYSICAL EXAM:  general       HEENT wnl  CHEST/LUNG: rhonchi and wheezing  HEART: Regular rate and rhythm; No murmurs, rubs, or gallops  ABDOMEN: Soft, Nontender, Nondistended; Bowel sounds present  EXTREMITIES:  2+ Peripheral Pulses, No clubbing, cyanosis, or edema  LYMPH: No lymphadenopathy noted  SKIN: No rashes or lesions    LABS:                        8.8    12.19 )-----------( 134      ( 2020 08:36 )             27.6     03    142  |  108  |  52<H>  ----------------------------<  112<H>  3.6   |  26  |  3.02<H>    Ca    8.4<L>      2020 08:36  Mg     2.0         TPro  5.5<L>  /  Alb  2.0<L>  /  TBili  0.2  /  DBili  x   /  AST  20  /  ALT  43  /  AlkPhos  115  02    PT/INR - ( 2020 13:53 )   PT: 11.7 sec;   INR: 1.04 ratio         PTT - ( 2020 13:53 )  PTT:29.5 sec  Urinalysis Basic - ( 2020 14:28 )    Color: Yellow / Appearance: Slightly Turbid / S.015 / pH: x  Gluc: x / Ketone: Negative  / Bili: Negative / Urobili: Negative mg/dL   Blood: x / Protein: 500 mg/dL / Nitrite: Negative   Leuk Esterase: Trace / RBC: 0-2 /HPF / WBC 3-5   Sq Epi: x / Non Sq Epi: Few / Bacteria: Many    Blood C&S neg  Urine [id number] to [id number] Enterococcus S Pending      CAPILLARY BLOOD GLUCOSE        ABG - ( 2020 14:09 )  pH, Arterial: x     pH, Blood: 7.39  /  pCO2: 43    /  pO2: 209   / HCO3: 26    / Base Excess: 0.9   /  SaO2: 100               CARDIAC MARKERS ( 2020 07:06 )  .076 ng/mL / x     / 25 U/L / x     / 1.3 ng/mL  CARDIAC MARKERS ( 2020 20:37 )  .074 ng/mL / x     / 30 U/L / x     / 2.1 ng/mL  CARDIAC MARKERS ( 2020 13:53 )  .074 ng/mL / x     / 50 U/L / x     / 2.2 ng/mL            RADIOLOGY & ADDITIONAL TESTS:    Imaging Personally Reviewed:  [y ] YES  [ ] NO    Consultant(s) Notes Reviewed:  [y ] YES  [ ] NO    Care Discussed with Consultants/Other Providers [ ] YES  [ ] NO    PROBLEMS:  CONGESTIVE BABB FAILURE;RSV INFECTION  SHORTNESS OF BREATH  CHF (congestive heart failure)  Tracheobronchitis  bronchiolitis   RSV  UTI  R atrophic kidney  CKD 4      Care discussed with family,         [  ]   yes  [  ]  No    imp:    stable[ ]    unstable[  ]     improving [ x  ]       unchanged  [  ]                Plans:  start Steroids Solumedrol 40mg IVP q 6 hours  AB continue  Nebulizer  ECHO Pending

## 2020-01-03 NOTE — DIETITIAN INITIAL EVALUATION ADULT. - PERTINENT LABORATORY DATA
01-03 Na142 mmol/L Glu 112 mg/dL<H> K+ 3.6 mmol/L Cr  3.02 mg/dL<H> BUN 52 mg/dL<H> 01-02 Alb 2.0 g/dL<L>01-02 ALT 43 U/L AST 20 U/L Alkaline Phosphatase 115 U/L

## 2020-01-03 NOTE — DIETITIAN INITIAL EVALUATION ADULT. - ETIOLOGY
inadequate protein-energy intake in setting of altered swallow, CVA, alzheimer's disease, cardiac hx

## 2020-01-03 NOTE — DIETITIAN INITIAL EVALUATION ADULT. - PERTINENT MEDS FT
MEDICATIONS  (STANDING):  albuterol/ipratropium for Nebulization 3 milliLiter(s) Nebulizer every 6 hours  atorvastatin 20 milliGRAM(s) Oral at bedtime  carvedilol 12.5 milliGRAM(s) Oral every 12 hours  cefTRIAXone   IVPB 1000 milliGRAM(s) IV Intermittent every 24 hours  cholecalciferol 1000 Unit(s) Oral daily  donepezil 10 milliGRAM(s) Oral at bedtime  escitalopram 10 milliGRAM(s) Oral daily  ferrous    sulfate 325 milliGRAM(s) Oral three times a day  heparin  Injectable 5000 Unit(s) SubCutaneous every 12 hours  hydrALAZINE 50 milliGRAM(s) Oral three times a day  levothyroxine 50 MICROGram(s) Oral daily  methylPREDNISolone sodium succinate Injectable 40 milliGRAM(s) IV Push every 6 hours  pantoprazole    Tablet 40 milliGRAM(s) Oral before breakfast  senna 1 Tablet(s) Oral at bedtime  sodium chloride 0.65% Nasal 1 Spray(s) Both Nostrils three times a day  sucralfate 1 Gram(s) Oral four times a day    MEDICATIONS  (PRN):  acetaminophen   Tablet .. 650 milliGRAM(s) Oral every 6 hours PRN Temp greater or equal to 38C (100.4F), Mild Pain (1 - 3)

## 2020-01-03 NOTE — DIETITIAN INITIAL EVALUATION ADULT. - OTHER INFO
Pt's family was not present when seen.  Pt was transferred from [facility] & was on DASH, 2-3 grams sodium, soft, cut-up foods c Ensure Enlive 1 x /day as per transfer records.  Pt adm c CHF, RSV infection, c CKD.

## 2020-01-03 NOTE — DIETITIAN INITIAL EVALUATION ADULT. - FACTORS AFF FOOD INTAKE
difficulty swallowing/other (specify)/01/02/20, swallow evaluation c recommendation for Dysphagia 3 Soft - Thin Liquids/change in mental status

## 2020-01-03 NOTE — CHART NOTE - NSCHARTNOTEFT_GEN_A_CORE
Upon Nutritional Assessment by the Registered Dietitian your patient was determined to meet criteria / has evidence of the following diagnosis/diagnoses:          [ ]  Mild Protein Calorie Malnutrition        [ x]  Moderate Protein Calorie Malnutrition        [ ] Severe Protein Calorie Malnutrition        [ ] Unspecified Protein Calorie Malnutrition        [ ] Underweight / BMI <19        [ ] Morbid Obesity / BMI > 40      Findings as based on:  •  Comprehensive nutrition assessment and consultation  •  Calorie counts (nutrient intake analysis)  •  Food acceptance and intake status from observations by staff  •  Follow up  •  Patient education  •  Intervention secondary to interdisciplinary rounds  •   concerns      Treatment:    The following diet has been recommended:  Low sodium , Dysphagia 3 Soft - Thin Liquids , Ensure Clear 8 oz 2x/day (480 rosa, 16 gm pro)     PROVIDER Section:     By signing this assessment you are acknowledging and agree with the diagnosis/diagnoses assigned by the Registered Dietitian    Comments:

## 2020-01-04 RX ORDER — AMPICILLIN TRIHYDRATE 250 MG
1 CAPSULE ORAL EVERY 12 HOURS
Refills: 0 | Status: DISCONTINUED | OUTPATIENT
Start: 2020-01-05 | End: 2020-01-12

## 2020-01-04 RX ORDER — AMPICILLIN TRIHYDRATE 250 MG
1 CAPSULE ORAL ONCE
Refills: 0 | Status: COMPLETED | OUTPATIENT
Start: 2020-01-04 | End: 2020-01-04

## 2020-01-04 RX ORDER — AMPICILLIN TRIHYDRATE 250 MG
CAPSULE ORAL
Refills: 0 | Status: DISCONTINUED | OUTPATIENT
Start: 2020-01-04 | End: 2020-01-12

## 2020-01-04 RX ADMIN — Medication 1000 UNIT(S): at 12:15

## 2020-01-04 RX ADMIN — Medication 325 MILLIGRAM(S): at 06:22

## 2020-01-04 RX ADMIN — ATORVASTATIN CALCIUM 20 MILLIGRAM(S): 80 TABLET, FILM COATED ORAL at 22:37

## 2020-01-04 RX ADMIN — Medication 40 MILLIGRAM(S): at 06:34

## 2020-01-04 RX ADMIN — Medication 1 SPRAY(S): at 16:43

## 2020-01-04 RX ADMIN — Medication 1 GRAM(S): at 18:49

## 2020-01-04 RX ADMIN — Medication 1 SPRAY(S): at 22:37

## 2020-01-04 RX ADMIN — Medication 1 SPRAY(S): at 06:28

## 2020-01-04 RX ADMIN — Medication 40 MILLIGRAM(S): at 12:15

## 2020-01-04 RX ADMIN — PANTOPRAZOLE SODIUM 40 MILLIGRAM(S): 20 TABLET, DELAYED RELEASE ORAL at 06:22

## 2020-01-04 RX ADMIN — Medication 1 GRAM(S): at 00:47

## 2020-01-04 RX ADMIN — CARVEDILOL PHOSPHATE 12.5 MILLIGRAM(S): 80 CAPSULE, EXTENDED RELEASE ORAL at 06:22

## 2020-01-04 RX ADMIN — Medication 3 MILLILITER(S): at 10:37

## 2020-01-04 RX ADMIN — Medication 3 MILLILITER(S): at 17:12

## 2020-01-04 RX ADMIN — ESCITALOPRAM OXALATE 10 MILLIGRAM(S): 10 TABLET, FILM COATED ORAL at 12:15

## 2020-01-04 RX ADMIN — Medication 3 MILLILITER(S): at 21:03

## 2020-01-04 RX ADMIN — HEPARIN SODIUM 5000 UNIT(S): 5000 INJECTION INTRAVENOUS; SUBCUTANEOUS at 06:23

## 2020-01-04 RX ADMIN — Medication 40 MILLIGRAM(S): at 23:14

## 2020-01-04 RX ADMIN — Medication 1 GRAM(S): at 23:14

## 2020-01-04 RX ADMIN — Medication 1 GRAM(S): at 06:22

## 2020-01-04 RX ADMIN — Medication 1 GRAM(S): at 12:15

## 2020-01-04 RX ADMIN — Medication 50 MILLIGRAM(S): at 06:22

## 2020-01-04 RX ADMIN — Medication 50 MILLIGRAM(S): at 22:37

## 2020-01-04 RX ADMIN — HEPARIN SODIUM 5000 UNIT(S): 5000 INJECTION INTRAVENOUS; SUBCUTANEOUS at 18:49

## 2020-01-04 RX ADMIN — Medication 325 MILLIGRAM(S): at 22:37

## 2020-01-04 RX ADMIN — Medication 50 MICROGRAM(S): at 06:22

## 2020-01-04 RX ADMIN — Medication 3 MILLILITER(S): at 01:03

## 2020-01-04 RX ADMIN — Medication 40 MILLIGRAM(S): at 00:47

## 2020-01-04 RX ADMIN — Medication 325 MILLIGRAM(S): at 16:36

## 2020-01-04 RX ADMIN — DONEPEZIL HYDROCHLORIDE 10 MILLIGRAM(S): 10 TABLET, FILM COATED ORAL at 22:37

## 2020-01-04 RX ADMIN — Medication 50 MILLIGRAM(S): at 16:35

## 2020-01-04 RX ADMIN — Medication 108 GRAM(S): at 18:49

## 2020-01-04 RX ADMIN — Medication 40 MILLIGRAM(S): at 18:49

## 2020-01-04 RX ADMIN — Medication 3 MILLILITER(S): at 05:53

## 2020-01-04 RX ADMIN — CARVEDILOL PHOSPHATE 12.5 MILLIGRAM(S): 80 CAPSULE, EXTENDED RELEASE ORAL at 18:49

## 2020-01-04 RX ADMIN — SENNA PLUS 1 TABLET(S): 8.6 TABLET ORAL at 22:37

## 2020-01-04 NOTE — PROGRESS NOTE ADULT - ASSESSMENT
79 female with a history of dementia, CVA with hemiparesis HTN, CKD stage 5 with anemia now admitted with cough, SOB and leg edema.     1.	CKD 4/5  2.	Pneumonia  3.	Anemia    Recheck am labs. To continue current meds. Will follow electrolytes and renal function trend. Monitor h/h trend.   Avoid nephrotoxic meds as possible. Avoid ACEI, ARB, NSAIDs and IV contrast. Pulmonary follow up.

## 2020-01-04 NOTE — PROGRESS NOTE ADULT - SUBJECTIVE AND OBJECTIVE BOX
INTERVAL HPI:  79 year female with HTN, HLD, MI hx, CVA, Gi bleed, Anemia Depression and Alzheimer's dementia.  Sent from [facility] due to SOB. Possibility of pneumonia vs CHF was raised.   In ED with Respiratory distress required BIPAP support and RVP positive for RSV. Pt not able to provide more details due to dementia. Information from transfer papers and ED physician.  Nods no when asked for smoking.    OVERNIGHT EVENTS:  Awake, responsive    Vital Signs Last 24 Hrs  T(C): 36.2 (04 Jan 2020 16:25), Max: 36.3 (04 Jan 2020 04:45)  T(F): 97.2 (04 Jan 2020 16:25), Max: 97.4 (04 Jan 2020 04:45)  HR: 79 (04 Jan 2020 17:15) (61 - 81)  BP: 131/72 (04 Jan 2020 16:25) (131/72 - 176/76)  BP(mean): --  RR: 18 (04 Jan 2020 16:25) (18 - 19)  SpO2: 99% (04 Jan 2020 17:15) (97% - 100%)    PHYSICAL EXAM:  GEN:         Awake, responsive and comfortable.  HEENT:    Normal.    RESP:        wheezing.  CVS:           Regular rate and rhythm.   ABD:         Soft, non-tender, non-distended;     MEDICATIONS  (STANDING):  albuterol/ipratropium for Nebulization 3 milliLiter(s) Nebulizer every 4 hours  ampicillin  IVPB      ampicillin  IVPB 1 Gram(s) IV Intermittent once  atorvastatin 20 milliGRAM(s) Oral at bedtime  carvedilol 12.5 milliGRAM(s) Oral every 12 hours  cholecalciferol 1000 Unit(s) Oral daily  donepezil 10 milliGRAM(s) Oral at bedtime  escitalopram 10 milliGRAM(s) Oral daily  ferrous    sulfate 325 milliGRAM(s) Oral three times a day  heparin  Injectable 5000 Unit(s) SubCutaneous every 12 hours  hydrALAZINE 50 milliGRAM(s) Oral three times a day  levothyroxine 50 MICROGram(s) Oral daily  methylPREDNISolone sodium succinate Injectable 40 milliGRAM(s) IV Push every 6 hours  pantoprazole    Tablet 40 milliGRAM(s) Oral before breakfast  senna 1 Tablet(s) Oral at bedtime  sodium chloride 0.65% Nasal 1 Spray(s) Both Nostrils three times a day  sucralfate 1 Gram(s) Oral four times a day    MEDICATIONS  (PRN):  acetaminophen   Tablet .. 650 milliGRAM(s) Oral every 6 hours PRN Temp greater or equal to 38C (100.4F), Mild Pain (1 - 3)    LABS:                        8.8    12.19 )-----------( 134      ( 03 Jan 2020 08:36 )             27.6     01-03    142  |  108  |  52<H>  ----------------------------<  112<H>  3.6   |  26  |  3.02<H>    Ca    8.4<L>      03 Jan 2020 08:36    01-01 @ 14:09  pH: 7.39  pCO2: 43  pO2: 209  SaO2: 100  ASSESSMENT AND PLAN:  ·	Acute Respiratory distress.  ·	RSV tracheobronchitis.  ·	Left pleural effusion.  ·	Hypothyroidism with very high TSH.  ·	Anemia.  ·	Leukocytosis.  ·	Renal Insuffiencey.  ·	CVA by history.  ·	HTN.  ·	HLD.  ·	Alzheimer's dementia.    Still wheezing, continue nebulizer.  Supplemental O2 with PRN BIPAP.  Continue steroids and antibiotics.

## 2020-01-04 NOTE — PROGRESS NOTE ADULT - SUBJECTIVE AND OBJECTIVE BOX
Patient is a 79y old  Female who presents with a chief complaint of sob cough (03 Jan 2020 18:47)  Patient seen in follow up for CKD 4/5    PAST MEDICAL HISTORY:  Hypothyroidism  Constipation  Iron deficiency anemia  Major depressive disorder  Hyperlipidemia  GI bleed  Depression  Alzheimers disease  MI, old  CVA (cerebral vascular accident)  HTN (hypertension)  No pertinent past medical history    MEDICATIONS  (STANDING):  albuterol/ipratropium for Nebulization 3 milliLiter(s) Nebulizer every 4 hours  atorvastatin 20 milliGRAM(s) Oral at bedtime  carvedilol 12.5 milliGRAM(s) Oral every 12 hours  cefTRIAXone   IVPB 1000 milliGRAM(s) IV Intermittent every 24 hours  cholecalciferol 1000 Unit(s) Oral daily  donepezil 10 milliGRAM(s) Oral at bedtime  escitalopram 10 milliGRAM(s) Oral daily  ferrous    sulfate 325 milliGRAM(s) Oral three times a day  heparin  Injectable 5000 Unit(s) SubCutaneous every 12 hours  hydrALAZINE 50 milliGRAM(s) Oral three times a day  levothyroxine 50 MICROGram(s) Oral daily  methylPREDNISolone sodium succinate Injectable 40 milliGRAM(s) IV Push every 6 hours  pantoprazole    Tablet 40 milliGRAM(s) Oral before breakfast  senna 1 Tablet(s) Oral at bedtime  sodium chloride 0.65% Nasal 1 Spray(s) Both Nostrils three times a day  sucralfate 1 Gram(s) Oral four times a day    MEDICATIONS  (PRN):  acetaminophen   Tablet .. 650 milliGRAM(s) Oral every 6 hours PRN Temp greater or equal to 38C (100.4F), Mild Pain (1 - 3)    T(C): 36.3 (01-04-20 @ 04:45), Max: 36.5 (01-03-20 @ 11:36)  HR: 61 (01-04-20 @ 08:41) (61 - 99)  BP: 149/78 (01-04-20 @ 06:16) (125/69 - 185/80)  RR: 18 (01-04-20 @ 04:45) (18 - 19)  SpO2: 99% (01-04-20 @ 08:41) (96% - 100%)  Wt(kg): --  I&O's Detail    03 Jan 2020 07:01  -  04 Jan 2020 07:00  --------------------------------------------------------  IN:    Oral Fluid: 150 mL  Total IN: 150 mL    OUT:    Voided: 400 mL  Total OUT: 400 mL    Total NET: -250 mL          PHYSICAL EXAM:  General: NAD  Respiratory: b/l air entry  Cardiovascular: S1 S2  Gastrointestinal: soft  Extremities:  no edema                          8.8    12.19 )-----------( 134      ( 03 Jan 2020 08:36 )             27.6     01-03    142  |  108  |  52<H>  ----------------------------<  112<H>  3.6   |  26  |  3.02<H>    Ca    8.4<L>      03 Jan 2020 08:36      Sodium, Serum: 142 (01-03 @ 08:36)  Sodium, Serum: 141 (01-02 @ 07:06)  Sodium, Serum: 140 (01-01 @ 13:53)    Creatinine, Serum: 3.02 (01-03 @ 08:36)  Creatinine, Serum: 3.04 (01-02 @ 07:06)  Creatinine, Serum: 2.99 (01-01 @ 13:53)    Potassium, Serum: 3.6 (01-03 @ 08:36)  Potassium, Serum: 3.5 (01-02 @ 07:06)  Potassium, Serum: 4.0 (01-01 @ 13:53)    Hemoglobin: 8.8 (01-03 @ 08:36)  Hemoglobin: 8.0 (01-02 @ 07:06)  Hemoglobin: 9.0 (01-01 @ 13:53)

## 2020-01-04 NOTE — PROVIDER CONTACT NOTE (CRITICAL VALUE NOTIFICATION) - TEST AND RESULT REPORTED:
Urine culture collected on 1/1/20 - Final 50,000 - 99,000 enterocouccous faecalis Vancomycin resistant

## 2020-01-04 NOTE — PROGRESS NOTE ADULT - SUBJECTIVE AND OBJECTIVE BOX
Patient is a 79y old  Female who presents with a chief complaint of sob cough (04 Jan 2020 17:28)      INTERVAL HPI/OVERNIGHT EVENTS:    MEDICATIONS  (STANDING):  albuterol/ipratropium for Nebulization 3 milliLiter(s) Nebulizer every 4 hours  ampicillin  IVPB      atorvastatin 20 milliGRAM(s) Oral at bedtime  carvedilol 12.5 milliGRAM(s) Oral every 12 hours  cholecalciferol 1000 Unit(s) Oral daily  donepezil 10 milliGRAM(s) Oral at bedtime  escitalopram 10 milliGRAM(s) Oral daily  ferrous    sulfate 325 milliGRAM(s) Oral three times a day  heparin  Injectable 5000 Unit(s) SubCutaneous every 12 hours  hydrALAZINE 50 milliGRAM(s) Oral three times a day  levothyroxine 50 MICROGram(s) Oral daily  methylPREDNISolone sodium succinate Injectable 40 milliGRAM(s) IV Push every 6 hours  pantoprazole    Tablet 40 milliGRAM(s) Oral before breakfast  senna 1 Tablet(s) Oral at bedtime  sodium chloride 0.65% Nasal 1 Spray(s) Both Nostrils three times a day  sucralfate 1 Gram(s) Oral four times a day    MEDICATIONS  (PRN):  acetaminophen   Tablet .. 650 milliGRAM(s) Oral every 6 hours PRN Temp greater or equal to 38C (100.4F), Mild Pain (1 - 3)      Allergies    No Known Allergies    Intolerances        REVIEW OF SYSTEMS:  unrelable        Vital Signs Last 24 Hrs  T(C): 36.2 (04 Jan 2020 16:25), Max: 36.3 (04 Jan 2020 04:45)  T(F): 97.2 (04 Jan 2020 16:25), Max: 97.4 (04 Jan 2020 04:45)  HR: 69 (04 Jan 2020 21:07) (61 - 83)  BP: 131/72 (04 Jan 2020 16:25) (131/72 - 176/76)  BP(mean): --  RR: 18 (04 Jan 2020 16:25) (18 - 19)  SpO2: 98% (04 Jan 2020 21:07) (97% - 100%)    PHYSICAL EXAM:  general       HEENT wnl  CHEST/LUNG: Clear to percussion bilaterally; No rales, rhonchi, wheezing, or rubs  HEART: Regular rate and rhythm; No murmurs, rubs, or gallops  ABDOMEN: Soft, Nontender, Nondistended; Bowel sounds present  EXTREMITIES:  2+ Peripheral Pulses, No clubbing, cyanosis, or edema  LYMPH: No lymphadenopathy noted  SKIN: No rashes or lesions    LABS:                        8.8    12.19 )-----------( 134      ( 03 Jan 2020 08:36 )             27.6     01-03    142  |  108  |  52<H>  ----------------------------<  112<H>  3.6   |  26  |  3.02<H>    Ca    8.4<L>      03 Jan 2020 08:36          CAPILLARY BLOOD GLUCOSE  Urine VRE                    RADIOLOGY & ADDITIONAL TESTS:    Imaging Personally Reviewed:  [y ] YES  [ ] NO    Consultant(s) Notes Reviewed:  [y ] YES  [ ] NO    Care Discussed with Consultants/Other Providers [ ] YES  [ ] NO    PROBLEMS:  CONGESTIVE BABB FAILURE;RSV INFECTION  SHORTNESS OF BREATH  CHF (congestive heart failure)  Acute tracheobronchitis  CRF 4  UTI VRE S to Ampicillin      Care discussed with family,         [  ]   yes  [  ]  No    imp:    stable[ ]    unstable[  ]     improving [   ]       unchanged  [  ]                Plans:  Amy miguel Amoicillin 1g IVPB q 12h  Steroids  Pulmonary noted  ID consult  Contact precautions

## 2020-01-05 LAB
ANION GAP SERPL CALC-SCNC: 9 MMOL/L — SIGNIFICANT CHANGE UP (ref 5–17)
BUN SERPL-MCNC: 56 MG/DL — HIGH (ref 7–23)
CALCIUM SERPL-MCNC: 8 MG/DL — LOW (ref 8.5–10.1)
CHLORIDE SERPL-SCNC: 105 MMOL/L — SIGNIFICANT CHANGE UP (ref 96–108)
CO2 SERPL-SCNC: 24 MMOL/L — SIGNIFICANT CHANGE UP (ref 22–31)
CREAT SERPL-MCNC: 3.45 MG/DL — HIGH (ref 0.5–1.3)
GLUCOSE SERPL-MCNC: 161 MG/DL — HIGH (ref 70–99)
HCT VFR BLD CALC: 24.7 % — LOW (ref 34.5–45)
HGB BLD-MCNC: 7.8 G/DL — LOW (ref 11.5–15.5)
MCHC RBC-ENTMCNC: 31.3 PG — SIGNIFICANT CHANGE UP (ref 27–34)
MCHC RBC-ENTMCNC: 31.6 GM/DL — LOW (ref 32–36)
MCV RBC AUTO: 99.2 FL — SIGNIFICANT CHANGE UP (ref 80–100)
NRBC # BLD: 0 /100 WBCS — SIGNIFICANT CHANGE UP (ref 0–0)
PLATELET # BLD AUTO: 132 K/UL — LOW (ref 150–400)
POTASSIUM SERPL-MCNC: 3.6 MMOL/L — SIGNIFICANT CHANGE UP (ref 3.5–5.3)
POTASSIUM SERPL-SCNC: 3.6 MMOL/L — SIGNIFICANT CHANGE UP (ref 3.5–5.3)
RBC # BLD: 2.49 M/UL — LOW (ref 3.8–5.2)
RBC # FLD: 16.8 % — HIGH (ref 10.3–14.5)
SODIUM SERPL-SCNC: 138 MMOL/L — SIGNIFICANT CHANGE UP (ref 135–145)
WBC # BLD: 7.1 K/UL — SIGNIFICANT CHANGE UP (ref 3.8–10.5)
WBC # FLD AUTO: 7.1 K/UL — SIGNIFICANT CHANGE UP (ref 3.8–10.5)

## 2020-01-05 PROCEDURE — 93971 EXTREMITY STUDY: CPT | Mod: 26,LT

## 2020-01-05 RX ADMIN — Medication 50 MILLIGRAM(S): at 11:50

## 2020-01-05 RX ADMIN — PANTOPRAZOLE SODIUM 40 MILLIGRAM(S): 20 TABLET, DELAYED RELEASE ORAL at 06:00

## 2020-01-05 RX ADMIN — Medication 3 MILLILITER(S): at 17:01

## 2020-01-05 RX ADMIN — Medication 50 MICROGRAM(S): at 06:00

## 2020-01-05 RX ADMIN — HEPARIN SODIUM 5000 UNIT(S): 5000 INJECTION INTRAVENOUS; SUBCUTANEOUS at 06:48

## 2020-01-05 RX ADMIN — DONEPEZIL HYDROCHLORIDE 10 MILLIGRAM(S): 10 TABLET, FILM COATED ORAL at 21:52

## 2020-01-05 RX ADMIN — Medication 30 MILLILITER(S): at 15:53

## 2020-01-05 RX ADMIN — Medication 108 GRAM(S): at 06:48

## 2020-01-05 RX ADMIN — CARVEDILOL PHOSPHATE 12.5 MILLIGRAM(S): 80 CAPSULE, EXTENDED RELEASE ORAL at 06:00

## 2020-01-05 RX ADMIN — Medication 40 MILLIGRAM(S): at 11:50

## 2020-01-05 RX ADMIN — HEPARIN SODIUM 5000 UNIT(S): 5000 INJECTION INTRAVENOUS; SUBCUTANEOUS at 17:35

## 2020-01-05 RX ADMIN — Medication 3 MILLILITER(S): at 09:01

## 2020-01-05 RX ADMIN — Medication 325 MILLIGRAM(S): at 06:00

## 2020-01-05 RX ADMIN — Medication 40 MILLIGRAM(S): at 06:00

## 2020-01-05 RX ADMIN — ATORVASTATIN CALCIUM 20 MILLIGRAM(S): 80 TABLET, FILM COATED ORAL at 21:51

## 2020-01-05 RX ADMIN — CARVEDILOL PHOSPHATE 12.5 MILLIGRAM(S): 80 CAPSULE, EXTENDED RELEASE ORAL at 17:35

## 2020-01-05 RX ADMIN — Medication 1000 UNIT(S): at 11:50

## 2020-01-05 RX ADMIN — Medication 3 MILLILITER(S): at 01:38

## 2020-01-05 RX ADMIN — Medication 1 SPRAY(S): at 05:59

## 2020-01-05 RX ADMIN — Medication 1 SPRAY(S): at 11:50

## 2020-01-05 RX ADMIN — Medication 108 GRAM(S): at 17:36

## 2020-01-05 RX ADMIN — Medication 1 GRAM(S): at 11:50

## 2020-01-05 RX ADMIN — Medication 325 MILLIGRAM(S): at 11:50

## 2020-01-05 RX ADMIN — Medication 3 MILLILITER(S): at 13:32

## 2020-01-05 RX ADMIN — Medication 1 GRAM(S): at 23:17

## 2020-01-05 RX ADMIN — Medication 325 MILLIGRAM(S): at 21:52

## 2020-01-05 RX ADMIN — Medication 1 SPRAY(S): at 21:52

## 2020-01-05 RX ADMIN — Medication 1 GRAM(S): at 06:00

## 2020-01-05 RX ADMIN — Medication 40 MILLIGRAM(S): at 23:17

## 2020-01-05 RX ADMIN — Medication 50 MILLIGRAM(S): at 06:00

## 2020-01-05 RX ADMIN — Medication 50 MILLIGRAM(S): at 21:52

## 2020-01-05 RX ADMIN — Medication 40 MILLIGRAM(S): at 17:35

## 2020-01-05 RX ADMIN — Medication 3 MILLILITER(S): at 22:46

## 2020-01-05 RX ADMIN — Medication 1 GRAM(S): at 17:34

## 2020-01-05 RX ADMIN — ESCITALOPRAM OXALATE 10 MILLIGRAM(S): 10 TABLET, FILM COATED ORAL at 11:50

## 2020-01-05 RX ADMIN — Medication 3 MILLILITER(S): at 06:11

## 2020-01-05 RX ADMIN — SENNA PLUS 1 TABLET(S): 8.6 TABLET ORAL at 21:51

## 2020-01-05 NOTE — PROGRESS NOTE ADULT - SUBJECTIVE AND OBJECTIVE BOX
INTERVAL HPI:  79 year female with HTN, HLD, MI hx, CVA, Gi bleed, Anemia Depression and Alzheimer's dementia.  Sent from [facility] due to SOB. Possibility of pneumonia vs CHF was raised.   In ED with Respiratory distress required BIPAP support and RVP positive for RSV. Pt not able to provide more details due to dementia. Information from transfer papers and ED physician.  Nods no when asked for smoking.    OVERNIGHT EVENTS:  Resting comfortably.    Vital Signs Last 24 Hrs  T(C): 37.1 (05 Jan 2020 17:30), Max: 37.1 (05 Jan 2020 17:30)  T(F): 98.8 (05 Jan 2020 17:30), Max: 98.8 (05 Jan 2020 17:30)  HR: 76 (05 Jan 2020 18:36) (64 - 87)  BP: 122/65 (05 Jan 2020 17:30) (110/66 - 157/61)  BP(mean): --  RR: 17 (05 Jan 2020 17:30) (17 - 18)  SpO2: 100% (05 Jan 2020 18:36) (97% - 100%)    PHYSICAL EXAM:  GEN:         Awake, responsive and comfortable.  HEENT:    Normal.    RESP:        no wheezing.  CVS:          Regular rate and rhythm.   ABD:         Soft, non-tender, non-distended;     MEDICATIONS  (STANDING):  albuterol/ipratropium for Nebulization 3 milliLiter(s) Nebulizer every 4 hours  ampicillin  IVPB      ampicillin  IVPB 1 Gram(s) IV Intermittent every 12 hours  atorvastatin 20 milliGRAM(s) Oral at bedtime  carvedilol 12.5 milliGRAM(s) Oral every 12 hours  cholecalciferol 1000 Unit(s) Oral daily  donepezil 10 milliGRAM(s) Oral at bedtime  escitalopram 10 milliGRAM(s) Oral daily  ferrous    sulfate 325 milliGRAM(s) Oral three times a day  heparin  Injectable 5000 Unit(s) SubCutaneous every 12 hours  hydrALAZINE 50 milliGRAM(s) Oral three times a day  levothyroxine 50 MICROGram(s) Oral daily  methylPREDNISolone sodium succinate Injectable 40 milliGRAM(s) IV Push every 6 hours  pantoprazole    Tablet 40 milliGRAM(s) Oral before breakfast  senna 1 Tablet(s) Oral at bedtime  sodium chloride 0.65% Nasal 1 Spray(s) Both Nostrils three times a day  sucralfate 1 Gram(s) Oral four times a day    MEDICATIONS  (PRN):  acetaminophen   Tablet .. 650 milliGRAM(s) Oral every 6 hours PRN Temp greater or equal to 38C (100.4F), Mild Pain (1 - 3)    LABS:                        7.8    7.10  )-----------( 132      ( 05 Jan 2020 08:12 )             24.7     01-05    138  |  105  |  56<H>  ----------------------------<  161<H>  3.6   |  24  |  3.45<H>    Ca    8.0<L>      05 Jan 2020 08:12    01-01 @ 14:09  pH: 7.39  pCO2: 43  pO2: 209  SaO2: 100    ASSESSMENT AND PLAN:  ·	Acute Respiratory distress.  ·	RSV tracheobronchitis.  ·	Left pleural effusion.  ·	Hypothyroidism with very high TSH.  ·	Anemia.  ·	Leukocytosis.  ·	Renal Insuffiencey.  ·	CVA by history.  ·	HTN.  ·	HLD.  ·	Alzheimer's dementia.    Clinically improving.  Supplemental O2 with PRN BIPAP.  Continue steroids and antibiotics.  Continue nebulizer.

## 2020-01-05 NOTE — PROGRESS NOTE ADULT - ASSESSMENT
79 female with a history of dementia, CVA with hemiparesis HTN, CKD stage 5 with anemia now admitted with cough, SOB and leg edema.     1.	CKD 4/5  2.	Pneumonia  3.	Anemia    Rising creatinine trend. Will monitor. To continue current meds. Will follow electrolytes and renal function trend.   Monitor h/h trend. Transfuse PRN. Avoid nephrotoxic meds as possible. Avoid ACEI, ARB, NSAIDs and IV contrast.   Pulmonary follow up. ? Taper IV steroids.

## 2020-01-05 NOTE — PROGRESS NOTE ADULT - SUBJECTIVE AND OBJECTIVE BOX
Patient is a 79y old  Female who presents with a chief complaint of sob cough (04 Jan 2020 22:00)      INTERVAL HPI/OVERNIGHT EVENTS: AA comfortable , eating breakfast admits improvement    MEDICATIONS  (STANDING):  albuterol/ipratropium for Nebulization 3 milliLiter(s) Nebulizer every 4 hours  ampicillin  IVPB      ampicillin  IVPB 1 Gram(s) IV Intermittent every 12 hours  atorvastatin 20 milliGRAM(s) Oral at bedtime  carvedilol 12.5 milliGRAM(s) Oral every 12 hours  cholecalciferol 1000 Unit(s) Oral daily  donepezil 10 milliGRAM(s) Oral at bedtime  escitalopram 10 milliGRAM(s) Oral daily  ferrous    sulfate 325 milliGRAM(s) Oral three times a day  heparin  Injectable 5000 Unit(s) SubCutaneous every 12 hours  hydrALAZINE 50 milliGRAM(s) Oral three times a day  levothyroxine 50 MICROGram(s) Oral daily  methylPREDNISolone sodium succinate Injectable 40 milliGRAM(s) IV Push every 6 hours  pantoprazole    Tablet 40 milliGRAM(s) Oral before breakfast  senna 1 Tablet(s) Oral at bedtime  sodium chloride 0.65% Nasal 1 Spray(s) Both Nostrils three times a day  sucralfate 1 Gram(s) Oral four times a day    MEDICATIONS  (PRN):  acetaminophen   Tablet .. 650 milliGRAM(s) Oral every 6 hours PRN Temp greater or equal to 38C (100.4F), Mild Pain (1 - 3)      Allergies    No Known Allergies    Intolerances        REVIEW OF SYSTEMS:        HEENT - wnl  RESPIRATORY: No cough wheezing bur subsiding  CARDIOVASCULAR: No chest pain, palpitations, dizziness, or leg swelling  GASTROINTESTINAL: No abdominal or epigastric pain. No nausea, vomiting, or hematemesis; No diarrhea or constipation. No melena or hematochezia.  GENITOURINARY: No dysuria, frequency, hematuria, or incontinence  SKIN: No itching, burning, rashes, or lesions   MUSCULOSKELETAL: L arm edema  PSYCHIATRIC: No deprassion, anxiety, mood swings, or difficulty sleeping        Vital Signs Last 24 Hrs  T(C): 36.6 (05 Jan 2020 05:13), Max: 36.7 (05 Jan 2020 00:11)  T(F): 97.8 (05 Jan 2020 05:13), Max: 98 (05 Jan 2020 00:11)  HR: 80 (05 Jan 2020 08:40) (64 - 81)  BP: 130/59 (05 Jan 2020 05:57) (111/57 - 176/76)  BP(mean): --  RR: 18 (05 Jan 2020 05:13) (18 - 18)  SpO2: 100% (05 Jan 2020 08:40) (97% - 100%)    PHYSICAL EXAM:  general       HEENT wnl  CHEST/LUNG: Clear to percussion bilaterally; + rhonchi + wheezing  HEART: Regular rate and rhythm; No murmurs, rubs, or gallops  ABDOMEN: Soft, Nontender, Nondistended; Bowel sounds present  EXTREMITIES:  LE no edema; LUE 3+ edema. No IV  LYMPH: No lymphadenopathy noted  SKIN: No rashes or lesions    LABS:    01-05    138  |  105  |  56<H>  ----------------------------<  161<H>  3.6   |  24  |  3.45<H>    Ca    8.0<L>      05 Jan 2020 08:12          CAPILLARY BLOOD GLUCOSE                    RADIOLOGY & ADDITIONAL TESTS:    Imaging Personally Reviewed:  [y ] YES  [ ] NO    Consultant(s) Notes Reviewed:  [y ] YES  [ ] NO    Care Discussed with Consultants/Other Providers [ ] YES  [ ] NO    PROBLEMS:  CONGESTIVE BABB FAILURE;RSV INFECTION  SHORTNESS OF BREATH  CHF (congestive heart failure)  UTI VRE  CKD 4      Care discussed with family,         [  ]   yes  [  ]  No    imp:    stable[ ]    unstable[  ]     improving [  x ]       unchanged  [  ]                Plans:  Continue AB, steroids; f/u BMP  Doppler of LUE r/o DVT

## 2020-01-05 NOTE — PROGRESS NOTE ADULT - SUBJECTIVE AND OBJECTIVE BOX
Patient is a 79y old  Female who presents with a chief complaint of sob cough (03 Jan 2020 18:47)  Patient seen in follow up for CKD 4/5    PAST MEDICAL HISTORY:  Hypothyroidism  Constipation  Iron deficiency anemia  Major depressive disorder  Hyperlipidemia  GI bleed  Depression  Alzheimers disease  MI, old  CVA (cerebral vascular accident)  HTN (hypertension)  No pertinent past medical history    MEDICATIONS  (STANDING):  albuterol/ipratropium for Nebulization 3 milliLiter(s) Nebulizer every 4 hours  ampicillin  IVPB      ampicillin  IVPB 1 Gram(s) IV Intermittent every 12 hours  atorvastatin 20 milliGRAM(s) Oral at bedtime  carvedilol 12.5 milliGRAM(s) Oral every 12 hours  cholecalciferol 1000 Unit(s) Oral daily  donepezil 10 milliGRAM(s) Oral at bedtime  escitalopram 10 milliGRAM(s) Oral daily  ferrous    sulfate 325 milliGRAM(s) Oral three times a day  heparin  Injectable 5000 Unit(s) SubCutaneous every 12 hours  hydrALAZINE 50 milliGRAM(s) Oral three times a day  levothyroxine 50 MICROGram(s) Oral daily  methylPREDNISolone sodium succinate Injectable 40 milliGRAM(s) IV Push every 6 hours  pantoprazole    Tablet 40 milliGRAM(s) Oral before breakfast  senna 1 Tablet(s) Oral at bedtime  sodium chloride 0.65% Nasal 1 Spray(s) Both Nostrils three times a day  sucralfate 1 Gram(s) Oral four times a day    MEDICATIONS  (PRN):  acetaminophen   Tablet .. 650 milliGRAM(s) Oral every 6 hours PRN Temp greater or equal to 38C (100.4F), Mild Pain (1 - 3)    T(C): 36.6 (01-05-20 @ 05:13), Max: 36.7 (01-05-20 @ 00:11)  HR: 81 (01-05-20 @ 09:33) (61 - 99)  BP: 130/59 (01-05-20 @ 05:57) (111/57 - 180/78)  RR: 18 (01-05-20 @ 05:13)  SpO2: 100% (01-05-20 @ 09:33)  Wt(kg): --  I&O's Detail    04 Jan 2020 07:01  -  05 Jan 2020 07:00  --------------------------------------------------------  IN:  Total IN: 0 mL    OUT:    Voided: 300 mL  Total OUT: 300 mL    Total NET: -300 mL      05 Jan 2020 07:01  -  05 Jan 2020 11:42  --------------------------------------------------------  IN:    Oral Fluid: 240 mL  Total IN: 240 mL    OUT:  Total OUT: 0 mL    Total NET: 240 mL          PHYSICAL EXAM:  General: NAD  Respiratory: b/l air entry  Cardiovascular: S1 S2  Gastrointestinal: soft  Extremities:  no edema                        LABORATORY:                        7.8    7.10  )-----------( 132      ( 05 Jan 2020 08:12 )             24.7     01-05    138  |  105  |  56<H>  ----------------------------<  161<H>  3.6   |  24  |  3.45<H>    Ca    8.0<L>      05 Jan 2020 08:12      Sodium, Serum: 138 mmol/L (01-05 @ 08:12)    Potassium, Serum: 3.6 mmol/L (01-05 @ 08:12)    Hemoglobin: 7.8 g/dL (01-05 @ 08:12)  Hemoglobin: 8.8 g/dL (01-03 @ 08:36)    Creatinine, Serum 3.45 (01-05 @ 08:12)  Creatinine, Serum 3.02 (01-03 @ 08:36)

## 2020-01-06 LAB
ANION GAP SERPL CALC-SCNC: 9 MMOL/L — SIGNIFICANT CHANGE UP (ref 5–17)
BUN SERPL-MCNC: 69 MG/DL — HIGH (ref 7–23)
CALCIUM SERPL-MCNC: 8.1 MG/DL — LOW (ref 8.5–10.1)
CHLORIDE SERPL-SCNC: 104 MMOL/L — SIGNIFICANT CHANGE UP (ref 96–108)
CO2 SERPL-SCNC: 25 MMOL/L — SIGNIFICANT CHANGE UP (ref 22–31)
CREAT SERPL-MCNC: 3.77 MG/DL — HIGH (ref 0.5–1.3)
CULTURE RESULTS: SIGNIFICANT CHANGE UP
CULTURE RESULTS: SIGNIFICANT CHANGE UP
GLUCOSE SERPL-MCNC: 111 MG/DL — HIGH (ref 70–99)
POTASSIUM SERPL-MCNC: 3.8 MMOL/L — SIGNIFICANT CHANGE UP (ref 3.5–5.3)
POTASSIUM SERPL-SCNC: 3.8 MMOL/L — SIGNIFICANT CHANGE UP (ref 3.5–5.3)
SODIUM SERPL-SCNC: 138 MMOL/L — SIGNIFICANT CHANGE UP (ref 135–145)
SPECIMEN SOURCE: SIGNIFICANT CHANGE UP
SPECIMEN SOURCE: SIGNIFICANT CHANGE UP

## 2020-01-06 RX ADMIN — DONEPEZIL HYDROCHLORIDE 10 MILLIGRAM(S): 10 TABLET, FILM COATED ORAL at 21:50

## 2020-01-06 RX ADMIN — Medication 325 MILLIGRAM(S): at 06:30

## 2020-01-06 RX ADMIN — Medication 1 SPRAY(S): at 06:31

## 2020-01-06 RX ADMIN — Medication 1 GRAM(S): at 17:38

## 2020-01-06 RX ADMIN — CARVEDILOL PHOSPHATE 12.5 MILLIGRAM(S): 80 CAPSULE, EXTENDED RELEASE ORAL at 06:30

## 2020-01-06 RX ADMIN — Medication 50 MILLIGRAM(S): at 06:30

## 2020-01-06 RX ADMIN — Medication 108 GRAM(S): at 06:30

## 2020-01-06 RX ADMIN — Medication 1 SPRAY(S): at 21:49

## 2020-01-06 RX ADMIN — Medication 3 MILLILITER(S): at 14:11

## 2020-01-06 RX ADMIN — Medication 325 MILLIGRAM(S): at 13:10

## 2020-01-06 RX ADMIN — Medication 40 MILLIGRAM(S): at 06:30

## 2020-01-06 RX ADMIN — Medication 1 GRAM(S): at 06:30

## 2020-01-06 RX ADMIN — HEPARIN SODIUM 5000 UNIT(S): 5000 INJECTION INTRAVENOUS; SUBCUTANEOUS at 17:38

## 2020-01-06 RX ADMIN — Medication 3 MILLILITER(S): at 21:28

## 2020-01-06 RX ADMIN — ESCITALOPRAM OXALATE 10 MILLIGRAM(S): 10 TABLET, FILM COATED ORAL at 12:40

## 2020-01-06 RX ADMIN — Medication 40 MILLIGRAM(S): at 12:40

## 2020-01-06 RX ADMIN — Medication 3 MILLILITER(S): at 02:20

## 2020-01-06 RX ADMIN — Medication 50 MILLIGRAM(S): at 21:50

## 2020-01-06 RX ADMIN — CARVEDILOL PHOSPHATE 12.5 MILLIGRAM(S): 80 CAPSULE, EXTENDED RELEASE ORAL at 17:38

## 2020-01-06 RX ADMIN — Medication 3 MILLILITER(S): at 06:02

## 2020-01-06 RX ADMIN — Medication 1000 UNIT(S): at 12:40

## 2020-01-06 RX ADMIN — ATORVASTATIN CALCIUM 20 MILLIGRAM(S): 80 TABLET, FILM COATED ORAL at 21:50

## 2020-01-06 RX ADMIN — PANTOPRAZOLE SODIUM 40 MILLIGRAM(S): 20 TABLET, DELAYED RELEASE ORAL at 06:30

## 2020-01-06 RX ADMIN — Medication 50 MICROGRAM(S): at 06:30

## 2020-01-06 RX ADMIN — Medication 50 MILLIGRAM(S): at 13:10

## 2020-01-06 RX ADMIN — Medication 325 MILLIGRAM(S): at 21:50

## 2020-01-06 RX ADMIN — SENNA PLUS 1 TABLET(S): 8.6 TABLET ORAL at 21:50

## 2020-01-06 RX ADMIN — Medication 1 GRAM(S): at 12:40

## 2020-01-06 RX ADMIN — Medication 108 GRAM(S): at 18:32

## 2020-01-06 RX ADMIN — Medication 3 MILLILITER(S): at 09:54

## 2020-01-06 RX ADMIN — HEPARIN SODIUM 5000 UNIT(S): 5000 INJECTION INTRAVENOUS; SUBCUTANEOUS at 06:30

## 2020-01-06 NOTE — PROGRESS NOTE ADULT - SUBJECTIVE AND OBJECTIVE BOX
[facility] NEPHROLOGY SERVICES, [facility]  NEPHROLOGY AND HYPERTENSION  [address]  [address]  [place] [address]  [phone]    MD MART LORD MD ANDREY GONCHARUK, MD MADHU KORRAPATI, MD YELENA ROSENBERG, MD ISABELLA SLADE, MD ALAYNA WALLACE MD          Patient feels well no complaints today.    MEDICATIONS  (STANDING):  albuterol/ipratropium for Nebulization 3 milliLiter(s) Nebulizer every 4 hours  ampicillin  IVPB      ampicillin  IVPB 1 Gram(s) IV Intermittent every 12 hours  atorvastatin 20 milliGRAM(s) Oral at bedtime  carvedilol 12.5 milliGRAM(s) Oral every 12 hours  cholecalciferol 1000 Unit(s) Oral daily  donepezil 10 milliGRAM(s) Oral at bedtime  escitalopram 10 milliGRAM(s) Oral daily  ferrous    sulfate 325 milliGRAM(s) Oral three times a day  heparin  Injectable 5000 Unit(s) SubCutaneous every 12 hours  hydrALAZINE 50 milliGRAM(s) Oral three times a day  levothyroxine 50 MICROGram(s) Oral daily  pantoprazole    Tablet 40 milliGRAM(s) Oral before breakfast  senna 1 Tablet(s) Oral at bedtime  sodium chloride 0.65% Nasal 1 Spray(s) Both Nostrils three times a day  sucralfate 1 Gram(s) Oral four times a day    MEDICATIONS  (PRN):  acetaminophen   Tablet .. 650 milliGRAM(s) Oral every 6 hours PRN Temp greater or equal to 38C (100.4F), Mild Pain (1 - 3)      01-05-20 @ 07:01  -  01-06-20 @ 07:00  --------------------------------------------------------  IN: 570 mL / OUT: 0 mL / NET: 570 mL    01-06-20 @ 07:01  -  01-06-20 @ 16:48  --------------------------------------------------------  IN: 360 mL / OUT: 0 mL / NET: 360 mL      PHYSICAL EXAM:      T(C): 36.8 (01-06-20 @ 11:10), Max: 37.1 (01-05-20 @ 17:30)  HR: 81 (01-06-20 @ 14:16) (72 - 87)  BP: 121/84 (01-06-20 @ 11:10) (115/59 - 126/65)  RR: 20 (01-06-20 @ 11:10) (17 - 20)  SpO2: 98% (01-06-20 @ 14:16) (95% - 100%)  Wt(kg): --  Respiratory: clear anteriorly, decreased BS at bases  Cardiovascular: S1 S2  Gastrointestinal: soft NT ND +BS  Extremities:   2 edema                                    7.8    7.10  )-----------( 132      ( 05 Jan 2020 08:12 )             24.7     01-06    138  |  104  |  69<H>  ----------------------------<  111<H>  3.8   |  25  |  3.77<H>    Ca    8.1<L>      06 Jan 2020 07:22      Comprehensive Metabolic Panel (12.01.19 @ 17:55)    Creatinine, Serum: 1.99 mg/dL      Comprehensive Metabolic, Mg + Phosphorus (12.02.19 @ 05:30)    Creatinine, Serum: 1.81 mg/dL          Creatinine Trend: 3.77<--, 3.45<--, 3.02<--, 3.04<--, 2.99<--, 3.12<--    Assessment  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      Plan:  Continued warranted diuresis  Follow paraprotein screen;   Prognosis guarded    Obed Jamison MD

## 2020-01-06 NOTE — PROGRESS NOTE ADULT - SUBJECTIVE AND OBJECTIVE BOX
Patient is a 79y old  Female who presents with a chief complaint of sob cough (06 Jan 2020 13:54)      INTERVAL HPI/OVERNIGHT EVENTS: improving, comfortable on NC    MEDICATIONS  (STANDING):  albuterol/ipratropium for Nebulization 3 milliLiter(s) Nebulizer every 4 hours  ampicillin  IVPB      ampicillin  IVPB 1 Gram(s) IV Intermittent every 12 hours  atorvastatin 20 milliGRAM(s) Oral at bedtime  carvedilol 12.5 milliGRAM(s) Oral every 12 hours  cholecalciferol 1000 Unit(s) Oral daily  donepezil 10 milliGRAM(s) Oral at bedtime  escitalopram 10 milliGRAM(s) Oral daily  ferrous    sulfate 325 milliGRAM(s) Oral three times a day  heparin  Injectable 5000 Unit(s) SubCutaneous every 12 hours  hydrALAZINE 50 milliGRAM(s) Oral three times a day  levothyroxine 50 MICROGram(s) Oral daily  pantoprazole    Tablet 40 milliGRAM(s) Oral before breakfast  senna 1 Tablet(s) Oral at bedtime  sodium chloride 0.65% Nasal 1 Spray(s) Both Nostrils three times a day  sucralfate 1 Gram(s) Oral four times a day    MEDICATIONS  (PRN):  acetaminophen   Tablet .. 650 milliGRAM(s) Oral every 6 hours PRN Temp greater or equal to 38C (100.4F), Mild Pain (1 - 3)      Allergies    No Known Allergies    Intolerances        REVIEW OF SYSTEMS:        HEENT - wnl  RESPIRATORY: No cough, wheezing, chills or hemoptysis; No shortness of breath  CARDIOVASCULAR: No chest pain, palpitations, dizziness, or leg swelling  GASTROINTESTINAL: No abdominal or epigastric pain. No nausea, vomiting, or hematemesis; No diarrhea or constipation. No melena or hematochezia.  GENITOURINARY: No dysuria, frequency, hematuria, or incontinence  SKIN: No itching, burning, rashes, or lesions   MUSCULOSKELETAL: No joint pain or swelling; No muscle, back, or extremity pain  PSYCHIATRIC: No depression, anxiety, mood swings, or difficulty sleeping        Vital Signs Last 24 Hrs  T(C): 36.8 (06 Jan 2020 11:10), Max: 37.1 (05 Jan 2020 17:30)  T(F): 98.3 (06 Jan 2020 11:10), Max: 98.8 (05 Jan 2020 17:30)  HR: 81 (06 Jan 2020 14:16) (72 - 87)  BP: 121/84 (06 Jan 2020 11:10) (115/59 - 126/65)  BP(mean): --  RR: 20 (06 Jan 2020 11:10) (17 - 20)  SpO2: 98% (06 Jan 2020 14:16) (95% - 100%)    PHYSICAL EXAM:  general       HEENT wnl  CHEST/LUNG: Clear to percussion bilaterally; No rales, rhonchi, wheezing, or rubs  HEART: Regular rate and rhythm; No murmurs, rubs, or gallops  ABDOMEN: Soft, Nontender, Nondistended; Bowel sounds present  EXTREMITIES:  2+ Peripheral Pulses, No clubbing, cyanosis, or edema  LYMPH: No lymphadenopathy noted  SKIN: No rashes or lesions    LABS:                        7.8    7.10  )-----------( 132      ( 05 Jan 2020 08:12 )             24.7     01-06    138  |  104  |  69<H>  ----------------------------<  111<H>  3.8   |  25  |  3.77<H>    Ca    8.1<L>      06 Jan 2020 07:22          CAPILLARY BLOOD GLUCOSE  Doppler LUE neg DVT  ECTO severe MR                    RADIOLOGY & ADDITIONAL TESTS:    Imaging Personally Reviewed:  [y ] YES  [ ] NO    Consultant(s) Notes Reviewed:  [y ] YES  [ ] NO    Care Discussed with Consultants/Other Providers [ ] YES  [ ] NO    PROBLEMS:  CONGESTIVE BABB FAILURE;RSV INFECTION  SHORTNESS OF BREATH  CHF (congestive heart failure)  Acute tracheobronchitis  UTI VRE  Severe mitral regurgitation      Care discussed with family,         [  ]   yes  [  ]  No    imp:    stable[ ]    unstable[  ]     improving [  x ]       unchanged  [  ]                Plans:  < Solumedrol to 40mg tid IVP  L arm NS warm comresses tid

## 2020-01-06 NOTE — CONSULT NOTE ADULT - SUBJECTIVE AND OBJECTIVE BOX
HPI:  78 y/o  female with hx of HTN, Hypothyroidism, Iron Deficiency Anemia, Depression, s/p MI, s/p CVA, s/p GI Bleed, Alzheimer's , admitted from [facility] on 1/1/20 with c/o SOB and cough x few days PTA.            Allergies :    No Known Allergies    Intolerances        Social History:  Tobacco: negative  Alcohol: negative  Recent Travel:  Immunizations:        MEDICATIONS  (STANDING):  albuterol/ipratropium for Nebulization 3 milliLiter(s) Nebulizer every 4 hours  ampicillin  IVPB      ampicillin  IVPB 1 Gram(s) IV Intermittent every 12 hours  atorvastatin 20 milliGRAM(s) Oral at bedtime  carvedilol 12.5 milliGRAM(s) Oral every 12 hours  cholecalciferol 1000 Unit(s) Oral daily  donepezil 10 milliGRAM(s) Oral at bedtime  escitalopram 10 milliGRAM(s) Oral daily  ferrous    sulfate 325 milliGRAM(s) Oral three times a day  heparin  Injectable 5000 Unit(s) SubCutaneous every 12 hours  hydrALAZINE 50 milliGRAM(s) Oral three times a day  levothyroxine 50 MICROGram(s) Oral daily  methylPREDNISolone sodium succinate Injectable 40 milliGRAM(s) IV Push every 6 hours  pantoprazole    Tablet 40 milliGRAM(s) Oral before breakfast  senna 1 Tablet(s) Oral at bedtime  sodium chloride 0.65% Nasal 1 Spray(s) Both Nostrils three times a day  sucralfate 1 Gram(s) Oral four times a day    MEDICATIONS  (PRN):  acetaminophen   Tablet .. 650 milliGRAM(s) Oral every 6 hours PRN Temp greater or equal to 38C (100.4F), Mild Pain (1 - 3)        LABS:  CBC Full  -  ( 05 Jan 2020 08:12 )  WBC Count : 7.10 K/uL  RBC Count : 2.49 M/uL  Hemoglobin : 7.8 g/dL  Hematocrit : 24.7 %  Platelet Count - Automated : 132 K/uL  Mean Cell Volume : 99.2 fl  Mean Cell Hemoglobin : 31.3 pg  Mean Cell Hemoglobin Concentration : 31.6 gm/dL  Auto Neutrophil # : x  Auto Lymphocyte # : x  Auto Monocyte # : x  Auto Eosinophil # : x  Auto Basophil # : x  Auto Neutrophil % : x  Auto Lymphocyte % : x  Auto Monocyte % : x  Auto Eosinophil % : x  Auto Basophil % : x    01-06    138  |  104  |  69<H>  ----------------------------<  111<H>  3.8   |  25  |  3.77<H>    Ca    8.1<L>      06 Jan 2020 07:22          MICROBIOLOGY:    Specimen Source: .Urine Catheterized (01-01 @ 17:13)  Culture Results:   50,000 - 99,000 CFU/mL Enterococcus faecalis (vancomycin resistant) (01-01 @ 17:13)  Culture - Urine (01.01.20 @ 17:13)    -  Tetra/Doxy: R >8    -  Vancomycin: R >16    -  Nitrofurantoin: S <=32 Should not be used to treat pyelonephritis.    -  Linezolid: S 2    -  Daptomycin: S 1    -  Levofloxacin: R >4    -  Ciprofloxacin: R >2    -  Ampicillin: S <=2 Predicts results to ampicillin/sulbactam, amoxacillin-clavulanate and  piperacillin-tazobactam.    Specimen Source: .Urine Catheterized    Culture Results:   50,000 - 99,000 CFU/mL Enterococcus faecalis (vancomycin resistant)    Organism Identification: Observation    Organism: Observation    Method Type: RONALD        Specimen Source: .Blood Blood-Peripheral (01-01 @ 17:11)  Culture Results:   No growth to date. (01-01 @ 17:11)    Specimen Source: .Blood Blood-Peripheral (01-01 @ 17:11)  Culture Results:   No growth to date. (01-01 @ 17:11)                Radiology:  CXR -   < from: Xray Chest 1 View-PORTABLE IMMEDIATE (01.01.20 @ 16:24) >    EXAM:  XR CHEST PORTABLE IMMED 1V                          PROCEDURE DATE:  01/01/2020      INTERPRETATION:    DATE OF STUDY: 1/1/2020 at 3:53PM    PRIOR:Earlier 1/1/2020 chest.    CLINICAL INDICATION: Shortness of breath    TECHNIQUE: portable chest.    FINDINGS:   Thoracic aortic atheromatous changes and ectasia are stable.  The heart is top normal in size.  No florid central congestive changes.  Small bilateral pleural effusions noted, left more than right.  Bibasilar airspace hazinessmay be due to atelectasis-but underlying pneumonia not excluded in the right clinical setting.  No pneumothorax.  No acute bony findings.    IMPRESSION:   1. Small bilateral pleural effusions,  2. Increasing bibasilar atelectasis and/or pneumonia, asabove.                         < from: US Duplex Venous Upper Ext Ltd, Left (01.05.20 @ 11:27) >  EXAM:  US DPLX UPR EXT VEINS LTD LT                          PROCEDURE DATE:  01/05/2020      INTERPRETATION:  CLINICAL INFORMATION: Left arm edema.    COMPARISON: None available.    TECHNIQUE: Duplex sonography of the LEFT UPPER extremity veins with color and spectral Doppler, with and without compression.      FINDINGS:    The left internal jugular, subclavian, axillary and brachial veins are patent and compressible where applicable.  The basilic and cephalic veins (superficial veins) are patent and without thrombus. Visual eyes portion of the radial and ulnar veins are patent.    Doppler examination shows normal spontaneous and phasic flow.    Subcutaneous edema is seen in the left upper arm.    IMPRESSION:     No evidence of left upper extremity deep venous thrombosis.                                   < from: TTE Echo Doppler w/o Cont (01.03.20 @ 13:46) >     PHYSICIAN INTERPRETATION:  Left Ventricle: Normal left ventricular size and wall thicknesses, with normal systolic and diastolic function.  Left ventricular ejection fraction, by visual estimation, is 60 to 65%. Normal segmental left ventricular systolic function.  Right Ventricle: Normal right ventricular size and function.  Left Atrium: Moderately enlarged left atrium.  Right Atrium: The right atrium is normal in size. Normal right atrial size.  Pericardium: There is no evidence of pericardial effusion.  Mitral Valve: The mitral valve is normal in structure. Mild thickening and calcification of the anterior and posterior mitral valve leaflets. Mitral leaflet mobility is normal. Severe mitral valve regurgitation is seen.  Tricuspid Valve: Structurally normal tricuspid valve, with normal leaflet excursion. The tricuspid valve is normal in structure. Severe tricuspid regurgitation is visualized. Estimated pulmonary artery systolic pressure is 97.5 mmHg assuming a right atrial pressure of 10 mmHg, which is consistent with severe pulmonary hypertension.  Aortic Valve: The aortic valve is nmildly sclerosed. No evidence of aortic valve regurgitation is seen.  Pulmonic Valve: Structurally normal pulmonic valve, with normal leaflet excursion. The pulmonic valve was not well visualized. The pulmonic valve is normal. No indication of pulmonic valveregurgitation.  Aorta: The aortic root is normal in size and structure.  Pulmonary Artery: The main pulmonary artery is normal in size.       Summary:   1. Left ventricular ejection fraction, by visual estimation, is 60 to 65%.   2. Normal left ventricular size and wall thicknesses, with normal systolic and diastolic function.   3. There is mild concentric left ventricular hypertrophy.   4. Normal right ventricular size and function.   5. Moderately enlarged left atrium.   6. Severe mitral valve regurgitation.   7. Severe tricuspid regurgitation.   8. Estimated pulmonary artery systolic pressure is 97.5 mmHg assuming a right atrial pressure of 10 mmHg, which is consistent with severe pulmonary hypertension.   9. Mild thickening and calcification of the anterior and posterior mitral valve leaflets.    [id number] Dio Blackmon MD, FACCMD, FACC  Electronically signed on 1/5/2020 at 8:23:57 PM                                Vital Signs Last 24 Hrs  T(C): 36.8 (06 Jan 2020 11:10), Max: 37.1 (05 Jan 2020 17:30)  T(F): 98.3 (06 Jan 2020 11:10), Max: 98.8 (05 Jan 2020 17:30)  HR: 84 (06 Jan 2020 11:10) (72 - 87)  BP: 121/84 (06 Jan 2020 11:10) (115/59 - 126/65)  BP(mean): --  RR: 20 (06 Jan 2020 11:10) (17 - 20)  SpO2: 95% (06 Jan 2020 11:10) (95% - 100%)  Supplemental O2:    PHYSICAL EXAM    General:  HEENT:   Neck:  Heart:  Lungs:  Abdomen:  Extremities:  Skin:          I&O's Summary :    05 Jan 2020 07:01  -  06 Jan 2020 07:00  --------------------------------------------------------  IN: 570 mL / OUT: 0 mL / NET: 570 mL    06 Jan 2020 07:01  -  06 Jan 2020 13:55  --------------------------------------------------------  IN: 360 mL / OUT: 0 mL / NET: 360 mL        Impression:    Suggestions: HPI:  78 y/o  female with hx of HTN, Hypothyroidism, Iron Deficiency Anemia, Depression, s/p MI, s/p CVA with residua; paralysis of the LUE, s/p GI Bleed, Alzheimer's , admitted from [facility] on 1/1/20 with c/o SOB and cough x few days PTA.  In the ER patient was found to have O2 Sat between 90 - 91% and w/u with Cx's of Blood and Urine were obtained as well as Rapid Influenza Testing which was negative for Influenza A + B but RSV was Positive.            Allergies :    No Known Allergies    Intolerances - none        Social History:  Tobacco: negative  Alcohol: negative  Recent Travel: none  Immunizations: Rec'd Influenza Vaccine this season                         ? Pneumovax ? Prevnar ?        MEDICATIONS  (STANDING):  albuterol/ipratropium for Nebulization 3 milliLiter(s) Nebulizer every 4 hours  ampicillin  IVPB      ampicillin  IVPB 1 Gram(s) IV Intermittent every 12 hours  atorvastatin 20 milliGRAM(s) Oral at bedtime  carvedilol 12.5 milliGRAM(s) Oral every 12 hours  cholecalciferol 1000 Unit(s) Oral daily  donepezil 10 milliGRAM(s) Oral at bedtime  escitalopram 10 milliGRAM(s) Oral daily  ferrous    sulfate 325 milliGRAM(s) Oral three times a day  heparin  Injectable 5000 Unit(s) SubCutaneous every 12 hours  hydrALAZINE 50 milliGRAM(s) Oral three times a day  levothyroxine 50 MICROGram(s) Oral daily  methylPREDNISolone sodium succinate Injectable 40 milliGRAM(s) IV Push every 6 hours  pantoprazole    Tablet 40 milliGRAM(s) Oral before breakfast  senna 1 Tablet(s) Oral at bedtime  sodium chloride 0.65% Nasal 1 Spray(s) Both Nostrils three times a day  sucralfate 1 Gram(s) Oral four times a day    MEDICATIONS  (PRN):  acetaminophen   Tablet .. 650 milliGRAM(s) Oral every 6 hours PRN Temp greater or equal to 38C (100.4F), Mild Pain (1 - 3)        LABS:  CBC Full  -  ( 05 Jan 2020 08:12 )  WBC Count : 7.10 K/uL  RBC Count : 2.49 M/uL  Hemoglobin : 7.8 g/dL  Hematocrit : 24.7 %  Platelet Count - Automated : 132 K/uL  Mean Cell Volume : 99.2 fl  Mean Cell Hemoglobin : 31.3 pg  Mean Cell Hemoglobin Concentration : 31.6 gm/dL  Auto Neutrophil # : x  Auto Lymphocyte # : x  Auto Monocyte # : x  Auto Eosinophil # : x  Auto Basophil # : x  Auto Neutrophil % : x  Auto Lymphocyte % : x  Auto Monocyte % : x  Auto Eosinophil % : x  Auto Basophil % : x    01-06    138  |  104  |  69<H>  ----------------------------<  111<H>  3.8   |  25  |  3.77<H>    Ca    8.1<L>      06 Jan 2020 07:22          MICROBIOLOGY:    Specimen Source: .Urine Catheterized (01-01 @ 17:13)  Culture Results:   50,000 - 99,000 CFU/mL Enterococcus faecalis (vancomycin resistant) (01-01 @ 17:13)  Culture - Urine (01.01.20 @ 17:13)    -  Tetra/Doxy: R >8    -  Vancomycin: R >16    -  Nitrofurantoin: S <=32 Should not be used to treat pyelonephritis.    -  Linezolid: S 2    -  Daptomycin: S 1    -  Levofloxacin: R >4    -  Ciprofloxacin: R >2    -  Ampicillin: S <=2 Predicts results to ampicillin/sulbactam, amoxacillin-clavulanate and  piperacillin-tazobactam.    Specimen Source: .Urine Catheterized    Culture Results:   50,000 - 99,000 CFU/mL Enterococcus faecalis (vancomycin resistant)    Organism Identification: Observation    Organism: Observation    Method Type: RONALD        Specimen Source: .Blood Blood-Peripheral (01-01 @ 17:11)  Culture Results:   No growth to date. (01-01 @ 17:11)    Specimen Source: .Blood Blood-Peripheral (01-01 @ 17:11)  Culture Results:   No growth to date. (01-01 @ 17:11)        FLU A B RSV Detection by PCR (01.01.20 @ 14:04)    Flu A Result: NotDetec: The Flu A B RSV assay is a Real-Time PCR test for the qualitative  detection and differentiation of Influenza A, Influenza B, and  Respiratory Syncytial Virus on nasopharyngeal swabs. The results should  be interpreted in the context of all clinical and laboratory findings.    Flu B Result: NotDetec    RSV Result: Detected            Radiology:  CXR -   < from: Xray Chest 1 View-PORTABLE IMMEDIATE (01.01.20 @ 16:24) >    EXAM:  XR CHEST PORTABLE IMMED 1V                          PROCEDURE DATE:  01/01/2020      INTERPRETATION:    DATE OF STUDY: 1/1/2020 at 3:53PM    PRIOR:Earlier 1/1/2020 chest.    CLINICAL INDICATION: Shortness of breath    TECHNIQUE: portable chest.    FINDINGS:   Thoracic aortic atheromatous changes and ectasia are stable.  The heart is top normal in size.  No florid central congestive changes.  Small bilateral pleural effusions noted, left more than right.  Bibasilar airspace hazinessmay be due to atelectasis-but underlying pneumonia not excluded in the right clinical setting.  No pneumothorax.  No acute bony findings.    IMPRESSION:   1. Small bilateral pleural effusions,  2. Increasing bibasilar atelectasis and/or pneumonia, asabove.                         < from: US Duplex Venous Upper Ext Ltd, Left (01.05.20 @ 11:27) >  EXAM:  US DPLX UPR EXT VEINS LTD LT                          PROCEDURE DATE:  01/05/2020      INTERPRETATION:  CLINICAL INFORMATION: Left arm edema.    COMPARISON: None available.    TECHNIQUE: Duplex sonography of the LEFT UPPER extremity veins with color and spectral Doppler, with and without compression.      FINDINGS:    The left internal jugular, subclavian, axillary and brachial veins are patent and compressible where applicable.  The basilic and cephalic veins (superficial veins) are patent and without thrombus. Visual eyes portion of the radial and ulnar veins are patent.    Doppler examination shows normal spontaneous and phasic flow.    Subcutaneous edema is seen in the left upper arm.    IMPRESSION:     No evidence of left upper extremity deep venous thrombosis.                                   < from: TTE Echo Doppler w/o Cont (01.03.20 @ 13:46) >     PHYSICIAN INTERPRETATION:  Left Ventricle: Normal left ventricular size and wall thicknesses, with normal systolic and diastolic function.  Left ventricular ejection fraction, by visual estimation, is 60 to 65%. Normal segmental left ventricular systolic function.  Right Ventricle: Normal right ventricular size and function.  Left Atrium: Moderately enlarged left atrium.  Right Atrium: The right atrium is normal in size. Normal right atrial size.  Pericardium: There is no evidence of pericardial effusion.  Mitral Valve: The mitral valve is normal in structure. Mild thickening and calcification of the anterior and posterior mitral valve leaflets. Mitral leaflet mobility is normal. Severe mitral valve regurgitation is seen.  Tricuspid Valve: Structurally normal tricuspid valve, with normal leaflet excursion. The tricuspid valve is normal in structure. Severe tricuspid regurgitation is visualized. Estimated pulmonary artery systolic pressure is 97.5 mmHg assuming a right atrial pressure of 10 mmHg, which is consistent with severe pulmonary hypertension.  Aortic Valve: The aortic valve is nmildly sclerosed. No evidence of aortic valve regurgitation is seen.  Pulmonic Valve: Structurally normal pulmonic valve, with normal leaflet excursion. The pulmonic valve was not well visualized. The pulmonic valve is normal. No indication of pulmonic valveregurgitation.  Aorta: The aortic root is normal in size and structure.  Pulmonary Artery: The main pulmonary artery is normal in size.       Summary:   1. Left ventricular ejection fraction, by visual estimation, is 60 to 65%.   2. Normal left ventricular size and wall thicknesses, with normal systolic and diastolic function.   3. There is mild concentric left ventricular hypertrophy.   4. Normal right ventricular size and function.   5. Moderately enlarged left atrium.   6. Severe mitral valve regurgitation.   7. Severe tricuspid regurgitation.   8. Estimated pulmonary artery systolic pressure is 97.5 mmHg assuming a right atrial pressure of 10 mmHg, which is consistent with severe pulmonary hypertension.   9. Mild thickening and calcification of the anterior and posterior mitral valve leaflets.    [id number] Dio Blackmon MD, FACCMD, FACC  Electronically signed on 1/5/2020 at 8:23:57 PM                                Vital Signs Last 24 Hrs  T(C): 36.8 (06 Jan 2020 11:10), Max: 37.1 (05 Jan 2020 17:30)  T(F): 98.3 (06 Jan 2020 11:10), Max: 98.8 (05 Jan 2020 17:30)  HR: 84 (06 Jan 2020 11:10) (72 - 87)  BP: 121/84 (06 Jan 2020 11:10) (115/59 - 126/65)  BP(mean): --  RR: 20 (06 Jan 2020 11:10) (17 - 20)  SpO2: 95% (06 Jan 2020 11:10) (95% - 100%)  Supplemental O2: on NC O2     PHYSICAL EXAM    General: 78 y/o  female awake, alert, sitting upright in bed now with NC O2 in place, pleasant and cooperative, in NAD but with audible congestion  HEENT:  conj pink, sclerae anicteric, PERRLA, no oral lesions noted  Neck: supple, no nodes noted  Heart: RR  Lungs: diffuse  moist rhonchi throughout with mild wheeze noted upper airways  Abdomen: soft, BS+, nontender to palpation, no masses or HS-megaly detected  Back: no CVA or Spinal tenderness noted  Extremities: 1+ edema Lt ankle                     LUE with swelling including the hand - nontender to palpation  Skin: warm, dry, no rash noted          I&O's Summary :    05 Jan 2020 07:01  -  06 Jan 2020 07:00  --------------------------------------------------------  IN: 570 mL / OUT: 0 mL / NET: 570 mL    06 Jan 2020 07:01  -  06 Jan 2020 13:55  --------------------------------------------------------  IN: 360 mL / OUT: 0 mL / NET: 360 mL        Impression:    Suggestions: HPI:  80 y/o  female with hx of HTN, Hypothyroidism, Iron Deficiency Anemia, Depression, s/p MI, 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  In the ER patient was found to have O2 Sat between 90 - 91% and w/u with Cx's of Blood and Urine were obtained as well as Rapid Influenza Testing which was negative for Influenza A + B but RSV was Positive.  Patient was rx'ed with nebulizers and O2 and evaluated by Pulmonary, Renal, and Cardiology.  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  She denies any cp or abdominal pain now and no chills reported.  Intermittent cough but with noted audible congestion but patient unable to expectorate any mucous.          Allergies :    No Known Allergies    Intolerances - none        Social History:  Tobacco: negative  Alcohol: negative  Recent Travel: none  Immunizations: Rec'd Influenza Vaccine this season                         ? Pneumovax ? Prevnar ?  Patient lived at home prior to her initial [facility] hospitalization in 12/19        MEDICATIONS  (STANDING):  albuterol/ipratropium for Nebulization 3 milliLiter(s) Nebulizer every 4 hours  ampicillin  IVPB      ampicillin  IVPB 1 Gram(s) IV Intermittent every 12 hours  atorvastatin 20 milliGRAM(s) Oral at bedtime  carvedilol 12.5 milliGRAM(s) Oral every 12 hours  cholecalciferol 1000 Unit(s) Oral daily  donepezil 10 milliGRAM(s) Oral at bedtime  escitalopram 10 milliGRAM(s) Oral daily  ferrous    sulfate 325 milliGRAM(s) Oral three times a day  heparin  Injectable 5000 Unit(s) SubCutaneous every 12 hours  hydrALAZINE 50 milliGRAM(s) Oral three times a day  levothyroxine 50 MICROGram(s) Oral daily  methylPREDNISolone sodium succinate Injectable 40 milliGRAM(s) IV Push every 6 hours  pantoprazole    Tablet 40 milliGRAM(s) Oral before breakfast  senna 1 Tablet(s) Oral at bedtime  sodium chloride 0.65% Nasal 1 Spray(s) Both Nostrils three times a day  sucralfate 1 Gram(s) Oral four times a day    MEDICATIONS  (PRN):  acetaminophen   Tablet .. 650 milliGRAM(s) Oral every 6 hours PRN Temp greater or equal to 38C (100.4F), Mild Pain (1 - 3)        LABS:  CBC Full  -  ( 05 Jan 2020 08:12 )  WBC Count : 7.10 K/uL  RBC Count : 2.49 M/uL  Hemoglobin : 7.8 g/dL  Hematocrit : 24.7 %  Platelet Count - Automated : 132 K/uL  Mean Cell Volume : 99.2 fl  Mean Cell Hemoglobin : 31.3 pg  Mean Cell Hemoglobin Concentration : 31.6 gm/dL  Auto Neutrophil # : x  Auto Lymphocyte # : x  Auto Monocyte # : x  Auto Eosinophil # : x  Auto Basophil # : x  Auto Neutrophil % : x  Auto Lymphocyte % : x  Auto Monocyte % : x  Auto Eosinophil % : x  Auto Basophil % : x    01-06    138  |  104  |  69<H>  ----------------------------<  111<H>  3.8   |  25  |  3.77<H>    Ca    8.1<L>      06 Jan 2020 07:22          MICROBIOLOGY:    Specimen Source: .Urine Catheterized (01-01 @ 17:13)  Culture Results:   50,000 - 99,000 CFU/mL Enterococcus faecalis (vancomycin resistant) (01-01 @ 17:13)  Culture - Urine (01.01.20 @ 17:13)    -  Tetra/Doxy: R >8    -  Vancomycin: R >16    -  Nitrofurantoin: S <=32 Should not be used to treat pyelonephritis.    -  Linezolid: S 2    -  Daptomycin: S 1    -  Levofloxacin: R >4    -  Ciprofloxacin: R >2    -  Ampicillin: S <=2 Predicts results to ampicillin/sulbactam, amoxacillin-clavulanate and  piperacillin-tazobactam.    Specimen Source: .Urine Catheterized    Culture Results:   50,000 - 99,000 CFU/mL Enterococcus faecalis (vancomycin resistant)    Organism Identification: Observation    Organism: Observation    Method Type: RONALD        Specimen Source: .Blood Blood-Peripheral (01-01 @ 17:11)  Culture Results:   No growth to date. (01-01 @ 17:11)    Specimen Source: .Blood Blood-Peripheral (01-01 @ 17:11)  Culture Results:   No growth to date. (01-01 @ 17:11)        FLU A B RSV Detection by PCR (01.01.20 @ 14:04)    Flu A Result: NotDete: The Flu A B RSV assay is a Real-Time PCR test for the qualitative  detection and differentiation of Influenza A, Influenza B, and  Respiratory Syncytial Virus on nasopharyngeal swabs. The results should  be interpreted in the context of all clinical and laboratory findings.    Flu B Result: NotDetec    RSV Result: Detected            Radiology:  CXR -   < from: Xray Chest 1 View-PORTABLE IMMEDIATE (01.01.20 @ 16:24) >    EXAM:  XR CHEST PORTABLE IMMED 1V                          PROCEDURE DATE:  01/01/2020      INTERPRETATION:    DATE OF STUDY: 1/1/2020 at 3:53PM    PRIOR:Earlier 1/1/2020 chest.    CLINICAL INDICATION: Shortness of breath    TECHNIQUE: portable chest.    FINDINGS:   Thoracic aortic atheromatous changes and ectasia are stable.  The heart is top normal in size.  No florid central congestive changes.  Small bilateral pleural effusions noted, left more than right.  Bibasilar airspace hazinessmay be due to atelectasis-but underlying pneumonia not excluded in the right clinical setting.  No pneumothorax.  No acute bony findings.    IMPRESSION:   1. Small bilateral pleural effusions,  2. Increasing bibasilar atelectasis and/or pneumonia, asabove.                         < from: US Duplex Venous Upper Ext Ltd, Left (01.05.20 @ 11:27) >  EXAM:  US DPLX UPR EXT VEINS LTD LT                          PROCEDURE DATE:  01/05/2020      INTERPRETATION:  CLINICAL INFORMATION: Left arm edema.    COMPARISON: None available.    TECHNIQUE: Duplex sonography of the LEFT UPPER extremity veins with color and spectral Doppler, with and without compression.      FINDINGS:    The left internal jugular, subclavian, axillary and brachial veins are patent and compressible where applicable.  The basilic and cephalic veins (superficial veins) are patent and without thrombus. Visual eyes portion of the radial and ulnar veins are patent.    Doppler examination shows normal spontaneous and phasic flow.    Subcutaneous edema is seen in the left upper arm.    IMPRESSION:     No evidence of left upper extremity deep venous thrombosis.                                   < from: TTE Echo Doppler w/o Cont (01.03.20 @ 13:46) >     PHYSICIAN INTERPRETATION:  Left Ventricle: Normal left ventricular size and wall thicknesses, with normal systolic and diastolic function.  Left ventricular ejection fraction, by visual estimation, is 60 to 65%. Normal segmental left ventricular systolic function.  Right Ventricle: Normal right ventricular size and function.  Left Atrium: Moderately enlarged left atrium.  Right Atrium: The right atrium is normal in size. Normal right atrial size.  Pericardium: There is no evidence of pericardial effusion.  Mitral Valve: The mitral valve is normal in structure. Mild thickening and calcification of the anterior and posterior mitral valve leaflets. Mitral leaflet mobility is normal. Severe mitral valve regurgitation is seen.  Tricuspid Valve: Structurally normal tricuspid valve, with normal leaflet excursion. The tricuspid valve is normal in structure. Severe tricuspid regurgitation is visualized. Estimated pulmonary artery systolic pressure is 97.5 mmHg assuming a right atrial pressure of 10 mmHg, which is consistent with severe pulmonary hypertension.  Aortic Valve: The aortic valve is nmildly sclerosed. No evidence of aortic valve regurgitation is seen.  Pulmonic Valve: Structurally normal pulmonic valve, with normal leaflet excursion. The pulmonic valve was not well visualized. The pulmonic valve is normal. No indication of pulmonic valveregurgitation.  Aorta: The aortic root is normal in size and structure.  Pulmonary Artery: The main pulmonary artery is normal in size.       Summary:   1. Left ventricular ejection fraction, by visual estimation, is 60 to 65%.   2. Normal left ventricular size and wall thicknesses, with normal systolic and diastolic function.   3. There is mild concentric left ventricular hypertrophy.   4. Normal right ventricular size and function.   5. Moderately enlarged left atrium.   6. Severe mitral valve regurgitation.   7. Severe tricuspid regurgitation.   8. Estimated pulmonary artery systolic pressure is 97.5 mmHg assuming a right atrial pressure of 10 mmHg, which is consistent with severe pulmonary hypertension.   9. Mild thickening and calcification of the anterior and posterior mitral valve leaflets.    [id number] Dio Blackmon MD, FACCMD, FACC  Electronically signed on 1/5/2020 at 8:23:57 PM                                Vital Signs Last 24 Hrs  T(C): 36.8 (06 Jan 2020 11:10), Max: 37.1 (05 Jan 2020 17:30)  T(F): 98.3 (06 Jan 2020 11:10), Max: 98.8 (05 Jan 2020 17:30)  HR: 84 (06 Jan 2020 11:10) (72 - 87)  BP: 121/84 (06 Jan 2020 11:10) (115/59 - 126/65)  BP(mean): --  RR: 20 (06 Jan 2020 11:10) (17 - 20)  SpO2: 95% (06 Jan 2020 11:10) (95% - 100%)  Supplemental O2: on NC O2     PHYSICAL EXAM    General: 80 y/o  female awake, alert, sitting upright in bed now with NC O2 in place, pleasant and cooperative, in NAD but with audible congestion  HEENT:  conj pink, sclerae anicteric, PERRLA, no oral lesions noted  Neck: supple, no nodes noted  Heart: RR  Lungs: diffuse  moist rhonchi throughout with mild wheeze noted upper airways  Abdomen: soft, BS+, nontender to palpation, no masses or HS-megaly detected  Back: no CVA or Spinal tenderness noted  Extremities: 1+ edema Lt ankle                     LUE with swelling including the hand - nontender to palpation  Skin: warm, dry, no rash noted          I&O's Summary :    05 Jan 2020 07:01  -  06 Jan 2020 07:00  --------------------------------------------------------  IN: 570 mL / OUT: 0 mL / NET: 570 mL    06 Jan 2020 07:01  -  06 Jan 2020 13:55  --------------------------------------------------------  IN: 360 mL / OUT: 0 mL / NET: 360 mL        Impression:  80 y/o  female with hx of HTN, Hypothyroidism, Iron Deficiency Anemia, Depression, s/p CVA with residual LUE paralysis, s/p MI, GI Bleed, Alzheimer's Disease, CKD, CHF, s/p recent [facility] Hospitalization for PNA in 12/19, admitted to [facility] from [facility] with SOB and cough and found to have RSV Bronchitis along with VRE UTI.    Suggestions:  Will therefore continue Ampi rx for now for UTI and maintain Contact Isolation for RSV Bronchitis.  Follow-up CXR and labs.  Thanks, will follow with you.  Discussed with patient at bedside. HPI:  78 y/o  female with hx of HTN, Hypothyroidism, Iron Deficiency Anemia, Depression, s/p MI, s/p CVA with residual paralysis of the LUE, CKD, s/p GI Bleed, Alzheimer's , s/p [facility] Hospitalization in 12/19 during which time she was rx'ed for PNA with Zosyn, admitted from [facility] on 1/1/20 with c/o SOB and cough x few days PTA.  In the ER patient was found to have O2 Sat between 90 - 91% and w/u with Cx's of Blood and Urine were obtained as well as Rapid Influenza Testing which was negative for Influenza A + B but RSV was Positive.  Patient was rx'ed with nebulizers and O2 and evaluated by Pulmonary and Renal.  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  She denies any cp or abdominal pain now and no chills reported.  Intermittent cough but with noted audible congestion but patient unable to expectorate any mucous.          Allergies :    No Known Allergies    Intolerances - none        Social History:  Tobacco: negative  Alcohol: negative  Recent Travel: none  Immunizations: Rec'd Influenza Vaccine this season                         ? Pneumovax ? Prevnar ?  Patient lived at home prior to her initial [facility] hospitalization in 12/19        MEDICATIONS  (STANDING):  albuterol/ipratropium for Nebulization 3 milliLiter(s) Nebulizer every 4 hours  ampicillin  IVPB      ampicillin  IVPB 1 Gram(s) IV Intermittent every 12 hours  atorvastatin 20 milliGRAM(s) Oral at bedtime  carvedilol 12.5 milliGRAM(s) Oral every 12 hours  cholecalciferol 1000 Unit(s) Oral daily  donepezil 10 milliGRAM(s) Oral at bedtime  escitalopram 10 milliGRAM(s) Oral daily  ferrous    sulfate 325 milliGRAM(s) Oral three times a day  heparin  Injectable 5000 Unit(s) SubCutaneous every 12 hours  hydrALAZINE 50 milliGRAM(s) Oral three times a day  levothyroxine 50 MICROGram(s) Oral daily  methylPREDNISolone sodium succinate Injectable 40 milliGRAM(s) IV Push every 6 hours  pantoprazole    Tablet 40 milliGRAM(s) Oral before breakfast  senna 1 Tablet(s) Oral at bedtime  sodium chloride 0.65% Nasal 1 Spray(s) Both Nostrils three times a day  sucralfate 1 Gram(s) Oral four times a day    MEDICATIONS  (PRN):  acetaminophen   Tablet .. 650 milliGRAM(s) Oral every 6 hours PRN Temp greater or equal to 38C (100.4F), Mild Pain (1 - 3)        LABS:  CBC Full  -  ( 05 Jan 2020 08:12 )  WBC Count : 7.10 K/uL  RBC Count : 2.49 M/uL  Hemoglobin : 7.8 g/dL  Hematocrit : 24.7 %  Platelet Count - Automated : 132 K/uL  Mean Cell Volume : 99.2 fl  Mean Cell Hemoglobin : 31.3 pg  Mean Cell Hemoglobin Concentration : 31.6 gm/dL  Auto Neutrophil # : x  Auto Lymphocyte # : x  Auto Monocyte # : x  Auto Eosinophil # : x  Auto Basophil # : x  Auto Neutrophil % : x  Auto Lymphocyte % : x  Auto Monocyte % : x  Auto Eosinophil % : x  Auto Basophil % : x    01-06    138  |  104  |  69<H>  ----------------------------<  111<H>  3.8   |  25  |  3.77<H>    Ca    8.1<L>      06 Jan 2020 07:22          MICROBIOLOGY:    Specimen Source: .Urine Catheterized (01-01 @ 17:13)  Culture Results:   50,000 - 99,000 CFU/mL Enterococcus faecalis (vancomycin resistant) (01-01 @ 17:13)  Culture - Urine (01.01.20 @ 17:13)    -  Tetra/Doxy: R >8    -  Vancomycin: R >16    -  Nitrofurantoin: S <=32 Should not be used to treat pyelonephritis.    -  Linezolid: S 2    -  Daptomycin: S 1    -  Levofloxacin: R >4    -  Ciprofloxacin: R >2    -  Ampicillin: S <=2 Predicts results to ampicillin/sulbactam, amoxacillin-clavulanate and  piperacillin-tazobactam.    Specimen Source: .Urine Catheterized    Culture Results:   50,000 - 99,000 CFU/mL Enterococcus faecalis (vancomycin resistant)    Organism Identification: Observation    Organism: Observation    Method Type: RONALD        Specimen Source: .Blood Blood-Peripheral (01-01 @ 17:11)  Culture Results:   No growth to date. (01-01 @ 17:11)    Specimen Source: .Blood Blood-Peripheral (01-01 @ 17:11)  Culture Results:   No growth to date. (01-01 @ 17:11)        FLU A B RSV Detection by PCR (01.01.20 @ 14:04)    Flu A Result: NotDete: The Flu A B RSV assay is a Real-Time PCR test for the qualitative  detection and differentiation of Influenza A, Influenza B, and  Respiratory Syncytial Virus on nasopharyngeal swabs. The results should  be interpreted in the context of all clinical and laboratory findings.    Flu B Result: NotDetec    RSV Result: Detected            Radiology:  CXR -   < from: Xray Chest 1 View-PORTABLE IMMEDIATE (01.01.20 @ 16:24) >    EXAM:  XR CHEST PORTABLE IMMED 1V                          PROCEDURE DATE:  01/01/2020      INTERPRETATION:    DATE OF STUDY: 1/1/2020 at 3:53PM    PRIOR:Earlier 1/1/2020 chest.    CLINICAL INDICATION: Shortness of breath    TECHNIQUE: portable chest.    FINDINGS:   Thoracic aortic atheromatous changes and ectasia are stable.  The heart is top normal in size.  No florid central congestive changes.  Small bilateral pleural effusions noted, left more than right.  Bibasilar airspace hazinessmay be due to atelectasis-but underlying pneumonia not excluded in the right clinical setting.  No pneumothorax.  No acute bony findings.    IMPRESSION:   1. Small bilateral pleural effusions,  2. Increasing bibasilar atelectasis and/or pneumonia, asabove.                         < from: US Duplex Venous Upper Ext Ltd, Left (01.05.20 @ 11:27) >  EXAM:  US DPLX UPR EXT VEINS LTD LT                          PROCEDURE DATE:  01/05/2020      INTERPRETATION:  CLINICAL INFORMATION: Left arm edema.    COMPARISON: None available.    TECHNIQUE: Duplex sonography of the LEFT UPPER extremity veins with color and spectral Doppler, with and without compression.      FINDINGS:    The left internal jugular, subclavian, axillary and brachial veins are patent and compressible where applicable.  The basilic and cephalic veins (superficial veins) are patent and without thrombus. Visual eyes portion of the radial and ulnar veins are patent.    Doppler examination shows normal spontaneous and phasic flow.    Subcutaneous edema is seen in the left upper arm.    IMPRESSION:     No evidence of left upper extremity deep venous thrombosis.                                   < from: TTE Echo Doppler w/o Cont (01.03.20 @ 13:46) >     PHYSICIAN INTERPRETATION:  Left Ventricle: Normal left ventricular size and wall thicknesses, with normal systolic and diastolic function.  Left ventricular ejection fraction, by visual estimation, is 60 to 65%. Normal segmental left ventricular systolic function.  Right Ventricle: Normal right ventricular size and function.  Left Atrium: Moderately enlarged left atrium.  Right Atrium: The right atrium is normal in size. Normal right atrial size.  Pericardium: There is no evidence of pericardial effusion.  Mitral Valve: The mitral valve is normal in structure. Mild thickening and calcification of the anterior and posterior mitral valve leaflets. Mitral leaflet mobility is normal. Severe mitral valve regurgitation is seen.  Tricuspid Valve: Structurally normal tricuspid valve, with normal leaflet excursion. The tricuspid valve is normal in structure. Severe tricuspid regurgitation is visualized. Estimated pulmonary artery systolic pressure is 97.5 mmHg assuming a right atrial pressure of 10 mmHg, which is consistent with severe pulmonary hypertension.  Aortic Valve: The aortic valve is nmildly sclerosed. No evidence of aortic valve regurgitation is seen.  Pulmonic Valve: Structurally normal pulmonic valve, with normal leaflet excursion. The pulmonic valve was not well visualized. The pulmonic valve is normal. No indication of pulmonic valveregurgitation.  Aorta: The aortic root is normal in size and structure.  Pulmonary Artery: The main pulmonary artery is normal in size.       Summary:   1. Left ventricular ejection fraction, by visual estimation, is 60 to 65%.   2. Normal left ventricular size and wall thicknesses, with normal systolic and diastolic function.   3. There is mild concentric left ventricular hypertrophy.   4. Normal right ventricular size and function.   5. Moderately enlarged left atrium.   6. Severe mitral valve regurgitation.   7. Severe tricuspid regurgitation.   8. Estimated pulmonary artery systolic pressure is 97.5 mmHg assuming a right atrial pressure of 10 mmHg, which is consistent with severe pulmonary hypertension.   9. Mild thickening and calcification of the anterior and posterior mitral valve leaflets.    [id number] Dio Blackmon MD, FACCMD, FACC  Electronically signed on 1/5/2020 at 8:23:57 PM                                Vital Signs Last 24 Hrs  T(C): 36.8 (06 Jan 2020 11:10), Max: 37.1 (05 Jan 2020 17:30)  T(F): 98.3 (06 Jan 2020 11:10), Max: 98.8 (05 Jan 2020 17:30)  HR: 84 (06 Jan 2020 11:10) (72 - 87)  BP: 121/84 (06 Jan 2020 11:10) (115/59 - 126/65)  BP(mean): --  RR: 20 (06 Jan 2020 11:10) (17 - 20)  SpO2: 95% (06 Jan 2020 11:10) (95% - 100%)  Supplemental O2: on NC O2     PHYSICAL EXAM    General: 78 y/o  female awake, alert, sitting upright in bed now with NC O2 in place, pleasant and cooperative, in NAD but with audible congestion  HEENT:  conj pink, sclerae anicteric, PERRLA, no oral lesions noted  Neck: supple, no nodes noted  Heart: RR  Lungs: diffuse  moist rhonchi throughout with mild wheeze noted upper airways  Abdomen: soft, BS+, nontender to palpation, no masses or HS-megaly detected  Back: no CVA or Spinal tenderness noted  Extremities: 1+ edema Lt ankle                     LUE with swelling including the hand - nontender to palpation  Skin: warm, dry, no rash noted          I&O's Summary :    05 Jan 2020 07:01  -  06 Jan 2020 07:00  --------------------------------------------------------  IN: 570 mL / OUT: 0 mL / NET: 570 mL    06 Jan 2020 07:01  -  06 Jan 2020 13:55  --------------------------------------------------------  IN: 360 mL / OUT: 0 mL / NET: 360 mL        Impression:  78 y/o  female with hx of HTN, Hypothyroidism, Iron Deficiency Anemia, Depression, s/p CVA with residual LUE paralysis, s/p MI, GI Bleed, Alzheimer's Disease, CKD, CHF, s/p recent [facility] Hospitalization for PNA in 12/19, admitted to [facility] from [facility] with SOB and cough and found to have RSV Bronchitis along with VRE UTI.    Suggestions:  Will therefore continue Ampi rx for now for UTI and maintain Contact Isolation for RSV Bronchitis.  Follow-up CXR and labs.  Thanks, will follow with you.  Discussed with patient at bedside.

## 2020-01-06 NOTE — PROGRESS NOTE ADULT - SUBJECTIVE AND OBJECTIVE BOX
INTERVAL HPI:  79 year female with HTN, HLD, MI hx, CVA, Gi bleed, Anemia Depression and Alzheimer's dementia.  Sent from [facility] due to SOB. Possibility of pneumonia vs CHF was raised.   In ED with Respiratory distress required BIPAP support and RVP positive for RSV. Pt not able to provide more details due to dementia. Information from transfer papers and ED physician.  Nods no when asked for smoking.    OVERNIGHT EVENTS:  Slowly improving    Vital Signs Last 24 Hrs  T(C): 37.1 (06 Jan 2020 16:15), Max: 37.1 (05 Jan 2020 23:57)  T(F): 98.7 (06 Jan 2020 16:15), Max: 98.8 (05 Jan 2020 23:57)  HR: 86 (06 Jan 2020 17:28) (72 - 86)  BP: 153/69 (06 Jan 2020 16:15) (115/59 - 153/69)  BP(mean): --  RR: 19 (06 Jan 2020 16:15) (18 - 20)  SpO2: 98% (06 Jan 2020 17:28) (95% - 99%)    PHYSICAL EXAM:  GEN:         Awake, responsive and comfortable.  HEENT:    Normal.    RESP:         occasional wheezing.      CVS:           Regular rate and rhythm.   ABD:         Soft, non-tender, non-distended;     MEDICATIONS  (STANDING):  albuterol/ipratropium for Nebulization 3 milliLiter(s) Nebulizer every 4 hours  ampicillin  IVPB      ampicillin  IVPB 1 Gram(s) IV Intermittent every 12 hours  atorvastatin 20 milliGRAM(s) Oral at bedtime  carvedilol 12.5 milliGRAM(s) Oral every 12 hours  cholecalciferol 1000 Unit(s) Oral daily  donepezil 10 milliGRAM(s) Oral at bedtime  escitalopram 10 milliGRAM(s) Oral daily  ferrous    sulfate 325 milliGRAM(s) Oral three times a day  heparin  Injectable 5000 Unit(s) SubCutaneous every 12 hours  hydrALAZINE 50 milliGRAM(s) Oral three times a day  levothyroxine 50 MICROGram(s) Oral daily  pantoprazole    Tablet 40 milliGRAM(s) Oral before breakfast  senna 1 Tablet(s) Oral at bedtime  sodium chloride 0.65% Nasal 1 Spray(s) Both Nostrils three times a day  sucralfate 1 Gram(s) Oral four times a day    MEDICATIONS  (PRN):  acetaminophen   Tablet .. 650 milliGRAM(s) Oral every 6 hours PRN Temp greater or equal to 38C (100.4F), Mild Pain (1 - 3)    LABS:                        7.8    7.10  )-----------( 132      ( 05 Jan 2020 08:12 )             24.7     01-06    138  |  104  |  69<H>  ----------------------------<  111<H>  3.8   |  25  |  3.77<H>    Ca    8.1<L>      06 Jan 2020 07:22    01-01 @ 14:09  pH: 7.39  pCO2: 43  pO2: 209  SaO2: 100    ASSESSMENT AND PLAN:  ·	Acute Respiratory distress.  ·	RSV tracheobronchitis.  ·	Left pleural effusion.  ·	Hypothyroidism with very high TSH.  ·	Anemia.  ·	Leukocytosis.  ·	Renal Insuffiencey.  ·	CVA by history.  ·	HTN.  ·	HLD.  ·	Alzheimer's dementia.    Seen by ID, started on ampicillin.  Continue nebulizer.

## 2020-01-07 DIAGNOSIS — D64.9 ANEMIA, UNSPECIFIED: ICD-10-CM

## 2020-01-07 RX ORDER — ERYTHROPOIETIN 10000 [IU]/ML
20000 INJECTION, SOLUTION INTRAVENOUS; SUBCUTANEOUS
Refills: 0 | Status: DISCONTINUED | OUTPATIENT
Start: 2020-01-07 | End: 2020-01-12

## 2020-01-07 RX ADMIN — Medication 325 MILLIGRAM(S): at 23:08

## 2020-01-07 RX ADMIN — Medication 1 GRAM(S): at 05:20

## 2020-01-07 RX ADMIN — Medication 1000 UNIT(S): at 12:53

## 2020-01-07 RX ADMIN — Medication 1 SPRAY(S): at 05:21

## 2020-01-07 RX ADMIN — PANTOPRAZOLE SODIUM 40 MILLIGRAM(S): 20 TABLET, DELAYED RELEASE ORAL at 05:22

## 2020-01-07 RX ADMIN — Medication 1 SPRAY(S): at 23:08

## 2020-01-07 RX ADMIN — Medication 325 MILLIGRAM(S): at 05:20

## 2020-01-07 RX ADMIN — CARVEDILOL PHOSPHATE 12.5 MILLIGRAM(S): 80 CAPSULE, EXTENDED RELEASE ORAL at 19:06

## 2020-01-07 RX ADMIN — Medication 50 MILLIGRAM(S): at 05:20

## 2020-01-07 RX ADMIN — Medication 50 MILLIGRAM(S): at 13:00

## 2020-01-07 RX ADMIN — Medication 3 MILLILITER(S): at 13:55

## 2020-01-07 RX ADMIN — Medication 3 MILLILITER(S): at 09:11

## 2020-01-07 RX ADMIN — Medication 50 MILLIGRAM(S): at 23:13

## 2020-01-07 RX ADMIN — Medication 108 GRAM(S): at 05:20

## 2020-01-07 RX ADMIN — Medication 1 GRAM(S): at 23:13

## 2020-01-07 RX ADMIN — ERYTHROPOIETIN 20000 UNIT(S): 10000 INJECTION, SOLUTION INTRAVENOUS; SUBCUTANEOUS at 12:52

## 2020-01-07 RX ADMIN — Medication 3 MILLILITER(S): at 18:39

## 2020-01-07 RX ADMIN — Medication 1 GRAM(S): at 12:53

## 2020-01-07 RX ADMIN — Medication 3 MILLILITER(S): at 05:11

## 2020-01-07 RX ADMIN — Medication 325 MILLIGRAM(S): at 13:00

## 2020-01-07 RX ADMIN — CARVEDILOL PHOSPHATE 12.5 MILLIGRAM(S): 80 CAPSULE, EXTENDED RELEASE ORAL at 05:20

## 2020-01-07 RX ADMIN — HEPARIN SODIUM 5000 UNIT(S): 5000 INJECTION INTRAVENOUS; SUBCUTANEOUS at 05:20

## 2020-01-07 RX ADMIN — Medication 1 SPRAY(S): at 12:58

## 2020-01-07 RX ADMIN — Medication 1 GRAM(S): at 19:06

## 2020-01-07 RX ADMIN — ATORVASTATIN CALCIUM 20 MILLIGRAM(S): 80 TABLET, FILM COATED ORAL at 23:08

## 2020-01-07 RX ADMIN — DONEPEZIL HYDROCHLORIDE 10 MILLIGRAM(S): 10 TABLET, FILM COATED ORAL at 23:08

## 2020-01-07 RX ADMIN — Medication 108 GRAM(S): at 19:07

## 2020-01-07 RX ADMIN — Medication 1 GRAM(S): at 00:28

## 2020-01-07 RX ADMIN — Medication 50 MICROGRAM(S): at 05:20

## 2020-01-07 RX ADMIN — HEPARIN SODIUM 5000 UNIT(S): 5000 INJECTION INTRAVENOUS; SUBCUTANEOUS at 19:07

## 2020-01-07 RX ADMIN — Medication 3 MILLILITER(S): at 01:04

## 2020-01-07 RX ADMIN — Medication 3 MILLILITER(S): at 21:54

## 2020-01-07 RX ADMIN — ESCITALOPRAM OXALATE 10 MILLIGRAM(S): 10 TABLET, FILM COATED ORAL at 12:53

## 2020-01-07 NOTE — CONSULT NOTE ADULT - SUBJECTIVE AND OBJECTIVE BOX
Reason for Consultation: Anemia    HPI: Patient is a 79y Female seen on consultatioin for the evaluation and management of Anemia    79 year female with HTN, HLD, MI hx, CVA, Gi bleed, Anemia Depression and Alzheimer's dementia.  Sent from [facility] due to SOB. Possibility of pneumonia vs CHF was raised.   In ED with Respiratory distress required BIPAP support and RVP positive for RSV. Pt not able to provide more details due to dementia. Patient also with Chronic Kideny Disease    PAST MEDICAL & SURGICAL HISTORY:  Hypothyroidism  Constipation  Iron deficiency anemia  Major depressive disorder  Hyperlipidemia  GI bleed  Depression  Alzheimers disease  MI, old  CVA (cerebral vascular accident)  HTN (hypertension)  No significant past surgical history        MEDICATIONS  (STANDING):  albuterol/ipratropium for Nebulization 3 milliLiter(s) Nebulizer every 4 hours  ampicillin  IVPB      ampicillin  IVPB 1 Gram(s) IV Intermittent every 12 hours  atorvastatin 20 milliGRAM(s) Oral at bedtime  carvedilol 12.5 milliGRAM(s) Oral every 12 hours  cholecalciferol 1000 Unit(s) Oral daily  donepezil 10 milliGRAM(s) Oral at bedtime  epoetin tawanna Injectable 29678 Unit(s) SubCutaneous every 7 days  escitalopram 10 milliGRAM(s) Oral daily  ferrous    sulfate 325 milliGRAM(s) Oral three times a day  heparin  Injectable 5000 Unit(s) SubCutaneous every 12 hours  hydrALAZINE 50 milliGRAM(s) Oral three times a day  levothyroxine 50 MICROGram(s) Oral daily  pantoprazole    Tablet 40 milliGRAM(s) Oral before breakfast  senna 1 Tablet(s) Oral at bedtime  sodium chloride 0.65% Nasal 1 Spray(s) Both Nostrils three times a day  sucralfate 1 Gram(s) Oral four times a day    MEDICATIONS  (PRN):  acetaminophen   Tablet .. 650 milliGRAM(s) Oral every 6 hours PRN Temp greater or equal to 38C (100.4F), Mild Pain (1 - 3)      Allergies    No Known Allergies    Intolerances        SOCIAL HISTORY:    Smoking Status: no  Alcohol: no  Occupation: no    FAMILY HISTORY:  Family history of essential hypertension (Child)  Family history of stroke      Vital Signs Last 24 Hrs  T(C): 36.2 (07 Jan 2020 04:59), Max: 37.1 (06 Jan 2020 16:15)  T(F): 97.2 (07 Jan 2020 04:59), Max: 98.7 (06 Jan 2020 16:15)  HR: 68 (07 Jan 2020 09:12) (68 - 86)  BP: 138/74 (07 Jan 2020 04:59) (121/84 - 153/69)  BP(mean): --  RR: 18 (07 Jan 2020 04:59) (18 - 20)  SpO2: 98% (07 Jan 2020 09:12) (95% - 98%)    PHYSICAL EXAM:    general - weak looking  HEENT - No Icterus  CVS - RRR  RS - AE B/L  Abd - soft, NT  Ext - Pulses +        LABS:    01-06    138  |  104  |  69<H>  ----------------------------<  111<H>  3.8   |  25  |  3.77<H>    Ca    8.1<L>      06 Jan 2020 07:22            Culture - Urine (collected 01 Jan 2020 17:13)  Source: .Urine Catheterized  Final Report (03 Jan 2020 20:37):    50,000 - 99,000 CFU/mL Enterococcus faecalis (vancomycin resistant)  Organism: Observation (03 Jan 2020 20:37)  Organism: Observation (03 Jan 2020 20:37)    Culture - Blood (collected 01 Jan 2020 17:11)  Source: .Blood Blood-Peripheral  Final Report (06 Jan 2020 18:00):    No growth at 5 days.    Culture - Blood (collected 01 Jan 2020 17:11)  Source: .Blood Blood-Peripheral  Final Report (06 Jan 2020 18:00):    No growth at 5 days.        RADIOLOGY & ADDITIONAL STUDIES:

## 2020-01-07 NOTE — PROGRESS NOTE ADULT - SUBJECTIVE AND OBJECTIVE BOX
Patient is a 79y old  Female who presents with a chief complaint of sob cough (06 Jan 2020 19:36)      INTERVAL HPI/OVERNIGHT EVENTS:  pt states that 2 weeks ago at [facility] Dx c cml  MEDICATIONS  (STANDING):  albuterol/ipratropium for Nebulization 3 milliLiter(s) Nebulizer every 4 hours  ampicillin  IVPB      ampicillin  IVPB 1 Gram(s) IV Intermittent every 12 hours  atorvastatin 20 milliGRAM(s) Oral at bedtime  carvedilol 12.5 milliGRAM(s) Oral every 12 hours  cholecalciferol 1000 Unit(s) Oral daily  donepezil 10 milliGRAM(s) Oral at bedtime  epoetin tawanna Injectable 34030 Unit(s) SubCutaneous every 7 days  escitalopram 10 milliGRAM(s) Oral daily  ferrous    sulfate 325 milliGRAM(s) Oral three times a day  heparin  Injectable 5000 Unit(s) SubCutaneous every 12 hours  hydrALAZINE 50 milliGRAM(s) Oral three times a day  levothyroxine 50 MICROGram(s) Oral daily  pantoprazole    Tablet 40 milliGRAM(s) Oral before breakfast  senna 1 Tablet(s) Oral at bedtime  sodium chloride 0.65% Nasal 1 Spray(s) Both Nostrils three times a day  sucralfate 1 Gram(s) Oral four times a day    MEDICATIONS  (PRN):  acetaminophen   Tablet .. 650 milliGRAM(s) Oral every 6 hours PRN Temp greater or equal to 38C (100.4F), Mild Pain (1 - 3)      Allergies    No Known Allergies    Intolerances        REVIEW OF SYSTEMS:        HEENT - wnl  RESPIRATORY: No cough, wheezing, chills or hemoptysis; No shortness of breath  CARDIOVASCULAR: No chest pain, palpitations, dizziness, or leg swelling  GASTROINTESTINAL: No abdominal or epigastric pain. No nausea, vomiting, or hematemesis; No diarrhea or constipation. No melena or hematochezia.  GENITOURINARY: No dysuria, frequency, hematuria, or incontinence  SKIN: No itching, burning, rashes, or lesions   MUSCULOSKELETAL: No joint pain or swelling; No muscle, back, or extremity pain  PSYCHIATRIC: No depression, anxiety, mood swings, or difficulty sleeping        Vital Signs Last 24 Hrs  T(C): 36.2 (07 Jan 2020 04:59), Max: 37.1 (06 Jan 2020 16:15)  T(F): 97.2 (07 Jan 2020 04:59), Max: 98.7 (06 Jan 2020 16:15)  HR: 73 (07 Jan 2020 05:11) (73 - 86)  BP: 138/74 (07 Jan 2020 04:59) (121/84 - 153/69)  BP(mean): --  RR: 18 (07 Jan 2020 04:59) (18 - 20)  SpO2: 97% (07 Jan 2020 05:11) (95% - 98%)    PHYSICAL EXAM:  general       HEENT wnl  CHEST/LUNG: Clear to percussion bilaterally; No rales, rhonchi, wheezing, or rubs  HEART: Regular rate and rhythm; III/VI JENNY  ABDOMEN: Soft, Nontender, Nondistended; Bowel sounds present  EXTREMITIES:  2+ Peripheral Pulses, No clubbing, cyanosis, or edema  LYMPH: No lymphadenopathy noted  SKIN: No rashes or lesions    LABS:    01-06    138  |  104  |  69<H>  ----------------------------<  111<H>  3.8   |  25  |  3.77<H>    Ca    8.1<L>      06 Jan 2020 07:22          CAPILLARY BLOOD GLUCOSE                    RADIOLOGY & ADDITIONAL TESTS:    Imaging Personally Reviewed:  [y ] YES  [ ] NO    Consultant(s) Notes Reviewed:  [y ] YES  [ ] NO    Care Discussed with Consultants/Other Providers [ ] YES  [ ] NO    PROBLEMS:  CONGESTIVE BABB FAILURE;RSV INFECTION  SHORTNESS OF BREATH  CHF (congestive heart failure)  s/p fall  Chest contusion  nasal abrasion/contusion  Acute on chronic renal Fx  CML?  Chronic anemia    Care discussed with family,         [x  ]   yes  [  ]  No    imp:    stable[x ]    unstable[  ]     improving [   ]       unchanged  [  ]                Plans:  strt diet  Rocephin empirically 1g ivpb  renal, bladder u/s  nephrology  hematology

## 2020-01-07 NOTE — CONSULT NOTE ADULT - SUBJECTIVE AND OBJECTIVE BOX
HPI:  HPI:  · HPI Objective Statement: 79 years old female from [facility] rehab c/o coughing sob for three to four days. Pt is sent here to rule out chf vs pneumonia. Pt is alert and oriented to person only unable to give detail hx due to hx of Alzheimer according to the [facility] pt had sat of 91 to 92 %,	    PAST MEDICAL/SURGICAL/FAMILY/SOCIAL HISTORY:    Past Medical History:  Alzheimers disease    CVA (cerebral vascular accident)    Depression    HTN (hypertension)    MI, old.  recently Dx c hypothyroidism, TSH > 111 started Levothyroxin 50mcg qd as per Dr Vigil (2020 11:41)      Chief Complaint:  Patient is a 79y old  Female who presents with a chief complaint of sob cough (2020 22:56)      Review of Systems:    General:  No wt loss, fevers, chills, night sweats  Eyes:  Good vision, no reported pain  ENT:  No sore throat, pain, runny nose, dysphagia  CV:  No pain, palpitations, hypo/hypertension  Resp:  dyspnea, cough  GI:  No pain, nausea, vomiting, diarrhea, constipation           Social History/Family History  SOCHX:   tobacco,  -  alcohol    FMHX: FA/MO  - contributory       Discussed with:  PMD, Family    Physical Exam:    Vital Signs:  Vital Signs Last 24 Hrs  T(C): 36.2 (2020 17:06), Max: 36.9 (2020 00:18)  T(F): 97.2 (2020 17:06), Max: 98.5 (2020 00:18)  HR: 74 (2020 21:55) (68 - 87)  BP: 179/78 (2020 17:06) (132/71 - 179/78)  BP(mean): --  RR: 18 (2020 17:06) (18 - 18)  SpO2: 97% (2020 21:55) (94% - 98%)  Daily     Daily Weight in k.8 (2020 04:59)  I&O's Summary    2020 07:01  -  2020 07:00  --------------------------------------------------------  IN: 560 mL / OUT: 0 mL / NET: 560 mL          Chest:  fair air entry bilateral  Cardiovascular:  Regular rhythm, S1, S2, no murmur/rub/S3/S4, no carotid/femoral/abdominal bruit, radial/pedal pulses 2+,   Abdomen:  Soft, non-tender, non-distended, normoactive bowel sounds, no HSM        Laboratory:          138  |  104  |  69<H>  ----------------------------<  111<H>  3.8   |  25  |  3.77<H>    Ca    8.1<L>      2020 07:22          Assessment:  I am asked to assist this patient admitted with shortness of breath  The patient has multifactorial causes of shortness of breath, renal insufficiency, anemia  And now after a diagnostic echocardiogram within normal ejection fraction the reading shows mitral regurgitation and tricuspid regurgitation  After review of the studies this regurgitation is not considered severe but in the moderate to severe but more moderate range  The patient had a diagnostic echocardiogram approximately one year ago with mild to moderate regurgitation  The patient also appears to be clinically improving with the treatment that is noted  Diuresis would not be beneficial at this time considering the patient's renal function is already depressed  Continue to optimize to systolic blood pressure as well as heart rate  Continue other consultants recommendations most notably renal and pulmonary  I will continue to follow this patient during this,Hospital course,

## 2020-01-07 NOTE — PROGRESS NOTE ADULT - SUBJECTIVE AND OBJECTIVE BOX
INTERVAL HPI:  79 year female with HTN, HLD, MI hx, CVA, Gi bleed, Anemia Depression and Alzheimer's dementia.  Sent from [facility] due to SOB. Possibility of pneumonia vs CHF was raised.   In ED with Respiratory distress required BIPAP support and RVP positive for RSV. Pt not able to provide more details due to dementia. Information from transfer papers and ED physician.  Nods no when asked for smoking.    OVERNIGHT EVENTS:  Awake and appears comfortable.    Vital Signs Last 24 Hrs  T(C): 36.2 (07 Jan 2020 17:06), Max: 36.9 (07 Jan 2020 00:18)  T(F): 97.2 (07 Jan 2020 17:06), Max: 98.5 (07 Jan 2020 00:18)  HR: 74 (07 Jan 2020 21:55) (68 - 87)  BP: 179/78 (07 Jan 2020 17:06) (132/71 - 179/78)  BP(mean): --  RR: 18 (07 Jan 2020 17:06) (18 - 18)  SpO2: 97% (07 Jan 2020 21:55) (94% - 98%)    PHYSICAL EXAM:  GEN:         Awake, responsive and comfortable.  HEENT:    Normal.    RESP:        no wheezing.  CVS:          Regular rate and rhythm.   ABD:         Soft, non-tender, non-distended;     MEDICATIONS  (STANDING):  albuterol/ipratropium for Nebulization 3 milliLiter(s) Nebulizer every 4 hours  ampicillin  IVPB      ampicillin  IVPB 1 Gram(s) IV Intermittent every 12 hours  atorvastatin 20 milliGRAM(s) Oral at bedtime  carvedilol 12.5 milliGRAM(s) Oral every 12 hours  cholecalciferol 1000 Unit(s) Oral daily  donepezil 10 milliGRAM(s) Oral at bedtime  epoetin tawanna Injectable 24126 Unit(s) SubCutaneous every 7 days  escitalopram 10 milliGRAM(s) Oral daily  ferrous    sulfate 325 milliGRAM(s) Oral three times a day  heparin  Injectable 5000 Unit(s) SubCutaneous every 12 hours  hydrALAZINE 50 milliGRAM(s) Oral three times a day  levothyroxine 50 MICROGram(s) Oral daily  pantoprazole    Tablet 40 milliGRAM(s) Oral before breakfast  senna 1 Tablet(s) Oral at bedtime  sodium chloride 0.65% Nasal 1 Spray(s) Both Nostrils three times a day  sucralfate 1 Gram(s) Oral four times a day    MEDICATIONS  (PRN):  acetaminophen   Tablet .. 650 milliGRAM(s) Oral every 6 hours PRN Temp greater or equal to 38C (100.4F), Mild Pain (1 - 3)    LABS:    01-06    138  |  104  |  69<H>  ----------------------------<  111<H>  3.8   |  25  |  3.77<H>    Ca    8.1<L>      06 Jan 2020 07:22    01-01 @ 14:09  pH: 7.39  pCO2: 43  pO2: 209  SaO2: 100    ASSESSMENT AND PLAN:  ·	Acute Respiratory distress.  ·	RSV tracheobronchitis.  ·	Left pleural effusion.  ·	Hypothyroidism with very high TSH.  ·	Anemia.  ·	Leukocytosis.  ·	Renal Insuffiencey.  ·	CVA by history.  ·	HTN.  ·	HLD.  ·	Alzheimer's dementia.    Continue nebulizer and antibiotics.

## 2020-01-07 NOTE — PROGRESS NOTE ADULT - SUBJECTIVE AND OBJECTIVE BOX
[facility] NEPHROLOGY SERVICES, [facility]  NEPHROLOGY AND HYPERTENSION  [address]  [address]  [place] [address]  [phone]    MD MART LORD MD ANDREY GONCHARUK, MD MADHU KORRAPATI, MD YELENA ROSENBERG, MD ISABELLA SLADE, MD ALAYNA WALLACE MD          Patient feels well no complaints today.    MEDICATIONS  (STANDING):  albuterol/ipratropium for Nebulization 3 milliLiter(s) Nebulizer every 4 hours  ampicillin  IVPB      ampicillin  IVPB 1 Gram(s) IV Intermittent every 12 hours  atorvastatin 20 milliGRAM(s) Oral at bedtime  carvedilol 12.5 milliGRAM(s) Oral every 12 hours  cholecalciferol 1000 Unit(s) Oral daily  donepezil 10 milliGRAM(s) Oral at bedtime  epoetin tawanna Injectable 81708 Unit(s) SubCutaneous every 7 days  escitalopram 10 milliGRAM(s) Oral daily  ferrous    sulfate 325 milliGRAM(s) Oral three times a day  heparin  Injectable 5000 Unit(s) SubCutaneous every 12 hours  hydrALAZINE 50 milliGRAM(s) Oral three times a day  levothyroxine 50 MICROGram(s) Oral daily  pantoprazole    Tablet 40 milliGRAM(s) Oral before breakfast  senna 1 Tablet(s) Oral at bedtime  sodium chloride 0.65% Nasal 1 Spray(s) Both Nostrils three times a day  sucralfate 1 Gram(s) Oral four times a day    MEDICATIONS  (PRN):  acetaminophen   Tablet .. 650 milliGRAM(s) Oral every 6 hours PRN Temp greater or equal to 38C (100.4F), Mild Pain (1 - 3)      01-06-20 @ 07:01  -  01-07-20 @ 07:00  --------------------------------------------------------  IN: 560 mL / OUT: 0 mL / NET: 560 mL      PHYSICAL EXAM:      T(C): 36.2 (01-07-20 @ 11:04), Max: 36.9 (01-07-20 @ 00:18)  HR: 74 (01-07-20 @ 13:56) (68 - 86)  BP: 134/77 (01-07-20 @ 11:04) (132/71 - 138/74)  RR: 18 (01-07-20 @ 11:04) (18 - 18)  SpO2: 98% (01-07-20 @ 13:56) (97% - 98%)  Wt(kg): --  Respiratory: clear anteriorly, decreased BS at bases  Cardiovascular: S1 S2  Gastrointestinal: soft NT ND +BS  Extremities:   1 edema                01-06    138  |  104  |  69<H>  ----------------------------<  111<H>  3.8   |  25  |  3.77<H>    Ca    8.1<L>      06 Jan 2020 07:22          Creatinine Trend: 3.77<--, 3.45<--, 3.02<--, 3.04<--, 2.99<--, 3.12<--    Assessment  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      Plan:  Continued warranted diuresis  Follow paraprotein screen;   Prognosis guarded      Obed Jamison MD

## 2020-01-07 NOTE — PROGRESS NOTE ADULT - SUBJECTIVE AND OBJECTIVE BOX
Patient is a 79y old  Female who presents with a chief complaint of sob cough (07 Jan 2020 08:58)      INTERVAL HPI/OVERNIGHT EVENTS: comfortable eating breakfast    MEDICATIONS  (STANDING):  albuterol/ipratropium for Nebulization 3 milliLiter(s) Nebulizer every 4 hours  ampicillin  IVPB      ampicillin  IVPB 1 Gram(s) IV Intermittent every 12 hours  atorvastatin 20 milliGRAM(s) Oral at bedtime  carvedilol 12.5 milliGRAM(s) Oral every 12 hours  cholecalciferol 1000 Unit(s) Oral daily  donepezil 10 milliGRAM(s) Oral at bedtime  epoetin tawanna Injectable 28062 Unit(s) SubCutaneous every 7 days  escitalopram 10 milliGRAM(s) Oral daily  ferrous    sulfate 325 milliGRAM(s) Oral three times a day  heparin  Injectable 5000 Unit(s) SubCutaneous every 12 hours  hydrALAZINE 50 milliGRAM(s) Oral three times a day  levothyroxine 50 MICROGram(s) Oral daily  pantoprazole    Tablet 40 milliGRAM(s) Oral before breakfast  senna 1 Tablet(s) Oral at bedtime  sodium chloride 0.65% Nasal 1 Spray(s) Both Nostrils three times a day  sucralfate 1 Gram(s) Oral four times a day    MEDICATIONS  (PRN):  acetaminophen   Tablet .. 650 milliGRAM(s) Oral every 6 hours PRN Temp greater or equal to 38C (100.4F), Mild Pain (1 - 3)      Allergies    No Known Allergies    Intolerances        REVIEW OF SYSTEMS:    > rndurance  > appetite  < cough  < SOB      Vital Signs Last 24 Hrs  T(C): 36.2 (07 Jan 2020 04:59), Max: 37.1 (06 Jan 2020 16:15)  T(F): 97.2 (07 Jan 2020 04:59), Max: 98.7 (06 Jan 2020 16:15)  HR: 73 (07 Jan 2020 05:11) (73 - 86)  BP: 138/74 (07 Jan 2020 04:59) (121/84 - 153/69)  BP(mean): --  RR: 18 (07 Jan 2020 04:59) (18 - 20)  SpO2: 97% (07 Jan 2020 05:11) (95% - 98%)    PHYSICAL EXAM:  general       HEENT wnl  CHEST/LUNG: coarse rhonchi and wheezing b/l  HEART: Regular rate and rhythm; No murmurs, rubs, or gallops  ABDOMEN: Soft, Nontender, Nondistended; Bowel sounds present  EXTREMITIES:  2+ Peripheral Pulses, No clubbing, cyanosis, or edema  LYMPH: No lymphadenopathy noted  SKIN: No rashes or lesions    LABS:    01-06    138  |  104  |  69<H>  ----------------------------<  111<H>  3.8   |  25  |  3.77<H>    Ca    8.1<L>      06 Jan 2020 07:22          CAPILLARY BLOOD GLUCOSE                    RADIOLOGY & ADDITIONAL TESTS:    Imaging Personally Reviewed:  [y ] YES  [ ] NO    Consultant(s) Notes Reviewed:  [y ] YES  [ ] NO    Care Discussed with Consultants/Other Providers [ ] YES  [ ] NO    PROBLEMS:  CONGESTIVE BABB FAILURE;RSV INFECTION  SHORTNESS OF BREATH  CHF (congestive heart failure)  Acute viral tracheobronchitis  VRE UTI  severe MS  Anemia      Care discussed with family,         [  ]   yes  [  ]  No    imp:    stable[ ]    unstable[  ]     improving [ x  ]       unchanged  [  ]                Plans:  hem-onc, cardiology consult  Epogen 00453 units q week

## 2020-01-08 LAB
BASOPHILS # BLD AUTO: 0.01 K/UL — SIGNIFICANT CHANGE UP (ref 0–0.2)
BASOPHILS NFR BLD AUTO: 0.1 % — SIGNIFICANT CHANGE UP (ref 0–2)
EOSINOPHIL # BLD AUTO: 0 K/UL — SIGNIFICANT CHANGE UP (ref 0–0.5)
EOSINOPHIL NFR BLD AUTO: 0 % — SIGNIFICANT CHANGE UP (ref 0–6)
FERRITIN SERPL-MCNC: 204 NG/ML — HIGH (ref 15–150)
FOLATE SERPL-MCNC: 10.9 NG/ML — SIGNIFICANT CHANGE UP
HCT VFR BLD CALC: 26 % — LOW (ref 34.5–45)
HGB BLD-MCNC: 8.2 G/DL — LOW (ref 11.5–15.5)
IMM GRANULOCYTES NFR BLD AUTO: 1.2 % — SIGNIFICANT CHANGE UP (ref 0–1.5)
IRON SATN MFR SERPL: 17 % — SIGNIFICANT CHANGE UP (ref 14–50)
IRON SATN MFR SERPL: 37 UG/DL — SIGNIFICANT CHANGE UP (ref 30–160)
LYMPHOCYTES # BLD AUTO: 1 K/UL — SIGNIFICANT CHANGE UP (ref 1–3.3)
LYMPHOCYTES # BLD AUTO: 10.6 % — LOW (ref 13–44)
MCHC RBC-ENTMCNC: 30.8 PG — SIGNIFICANT CHANGE UP (ref 27–34)
MCHC RBC-ENTMCNC: 31.5 GM/DL — LOW (ref 32–36)
MCV RBC AUTO: 97.7 FL — SIGNIFICANT CHANGE UP (ref 80–100)
MONOCYTES # BLD AUTO: 0.44 K/UL — SIGNIFICANT CHANGE UP (ref 0–0.9)
MONOCYTES NFR BLD AUTO: 4.7 % — SIGNIFICANT CHANGE UP (ref 2–14)
NEUTROPHILS # BLD AUTO: 7.9 K/UL — HIGH (ref 1.8–7.4)
NEUTROPHILS NFR BLD AUTO: 83.4 % — HIGH (ref 43–77)
NRBC # BLD: 0 /100 WBCS — SIGNIFICANT CHANGE UP (ref 0–0)
PLATELET # BLD AUTO: 143 K/UL — LOW (ref 150–400)
RBC # BLD: 2.66 M/UL — LOW (ref 3.8–5.2)
RBC # BLD: 2.66 M/UL — LOW (ref 3.8–5.2)
RBC # FLD: 15.9 % — HIGH (ref 10.3–14.5)
RETICS #: 45.5 K/UL — SIGNIFICANT CHANGE UP (ref 25–125)
RETICS/RBC NFR: 1.7 % — SIGNIFICANT CHANGE UP (ref 0.5–2.5)
TIBC SERPL-MCNC: 215 UG/DL — LOW (ref 220–430)
UIBC SERPL-MCNC: 178 UG/DL — SIGNIFICANT CHANGE UP (ref 110–370)
VIT B12 SERPL-MCNC: 1652 PG/ML — HIGH (ref 232–1245)
WBC # BLD: 9.46 K/UL — SIGNIFICANT CHANGE UP (ref 3.8–10.5)
WBC # FLD AUTO: 9.46 K/UL — SIGNIFICANT CHANGE UP (ref 3.8–10.5)

## 2020-01-08 RX ORDER — ACETYLCYSTEINE 200 MG/ML
5 VIAL (ML) MISCELLANEOUS THREE TIMES A DAY
Refills: 0 | Status: DISCONTINUED | OUTPATIENT
Start: 2020-01-08 | End: 2020-01-08

## 2020-01-08 RX ORDER — ACETYLCYSTEINE 200 MG/ML
3 VIAL (ML) MISCELLANEOUS THREE TIMES A DAY
Refills: 0 | Status: COMPLETED | OUTPATIENT
Start: 2020-01-08 | End: 2020-01-09

## 2020-01-08 RX ADMIN — Medication 1 GRAM(S): at 18:11

## 2020-01-08 RX ADMIN — Medication 650 MILLIGRAM(S): at 15:23

## 2020-01-08 RX ADMIN — Medication 3 MILLILITER(S): at 10:51

## 2020-01-08 RX ADMIN — Medication 1 SPRAY(S): at 14:11

## 2020-01-08 RX ADMIN — PANTOPRAZOLE SODIUM 40 MILLIGRAM(S): 20 TABLET, DELAYED RELEASE ORAL at 05:37

## 2020-01-08 RX ADMIN — ESCITALOPRAM OXALATE 10 MILLIGRAM(S): 10 TABLET, FILM COATED ORAL at 12:11

## 2020-01-08 RX ADMIN — Medication 3 MILLILITER(S): at 13:55

## 2020-01-08 RX ADMIN — Medication 1 SPRAY(S): at 05:37

## 2020-01-08 RX ADMIN — SENNA PLUS 1 TABLET(S): 8.6 TABLET ORAL at 22:54

## 2020-01-08 RX ADMIN — Medication 1000 UNIT(S): at 12:11

## 2020-01-08 RX ADMIN — Medication 108 GRAM(S): at 05:37

## 2020-01-08 RX ADMIN — CARVEDILOL PHOSPHATE 12.5 MILLIGRAM(S): 80 CAPSULE, EXTENDED RELEASE ORAL at 05:37

## 2020-01-08 RX ADMIN — Medication 1 GRAM(S): at 05:36

## 2020-01-08 RX ADMIN — Medication 1 SPRAY(S): at 22:55

## 2020-01-08 RX ADMIN — Medication 325 MILLIGRAM(S): at 05:37

## 2020-01-08 RX ADMIN — DONEPEZIL HYDROCHLORIDE 10 MILLIGRAM(S): 10 TABLET, FILM COATED ORAL at 22:54

## 2020-01-08 RX ADMIN — ATORVASTATIN CALCIUM 20 MILLIGRAM(S): 80 TABLET, FILM COATED ORAL at 22:54

## 2020-01-08 RX ADMIN — Medication 325 MILLIGRAM(S): at 22:54

## 2020-01-08 RX ADMIN — Medication 40 MILLIGRAM(S): at 18:11

## 2020-01-08 RX ADMIN — Medication 3 MILLILITER(S): at 01:18

## 2020-01-08 RX ADMIN — Medication 325 MILLIGRAM(S): at 14:10

## 2020-01-08 RX ADMIN — Medication 3 MILLILITER(S): at 21:33

## 2020-01-08 RX ADMIN — CARVEDILOL PHOSPHATE 12.5 MILLIGRAM(S): 80 CAPSULE, EXTENDED RELEASE ORAL at 18:12

## 2020-01-08 RX ADMIN — Medication 50 MILLIGRAM(S): at 14:10

## 2020-01-08 RX ADMIN — Medication 108 GRAM(S): at 18:16

## 2020-01-08 RX ADMIN — Medication 3 MILLILITER(S): at 17:07

## 2020-01-08 RX ADMIN — Medication 3 MILLILITER(S): at 21:34

## 2020-01-08 RX ADMIN — Medication 1 GRAM(S): at 12:11

## 2020-01-08 RX ADMIN — HEPARIN SODIUM 5000 UNIT(S): 5000 INJECTION INTRAVENOUS; SUBCUTANEOUS at 18:11

## 2020-01-08 RX ADMIN — Medication 650 MILLIGRAM(S): at 14:23

## 2020-01-08 RX ADMIN — Medication 50 MILLIGRAM(S): at 05:37

## 2020-01-08 RX ADMIN — Medication 50 MILLIGRAM(S): at 22:54

## 2020-01-08 RX ADMIN — Medication 3 MILLILITER(S): at 05:00

## 2020-01-08 RX ADMIN — HEPARIN SODIUM 5000 UNIT(S): 5000 INJECTION INTRAVENOUS; SUBCUTANEOUS at 05:36

## 2020-01-08 RX ADMIN — Medication 50 MICROGRAM(S): at 05:37

## 2020-01-08 NOTE — PROGRESS NOTE ADULT - SUBJECTIVE AND OBJECTIVE BOX
79 years old female from [facility] rehab c/o coughing sob for three to four days. Pt is sent here to rule out chf vs pneumonia. Pt is alert and oriented to person only unable to give detail hx due to hx of Alzheimer according to the [facility] pt had sat of 91 to 92 %,	    PAST MEDICAL/SURGICAL/FAMILY/SOCIAL HISTORY:    Past Medical History:  Alzheimers disease    CVA (cerebral vascular accident)    Depression    HTN (hypertension)    MI, old.  recently Dx c hypothyroidism, TSH > 111 started Levothyroxin 50mcg qd as per Dr Vigil (2020 11:41)      Chief Complaint:  Patient is a 79y old  Female who presents with a chief complaint of sob cough (2020 22:56)      Review of Systems:    General:  No wt loss, fevers, chills, night sweats  Eyes:  Good vision, no reported pain  ENT:  No sore throat, pain, runny nose, dysphagia  CV:  No pain, palpitations, hypo/hypertension  Resp:  dyspnea, cough  GI:  No pain, nausea, vomiting, diarrhea, constipation           Social History/Family History  SOCHX:   tobacco,  -  alcohol    FMHX: FA/MO  - contributory       Discussed with:  PMD, Family    Physical Exam:    Vital Signs:  Vital Signs Last 24 Hrs  T(C): 36.2 (2020 17:06), Max: 36.9 (2020 00:18)  T(F): 97.2 (2020 17:06), Max: 98.5 (2020 00:18)  HR: 74 (2020 21:55) (68 - 87)  BP: 179/78 (2020 17:06) (132/71 - 179/78)  BP(mean): --  RR: 18 (2020 17:06) (18 - 18)  SpO2: 97% (2020 21:55) (94% - 98%)  Daily     Daily Weight in k.8 (2020 04:59)  I&O's Summary    2020 07:01  -  2020 07:00  --------------------------------------------------------  IN: 560 mL / OUT: 0 mL / NET: 560 mL          Chest:  fair air entry bilateral  Cardiovascular:  Regular rhythm, S1, S2, no murmur/rub/S3/S4, no carotid/femoral/abdominal bruit, radial/pedal pulses 2+,   Abdomen:  Soft, non-tender, non-distended, normoactive bowel sounds, no HSM        Laboratory:          138  |  104  |  69<H>  ----------------------------<  111<H>  3.8   |  25  |  3.77<H>    Ca    8.1<L>      2020 07:22          Assessment:  I am asked to assist this patient admitted with shortness of breath  The patient has multifactorial causes of shortness of breath, renal insufficiency, anemia  And now after a diagnostic echocardiogram within normal ejection fraction the reading shows mitral regurgitation and tricuspid regurgitation  After review of the studies this regurgitation is not considered severe but in the moderate to severe but more moderate range  The patient had a diagnostic echocardiogram approximately one year ago with mild to moderate regurgitation  The patient also appears to be clinically improving with the treatment that is noted  Diuresis would not be beneficial at this time considering the patient's renal function is already depressed  Continue to optimize to systolic blood pressure as well as heart rate  Continue other consultants recommendations most notably renal and pulmonary

## 2020-01-08 NOTE — PROGRESS NOTE ADULT - SUBJECTIVE AND OBJECTIVE BOX
Patient is a 79y old  Female who presents with a chief complaint of sob cough (08 Jan 2020 09:43)      INTERVAL HPI/OVERNIGHT EVENTS: comfortable eating breakfast    MEDICATIONS  (STANDING):  acetylcysteine 10%  Inhalation 5 milliLiter(s) Inhalation three times a day  albuterol/ipratropium for Nebulization 3 milliLiter(s) Nebulizer every 4 hours  ampicillin  IVPB      ampicillin  IVPB 1 Gram(s) IV Intermittent every 12 hours  atorvastatin 20 milliGRAM(s) Oral at bedtime  carvedilol 12.5 milliGRAM(s) Oral every 12 hours  cholecalciferol 1000 Unit(s) Oral daily  donepezil 10 milliGRAM(s) Oral at bedtime  epoetin tawanna Injectable 95647 Unit(s) SubCutaneous every 7 days  escitalopram 10 milliGRAM(s) Oral daily  ferrous    sulfate 325 milliGRAM(s) Oral three times a day  heparin  Injectable 5000 Unit(s) SubCutaneous every 12 hours  hydrALAZINE 50 milliGRAM(s) Oral three times a day  levothyroxine 50 MICROGram(s) Oral daily  methylPREDNISolone sodium succinate Injectable 40 milliGRAM(s) IV Push two times a day  pantoprazole    Tablet 40 milliGRAM(s) Oral before breakfast  senna 1 Tablet(s) Oral at bedtime  sodium chloride 0.65% Nasal 1 Spray(s) Both Nostrils three times a day  sucralfate 1 Gram(s) Oral four times a day    MEDICATIONS  (PRN):  acetaminophen   Tablet .. 650 milliGRAM(s) Oral every 6 hours PRN Temp greater or equal to 38C (100.4F), Mild Pain (1 - 3)      Allergies    No Known Allergies    Intolerances        REVIEW OF SYSTEMS:    cardiology eval noted.  Mod to severe but more moderate MR  EF nl  On Epogen      Vital Signs Last 24 Hrs  T(C): 37.6 (08 Jan 2020 05:11), Max: 37.7 (07 Jan 2020 23:42)  T(F): 99.6 (08 Jan 2020 05:11), Max: 99.8 (07 Jan 2020 23:42)  HR: 82 (08 Jan 2020 05:11) (70 - 105)  BP: 128/63 (08 Jan 2020 05:11) (128/63 - 179/78)  BP(mean): --  RR: 18 (08 Jan 2020 05:11) (16 - 18)  SpO2: 98% (08 Jan 2020 05:11) (94% - 98%)    PHYSICAL EXAM:  general       HEENT wnl  CHEST/LUNG: still b/l coarse rhonchi and wheezing  HEART: Regular rate and rhythm; No murmurs, rubs, or gallops  ABDOMEN: Soft, Nontender, Nondistended; Bowel sounds present  EXTREMITIES:  2+ Peripheral Pulses, No clubbing, cyanosis, or edema  LYMPH: No lymphadenopathy noted  SKIN: No rashes or lesions    LABS:                        8.2    9.46  )-----------( 143      ( 08 Jan 2020 07:29 )             26.0               CAPILLARY BLOOD GLUCOSE                    RADIOLOGY & ADDITIONAL TESTS:    Imaging Personally Reviewed:  [y ] YES  [ ] NO    Consultant(s) Notes Reviewed:  [y ] YES  [ ] NO    Care Discussed with Consultants/Other Providers [ ] YES  [ ] NO    PROBLEMS:  CONGESTIVE BABB FAILURE; RSV INFECTION  SHORTNESS OF BREATH  CHF (congestive heart failure)  Anemia  CKD 4  MR moder-to sev        Care discussed with family,         [  ]   yes  [  ]  No    imp:    stable[ ]    unstable[  ]     improving [   ]       unchanged  [  ]                Plans:  Continue present plans  [  ]

## 2020-01-08 NOTE — PROGRESS NOTE ADULT - SUBJECTIVE AND OBJECTIVE BOX
INTERVAL HPI:  79 year female with HTN, HLD, MI hx, CVA, Gi bleed, Anemia Depression and Alzheimer's dementia.  Sent from [facility] due to SOB. Possibility of pneumonia vs CHF was raised.   In ED with Respiratory distress required BIPAP support and RVP positive for RSV. Pt not able to provide more details due to dementia. Information from transfer papers and ED physician.  Nods no when asked for smoking.    OVERNIGHT EVENTS:  Appears some what better. low grade fever noted.    Vital Signs Last 24 Hrs  T(C): 36.4 (08 Jan 2020 16:18), Max: 37.7 (07 Jan 2020 23:42)  T(F): 97.5 (08 Jan 2020 16:18), Max: 99.8 (07 Jan 2020 23:42)  HR: 80 (08 Jan 2020 18:44) (70 - 105)  BP: 155/80 (08 Jan 2020 16:18) (128/63 - 164/80)  BP(mean): --  RR: 18 (08 Jan 2020 16:18) (16 - 18)  SpO2: 98% (08 Jan 2020 18:44) (95% - 99%)    PHYSICAL EXAM:  GEN:         Awake, responsive and comfortable.  HEENT:    Normal.    RESP:        occasional wheeze.  CVS:             Regular rate and rhythm.   ABD:         Soft, non-tender, non-distended;     MEDICATIONS  (STANDING):  acetylcysteine 10%  Inhalation 3 milliLiter(s) Inhalation three times a day  albuterol/ipratropium for Nebulization 3 milliLiter(s) Nebulizer every 4 hours  ampicillin  IVPB      ampicillin  IVPB 1 Gram(s) IV Intermittent every 12 hours  atorvastatin 20 milliGRAM(s) Oral at bedtime  carvedilol 12.5 milliGRAM(s) Oral every 12 hours  cholecalciferol 1000 Unit(s) Oral daily  donepezil 10 milliGRAM(s) Oral at bedtime  epoetin tawanna Injectable 78617 Unit(s) SubCutaneous every 7 days  escitalopram 10 milliGRAM(s) Oral daily  ferrous    sulfate 325 milliGRAM(s) Oral three times a day  heparin  Injectable 5000 Unit(s) SubCutaneous every 12 hours  hydrALAZINE 50 milliGRAM(s) Oral three times a day  levothyroxine 50 MICROGram(s) Oral daily  methylPREDNISolone sodium succinate Injectable 40 milliGRAM(s) IV Push two times a day  pantoprazole    Tablet 40 milliGRAM(s) Oral before breakfast  senna 1 Tablet(s) Oral at bedtime  sodium chloride 0.65% Nasal 1 Spray(s) Both Nostrils three times a day  sucralfate 1 Gram(s) Oral four times a day    MEDICATIONS  (PRN):  acetaminophen   Tablet .. 650 milliGRAM(s) Oral every 6 hours PRN Temp greater or equal to 38C (100.4F), Mild Pain (1 - 3)    LABS:                        8.2    9.46  )-----------( 143      ( 08 Jan 2020 07:29 )             26.0     ASSESSMENT AND PLAN:  ·	Acute Respiratory distress.  ·	RSV tracheobronchitis.  ·	Left pleural effusion.  ·	Hypothyroidism with very high TSH.  ·	Anemia.  ·	VRE UTI  ·	Leukocytosis.  ·	Renal Insuffiencey.  ·	CVA by history.  ·	HTN.  ·	HLD.  ·	Alzheimer's dementia.    Continue nebulizer and antibiotics.

## 2020-01-08 NOTE — PROGRESS NOTE ADULT - SUBJECTIVE AND OBJECTIVE BOX
[facility] NEPHROLOGY SERVICES, [facility]  NEPHROLOGY AND HYPERTENSION  [address]  [address]  [place] [address]  [phone]    MD MART LORD MD ANDREY GONCHARUK, MD MADHU KORRAPATI, MD YELENA ROSENBERG, MD ISABELLA SLADE, MD ALAYNA WALLACE MD          Patient appears comfortable    MEDICATIONS  (STANDING):  acetylcysteine 10%  Inhalation 3 milliLiter(s) Inhalation three times a day  albuterol/ipratropium for Nebulization 3 milliLiter(s) Nebulizer every 4 hours  ampicillin  IVPB      ampicillin  IVPB 1 Gram(s) IV Intermittent every 12 hours  atorvastatin 20 milliGRAM(s) Oral at bedtime  carvedilol 12.5 milliGRAM(s) Oral every 12 hours  cholecalciferol 1000 Unit(s) Oral daily  donepezil 10 milliGRAM(s) Oral at bedtime  epoetin tawanna Injectable 67456 Unit(s) SubCutaneous every 7 days  escitalopram 10 milliGRAM(s) Oral daily  ferrous    sulfate 325 milliGRAM(s) Oral three times a day  heparin  Injectable 5000 Unit(s) SubCutaneous every 12 hours  hydrALAZINE 50 milliGRAM(s) Oral three times a day  levothyroxine 50 MICROGram(s) Oral daily  methylPREDNISolone sodium succinate Injectable 40 milliGRAM(s) IV Push two times a day  pantoprazole    Tablet 40 milliGRAM(s) Oral before breakfast  senna 1 Tablet(s) Oral at bedtime  sodium chloride 0.65% Nasal 1 Spray(s) Both Nostrils three times a day  sucralfate 1 Gram(s) Oral four times a day    MEDICATIONS  (PRN):  acetaminophen   Tablet .. 650 milliGRAM(s) Oral every 6 hours PRN Temp greater or equal to 38C (100.4F), Mild Pain (1 - 3)      01-08-20 @ 07:01  -  01-08-20 @ 20:16  --------------------------------------------------------  IN: 480 mL / OUT: 0 mL / NET: 480 mL      PHYSICAL EXAM:      T(C): 36.4 (01-08-20 @ 16:18), Max: 37.7 (01-07-20 @ 23:42)  HR: 80 (01-08-20 @ 18:44) (70 - 105)  BP: 155/80 (01-08-20 @ 16:18) (128/63 - 164/80)  RR: 18 (01-08-20 @ 16:18) (16 - 18)  SpO2: 98% (01-08-20 @ 18:44) (95% - 99%)  Wt(kg): --  Respiratory: clear anteriorly, decreased BS at bases  Cardiovascular: S1 S2  Gastrointestinal: soft NT ND +BS  Extremities:   1 edema                                    8.2    9.46  )-----------( 143      ( 08 Jan 2020 07:29 )             26.0               Creatinine Trend: 3.77<--, 3.45<--, 3.02<--, 3.04<--, 2.99<--, 3.12<--      Assessment  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      Plan:  Continued warranted diuresis  Follow paraprotein screen;   Prognosis poor;       Obed Jamison MD

## 2020-01-08 NOTE — PROGRESS NOTE ADULT - SUBJECTIVE AND OBJECTIVE BOX
lying in bed    Vital Signs Last 24 Hrs  T(C): 37.6 (08 Jan 2020 05:11), Max: 37.7 (07 Jan 2020 23:42)  T(F): 99.6 (08 Jan 2020 05:11), Max: 99.8 (07 Jan 2020 23:42)  HR: 82 (08 Jan 2020 05:11) (70 - 105)  BP: 128/63 (08 Jan 2020 05:11) (128/63 - 179/78)  BP(mean): --  RR: 18 (08 Jan 2020 05:11) (16 - 18)  SpO2: 98% (08 Jan 2020 05:11) (94% - 98%)    PHYSICAL EXAM:    general - dementia  HEENT - No Icterus  CVS - RRR  RS - AE B/L  Abd - soft, NT  Ext - Pulses +        LABS:                        8.2    9.46  )-----------( 143      ( 08 Jan 2020 07:29 )             26.0                 Culture - Urine (collected 01 Jan 2020 17:13)  Source: .Urine Catheterized  Final Report (03 Jan 2020 20:37):    50,000 - 99,000 CFU/mL Enterococcus faecalis (vancomycin resistant)  Organism: Observation (03 Jan 2020 20:37)  Organism: Observation (03 Jan 2020 20:37)    Culture - Blood (collected 01 Jan 2020 17:11)  Source: .Blood Blood-Peripheral  Final Report (06 Jan 2020 18:00):    No growth at 5 days.    Culture - Blood (collected 01 Jan 2020 17:11)  Source: .Blood Blood-Peripheral  Final Report (06 Jan 2020 18:00):    No growth at 5 days.        RADIOLOGY & ADDITIONAL STUDIES:

## 2020-01-08 NOTE — PROGRESS NOTE ADULT - SUBJECTIVE AND OBJECTIVE BOX
TMAX - 99.8    On day # 4 Ampi now    Vital Signs Last 24 Hrs  T(C): 37.1 (08 Jan 2020 11:59), Max: 37.7 (07 Jan 2020 23:42)  T(F): 98.8 (08 Jan 2020 11:59), Max: 99.8 (07 Jan 2020 23:42)  HR: 74 (08 Jan 2020 11:59) (70 - 105)  BP: 137/70 (08 Jan 2020 11:59) (128/63 - 179/78)  BP(mean): --  RR: 18 (08 Jan 2020 11:59) (16 - 18)  SpO2: 97% (08 Jan 2020 11:59) (94% - 98%)  Supplemental O2:  on NC O2      Lethargic but arousable and denies any cp or abdominal pain now.  Audibly congested but seems unable to cough/ expectorate secondary to fatigue/ weakness now.  Has not been OOB.         PHYSICAL EXAM  General:  arousable but lethargic sitting semi-upright in bed now with audible congestion  HEENT:  conj pink, sclerae anicteric, PERRLA, no oral lesions noted  Neck: supple, no nodes noted   Heart:  RR  Lungs: diffuse bilateral moist rhonchi with associated wheezing   Abdomen:  soft, BS+, nontender to palpation  Back: no CVA or Spinal tenderness noted  Extremities:  trace edema RLE and 1+ edema Lt., Ankle   Skin:  warm, dry no rash noted        I&O's Summary :    08 Jan 2020 07:01  -  08 Jan 2020 14:44  --------------------------------------------------------  IN: 240 mL / OUT: 0 mL / NET: 240 mL        LABS:  CBC Full  -  ( 08 Jan 2020 07:29 )  WBC Count : 9.46 K/uL  RBC Count : 2.66 M/uL  Hemoglobin : 8.2 g/dL  Hematocrit : 26.0 %  Platelet Count - Automated : 143 K/uL  Mean Cell Volume : 97.7 fl  Mean Cell Hemoglobin : 30.8 pg  Mean Cell Hemoglobin Concentration : 31.5 gm/dL  Auto Neutrophil # : 7.90 K/uL  Auto Lymphocyte # : 1.00 K/uL  Auto Monocyte # : 0.44 K/uL  Auto Eosinophil # : 0.00 K/uL  Auto Basophil # : 0.01 K/uL  Auto Neutrophil % : 83.4 %  Auto Lymphocyte % : 10.6 %  Auto Monocyte % : 4.7 %  Auto Eosinophil % : 0.0 %  Auto Basophil % : 0.1 %            MICROBIOLOGY:    Specimen Source: .Urine Catheterized (01-01 @ 17:13)  Culture Results:   50,000 - 99,000 CFU/mL Enterococcus faecalis (vancomycin resistant) (01-01 @ 17:13)    Specimen Source: .Blood Blood-Peripheral (01-01 @ 17:11)  Culture Results:   No growth at 5 days. (01-01 @ 17:11)    Specimen Source: .Blood Blood-Peripheral (01-01 @ 17:11)  Culture Results:   No growth at 5 days. (01-01 @ 17:11)        FLU A B RSV Detection by PCR (01.01.20 @ 14:04)    Flu A Result: NotDetec: The Flu A B RSV assay is a Real-Time PCR test for the qualitative  detection and differentiation of Influenza A, Influenza B, and  Respiratory Syncytial Virus on nasopharyngeal swabs. The results should  be interpreted in the context of all clinical and laboratory findings.    Flu B Result: NotDetec    RSV Result: Detected          Radiology:  < from: Xray Chest 1 View-PORTABLE IMMEDIATE (01.01.20 @ 16:24) >    EXAM:  XR CHEST PORTABLE IMMED 1V                          PROCEDURE DATE:  01/01/2020      INTERPRETATION:    DATE OF STUDY: 1/1/2020 at 3:53PM    PRIOR:Earlier 1/1/2020 chest.    CLINICAL INDICATION: Shortness of breath    TECHNIQUE: portable chest.    FINDINGS:   Thoracic aortic atheromatous changes and ectasia are stable.  The heart is top normal in size.  No florid central congestive changes.  Small bilateral pleural effusions noted, left more than right.  Bibasilar airspace hazinessmay be due to atelectasis-but underlying pneumonia not excluded in the right clinical setting.  No pneumothorax.  No acute bony findings.    IMPRESSION:   1. Small bilateral pleural effusions,  2. Increasing bibasilar atelectasis and/or pneumonia, asabove.          Impression:  Low-grade temp on present ab rx with Ampi for continued rx of Sepsis due to VRE UTI with Bronchitis / ? PNA secondary to RSV.      Suggestions:  Will continue current ab rx and follow-up temps and labs closely.  Repeat CXR to re-evaluate as well.  Maintain Contact Isolation for RSV.

## 2020-01-09 DIAGNOSIS — E03.9 HYPOTHYROIDISM, UNSPECIFIED: ICD-10-CM

## 2020-01-09 DIAGNOSIS — G30.9 ALZHEIMER'S DISEASE, UNSPECIFIED: ICD-10-CM

## 2020-01-09 DIAGNOSIS — B97.4 RESPIRATORY SYNCYTIAL VIRUS AS THE CAUSE OF DISEASES CLASSIFIED ELSEWHERE: ICD-10-CM

## 2020-01-09 DIAGNOSIS — Z71.89 OTHER SPECIFIED COUNSELING: ICD-10-CM

## 2020-01-09 DIAGNOSIS — K92.2 GASTROINTESTINAL HEMORRHAGE, UNSPECIFIED: ICD-10-CM

## 2020-01-09 DIAGNOSIS — I25.2 OLD MYOCARDIAL INFARCTION: ICD-10-CM

## 2020-01-09 DIAGNOSIS — E78.5 HYPERLIPIDEMIA, UNSPECIFIED: ICD-10-CM

## 2020-01-09 DIAGNOSIS — I50.9 HEART FAILURE, UNSPECIFIED: ICD-10-CM

## 2020-01-09 DIAGNOSIS — I10 ESSENTIAL (PRIMARY) HYPERTENSION: ICD-10-CM

## 2020-01-09 LAB
ABO RH CONFIRMATION: SIGNIFICANT CHANGE UP
ALBUMIN SERPL ELPH-MCNC: 1.8 G/DL — LOW (ref 3.3–5)
ALP SERPL-CCNC: 70 U/L — SIGNIFICANT CHANGE UP (ref 40–120)
ALT FLD-CCNC: 42 U/L — SIGNIFICANT CHANGE UP (ref 12–78)
ANION GAP SERPL CALC-SCNC: 11 MMOL/L — SIGNIFICANT CHANGE UP (ref 5–17)
ANION GAP SERPL CALC-SCNC: 8 MMOL/L — SIGNIFICANT CHANGE UP (ref 5–17)
ANION GAP SERPL CALC-SCNC: 9 MMOL/L — SIGNIFICANT CHANGE UP (ref 5–17)
ANISOCYTOSIS BLD QL: SLIGHT — SIGNIFICANT CHANGE UP
APTT BLD: 26 SEC — LOW (ref 27.5–36.3)
AST SERPL-CCNC: 21 U/L — SIGNIFICANT CHANGE UP (ref 15–37)
BASE EXCESS BLDA CALC-SCNC: -0.7 MMOL/L — SIGNIFICANT CHANGE UP (ref -2–2)
BASOPHILS # BLD AUTO: 0 K/UL — SIGNIFICANT CHANGE UP (ref 0–0.2)
BASOPHILS NFR BLD AUTO: 0 % — SIGNIFICANT CHANGE UP (ref 0–2)
BILIRUB SERPL-MCNC: 0.7 MG/DL — SIGNIFICANT CHANGE UP (ref 0.2–1.2)
BLD GP AB SCN SERPL QL: SIGNIFICANT CHANGE UP
BLOOD GAS COMMENTS: SIGNIFICANT CHANGE UP
BLOOD GAS SOURCE: SIGNIFICANT CHANGE UP
BUN SERPL-MCNC: 81 MG/DL — HIGH (ref 7–23)
BUN SERPL-MCNC: 85 MG/DL — HIGH (ref 7–23)
BUN SERPL-MCNC: 89 MG/DL — HIGH (ref 7–23)
CALCIUM SERPL-MCNC: 7.7 MG/DL — LOW (ref 8.5–10.1)
CALCIUM SERPL-MCNC: 7.8 MG/DL — LOW (ref 8.5–10.1)
CALCIUM SERPL-MCNC: 8.1 MG/DL — LOW (ref 8.5–10.1)
CHLORIDE SERPL-SCNC: 106 MMOL/L — SIGNIFICANT CHANGE UP (ref 96–108)
CK MB BLD-MCNC: 3.9 % — HIGH (ref 0–3.5)
CK MB BLD-MCNC: <5.3 % — HIGH (ref 0–3.5)
CK MB CFR SERPL CALC: 1.6 NG/ML — SIGNIFICANT CHANGE UP (ref 0.5–3.6)
CK MB CFR SERPL CALC: <1 NG/ML — SIGNIFICANT CHANGE UP (ref 0.5–3.6)
CK SERPL-CCNC: 19 U/L — LOW (ref 26–192)
CK SERPL-CCNC: 41 U/L — SIGNIFICANT CHANGE UP (ref 26–192)
CO2 SERPL-SCNC: 22 MMOL/L — SIGNIFICANT CHANGE UP (ref 22–31)
CO2 SERPL-SCNC: 25 MMOL/L — SIGNIFICANT CHANGE UP (ref 22–31)
CO2 SERPL-SCNC: 25 MMOL/L — SIGNIFICANT CHANGE UP (ref 22–31)
CREAT SERPL-MCNC: 4.11 MG/DL — HIGH (ref 0.5–1.3)
CREAT SERPL-MCNC: 4.27 MG/DL — HIGH (ref 0.5–1.3)
CREAT SERPL-MCNC: 4.36 MG/DL — HIGH (ref 0.5–1.3)
CRP SERPL-MCNC: 2.28 MG/DL — HIGH (ref 0–0.4)
DACRYOCYTES BLD QL SMEAR: SLIGHT — SIGNIFICANT CHANGE UP
ELLIPTOCYTES BLD QL SMEAR: SLIGHT — SIGNIFICANT CHANGE UP
EOSINOPHIL # BLD AUTO: 0 K/UL — SIGNIFICANT CHANGE UP (ref 0–0.5)
EOSINOPHIL NFR BLD AUTO: 0 % — SIGNIFICANT CHANGE UP (ref 0–6)
ERYTHROCYTE [SEDIMENTATION RATE] IN BLOOD: 49 MM/HR — HIGH (ref 0–20)
GLUCOSE BLDC GLUCOMTR-MCNC: 249 MG/DL — HIGH (ref 70–99)
GLUCOSE BLDC GLUCOMTR-MCNC: 251 MG/DL — HIGH (ref 70–99)
GLUCOSE SERPL-MCNC: 150 MG/DL — HIGH (ref 70–99)
GLUCOSE SERPL-MCNC: 157 MG/DL — HIGH (ref 70–99)
GLUCOSE SERPL-MCNC: 231 MG/DL — HIGH (ref 70–99)
HCO3 BLDA-SCNC: 22 MMOL/L — SIGNIFICANT CHANGE UP (ref 21–29)
HCT VFR BLD CALC: 23 % — LOW (ref 34.5–45)
HCT VFR BLD CALC: 26.8 % — LOW (ref 34.5–45)
HCT VFR BLD CALC: 30.7 % — LOW (ref 34.5–45)
HGB BLD-MCNC: 10.6 G/DL — LOW (ref 11.5–15.5)
HGB BLD-MCNC: 7.2 G/DL — LOW (ref 11.5–15.5)
HGB BLD-MCNC: 8.4 G/DL — LOW (ref 11.5–15.5)
HOROWITZ INDEX BLDA+IHG-RTO: 0.4 — SIGNIFICANT CHANGE UP
HYPOCHROMIA BLD QL: SLIGHT — SIGNIFICANT CHANGE UP
INR BLD: 1.08 RATIO — SIGNIFICANT CHANGE UP (ref 0.88–1.16)
LACTATE SERPL-SCNC: 2 MMOL/L — SIGNIFICANT CHANGE UP (ref 0.7–2)
LYMPHOCYTES # BLD AUTO: 0.43 K/UL — LOW (ref 1–3.3)
LYMPHOCYTES # BLD AUTO: 6 % — LOW (ref 13–44)
MACROCYTES BLD QL: SLIGHT — SIGNIFICANT CHANGE UP
MAGNESIUM SERPL-MCNC: 2.3 MG/DL — SIGNIFICANT CHANGE UP (ref 1.6–2.6)
MANUAL SMEAR VERIFICATION: SIGNIFICANT CHANGE UP
MCHC RBC-ENTMCNC: 30.5 PG — SIGNIFICANT CHANGE UP (ref 27–34)
MCHC RBC-ENTMCNC: 30.7 PG — SIGNIFICANT CHANGE UP (ref 27–34)
MCHC RBC-ENTMCNC: 31 PG — SIGNIFICANT CHANGE UP (ref 27–34)
MCHC RBC-ENTMCNC: 31.3 GM/DL — LOW (ref 32–36)
MCHC RBC-ENTMCNC: 31.3 GM/DL — LOW (ref 32–36)
MCHC RBC-ENTMCNC: 34.5 GM/DL — SIGNIFICANT CHANGE UP (ref 32–36)
MCV RBC AUTO: 88.5 FL — SIGNIFICANT CHANGE UP (ref 80–100)
MCV RBC AUTO: 97.8 FL — SIGNIFICANT CHANGE UP (ref 80–100)
MCV RBC AUTO: 99.1 FL — SIGNIFICANT CHANGE UP (ref 80–100)
MICROCYTES BLD QL: SLIGHT — SIGNIFICANT CHANGE UP
MONOCYTES # BLD AUTO: 0.29 K/UL — SIGNIFICANT CHANGE UP (ref 0–0.9)
MONOCYTES NFR BLD AUTO: 4 % — SIGNIFICANT CHANGE UP (ref 2–14)
NEUTROPHILS # BLD AUTO: 6.42 K/UL — SIGNIFICANT CHANGE UP (ref 1.8–7.4)
NEUTROPHILS NFR BLD AUTO: 90 % — HIGH (ref 43–77)
NRBC # BLD: 0 /100 WBCS — SIGNIFICANT CHANGE UP (ref 0–0)
NRBC # BLD: 0 /100 WBCS — SIGNIFICANT CHANGE UP (ref 0–0)
NRBC # BLD: 0 /100 — SIGNIFICANT CHANGE UP (ref 0–0)
NRBC # BLD: SIGNIFICANT CHANGE UP /100 WBCS (ref 0–0)
OB PNL STL: POSITIVE
OVALOCYTES BLD QL SMEAR: SLIGHT — SIGNIFICANT CHANGE UP
PCO2 BLDA: 29 MMHG — LOW (ref 32–46)
PH BLD: 7.48 — HIGH (ref 7.35–7.45)
PHOSPHATE SERPL-MCNC: 3.1 MG/DL — SIGNIFICANT CHANGE UP (ref 2.5–4.5)
PLAT MORPH BLD: NORMAL — SIGNIFICANT CHANGE UP
PLATELET # BLD AUTO: 149 K/UL — LOW (ref 150–400)
PLATELET # BLD AUTO: 160 K/UL — SIGNIFICANT CHANGE UP (ref 150–400)
PLATELET # BLD AUTO: 172 K/UL — SIGNIFICANT CHANGE UP (ref 150–400)
PO2 BLDA: 205 MMHG — HIGH (ref 74–108)
POIKILOCYTOSIS BLD QL AUTO: SLIGHT — SIGNIFICANT CHANGE UP
POTASSIUM SERPL-MCNC: 3.5 MMOL/L — SIGNIFICANT CHANGE UP (ref 3.5–5.3)
POTASSIUM SERPL-MCNC: 3.9 MMOL/L — SIGNIFICANT CHANGE UP (ref 3.5–5.3)
POTASSIUM SERPL-MCNC: 4 MMOL/L — SIGNIFICANT CHANGE UP (ref 3.5–5.3)
POTASSIUM SERPL-SCNC: 3.5 MMOL/L — SIGNIFICANT CHANGE UP (ref 3.5–5.3)
POTASSIUM SERPL-SCNC: 3.9 MMOL/L — SIGNIFICANT CHANGE UP (ref 3.5–5.3)
POTASSIUM SERPL-SCNC: 4 MMOL/L — SIGNIFICANT CHANGE UP (ref 3.5–5.3)
PROT SERPL-MCNC: 5.2 GM/DL — LOW (ref 6–8.3)
PROTHROM AB SERPL-ACNC: 12.1 SEC — SIGNIFICANT CHANGE UP (ref 10–12.9)
RBC # BLD: 2.32 M/UL — LOW (ref 3.8–5.2)
RBC # BLD: 2.74 M/UL — LOW (ref 3.8–5.2)
RBC # BLD: 3.47 M/UL — LOW (ref 3.8–5.2)
RBC # FLD: 15.9 % — HIGH (ref 10.3–14.5)
RBC # FLD: 15.9 % — HIGH (ref 10.3–14.5)
RBC # FLD: 16.3 % — HIGH (ref 10.3–14.5)
RBC BLD AUTO: ABNORMAL
SAO2 % BLDA: 100 % — HIGH (ref 92–96)
SCHISTOCYTES BLD QL AUTO: SLIGHT — SIGNIFICANT CHANGE UP
SODIUM SERPL-SCNC: 139 MMOL/L — SIGNIFICANT CHANGE UP (ref 135–145)
SODIUM SERPL-SCNC: 139 MMOL/L — SIGNIFICANT CHANGE UP (ref 135–145)
SODIUM SERPL-SCNC: 140 MMOL/L — SIGNIFICANT CHANGE UP (ref 135–145)
TROPONIN I SERPL-MCNC: 0.08 NG/ML — HIGH (ref 0.01–0.04)
TROPONIN I SERPL-MCNC: 0.09 NG/ML — HIGH (ref 0.01–0.04)
WBC # BLD: 14.12 K/UL — HIGH (ref 3.8–10.5)
WBC # BLD: 7.13 K/UL — SIGNIFICANT CHANGE UP (ref 3.8–10.5)
WBC # BLD: 7.23 K/UL — SIGNIFICANT CHANGE UP (ref 3.8–10.5)
WBC # FLD AUTO: 14.12 K/UL — HIGH (ref 3.8–10.5)
WBC # FLD AUTO: 7.13 K/UL — SIGNIFICANT CHANGE UP (ref 3.8–10.5)
WBC # FLD AUTO: 7.23 K/UL — SIGNIFICANT CHANGE UP (ref 3.8–10.5)

## 2020-01-09 PROCEDURE — 74018 RADEX ABDOMEN 1 VIEW: CPT | Mod: 26

## 2020-01-09 PROCEDURE — 99291 CRITICAL CARE FIRST HOUR: CPT

## 2020-01-09 PROCEDURE — 71045 X-RAY EXAM CHEST 1 VIEW: CPT | Mod: 26

## 2020-01-09 PROCEDURE — 99221 1ST HOSP IP/OBS SF/LOW 40: CPT

## 2020-01-09 PROCEDURE — 31500 INSERT EMERGENCY AIRWAY: CPT

## 2020-01-09 PROCEDURE — 99292 CRITICAL CARE ADDL 30 MIN: CPT

## 2020-01-09 PROCEDURE — 71045 X-RAY EXAM CHEST 1 VIEW: CPT | Mod: 26,77

## 2020-01-09 RX ORDER — MIDAZOLAM HYDROCHLORIDE 1 MG/ML
4 INJECTION, SOLUTION INTRAMUSCULAR; INTRAVENOUS ONCE
Refills: 0 | Status: DISCONTINUED | OUTPATIENT
Start: 2020-01-09 | End: 2020-01-09

## 2020-01-09 RX ORDER — SODIUM CHLORIDE 9 MG/ML
1000 INJECTION, SOLUTION INTRAVENOUS
Refills: 0 | Status: DISCONTINUED | OUTPATIENT
Start: 2020-01-09 | End: 2020-01-11

## 2020-01-09 RX ORDER — CHLORHEXIDINE GLUCONATE 213 G/1000ML
15 SOLUTION TOPICAL EVERY 12 HOURS
Refills: 0 | Status: DISCONTINUED | OUTPATIENT
Start: 2020-01-09 | End: 2020-01-15

## 2020-01-09 RX ORDER — BUDESONIDE, MICRONIZED 100 %
0.5 POWDER (GRAM) MISCELLANEOUS EVERY 12 HOURS
Refills: 0 | Status: DISCONTINUED | OUTPATIENT
Start: 2020-01-09 | End: 2020-01-30

## 2020-01-09 RX ORDER — DESMOPRESSIN ACETATE 0.1 MG/1
20 TABLET ORAL ONCE
Refills: 0 | Status: COMPLETED | OUTPATIENT
Start: 2020-01-09 | End: 2020-01-09

## 2020-01-09 RX ORDER — LEVOTHYROXINE SODIUM 125 MCG
25 TABLET ORAL AT BEDTIME
Refills: 0 | Status: DISCONTINUED | OUTPATIENT
Start: 2020-01-09 | End: 2020-01-21

## 2020-01-09 RX ORDER — METOCLOPRAMIDE HCL 10 MG
5 TABLET ORAL ONCE
Refills: 0 | Status: COMPLETED | OUTPATIENT
Start: 2020-01-09 | End: 2020-01-09

## 2020-01-09 RX ORDER — NOREPINEPHRINE BITARTRATE/D5W 8 MG/250ML
0.05 PLASTIC BAG, INJECTION (ML) INTRAVENOUS
Qty: 8 | Refills: 0 | Status: DISCONTINUED | OUTPATIENT
Start: 2020-01-09 | End: 2020-01-10

## 2020-01-09 RX ORDER — PROPOFOL 10 MG/ML
10 INJECTION, EMULSION INTRAVENOUS
Qty: 1000 | Refills: 0 | Status: DISCONTINUED | OUTPATIENT
Start: 2020-01-09 | End: 2020-01-15

## 2020-01-09 RX ORDER — CHLORHEXIDINE GLUCONATE 213 G/1000ML
1 SOLUTION TOPICAL
Refills: 0 | Status: DISCONTINUED | OUTPATIENT
Start: 2020-01-09 | End: 2020-01-16

## 2020-01-09 RX ORDER — PANTOPRAZOLE SODIUM 20 MG/1
8 TABLET, DELAYED RELEASE ORAL
Qty: 80 | Refills: 0 | Status: DISCONTINUED | OUTPATIENT
Start: 2020-01-09 | End: 2020-01-11

## 2020-01-09 RX ADMIN — Medication 1 SPRAY(S): at 22:44

## 2020-01-09 RX ADMIN — PROPOFOL 3.68 MICROGRAM(S)/KG/MIN: 10 INJECTION, EMULSION INTRAVENOUS at 19:00

## 2020-01-09 RX ADMIN — CARVEDILOL PHOSPHATE 12.5 MILLIGRAM(S): 80 CAPSULE, EXTENDED RELEASE ORAL at 05:49

## 2020-01-09 RX ADMIN — Medication 5 MILLIGRAM(S): at 14:50

## 2020-01-09 RX ADMIN — Medication 40 MILLIGRAM(S): at 05:48

## 2020-01-09 RX ADMIN — Medication 0.5 MILLIGRAM(S): at 18:31

## 2020-01-09 RX ADMIN — Medication 3 MILLILITER(S): at 06:10

## 2020-01-09 RX ADMIN — HEPARIN SODIUM 5000 UNIT(S): 5000 INJECTION INTRAVENOUS; SUBCUTANEOUS at 05:48

## 2020-01-09 RX ADMIN — Medication 3 MILLILITER(S): at 10:03

## 2020-01-09 RX ADMIN — Medication 1 GRAM(S): at 11:56

## 2020-01-09 RX ADMIN — Medication 3 MILLILITER(S): at 21:48

## 2020-01-09 RX ADMIN — Medication 50 MICROGRAM(S): at 05:49

## 2020-01-09 RX ADMIN — Medication 108 GRAM(S): at 05:49

## 2020-01-09 RX ADMIN — CHLORHEXIDINE GLUCONATE 1 APPLICATION(S): 213 SOLUTION TOPICAL at 16:14

## 2020-01-09 RX ADMIN — PANTOPRAZOLE SODIUM 10 MG/HR: 20 TABLET, DELAYED RELEASE ORAL at 13:48

## 2020-01-09 RX ADMIN — Medication 1 GRAM(S): at 05:49

## 2020-01-09 RX ADMIN — MIDAZOLAM HYDROCHLORIDE 4 MILLIGRAM(S): 1 INJECTION, SOLUTION INTRAMUSCULAR; INTRAVENOUS at 15:30

## 2020-01-09 RX ADMIN — Medication 1 SPRAY(S): at 05:49

## 2020-01-09 RX ADMIN — Medication 3 MILLILITER(S): at 01:32

## 2020-01-09 RX ADMIN — Medication 50 MILLIGRAM(S): at 12:54

## 2020-01-09 RX ADMIN — Medication 1000 UNIT(S): at 11:57

## 2020-01-09 RX ADMIN — PANTOPRAZOLE SODIUM 40 MILLIGRAM(S): 20 TABLET, DELAYED RELEASE ORAL at 05:49

## 2020-01-09 RX ADMIN — PANTOPRAZOLE SODIUM 10 MG/HR: 20 TABLET, DELAYED RELEASE ORAL at 19:00

## 2020-01-09 RX ADMIN — Medication 3 MILLILITER(S): at 06:07

## 2020-01-09 RX ADMIN — CHLORHEXIDINE GLUCONATE 15 MILLILITER(S): 213 SOLUTION TOPICAL at 18:29

## 2020-01-09 RX ADMIN — DESMOPRESSIN ACETATE 220 MICROGRAM(S): 0.1 TABLET ORAL at 21:30

## 2020-01-09 RX ADMIN — PROPOFOL 3.68 MICROGRAM(S)/KG/MIN: 10 INJECTION, EMULSION INTRAVENOUS at 16:13

## 2020-01-09 RX ADMIN — Medication 1 GRAM(S): at 01:04

## 2020-01-09 RX ADMIN — Medication 325 MILLIGRAM(S): at 05:49

## 2020-01-09 RX ADMIN — Medication 3 MILLILITER(S): at 13:45

## 2020-01-09 RX ADMIN — SODIUM CHLORIDE 50 MILLILITER(S): 9 INJECTION, SOLUTION INTRAVENOUS at 13:48

## 2020-01-09 RX ADMIN — Medication 25 MICROGRAM(S): at 22:19

## 2020-01-09 RX ADMIN — ESCITALOPRAM OXALATE 10 MILLIGRAM(S): 10 TABLET, FILM COATED ORAL at 11:57

## 2020-01-09 RX ADMIN — Medication 108 GRAM(S): at 18:27

## 2020-01-09 RX ADMIN — Medication 50 MILLIGRAM(S): at 05:49

## 2020-01-09 RX ADMIN — Medication 3 MILLILITER(S): at 18:30

## 2020-01-09 NOTE — CONSULT NOTE ADULT - ASSESSMENT
Mrs. Arteaga is an 78 yo female admitted to hospital for tx of CHF, JONY and anemia brought to ICU for emergent tx of suspected UGIB. [place] sump placed and placed to suction, large output noted of darkened bloody drainage with clots. At time of ICU admission pt HDS and arousable. Now pt intubated/sedated requiring vasopressor support likely due to intubation pathophysiology process and sedative effect. Plan is as follows:    -intubate emergently for airway protection, PRVC, abg prn, adjust settings prn  -propofol for sedation, goal rass 0/-1  -monitor vitals, oxygen saturation, blood glucose  -2 u PRBC stat as fast as possible; trend h/h; addition units ordered to have ready if needed, maintain hgb >8 given cardiac history  -monitor creatinine; monitor u/o; avoid nephrotoxic medications  -norepinephrine to maintain MAP >65 mmHg  -DDAVP for possible uremic bleeding IVPB  -GI consult with Dr. Laird, urgent EGD, appreciate recommendations  -intermittent pneumatic stocking for dvt prophylaxis, no chemoprophylaxis given hemorrhage  -PPI drip; maintain NPO  -continue [place] sump to suction  -GOC discussion with family; pt currently full code with interventions requested; consent for blood obtained over phone by attending  -monitor cbc, coags, cmp, mg/phos, lactate, cardiac enzymes (no ekg changes noted); replace lytes as needed  -convert levothyroxine dosage from PO to IV given NPO status Mrs. Arteaga is an 78 yo female admitted to hospital for tx of CHF, JONY and anemia brought to ICU for emergent tx of suspected UGIB. [place] sump placed and placed to suction, large output noted of darkened bloody drainage with clots. At time of ICU admission pt HDS and arousable. Now pt intubated/sedated requiring vasopressor support likely due to intubation pathophysiology process and sedative effect. Plan is as follows:    -intubate emergently for airway protection, PRVC, abg prn, adjust settings prn  -propofol for sedation, goal rass 0/-1  -monitor vitals, oxygen saturation, blood glucose  -2 u PRBC stat as fast as possible; trend h/h; addition units ordered to have ready if needed, maintain hgb >8 given cardiac history  -monitor creatinine; monitor u/o; avoid nephrotoxic medications  -norepinephrine to maintain MAP >65 mmHg  -DDAVP for possible uremic bleeding IVPB  -GI consult with Dr. Laird, urgent EGD, appreciate recommendations  -intermittent pneumatic stocking for dvt prophylaxis, no chemoprophylaxis given hemorrhage; no antiplatelet agents  -PPI drip; maintain NPO  -continue [place] sump to suction  -GOC discussion with family; pt currently full code with interventions requested; consent for blood obtained over phone by attending  -monitor cbc, coags, cmp, mg/phos, lactate, cardiac enzymes (no ekg changes noted); replace lytes as needed  -convert levothyroxine dosage from PO to IV given NPO status

## 2020-01-09 NOTE — CONSULT NOTE ADULT - ASSESSMENT
HPI:  · HPI Objective Statement: 79 years old female from [facility] rehab c/o coughing sob for three to four days. Pt is sent here to rule out chf vs pneumonia. Pt is alert and oriented to person only unable to give detail hx due to hx of Alzheimer according to the [facility] pt had sat of 91 to 92 %,	  ---------------- As Above ---------------------------------------------  Patient is a poor historian. Patient admitted with Bronchitis and UTI. Called for hematemsis. Patient had three episodes of bloody vomitus. ( 1/3 of a cup ) . Patient complaining of mid epigastric / chest pain but patient can not characterize the pain.   Patient has chronic anemia. but HGB fell 8.4 --> 7.2. Was on Carafate, now on IV PPI  KUB pending  HX of CRI    Addendum : Patient had another episode of bloody vomitus    Upper GI Bleed - Secondary to (?). 1) NPO  2)Check KUB  3) IV PPI 4) telemetry  5) NGT 6) EGD 7) Hold Carafate  8) Hold iron  ---Will speak with daughter.

## 2020-01-09 NOTE — BRIEF OPERATIVE NOTE - NSICDXBRIEFPROCEDURE_GEN_ALL_CORE_FT
PROCEDURES:  Esophagogastroduodenoscopy (EGD) with argon plasma coagulation (APC) 09-Jan-2020 19:57:24  Geraldo Laird

## 2020-01-09 NOTE — CONSULT NOTE ADULT - ASSESSMENT
80 yo female with GI Bleed, S/P EGD and Colonoscopy   PMH of Alzheimer's, CVA, Depression, CHF, HTN, MI (remote), recent dx of hypothyroidism 80 yo female with GI Bleed, S/P EGD and Colonoscopy, s/p 2 units of PRBC, currently intubated and sedated on pressor support in ICU   PMH of Alzheimer's, CVA, Depression, CHF, HTN, MI (remote), recent dx of hypothyroidism

## 2020-01-09 NOTE — PROGRESS NOTE ADULT - SUBJECTIVE AND OBJECTIVE BOX
Procedure:           Upper GI endoscopy with APC  01-09-20 @ 19:58    Indications:                 Upper GI bleed, gastric AVM    Monitored Anesthesia Care Provided by :     ____________________________________________________________________________________________________  Procedure:           Pre-Anesthesia Assessment:                       - Prior to the procedure, a History and Physical was performed, and patient                        medications and allergies were reviewed. The patient is competent. The risks                        and benefits of the procedure and the sedation options and risks were                        discussed with the patient. All questions were answered and informed consent                        was obtained. Patient identification and proposed procedure were verified by                        the physician, the nurse and the anesthesiologist in the procedure room.                        Mental Status Examination:        alert and oriented. Airway Examination: normal                        oropharyngeal airway and neck mobility. Respiratory Examination: clear to                        auscultation. CV Examination: normal. Prophylactic Antibiotics: -The patient                        does not require prophylactic antibiotics.                        Grade Assessment:  After                        reviewing the risks and benefits, the patient was deemed in satisfactory                        condition to undergo the procedure. The anesthesia plan was to use monitored                        anesthesia care (MAC). Immediately prior to administration of medications,                        the patient was re-assessed for adequacy to receive sedatives. The heart        		     rate, respiratory rate, oxygen saturations, blood pressure, adequacy of                        pulmonary ventilation, and response to care were monitored throughout the                        procedure. The physical status of the patient was re-assessed after the                        procedure.                       After obtaining informed consent, the endoscope was passed under direct                        vision. Throughout the procedure, the patient's blood pressure, pulse, and                        oxygen saturations were monitored continuously. The Endoscope was introduced                        through the mouth, and advanced to the second part of duodenum. The upper GI                        endoscopy was accomplished with ease. The patient tolerated the procedure                        well.    ESOPHAGUS: old blood coating esophagus    STOMACH:   Pooling of old blood in fundus, area of fresh blood that would not wash off, (?) AVM s/p APC    DUODENUM: No blood    At the end of the procedure, patient passed BRB per rectum confirmed by rectal      Assessment : As Above    PLAN :  Colonoscopy, ? DDAVP

## 2020-01-09 NOTE — PROGRESS NOTE ADULT - SUBJECTIVE AND OBJECTIVE BOX
Subjective: somnolent, mildly dyspneic at rest.       MEDICATIONS  (STANDING):  acetylcysteine 10%  Inhalation 3 milliLiter(s) Inhalation three times a day  albuterol/ipratropium for Nebulization 3 milliLiter(s) Nebulizer every 4 hours  ampicillin  IVPB      ampicillin  IVPB 1 Gram(s) IV Intermittent every 12 hours  atorvastatin 20 milliGRAM(s) Oral at bedtime  buDESOnide    Inhalation Suspension 0.5 milliGRAM(s) Inhalation every 12 hours  carvedilol 12.5 milliGRAM(s) Oral every 12 hours  cholecalciferol 1000 Unit(s) Oral daily  dextrose 5% + sodium chloride 0.45%. 1000 milliLiter(s) (50 mL/Hr) IV Continuous <Continuous>  donepezil 10 milliGRAM(s) Oral at bedtime  epoetin tawanna Injectable 07725 Unit(s) SubCutaneous every 7 days  escitalopram 10 milliGRAM(s) Oral daily  ferrous    sulfate 325 milliGRAM(s) Oral three times a day  heparin  Injectable 5000 Unit(s) SubCutaneous every 12 hours  hydrALAZINE 50 milliGRAM(s) Oral three times a day  levothyroxine 50 MICROGram(s) Oral daily  pantoprazole Infusion 8 mG/Hr (10 mL/Hr) IV Continuous <Continuous>  senna 1 Tablet(s) Oral at bedtime  sodium chloride 0.65% Nasal 1 Spray(s) Both Nostrils three times a day  sucralfate 1 Gram(s) Oral four times a day    MEDICATIONS  (PRN):  acetaminophen   Tablet .. 650 milliGRAM(s) Oral every 6 hours PRN Temp greater or equal to 38C (100.4F), Mild Pain (1 - 3)          T(C): 37.6 (01-09-20 @ 11:10), Max: 37.6 (01-09-20 @ 11:10)  HR: 75 (01-09-20 @ 13:13) (73 - 88)  BP: 161/83 (01-09-20 @ 13:13) (150/68 - 194/80)  RR: 18 (01-09-20 @ 11:10) (18 - 18)  SpO2: 99% (01-09-20 @ 13:13) (96% - 99%)  Wt(kg): --        I&O's Detail    08 Jan 2020 07:01  -  09 Jan 2020 07:00  --------------------------------------------------------  IN:    Oral Fluid: 880 mL  Total IN: 880 mL    OUT:  Total OUT: 0 mL    Total NET: 880 mL      09 Jan 2020 07:01  -  09 Jan 2020 13:41  --------------------------------------------------------  IN:    Oral Fluid: 240 mL  Total IN: 240 mL    OUT:  Total OUT: 0 mL    Total NET: 240 mL               PHYSICAL EXAM:    GENERAL: NAD  EYES: EOMI, PERRLA, conjunctiva and sclera clear  NECK: Supple, no inc in JVP  CHEST/LUNG: crackles  HEART: S1S2  ABDOMEN: Soft, Nontender, Nondistended; Bowel sounds present  EXTREMITIES:  pos edema.   NEURO: no asterixis      LABS:  CBC Full  -  ( 09 Jan 2020 12:58 )  WBC Count : 7.23 K/uL  RBC Count : 2.32 M/uL  Hemoglobin : 7.2 g/dL  Hematocrit : 23.0 %  Platelet Count - Automated : 172 K/uL  Mean Cell Volume : 99.1 fl  Mean Cell Hemoglobin : 31.0 pg  Mean Cell Hemoglobin Concentration : 31.3 gm/dL  Auto Neutrophil # : x  Auto Lymphocyte # : x  Auto Monocyte # : x  Auto Eosinophil # : x  Auto Basophil # : x  Auto Neutrophil % : x  Auto Lymphocyte % : x  Auto Monocyte % : x  Auto Eosinophil % : x  Auto Basophil % : x    01-09    139  |  106  |  85<H>  ----------------------------<  231<H>  4.0   |  25  |  4.36<H>    Ca    7.7<L>      09 Jan 2020 12:58      PT/INR - ( 09 Jan 2020 12:58 )   PT: 12.1 sec;   INR: 1.08 ratio         PTT - ( 09 Jan 2020 12:58 )  PTT:26.0 sec          Impression:  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      Recommendations:   Continued warranted diuresis  Follow paraprotein screen  Daily BMP  Prognosis is poor.

## 2020-01-09 NOTE — CHART NOTE - NSCHARTNOTEFT_GEN_A_CORE
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     Pt presents from [facility] rehab for SOB, cough x3-4 days. Admitted to medical service for PNA, CHF exacerbation. Pt had + RVP for RSV and urine culture with VRE. Acute on chronic renal insufficiency.     Today pt started to have hematemesis. RRT called. Fresh blood noted on gown and around mouth and approximately 1/2 cup full of bloody emesis into plastic cup at bedside. Pt suctioned. CBC, coags, type and screen sent.     Vitals: stable, slightly hypertensive  -180s  HR 70-80s  RR 16  Sat %    GEN: elderly Pashto speaking female ( screen  used), NAD, with bloody emesis around mouth and on gown  HEENT: NC/AT, no JVD  CV: RRR  LUNGS: bilateral coarse breath sounds, no wheeze, scattered rhonchi bilaterally  ABD: soft, NT, ND + BS  EXT: no edema/cyanosis    LABS from this morning   Complete Blood Count + Automated Diff in AM (01.09.20 @ 08:25)    WBC Count: 7.13 K/uL    RBC Count: 2.74 M/uL    Hemoglobin: 8.4 g/dL    Hematocrit: 26.8 %    Mean Cell Volume: 97.8 fl    Mean Cell Hemoglobin: 30.7 pg    Mean Cell Hemoglobin Conc: 31.3 gm/dL    Red Cell Distrib Width: 15.9 %    Platelet Count - Automated: 160 K/uL    Auto Neutrophil #: 6.42 K/uL    Auto Lymphocyte #: 0.43 K/uL    Auto Monocyte #: 0.29 K/uL    Auto Eosinophil #: 0.00 K/uL    Auto Basophil #: 0.00 K/uL    Auto Neutrophil %: 90.0: Differential percentages must be correlated with absolute numbers for  clinical significance. %    Auto Lymphocyte %: 6.0 %    Auto Monocyte %: 4.0 %    Auto Eosinophil %: 0.0 %    Auto Basophil %: 0.0 %    Nucleated RBC: N/A: Manual NRBC performed. 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    - pt remains with stable BP  - hematemesis stopped  - recommend follow up of CBC, transfuse if Hb downtrending  - recommend getting GI consult, may need EGD  - protonix drip  - d/w medicine NP

## 2020-01-09 NOTE — PROGRESS NOTE ADULT - SUBJECTIVE AND OBJECTIVE BOX
lying in bed    Vital Signs Last 24 Hrs  T(C): 36.6 (09 Jan 2020 04:51), Max: 37.1 (08 Jan 2020 11:59)  T(F): 97.8 (09 Jan 2020 04:51), Max: 98.8 (08 Jan 2020 11:59)  HR: 88 (09 Jan 2020 06:08) (73 - 88)  BP: 177/79 (09 Jan 2020 04:51) (137/70 - 177/79)  BP(mean): --  RR: 18 (09 Jan 2020 04:51) (18 - 18)  SpO2: 97% (09 Jan 2020 06:08) (96% - 99%)    PHYSICAL EXAM:    general - weak looking  HEENT - No Icterus  CVS - RRR  RS - AE B/L  Abd - soft, NT  Ext - Pulses +        LABS:                        8.4    7.13  )-----------( 160      ( 09 Jan 2020 08:25 )             26.8     01-09    139  |  106  |  81<H>  ----------------------------<  157<H>  3.9   |  22  |  4.27<H>    Ca    8.1<L>      09 Jan 2020 08:25            Culture - Urine (collected 01 Jan 2020 17:13)  Source: .Urine Catheterized  Final Report (03 Jan 2020 20:37):    50,000 - 99,000 CFU/mL Enterococcus faecalis (vancomycin resistant)  Organism: Observation (03 Jan 2020 20:37)  Organism: Observation (03 Jan 2020 20:37)    Culture - Blood (collected 01 Jan 2020 17:11)  Source: .Blood Blood-Peripheral  Final Report (06 Jan 2020 18:00):    No growth at 5 days.    Culture - Blood (collected 01 Jan 2020 17:11)  Source: .Blood Blood-Peripheral  Final Report (06 Jan 2020 18:00):    No growth at 5 days.        RADIOLOGY & ADDITIONAL STUDIES:

## 2020-01-09 NOTE — PROGRESS NOTE ADULT - SUBJECTIVE AND OBJECTIVE BOX
79 years old female from [facility] rehab c/o coughing sob for three to four days. Pt is sent here to rule out chf vs pneumonia. Pt is alert and oriented to person only unable to give detail hx due to hx of Alzheimer according to the [facility] pt had sat of 91 to 92 %,	    PAST MEDICAL/SURGICAL/FAMILY/SOCIAL HISTORY:    Past Medical History:  Alzheimers disease    CVA (cerebral vascular accident)    Depression    HTN (hypertension)    MI, old.  recently Dx c hypothyroidism, TSH > 111 started Levothyroxin 50mcg qd as per Dr Vigil (2020 11:41)      Chief Complaint:  Patient is a 79y old  Female who presents with a chief complaint of sob cough (2020 22:56)      Review of Systems:    General:  No wt loss, fevers, chills, night sweats  Eyes:  Good vision, no reported pain  ENT:  No sore throat, pain, runny nose, dysphagia  CV:  No pain, palpitations, hypo/hypertension  Resp:  dyspnea, cough  GI:  No pain, nausea, vomiting, diarrhea, constipation           Social History/Family History  SOCHX:   tobacco,  -  alcohol    FMHX: FA/MO  - contributory       Discussed with:  PMD, Family    Physical Exam:    Vital Signs:  Vital Signs Last 24 Hrs  T(C): 36.2 (2020 17:06), Max: 36.9 (2020 00:18)  T(F): 97.2 (2020 17:06), Max: 98.5 (2020 00:18)  HR: 74 (2020 21:55) (68 - 87)  BP: 179/78 (2020 17:06) (132/71 - 179/78)  BP(mean): --  RR: 18 (2020 17:06) (18 - 18)  SpO2: 97% (2020 21:55) (94% - 98%)  Daily     Daily Weight in k.8 (2020 04:59)  I&O's Summary    2020 07:01  -  2020 07:00  --------------------------------------------------------  IN: 560 mL / OUT: 0 mL / NET: 560 mL          Chest:  fair air entry bilateral  Cardiovascular:  Regular rhythm, S1, S2, no murmur/rub/S3/S4, no carotid/femoral/abdominal bruit, radial/pedal pulses 2+,   Abdomen:  Soft, non-tender, non-distended, normoactive bowel sounds, no HSM        Laboratory:          138  |  104  |  69<H>  ----------------------------<  111<H>  3.8   |  25  |  3.77<H>    Ca    8.1<L>      2020 07:22          Assessment:  I am asked to assist this patient admitted with shortness of breath  The patient has multifactorial causes of shortness of breath, renal insufficiency, anemia  And now after a diagnostic echocardiogram within normal ejection fraction the reading shows mitral regurgitation and tricuspid regurgitation  After review of the studies this regurgitation is not considered severe but in the moderate to severe but more moderate range  the patient now is transferred to the intensive care unit after a noted upper GI bleed  The patient was intubated and remains on ventilator support as well as pressor therapy

## 2020-01-09 NOTE — CONSULT NOTE ADULT - SUBJECTIVE AND OBJECTIVE BOX
HPI:  · HPI Objective Statement: 79 years old female from [facility] rehab c/o coughing sob for three to four days. Pt is sent here to rule out chf vs pneumonia. Pt is alert and oriented to person only unable to give detail hx due to hx of Alzheimer according to the [facility] pt had sat of 91 to 92 %,	  ---------------- As Above ---------------------------------------------  Patient is a poor historian. Patient admitted with Bronchitis and UTI. Called for hematemsis. Patient had three episodes of bloody vomitus. ( 1/3 of a cup ) . Patient complaining of mid epigastric / chest pain but patient can not characterize the pain.   Patient has chronic anemia. but HGB fell 8.4 --> 7.2. Was on Carafate, now on IV PPI  KUB pending  HX of CRI    PAST MEDICAL/SURGICAL/FAMILY/SOCIAL HISTORY:    Past Medical History:  Alzheimers disease    CVA (cerebral vascular accident)    Depression    HTN (hypertension)    MI, old.  recently Dx c hypothyroidism, TSH > 111 started Levothyroxin 50mcg qd as per Dr Vigil (02 Jan 2020 11:41)      PAST MEDICAL & SURGICAL HISTORY:  Hypothyroidism  Constipation  Iron deficiency anemia  Major depressive disorder  Hyperlipidemia  GI bleed  Depression  Alzheimers disease  MI, old  CVA (cerebral vascular accident)  HTN (hypertension)  No significant past surgical history      MEDICATIONS  (STANDING):  acetylcysteine 10%  Inhalation 3 milliLiter(s) Inhalation three times a day  albuterol/ipratropium for Nebulization 3 milliLiter(s) Nebulizer every 4 hours  ampicillin  IVPB      ampicillin  IVPB 1 Gram(s) IV Intermittent every 12 hours  atorvastatin 20 milliGRAM(s) Oral at bedtime  buDESOnide    Inhalation Suspension 0.5 milliGRAM(s) Inhalation every 12 hours  carvedilol 12.5 milliGRAM(s) Oral every 12 hours  cholecalciferol 1000 Unit(s) Oral daily  dextrose 5% + sodium chloride 0.45%. 1000 milliLiter(s) (50 mL/Hr) IV Continuous <Continuous>  donepezil 10 milliGRAM(s) Oral at bedtime  epoetin tawanna Injectable 74363 Unit(s) SubCutaneous every 7 days  escitalopram 10 milliGRAM(s) Oral daily  ferrous    sulfate 325 milliGRAM(s) Oral three times a day  heparin  Injectable 5000 Unit(s) SubCutaneous every 12 hours  hydrALAZINE 50 milliGRAM(s) Oral three times a day  levothyroxine 50 MICROGram(s) Oral daily  pantoprazole Infusion 8 mG/Hr (10 mL/Hr) IV Continuous <Continuous>  senna 1 Tablet(s) Oral at bedtime  sodium chloride 0.65% Nasal 1 Spray(s) Both Nostrils three times a day  sucralfate 1 Gram(s) Oral four times a day    MEDICATIONS  (PRN):  acetaminophen   Tablet .. 650 milliGRAM(s) Oral every 6 hours PRN Temp greater or equal to 38C (100.4F), Mild Pain (1 - 3)      Allergies    No Known Allergies    Intolerances        FAMILY HISTORY:  Family history of essential hypertension (Child)  Family history of stroke      REVIEW OF SYSTEMS:  Confused  CONSTITUTIONAL: No fever, weight loss, or fatigue  EYES: No eye pain, visual disturbances, or discharge  ENMT:  No difficulty hearing, tinnitus, vertigo; No sinus or throat pain  NECK: No pain or stiffness  BREASTS: No pain, masses, or nipple discharge  RESPIRATORY: No cough, wheezing, chills or hemoptysis; No shortness of breath  CARDIOVASCULAR: No chest pain, palpitations, dizziness, or leg swelling  GASTROINTESTINAL: See above  GENITOURINARY: No dysuria, frequency, hematuria, or incontinence  NEUROLOGICAL: No headaches, memory loss, loss of strength, numbness, or tremors  SKIN: No itching, burning, rashes, or lesions   LYMPH NODES: No enlarged glands  ENDOCRINE: No heat or cold intolerance; No hair loss  MUSCULOSKELETAL: No joint pain or swelling; No muscle, back, or extremity pain  PSYCHIATRIC: No depression, anxiety, mood swings, or difficulty sleeping  HEME/LYMPH: No easy bruising, or bleeding gums  ALLERGY AND IMMUNOLOGIC: No hives or eczema          SOCIAL HISTORY:    FAMILY HISTORY:  Family history of essential hypertension (Child)  Family history of stroke      Vital Signs Last 24 Hrs  T(C): 37.6 (09 Jan 2020 11:10), Max: 37.6 (09 Jan 2020 11:10)  T(F): 99.6 (09 Jan 2020 11:10), Max: 99.6 (09 Jan 2020 11:10)  HR: 75 (09 Jan 2020 13:13) (73 - 88)  BP: 161/83 (09 Jan 2020 13:13) (150/68 - 194/80)  BP(mean): --  RR: 18 (09 Jan 2020 11:10) (18 - 18)  SpO2: 99% (09 Jan 2020 13:13) (96% - 99%)    PHYSICAL EXAM:    GENERAL: NAD, well-groomed, well-developed  HEAD:  Atraumatic, Normocephalic  EYES: EOMI, PERRLA, conjunctiva and sclera clear  ENMT: No tonsillar erythema, exudates, or enlargement; Moist mucous membranes, Good dentition, No lesions  NECK: Supple, No JVD, Normal thyroid  NERVOUS SYSTEM:  Alert & Oriented X 1, Good concentration;   CHEST/LUNG: Clear to percussion bilaterally; No rales, rhonchi, wheezing, or rubs  HEART: Regular rate and rhythm; No murmurs, rubs, or gallops  ABDOMEN: Soft, Nontender, Nondistended; Bowel sounds present  EXTREMITIES:  2+ Peripheral Pulses, No clubbing, cyanosis, or edema  LYMPH: No lymphadenopathy noted   RECTAL: No masses, Very dark green stool  SKIN: No rashes or lesions    LABS:                        7.2    7.23  )-----------( 172      ( 09 Jan 2020 12:58 )             23.0       CBC:  01-09 @ 12:58  WBC  7.23  HGB 7.2  HCT 23.0 Plate 172  MCV 99.1  01-09 @ 08:25  WBC  7.13  HGB 8.4  HCT 26.8 Plate 160  MCV 97.8  01-08 @ 07:29  WBC  9.46  HGB 8.2  HCT 26.0 Plate 143  MCV 97.7  01-05 @ 08:12  WBC  7.10  HGB 7.8  HCT 24.7 Plate 132  MCV 99.2  01-03 @ 08:36  WBC  12.19  HGB 8.8  HCT 27.6 Plate 134  MCV 96.2           09 Jan 2020 12:58    139    |  106    |  85     ----------------------------<  231    4.0     |  25     |  4.36   09 Jan 2020 08:25    139    |  106    |  81     ----------------------------<  157    3.9     |  22     |  4.27   06 Jan 2020 07:22    138    |  104    |  69     ----------------------------<  111    3.8     |  25     |  3.77   05 Jan 2020 08:12    138    |  105    |  56     ----------------------------<  161    3.6     |  24     |  3.45   03 Jan 2020 08:36    142    |  108    |  52     ----------------------------<  112    3.6     |  26     |  3.02     Ca    7.7        09 Jan 2020 12:58  Ca    8.1        09 Jan 2020 08:25  Ca    8.1        06 Jan 2020 07:22  Ca    8.0        05 Jan 2020 08:12  Ca    8.4        03 Jan 2020 08:36      PT/INR - ( 09 Jan 2020 12:58 )   PT: 12.1 sec;   INR: 1.08 ratio         PTT - ( 09 Jan 2020 12:58 )  PTT:26.0 sec    C-Reactive Protein, Serum: 2.28 mg/dL (01-09 @ 11:35)      RADIOLOGY & ADDITIONAL STUDIES:

## 2020-01-09 NOTE — PROGRESS NOTE ADULT - SUBJECTIVE AND OBJECTIVE BOX
INTERVAL HPI:  79 year female with HTN, HLD, MI hx, CVA, Gi bleed, Anemia Depression and Alzheimer's dementia.  Sent from [facility] due to SOB. Possibility of pneumonia vs CHF was raised.   In ED with Respiratory distress required BIPAP support and RVP positive for RSV. Pt not able to provide more details due to dementia. Information from transfer papers and ED physician.  Nods no when asked for smoking.    OVERNIGHT EVENTS:  Reported to have hematemesis    Vital Signs Last 24 Hrs  T(C): 37.6 (09 Jan 2020 11:10), Max: 37.6 (09 Jan 2020 11:10)  T(F): 99.6 (09 Jan 2020 11:10), Max: 99.6 (09 Jan 2020 11:10)  HR: 75 (09 Jan 2020 13:13) (73 - 88)  BP: 161/83 (09 Jan 2020 13:13) (150/68 - 194/80)  BP(mean): --  RR: 18 (09 Jan 2020 11:10) (18 - 18)  SpO2: 99% (09 Jan 2020 13:13) (96% - 99%)    PHYSICAL EXAM:  GEN:         Awake, responsive and comfortable.  HEENT:    Normal.    RESP:        no wheezing.  CVS:           Regular rate and rhythm.   ABD:         Soft, non-tender, non-distended;     MEDICATIONS  (STANDING):  acetylcysteine 10%  Inhalation 3 milliLiter(s) Inhalation three times a day  albuterol/ipratropium for Nebulization 3 milliLiter(s) Nebulizer every 4 hours  ampicillin  IVPB      ampicillin  IVPB 1 Gram(s) IV Intermittent every 12 hours  atorvastatin 20 milliGRAM(s) Oral at bedtime  carvedilol 12.5 milliGRAM(s) Oral every 12 hours  cholecalciferol 1000 Unit(s) Oral daily  dextrose 5% + sodium chloride 0.45%. 1000 milliLiter(s) (50 mL/Hr) IV Continuous <Continuous>  donepezil 10 milliGRAM(s) Oral at bedtime  epoetin tawanna Injectable 99884 Unit(s) SubCutaneous every 7 days  escitalopram 10 milliGRAM(s) Oral daily  ferrous    sulfate 325 milliGRAM(s) Oral three times a day  heparin  Injectable 5000 Unit(s) SubCutaneous every 12 hours  hydrALAZINE 50 milliGRAM(s) Oral three times a day  levothyroxine 50 MICROGram(s) Oral daily  methylPREDNISolone sodium succinate Injectable 40 milliGRAM(s) IV Push two times a day  pantoprazole Infusion 8 mG/Hr (10 mL/Hr) IV Continuous <Continuous>  senna 1 Tablet(s) Oral at bedtime  sodium chloride 0.65% Nasal 1 Spray(s) Both Nostrils three times a day  sucralfate 1 Gram(s) Oral four times a day    MEDICATIONS  (PRN):  acetaminophen   Tablet .. 650 milliGRAM(s) Oral every 6 hours PRN Temp greater or equal to 38C (100.4F), Mild Pain (1 - 3)    LABS:                        7.2    7.23  )-----------( 172      ( 09 Jan 2020 12:58 )             23.0     01-09    139  |  106  |  85<H>  ----------------------------<  231<H>  4.0   |  25  |  4.36<H>    Ca    7.7<L>      09 Jan 2020 12:58    PT/INR - ( 09 Jan 2020 12:58 )   PT: 12.1 sec;   INR: 1.08 ratio      PTT - ( 09 Jan 2020 12:58 )  PTT:26.0 sec    ASSESSMENT AND PLAN:  ·	Acute Respiratory distress.  ·	RSV tracheobronchitis.  ·	Left pleural effusion.  ·	Hypothyroidism with very high TSH.  ·	Anemia.  ·	VRE UTI  ·	Leukocytosis.  ·	Renal Insuffiencey.  ·	CVA by history.  ·	HTN.  ·	HLD.  ·	Alzheimer's dementia.  ·	Hematemesis.    Being started on Protonix drip, follow [facility] & [facility].  Will dc steroids, starts budesonide.

## 2020-01-09 NOTE — CONSULT NOTE ADULT - SUBJECTIVE AND OBJECTIVE BOX
Chief Complaint:  Patient is a 79y old  Female who presents with a chief complaint of sob cough (2020 22:14)      HPI: 80 yo female with a PMH of alzheimers dz, CVA, Depression, CHF, HTN, MI (remote), recent dx of hypothyroidism who was admitted to [facility] on 2020 for CHF with + RSV who's hospital stay has been complicated by JONY, VRE of urine and anemia who was a RRT today for multiple episodes of hematemasis with associated N/V. Pt was prior on carafate and PPI during this hospitalization. During RRT pt was HDS and HR was NSR (pt not on beta blocker). Patient has been accepted to ICU for urgent blood transfusions and emergent EGD with GI. EGD report shows old blood in fundus, old blood coating esophagus, No blood in Duodenum. A colonscopy was subseqently done sec to [facility] showing Black, liquid stool through out colon and TI.       PAST MEDICAL/SURGICAL/FAMILY/SOCIAL HISTORY:    Past Medical History:  Alzheimers disease    CVA (cerebral vascular accident)    Depression    HTN (hypertension)    MI, old.  recently Dx c hypothyroidism, TSH > 111 started Levothyroxin 50mcg qd as per Dr Vigil (2020 11:41)      PMH/PSH:PAST MEDICAL & SURGICAL HISTORY:  Hypothyroidism  Constipation  Iron deficiency anemia  Major depressive disorder  Hyperlipidemia  GI bleed  Depression  Alzheimers disease  MI, old  CVA (cerebral vascular accident)  HTN (hypertension)  No significant past surgical history      Allergies:  No Known Allergies      Medications:  albuterol/ipratropium for Nebulization 3 milliLiter(s) Nebulizer every 4 hours  ampicillin  IVPB      ampicillin  IVPB 1 Gram(s) IV Intermittent every 12 hours  buDESOnide    Inhalation Suspension 0.5 milliGRAM(s) Inhalation every 12 hours  chlorhexidine 0.12% Liquid 15 milliLiter(s) Oral Mucosa every 12 hours  chlorhexidine 4% Liquid 1 Application(s) Topical <User Schedule>  dextrose 5% + sodium chloride 0.45%. 1000 milliLiter(s) IV Continuous <Continuous>  epoetin tawanna Injectable 18980 Unit(s) SubCutaneous every 7 days  levothyroxine Injectable 25 MICROGram(s) IV Push at bedtime  norepinephrine Infusion 0.05 MICROgram(s)/kG/Min IV Continuous <Continuous>  pantoprazole Infusion 8 mG/Hr IV Continuous <Continuous>  propofol Infusion 10 MICROgram(s)/kG/Min IV Continuous <Continuous>  sodium chloride 0.65% Nasal 1 Spray(s) Both Nostrils three times a day      REVIEW OF SYSTEMS:  All other review of systems is negative unless indicated above.    Relevant Family History:   FAMILY HISTORY:  Family history of essential hypertension (Child)  Family history of stroke      Relevant Social History:  Denies ETOH or tobacco history    Physical Exam:    Vital Signs:  Vital Signs Last 24 Hrs  T(C): 35.7 (2020 19:55), Max: 37.6 (2020 11:10)  T(F): 96.2 (2020 19:55), Max: 99.6 (2020 11:10)  HR: 67 (2020 22:15) (58 - 88)  BP: 214/74 (2020 22:15) (95/33 - 232/175)  BP(mean): 110 (2020 22:15) (44 - 185)  RR: 16 (2020 22:15) (13 - 18)  SpO2: 100% (2020 22:15) (90% - 100%)  Daily     Daily Weight in k.8 (2020 04:51)    Constitutional: WDWN resting comfortably in bed; NAD  HEENT: NC/AT, PERRL, EOMI, anicteric sclera, no nasal discharge; uvula midline, no oropharyngeal erythema or exudates  Neck: supple; no JVD or thyromegaly  Respiratory: CTA B/L; no W/R/R, no retractions  Cardiac: +S1/S2; RRR; no M/R/G; PMI non-displaced  Gastrointestinal: soft, NT/ND; no rebound or guarding; +BS   Extremities: , no clubbing or cyanosis; no peripheral edema  Musculoskeletal:  no joint swelling, tenderness or erythema  Vascular: 2+ radial, femoral, DP/PT pulses B/L  Skin: warm, dry and intact; no rashes, wounds, or scars  Neurologic: AAOx3; CNS grossly intact; no focal deficits no asterixis, no tremor, no encephalopathy    Laboratory:                          10.6   14.12 )-----------( 149      ( 2020 22:16 )             30.7     01-09    139  |  106  |  85<H>  ----------------------------<  231<H>  4.0   |  25  |  4.36<H>    Ca    7.7<L>      2020 12:58        PT/INR - ( 2020 12:58 )   PT: 12.1 sec;   INR: 1.08 ratio         PTT - ( 2020 12:58 )  PTT:26.0 sec        Intake and Output    20 @ 07:01  -  20 @ 07:00  --------------------------------------------------------  IN: 880 mL / OUT: 0 mL / NET: 880 mL    20 @ 07:01  -  20 @ 22:45  --------------------------------------------------------  IN: 1234.5 mL / OUT: 460 mL / NET: 774.5 mL        Imaging:    EXAM:  CT ABDOMEN AND PELVIS        PROCEDURE DATE:  Dec  1 2019         INTERPRETATION:  CLINICAL INFORMATION: CKD. Abdominal pain.     COMPARISON: Chest CTA 2018.    PROCEDURE:   CT of the Abdomen and Pelvis was performed without intravenous contrast.   Intravenous contrast: None.  Oral contrast: positive contrast was administered.  Sagittal and coronal reformats were performed.    FINDINGS: Evaluation of the abdominal/pelvic organs, viscera and   vasculature is limited without intravenous contrast. Artifact from the   patient's arm degrades images.    LOWER CHEST: Nonspecific interlobular septal thickening. Persistent left   pleural effusion with atelectasis. Resolved right pleural effusion.   Aortic valve, mitral annular and coronary artery calcification. Findings   reflecting anemia.    LIVER: Unremarkable.  GALLBLADDER/BILE DUCTS: No intrahepatic or extrahepatic biliary   dilatation. Cholelithiasis.  PANCREAS: Unremarkable.  SPLEEN: Unremarkable.    ADRENALS: Unremarkable.  KIDNEYS/URETERS: Again noted, atrophic right kidney with a cyst.   Duplicated left renal collecting system. Prominent left collecting system   with nonspecific left perinephric stranding. No hydroureteronephrosis or   radiopaque urinary tract stone.  BLADDER: Partially distended.  REPRODUCTIVE ORGANS: Unremarkable.    BOWEL: No bowel obstruction. Unremarkable appendix. Fluid-filled colon.  PERITONEUM: No drainable fluid collection or free air.  VESSELS: Atherosclerosis.   RETROPERITONEUM: No lymphadenopathy.    ABDOMINAL WALL/SOFT TISSUES: Small fat-containing umbilical and inguinal   hernias. Left buttock injection granuloma.  BONES: Degenerative changes of the spine. Grade 1 anterolisthesis of L5   on S1. Stable anterior wedging of T11 and T12. New T10 superior endplate   depression.     IMPRESSION:     Nonspecific left perinephric stranding and prominent collecting system.   No hydroureteronephrosis or radiopaque urinary tract stone. Recommend   clinical correlation to assess urinary tract infection.    Cholelithiasis. Recommend clinical correlation to assess acute   cholecystitis.    Fluid-filled colon, which can be seen in the setting of colitis.   Recommend clinical correlation.    Persistent left pleural effusion with atelectasis.Nonspecific   interlobular septal thickening, which can be seen in pulmonary   edema/aggressive fluid resuscitation. Recommend clinical correlation to   assess underlying pneumonia.    Additional findings as described.    GALE STACY M.D., RADIOLOGY RESIDENT  This document has been electronically signed.  KEITH GARCIA M.D., ATTENDING RADIOLOGIST Chief Complaint:  Patient is a 79y old  Female who presents with a chief complaint of sob cough (2020 22:14)      HPI: 78 yo female with a PMH of alzheimers dz, CVA, Depression, CHF, HTN, MI (remote), recent dx of hypothyroidism who was admitted to [facility] on 2020 for CHF with + RSV who's hospital stay has been complicated by JONY, VRE of urine and anemia who was a RRT today for multiple episodes of hematemasis with associated N/V. Pt was prior on carafate and PPI during this hospitalization. During RRT pt was HDS and HR was NSR (pt not on beta blocker). Patient has been accepted to ICU for urgent blood transfusions and emergent EGD with GI. EGD report shows old blood in fundus, old blood coating esophagus, No blood in Duodenum. A colonscopy was subseqently done sec to [facility] showing Black, liquid stool through out colon and TI.       PAST MEDICAL/SURGICAL/FAMILY/SOCIAL HISTORY:    Past Medical History:  Alzheimers disease    CVA (cerebral vascular accident)    Depression    HTN (hypertension)    MI, old.  recently Dx c hypothyroidism, TSH > 111 started Levothyroxin 50mcg qd as per Dr Vigil (2020 11:41)      PMH/PSH:PAST MEDICAL & SURGICAL HISTORY:  Hypothyroidism  Constipation  Iron deficiency anemia  Major depressive disorder  Hyperlipidemia  GI bleed  Depression  Alzheimers disease  MI, old  CVA (cerebral vascular accident)  HTN (hypertension)  No significant past surgical history      Allergies:  No Known Allergies      Medications:  albuterol/ipratropium for Nebulization 3 milliLiter(s) Nebulizer every 4 hours  ampicillin  IVPB      ampicillin  IVPB 1 Gram(s) IV Intermittent every 12 hours  buDESOnide    Inhalation Suspension 0.5 milliGRAM(s) Inhalation every 12 hours  chlorhexidine 0.12% Liquid 15 milliLiter(s) Oral Mucosa every 12 hours  chlorhexidine 4% Liquid 1 Application(s) Topical <User Schedule>  dextrose 5% + sodium chloride 0.45%. 1000 milliLiter(s) IV Continuous <Continuous>  epoetin tawanna Injectable 19798 Unit(s) SubCutaneous every 7 days  levothyroxine Injectable 25 MICROGram(s) IV Push at bedtime  norepinephrine Infusion 0.05 MICROgram(s)/kG/Min IV Continuous <Continuous>  pantoprazole Infusion 8 mG/Hr IV Continuous <Continuous>  propofol Infusion 10 MICROgram(s)/kG/Min IV Continuous <Continuous>  sodium chloride 0.65% Nasal 1 Spray(s) Both Nostrils three times a day      REVIEW OF SYSTEMS:  All other review of systems is negative unless indicated above.    Relevant Family History:   FAMILY HISTORY:  Family history of essential hypertension (Child)  Family history of stroke      Relevant Social History:  Denies ETOH or tobacco history    Physical Exam:    Vital Signs:  Vital Signs Last 24 Hrs  T(C): 35.7 (2020 19:55), Max: 37.6 (2020 11:10)  T(F): 96.2 (2020 19:55), Max: 99.6 (2020 11:10)  HR: 67 (2020 22:15) (58 - 88)  BP: 214/74 (2020 22:15) (95/33 - 232/175)  BP(mean): 110 (2020 22:15) (44 - 185)  RR: 16 (2020 22:15) (13 - 18)  SpO2: 100% (2020 22:15) (90% - 100%)  Daily     Daily Weight in k.8 (2020 04:51)    Constitutional: WDWN, sedated  HEENT: NC/AT, PERRL, EOMI, anicteric sclera, Orally intubated  Neck: supple; no JVD or thyromegaly  Respiratory: CTA B/L; no W/R/R, no retractions  Cardiac: +S1/S2; RRR; no M/R/G; PMI non-displaced  Gastrointestinal: soft, NT/ND; no rebound or guarding; +BS   Extremities: , no clubbing or cyanosis; no peripheral edema  Musculoskeletal:  no joint swelling, tenderness or erythema  Vascular: 2+ radial, femoral, DP/PT pulses B/L  Skin: warm, dry and intact; no rashes, wounds, or scars  Neurologic: Seated  Rectal: + melena    Laboratory:                          10.6   14.12 )-----------( 149      ( 2020 22:16 )             30.7         139  |  106  |  85<H>  ----------------------------<  231<H>  4.0   |  25  |  4.36<H>    Ca    7.7<L>      2020 12:58        PT/INR - ( 2020 12:58 )   PT: 12.1 sec;   INR: 1.08 ratio         PTT - ( 2020 12:58 )  PTT:26.0 sec        Intake and Output    20 @ 07:01  -  20 @ 07:00  --------------------------------------------------------  IN: 880 mL / OUT: 0 mL / NET: 880 mL    20 @ 07:  -  20 @ 22:45  --------------------------------------------------------  IN: 1234.5 mL / OUT: 460 mL / NET: 774.5 mL        Imaging:    EXAM:  CT ABDOMEN AND PELVIS        PROCEDURE DATE:  Dec  1 2019         INTERPRETATION:  CLINICAL INFORMATION: CKD. Abdominal pain.     COMPARISON: Chest CTA 2018.    PROCEDURE:   CT of the Abdomen and Pelvis was performed without intravenous contrast.   Intravenous contrast: None.  Oral contrast: positive contrast was administered.  Sagittal and coronal reformats were performed.    FINDINGS: Evaluation of the abdominal/pelvic organs, viscera and   vasculature is limited without intravenous contrast. Artifact from the   patient's arm degrades images.    LOWER CHEST: Nonspecific interlobular septal thickening. Persistent left   pleural effusion with atelectasis. Resolved right pleural effusion.   Aortic valve, mitral annular and coronary artery calcification. Findings   reflecting anemia.    LIVER: Unremarkable.  GALLBLADDER/BILE DUCTS: No intrahepatic or extrahepatic biliary   dilatation. Cholelithiasis.  PANCREAS: Unremarkable.  SPLEEN: Unremarkable.    ADRENALS: Unremarkable.  KIDNEYS/URETERS: Again noted, atrophic right kidney with a cyst.   Duplicated left renal collecting system. Prominent left collecting system   with nonspecific left perinephric stranding. No hydroureteronephrosis or   radiopaque urinary tract stone.  BLADDER: Partially distended.  REPRODUCTIVE ORGANS: Unremarkable.    BOWEL: No bowel obstruction. Unremarkable appendix. Fluid-filled colon.  PERITONEUM: No drainable fluid collection or free air.  VESSELS: Atherosclerosis.   RETROPERITONEUM: No lymphadenopathy.    ABDOMINAL WALL/SOFT TISSUES: Small fat-containing umbilical and inguinal   hernias. Left buttock injection granuloma.  BONES: Degenerative changes of the spine. Grade 1 anterolisthesis of L5   on S1. Stable anterior wedging of T11 and T12. New T10 superior endplate   depression.     IMPRESSION:     Nonspecific left perinephric stranding and prominent collecting system.   No hydroureteronephrosis or radiopaque urinary tract stone. Recommend   clinical correlation to assess urinary tract infection.    Cholelithiasis. Recommend clinical correlation to assess acute   cholecystitis.    Fluid-filled colon, which can be seen in the setting of colitis.   Recommend clinical correlation.    Persistent left pleural effusion with atelectasis.Nonspecific   interlobular septal thickening, which can be seen in pulmonary   edema/aggressive fluid resuscitation. Recommend clinical correlation to   assess underlying pneumonia.    Additional findings as described.    GALE STACY M.D., RADIOLOGY RESIDENT  This document has been electronically signed.  KEITH GARCIA M.D., ATTENDING RADIOLOGIST

## 2020-01-10 LAB
ALBUMIN SERPL ELPH-MCNC: 1.6 G/DL — LOW (ref 3.3–5)
ALP SERPL-CCNC: 59 U/L — SIGNIFICANT CHANGE UP (ref 40–120)
ALT FLD-CCNC: 33 U/L — SIGNIFICANT CHANGE UP (ref 12–78)
ANION GAP SERPL CALC-SCNC: 10 MMOL/L — SIGNIFICANT CHANGE UP (ref 5–17)
APTT BLD: 22.9 SEC — LOW (ref 27.5–36.3)
AST SERPL-CCNC: 15 U/L — SIGNIFICANT CHANGE UP (ref 15–37)
BILIRUB SERPL-MCNC: 0.6 MG/DL — SIGNIFICANT CHANGE UP (ref 0.2–1.2)
BUN SERPL-MCNC: 93 MG/DL — HIGH (ref 7–23)
CALCIUM SERPL-MCNC: 7.6 MG/DL — LOW (ref 8.5–10.1)
CHLORIDE SERPL-SCNC: 108 MMOL/L — SIGNIFICANT CHANGE UP (ref 96–108)
CK MB BLD-MCNC: 3.6 % — HIGH (ref 0–3.5)
CK MB CFR SERPL CALC: 1.7 NG/ML — SIGNIFICANT CHANGE UP (ref 0.5–3.6)
CK SERPL-CCNC: 47 U/L — SIGNIFICANT CHANGE UP (ref 26–192)
CO2 SERPL-SCNC: 22 MMOL/L — SIGNIFICANT CHANGE UP (ref 22–31)
CREAT SERPL-MCNC: 4.17 MG/DL — HIGH (ref 0.5–1.3)
GLUCOSE SERPL-MCNC: 152 MG/DL — HIGH (ref 70–99)
HCT VFR BLD CALC: 24.7 % — LOW (ref 34.5–45)
HCT VFR BLD CALC: 26.5 % — LOW (ref 34.5–45)
HGB BLD-MCNC: 8.2 G/DL — LOW (ref 11.5–15.5)
HGB BLD-MCNC: 9.1 G/DL — LOW (ref 11.5–15.5)
INR BLD: 1.1 RATIO — SIGNIFICANT CHANGE UP (ref 0.88–1.16)
MAGNESIUM SERPL-MCNC: 2.2 MG/DL — SIGNIFICANT CHANGE UP (ref 1.6–2.6)
MCHC RBC-ENTMCNC: 30.5 PG — SIGNIFICANT CHANGE UP (ref 27–34)
MCHC RBC-ENTMCNC: 31.2 PG — SIGNIFICANT CHANGE UP (ref 27–34)
MCHC RBC-ENTMCNC: 33.2 GM/DL — SIGNIFICANT CHANGE UP (ref 32–36)
MCHC RBC-ENTMCNC: 34.3 GM/DL — SIGNIFICANT CHANGE UP (ref 32–36)
MCV RBC AUTO: 88.9 FL — SIGNIFICANT CHANGE UP (ref 80–100)
MCV RBC AUTO: 93.9 FL — SIGNIFICANT CHANGE UP (ref 80–100)
NRBC # BLD: 0 /100 WBCS — SIGNIFICANT CHANGE UP (ref 0–0)
NRBC # BLD: 0 /100 WBCS — SIGNIFICANT CHANGE UP (ref 0–0)
PLATELET # BLD AUTO: 110 K/UL — LOW (ref 150–400)
PLATELET # BLD AUTO: 132 K/UL — LOW (ref 150–400)
POTASSIUM SERPL-MCNC: 3.7 MMOL/L — SIGNIFICANT CHANGE UP (ref 3.5–5.3)
POTASSIUM SERPL-SCNC: 3.7 MMOL/L — SIGNIFICANT CHANGE UP (ref 3.5–5.3)
PROT SERPL-MCNC: 4.6 GM/DL — LOW (ref 6–8.3)
PROTHROM AB SERPL-ACNC: 12.3 SEC — SIGNIFICANT CHANGE UP (ref 10–12.9)
RBC # BLD: 2.63 M/UL — LOW (ref 3.8–5.2)
RBC # BLD: 2.98 M/UL — LOW (ref 3.8–5.2)
RBC # FLD: 16.4 % — HIGH (ref 10.3–14.5)
RBC # FLD: 17.2 % — HIGH (ref 10.3–14.5)
SODIUM SERPL-SCNC: 140 MMOL/L — SIGNIFICANT CHANGE UP (ref 135–145)
TROPONIN I SERPL-MCNC: 0.09 NG/ML — HIGH (ref 0.01–0.04)
WBC # BLD: 13.01 K/UL — HIGH (ref 3.8–10.5)
WBC # BLD: 13.91 K/UL — HIGH (ref 3.8–10.5)
WBC # FLD AUTO: 13.01 K/UL — HIGH (ref 3.8–10.5)
WBC # FLD AUTO: 13.91 K/UL — HIGH (ref 3.8–10.5)

## 2020-01-10 PROCEDURE — 99231 SBSQ HOSP IP/OBS SF/LOW 25: CPT

## 2020-01-10 PROCEDURE — 99291 CRITICAL CARE FIRST HOUR: CPT

## 2020-01-10 PROCEDURE — 71045 X-RAY EXAM CHEST 1 VIEW: CPT | Mod: 26

## 2020-01-10 RX ORDER — LABETALOL HCL 100 MG
10 TABLET ORAL EVERY 4 HOURS
Refills: 0 | Status: DISCONTINUED | OUTPATIENT
Start: 2020-01-10 | End: 2020-01-11

## 2020-01-10 RX ORDER — LABETALOL HCL 100 MG
10 TABLET ORAL ONCE
Refills: 0 | Status: COMPLETED | OUTPATIENT
Start: 2020-01-10 | End: 2020-01-10

## 2020-01-10 RX ORDER — HYDRALAZINE HCL 50 MG
10 TABLET ORAL ONCE
Refills: 0 | Status: COMPLETED | OUTPATIENT
Start: 2020-01-10 | End: 2020-01-10

## 2020-01-10 RX ADMIN — Medication 3 MILLILITER(S): at 10:12

## 2020-01-10 RX ADMIN — SODIUM CHLORIDE 50 MILLILITER(S): 9 INJECTION, SOLUTION INTRAVENOUS at 15:13

## 2020-01-10 RX ADMIN — CHLORHEXIDINE GLUCONATE 1 APPLICATION(S): 213 SOLUTION TOPICAL at 06:00

## 2020-01-10 RX ADMIN — Medication 1 SPRAY(S): at 15:03

## 2020-01-10 RX ADMIN — Medication 0.5 MILLIGRAM(S): at 17:22

## 2020-01-10 RX ADMIN — Medication 3 MILLILITER(S): at 01:06

## 2020-01-10 RX ADMIN — Medication 3 MILLILITER(S): at 14:39

## 2020-01-10 RX ADMIN — Medication 108 GRAM(S): at 06:40

## 2020-01-10 RX ADMIN — Medication 3 MILLILITER(S): at 22:11

## 2020-01-10 RX ADMIN — Medication 10 MILLIGRAM(S): at 15:41

## 2020-01-10 RX ADMIN — Medication 3 MILLILITER(S): at 05:49

## 2020-01-10 RX ADMIN — Medication 1 SPRAY(S): at 05:48

## 2020-01-10 RX ADMIN — Medication 108 GRAM(S): at 17:49

## 2020-01-10 RX ADMIN — Medication 1 SPRAY(S): at 22:29

## 2020-01-10 RX ADMIN — Medication 25 MICROGRAM(S): at 22:29

## 2020-01-10 RX ADMIN — Medication 0.5 MILLIGRAM(S): at 05:49

## 2020-01-10 RX ADMIN — Medication 10 MILLIGRAM(S): at 22:28

## 2020-01-10 RX ADMIN — Medication 10 MILLIGRAM(S): at 17:49

## 2020-01-10 RX ADMIN — CHLORHEXIDINE GLUCONATE 15 MILLILITER(S): 213 SOLUTION TOPICAL at 05:48

## 2020-01-10 RX ADMIN — PROPOFOL 3.68 MICROGRAM(S)/KG/MIN: 10 INJECTION, EMULSION INTRAVENOUS at 15:12

## 2020-01-10 RX ADMIN — Medication 3 MILLILITER(S): at 17:23

## 2020-01-10 RX ADMIN — PANTOPRAZOLE SODIUM 10 MG/HR: 20 TABLET, DELAYED RELEASE ORAL at 15:12

## 2020-01-10 RX ADMIN — CHLORHEXIDINE GLUCONATE 15 MILLILITER(S): 213 SOLUTION TOPICAL at 17:49

## 2020-01-10 NOTE — PROGRESS NOTE ADULT - ASSESSMENT
HPI:  · HPI Objective Statement: 79 years old female from [facility] rehab c/o coughing sob for three to four days. Pt is sent here to rule out chf vs pneumonia. Pt is alert and oriented to person only unable to give detail hx due to hx of Alzheimer according to the [facility] pt had sat of 91 to 92 %,	  ---------------- As Above ---------------------------------------------  Patient is a poor historian. Patient admitted with Bronchitis and UTI. Called for hematemsis. Patient had three episodes of bloody vomitus. ( 1/3 of a cup ) . Patient complaining of mid epigastric / chest pain but patient can not characterize the pain.   Patient has chronic anemia. but HGB fell 8.4 --> 7.2. Was on Carafate, now on IV PPI  KUB pending  HX of CRI    Addendum : Patient had another episode of bloody vomitus    Upper GI Bleed - Secondary to (?). 1) NPO  2)Check KUB  3) IV PPI 4) telemetry  5) NGT 6) EGD 7) Hold Carafate  8) Hold iron  ---Will speak with daughter.

## 2020-01-10 NOTE — PROGRESS NOTE ADULT - SUBJECTIVE AND OBJECTIVE BOX
INTERVAL HPI:  79 year female with HTN, HLD, MI hx, CVA, Gi bleed, Anemia Depression and Alzheimer's dementia.  Sent from [facility] due to SOB. Possibility of pneumonia vs CHF was raised.   In ED with Respiratory distress required BIPAP support and RVP positive for RSV. Pt not able to provide more details due to dementia. Information from transfer papers and ED physician.  Nods no when asked for smoking.  01/09/20: With massive GI bleed, intubated and transferred to ICU.  01/09/20:  EGD+ Colonoscopy.    OVERNIGHT EVENTS:  In ICU, on Vent.    Vital Signs Last 24 Hrs  T(C): 37.1 (10 Ousmane 2020 19:15), Max: 37.4 (10 Ousmane 2020 11:05)  T(F): 98.7 (10 Ousmane 2020 19:15), Max: 99.4 (10 Ousmane 2020 11:05)  HR: 73 (10 Ousmane 2020 20:15) (50 - 77)  BP: 190/52 (10 Ousmane 2020 20:00) (94/63 - 221/67)  BP(mean): 85 (10 Ousmane 2020 20:00) (60 - 169)  RR: 16 (10 Ousmane 2020 20:15) (13 - 17)  SpO2: 100% (10 Ousmane 2020 20:15) (100% - 100%)    Mode: AC/ CMV (Assist Control/ Continuous Mandatory Ventilation)  RR (machine): 16  TV (machine): 400  FiO2: 40  PEEP: 5  ITime: 1  MAP: 10  PIP: 32    PHYSICAL EXAM:  GEN:        sedated, comfortable.  HEENT:     ETT   RESP:        no wheezing.  CVS:           Regular rate and rhythm.   ABD:         Soft, non-tender, non-distended;     MEDICATIONS  (STANDING):  albuterol/ipratropium for Nebulization 3 milliLiter(s) Nebulizer every 4 hours  ampicillin  IVPB      ampicillin  IVPB 1 Gram(s) IV Intermittent every 12 hours  buDESOnide    Inhalation Suspension 0.5 milliGRAM(s) Inhalation every 12 hours  chlorhexidine 0.12% Liquid 15 milliLiter(s) Oral Mucosa every 12 hours  chlorhexidine 4% Liquid 1 Application(s) Topical <User Schedule>  dextrose 5% + sodium chloride 0.45%. 1000 milliLiter(s) (50 mL/Hr) IV Continuous <Continuous>  epoetin tawanna Injectable 77026 Unit(s) SubCutaneous every 7 days  labetalol Injectable 10 milliGRAM(s) IV Push every 4 hours  levothyroxine Injectable 25 MICROGram(s) IV Push at bedtime  pantoprazole Infusion 8 mG/Hr (10 mL/Hr) IV Continuous <Continuous>  propofol Infusion 10 MICROgram(s)/kG/Min (3.678 mL/Hr) IV Continuous <Continuous>  sodium chloride 0.65% Nasal 1 Spray(s) Both Nostrils three times a day    LABS:                        8.2    13.91 )-----------( 110      ( 10 Ousmane 2020 14:21 )             24.7     01-10    140  |  108  |  93<H>  ----------------------------<  152<H>  3.7   |  22  |  4.17<H>    Ca    7.6<L>      10 Ousmane 2020 03:27  Phos  3.1     01-09  Mg     2.2     01-10    TPro  4.6<L>  /  Alb  1.6<L>  /  TBili  0.6  /  DBili  x   /  AST  15  /  ALT  33  /  AlkPhos  59  01-10    PT/INR - ( 10 Ousmane 2020 03:27 )   PT: 12.3 sec;   INR: 1.10 ratio      PTT - ( 10 Ousmane 2020 03:27 )  PTT:22.9 sec  01-09 @ 22:08  pH: 7.48  pCO2: 29  pO2: 205  SaO2: 100    RADIOLOGY & ADDITIONAL STUDIES:  < from: Xray Chest 1 View- PORTABLE-Urgent (01.10.20 @ 14:45) >    EXAM:  XR CHEST PORTABLE URGENT 1V                          PROCEDURE DATE:  01/10/2020      INTERPRETATION:  NG tube placement.    AP chest. Prior 1/9/2020.    Endotracheal tube nasogastric tube are in satisfactory position. No change heart mediastinum. No gross change bilateral airspace disease and small hazy basilar effusions. No pneumothorax.    Impression: As above    ANA ROGERS M.D., ATTENDING RADIOLOGIST  This document has been electronically signed. Ousmane 10 2020  3:01PM    ASSESSMENT AND PLAN:  ·	Acute hypoxic  Respiratory failure .  ·	RSV tracheobronchitis.  ·	Left pleural effusion.  ·	Hypothyroidism with very high TSH.  ·	Anemia.  ·	VRE UTI  ·	Leukocytosis.  ·	Renal Insuffiencey.  ·	CVA by history.  ·	HTN.  ·	HLD.  ·	Alzheimer's dementia.  ·	GI bleed,    Continue Vent, nebulizer and antibiotics.  On Protonix drip.  Follow H & H.

## 2020-01-10 NOTE — PROGRESS NOTE ADULT - SUBJECTIVE AND OBJECTIVE BOX
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 Pt presents from [facility] rehab for SOB, cough x3-4 days. Admitted to medical service for PNA, CHF exacerbation. Pt had + RVP for RSV and urine culture with VRE. Acute on chronic renal insufficiency. Transferred to ICU s/p multiple episodes of hematemesis with NGT insertion evacuating 700-800 cc of blood from stomach. Upon arrival to ICU pt noted to become more lethargic, intubated for airway protection and for GI procedure. DX: acute blood loss anemia, upper GI bleed, acute on chronic renal insufficiency, RSV PNA, VRE UTI, acute respiratory failure requiring intubation. 	      24 hr events:  s/p 2 units pRBC  s/p emergent EGD: old blood noted in stomach, cautery performed to area of fresh blood   during EGD pt had BRBPR/hematochezia, colonoscopy performed urgently at bedside  remains intubated  hypertensive  NGT reinserted (removed for procedure overnight)    ## ROS:  [x] unable to obtain due to intubation and sedation      ## Labs:  CBC:              8.2    13.91 )-----------( 110      ( 10 Ousmane 2020 14:21 )             24.7     01-10    140  |  108  |  93<H>  ----------------------------<  152<H>  3.7   |  22  |  4.17<H>    Ca    7.6<L>      10 Ousmane 2020 03:27  Phos  3.1     01-09  Mg     2.2     01-10      TPro  4.6<L>  /  Alb  1.6<L>  /  TBili  0.6  /  DBili  x   /  AST  15  /  ALT  33  /  AlkPhos  59  01-10    Coags:  PT/INR - ( 10 Ousmane 2020 03:27 )   PT: 12.3 sec;   INR: 1.10 ratio    PTT - ( 10 Ousmane 2020 03:27 )  PTT:22.9 sec        ## Imaging:  CXR < from: Xray Chest 1 View- PORTABLE-Urgent (01.10.20 @ 14:45) >  Endotracheal tube nasogastric tube are in satisfactory position. No change heart mediastinum. No gross change bilateral airspace disease and small hazy basilar effusions. No pneumothorax.      ## Medications:   ampicillin  IVPB 1 Gram(s) IV Intermittent every 12 hours    labetalol Injectable 10 milliGRAM(s) IV Push every 4 hours    albuterol/ipratropium for Nebulization 3 milliLiter(s) Nebulizer every 4 hours    levothyroxine Injectable 25 MICROGram(s) IV Push at bedtime      pantoprazole Infusion 8 mG/Hr IV Continuous <Continuous>    propofol Infusion 10 MICROgram(s)/kG/Min IV Continuous <Continuous>      ## Vitals:  T(C): 36.6 (01-11-20 @ 05:20), Max: 37.4 (01-10-20 @ 11:05)  HR: 69 (01-11-20 @ 06:45) (54 - 77)  BP: 180/74 (01-11-20 @ 06:30) (90/53 - 201/50)  BP(mean): 96 (01-11-20 @ 06:30) (62 - 169)  RR: 15 (01-11-20 @ 06:45) (14 - 18)  SpO2: 100% (01-11-20 @ 06:45) (98% - 100%)    Vent: Mode: AC/ CMV (Assist Control/ Continuous Mandatory Ventilation), RR (machine): 16, RR (patient): 16, TV (machine): 400, FiO2: 40, PEEP: 5, PIP: 31    ABG: ABG - ( 09 Jan 2020 22:08 )  pH, Arterial: x     pH, Blood: 7.48  /  pCO2: 29    /  pO2: 205   / HCO3: 22    / Base Excess: -0.7  /  SaO2: 100           01-09 @ 07:01  -  01-10 @ 07:00  --------------------------------------------------------  IN: 1817.9 mL / OUT: 840 mL / NET: 977.9 mL        ## P/E:  Gen: lying comfortably in bed in no apparent distress, sedated, orally intubated on mechanical vent  HEENT: PERRL, ETT, NGT in place  Resp: CTA B/L   CVS: RRR  Abd: soft ND +BS  Ext: no c/c/e  Neuro: sedated, opens eyes to name call    CENTRAL LINE: [ ] YES [x] NO  LOCATION:   DATE INSERTED:  REMOVE: [ ] YES [ ] NO      WINTERS: [x] YES [ ] NO    DATE INSERTED:  REMOVE:  [ ] YES [ ] NO      A-LINE:  [ ] YES [x] NO  LOCATION:   DATE INSERTED:  REMOVE:  [ ] YES [ ] NO  EXPLAIN:    GLOBAL ISSUE/BEST PRACTICE:  Analgesia: n/a  Sedation: propofol  HOB elevation: yes  Stress ulcer prophylaxis: protonix drip  VTE prophylaxis: bilateral compression devices  Oral Care: chlorhexidine  Glycemic control: n/a  Nutrition: npo    CODE STATUS: [x] full code  [ ] DNR  [ ] DNI  [ ] MOLST  Goals of care discussion: [x] yes

## 2020-01-10 NOTE — PROGRESS NOTE ADULT - SUBJECTIVE AND OBJECTIVE BOX
Patient is a 79y old  Female who presents with a chief complaint of sob cough (10 Ousmane 2020 09:22)      INTERVAL HPI/OVERNIGHT EVENTS: all notes reviewed. ICU on vent on Diprivan on Protonix    MEDICATIONS  (STANDING):  albuterol/ipratropium for Nebulization 3 milliLiter(s) Nebulizer every 4 hours  ampicillin  IVPB      ampicillin  IVPB 1 Gram(s) IV Intermittent every 12 hours  buDESOnide    Inhalation Suspension 0.5 milliGRAM(s) Inhalation every 12 hours  chlorhexidine 0.12% Liquid 15 milliLiter(s) Oral Mucosa every 12 hours  chlorhexidine 4% Liquid 1 Application(s) Topical <User Schedule>  dextrose 5% + sodium chloride 0.45%. 1000 milliLiter(s) (50 mL/Hr) IV Continuous <Continuous>  epoetin tawanna Injectable 01741 Unit(s) SubCutaneous every 7 days  levothyroxine Injectable 25 MICROGram(s) IV Push at bedtime  pantoprazole Infusion 8 mG/Hr (10 mL/Hr) IV Continuous <Continuous>  propofol Infusion 10 MICROgram(s)/kG/Min (3.678 mL/Hr) IV Continuous <Continuous>  sodium chloride 0.65% Nasal 1 Spray(s) Both Nostrils three times a day    MEDICATIONS  (PRN):      Allergies    No Known Allergies    Intolerances        REVIEW OF SYSTEMS:            Vital Signs Last 24 Hrs  T(C): 37.4 (10 Ousmane 2020 11:05), Max: 37.4 (10 Ousmane 2020 11:05)  T(F): 99.4 (10 Ousmane 2020 11:05), Max: 99.4 (10 Ousmane 2020 11:05)  HR: 67 (10 Ousmane 2020 12:42) (50 - 83)  BP: 159/39 (10 Ousmane 2020 10:00) (94/63 - 232/175)  BP(mean): 69 (10 Ousmane 2020 10:00) (44 - 185)  RR: 16 (10 Ousmane 2020 10:00) (13 - 16)  SpO2: 100% (10 Ousmane 2020 12:42) (90% - 100%)    PHYSICAL EXAM:  general       HEENT wnl  CHEST/LUNG: rhonchi b/l , but less then yesterday  HEART: Regular rate and rhythm; No murmurs, rubs, or gallops  ABDOMEN: Soft, Nontender, Nondistended; Bowel sounds present  EXTREMITIES:  2+ Peripheral Pulses, No clubbing, cyanosis, or edema  LYMPH: No lymphadenopathy noted  SKIN: No rashes or lesions    LABS:                        9.1    13.01 )-----------( 132      ( 10 Ousmane 2020 03:27 )             26.5     01-10    140  |  108  |  93<H>  ----------------------------<  152<H>  3.7   |  22  |  4.17<H>    Ca    7.6<L>      10 Ousmane 2020 03:27  Phos  3.1     01-09  Mg     2.2     01-10    TPro  4.6<L>  /  Alb  1.6<L>  /  TBili  0.6  /  DBili  x   /  AST  15  /  ALT  33  /  AlkPhos  59  01-10    PT/INR - ( 10 Ousmane 2020 03:27 )   PT: 12.3 sec;   INR: 1.10 ratio         PTT - ( 10 Ousmane 2020 03:27 )  PTT:22.9 sec    CAPILLARY BLOOD GLUCOSE      POCT Blood Glucose.: 249 mg/dL (09 Jan 2020 14:39)    ABG - ( 09 Jan 2020 22:08 )  pH, Arterial: x     pH, Blood: 7.48  /  pCO2: 29    /  pO2: 205   / HCO3: 22    / Base Excess: -0.7  /  SaO2: 100               CARDIAC MARKERS ( 10 Ousmane 2020 03:27 )  .085 ng/mL / x     / 47 U/L / x     / 1.7 ng/mL  CARDIAC MARKERS ( 09 Jan 2020 22:16 )  .094 ng/mL / x     / 41 U/L / x     / 1.6 ng/mL  CARDIAC MARKERS ( 09 Jan 2020 12:58 )  .080 ng/mL / x     / 19 U/L / x     / <1.0 ng/mL            RADIOLOGY & ADDITIONAL TESTS:    Imaging Personally Reviewed:  [y ] YES  [ ] NO    Consultant(s) Notes Reviewed:  [y ] YES  [ ] NO    Care Discussed with Consultants/Other Providers [ ] YES  [ ] NO    PROBLEMS:  CONGESTIVE HEART FAILURE;RSV INFECTION  CONGESTIVE BABB FAILURE;RSV INFECTION  SHORTNESS OF BREATH  CHF (congestive heart failure)  Gastrointestinal hemorrhage, unspecified gastrointestinal hemorrhage type  Goals of care, counseling/discussion  HTN (hypertension)  Hyperlipidemia  Alzheimers disease  MI, old  Hypothyroidism  RSV infection  RSV PNA tracheobronchitis  GI bleed AVM, aspiration  Anemia  ARF/CKD4  MS  UTI VRE      Care discussed with family,         [x  ]   yes  [  ]  No PA Nadine just spoke with the daughter    imp:    stable[ ]    unstable[  ]     improving [   ]       unchanged  [ x ]                Plans:  Continue present plans  [x  ]   Pulmonary, cardiology GI Nephrology to f/u  Extubation ASAP

## 2020-01-10 NOTE — PROGRESS NOTE ADULT - SUBJECTIVE AND OBJECTIVE BOX
[facility] NEPHROLOGY SERVICES, [facility]  NEPHROLOGY AND HYPERTENSION  [address]  [address]  [place] [address]  [phone]    MD MART LORD, MD CHING ROTHMAN MD YELENA ROSENBERG, MD ISABELLA SLADE, MD ALAYNA WALLACE MD          Patient events noted    MEDICATIONS  (STANDING):  albuterol/ipratropium for Nebulization 3 milliLiter(s) Nebulizer every 4 hours  ampicillin  IVPB      ampicillin  IVPB 1 Gram(s) IV Intermittent every 12 hours  buDESOnide    Inhalation Suspension 0.5 milliGRAM(s) Inhalation every 12 hours  chlorhexidine 0.12% Liquid 15 milliLiter(s) Oral Mucosa every 12 hours  chlorhexidine 4% Liquid 1 Application(s) Topical <User Schedule>  dextrose 5% + sodium chloride 0.45%. 1000 milliLiter(s) (50 mL/Hr) IV Continuous <Continuous>  epoetin tawanna Injectable 65031 Unit(s) SubCutaneous every 7 days  levothyroxine Injectable 25 MICROGram(s) IV Push at bedtime  norepinephrine Infusion 0.05 MICROgram(s)/kG/Min (5.747 mL/Hr) IV Continuous <Continuous>  pantoprazole Infusion 8 mG/Hr (10 mL/Hr) IV Continuous <Continuous>  propofol Infusion 10 MICROgram(s)/kG/Min (3.678 mL/Hr) IV Continuous <Continuous>  sodium chloride 0.65% Nasal 1 Spray(s) Both Nostrils three times a day    MEDICATIONS  (PRN):      01-09-20 @ 07:01  -  01-10-20 @ 07:00  --------------------------------------------------------  IN: 1817.9 mL / OUT: 840 mL / NET: 977.9 mL    01-10-20 @ 07:01  -  01-10-20 @ 09:23  --------------------------------------------------------  IN: 131 mL / OUT: 0 mL / NET: 131 mL      PHYSICAL EXAM:      T(C): 36.2 (01-10-20 @ 06:00), Max: 37.6 (01-09-20 @ 11:10)  HR: 57 (01-10-20 @ 08:30) (50 - 83)  BP: 171/42 (01-10-20 @ 08:30) (94/63 - 232/175)  RR: 16 (01-10-20 @ 08:30) (13 - 18)  SpO2: 100% (01-10-20 @ 08:30) (90% - 100%)  Wt(kg): --  Respiratory: clear anteriorly, decreased BS at bases  Cardiovascular: S1 S2  Gastrointestinal: soft NT ND +BS  Extremities:  tr edema                                    9.1    13.01 )-----------( 132      ( 10 Ousmane 2020 03:27 )             26.5     01-10    140  |  108  |  93<H>  ----------------------------<  152<H>  3.7   |  22  |  4.17<H>    Ca    7.6<L>      10 Ousmane 2020 03:27  Phos  3.1     01-09  Mg     2.2     01-10    TPro  4.6<L>  /  Alb  1.6<L>  /  TBili  0.6  /  DBili  x   /  AST  15  /  ALT  33  /  AlkPhos  59  01-10    ABG - ( 09 Jan 2020 22:08 )  pH, Arterial: x     pH, Blood: 7.48  /  pCO2: 29    /  pO2: 205   / HCO3: 22    / Base Excess: -0.7  /  SaO2: 100               LIVER FUNCTIONS - ( 10 Ousmane 2020 03:27 )  Alb: 1.6 g/dL / Pro: 4.6 gm/dL / ALK PHOS: 59 U/L / ALT: 33 U/L / AST: 15 U/L / GGT: x           Creatinine Trend: 4.17<--, 4.11<--, 4.36<--, 4.27<--, 3.77<--, 3.45<--    < from: TTE Echo Doppler w/o Cont (01.03.20 @ 13:46) >    Summary:   1. Left ventricular ejection fraction, by visual estimation, is 60 to 65%.   2. Normal left ventricular size and wall thicknesses, with normal systolic and diastolic function.   3. There is mild concentric left ventricular hypertrophy.   4. Normal right ventricular size and function.   5. Moderately enlarged left atrium.   6. Severe mitral valve regurgitation.   7. Severe tricuspid regurgitation.   8. Estimated pulmonary artery systolic pressure is 97.5 mmHg assuming a right atrial pressure of 10 mmHg, which is consistent with severe pulmonary hypertension.   9. Mild thickening and calcification of the anterior and posterior mitral valve leaflets.          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    Plan  Continued warranted diuresis  Follow paraprotein screen.  Would defer prospect of aggressive work up or  HD support as overall prognosis poor.    Obed Jamison MD [facility] NEPHROLOGY SERVICES, [facility]  NEPHROLOGY AND HYPERTENSION  [address]  [address]  [place] [address]  [phone]    MD MART LORD, MD CHING ROTHMAN MD YELENA ROSENBERG, MD ISABELLA SLADE, MD ALAYNA WALLACE MD          Patient events noted    MEDICATIONS  (STANDING):  albuterol/ipratropium for Nebulization 3 milliLiter(s) Nebulizer every 4 hours  ampicillin  IVPB      ampicillin  IVPB 1 Gram(s) IV Intermittent every 12 hours  buDESOnide    Inhalation Suspension 0.5 milliGRAM(s) Inhalation every 12 hours  chlorhexidine 0.12% Liquid 15 milliLiter(s) Oral Mucosa every 12 hours  chlorhexidine 4% Liquid 1 Application(s) Topical <User Schedule>  dextrose 5% + sodium chloride 0.45%. 1000 milliLiter(s) (50 mL/Hr) IV Continuous <Continuous>  epoetin tawanna Injectable 14151 Unit(s) SubCutaneous every 7 days  levothyroxine Injectable 25 MICROGram(s) IV Push at bedtime  norepinephrine Infusion 0.05 MICROgram(s)/kG/Min (5.747 mL/Hr) IV Continuous <Continuous>  pantoprazole Infusion 8 mG/Hr (10 mL/Hr) IV Continuous <Continuous>  propofol Infusion 10 MICROgram(s)/kG/Min (3.678 mL/Hr) IV Continuous <Continuous>  sodium chloride 0.65% Nasal 1 Spray(s) Both Nostrils three times a day    MEDICATIONS  (PRN):      01-09-20 @ 07:01  -  01-10-20 @ 07:00  --------------------------------------------------------  IN: 1817.9 mL / OUT: 840 mL / NET: 977.9 mL    01-10-20 @ 07:01  -  01-10-20 @ 09:23  --------------------------------------------------------  IN: 131 mL / OUT: 0 mL / NET: 131 mL      PHYSICAL EXAM:      T(C): 36.2 (01-10-20 @ 06:00), Max: 37.6 (01-09-20 @ 11:10)  HR: 57 (01-10-20 @ 08:30) (50 - 83)  BP: 171/42 (01-10-20 @ 08:30) (94/63 - 232/175)  RR: 16 (01-10-20 @ 08:30) (13 - 18)  SpO2: 100% (01-10-20 @ 08:30) (90% - 100%)  Wt(kg): --  Respiratory: clear anteriorly, decreased BS at bases  Cardiovascular: S1 S2  Gastrointestinal: soft NT ND +BS  Extremities:  tr edema                                    9.1    13.01 )-----------( 132      ( 10 Ousmane 2020 03:27 )             26.5     01-10    140  |  108  |  93<H>  ----------------------------<  152<H>  3.7   |  22  |  4.17<H>    Ca    7.6<L>      10 Ousmane 2020 03:27  Phos  3.1     01-09  Mg     2.2     01-10    TPro  4.6<L>  /  Alb  1.6<L>  /  TBili  0.6  /  DBili  x   /  AST  15  /  ALT  33  /  AlkPhos  59  01-10    ABG - ( 09 Jan 2020 22:08 )  pH, Arterial: x     pH, Blood: 7.48  /  pCO2: 29    /  pO2: 205   / HCO3: 22    / Base Excess: -0.7  /  SaO2: 100               LIVER FUNCTIONS - ( 10 Ousmane 2020 03:27 )  Alb: 1.6 g/dL / Pro: 4.6 gm/dL / ALK PHOS: 59 U/L / ALT: 33 U/L / AST: 15 U/L / GGT: x           Creatinine Trend: 4.17<--, 4.11<--, 4.36<--, 4.27<--, 3.77<--, 3.45<--    < from: TTE Echo Doppler w/o Cont (01.03.20 @ 13:46) >    Summary:   1. Left ventricular ejection fraction, by visual estimation, is 60 to 65%.   2. Normal left ventricular size and wall thicknesses, with normal systolic and diastolic function.   3. There is mild concentric left ventricular hypertrophy.   4. Normal right ventricular size and function.   5. Moderately enlarged left atrium.   6. Severe mitral valve regurgitation.   7. Severe tricuspid regurgitation.   8. Estimated pulmonary artery systolic pressure is 97.5 mmHg assuming a right atrial pressure of 10 mmHg, which is consistent with severe pulmonary hypertension.   9. Mild thickening and calcification of the anterior and posterior mitral valve leaflets.          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    Plan  Continued warranted diuresis, supportive care.  Follow paraprotein screen.  Would defer prospect of aggressive work up or HD support as overall prognosis poor.    Obed Jamison MD

## 2020-01-10 NOTE — CHART NOTE - NSCHARTNOTEFT_GEN_A_CORE
Assessment: Pt seen for follow-up & Critical Care admission. Pt with GIB- colonoscopy 1/9 s/p intubation. S/p acute resp failure, pulmonary multifactorial cause of SOB, JONY, CKD stage 3-4 & anemia remains intubated .     Factors impacting intake: [ ] none [ ] nausea  [ ] vomiting [ ] diarrhea [ ] constipation  [ ]chewing problems [ ] swallowing issues  [ x] other: GIB (black stool), Hypoactive bowels    Diet Prescription: Diet, NPO:   Except Medications (01-09-20 @ 13:24)    Intake: NPO x 1 day. Last BM x 2(1/9). NGT SBD=308mi (19)    Current Weight: Weight (kg): Wt=61.8kg(1/9), Wt=61.3kg(1/2)  % Weight Change Wt remains stable x 1 week    Physical appearance: +1 edema Hank arms; Nutrition focused physical exam conducted: Subcutaneous fat loss: [Moderate ] Orbital fat pads region, [unable ]Buccal fat region, [moderate ]Triceps region,  [WNL ]Ribs region.  Muscle wasting: [Moderate ]Temples region, [Mild ]Clavicle region, [Moderate ]Shoulder region, [unable ]Scapula region, [moderate ]Interosseous region,  [unable ]thigh region, [unable ]Calf region    Pertinent Medications: MEDICATIONS  (STANDING):  albuterol/ipratropium for Nebulization 3 milliLiter(s) Nebulizer every 4 hours  ampicillin  IVPB      ampicillin  IVPB 1 Gram(s) IV Intermittent every 12 hours  buDESOnide    Inhalation Suspension 0.5 milliGRAM(s) Inhalation every 12 hours  chlorhexidine 0.12% Liquid 15 milliLiter(s) Oral Mucosa every 12 hours  chlorhexidine 4% Liquid 1 Application(s) Topical <User Schedule>  dextrose 5% + sodium chloride 0.45%. 1000 milliLiter(s) (50 mL/Hr) IV Continuous <Continuous>  epoetin tawanna Injectable 25067 Unit(s) SubCutaneous every 7 days  levothyroxine Injectable 25 MICROGram(s) IV Push at bedtime  norepinephrine Infusion 0.05 MICROgram(s)/kG/Min (5.747 mL/Hr) IV Continuous <Continuous>  pantoprazole Infusion 8 mG/Hr (10 mL/Hr) IV Continuous <Continuous>  propofol Infusion 10 MICROgram(s)/kG/Min (3.678 mL/Hr) IV Continuous <Continuous>  sodium chloride 0.65% Nasal 1 Spray(s) Both Nostrils three times a day    MEDICATIONS  (PRN):    Pertinent Labs: 01-10 Na 140 mmol/L Glu 152 mg/dL<H> K+ 3.7 mmol/L Cr 4.17 mg/dL<H> BUN 93 mg/dL<H> Phos 3.1   Alb 1.6 g/dL<L> PAB n/a   Hgb 9.1 g/dL<L> Hct 26.5 %<L> HgA1C n/a    Glucose, Serum: 152 mg/dL <H>, WBC=13.01(1/10), GFR=11  Glucose, Serum: 150 mg/dL <H>  Glucose, Serum: 231 mg/dL <H>   24Hr FS:249 mg/dL  251 mg/dL    Skin: Coccyx stage II    Estimated Needs:   [x ] no change since previous assessment (1/3)  [ ] recalculated:     Previous Nutrition Diagnosis: Nutrition Diagnostic Terminology #1 Malnutrition...     Malnutrition moderate malnutrition in context of chronic illness.   Etiology inadequate protein-energy intake in setting of altered swallow, CVA, alzheimer's disease, cardiac hx.   Signs/Symptoms physical findings of mild/moderate fat & muscle loss.     Goal/Expected Outcome pt to consume >75% of meals & supplement; not applicable (pt NPO)      Nutrition Diagnosis is [x ] ongoing  [ ] resolved  [ ] improved  [ ] not applicable       New Nutrition Diagnosis: [x ] not applicable       Interventions:   Recommend  [ ] Continue:  [ ] Change Diet To:  [ ] Nutrition Supplement:  [x ] Nutrition Support: Consider NGT feeding when medically feasible start Suplena with carb steady @25ml/hr to goal rate [id number], 1728kcal & 43gm protein  [ ] Other:     Monitoring and Evaluation:   [ ] PO intake [ x ] Tolerance to diet prescription [ x ] weights [ x ] labs[ x ] follow up per protocol  [ ] other:

## 2020-01-10 NOTE — PROGRESS NOTE ADULT - ASSESSMENT
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 Course complicated by hematemesis, acute upper GI bleed, acute blood loss anemia, acute on chronic renal insufficiency, acute respiratory failure requiring intubation. Overnight with ?acute lower GI bleed    1. NEURO  - progressively more lethargic upon transfer to ICU, intubated for airway protection  - hx of prior CVA with left weakness and noted left hand contraction with mild left hemiparesis  - daily sedation vacation    2. PULM  - intubated for airway protection  - weaning as tolerates  - supportive care for RVS PNA    3. CV  - BP stable prior to intubation  - levophed started with propofol given for sedation and transient hypotension to SBP 90s  - weaned off levophed overnight  - hypertensive today: start labetalol 10mg IVP q4 as pt NPO  - hold chemical DVT prophylaxis, no antiplatelets      4. GI  - acute upper GI bleed with hematemesis, s/p reglan 5mg IVP yesterday for nausea and for clearance of abdomen/GI motility to optimize for EGD  - s/p 2 units pRBC with improvement in Hb  - NGT to intermittent suctioning  - protonix drip  - monitor serial H/H  - hematochezia during EGD yesterday, colonoscopy done urgently  - EGD with old blood in stomach, area of fresh blood but no active oozing or bleed noted ?AVM s/p cautery  - colonoscopy: old blood noted in colon  - plan for possible repeat EGD/colonscopy  - unclear if pt with 2 areas of bleed upper and lower as duodenum clean, s/p ddavp yesterday  - appreciate surgical consult: no surgical intervention   - NPO    5. ID  - supportive care for RSV PNA  - on day 6 of antibx for UTI  - ID follow up" ? d/c antibx for UTI after 7 day course    6. RENAL  - JONY on CKD due to ischemic ATN  - with acute blood loss/volume loss will hold on further diuresis  - lee  - monitor output  - renal follow up    7. GEN  - full code    Critical Care Time: 35 min

## 2020-01-10 NOTE — PROGRESS NOTE ADULT - SUBJECTIVE AND OBJECTIVE BOX
79 years old female from [facility] rehab c/o coughing sob for three to four days. Pt is sent here to rule out chf vs pneumonia. Pt is alert and oriented to person only unable to give detail hx due to hx of Alzheimer according to the [facility] pt had sat of 91 to 92 %,	    PAST MEDICAL/SURGICAL/FAMILY/SOCIAL HISTORY:    Past Medical History:  Alzheimers disease    CVA (cerebral vascular accident)    Depression    HTN (hypertension)    MI, old.  recently Dx c hypothyroidism, TSH > 111 started Levothyroxin 50mcg qd as per Dr Vigil (2020 11:41)      Chief Complaint:  Patient is a 79y old  Female who presents with a chief complaint of sob cough (2020 22:56)      Review of Systems:    General:  No wt loss, fevers, chills, night sweats  Eyes:  Good vision, no reported pain  ENT:  No sore throat, pain, runny nose, dysphagia  CV:  No pain, palpitations, hypo/hypertension  Resp:  dyspnea, cough  GI:  No pain, nausea, vomiting, diarrhea, constipation           Social History/Family History  SOCHX:   tobacco,  -  alcohol    FMHX: FA/MO  - contributory       Discussed with:  PMD, Family    Physical Exam:    Vital Signs:  Vital Signs Last 24 Hrs  T(C): 36.2 (2020 17:06), Max: 36.9 (2020 00:18)  T(F): 97.2 (2020 17:06), Max: 98.5 (2020 00:18)  HR: 74 (2020 21:55) (68 - 87)  BP: 179/78 (2020 17:06) (132/71 - 179/78)  BP(mean): --  RR: 18 (2020 17:06) (18 - 18)  SpO2: 97% (2020 21:55) (94% - 98%)  Daily     Daily Weight in k.8 (2020 04:59)  I&O's Summary    2020 07:01  -  2020 07:00  --------------------------------------------------------  IN: 560 mL / OUT: 0 mL / NET: 560 mL          Chest:  fair air entry bilateral  Cardiovascular:  Regular rhythm, S1, S2, no murmur/rub/S3/S4, no carotid/femoral/abdominal bruit, radial/pedal pulses 2+,   Abdomen:  Soft, non-tender, non-distended, normoactive bowel sounds, no HSM        Laboratory:          138  |  104  |  69<H>  ----------------------------<  111<H>  3.8   |  25  |  3.77<H>    Ca    8.1<L>      2020 07:22          Assessment:  I am asked to assist this patient admitted with shortness of breath  The patient has multifactorial causes of shortness of breath, renal insufficiency, anemia  And now after a diagnostic echocardiogram within normal ejection fraction the reading shows mitral regurgitation and tricuspid regurgitation  After review of the studies this regurgitation is not considered severe but in the moderate to severe but more moderate range  the patient now is transferred to the intensive care unit after a noted upper GI bleed  GI noted  The patient was intubated and remains on ventilator support as well as pressor therapy

## 2020-01-10 NOTE — PROGRESS NOTE ADULT - SUBJECTIVE AND OBJECTIVE BOX
Surgery NP note  Patient seen and examined bedside   Intubated and sedated in ICU. Afebrile, no active bleeding    T(F): 97 (01-10-20 @ 05:00), Max: 99.6 (01-09-20 @ 11:10)  HR: 62 (01-10-20 @ 05:56) (50 - 88)  BP: 160/43 (01-10-20 @ 05:00) (95/33 - 232/175)  RR: 16 (01-10-20 @ 05:00) (13 - 18)  SpO2: 100% (01-10-20 @ 05:56) (90% - 100%)  Wt(kg): --  CAPILLARY BLOOD GLUCOSE      POCT Blood Glucose.: 249 mg/dL (09 Jan 2020 14:39)  POCT Blood Glucose.: 251 mg/dL (09 Jan 2020 12:35)      PHYSICAL EXAM:  General: NAD, sedated  HEENT: NC/AT, PERRL, EOMI, anicteric sclera, Orally intubated  Neck: supple; no JVD or thyromegaly  Respiratory: CTA B/L; no W/R/R, no retractions  Cardiac: +S1/S2; RRR; no M/R/G; PMI non-displaced  Gastrointestinal: soft, NT/ND; no rebound or guarding; +BS   Extremities: , no clubbing or cyanosis; no peripheral edema  Musculoskeletal:  no joint swelling, tenderness or erythema  Skin: warm, dry and intact; no rashes, wounds, or scars  Neurologic: Seated  Rectal: + melena    LABS:                        9.1    13.01 )-----------( 132      ( 10 Ousmane 2020 03:27 )             26.5     01-10    140  |  108  |  93<H>  ----------------------------<  152<H>  3.7   |  22  |  4.17<H>    Ca    7.6<L>      10 Ousmane 2020 03:27  Phos  3.1     01-09  Mg     2.2     01-10    TPro  4.6<L>  /  Alb  1.6<L>  /  TBili  0.6  /  DBili  x   /  AST  15  /  ALT  33  /  AlkPhos  59  01-10    LIVER FUNCTIONS - ( 10 Ousmane 2020 03:27 )  Alb: 1.6 g/dL / Pro: 4.6 gm/dL / ALK PHOS: 59 U/L / ALT: 33 U/L / AST: 15 U/L / GGT: x           PT/INR - ( 10 Ousmane 2020 03:27 )   PT: 12.3 sec;   INR: 1.10 ratio         PTT - ( 10 Ousmane 2020 03:27 )  PTT:22.9 sec  I&O's Detail    08 Jan 2020 07:01  -  09 Jan 2020 07:00  --------------------------------------------------------  IN:    Oral Fluid: 880 mL  Total IN: 880 mL    OUT:  Total OUT: 0 mL    Total NET: 880 mL      09 Jan 2020 07:01  -  10 Ousmane 2020 06:00  --------------------------------------------------------  IN:    dextrose 5% + sodium chloride 0.45%.: 600 mL    IV PiggyBack: 50 mL    Oral Fluid: 240 mL    Packed Red Blood Cells: 595 mL    pantoprazole Infusion: 140 mL    propofol Infusion: 128.9 mL  Total IN: 1753.9 mL    OUT:    Indwelling Catheter - Urethral: 610 mL    Nasoenteral Tube: 200 mL  Total OUT: 810 mL    Total NET: 943.9 mL      Assessment  78 yo female with GI Bleed, S/P EGD and Colonoscopy, s/p 2 units of PRBC, currently intubated and sedated on pressor support in ICU   PMH of Alzheimer's, CVA, Depression, CHF, HTN, MI (remote), recent dx of hypothyroidism     Plan:  Continue PPI drip  Serial CBCs and transfuse as needed  No Surgical intervention at tis time  Monitor for Acute bleeding  Repeat Endoscopic examination when patient stable. Surgery NP note  Patient seen and examined bedside   Intubated and sedated in ICU. Afebrile, no active bleeding    T(F): 97 (01-10-20 @ 05:00), Max: 99.6 (01-09-20 @ 11:10)  HR: 62 (01-10-20 @ 05:56) (50 - 88)  BP: 160/43 (01-10-20 @ 05:00) (95/33 - 232/175)  RR: 16 (01-10-20 @ 05:00) (13 - 18)  SpO2: 100% (01-10-20 @ 05:56) (90% - 100%)  Wt(kg): --  CAPILLARY BLOOD GLUCOSE      POCT Blood Glucose.: 249 mg/dL (09 Jan 2020 14:39)  POCT Blood Glucose.: 251 mg/dL (09 Jan 2020 12:35)      PHYSICAL EXAM:  General: NAD, sedated  HEENT: NC/AT, PERRL, EOMI, anicteric sclera, Orally intubated  Neck: supple; no JVD or thyromegaly  Respiratory: CTA B/L; no W/R/R, no retractions  Cardiac: +S1/S2; RRR; no M/R/G; PMI non-displaced  Gastrointestinal: soft, NT/ND; no rebound or guarding; +BS   Extremities: , no clubbing or cyanosis; no peripheral edema  Musculoskeletal:  no joint swelling, tenderness or erythema  Skin: warm, dry and intact; no rashes, wounds, or scars  Neurologic: Seated  Rectal: + melena    LABS:                        9.1    13.01 )-----------( 132      ( 10 Ousmane 2020 03:27 )             26.5     01-10    140  |  108  |  93<H>  ----------------------------<  152<H>  3.7   |  22  |  4.17<H>    Ca    7.6<L>      10 Ousmane 2020 03:27  Phos  3.1     01-09  Mg     2.2     01-10    TPro  4.6<L>  /  Alb  1.6<L>  /  TBili  0.6  /  DBili  x   /  AST  15  /  ALT  33  /  AlkPhos  59  01-10    LIVER FUNCTIONS - ( 10 Ousmane 2020 03:27 )  Alb: 1.6 g/dL / Pro: 4.6 gm/dL / ALK PHOS: 59 U/L / ALT: 33 U/L / AST: 15 U/L / GGT: x           PT/INR - ( 10 Ousmane 2020 03:27 )   PT: 12.3 sec;   INR: 1.10 ratio         PTT - ( 10 Ousmane 2020 03:27 )  PTT:22.9 sec  I&O's Detail    08 Jan 2020 07:01  -  09 Jan 2020 07:00  --------------------------------------------------------  IN:    Oral Fluid: 880 mL  Total IN: 880 mL    OUT:  Total OUT: 0 mL    Total NET: 880 mL      09 Jan 2020 07:01  -  10 Ousmane 2020 06:00  --------------------------------------------------------  IN:    dextrose 5% + sodium chloride 0.45%.: 600 mL    IV PiggyBack: 50 mL    Oral Fluid: 240 mL    Packed Red Blood Cells: 595 mL    pantoprazole Infusion: 140 mL    propofol Infusion: 128.9 mL  Total IN: 1753.9 mL    OUT:    Indwelling Catheter - Urethral: 610 mL    Nasoenteral Tube: 200 mL  Total OUT: 810 mL    Total NET: 943.9 mL      Assessment  78 yo female with GI Bleed, S/P EGD and Colonoscopy, s/p 2 units of PRBC, currently intubated and sedated on pressor support in ICU   PMH of Alzheimer's, CVA, Depression, CHF, HTN, MI (remote), recent dx of hypothyroidism     Plan:  Continue PPI drip  Serial CBCs and transfuse as needed  No Surgical intervention at this time  Monitor for Acute bleeding  Repeat Endoscopic examination when patient stable.

## 2020-01-10 NOTE — PROGRESS NOTE ADULT - PROBLEM SELECTOR PLAN 1
ICU #1, Intubated. S/P EGD ( presumed AVMs s/p APC ) / colonoscopy ( blood through out colon including TI ) On IV PPI continuous. 1) Add Carafate  2) continue NPO 3) F/U EGD later this week. 4) F/U CBC

## 2020-01-10 NOTE — PROGRESS NOTE ADULT - SUBJECTIVE AND OBJECTIVE BOX
Patient is a 79y old  Female who presents with a chief complaint of sob cough (10 Ousmane 2020 13:12)      HPI:  · HPI Objective Statement: 79 years old female from [facility] rehab c/o coughing sob for three to four days. Pt is sent here to rule out chf vs pneumonia. Pt is alert and oriented to person only unable to give detail hx due to hx of Alzheimer according to the [facility] pt had sat of 91 to 92 %,	    PAST MEDICAL/SURGICAL/FAMILY/SOCIAL HISTORY:    Past Medical History:  Alzheimers disease    CVA (cerebral vascular accident)    Depression    HTN (hypertension)    MI, old.  recently Dx c hypothyroidism, TSH > 111 started Levothyroxin 50mcg qd as per Dr Vigil (02 Jan 2020 11:41)      INTERVAL HPI/OVERNIGHT EVENTS: Patient seen earlier today. ICU #1, Intubated.   The nurse denies melena, hematochezia, hematemesis, nausea, vomiting, abdominal pain, constipation, diarrhea, or change in bowel movements over night    MEDICATIONS  (STANDING):  albuterol/ipratropium for Nebulization 3 milliLiter(s) Nebulizer every 4 hours  ampicillin  IVPB      ampicillin  IVPB 1 Gram(s) IV Intermittent every 12 hours  buDESOnide    Inhalation Suspension 0.5 milliGRAM(s) Inhalation every 12 hours  chlorhexidine 0.12% Liquid 15 milliLiter(s) Oral Mucosa every 12 hours  chlorhexidine 4% Liquid 1 Application(s) Topical <User Schedule>  dextrose 5% + sodium chloride 0.45%. 1000 milliLiter(s) (50 mL/Hr) IV Continuous <Continuous>  epoetin tawanna Injectable 90228 Unit(s) SubCutaneous every 7 days  levothyroxine Injectable 25 MICROGram(s) IV Push at bedtime  pantoprazole Infusion 8 mG/Hr (10 mL/Hr) IV Continuous <Continuous>  propofol Infusion 10 MICROgram(s)/kG/Min (3.678 mL/Hr) IV Continuous <Continuous>  sodium chloride 0.65% Nasal 1 Spray(s) Both Nostrils three times a day    MEDICATIONS  (PRN):      FAMILY HISTORY:  Family history of essential hypertension (Child)  Family history of stroke      Allergies    No Known Allergies    Intolerances        PMH/PSH:  Hypothyroidism  Constipation  Iron deficiency anemia  Major depressive disorder  Hyperlipidemia  GI bleed  Depression  Alzheimers disease  MI, old  CVA (cerebral vascular accident)  HTN (hypertension)  No pertinent past medical history  No significant past surgical history        REVIEW OF SYSTEMS:  Intubated  CONSTITUTIONAL: No fever, weight loss, or fatigue  EYES: No eye pain, visual disturbances, or discharge  NECK: No pain or stiffness  BREASTS: No pain, masses, or nipple discharge  RESPIRATORY: No cough, wheezing, chills or hemoptysis; No shortness of breath  CARDIOVASCULAR: No chest pain, palpitations, dizziness, or leg swelling  GASTROINTESTINAL: See above  GENITOURINARY: No dysuria, frequency, hematuria, or incontinence  NEUROLOGICAL: No headaches, memory loss, loss of strength, numbness, or tremors  SKIN: No itching, burning, rashes, or lesions   LYMPH NODES: No enlarged glands  ENDOCRINE: No heat or cold intolerance; No hair loss  MUSCULOSKELETAL: No joint pain or swelling; No muscle, back, or extremity pain  PSYCHIATRIC: No depression, anxiety, mood swings, or difficulty sleeping  HEME/LYMPH: No easy bruising, or bleeding gums  ALLERGY AND IMMUNOLOGIC: No hives or eczema    Vital Signs Last 24 Hrs  T(C): 37.4 (10 Ousmane 2020 11:05), Max: 37.4 (10 Ousmane 2020 11:05)  T(F): 99.4 (10 Ousmane 2020 11:05), Max: 99.4 (10 Ousmane 2020 11:05)  HR: 67 (10 Ousmane 2020 12:42) (50 - 78)  BP: 159/39 (10 Ousmane 2020 10:00) (94/63 - 232/175)  BP(mean): 69 (10 Ousmane 2020 10:00) (44 - 185)  RR: 16 (10 Ousmane 2020 10:00) (13 - 16)  SpO2: 100% (10 Ousmane 2020 12:42) (90% - 100%)    PHYSICAL EXAM:  GENERAL: NAD, well-groomed, well-developed  HEAD:  Atraumatic, Normocephalic  EYES: EOMI, PERRLA, conjunctiva and sclera clear  NECK: Supple, No JVD, Normal thyroid  NERVOUS SYSTEM:  Intubated  CHEST/LUNG: Clear to percussion bilaterally; No rales, rhonchi, wheezing, or rubs  HEART: Regular rate and rhythm; No murmurs, rubs, or gallops  ABDOMEN: Soft, Nontender, Nondistended; Bowel sounds present  EXTREMITIES:  2+ Peripheral Pulses, No clubbing, cyanosis, or edema  LYMPH: No lymphadenopathy noted  SKIN: No rashes or lesions    LAB                          9.1    13.01 )-----------( 132      ( 10 Ousmane 2020 03:27 )             26.5       CBC:  01-10 @ 03:27  WBC 13.01   Hgb 9.1   Hct 26.5   Plts 132  MCV 88.9  01-09 @ 22:16  WBC 14.12   Hgb 10.6   Hct 30.7   Plts 149  MCV 88.5  01-09 @ 12:58  WBC 7.23   Hgb 7.2   Hct 23.0   Plts 172  MCV 99.1  01-09 @ 08:25  WBC 7.13   Hgb 8.4   Hct 26.8   Plts 160  MCV 97.8  01-08 @ 07:29  WBC 9.46   Hgb 8.2   Hct 26.0   Plts 143  MCV 97.7  01-05 @ 08:12  WBC 7.10   Hgb 7.8   Hct 24.7   Plts 132  MCV 99.2      Chemistry:  01-10 @ 03:27  Na+ 140  K+ 3.7  Cl- 108  CO2 22  BUN 93  Cr 4.17     01-09 @ 22:16  Na+ 140  K+ 3.5  Cl- 106  CO2 25  BUN 89  Cr 4.11     01-09 @ 12:58  Na+ 139  K+ 4.0  Cl- 106  CO2 25  BUN 85  Cr 4.36     01-09 @ 08:25  Na+ 139  K+ 3.9  Cl- 106  CO2 22  BUN 81  Cr 4.27     01-06 @ 07:22  Na+ 138  K+ 3.8  Cl- 104  CO2 25  BUN 69  Cr 3.77     01-05 @ 08:12  Na+ 138  K+ 3.6  Cl- 105  CO2 24  BUN 56  Cr 3.45         Glucose, Serum: 152 mg/dL (01-10 @ 03:27)  Glucose, Serum: 150 mg/dL (01-09 @ 22:16)  Glucose, Serum: 231 mg/dL (01-09 @ 12:58)  Glucose, Serum: 157 mg/dL (01-09 @ 08:25)  Glucose, Serum: 111 mg/dL (01-06 @ 07:22)  Glucose, Serum: 161 mg/dL (01-05 @ 08:12)      10 Ousmane 2020 03:27    140    |  108    |  93     ----------------------------<  152    3.7     |  22     |  4.17   09 Jan 2020 22:16    140    |  106    |  89     ----------------------------<  150    3.5     |  25     |  4.11   09 Jan 2020 12:58    139    |  106    |  85     ----------------------------<  231    4.0     |  25     |  4.36   09 Jan 2020 08:25    139    |  106    |  81     ----------------------------<  157    3.9     |  22     |  4.27   06 Jan 2020 07:22    138    |  104    |  69     ----------------------------<  111    3.8     |  25     |  3.77   05 Jan 2020 08:12    138    |  105    |  56     ----------------------------<  161    3.6     |  24     |  3.45     Ca    7.6        10 Jan 2020 03:27  Ca    7.8        09 Jan 2020 22:16  Ca    7.7        09 Jan 2020 12:58  Ca    8.1        09 Jan 2020 08:25  Ca    8.1        06 Jan 2020 07:22  Ca    8.0        05 Jan 2020 08:12  Phos  3.1       09 Jan 2020 22:16  Mg     2.2       10 Jan 2020 03:27  Mg     2.3       09 Jan 2020 22:16    TPro  4.6    /  Alb  1.6    /  TBili  0.6    /  DBili  x      /  AST  15     /  ALT  33     /  AlkPhos  59     10 Ousmane 2020 03:27  TPro  5.2    /  Alb  1.8    /  TBili  0.7    /  DBili  x      /  AST  21     /  ALT  42     /  AlkPhos  70     09 Jan 2020 22:16      PT/INR - ( 10 Ousmane 2020 03:27 )   PT: 12.3 sec;   INR: 1.10 ratio         PTT - ( 10 Ousmane 2020 03:27 )  PTT:22.9 sec        CAPILLARY BLOOD GLUCOSE      POCT Blood Glucose.: 249 mg/dL (09 Jan 2020 14:39)      C-Reactive Protein, Serum: 2.28 mg/dL (01-09 @ 11:35)      RADIOLOGY & ADDITIONAL TESTS:    Imaging Personally Reviewed:  [ ] YES  [ ] NO    Consultant(s) Notes Reviewed:  [ ] YES  [ ] NO    Care Discussed with Consultants/Other Providers [ ] YES  [ ] NO

## 2020-01-10 NOTE — PROGRESS NOTE ADULT - SUBJECTIVE AND OBJECTIVE BOX
Events Noted, in ICU    Vital Signs Last 24 Hrs  T(C): 36.2 (10 Ousmane 2020 06:00), Max: 37.6 (09 Jan 2020 11:10)  T(F): 97.2 (10 Ousamne 2020 06:00), Max: 99.6 (09 Jan 2020 11:10)  HR: 57 (10 Ousmane 2020 08:30) (50 - 83)  BP: 171/42 (10 Ousmane 2020 08:30) (94/63 - 232/175)  BP(mean): 73 (10 Ousmane 2020 08:30) (44 - 185)  RR: 16 (10 Ousmane 2020 08:30) (13 - 18)  SpO2: 100% (10 Ousmane 2020 08:30) (90% - 100%)    PHYSICAL EXAM:    general - intubated on ventilator  HEENT - ETT +  CVS - RRR  RS - AE B/L  Abd - soft, NT  Ext - Pulses +        LABS:                        9.1    13.01 )-----------( 132      ( 10 Ousmane 2020 03:27 )             26.5     01-10    140  |  108  |  93<H>  ----------------------------<  152<H>  3.7   |  22  |  4.17<H>    Ca    7.6<L>      10 Ousmane 2020 03:27  Phos  3.1     01-09  Mg     2.2     01-10    TPro  4.6<L>  /  Alb  1.6<L>  /  TBili  0.6  /  DBili  x   /  AST  15  /  ALT  33  /  AlkPhos  59  01-10    PT/INR - ( 10 Ousmane 2020 03:27 )   PT: 12.3 sec;   INR: 1.10 ratio         PTT - ( 10 Oumsane 2020 03:27 )  PTT:22.9 sec      Culture - Urine (collected 01 Jan 2020 17:13)  Source: .Urine Catheterized  Final Report (03 Jan 2020 20:37):    50,000 - 99,000 CFU/mL Enterococcus faecalis (vancomycin resistant)  Organism: Observation (03 Jan 2020 20:37)  Organism: Observation (03 Jan 2020 20:37)    Culture - Blood (collected 01 Jan 2020 17:11)  Source: .Blood Blood-Peripheral  Final Report (06 Jan 2020 18:00):    No growth at 5 days.    Culture - Blood (collected 01 Jan 2020 17:11)  Source: .Blood Blood-Peripheral  Final Report (06 Jan 2020 18:00):    No growth at 5 days.        RADIOLOGY & ADDITIONAL STUDIES:

## 2020-01-11 DIAGNOSIS — J91.8 PLEURAL EFFUSION IN OTHER CONDITIONS CLASSIFIED ELSEWHERE: ICD-10-CM

## 2020-01-11 LAB
ANION GAP SERPL CALC-SCNC: 11 MMOL/L — SIGNIFICANT CHANGE UP (ref 5–17)
BUN SERPL-MCNC: 79 MG/DL — HIGH (ref 7–23)
CALCIUM SERPL-MCNC: 6.8 MG/DL — LOW (ref 8.5–10.1)
CHLORIDE SERPL-SCNC: 105 MMOL/L — SIGNIFICANT CHANGE UP (ref 96–108)
CO2 SERPL-SCNC: 21 MMOL/L — LOW (ref 22–31)
CREAT SERPL-MCNC: 3.87 MG/DL — HIGH (ref 0.5–1.3)
GLUCOSE SERPL-MCNC: 384 MG/DL — HIGH (ref 70–99)
HCT VFR BLD CALC: 22.9 % — LOW (ref 34.5–45)
HCT VFR BLD CALC: 35.4 % — SIGNIFICANT CHANGE UP (ref 34.5–45)
HGB BLD-MCNC: 12.1 G/DL — SIGNIFICANT CHANGE UP (ref 11.5–15.5)
HGB BLD-MCNC: 7.7 G/DL — LOW (ref 11.5–15.5)
IGA FLD-MCNC: 262 MG/DL — SIGNIFICANT CHANGE UP (ref 84–499)
IGG FLD-MCNC: 662 MG/DL — SIGNIFICANT CHANGE UP (ref 610–1660)
IGM SERPL-MCNC: 21 MG/DL — LOW (ref 35–242)
KAPPA LC SER QL IFE: 11.5 MG/DL — HIGH (ref 0.33–1.94)
KAPPA/LAMBDA FREE LIGHT CHAIN RATIO, SERUM: 1.66 RATIO — HIGH (ref 0.26–1.65)
LAMBDA LC SER QL IFE: 6.94 MG/DL — HIGH (ref 0.57–2.63)
MAGNESIUM SERPL-MCNC: 1.9 MG/DL — SIGNIFICANT CHANGE UP (ref 1.6–2.6)
MCHC RBC-ENTMCNC: 30.2 PG — SIGNIFICANT CHANGE UP (ref 27–34)
MCHC RBC-ENTMCNC: 30.6 PG — SIGNIFICANT CHANGE UP (ref 27–34)
MCHC RBC-ENTMCNC: 33.6 GM/DL — SIGNIFICANT CHANGE UP (ref 32–36)
MCHC RBC-ENTMCNC: 34.2 GM/DL — SIGNIFICANT CHANGE UP (ref 32–36)
MCV RBC AUTO: 89.6 FL — SIGNIFICANT CHANGE UP (ref 80–100)
MCV RBC AUTO: 89.8 FL — SIGNIFICANT CHANGE UP (ref 80–100)
NRBC # BLD: 0 /100 WBCS — SIGNIFICANT CHANGE UP (ref 0–0)
NRBC # BLD: 0 /100 WBCS — SIGNIFICANT CHANGE UP (ref 0–0)
PHOSPHATE SERPL-MCNC: 2.8 MG/DL — SIGNIFICANT CHANGE UP (ref 2.5–4.5)
PLATELET # BLD AUTO: 129 K/UL — LOW (ref 150–400)
PLATELET # BLD AUTO: 181 K/UL — SIGNIFICANT CHANGE UP (ref 150–400)
POTASSIUM SERPL-MCNC: 2.9 MMOL/L — CRITICAL LOW (ref 3.5–5.3)
POTASSIUM SERPL-SCNC: 2.9 MMOL/L — CRITICAL LOW (ref 3.5–5.3)
PROT SERPL-MCNC: 3.7 G/DL — LOW (ref 6–8.3)
PROT SERPL-MCNC: 3.7 G/DL — LOW (ref 6–8.3)
RBC # BLD: 2.55 M/UL — LOW (ref 3.8–5.2)
RBC # BLD: 3.95 M/UL — SIGNIFICANT CHANGE UP (ref 3.8–5.2)
RBC # FLD: 16.7 % — HIGH (ref 10.3–14.5)
RBC # FLD: 16.8 % — HIGH (ref 10.3–14.5)
SODIUM SERPL-SCNC: 137 MMOL/L — SIGNIFICANT CHANGE UP (ref 135–145)
WBC # BLD: 11.89 K/UL — HIGH (ref 3.8–10.5)
WBC # BLD: 16.89 K/UL — HIGH (ref 3.8–10.5)
WBC # FLD AUTO: 11.89 K/UL — HIGH (ref 3.8–10.5)
WBC # FLD AUTO: 16.89 K/UL — HIGH (ref 3.8–10.5)

## 2020-01-11 PROCEDURE — 99231 SBSQ HOSP IP/OBS SF/LOW 25: CPT

## 2020-01-11 PROCEDURE — 99291 CRITICAL CARE FIRST HOUR: CPT

## 2020-01-11 RX ORDER — FUROSEMIDE 40 MG
40 TABLET ORAL ONCE
Refills: 0 | Status: COMPLETED | OUTPATIENT
Start: 2020-01-11 | End: 2020-01-11

## 2020-01-11 RX ORDER — LABETALOL HCL 100 MG
10 TABLET ORAL EVERY 6 HOURS
Refills: 0 | Status: DISCONTINUED | OUTPATIENT
Start: 2020-01-11 | End: 2020-01-13

## 2020-01-11 RX ORDER — POTASSIUM CHLORIDE 20 MEQ
20 PACKET (EA) ORAL ONCE
Refills: 0 | Status: COMPLETED | OUTPATIENT
Start: 2020-01-11 | End: 2020-01-11

## 2020-01-11 RX ORDER — PANTOPRAZOLE SODIUM 20 MG/1
40 TABLET, DELAYED RELEASE ORAL EVERY 12 HOURS
Refills: 0 | Status: DISCONTINUED | OUTPATIENT
Start: 2020-01-11 | End: 2020-01-18

## 2020-01-11 RX ORDER — HYDRALAZINE HCL 50 MG
50 TABLET ORAL EVERY 8 HOURS
Refills: 0 | Status: DISCONTINUED | OUTPATIENT
Start: 2020-01-11 | End: 2020-01-17

## 2020-01-11 RX ORDER — HYDRALAZINE HCL 50 MG
10 TABLET ORAL ONCE
Refills: 0 | Status: COMPLETED | OUTPATIENT
Start: 2020-01-11 | End: 2020-01-11

## 2020-01-11 RX ORDER — POTASSIUM CHLORIDE 20 MEQ
10 PACKET (EA) ORAL ONCE
Refills: 0 | Status: COMPLETED | OUTPATIENT
Start: 2020-01-11 | End: 2020-01-11

## 2020-01-11 RX ADMIN — Medication 1 SPRAY(S): at 06:29

## 2020-01-11 RX ADMIN — Medication 0.1 MILLIGRAM(S): at 15:34

## 2020-01-11 RX ADMIN — Medication 0.1 MILLIGRAM(S): at 18:06

## 2020-01-11 RX ADMIN — Medication 3 MILLILITER(S): at 01:47

## 2020-01-11 RX ADMIN — Medication 10 MILLIGRAM(S): at 02:36

## 2020-01-11 RX ADMIN — Medication 0.5 MILLIGRAM(S): at 05:41

## 2020-01-11 RX ADMIN — Medication 108 GRAM(S): at 17:52

## 2020-01-11 RX ADMIN — CHLORHEXIDINE GLUCONATE 15 MILLILITER(S): 213 SOLUTION TOPICAL at 06:27

## 2020-01-11 RX ADMIN — CHLORHEXIDINE GLUCONATE 1 APPLICATION(S): 213 SOLUTION TOPICAL at 06:28

## 2020-01-11 RX ADMIN — Medication 50 MILLIGRAM(S): at 13:04

## 2020-01-11 RX ADMIN — Medication 3 MILLILITER(S): at 05:41

## 2020-01-11 RX ADMIN — Medication 20 MILLIEQUIVALENT(S): at 11:18

## 2020-01-11 RX ADMIN — Medication 3 MILLILITER(S): at 17:32

## 2020-01-11 RX ADMIN — Medication 10 MILLIGRAM(S): at 11:18

## 2020-01-11 RX ADMIN — Medication 0.5 MILLIGRAM(S): at 17:32

## 2020-01-11 RX ADMIN — PROPOFOL 3.68 MICROGRAM(S)/KG/MIN: 10 INJECTION, EMULSION INTRAVENOUS at 17:52

## 2020-01-11 RX ADMIN — CHLORHEXIDINE GLUCONATE 15 MILLILITER(S): 213 SOLUTION TOPICAL at 17:53

## 2020-01-11 RX ADMIN — Medication 25 MICROGRAM(S): at 22:16

## 2020-01-11 RX ADMIN — Medication 40 MILLIGRAM(S): at 11:19

## 2020-01-11 RX ADMIN — Medication 3 MILLILITER(S): at 09:14

## 2020-01-11 RX ADMIN — Medication 10 MILLIGRAM(S): at 06:29

## 2020-01-11 RX ADMIN — Medication 100 MILLIEQUIVALENT(S): at 06:26

## 2020-01-11 RX ADMIN — Medication 3 MILLILITER(S): at 21:23

## 2020-01-11 RX ADMIN — Medication 1 SPRAY(S): at 13:04

## 2020-01-11 RX ADMIN — Medication 3 MILLILITER(S): at 13:03

## 2020-01-11 RX ADMIN — Medication 108 GRAM(S): at 06:27

## 2020-01-11 RX ADMIN — Medication 1 SPRAY(S): at 22:15

## 2020-01-11 RX ADMIN — PANTOPRAZOLE SODIUM 40 MILLIGRAM(S): 20 TABLET, DELAYED RELEASE ORAL at 20:40

## 2020-01-11 RX ADMIN — Medication 50 MILLIGRAM(S): at 20:41

## 2020-01-11 NOTE — PROGRESS NOTE ADULT - SUBJECTIVE AND OBJECTIVE BOX
lying in bed, intubated in ICU    Vital Signs Last 24 Hrs  T(C): 36.1 (11 Jan 2020 09:00), Max: 37.4 (10 Ousmane 2020 11:05)  T(F): 97 (11 Jan 2020 09:00), Max: 99.4 (10 Ousmane 2020 11:05)  HR: 63 (11 Jan 2020 09:16) (54 - 77)  BP: 191/67 (11 Jan 2020 09:00) (90/53 - 201/50)  BP(mean): 98 (11 Jan 2020 09:00) (62 - 169)  RR: 16 (11 Jan 2020 09:00) (14 - 22)  SpO2: 100% (11 Jan 2020 09:16) (98% - 100%)    PHYSICAL EXAM:    general - intubated on ventilator  HEENT - ETT  CVS - RRR  RS - AE B/L  Abd - soft, NT  Ext - Pulses +        LABS:                        7.7    11.89 )-----------( 129      ( 11 Jan 2020 03:06 )             22.9     01-11    137  |  105  |  79<H>  ----------------------------<  384<H>  2.9<LL>   |  21<L>  |  3.87<H>    Ca    6.8<L>      11 Jan 2020 03:06  Phos  2.8     01-11  Mg     1.9     01-11    TPro  4.6<L>  /  Alb  1.6<L>  /  TBili  0.6  /  DBili  x   /  AST  15  /  ALT  33  /  AlkPhos  59  01-10    PT/INR - ( 10 Ousmane 2020 03:27 )   PT: 12.3 sec;   INR: 1.10 ratio         PTT - ( 10 Ousmane 2020 03:27 )  PTT:22.9 sec      Culture - Urine (collected 01 Jan 2020 17:13)  Source: .Urine Catheterized  Final Report (03 Jan 2020 20:37):    50,000 - 99,000 CFU/mL Enterococcus faecalis (vancomycin resistant)  Organism: Observation (03 Jan 2020 20:37)  Organism: Observation (03 Jan 2020 20:37)    Culture - Blood (collected 01 Jan 2020 17:11)  Source: .Blood Blood-Peripheral  Final Report (06 Jan 2020 18:00):    No growth at 5 days.    Culture - Blood (collected 01 Jan 2020 17:11)  Source: .Blood Blood-Peripheral  Final Report (06 Jan 2020 18:00):    No growth at 5 days.        RADIOLOGY & ADDITIONAL STUDIES:

## 2020-01-11 NOTE — PROGRESS NOTE ADULT - SUBJECTIVE AND OBJECTIVE BOX
Patient is a 79y old  Female who presents with a chief complaint of sob cough (11 Jan 2020 07:38)      INTERVAL HPI/OVERNIGHT EVENTS:  ICU on Ventilator, just completed blood transfusion, Diprivan < dose, opening eyes  MEDICATIONS  (STANDING):  albuterol/ipratropium for Nebulization 3 milliLiter(s) Nebulizer every 4 hours  ampicillin  IVPB      ampicillin  IVPB 1 Gram(s) IV Intermittent every 12 hours  buDESOnide    Inhalation Suspension 0.5 milliGRAM(s) Inhalation every 12 hours  chlorhexidine 0.12% Liquid 15 milliLiter(s) Oral Mucosa every 12 hours  chlorhexidine 4% Liquid 1 Application(s) Topical <User Schedule>  dextrose 5% + sodium chloride 0.45%. 1000 milliLiter(s) (50 mL/Hr) IV Continuous <Continuous>  epoetin tawanna Injectable 64614 Unit(s) SubCutaneous every 7 days  labetalol Injectable 10 milliGRAM(s) IV Push every 4 hours  levothyroxine Injectable 25 MICROGram(s) IV Push at bedtime  pantoprazole Infusion 8 mG/Hr (10 mL/Hr) IV Continuous <Continuous>  propofol Infusion 10 MICROgram(s)/kG/Min (3.678 mL/Hr) IV Continuous <Continuous>  sodium chloride 0.65% Nasal 1 Spray(s) Both Nostrils three times a day    MEDICATIONS  (PRN):      Allergies    No Known Allergies    Intolerances        REVIEW OF SYSTEMS:                Vital Signs Last 24 Hrs  T(C): 36.1 (11 Jan 2020 09:00), Max: 37.4 (10 Ousmane 2020 11:05)  T(F): 97 (11 Jan 2020 09:00), Max: 99.4 (10 Ousmane 2020 11:05)  HR: 63 (11 Jan 2020 09:16) (54 - 77)  BP: 191/67 (11 Jan 2020 09:00) (90/53 - 201/50)  BP(mean): 98 (11 Jan 2020 09:00) (62 - 169)  RR: 16 (11 Jan 2020 09:00) (14 - 22)  SpO2: 100% (11 Jan 2020 09:16) (98% - 100%)    PHYSICAL EXAM:  general       HEENT wnl  CHEST/LUNG: rhonchi b/l but <  HEART: Regular rate and rhythm; No murmurs, rubs, or gallops  ABDOMEN: Soft, Nontender, Nondistended; Bowel sounds present  EXTREMITIES:  2+ Peripheral Pulses, No clubbing, cyanosis, or edema  LYMPH: No lymphadenopathy noted  SKIN: No rashes or lesions    LABS:                        7.7    11.89 )-----------( 129      ( 11 Jan 2020 03:06 )             22.9     01-11    137  |  105  |  79<H>  ----------------------------<  384<H>  2.9<LL>   |  21<L>  |  3.87<H>    Ca    6.8<L>      11 Jan 2020 03:06  Phos  2.8     01-11  Mg     1.9     01-11    TPro  4.6<L>  /  Alb  1.6<L>  /  TBili  0.6  /  DBili  x   /  AST  15  /  ALT  33  /  AlkPhos  59  01-10    PT/INR - ( 10 Ousmane 2020 03:27 )   PT: 12.3 sec;   INR: 1.10 ratio         PTT - ( 10 Ousmane 2020 03:27 )  PTT:22.9 sec    CAPILLARY BLOOD GLUCOSE        ABG - ( 09 Jan 2020 22:08 )  pH, Arterial: x     pH, Blood: 7.48  /  pCO2: 29    /  pO2: 205   / HCO3: 22    / Base Excess: -0.7  /  SaO2: 100               CARDIAC MARKERS ( 10 Ousmane 2020 03:27 )  .085 ng/mL / x     / 47 U/L / x     / 1.7 ng/mL  CARDIAC MARKERS ( 09 Jan 2020 22:16 )  .094 ng/mL / x     / 41 U/L / x     / 1.6 ng/mL  CARDIAC MARKERS ( 09 Jan 2020 12:58 )  .080 ng/mL / x     / 19 U/L / x     / <1.0 ng/mL            RADIOLOGY & ADDITIONAL TESTS:    Imaging Personally Reviewed:  [y ] YES  [ ] NO    Consultant(s) Notes Reviewed:  [y ] YES  [ ] NO    Care Discussed with Consultants/Other Providers [x ] YES  [ ] NO Dr sue    PROBLEMS:  CONGESTIVE HEART FAILURE;RSV INFECTION  CONGESTIVE BABB FAILURE;RSV INFECTION  SHORTNESS OF BREATH  CHF (congestive heart failure)  Gastrointestinal hemorrhage, unspecified gastrointestinal hemorrhage type  Goals of care, counseling/discussion  HTN (hypertension)  Hyperlipidemia  Alzheimers disease  MI, old  Hypothyroidism  RSV infection  CHF (congestive heart failure)  GI bleed  Anemia  UTI VRE  Accelerated HTN  > troponin  hypokaliemia    Care discussed with family,         [  x]   yes  [  ]  No Daughter Katelyn    imp:    stable[ ]    unstable[  ]     improving [   ]       unchanged  [x  ]                Plans:  Continue present plans  [x  ] f/u h/H  K supplement;   agree c Labetalol  consider extubation if h/H stable

## 2020-01-11 NOTE — PROGRESS NOTE ADULT - SUBJECTIVE AND OBJECTIVE BOX
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 Pt presents from [facility] rehab for SOB, cough x3-4 days. Admitted to medical service for PNA, CHF exacerbation. Pt had + RVP for RSV and urine culture with VRE. Acute on chronic renal insufficiency. Transferred to ICU s/p multiple episodes of hematemesis with NGT insertion evacuating 700-800 cc of blood from stomach. Upon arrival to ICU pt noted to become more lethargic, intubated for airway protection and for GI procedure. DX: acute blood loss anemia, upper GI bleed, acute on chronic renal insufficiency, RSV PNA, VRE UTI, acute respiratory failure requiring intubation      24 hr events:  minimal coffee ground noted in tubing draining NGT  NGT flushed without bloody return  1 BM with melena  s/p 1 unit pRBC today for Hb 7.7  failed weaning: became agitated, pink frothy sputum from ETT, tachypneic with accessory muscle use    ## ROS:  [x] unable to obtain due to intubation/sedation      ## Labs:             12.1   16.89 )-----------( 181      ( 11 Jan 2020 16:58 )             35.4     01-11    137          |  105  |  79<H>  -------------------------------------<  384<H>  2.9<LL>   |  21<L>  |  3.87<H>    Ca    6.8<L>      11 Jan 2020 03:06  Phos  2.8     01-11  Mg     1.9     01-11    TPro  3.7<L>  /  Alb  x   /  TBili  x   /  AST  x   /  ALT  x   /  AlkPhos  x   01-11      PT/INR - ( 10 Ousmane 2020 03:27 )   PT: 12.3 sec;   INR: 1.10 ratio    PTT - ( 10 Ousmane 2020 03:27 )  PTT:22.9 sec      ## Medications:      ampicillin  IVPB 1 Gram(s) IV Intermittent every 12 hours    hydrALAZINE 50 milliGRAM(s) Oral every 8 hours  labetalol Injectable 10 milliGRAM(s) IV Push every 6 hours PRN    albuterol/ipratropium for Nebulization 3 milliLiter(s) Nebulizer every 4 hours  buDESOnide    Inhalation Suspension 0.5 milliGRAM(s) Inhalation every 12 hours    levothyroxine Injectable 25 MICROGram(s) IV Push at bedtime      pantoprazole  Injectable 40 milliGRAM(s) IV Push every 12 hours    propofol Infusion 10 MICROgram(s)/kG/Min IV Continuous <Continuous>      ## Vitals:  T(C): 36.1 (11 Jan 2020 09:00), Max: 36.8 (10 Ousmane 2020 23:30)  T(F): 97 (11 Jan 2020 09:00), Max: 98.2 (10 Ousmane 2020 23:30)  HR: 63 (11 Jan 2020 19:30) (54 - 96)  BP: 134/42 (11 Jan 2020 19:30) (90/53 - 232/77)  BP(mean): 65 (11 Jan 2020 19:30) (54 - 126)  RR: 16 (11 Jan 2020 19:30) (9 - 26)  SpO2: 100% (11 Jan 2020 19:30) (98% - 100%)    Vent: Mode: AC/ CMV (Assist Control/ Continuous Mandatory Ventilation), RR (machine): 16, RR (patient): 16, TV (machine): 400, FiO2: 40, PEEP: 5, PIP: 34         01-10 @ 07:01  -  01-11 @ 07:00  --------------------------------------------------------  IN: 1836.7 mL / OUT: 430 mL / NET: 1406.7 mL              ## P/E:  Gen: lying comfortably in bed in no apparent distress, orally intubated  HEENT: ETT and NGT in place  Resp: bilateral coarse breath sounds, no wheeze  CVS: RRR  Abd: soft NT/ND +BS  Ext: bilateral hand edema, no cyanosis  Neuro: arousable on light sedation, follows simple commands, left side weaker (baseline from CVA)    CENTRAL LINE: [ ] YES [x] NO  LOCATION:   DATE INSERTED:  REMOVE: [ ] YES [ ] NO      WINTERS: [ ] YES [x] NO    DATE INSERTED:  REMOVE:  [ ] YES [ ] NO      A-LINE:  [ ] YES [x] NO  LOCATION:   DATE INSERTED:  REMOVE:  [ ] YES [ ] NO  EXPLAIN:    GLOBAL ISSUE/BEST PRACTICE:  Analgesia: n/a  Sedation: propofol  HOB elevation: yes  Stress ulcer prophylaxis: protonix  VTE prophylaxis: bilateral compression devices  Oral Care: chlorhexidine   Glycemic control: n/a  Nutrition: start ensure clears through NGT    CODE STATUS: [x] full code  [ ] DNR  [ ] DNI  [ ] MOLST  Goals of care discussion: [x] yes

## 2020-01-11 NOTE — PROGRESS NOTE ADULT - SUBJECTIVE AND OBJECTIVE BOX
TMAX - 96.1 - 98.7    On day # 7 Ampi now    Vital Signs Last 24 Hrs  T(C): 36.1 (11 Jan 2020 09:00), Max: 37.1 (10 Ousmane 2020 19:15)  T(F): 97 (11 Jan 2020 09:00), Max: 98.7 (10 Ousmane 2020 19:15)  HR: 79 (11 Jan 2020 16:39) (54 - 96)  BP: 190/56 (11 Jan 2020 16:00) (90/53 - 232/77)  BP(mean): 91 (11 Jan 2020 16:00) (62 - 126)  RR: 15 (11 Jan 2020 16:00) (9 - 26)  SpO2: 99% (11 Jan 2020 16:39) (98% - 100%)  Mode: AC/ CMV (Assist Control/ Continuous Mandatory Ventilation)  RR (machine): 16  TV (machine): 400  FiO2: 40  PEEP: 5  ITime: 1  MAP: 10.5  PIP: 32  Supplemental O2:  on the Ventilator      In ICU now s/p RRT for GI Bleeding and intubated for airway protection.  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  On 40% FIO2 and + 5 PEEP now with O2 Sat around 99% and reportedly suctioned for small amount of frothy pink-tinged secretions.      PHYSICAL EXAM  General:  sedated but arousable now on on the ventilator on 40% FIO2 + 5 PEEP, appears comfortable at present  HEENT:  conj pink, sclerae anicteric, PERRLA, orally intubated and with NGT in place with feedings in progress notw  Neck:  semi-supple, no nodes noted  Heart: RR   Lungs:  scattered moist rhonchi bilaterally with Rt. > Lt now  Abdomen: soft, BS+, appears nontender to palpation   Extremities:  LUE with some swelling                     trace edema LE's  Skin: warm, dry, no rash noted   Stevenson in place with small amount of dark urine in the bag now        I&O's Summary :    10 Ousmane 2020 07:01  -  11 Jan 2020 07:00  --------------------------------------------------------  IN: 1836.7 mL / OUT: 430 mL / NET: 1406.7 mL    11 Jan 2020 07:01  -  11 Jan 2020 17:00  --------------------------------------------------------  IN: 401.9 mL / OUT: 305 mL / NET: 96.9 mL        LABS:  CBC Full  -  ( 11 Jan 2020 03:06 )  WBC Count : 11.89 K/uL  RBC Count : 2.55 M/uL  Hemoglobin : 7.7 g/dL  Hematocrit : 22.9 %  Platelet Count - Automated : 129 K/uL  Mean Cell Volume : 89.8 fl  Mean Cell Hemoglobin : 30.2 pg  Mean Cell Hemoglobin Concentration : 33.6 gm/dL  Auto Neutrophil # : x  Auto Lymphocyte # : x  Auto Monocyte # : x  Auto Eosinophil # : x  Auto Basophil # : x  Auto Neutrophil % : x  Auto Lymphocyte % : x  Auto Monocyte % : x  Auto Eosinophil % : x  Auto Basophil % : x    01-11    137  |  105  |  79<H>  ----------------------------<  384<H>  2.9<LL>   |  21<L>  |  3.87<H>    Ca    6.8<L>      11 Jan 2020 03:06  Phos  2.8     01-11  Mg     1.9     01-11    TPro  3.7<L>  /  Alb  x   /  TBili  x   /  DBili  x   /  AST  x   /  ALT  x   /  AlkPhos  x   01-11    LIVER FUNCTIONS - ( 11 Jan 2020 12:04 )  Alb: x     / Pro: 3.7 g/dL / ALK PHOS: x     / ALT: x     / AST: x     / GGT: x             PT/INR - ( 10 Ousmane 2020 03:27 )   PT: 12.3 sec;   INR: 1.10 ratio       PTT - ( 10 Ousmane 2020 03:27 )  PTT:22.9 sec    ABG - ( 09 Jan 2020 22:08 )  pH, Arterial: x     pH, Blood: 7.48  /  pCO2: 29    /  pO2: 205   / HCO3: 22    / Base Excess: -0.7  /  SaO2: 100           CARDIAC MARKERS ( 10 Ousmane 2020 03:27 )  .085 ng/mL / x     / 47 U/L / x     / 1.7 ng/mL    CARDIAC MARKERS ( 09 Jan 2020 22:16 )  .094 ng/mL / x     / 41 U/L / x     / 1.6 ng/mL        Sedimentation Rate, Erythrocyte: 49 mm/hr (01-09 @ 08:25)        MICROBIOLOGY:    Culture - Urine (01.01.20 @ 17:13)    -  Ciprofloxacin: R >2    -  Ampicillin: S <=2 Predicts results to ampicillin/sulbactam, amoxacillin-clavulanate and  piperacillin-tazobactam.    -  Tetra/Doxy: R >8    -  Vancomycin: R >16    -  Nitrofurantoin: S <=32 Should not be used to treat pyelonephritis.    -  Linezolid: S 2    -  Daptomycin: S 1    -  Levofloxacin: R >4    Specimen Source: .Urine Catheterized    Culture Results:   50,000 - 99,000 CFU/mL Enterococcus faecalis (vancomycin resistant)    Organism Identification: Observation    Organism: Observation    Method Type: RONALD      Culture - Blood (01.01.20 @ 17:11)    Specimen Source: .Blood Blood-Peripheral    Culture Results:   No growth at 5 days.    Culture - Blood (01.01.20 @ 17:11)    Specimen Source: .Blood Blood-Peripheral    Culture Results:   No growth at 5 days.    FLU A B RSV Detection by PCR (01.01.20 @ 14:04)    Flu A Result: NotDete: The Flu A B RSV assay is a Real-Time PCR test for the qualitative  detection and differentiation of Influenza A, Influenza B, and  Respiratory Syncytial Virus on nasopharyngeal swabs. The results should  be interpreted in the context of all clinical and laboratory findings.    Flu B Result: NotDete    RSV Result: Detected          Radiology:  CXR - < from: Xray Chest 1 View- PORTABLE-Urgent (01.10.20 @ 14:45) >    EXAM:  XR CHEST PORTABLE URGENT 1V                          PROCEDURE DATE:  01/10/2020      INTERPRETATION:  NG tube placement.    AP chest. Prior 1/9/2020.    Endotracheal tube nasogastric tube are in satisfactory position. No change heart mediastinum. No gross change bilateral airspace disease and small hazy basilar effusions. No pneumothorax.    Impression: As above          Impression:  Remains afebrile on present ab rx with Ampi for continued rx of Sepsis due to RSV with VRE UTI and now with Respiratory Failure s/p RRT due to UGI Bleed secondary to an AVM found on Endoscopy.        Suggestions:  Will d/c Ampicillin rx after tonight's dose as today will complete 7 days of rx for her VRE UTI and then observe.  Continue to follow temps and labs and Re- Cx for any increase temp, increase WBC's, or increased Congestion.  Discussed with patient's daughter at bedside in detail.  Maintain Contact Isolation for RSV.

## 2020-01-11 NOTE — PROGRESS NOTE ADULT - PROBLEM SELECTOR PLAN 1
ICU #1, Intubated. 8.2 --> 7.7,  S/P 1 unit PRBC  S/P EGD ( presumed AVMs s/p APC ) / colonoscopy ( blood through out colon including TI ) On IV PPI continuous. 1) Add Carafate  2) clear liquids 3) F/U EGD later this week. 4) F/U CBC

## 2020-01-11 NOTE — PROGRESS NOTE ADULT - ASSESSMENT
HPI:  · HPI Objective Statement: 79 years old female from [facility] rehab c/o coughing sob for three to four days. Pt is sent here to rule out chf vs pneumonia. Pt is alert and oriented to person only unable to give detail hx due to hx of Alzheimer according to the [facility] pt had sat of 91 to 92 %,	  ---------------- As Above ---------------------------------------------  Patient is a poor historian. Patient admitted with Bronchitis and UTI. Called for hematemsis. Patient had three episodes of bloody vomitus. ( 1/3 of a cup ) . Patient complaining of mid epigastric / chest pain but patient can not characterize the pain.   Patient has chronic anemia. but HGB fell 8.4 --> 7.2. Was on Carafate, now on IV PPI  KUB pending  HX of CRI    Addendum : Patient had another episode of bloody vomitus    Upper GI Bleed - Secondary to (?). 1) NPO  2)Check KUB  3) IV PPI 4) telemetry  5) NGT 6) EGD 7) Hold Carafate  8) Hold iron  ---Will speak with daughter.

## 2020-01-11 NOTE — PROGRESS NOTE ADULT - SUBJECTIVE AND OBJECTIVE BOX
Pt seen and examined at bedside. Intubated/sedated.  No acute events overnight per RN. Receiving 1unit prbc for low H/H this AM.    T(F): 97.9 (01-11-20 @ 05:20), Max: 99.4 (01-10-20 @ 11:05)  HR: 69 (01-11-20 @ 06:45) (54 - 77)  BP: 180/74 (01-11-20 @ 06:30) (90/53 - 201/50)  RR: 15 (01-11-20 @ 06:45) (14 - 18)  SpO2: 100% (01-11-20 @ 06:45) (98% - 100%)  Wt(kg): --  CAPILLARY BLOOD GLUCOSE      PHYSICAL EXAM:  General: NAD, intubated, sedated on propofol  CV: +S1+S2 RRR  Lung: clear to auscultation bilaterally, respirations nonlabored   Abdomen: non-distended, soft, nontender to palpation  Extremities: 1+ pitting edema in all extremities      LABS:                        7.7    11.89 )-----------( 129      ( 11 Jan 2020 03:06 )             22.9     01-11    137  |  105  |  79<H>  ----------------------------<  384<H>  2.9<LL>   |  21<L>  |  3.87<H>    Ca    6.8<L>      11 Jan 2020 03:06  Phos  2.8     01-11  Mg     1.9     01-11    TPro  4.6<L>  /  Alb  1.6<L>  /  TBili  0.6  /  DBili  x   /  AST  15  /  ALT  33  /  AlkPhos  59  01-10    PT/INR - ( 10 Ousmane 2020 03:27 )   PT: 12.3 sec;   INR: 1.10 ratio         PTT - ( 10 Ousmane 2020 03:27 )  PTT:22.9 sec    I&O:         Impression: 78 yo female with GI Bleed, S/P EGD and Colonoscopy, s/p 2 units of PRBC, currently intubated and sedated   PMH of Alzheimer's, CVA, Depression, CHF, HTN, MI (remote), recent dx of hypothyroidism   no active bleeding     Plan:  Continue PPI drip  Serial CBCs and transfuse as needed, 1 unit prbc running currently   No Surgical intervention at this time  Monitor for Acute bleeding  repeat EGD planned for next week per GI   medical management as per ICU team       will d/w attending

## 2020-01-11 NOTE — PROGRESS NOTE ADULT - SUBJECTIVE AND OBJECTIVE BOX
79 years old female from [facility] rehab c/o coughing sob for three to four days. Pt is sent here to rule out chf vs pneumonia. Pt is alert and oriented to person only unable to give detail hx due to hx of Alzheimer according to the [facility] pt had sat of 91 to 92 %,	    PAST MEDICAL/SURGICAL/FAMILY/SOCIAL HISTORY:    Past Medical History:  Alzheimers disease    CVA (cerebral vascular accident)    Depression    HTN (hypertension)    MI, old.  recently Dx c hypothyroidism, TSH > 111 started Levothyroxin 50mcg qd as per Dr Vigil (2020 11:41)      Chief Complaint:  Patient is a 79y old  Female who presents with a chief complaint of sob cough (2020 22:56)      Review of Systems:    General:  No wt loss, fevers, chills, night sweats  Eyes:  Good vision, no reported pain  ENT:  No sore throat, pain, runny nose, dysphagia  CV:  No pain, palpitations, hypo/hypertension  Resp:  dyspnea, cough  GI:  No pain, nausea, vomiting, diarrhea, constipation           Social History/Family History  SOCHX:   tobacco,  -  alcohol    FMHX: FA/MO  - contributory       Discussed with:  PMD, Family    Physical Exam:    Vital Signs:  Vital Signs Last 24 Hrs  T(C): 36.2 (2020 17:06), Max: 36.9 (2020 00:18)  T(F): 97.2 (2020 17:06), Max: 98.5 (2020 00:18)  HR: 74 (2020 21:55) (68 - 87)  BP: 179/78 (2020 17:06) (132/71 - 179/78)  BP(mean): --  RR: 18 (2020 17:06) (18 - 18)  SpO2: 97% (2020 21:55) (94% - 98%)  Daily     Daily Weight in k.8 (2020 04:59)  I&O's Summary    2020 07:01  -  2020 07:00  --------------------------------------------------------  IN: 560 mL / OUT: 0 mL / NET: 560 mL          Chest:  fair air entry bilateral  Cardiovascular:  Regular rhythm, S1, S2, no murmur/rub/S3/S4, no carotid/femoral/abdominal bruit, radial/pedal pulses 2+,   Abdomen:  Soft, non-tender, non-distended, normoactive bowel sounds, no HSM        Laboratory:          138  |  104  |  69<H>  ----------------------------<  111<H>  3.8   |  25  |  3.77<H>    Ca    8.1<L>      2020 07:22          Assessment:  I am asked to assist this patient admitted with shortness of breath  The patient has multifactorial causes of shortness of breath, renal insufficiency, anemia  And now after a diagnostic echocardiogram within normal ejection fraction the reading shows mitral regurgitation and tricuspid regurgitation  After review of the studies this regurgitation is not considered severe but in the moderate to severe but more moderate range  the patient now is transferred to the intensive care unit after a noted upper GI bleed  GI noted  The patient was intubated and remains on ventilator support as well as pressor therapy

## 2020-01-11 NOTE — PROGRESS NOTE ADULT - ATTENDING COMMENTS
Ms. Arteaga was seen and examined with NAHID Adrian. Overall seems comfortable. Remains intubated. On examination she is hemodynamically stable, abdominal examination is benign. Wean to extubate. No acute surgical intervention warranted at this time. Ms. Arteaga was seen and examined with NAHID Adrian. Overall seems comfortable. Remains intubated. On examination she is hemodynamically stable, abdominal examination is benign. Wean to extubate. No acute surgical intervention warranted at this time

## 2020-01-11 NOTE — PROGRESS NOTE ADULT - SUBJECTIVE AND OBJECTIVE BOX
[facility] NEPHROLOGY SERVICES, [facility]  NEPHROLOGY AND HYPERTENSION  [address]  [address]  [place] [address]  [phone]    MD MART LORD, MD CHING ROTHMAN MD YELENA ROSENBERG, MD ISABELLA SLADE, MD ALAYNA WALLACE MD          Patient intubated, awake    MEDICATIONS  (STANDING):  albuterol/ipratropium for Nebulization 3 milliLiter(s) Nebulizer every 4 hours  ampicillin  IVPB      ampicillin  IVPB 1 Gram(s) IV Intermittent every 12 hours  buDESOnide    Inhalation Suspension 0.5 milliGRAM(s) Inhalation every 12 hours  chlorhexidine 0.12% Liquid 15 milliLiter(s) Oral Mucosa every 12 hours  chlorhexidine 4% Liquid 1 Application(s) Topical <User Schedule>  cloNIDine 0.1 milliGRAM(s) Oral every 6 hours  epoetin tawanna Injectable 14177 Unit(s) SubCutaneous every 7 days  hydrALAZINE 50 milliGRAM(s) Oral every 8 hours  levothyroxine Injectable 25 MICROGram(s) IV Push at bedtime  pantoprazole Infusion 8 mG/Hr (10 mL/Hr) IV Continuous <Continuous>  propofol Infusion 10 MICROgram(s)/kG/Min (3.678 mL/Hr) IV Continuous <Continuous>  sodium chloride 0.65% Nasal 1 Spray(s) Both Nostrils three times a day    MEDICATIONS  (PRN):  labetalol Injectable 10 milliGRAM(s) IV Push every 6 hours PRN Systolic blood pressure > 170      01-10-20 @ 07:01 - 01-11-20 @ 07:00  --------------------------------------------------------  IN: 1836.7 mL / OUT: 430 mL / NET: 1406.7 mL    01-11-20 @ 07:01  - 01-11-20 @ 13:26  --------------------------------------------------------  IN: 67.3 mL / OUT: 35 mL / NET: 32.3 mL      PHYSICAL EXAM:      T(C): 36.1 (01-11-20 @ 09:00), Max: 37.1 (01-10-20 @ 19:15)  HR: 96 (01-11-20 @ 13:19) (54 - 96)  BP: 227/74 (01-11-20 @ 13:00) (90/53 - 232/77)  RR: 19 (01-11-20 @ 13:00) (9 - 26)  SpO2: 100% (01-11-20 @ 13:19) (98% - 100%)  Wt(kg): --  Respiratory: mild rhonchi R>L anteriorly, decreased BS at bases  Cardiovascular: S1 S2  Gastrointestinal: soft NT ND +BS  Extremities:   12 edema                                    7.7    11.89 )-----------( 129      ( 11 Jan 2020 03:06 )             22.9     01-11    137  |  105  |  79<H>  ----------------------------<  384<H>  2.9<LL>   |  21<L>  |  3.87<H>    Ca    6.8<L>  Comprehensive Metabolic Panel in AM (01.10.20 @ 03:27)    Albumin, Serum: 1.6 g/dL corrected ca 8.8        11 Jan 2020 03:06  Phos  2.8     01-11  Mg     1.9     01-11    TPro  3.7<L>  /  Alb  x   /  TBili  x   /  DBili  x   /  AST  x   /  ALT  x   /  AlkPhos  x   01-11    ABG - ( 09 Jan 2020 22:08 )  pH, Arterial: x     pH, Blood: 7.48  /  pCO2: 29    /  pO2: 205   / HCO3: 22    / Base Excess: -0.7  /  SaO2: 100               LIVER FUNCTIONS - ( 11 Jan 2020 12:04 )  Alb: x     / Pro: 3.7 g/dL / ALK PHOS: x     / ALT: x     / AST: x     / GGT: x           Creatinine Trend: 3.87<--, 4.17<--, 4.11<--, 4.36<--, 4.27<--, 3.77<--    Immunoelectrophoresis, Serum (01.11.20 @ 12:04)    Protein Total, Serum: 3.7 g/dL    Total Protein, Serum: 3.7 g/dL        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    Plan  IV Labetalol  Add clonidine 0.1 mg q 6  Continued warranted diuresis, supportive care.  Follow paraprotein screen.  Would defer prospect of aggressive work up or HD support as overall prognosis poor.    Obed Jamison MD

## 2020-01-11 NOTE — PROGRESS NOTE ADULT - ASSESSMENT
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 Course complicated by hematemesis, acute upper GI bleed and lower GI bleed, acute blood loss anemia, acute on chronic renal insufficiency, acute respiratory failure requiring intubation.    1. NEURO  - hx of prior CVA with left weakness and noted left hand contraction with mild left hemiparesis  - daily sedation vacation    2. PULM  - intubated for airway protection  - weaning as tolerates, failed today, developed pink frothy sputum and agitation  - diurese with lasix 40mg IVP x1  - supportive care for RVS PNA    3. CV  - BP stable prior to intubation  - transient levophed requirement with propofol given for sedation and transient hypotension to SBP 90s  - off levophed x24 hours  - now hypertensive: will add home hydralazine 50mg three times a day, labetalol 10mg IVP prn SBP > 170  - cont to hold chemical DVT prophylaxis, no antiplatelets      4. GI  - acute upper GI bleed with hematemesis  - s/p total of 3 units pRBC with improvement in Hb  - NGT with minimal coffee ground output  - d/c protonix drip, change to protonix IVP twice daily  - monitor serial H/H  - no further hematochezia, however had 1 melanotic stool  - EGD with old blood in stomach, area of fresh blood but no active oozing or bleed noted ?AVM s/p cautery  - colonoscopy: old blood noted in colon  - plan for possible repeat EGD/colonscopy  - unclear if pt with 2 areas of bleed: upper and lower as duodenum clean  - appreciate surgical consult: no surgical intervention   - initiate tube clear tube feeds    5. ID  - supportive care for RSV PNA  - on day 7 of antibx for UTI: ampicillin  - ID follow up" ? d/c antibx for UTI after 7 day course    6. RENAL  - JONY on CKD due to ischemic ATN  - monitor output  - Cr downtrending  - supplement hypokalemia  - renal follow up    7. GEN  - full code    Critical Care Time: 35 min

## 2020-01-11 NOTE — PROGRESS NOTE ADULT - SUBJECTIVE AND OBJECTIVE BOX
Patient is a 79y old  Female who presents with a chief complaint of sob cough (11 Jan 2020 07:06)      HPI:  · HPI Objective Statement: 79 years old female from [facility] rehab c/o coughing sob for three to four days. Pt is sent here to rule out chf vs pneumonia. Pt is alert and oriented to person only unable to give detail hx due to hx of Alzheimer according to the [facility] pt had sat of 91 to 92 %,	    PAST MEDICAL/SURGICAL/FAMILY/SOCIAL HISTORY:    Past Medical History:  Alzheimers disease    CVA (cerebral vascular accident)    Depression    HTN (hypertension)    MI, old.  recently Dx c hypothyroidism, TSH > 111 started Levothyroxin 50mcg qd as per Dr Vigil (02 Jan 2020 11:41)      INTERVAL HPI/OVERNIGHT EVENTS:  ICU #1  Intubated  NGT (+)  The nurse denies melena, hematochezia, hematemesis, nausea, vomiting, abdominal pain, constipation, diarrhea, or change in bowel movements     MEDICATIONS  (STANDING):  albuterol/ipratropium for Nebulization 3 milliLiter(s) Nebulizer every 4 hours  ampicillin  IVPB      ampicillin  IVPB 1 Gram(s) IV Intermittent every 12 hours  buDESOnide    Inhalation Suspension 0.5 milliGRAM(s) Inhalation every 12 hours  chlorhexidine 0.12% Liquid 15 milliLiter(s) Oral Mucosa every 12 hours  chlorhexidine 4% Liquid 1 Application(s) Topical <User Schedule>  dextrose 5% + sodium chloride 0.45%. 1000 milliLiter(s) (50 mL/Hr) IV Continuous <Continuous>  epoetin tawanna Injectable 18305 Unit(s) SubCutaneous every 7 days  labetalol Injectable 10 milliGRAM(s) IV Push every 4 hours  levothyroxine Injectable 25 MICROGram(s) IV Push at bedtime  pantoprazole Infusion 8 mG/Hr (10 mL/Hr) IV Continuous <Continuous>  propofol Infusion 10 MICROgram(s)/kG/Min (3.678 mL/Hr) IV Continuous <Continuous>  sodium chloride 0.65% Nasal 1 Spray(s) Both Nostrils three times a day    MEDICATIONS  (PRN):      FAMILY HISTORY:  Family history of essential hypertension (Child)  Family history of stroke      Allergies    No Known Allergies    Intolerances        PMH/PSH:  Hypothyroidism  Constipation  Iron deficiency anemia  Major depressive disorder  Hyperlipidemia  GI bleed  Depression  Alzheimers disease  MI, old  CVA (cerebral vascular accident)  HTN (hypertension)  No pertinent past medical history  No significant past surgical history        REVIEW OF SYSTEMS:  CONSTITUTIONAL: No fever, weight loss,   EYES: No eye pain, visual disturbances, or discharge  ENMT:  No difficulty hearing, tinnitus, vertigo; No sinus or throat pain  NECK: No pain or stiffness  BREASTS: No pain, masses, or nipple discharge  RESPIRATORY: No cough, wheezing, chills or hemoptysis; No shortness of breath  CARDIOVASCULAR: No chest pain, palpitations, dizziness, or leg swelling  GASTROINTESTINAL: See above  GENITOURINARY: No dysuria, frequency, hematuria, or incontinence  NEUROLOGICAL: No headaches, memory loss, loss of strength, numbness, or tremors  SKIN: No itching, burning, rashes, or lesions   LYMPH NODES: No enlarged glands  ENDOCRINE: No heat or cold intolerance; No hair loss  MUSCULOSKELETAL: No joint pain or swelling; No muscle, back, or extremity pain  PSYCHIATRIC: No depression, anxiety, mood swings, or difficulty sleeping  HEME/LYMPH: No easy bruising, or bleeding gums  ALLERGY AND IMMUNOLOGIC: No hives or eczema    Vital Signs Last 24 Hrs  T(C): 36.6 (11 Jan 2020 05:20), Max: 37.4 (10 Ousmane 2020 11:05)  T(F): 97.9 (11 Jan 2020 05:20), Max: 99.4 (10 Ousmane 2020 11:05)  HR: 69 (11 Jan 2020 06:45) (54 - 77)  BP: 180/74 (11 Jan 2020 06:30) (90/53 - 201/50)  BP(mean): 96 (11 Jan 2020 06:30) (62 - 169)  RR: 15 (11 Jan 2020 06:45) (14 - 18)  SpO2: 100% (11 Jan 2020 06:45) (98% - 100%)    PHYSICAL EXAM: Intubated, on propofol drip , NGT  GENERAL: NAD, well-groomed, well-developed  HEAD:  Atraumatic, Normocephalic  EYES: EOMI, PERRLA, conjunctiva and sclera clear  NECK: Supple, No JVD, Normal thyroid  NERVOUS SYSTEM:  See above  CHEST/LUNG: Clear to percussion bilaterally; No rales, rhonchi, wheezing, or rubs  HEART: Regular rate and rhythm; No murmurs, rubs, or gallops  ABDOMEN: Soft, Nontender, Nondistended; Bowel sounds present  EXTREMITIES:  2+ Peripheral Pulses, No clubbing, cyanosis, or edema  LYMPH: No lymphadenopathy noted  SKIN: No rashes or lesions    LAB                          7.7    11.89 )-----------( 129      ( 11 Jan 2020 03:06 )             22.9       CBC:  01-11 @ 03:06  WBC 11.89   Hgb 7.7   Hct 22.9   Plts 129  MCV 89.8  01-10 @ 14:21  WBC 13.91   Hgb 8.2   Hct 24.7   Plts 110  MCV 93.9  01-10 @ 03:27  WBC 13.01   Hgb 9.1   Hct 26.5   Plts 132  MCV 88.9  01-09 @ 22:16  WBC 14.12   Hgb 10.6   Hct 30.7   Plts 149  MCV 88.5  01-09 @ 12:58  WBC 7.23   Hgb 7.2   Hct 23.0   Plts 172  MCV 99.1  01-09 @ 08:25  WBC 7.13   Hgb 8.4   Hct 26.8   Plts 160  MCV 97.8  01-08 @ 07:29  WBC 9.46   Hgb 8.2   Hct 26.0   Plts 143  MCV 97.7  01-05 @ 08:12  WBC 7.10   Hgb 7.8   Hct 24.7   Plts 132  MCV 99.2      Chemistry:  01-11 @ 03:06  Na+ 137  K+ 2.9  Cl- 105  CO2 21  BUN 79  Cr 3.87     01-10 @ 03:27  Na+ 140  K+ 3.7  Cl- 108  CO2 22  BUN 93  Cr 4.17     01-09 @ 22:16  Na+ 140  K+ 3.5  Cl- 106  CO2 25  BUN 89  Cr 4.11     01-09 @ 12:58  Na+ 139  K+ 4.0  Cl- 106  CO2 25  BUN 85  Cr 4.36     01-09 @ 08:25  Na+ 139  K+ 3.9  Cl- 106  CO2 22  BUN 81  Cr 4.27     01-06 @ 07:22  Na+ 138  K+ 3.8  Cl- 104  CO2 25  BUN 69  Cr 3.77     01-05 @ 08:12  Na+ 138  K+ 3.6  Cl- 105  CO2 24  BUN 56  Cr 3.45         Glucose, Serum: 384 mg/dL (01-11 @ 03:06)  Glucose, Serum: 152 mg/dL (01-10 @ 03:27)  Glucose, Serum: 150 mg/dL (01-09 @ 22:16)  Glucose, Serum: 231 mg/dL (01-09 @ 12:58)  Glucose, Serum: 157 mg/dL (01-09 @ 08:25)  Glucose, Serum: 111 mg/dL (01-06 @ 07:22)  Glucose, Serum: 161 mg/dL (01-05 @ 08:12)      11 Jan 2020 03:06    137    |  105    |  79     ----------------------------<  384    2.9     |  21     |  3.87   10 Ousmane 2020 03:27    140    |  108    |  93     ----------------------------<  152    3.7     |  22     |  4.17   09 Jan 2020 22:16    140    |  106    |  89     ----------------------------<  150    3.5     |  25     |  4.11   09 Jan 2020 12:58    139    |  106    |  85     ----------------------------<  231    4.0     |  25     |  4.36   09 Jan 2020 08:25    139    |  106    |  81     ----------------------------<  157    3.9     |  22     |  4.27   06 Jan 2020 07:22    138    |  104    |  69     ----------------------------<  111    3.8     |  25     |  3.77   05 Jan 2020 08:12    138    |  105    |  56     ----------------------------<  161    3.6     |  24     |  3.45     Ca    6.8        11 Jan 2020 03:06  Ca    7.6        10 Ousmane 2020 03:27  Ca    7.8        09 Jan 2020 22:16  Ca    7.7        09 Jan 2020 12:58  Ca    8.1        09 Jan 2020 08:25  Ca    8.1        06 Jan 2020 07:22  Ca    8.0        05 Jan 2020 08:12  Phos  2.8       11 Jan 2020 03:06  Phos  3.1       09 Jan 2020 22:16  Mg     1.9       11 Jan 2020 03:06  Mg     2.2       10 Ousmane 2020 03:27  Mg     2.3       09 Jan 2020 22:16    TPro  4.6    /  Alb  1.6    /  TBili  0.6    /  DBili  x      /  AST  15     /  ALT  33     /  AlkPhos  59     10 Ousmane 2020 03:27  TPro  5.2    /  Alb  1.8    /  TBili  0.7    /  DBili  x      /  AST  21     /  ALT  42     /  AlkPhos  70     09 Jan 2020 22:16      PT/INR - ( 10 Ousmane 2020 03:27 )   PT: 12.3 sec;   INR: 1.10 ratio         PTT - ( 10 Ousmnae 2020 03:27 )  PTT:22.9 sec        CAPILLARY BLOOD GLUCOSE          C-Reactive Protein, Serum: 2.28 mg/dL (01-09 @ 11:35)      RADIOLOGY & ADDITIONAL TESTS:    Imaging Personally Reviewed:  [ ] YES  [ ] NO    Consultant(s) Notes Reviewed:  [ ] YES  [ ] NO    Care Discussed with Consultants/Other Providers [ ] YES  [ ] NO

## 2020-01-11 NOTE — PROGRESS NOTE ADULT - SUBJECTIVE AND OBJECTIVE BOX
INTERVAL HPI:   79 year female with HTN, HLD, MI hx, CVA, Gi bleed, Anemia Depression and Alzheimer's dementia.  Sent from [facility] due to SOB. Possibility of pneumonia vs CHF was raised.   In ED with Respiratory distress required BIPAP support and RVP positive for RSV. Pt not able to provide more details due to dementia. Information from transfer papers and ED physician.  Nods no when asked for smoking.  01/09/20: With massive GI bleed, intubated and transferred to ICU.  01/09/20:  EGD+ Colonoscopy.    OVERNIGHT EVENTS:  In ICU, on Vent and sedated.    Vital Signs Last 24 Hrs  T(C): 36.1 (11 Jan 2020 09:00), Max: 36.8 (10 Ousmane 2020 23:30)  T(F): 97 (11 Jan 2020 09:00), Max: 98.2 (10 Ousmane 2020 23:30)  HR: 63 (11 Jan 2020 19:30) (54 - 96)  BP: 134/42 (11 Jan 2020 19:30) (90/53 - 232/77)  BP(mean): 65 (11 Jan 2020 19:30) (54 - 126)  RR: 16 (11 Jan 2020 19:30) (9 - 26)  SpO2: 100% (11 Jan 2020 19:30) (98% - 100%)    Mode: AC/ CMV (Assist Control/ Continuous Mandatory Ventilation)  RR (machine): 16  TV (machine): 400  FiO2: 40  PEEP: 5  ITime: 1  MAP: 10.5  PIP: 32    PHYSICAL EXAM:  GEN:        Sedated, comfortable.  HEENT:     ETT   RESP:        no distress  CVS:          Regular rate and rhythm.   ABD:         Soft, non-tender, non-distended;     MEDICATIONS  (STANDING):  albuterol/ipratropium for Nebulization 3 milliLiter(s) Nebulizer every 4 hours  ampicillin  IVPB      ampicillin  IVPB 1 Gram(s) IV Intermittent every 12 hours  buDESOnide    Inhalation Suspension 0.5 milliGRAM(s) Inhalation every 12 hours  chlorhexidine 0.12% Liquid 15 milliLiter(s) Oral Mucosa every 12 hours  chlorhexidine 4% Liquid 1 Application(s) Topical <User Schedule>  cloNIDine 0.1 milliGRAM(s) Oral every 6 hours  epoetin tawanna Injectable 94407 Unit(s) SubCutaneous every 7 days  hydrALAZINE 50 milliGRAM(s) Oral every 8 hours  levothyroxine Injectable 25 MICROGram(s) IV Push at bedtime  pantoprazole  Injectable 40 milliGRAM(s) IV Push every 12 hours  propofol Infusion 10 MICROgram(s)/kG/Min (3.678 mL/Hr) IV Continuous <Continuous>  sodium chloride 0.65% Nasal 1 Spray(s) Both Nostrils three times a day    MEDICATIONS  (PRN):  labetalol Injectable 10 milliGRAM(s) IV Push every 6 hours PRN Systolic blood pressure > 170    LABS:                        12.1   16.89 )-----------( 181      ( 11 Jan 2020 16:58 )             35.4     01-11    137  |  105  |  79<H>  ----------------------------<  384<H>  2.9<LL>   |  21<L>  |  3.87<H>    Ca    6.8<L>      11 Jan 2020 03:06  Phos  2.8     01-11  Mg     1.9     01-11    TPro  3.7<L>  /  Alb  x   /  TBili  x   /  DBili  x   /  AST  x   /  ALT  x   /  AlkPhos  x   01-11    PT/INR - ( 10 Ousmane 2020 03:27 )   PT: 12.3 sec;   INR: 1.10 ratio      PTT - ( 10 Ousmane 2020 03:27 )  PTT:22.9 sec  01-09 @ 22:08  pH: 7.48  pCO2: 29  pO2: 205  SaO2: 100      ASSESSMENT AND PLAN:  ·	Acute hypoxic  Respiratory failure .  ·	RSV tracheobronchitis.  ·	Left pleural effusion.  ·	Hypothyroidism with very high TSH.  ·	Anemia.  ·	VRE UTI  ·	Leukocytosis.  ·	Renal Insuffiencey.  ·	CVA by history.  ·	HTN.  ·	HLD.  ·	Alzheimer's dementia.  ·	GI bleed,    Had weaning trial.  Continue Vent, Protonix.

## 2020-01-12 LAB
ALBUMIN SERPL ELPH-MCNC: 1.4 G/DL — LOW (ref 3.3–5)
ALP SERPL-CCNC: 55 U/L — SIGNIFICANT CHANGE UP (ref 40–120)
ALT FLD-CCNC: 21 U/L — SIGNIFICANT CHANGE UP (ref 12–78)
ANION GAP SERPL CALC-SCNC: 10 MMOL/L — SIGNIFICANT CHANGE UP (ref 5–17)
AST SERPL-CCNC: 12 U/L — LOW (ref 15–37)
BILIRUB SERPL-MCNC: 0.4 MG/DL — SIGNIFICANT CHANGE UP (ref 0.2–1.2)
BUN SERPL-MCNC: 82 MG/DL — HIGH (ref 7–23)
CALCIUM SERPL-MCNC: 7.5 MG/DL — LOW (ref 8.5–10.1)
CHLORIDE SERPL-SCNC: 107 MMOL/L — SIGNIFICANT CHANGE UP (ref 96–108)
CO2 SERPL-SCNC: 22 MMOL/L — SIGNIFICANT CHANGE UP (ref 22–31)
CREAT SERPL-MCNC: 3.88 MG/DL — HIGH (ref 0.5–1.3)
GLUCOSE SERPL-MCNC: 80 MG/DL — SIGNIFICANT CHANGE UP (ref 70–99)
HCT VFR BLD CALC: 25.8 % — LOW (ref 34.5–45)
HGB BLD-MCNC: 9 G/DL — LOW (ref 11.5–15.5)
MAGNESIUM SERPL-MCNC: 2.1 MG/DL — SIGNIFICANT CHANGE UP (ref 1.6–2.6)
MCHC RBC-ENTMCNC: 31.3 PG — SIGNIFICANT CHANGE UP (ref 27–34)
MCHC RBC-ENTMCNC: 34.9 GM/DL — SIGNIFICANT CHANGE UP (ref 32–36)
MCV RBC AUTO: 89.6 FL — SIGNIFICANT CHANGE UP (ref 80–100)
NRBC # BLD: 0 /100 WBCS — SIGNIFICANT CHANGE UP (ref 0–0)
PHOSPHATE SERPL-MCNC: 4.2 MG/DL — SIGNIFICANT CHANGE UP (ref 2.5–4.5)
PLATELET # BLD AUTO: 121 K/UL — LOW (ref 150–400)
POTASSIUM SERPL-MCNC: 3.6 MMOL/L — SIGNIFICANT CHANGE UP (ref 3.5–5.3)
POTASSIUM SERPL-SCNC: 3.6 MMOL/L — SIGNIFICANT CHANGE UP (ref 3.5–5.3)
PROT SERPL-MCNC: 4.2 GM/DL — LOW (ref 6–8.3)
RBC # BLD: 2.88 M/UL — LOW (ref 3.8–5.2)
RBC # FLD: 17.1 % — HIGH (ref 10.3–14.5)
SODIUM SERPL-SCNC: 139 MMOL/L — SIGNIFICANT CHANGE UP (ref 135–145)
WBC # BLD: 10.14 K/UL — SIGNIFICANT CHANGE UP (ref 3.8–10.5)
WBC # FLD AUTO: 10.14 K/UL — SIGNIFICANT CHANGE UP (ref 3.8–10.5)

## 2020-01-12 PROCEDURE — 99233 SBSQ HOSP IP/OBS HIGH 50: CPT

## 2020-01-12 PROCEDURE — 99231 SBSQ HOSP IP/OBS SF/LOW 25: CPT

## 2020-01-12 RX ORDER — FUROSEMIDE 40 MG
40 TABLET ORAL ONCE
Refills: 0 | Status: COMPLETED | OUTPATIENT
Start: 2020-01-12 | End: 2020-01-12

## 2020-01-12 RX ADMIN — Medication 3 MILLILITER(S): at 05:36

## 2020-01-12 RX ADMIN — Medication 1 SPRAY(S): at 05:13

## 2020-01-12 RX ADMIN — Medication 108 GRAM(S): at 05:12

## 2020-01-12 RX ADMIN — Medication 0.1 MILLIGRAM(S): at 05:12

## 2020-01-12 RX ADMIN — Medication 0.1 MILLIGRAM(S): at 17:29

## 2020-01-12 RX ADMIN — CHLORHEXIDINE GLUCONATE 15 MILLILITER(S): 213 SOLUTION TOPICAL at 17:29

## 2020-01-12 RX ADMIN — CHLORHEXIDINE GLUCONATE 1 APPLICATION(S): 213 SOLUTION TOPICAL at 10:16

## 2020-01-12 RX ADMIN — Medication 0.1 MILLIGRAM(S): at 11:09

## 2020-01-12 RX ADMIN — Medication 50 MILLIGRAM(S): at 21:33

## 2020-01-12 RX ADMIN — Medication 0.1 MILLIGRAM(S): at 00:48

## 2020-01-12 RX ADMIN — CHLORHEXIDINE GLUCONATE 15 MILLILITER(S): 213 SOLUTION TOPICAL at 05:12

## 2020-01-12 RX ADMIN — Medication 3 MILLILITER(S): at 22:37

## 2020-01-12 RX ADMIN — Medication 0.5 MILLIGRAM(S): at 17:13

## 2020-01-12 RX ADMIN — Medication 3 MILLILITER(S): at 17:13

## 2020-01-12 RX ADMIN — Medication 3 MILLILITER(S): at 09:11

## 2020-01-12 RX ADMIN — Medication 50 MILLIGRAM(S): at 11:09

## 2020-01-12 RX ADMIN — Medication 40 MILLIGRAM(S): at 11:08

## 2020-01-12 RX ADMIN — Medication 25 MICROGRAM(S): at 21:33

## 2020-01-12 RX ADMIN — Medication 0.5 MILLIGRAM(S): at 05:36

## 2020-01-12 RX ADMIN — PANTOPRAZOLE SODIUM 40 MILLIGRAM(S): 20 TABLET, DELAYED RELEASE ORAL at 21:33

## 2020-01-12 RX ADMIN — PANTOPRAZOLE SODIUM 40 MILLIGRAM(S): 20 TABLET, DELAYED RELEASE ORAL at 10:15

## 2020-01-12 RX ADMIN — Medication 1 SPRAY(S): at 13:41

## 2020-01-12 RX ADMIN — PROPOFOL 3.68 MICROGRAM(S)/KG/MIN: 10 INJECTION, EMULSION INTRAVENOUS at 05:12

## 2020-01-12 RX ADMIN — Medication 3 MILLILITER(S): at 01:06

## 2020-01-12 RX ADMIN — Medication 1 SPRAY(S): at 21:33

## 2020-01-12 RX ADMIN — Medication 3 MILLILITER(S): at 13:05

## 2020-01-12 NOTE — PROGRESS NOTE ADULT - PROBLEM SELECTOR PLAN 1
ICU #1, Intubated. 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 1) Hold Carafate for EGD 2) clear liquids 3) F/U EGD  4) F/U CBC

## 2020-01-12 NOTE — PROGRESS NOTE ADULT - SUBJECTIVE AND OBJECTIVE BOX
79 years old female from [facility] rehab c/o coughing sob for three to four days. Pt is sent here to rule out chf vs pneumonia. Pt is alert and oriented to person only unable to give detail hx due to hx of Alzheimer according to the [facility] pt had sat of 91 to 92 %,	    PAST MEDICAL/SURGICAL/FAMILY/SOCIAL HISTORY:    Past Medical History:  Alzheimers disease    CVA (cerebral vascular accident)    Depression    HTN (hypertension)    MI, old.  recently Dx c hypothyroidism, TSH > 111 started Levothyroxin 50mcg qd as per Dr Vigil (2020 11:41)      Chief Complaint:  Patient is a 79y old  Female who presents with a chief complaint of sob cough (2020 22:56)      Review of Systems:    General:  No wt loss, fevers, chills, night sweats  Eyes:  Good vision, no reported pain  ENT:  No sore throat, pain, runny nose, dysphagia  CV:  No pain, palpitations, hypo/hypertension  Resp:  dyspnea, cough  GI:  No pain, nausea, vomiting, diarrhea, constipation           Social History/Family History  SOCHX:   tobacco,  -  alcohol    FMHX: FA/MO  - contributory       Discussed with:  PMD, Family    Physical Exam:    Vital Signs:  Vital Signs Last 24 Hrs  T(C): 36.2 (2020 17:06), Max: 36.9 (2020 00:18)  T(F): 97.2 (2020 17:06), Max: 98.5 (2020 00:18)  HR: 74 (2020 21:55) (68 - 87)  BP: 179/78 (2020 17:06) (132/71 - 179/78)  BP(mean): --  RR: 18 (2020 17:06) (18 - 18)  SpO2: 97% (2020 21:55) (94% - 98%)  Daily     Daily Weight in k.8 (2020 04:59)  I&O's Summary    2020 07:01  -  2020 07:00  --------------------------------------------------------  IN: 560 mL / OUT: 0 mL / NET: 560 mL          Chest:  fair air entry bilateral  Cardiovascular:  Regular rhythm, S1, S2, no murmur/rub/S3/S4, no carotid/femoral/abdominal bruit, radial/pedal pulses 2+,   Abdomen:  Soft, non-tender, non-distended, normoactive bowel sounds, no HSM        Laboratory:          138  |  104  |  69<H>  ----------------------------<  111<H>  3.8   |  25  |  3.77<H>    Ca    8.1<L>      2020 07:22          Assessment:  I am asked to assist this patient admitted with shortness of breath  The patient has multifactorial causes of shortness of breath, renal insufficiency, anemia  And now after a diagnostic echocardiogram within normal ejection fraction the reading shows mitral regurgitation and tricuspid regurgitation  After review of the studies this regurgitation is not considered severe but in the moderate to severe but more moderate range  the patient now is transferred to the intensive care unit after a noted upper GI bleed  GI noted, repeat EGD in a.m.  The patient was intubated and remains on ventilator support , weaning trials today

## 2020-01-12 NOTE — PROGRESS NOTE ADULT - SUBJECTIVE AND OBJECTIVE BOX
Patient is a 79y old  Female who presents with a chief complaint of sob cough (12 Jan 2020 09:31)      HPI:  · HPI Objective Statement: 79 years old female from [facility] rehab c/o coughing sob for three to four days. Pt is sent here to rule out chf vs pneumonia. Pt is alert and oriented to person only unable to give detail hx due to hx of Alzheimer according to the [facility] pt had sat of 91 to 92 %,	    PAST MEDICAL/SURGICAL/FAMILY/SOCIAL HISTORY:    Past Medical History:  Alzheimers disease    CVA (cerebral vascular accident)    Depression    HTN (hypertension)    MI, old.  recently Dx c hypothyroidism, TSH > 111 started Levothyroxin 50mcg qd as per Dr Vigil (02 Jan 2020 11:41)      INTERVAL HPI/OVERNIGHT EVENTS:  Intubated, ICU #1  Small amount of coffee ground through NGT. One melanotic BM On clear liquids.    MEDICATIONS  (STANDING):  albuterol/ipratropium for Nebulization 3 milliLiter(s) Nebulizer every 4 hours  buDESOnide    Inhalation Suspension 0.5 milliGRAM(s) Inhalation every 12 hours  chlorhexidine 0.12% Liquid 15 milliLiter(s) Oral Mucosa every 12 hours  chlorhexidine 4% Liquid 1 Application(s) Topical <User Schedule>  cloNIDine 0.1 milliGRAM(s) Oral every 6 hours  epoetin tawanna Injectable 16536 Unit(s) SubCutaneous every 7 days  hydrALAZINE 50 milliGRAM(s) Oral every 8 hours  levothyroxine Injectable 25 MICROGram(s) IV Push at bedtime  pantoprazole  Injectable 40 milliGRAM(s) IV Push every 12 hours  propofol Infusion 10 MICROgram(s)/kG/Min (3.678 mL/Hr) IV Continuous <Continuous>  sodium chloride 0.65% Nasal 1 Spray(s) Both Nostrils three times a day    MEDICATIONS  (PRN):  labetalol Injectable 10 milliGRAM(s) IV Push every 6 hours PRN Systolic blood pressure > 170      FAMILY HISTORY:  Family history of essential hypertension (Child)  Family history of stroke      Allergies    No Known Allergies    Intolerances        PMH/PSH:  Hypothyroidism  Constipation  Iron deficiency anemia  Major depressive disorder  Hyperlipidemia  GI bleed  Depression  Alzheimers disease  MI, old  CVA (cerebral vascular accident)  HTN (hypertension)  No pertinent past medical history  No significant past surgical history        REVIEW OF SYSTEMS: Intubated  CONSTITUTIONAL: No fever, weight loss,   EYES: No eye pain, visual disturbances, or discharge  ENMT:  No difficulty hearing, tinnitus, vertigo; No sinus or throat pain  NECK: No pain or stiffness  BREASTS: No pain, masses, or nipple discharge  RESPIRATORY: No cough, wheezing, chills or hemoptysis; No shortness of breath  CARDIOVASCULAR: No chest pain, palpitations, dizziness, or leg swelling  GASTROINTESTINAL: See above  GENITOURINARY: No dysuria, frequency, hematuria, or incontinence  NEUROLOGICAL: No headaches, memory loss, loss of strength, numbness, or tremors  SKIN: No itching, burning, rashes, or lesions   LYMPH NODES: No enlarged glands  ENDOCRINE: No heat or cold intolerance; No hair loss  MUSCULOSKELETAL: No joint pain or swelling; No muscle, back, or extremity pain  PSYCHIATRIC: No depression, anxiety, mood swings, or difficulty sleeping  HEME/LYMPH: No easy bruising, or bleeding gums  ALLERGY AND IMMUNOLOGIC: No hives or eczema    Vital Signs Last 24 Hrs  T(C): 36.3 (12 Jan 2020 12:00), Max: 36.3 (12 Jan 2020 12:00)  T(F): 97.3 (12 Jan 2020 12:00), Max: 97.3 (12 Jan 2020 12:00)  HR: 63 (12 Jan 2020 14:00) (53 - 87)  BP: 140/36 (12 Jan 2020 14:00) (113/33 - 201/58)  BP(mean): 62 (12 Jan 2020 14:00) (54 - 149)  RR: 12 (12 Jan 2020 14:00) (10 - 20)  SpO2: 100% (12 Jan 2020 14:00) (98% - 100%)    PHYSICAL EXAM:  GENERAL: NAD, well-groomed, well-developed  HEAD:  Atraumatic, Normocephalic  EYES: EOMI, PERRLA, conjunctiva and sclera clear  NECK: Supple, No JVD, Normal thyroid  NERVOUS SYSTEM:  Intubated  CHEST/LUNG: Clear to percussion bilaterally; No rales, rhonchi, wheezing, or rubs  HEART: Regular rate and rhythm; No murmurs, rubs, or gallops  ABDOMEN: Soft, Nontender, Nondistended; Bowel sounds present  EXTREMITIES:  2+ Peripheral Pulses, No clubbing, cyanosis, or edema  LYMPH: No lymphadenopathy noted  SKIN: No rashes or lesions    LAB                          9.0    10.14 )-----------( 121      ( 12 Jan 2020 02:36 )             25.8       CBC:  01-12 @ 02:36  WBC 10.14   Hgb 9.0   Hct 25.8   Plts 121  MCV 89.6  01-11 @ 16:58  WBC 16.89   Hgb 12.1   Hct 35.4   Plts 181  MCV 89.6  01-11 @ 03:06  WBC 11.89   Hgb 7.7   Hct 22.9   Plts 129  MCV 89.8  01-10 @ 14:21  WBC 13.91   Hgb 8.2   Hct 24.7   Plts 110  MCV 93.9  01-10 @ 03:27  WBC 13.01   Hgb 9.1   Hct 26.5   Plts 132  MCV 88.9  01-09 @ 22:16  WBC 14.12   Hgb 10.6   Hct 30.7   Plts 149  MCV 88.5  01-09 @ 12:58  WBC 7.23   Hgb 7.2   Hct 23.0   Plts 172  MCV 99.1  01-09 @ 08:25  WBC 7.13   Hgb 8.4   Hct 26.8   Plts 160  MCV 97.8  01-08 @ 07:29  WBC 9.46   Hgb 8.2   Hct 26.0   Plts 143  MCV 97.7      Chemistry:  01-12 @ 02:36  Na+ 139  K+ 3.6  Cl- 107  CO2 22  BUN 82  Cr 3.88     01-11 @ 03:06  Na+ 137  K+ 2.9  Cl- 105  CO2 21  BUN 79  Cr 3.87     01-10 @ 03:27  Na+ 140  K+ 3.7  Cl- 108  CO2 22  BUN 93  Cr 4.17     01-09 @ 22:16  Na+ 140  K+ 3.5  Cl- 106  CO2 25  BUN 89  Cr 4.11     01-09 @ 12:58  Na+ 139  K+ 4.0  Cl- 106  CO2 25  BUN 85  Cr 4.36     01-09 @ 08:25  Na+ 139  K+ 3.9  Cl- 106  CO2 22  BUN 81  Cr 4.27     01-06 @ 07:22  Na+ 138  K+ 3.8  Cl- 104  CO2 25  BUN 69  Cr 3.77         Glucose, Serum: 80 mg/dL (01-12 @ 02:36)  Glucose, Serum: 384 mg/dL (01-11 @ 03:06)  Glucose, Serum: 152 mg/dL (01-10 @ 03:27)  Glucose, Serum: 150 mg/dL (01-09 @ 22:16)  Glucose, Serum: 231 mg/dL (01-09 @ 12:58)  Glucose, Serum: 157 mg/dL (01-09 @ 08:25)  Glucose, Serum: 111 mg/dL (01-06 @ 07:22)      12 Jan 2020 02:36    139    |  107    |  82     ----------------------------<  80     3.6     |  22     |  3.88   11 Jan 2020 03:06    137    |  105    |  79     ----------------------------<  384    2.9     |  21     |  3.87   10 Ousmane 2020 03:27    140    |  108    |  93     ----------------------------<  152    3.7     |  22     |  4.17   09 Jan 2020 22:16    140    |  106    |  89     ----------------------------<  150    3.5     |  25     |  4.11   09 Jan 2020 12:58    139    |  106    |  85     ----------------------------<  231    4.0     |  25     |  4.36   09 Jan 2020 08:25    139    |  106    |  81     ----------------------------<  157    3.9     |  22     |  4.27   06 Jan 2020 07:22    138    |  104    |  69     ----------------------------<  111    3.8     |  25     |  3.77     Ca    7.5        12 Jan 2020 02:36  Ca    6.8        11 Jan 2020 03:06  Ca    7.6        10 Jan 2020 03:27  Ca    7.8        09 Jan 2020 22:16  Ca    7.7        09 Jan 2020 12:58  Ca    8.1        09 Jan 2020 08:25  Ca    8.1        06 Jan 2020 07:22  Phos  4.2       12 Jan 2020 02:36  Phos  2.8       11 Jan 2020 03:06  Phos  3.1       09 Jan 2020 22:16  Mg     2.1       12 Jan 2020 02:36  Mg     1.9       11 Jan 2020 03:06  Mg     2.2       10 Ousmane 2020 03:27  Mg     2.3       09 Jan 2020 22:16    TPro  4.2    /  Alb  1.4    /  TBili  0.4    /  DBili  x      /  AST  12     /  ALT  21     /  AlkPhos  55     12 Jan 2020 02:36  TPro  3.7    /  Alb  x      /  TBili  x      /  DBili  x      /  AST  x      /  ALT  x      /  AlkPhos  x      11 Jan 2020 12:04  TPro  4.6    /  Alb  1.6    /  TBili  0.6    /  DBili  x      /  AST  15     /  ALT  33     /  AlkPhos  59     10 Ousmane 2020 03:27  TPro  5.2    /  Alb  1.8    /  TBili  0.7    /  DBili  x      /  AST  21     /  ALT  42     /  AlkPhos  70     09 Jan 2020 22:16              CAPILLARY BLOOD GLUCOSE          C-Reactive Protein, Serum: 2.28 mg/dL (01-09 @ 11:35)      RADIOLOGY & ADDITIONAL TESTS:    Imaging Personally Reviewed:  [ ] YES  [ ] NO    Consultant(s) Notes Reviewed:  [ ] YES  [ ] NO    Care Discussed with Consultants/Other Providers [ ] YES  [ ] NO

## 2020-01-12 NOTE — PROGRESS NOTE ADULT - SUBJECTIVE AND OBJECTIVE BOX
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 Pt presents from [facility] rehab for SOB, cough x3-4 days. Admitted to medical service for PNA, CHF exacerbation. Pt had + RVP for RSV and urine culture with VRE. Acute on chronic renal insufficiency. Transferred to ICU s/p multiple episodes of hematemesis with NGT insertion evacuating 700-800 cc of blood from stomach. Upon arrival to ICU pt noted to become more lethargic, intubated for airway protection and for GI procedure. DX: acute blood loss anemia, upper GI bleed, acute on chronic renal insufficiency, RSV PNA, VRE UTI, acute respiratory failure requiring intubation    24 hr events:  failed wean yesterday: agitated, developed pink frothy sputum  failed wean early in AM due to apnea  weaned well on second try of the day  had melena overnight      ## ROS:  [x] unable to obtain due to intubation      ## Labs:  CBC:                        9.0    10.14 )-----------( 121      ( 12 Jan 2020 02:36 )             25.8     Chem:  01-12    139  |  107  |  82<H>  ----------------------------<  80  3.6   |  22  |  3.88<H>    Ca    7.5<L>      12 Jan 2020 02:36  Phos  4.2     01-12  Mg     2.1     01-12    TPro  4.2<L>  /  Alb  1.4<L>  /  TBili  0.4  /  AST  12<L>  /  ALT  21  /  AlkPhos  55  01-12    Culture - Urine (01.01.20 @ 17:13)    Specimen Source: .Urine Catheterized    Culture Results: 50,000 - 99,000 CFU/mL Enterococcus faecalis (vancomycin resistant)          FLU A B RSV Detection by PCR (01.01.20 @ 14:04)    Flu A Result: NotDetec    Flu B Result: NotDetec    RSV Result: Detected      ## Medications:  cloNIDine 0.1 milliGRAM(s) Oral every 6 hours  hydrALAZINE 50 milliGRAM(s) Oral every 8 hours  labetalol Injectable 10 milliGRAM(s) IV Push every 6 hours PRN    albuterol/ipratropium for Nebulization 3 milliLiter(s) Nebulizer every 4 hours  buDESOnide    Inhalation Suspension 0.5 milliGRAM(s) Inhalation every 12 hours    levothyroxine Injectable 25 MICROGram(s) IV Push at bedtime      pantoprazole  Injectable 40 milliGRAM(s) IV Push every 12 hours    propofol Infusion 10 MICROgram(s)/kG/Min IV Continuous <Continuous>      ## Vitals:  T(C): 36.1 (01-12-20 @ 23:00), Max: 36.6 (01-12-20 @ 16:16)  HR: 63 (01-13-20 @ 01:13) (53 - 74)  BP: 205/54  (133/31 - 205/54)  RR: 16  (10 - 20)  SpO2: 100% (01-13-20 @ 01:13) (99% - 100%)  Vent: Mode: AC/ CMV (Assist Control/ Continuous Mandatory Ventilation), RR (machine): 16, RR (patient): 16, TV (machine): 400, FiO2: 40, PEEP: 5, PIP: 40        01-11 @ 07:01  -  01-12 @ 07:00  --------------------------------------------------------  IN: 628.4 mL / OUT: 1345 mL / NET: -716.6 mL          ## P/E:  Gen: lying comfortably in bed in no apparent distress  HEENT: PERRL, ETT and NGT in place  Resp: bibasilar rales, coarse breath sounds bilaterally  CVS: RRR  Abd: soft NT/ND +BS  Ext: bilateral upper and lower extremity edema  Neuro: arousable, follows simple commands, baseline left sided weakness    CENTRAL LINE: [ ] YES [x] NO  LOCATION:   DATE INSERTED:  REMOVE: [ ] YES [ ] NO      WINTERS: [x] YES [ ] NO    DATE INSERTED:  REMOVE:  [x] YES [ ] NO      A-LINE:  [ ] YES [x] NO  LOCATION:   DATE INSERTED:  REMOVE:  [ ] YES [ ] NO  EXPLAIN:    GLOBAL ISSUE/BEST PRACTICE:  Analgesia: n/a  Sedation: propofol  HOB elevation: yes  Stress ulcer prophylaxis: protonix  VTE prophylaxis: bilateral compression devices  Oral Care: chlorhexidine  Glycemic control: n/a  Nutrition: npo after MN for EGD    CODE STATUS: [x] full code  [ ] DNR  [ ] DNI  [ ] MOLST  Goals of care discussion: [x] yes

## 2020-01-12 NOTE — PROGRESS NOTE ADULT - SUBJECTIVE AND OBJECTIVE BOX
INTERVAL HPI:  79 year female with HTN, HLD, MI hx, CVA, Gi bleed, Anemia Depression and Alzheimer's dementia.  Sent from [facility] due to SOB. Possibility of pneumonia vs CHF was raised.   In ED with Respiratory distress required BIPAP support and RVP positive for RSV. Pt not able to provide more details due to dementia. Information from transfer papers and ED physician.  Nods no when asked for smoking.  01/09/20: With massive GI bleed, intubated and transferred to ICU.  01/09/20:  EGD+ Colonoscopy.    OVERNIGHT EVENTS:  Tolerating CPAP but some what lethargic.    Vital Signs Last 24 Hrs  T(C): 36.6 (12 Jan 2020 16:16), Max: 36.6 (12 Jan 2020 16:16)  T(F): 97.9 (12 Jan 2020 16:16), Max: 97.9 (12 Jan 2020 16:16)  HR: 60 (12 Jan 2020 17:26) (53 - 82)  BP: 142/35 (12 Jan 2020 15:00) (113/33 - 186/47)  BP(mean): 62 (12 Jan 2020 15:00) (54 - 149)  RR: 18 (12 Jan 2020 15:30) (10 - 20)  SpO2: 100% (12 Jan 2020 17:26) (99% - 100%)    Mode: CPAP with PS  FiO2: 40  PEEP: 5  PS: 5  ITime: 1  MAP: 6.6  PIP: 11    PHYSICAL EXAM:  GEN:        comfortable.  HEENT:    Normal.    RESP:       Resting        CVS:          Regular rate and rhythm.   ABD:         Soft, non-tender, non-distended;     MEDICATIONS  (STANDING):  albuterol/ipratropium for Nebulization 3 milliLiter(s) Nebulizer every 4 hours  buDESOnide    Inhalation Suspension 0.5 milliGRAM(s) Inhalation every 12 hours  chlorhexidine 0.12% Liquid 15 milliLiter(s) Oral Mucosa every 12 hours  chlorhexidine 4% Liquid 1 Application(s) Topical <User Schedule>  cloNIDine 0.1 milliGRAM(s) Oral every 6 hours  hydrALAZINE 50 milliGRAM(s) Oral every 8 hours  levothyroxine Injectable 25 MICROGram(s) IV Push at bedtime  pantoprazole  Injectable 40 milliGRAM(s) IV Push every 12 hours  propofol Infusion 10 MICROgram(s)/kG/Min (3.678 mL/Hr) IV Continuous <Continuous>  sodium chloride 0.65% Nasal 1 Spray(s) Both Nostrils three times a day    MEDICATIONS  (PRN):  labetalol Injectable 10 milliGRAM(s) IV Push every 6 hours PRN Systolic blood pressure > 170    LABS:                        9.0    10.14 )-----------( 121      ( 12 Jan 2020 02:36 )             25.8     01-12    139  |  107  |  82<H>  ----------------------------<  80  3.6   |  22  |  3.88<H>    Ca    7.5<L>      12 Jan 2020 02:36  Phos  4.2     01-12  Mg     2.1     01-12    TPro  4.2<L>  /  Alb  1.4<L>  /  TBili  0.4  /  DBili  x   /  AST  12<L>  /  ALT  21  /  AlkPhos  55  01-12 01-09 @ 22:08  pH: 7.48  pCO2: 29  pO2: 205  SaO2: 100      ASSESSMENT AND PLAN:  ·	Acute hypoxic  Respiratory failure .  ·	RSV tracheobronchitis.  ·	Left pleural effusion.  ·	Hypothyroidism with very high TSH.  ·	Anemia.  ·	VRE UTI  ·	Leukocytosis.  ·	Renal Insuffiencey.  ·	CVA by history.  ·	HTN.  ·	HLD.  ·	Alzheimer's dementia.  ·	GI bleed,    Continue Vent, nebulizer and Protonix.  For repeat EGD tomorrow.

## 2020-01-12 NOTE — PROGRESS NOTE ADULT - SUBJECTIVE AND OBJECTIVE BOX
[facility] NEPHROLOGY SERVICES, [facility]  NEPHROLOGY AND HYPERTENSION  [address]  [address]  [place] [address]  [phone]    MD MART LORD, MD CHING ROTHMAN, MD JU BLAS, MD ISABELLA SLADE, MD ALAYNA WALLACE MD          Patient intubated no distress    MEDICATIONS  (STANDING):  albuterol/ipratropium for Nebulization 3 milliLiter(s) Nebulizer every 4 hours  buDESOnide    Inhalation Suspension 0.5 milliGRAM(s) Inhalation every 12 hours  chlorhexidine 0.12% Liquid 15 milliLiter(s) Oral Mucosa every 12 hours  chlorhexidine 4% Liquid 1 Application(s) Topical <User Schedule>  cloNIDine 0.1 milliGRAM(s) Oral every 6 hours  epoetin tawanna Injectable 91050 Unit(s) SubCutaneous every 7 days  hydrALAZINE 50 milliGRAM(s) Oral every 8 hours  levothyroxine Injectable 25 MICROGram(s) IV Push at bedtime  pantoprazole  Injectable 40 milliGRAM(s) IV Push every 12 hours  propofol Infusion 10 MICROgram(s)/kG/Min (3.678 mL/Hr) IV Continuous <Continuous>  sodium chloride 0.65% Nasal 1 Spray(s) Both Nostrils three times a day    MEDICATIONS  (PRN):  labetalol Injectable 10 milliGRAM(s) IV Push every 6 hours PRN Systolic blood pressure > 170      01-11-20 @ 07:01 - 01-12-20 @ 07:00  --------------------------------------------------------  IN: 628.4 mL / OUT: 1345 mL / NET: -716.6 mL    01-12-20 @ 07:01 - 01-12-20 @ 14:41  --------------------------------------------------------  IN: 275.5 mL / OUT: 200 mL / NET: 75.5 mL      PHYSICAL EXAM:      T(C): 36.3 (01-12-20 @ 12:00), Max: 36.3 (01-12-20 @ 12:00)  HR: 63 (01-12-20 @ 14:00) (53 - 86)  BP: 140/36 (01-12-20 @ 14:00) (113/33 - 201/58)  RR: 12 (01-12-20 @ 14:00) (10 - 20)  SpO2: 100% (01-12-20 @ 14:00) (98% - 100%)  Wt(kg): --  Respiratory: clear anteriorly, decreased BS at bases  Cardiovascular: S1 S2  Gastrointestinal: soft NT ND +BS  Extremities:   1 edema                                    9.0    10.14 )-----------( 121      ( 12 Jan 2020 02:36 )             25.8     01-12    139  |  107  |  82<H>  ----------------------------<  80  3.6   |  22  |  3.88<H>    Ca    7.5<L>      12 Jan 2020 02:36  Phos  4.2     01-12  Mg     2.1     01-12    TPro  4.2<L>  /  Alb  1.4<L>  /  TBili  0.4  /  DBili  x   /  AST  12<L>  /  ALT  21  /  AlkPhos  55  01-12      LIVER FUNCTIONS - ( 12 Jan 2020 02:36 )  Alb: 1.4 g/dL / Pro: 4.2 gm/dL / ALK PHOS: 55 U/L / ALT: 21 U/L / AST: 12 U/L / GGT: x           Creatinine Trend: 3.88<--, 3.87<--, 4.17<--, 4.11<--, 4.36<--, 4.27<--          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    Plan  IV Labetalolprn  Add clonidine 0.1 mg q 6  Continued warranted diuresis, supportive care.  Follow paraprotein screen.  Would defer prospect of aggressive work up or HD support as overall prognosis poor.    Obed Jamison MD

## 2020-01-12 NOTE — PROGRESS NOTE ADULT - ASSESSMENT
HPI:  · HPI Objective Statement: 79 years old female from [facility] rehab c/o coughing sob for three to four days. Pt is sent here to rule out chf vs pneumonia. Pt is alert and oriented to person only unable to give detail hx due to hx of Alzheimer according to the [facility] pt had sat of 91 to 92 %,	  ---------------- As Above ---------------------------------------------  Patient is a poor historian. Patient admitted with Bronchitis and UTI. Called for hematemsis. Patient had three episodes of bloody vomitus. ( 1/3 of a cup ) . Patient complaining of mid epigastric / chest pain but patient can not characterize the pain.   Patient has chronic anemia. but HGB fell 8.4 --> 7.2. Was on Carafate, now on IV PPI  KUB pending  HX of CRI    Addendum : Patient had another episode of bloody vomitus    Upper GI Bleed - Secondary to (?). 1) NPO  2)Check KUB  3) IV PPI 4) telemetry  5) NGT 6) EGD 7) Hold Carafate  8) Hold iron  ---Will speak with daughter.

## 2020-01-12 NOTE — PROGRESS NOTE ADULT - SUBJECTIVE AND OBJECTIVE BOX
lying in bed, remains Intubated in ICU    Vital Signs Last 24 Hrs  T(C): 35.7 (12 Jan 2020 07:00), Max: 36.1 (12 Jan 2020 00:30)  T(F): 96.3 (12 Jan 2020 07:00), Max: 97 (12 Jan 2020 00:30)  HR: 55 (12 Jan 2020 09:15) (53 - 96)  BP: 154/40 (12 Jan 2020 07:00) (113/33 - 232/77)  BP(mean): 69 (12 Jan 2020 07:00) (54 - 126)  RR: 16 (12 Jan 2020 07:00) (9 - 26)  SpO2: 100% (12 Jan 2020 09:15) (98% - 100%)    PHYSICAL EXAM:    general - intubated on ventilator  HEENT -ETT +  CVS - RRR  RS - AE B/L  Abd - soft, NT  Ext - Pulses +        LABS:                        9.0    10.14 )-----------( 121      ( 12 Jan 2020 02:36 )             25.8     01-12    139  |  107  |  82<H>  ----------------------------<  80  3.6   |  22  |  3.88<H>    Ca    7.5<L>      12 Jan 2020 02:36  Phos  4.2     01-12  Mg     2.1     01-12    TPro  4.2<L>  /  Alb  1.4<L>  /  TBili  0.4  /  DBili  x   /  AST  12<L>  /  ALT  21  /  AlkPhos  55  01-12            RADIOLOGY & ADDITIONAL STUDIES:

## 2020-01-12 NOTE — PROGRESS NOTE ADULT - ASSESSMENT
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 Course complicated by hematemesis, acute upper GI bleed and lower GI bleed, acute blood loss anemia, acute on chronic renal insufficiency, acute respiratory failure requiring intubation.    1. NEURO  - hx of prior CVA with left weakness and noted left hand contraction   - daily sedation vacation    2. PULM  - intubated for airway protection  - weaning as tolerates, failed wean early morning, did well with second weaning trial  - will diurese with lasix 40mg IVP x1  - supportive care for RVS PNA    3. CV  - BP stable prior to intubation,  transient levophed requirement with propofol given for sedation and transient hypotension to SBP 90s with intubation  - off levophed x48 hours  - now hypertensive: cont hydralazine 50mg three times a day, labetalol 10mg IVP prn SBP > 170, clonidine added yesterday for better BP control  - cont to hold chemical DVT prophylaxis, no antiplatelets      4. GI  - acute upper GI bleed with hematemesis  - s/p total of 3 units pRBC with improvement in Hb  - NGT with minimal coffee ground output  - off protonix drip, cont with protonix IVP twice daily  - monitor serial H/H  - no further hematochezia, however having melanotic which can be residual blood from recent upper GI bleed  - EGD with old blood in stomach, area of fresh blood but no active oozing or bleed noted ?AVM s/p cautery  - colonoscopy: old blood noted in colon  - plan for repeat EGD tomorrow, d/w GI  - unclear if pt with 2 areas of bleed: upper and lower as duodenum clean  - appreciate surgical consult: no surgical intervention   - clear tube feeds, NPO after midnight for EGD tomorrow    5. ID  - supportive care for RSV PNA  - s/p 7 days of antibx for UTI: ampicillin  - monitor off antibx  - ID follow up    6. RENAL  - JONY on CKD due to ischemic ATN  - monitor output  - Cr downtrending  - supplement hypokalemia  - renal follow up    7. GEN  - full code  - as pt going for procedure tomorrow will keep intubated for today  - if pt weans well tomorrow, likely can extubate post procedure    Critical Care Time: 35 min

## 2020-01-12 NOTE — PROGRESS NOTE ADULT - SUBJECTIVE AND OBJECTIVE BOX
Patient seen and examined at bedside.  Intubated and sedated.  No acute distress.  No acute events overnight.        Vital Signs Last 24 Hrs  T(F): 96.3 (01-12-20 @ 07:00), Max: 97 (01-12-20 @ 00:30)  HR: 56 (01-12-20 @ 08:27)  BP: 154/40 (01-12-20 @ 07:00)  RR: 16 (01-12-20 @ 07:00)  SpO2: 100% (01-12-20 @ 08:27)      GENERAL: NAD, sedated  CHEST/LUNG: Orally intubated, Respirations nonlabored  HEART: S1S2, Regular rate and rhythm  ABDOMEN: +Bowel sounds, soft, Nontender, Nondistended  : lee catheter in place draining 1105 cc/24hr clear yellow urine  EXTREMITIES:  no calf tenderness, No edema    I&O's Detail    11 Jan 2020 07:01  -  12 Jan 2020 07:00  --------------------------------------------------------  IN:    dextrose 5% + sodium chloride 0.45%.: 300 mL    pantoprazole Infusion: 110 mL    propofol Infusion: 118.4 mL    Solution: 100 mL  Total IN: 628.4 mL    OUT:    Indwelling Catheter - Urethral: 1105 mL    Nasoenteral Tube: 240 mL  Total OUT: 1345 mL    Total NET: -716.6 mL          LABS:                        9.0    10.14 )-----------( 121      ( 12 Jan 2020 02:36 )             25.8     01-12    139  |  107  |  82<H>  ----------------------------<  80  3.6   |  22  |  3.88<H>    Ca    7.5<L>      12 Jan 2020 02:36  Phos  4.2     01-12  Mg     2.1     01-12    TPro  4.2<L>  /  Alb  1.4<L>  /  TBili  0.4  /  DBili  x   /  AST  12<L>  /  ALT  21  /  AlkPhos  55  01-12        Impression: 80 y/o female with PMH Alzheimer's, CVA, Depression, CHF, HTN, MI (remote), recent dx of hypothyroidism with GI Bleed, S/P EGD and Colonoscopy, s/p 3 units of PRBC total, currently intubated and sedated.  No active bleeding.      Plan:  -No acute surgical intervention at this time. Serial CBCs, transfuse as needed.    -Continue to monitor for signs of acute bleeding.   -Continue PPI drip.    -GI f/u.  Repeat EGD planned for next week per GI   -Continue medical management and supportive care as per ICU team.    -Discussed with Dr. Carreon.

## 2020-01-12 NOTE — PROGRESS NOTE ADULT - SUBJECTIVE AND OBJECTIVE BOX
Patient is a 79y old  Female who presents with a chief complaint of sob cough (12 Jan 2020 14:41)      INTERVAL HPI/OVERNIGHT EVENTS: ICU attempts to wean from vent failing , not tplerating cpap    MEDICATIONS  (STANDING):  albuterol/ipratropium for Nebulization 3 milliLiter(s) Nebulizer every 4 hours  buDESOnide    Inhalation Suspension 0.5 milliGRAM(s) Inhalation every 12 hours  chlorhexidine 0.12% Liquid 15 milliLiter(s) Oral Mucosa every 12 hours  chlorhexidine 4% Liquid 1 Application(s) Topical <User Schedule>  cloNIDine 0.1 milliGRAM(s) Oral every 6 hours  hydrALAZINE 50 milliGRAM(s) Oral every 8 hours  levothyroxine Injectable 25 MICROGram(s) IV Push at bedtime  pantoprazole  Injectable 40 milliGRAM(s) IV Push every 12 hours  propofol Infusion 10 MICROgram(s)/kG/Min (3.678 mL/Hr) IV Continuous <Continuous>  sodium chloride 0.65% Nasal 1 Spray(s) Both Nostrils three times a day    MEDICATIONS  (PRN):  labetalol Injectable 10 milliGRAM(s) IV Push every 6 hours PRN Systolic blood pressure > 170      Allergies    No Known Allergies    Intolerances        REVIEW OF SYSTEMS:    opening eyes tired lethargic  no bleeding      Vital Signs Last 24 Hrs  T(C): 36.6 (12 Jan 2020 16:16), Max: 36.6 (12 Jan 2020 16:16)  T(F): 97.9 (12 Jan 2020 16:16), Max: 97.9 (12 Jan 2020 16:16)  HR: 60 (12 Jan 2020 17:26) (53 - 82)  BP: 142/35 (12 Jan 2020 15:00) (113/33 - 186/47)  BP(mean): 62 (12 Jan 2020 15:00) (54 - 149)  RR: 18 (12 Jan 2020 15:30) (10 - 20)  SpO2: 100% (12 Jan 2020 17:26) (99% - 100%)    PHYSICAL EXAM:  general       HEENT ET NGT  CHEST/LUNG: beter air entry, << rhonchi  HEART: Regular rate and rhythm; No murmurs, rubs, or gallops  ABDOMEN: Soft, Nontender, Nondistended; Bowel sounds present; Stevenson clear urine  EXTREMITIES:  2+ Peripheral Pulses, No clubbing, cyanosis, or edema  LYMPH: No lymphadenopathy noted  SKIN: No rashes or lesions    LABS:                        9.0    10.14 )-----------( 121      ( 12 Jan 2020 02:36 )             25.8     01-12    139  |  107  |  82<H>  ----------------------------<  80  3.6   |  22  |  3.88<H>    Ca    7.5<L>      12 Jan 2020 02:36  Phos  4.2     01-12  Mg     2.1     01-12    TPro  4.2<L>  /  Alb  1.4<L>  /  TBili  0.4  /  DBili  x   /  AST  12<L>  /  ALT  21  /  AlkPhos  55  01-12        CAPILLARY BLOOD GLUCOSE                    RADIOLOGY & ADDITIONAL TESTS:    Imaging Personally Reviewed:  [y ] YES  [ ] NO    Consultant(s) Notes Reviewed:  [y ] YES  [ ] NO    Care Discussed with Consultants/Other Providers [ ] YES  [ ] NO    PROBLEMS:  CONGESTIVE HEART FAILURE;RSV INFECTION  CONGESTIVE BABB FAILURE;RSV INFECTION  SHORTNESS OF BREATH  CHF (congestive heart failure)  Gastrointestinal hemorrhage, unspecified gastrointestinal hemorrhage type  Goals of care, counseling/discussion  HTN (hypertension)  Hyperlipidemia  Alzheimers disease  MI, old  Hypothyroidism  RSV infection  CHF (congestive heart failure)  GI bleed  Anemia  ARF/CKD    Care discussed with family,         [x  ]   yes  [  ]  No    imp:    stable[ ]    unstable[ x ]     improving [   ]       unchanged  [  ]                Plans:endoscopy in AM ; extubation? f/u H/H, BMP  prognosis guarded

## 2020-01-13 LAB
% ALBUMIN: 45.8 % — SIGNIFICANT CHANGE UP
% ALPHA 1: 8 % — SIGNIFICANT CHANGE UP
% ALPHA 2: 14.6 % — SIGNIFICANT CHANGE UP
% BETA: 15 % — SIGNIFICANT CHANGE UP
% GAMMA: 16.6 % — SIGNIFICANT CHANGE UP
% M SPIKE: SIGNIFICANT CHANGE UP
ALBUMIN SERPL ELPH-MCNC: 1.4 G/DL — LOW (ref 3.3–5)
ALBUMIN SERPL ELPH-MCNC: 1.7 G/DL — LOW (ref 3.6–5.5)
ALBUMIN/GLOB SERPL ELPH: 0.8 RATIO — SIGNIFICANT CHANGE UP
ALP SERPL-CCNC: 56 U/L — SIGNIFICANT CHANGE UP (ref 40–120)
ALPHA1 GLOB SERPL ELPH-MCNC: 0.3 G/DL — SIGNIFICANT CHANGE UP (ref 0.1–0.4)
ALPHA2 GLOB SERPL ELPH-MCNC: 0.5 G/DL — SIGNIFICANT CHANGE UP (ref 0.5–1)
ALT FLD-CCNC: 19 U/L — SIGNIFICANT CHANGE UP (ref 12–78)
ANION GAP SERPL CALC-SCNC: 10 MMOL/L — SIGNIFICANT CHANGE UP (ref 5–17)
AST SERPL-CCNC: 12 U/L — LOW (ref 15–37)
B-GLOBULIN SERPL ELPH-MCNC: 0.6 G/DL — SIGNIFICANT CHANGE UP (ref 0.5–1)
BASOPHILS # BLD AUTO: 0.01 K/UL — SIGNIFICANT CHANGE UP (ref 0–0.2)
BASOPHILS NFR BLD AUTO: 0.1 % — SIGNIFICANT CHANGE UP (ref 0–2)
BILIRUB SERPL-MCNC: 0.3 MG/DL — SIGNIFICANT CHANGE UP (ref 0.2–1.2)
BUN SERPL-MCNC: 74 MG/DL — HIGH (ref 7–23)
CALCIUM SERPL-MCNC: 7 MG/DL — LOW (ref 8.5–10.1)
CHLORIDE SERPL-SCNC: 107 MMOL/L — SIGNIFICANT CHANGE UP (ref 96–108)
CO2 SERPL-SCNC: 22 MMOL/L — SIGNIFICANT CHANGE UP (ref 22–31)
CREAT SERPL-MCNC: 3.89 MG/DL — HIGH (ref 0.5–1.3)
EOSINOPHIL # BLD AUTO: 0.09 K/UL — SIGNIFICANT CHANGE UP (ref 0–0.5)
EOSINOPHIL NFR BLD AUTO: 0.9 % — SIGNIFICANT CHANGE UP (ref 0–6)
GAMMA GLOBULIN: 0.6 G/DL — SIGNIFICANT CHANGE UP (ref 0.6–1.6)
GLUCOSE SERPL-MCNC: 101 MG/DL — HIGH (ref 70–99)
GRAM STN FLD: SIGNIFICANT CHANGE UP
HCT VFR BLD CALC: 26.2 % — LOW (ref 34.5–45)
HGB BLD-MCNC: 8.8 G/DL — LOW (ref 11.5–15.5)
IMM GRANULOCYTES NFR BLD AUTO: 0.6 % — SIGNIFICANT CHANGE UP (ref 0–1.5)
INTERPRETATION SERPL IFE-IMP: SIGNIFICANT CHANGE UP
LYMPHOCYTES # BLD AUTO: 1.07 K/UL — SIGNIFICANT CHANGE UP (ref 1–3.3)
LYMPHOCYTES # BLD AUTO: 10.4 % — LOW (ref 13–44)
M-SPIKE: SIGNIFICANT CHANGE UP (ref 0–0)
MAGNESIUM SERPL-MCNC: 2 MG/DL — SIGNIFICANT CHANGE UP (ref 1.6–2.6)
MCHC RBC-ENTMCNC: 30.7 PG — SIGNIFICANT CHANGE UP (ref 27–34)
MCHC RBC-ENTMCNC: 33.6 GM/DL — SIGNIFICANT CHANGE UP (ref 32–36)
MCV RBC AUTO: 91.3 FL — SIGNIFICANT CHANGE UP (ref 80–100)
MONOCYTES # BLD AUTO: 0.55 K/UL — SIGNIFICANT CHANGE UP (ref 0–0.9)
MONOCYTES NFR BLD AUTO: 5.3 % — SIGNIFICANT CHANGE UP (ref 2–14)
NEUTROPHILS # BLD AUTO: 8.51 K/UL — HIGH (ref 1.8–7.4)
NEUTROPHILS NFR BLD AUTO: 82.7 % — HIGH (ref 43–77)
NRBC # BLD: 0 /100 WBCS — SIGNIFICANT CHANGE UP (ref 0–0)
PHOSPHATE SERPL-MCNC: 4.5 MG/DL — SIGNIFICANT CHANGE UP (ref 2.5–4.5)
PLATELET # BLD AUTO: 129 K/UL — LOW (ref 150–400)
POTASSIUM SERPL-MCNC: 3.3 MMOL/L — LOW (ref 3.5–5.3)
POTASSIUM SERPL-SCNC: 3.3 MMOL/L — LOW (ref 3.5–5.3)
PROT ?TM UR-MCNC: 211 MG/DL — HIGH (ref 0–12)
PROT PATTERN SERPL ELPH-IMP: SIGNIFICANT CHANGE UP
PROT SERPL-MCNC: 4.4 GM/DL — LOW (ref 6–8.3)
RBC # BLD: 2.87 M/UL — LOW (ref 3.8–5.2)
RBC # FLD: 16.9 % — HIGH (ref 10.3–14.5)
SODIUM SERPL-SCNC: 139 MMOL/L — SIGNIFICANT CHANGE UP (ref 135–145)
SPECIMEN SOURCE: SIGNIFICANT CHANGE UP
WBC # BLD: 10.29 K/UL — SIGNIFICANT CHANGE UP (ref 3.8–10.5)
WBC # FLD AUTO: 10.29 K/UL — SIGNIFICANT CHANGE UP (ref 3.8–10.5)

## 2020-01-13 PROCEDURE — 99231 SBSQ HOSP IP/OBS SF/LOW 25: CPT

## 2020-01-13 PROCEDURE — 99291 CRITICAL CARE FIRST HOUR: CPT

## 2020-01-13 PROCEDURE — 71045 X-RAY EXAM CHEST 1 VIEW: CPT | Mod: 26

## 2020-01-13 RX ORDER — HYDRALAZINE HCL 50 MG
10 TABLET ORAL ONCE
Refills: 0 | Status: COMPLETED | OUTPATIENT
Start: 2020-01-13 | End: 2020-01-13

## 2020-01-13 RX ORDER — IPRATROPIUM/ALBUTEROL SULFATE 18-103MCG
3 AEROSOL WITH ADAPTER (GRAM) INHALATION EVERY 6 HOURS
Refills: 0 | Status: DISCONTINUED | OUTPATIENT
Start: 2020-01-13 | End: 2020-01-30

## 2020-01-13 RX ORDER — POTASSIUM CHLORIDE 20 MEQ
20 PACKET (EA) ORAL ONCE
Refills: 0 | Status: COMPLETED | OUTPATIENT
Start: 2020-01-13 | End: 2020-01-13

## 2020-01-13 RX ADMIN — Medication 50 MILLIGRAM(S): at 12:44

## 2020-01-13 RX ADMIN — CHLORHEXIDINE GLUCONATE 15 MILLILITER(S): 213 SOLUTION TOPICAL at 18:22

## 2020-01-13 RX ADMIN — CHLORHEXIDINE GLUCONATE 1 APPLICATION(S): 213 SOLUTION TOPICAL at 12:44

## 2020-01-13 RX ADMIN — Medication 20 MILLIEQUIVALENT(S): at 06:51

## 2020-01-13 RX ADMIN — PANTOPRAZOLE SODIUM 40 MILLIGRAM(S): 20 TABLET, DELAYED RELEASE ORAL at 21:00

## 2020-01-13 RX ADMIN — Medication 3 MILLILITER(S): at 05:23

## 2020-01-13 RX ADMIN — Medication 50 MILLIGRAM(S): at 21:00

## 2020-01-13 RX ADMIN — Medication 0.5 MILLIGRAM(S): at 05:23

## 2020-01-13 RX ADMIN — Medication 1 SPRAY(S): at 05:47

## 2020-01-13 RX ADMIN — Medication 0.1 MILLIGRAM(S): at 18:22

## 2020-01-13 RX ADMIN — PANTOPRAZOLE SODIUM 40 MILLIGRAM(S): 20 TABLET, DELAYED RELEASE ORAL at 08:11

## 2020-01-13 RX ADMIN — Medication 1 SPRAY(S): at 17:07

## 2020-01-13 RX ADMIN — Medication 1 SPRAY(S): at 21:44

## 2020-01-13 RX ADMIN — Medication 10 MILLIGRAM(S): at 01:28

## 2020-01-13 RX ADMIN — CHLORHEXIDINE GLUCONATE 15 MILLILITER(S): 213 SOLUTION TOPICAL at 05:47

## 2020-01-13 RX ADMIN — Medication 0.1 MILLIGRAM(S): at 05:47

## 2020-01-13 RX ADMIN — Medication 50 MILLIGRAM(S): at 05:47

## 2020-01-13 RX ADMIN — Medication 0.5 MILLIGRAM(S): at 17:27

## 2020-01-13 RX ADMIN — Medication 3 MILLILITER(S): at 01:10

## 2020-01-13 RX ADMIN — Medication 25 MICROGRAM(S): at 21:43

## 2020-01-13 RX ADMIN — Medication 0.1 MILLIGRAM(S): at 12:44

## 2020-01-13 RX ADMIN — Medication 0.1 MILLIGRAM(S): at 00:51

## 2020-01-13 NOTE — PROGRESS NOTE ADULT - ASSESSMENT
HPI:  · HPI Objective Statement: 79 years old female from [facility] rehab c/o coughing sob for three to four days. Pt is sent here to rule out chf vs pneumonia. Pt is alert and oriented to person only unable to give detail hx due to hx of Alzheimer according to the [facility] pt had sat of 91 to 92 %,	  ---------------- As Above ---------------------------------------------  Patient is a poor historian. Patient admitted with Bronchitis and UTI. Called for hematemsis. Patient had three episodes of bloody vomitus. ( 1/3 of a cup ) . Patient complaining of mid epigastric / chest pain but patient can not characterize the pain.   Patient has chronic anemia. but HGB fell 8.4 --> 7.2. Was on Carafate, now on IV PPI  KUB pending  HX of CRI    Addendum : Patient had another episode of bloody vomitus    Upper GI Bleed - Secondary to (?). 1) NPO  2)Check KUB  3) IV PPI 4) telemetry  5) NGT 6) EGD 7) Hold Carafate  8) Hold iron  ---Will speak with daughter.

## 2020-01-13 NOTE — PROGRESS NOTE ADULT - SUBJECTIVE AND OBJECTIVE BOX
79 years old female from [facility] rehab c/o coughing sob for three to four days. Pt is sent here to rule out chf vs pneumonia. Pt is alert and oriented to person only unable to give detail hx due to hx of Alzheimer according to the [facility] pt had sat of 91 to 92 %,	    PAST MEDICAL/SURGICAL/FAMILY/SOCIAL HISTORY:    Past Medical History:  Alzheimers disease    CVA (cerebral vascular accident)    Depression    HTN (hypertension)    MI, old.  recently Dx c hypothyroidism, TSH > 111 started Levothyroxin 50mcg qd as per Dr Vigil (2020 11:41)      Chief Complaint:  Patient is a 79y old  Female who presents with a chief complaint of sob cough (2020 22:56)      Review of Systems:    General:  No wt loss, fevers, chills, night sweats  Eyes:  Good vision, no reported pain  ENT:  No sore throat, pain, runny nose, dysphagia  CV:  No pain, palpitations, hypo/hypertension  Resp:  dyspnea, cough  GI:  No pain, nausea, vomiting, diarrhea, constipation           Social History/Family History  SOCHX:   tobacco,  -  alcohol    FMHX: FA/MO  - contributory       Discussed with:  PMD, Family    Physical Exam:    Vital Signs:  Vital Signs Last 24 Hrs  T(C): 36.2 (2020 17:06), Max: 36.9 (2020 00:18)  T(F): 97.2 (2020 17:06), Max: 98.5 (2020 00:18)  HR: 74 (2020 21:55) (68 - 87)  BP: 179/78 (2020 17:06) (132/71 - 179/78)  BP(mean): --  RR: 18 (2020 17:06) (18 - 18)  SpO2: 97% (2020 21:55) (94% - 98%)  Daily     Daily Weight in k.8 (2020 04:59)  I&O's Summary    2020 07:01  -  2020 07:00  --------------------------------------------------------  IN: 560 mL / OUT: 0 mL / NET: 560 mL          Chest:  fair air entry bilateral  Cardiovascular:  Regular rhythm, S1, S2, no murmur/rub/S3/S4, no carotid/femoral/abdominal bruit, radial/pedal pulses 2+,   Abdomen:  Soft, non-tender, non-distended, normoactive bowel sounds, no HSM        Laboratory:          138  |  104  |  69<H>  ----------------------------<  111<H>  3.8   |  25  |  3.77<H>    Ca    8.1<L>      2020 07:22          Assessment:  I am asked to assist this patient admitted with shortness of breath  The patient has multifactorial causes of shortness of breath, renal insufficiency, anemia  And now after a diagnostic echocardiogram within normal ejection fraction the reading shows mitral regurgitation and tricuspid regurgitation  After review of the studies this regurgitation is not considered severe but in the moderate to severe but more moderate range  the patient now is transferred to the intensive care unit after a noted upper GI bleed  GI noted, repeat EGD   The patient was intubated and remains on ventilator support , weaning trials again today  optimize systolic blood pressure

## 2020-01-13 NOTE — PROGRESS NOTE ADULT - SUBJECTIVE AND OBJECTIVE BOX
HPI:  Pt is an 78 yo HF with h/o CAD, MI, dCHF, HTN, CKD with solitary kidney, hypothyroidism, Alzheimer's dementia, CVA x2 ,  with residual L weakness,achalasia s/p POEMS (2018) and UGIB presents from rehab for RSV PNA, VRE UTI, CHF exacerbation. Hospital course complicated by acute blood loss anemia 2 to UGIB acute on CKD and acute respiratory failure requiring intubation.      ## Labs:  CBC:                        8.8    10.29 )-----------( 129      ( 2020 04:22 )             26.2     Chem:      139  |  107  |  74<H>  ----------------------------<  101<H>  3.3<L>   |  22  |  3.89<H>    Ca    7.0<L>      2020 04:22  Phos  4.5       Mg     2.0         TPro  4.4<L>  /  Alb  1.4<L>  /  TBili  0.3  /  DBili  x   /  AST  12<L>  /  ALT  19  /  AlkPhos  56      Coags:          ## Imaging:    ## Medications:    cloNIDine 0.1 milliGRAM(s) Oral every 6 hours  hydrALAZINE 50 milliGRAM(s) Oral every 8 hours    albuterol/ipratropium for Nebulization 3 milliLiter(s) Nebulizer every 6 hours PRN  buDESOnide    Inhalation Suspension 0.5 milliGRAM(s) Inhalation every 12 hours    levothyroxine Injectable 25 MICROGram(s) IV Push at bedtime      pantoprazole  Injectable 40 milliGRAM(s) IV Push every 12 hours    propofol Infusion 10 MICROgram(s)/kG/Min IV Continuous <Continuous>      ## Vitals:  T(C): 35.6 (20 @ 16:02), Max: 36.3 (20 @ 20:00)  HR: 71 (20 @ 18:05) (55 - 90)  BP: 176/66 (20 @ 18:00) (136/39 - 213/50)  BP(mean): 94 (20 @ 18:00) (63 - 99)  RR: 16 (20 @ 18:00) (10 - 19)  SpO2: 100% (20 @ 18:05) (99% - 100%)  Wt(kg): --  Vent: Mode: AC/ CMV (Assist Control/ Continuous Mandatory Ventilation), RR (machine): 16, RR (patient): 16, TV (machine): 400, FiO2: 40, PEEP: 5, PIP: 36  AB-12 @ 07:01  -   @ 07:00  --------------------------------------------------------  IN: 765.5 mL / OUT: 350 mL / NET: 415.5 mL          ## P/E:  Gen: lying comfortably in bed in no apparent distress  Mouth: (+) ETT  Lungs: Rhonchi  Heart: RRR  Abd: Soft/+BS  Ext: UE edema  Neuro: Sedated    CENTRAL LINE: [ ] YES [ ] NO  LOCATION:   DATE INSERTED:  REMOVE: [ ] YES [ ] NO      VIANEY: [ ] YES [ ] NO    DATE INSERTED:  REMOVE:  [ ] YES [ ] NO      A-LINE:  [ ] YES [ ] NO  LOCATION:   DATE INSERTED:  REMOVE:  [ ] YES [ ] NO  EXPLAIN:    CODE STATUS: [x] full code  [ ] DNR  [ ] DNI  [ ] MOLST  Goals of care discussion: [ ] yes

## 2020-01-13 NOTE — PROGRESS NOTE ADULT - SUBJECTIVE AND OBJECTIVE BOX
TMAX - 96.9 - 97.9     Presently off ab rx - Ampi rx completed    Vital Signs Last 24 Hrs  T(C): 36.1 (13 Jan 2020 12:15), Max: 36.6 (12 Jan 2020 16:16)  T(F): 96.9 (13 Jan 2020 12:15), Max: 97.9 (12 Jan 2020 16:16)  HR: 74 (13 Jan 2020 12:30) (56 - 90)  BP: 194/49 (13 Jan 2020 12:15) (135/36 - 213/50)  BP(mean): 86 (13 Jan 2020 12:15) (61 - 98)  RR: 16 (13 Jan 2020 12:30) (10 - 19)  SpO2: 100% (13 Jan 2020 12:30) (99% - 100%)  Mode: AC/ CMV (Assist Control/ Continuous Mandatory Ventilation)  RR (machine): 16  TV (machine): 400  FiO2: 40  PEEP: 5  ITime: 1  MAP: 11  PIP: 39  Supplemental O2:  on 40% FIO2 + 5 PEEP      Awakens on the ventilator and denies any cp or abdominal pain and no c/o SOB at present ( pt. nods to questions ).  Stevenson has been d/c'ed now.  Patient reportedly suctioned now for thick yellow secretions ( previously was frothy pink ).  For repeat EGD today.        PHYSICAL EXAM  General:  arousable on ventilator, appears comfortable at present and in NAD  HEENT:  conj pink, sclerae anicteric, PERRLA, orally intubated and with NGT in place  Neck:  semi-supple, no nodes noted  Heart:  RR  Lungs:  bilateral moist rhonchi with upper airway wheeze intermittently  Abdomen: soft, BS+, nontender to palpation  Back: no CVA or Spinal tenderness noted   Extremities:  trace edema LE's and mild swelling LUE > RUE  Skin:  warm, dry, no rash noted        I&O's Summary :    12 Jan 2020 07:01  -  13 Jan 2020 07:00  --------------------------------------------------------  IN: 765.5 mL / OUT: 350 mL / NET: 415.5 mL      LABS:  CBC Full  -  ( 13 Jan 2020 04:22 )  WBC Count : 10.29 K/uL  RBC Count : 2.87 M/uL  Hemoglobin : 8.8 g/dL  Hematocrit : 26.2 %  Platelet Count - Automated : 129 K/uL  Mean Cell Volume : 91.3 fl  Mean Cell Hemoglobin : 30.7 pg  Mean Cell Hemoglobin Concentration : 33.6 gm/dL  Auto Neutrophil # : 8.51 K/uL  Auto Lymphocyte # : 1.07 K/uL  Auto Monocyte # : 0.55 K/uL  Auto Eosinophil # : 0.09 K/uL  Auto Basophil # : 0.01 K/uL  Auto Neutrophil % : 82.7 %  Auto Lymphocyte % : 10.4 %  Auto Monocyte % : 5.3 %  Auto Eosinophil % : 0.9 %  Auto Basophil % : 0.1 %    01-13    139  |  107  |  74<H>  ----------------------------<  101<H>  3.3<L>   |  22  |  3.89<H>    Ca    7.0<L>      13 Jan 2020 04:22  Phos  4.5     01-13  Mg     2.0     01-13    TPro  4.4<L>  /  Alb  1.4<L>  /  TBili  0.3  /  DBili  x   /  AST  12<L>  /  ALT  19  /  AlkPhos  56  01-13    LIVER FUNCTIONS - ( 13 Jan 2020 04:22 )  Alb: 1.4 g/dL / Pro: 4.4 gm/dL / ALK PHOS: 56 U/L / ALT: 19 U/L / AST: 12 U/L / GGT: x             Sedimentation Rate, Erythrocyte: 49 mm/hr (01-09 @ 08:25)            MICROBIOLOGY:    Culture - Urine (01.01.20 @ 17:13)    -  Tetra/Doxy: R >8    -  Vancomycin: R >16    -  Nitrofurantoin: S <=32 Should not be used to treat pyelonephritis.    -  Linezolid: S 2    -  Daptomycin: S 1    -  Levofloxacin: R >4    -  Ciprofloxacin: R >2    -  Ampicillin: S <=2 Predicts results to ampicillin/sulbactam, amoxacillin-clavulanate and  piperacillin-tazobactam.    Specimen Source: .Urine Catheterized    Culture Results:   50,000 - 99,000 CFU/mL Enterococcus faecalis (vancomycin resistant)    Organism Identification: Observation    Organism: Observation    Method Type: RONALD      Culture - Blood (01.01.20 @ 17:11)    Specimen Source: .Blood Blood-Peripheral    Culture Results:   No growth at 5 days.    Culture - Blood (01.01.20 @ 17:11)    Specimen Source: .Blood Blood-Peripheral    Culture Results:   No growth at 5 days.    FLU A B RSV Detection by PCR (01.01.20 @ 14:04)    Flu A Result: [facility]: The Flu A B RSV assay is a Real-Time PCR test for the qualitative  detection and differentiation of Influenza A, Influenza B, and  Respiratory Syncytial Virus on nasopharyngeal swabs. The results should  be interpreted in the context of all clinical and laboratory findings.    Flu B Result: NotDete    RSV Result: Detected           Radiology:  < from: Xray Chest 1 View- PORTABLE-Urgent (01.10.20 @ 14:45) >    EXAM:  XR CHEST PORTABLE URGENT 1V                          PROCEDURE DATE:  01/10/2020      INTERPRETATION:  NG tube placement.    AP chest. Prior 1/9/2020.    Endotracheal tube nasogastric tube are in satisfactory position. No change heart mediastinum. No gross change bilateral airspace disease and small hazy basilar effusions. No pneumothorax.    Impression: As above                                            CXR - < from: Xray Chest 1 View- PORTABLE-Routine (01.13.20 @ 07:28) >    EXAM:  XR CHEST PORTABLE ROUTINE 1V                            PROCEDURE DATE:  01/13/2020          INTERPRETATION:  Chest portable.    Clinical History: Pneumonia, follow-up exam. Gastrointestinal bleed.    Comparison: 1/10/2020.    Single AP viewsubmitted.  The patient's lines and tubes remain unchanged. There are multiple leads overlying the chest.    The evaluation of the cardiomediastinal silhouette is limited on portable technique.  Prominent cardiac silhouette.    Pulmonary vascular congestion with small bilateral pleural effusions.    Impression:    Findings as discussed above.                                Impression:  Intermittent hypothermia, presently off ab rx  after initial  rx of Sepsis due to RSV Bronchitis/ PNA with VRE UTI and secondary Respiratory Failure with JONY and new UGI Bleed - s/p EGD and Colonoscopy with AVM found on EGD.  Now with  thick yellow sputum suctioned and CXR as above with congestion and small bilateral effusions.  Noted WBC's are decreased today.    Suggestions:  Will continue to observe off ab rx and request Sputum Cx to further evaluate.  Continue to follow-up temps and labs.  Await repeat EGD scheduled for today.

## 2020-01-13 NOTE — PROGRESS NOTE ADULT - SUBJECTIVE AND OBJECTIVE BOX
[facility] NEPHROLOGY SERVICES, [facility]  NEPHROLOGY AND HYPERTENSION  [address]  [address]  [place] [address]  [phone]    MD MART LORD, MD CHING ROTHMAN MD YELENA ROSENBERG, MD ISABELLA SLADE, MD ALAYNA WALLACE MD          Patient vent dependent no distress      MEDICATIONS  (STANDING):  buDESOnide    Inhalation Suspension 0.5 milliGRAM(s) Inhalation every 12 hours  chlorhexidine 0.12% Liquid 15 milliLiter(s) Oral Mucosa every 12 hours  chlorhexidine 4% Liquid 1 Application(s) Topical <User Schedule>  cloNIDine 0.1 milliGRAM(s) Oral every 6 hours  hydrALAZINE 50 milliGRAM(s) Oral every 8 hours  levothyroxine Injectable 25 MICROGram(s) IV Push at bedtime  pantoprazole  Injectable 40 milliGRAM(s) IV Push every 12 hours  propofol Infusion 10 MICROgram(s)/kG/Min (3.678 mL/Hr) IV Continuous <Continuous>  sodium chloride 0.65% Nasal 1 Spray(s) Both Nostrils three times a day    MEDICATIONS  (PRN):  albuterol/ipratropium for Nebulization 3 milliLiter(s) Nebulizer every 6 hours PRN Shortness of Breath and/or Wheezing      20 @ 07:01  -  20 @ 07:00  --------------------------------------------------------  IN: 765.5 mL / OUT: 350 mL / NET: 415.5 mL      PHYSICAL EXAM:      T(C): 36.1 (20 @ 12:15), Max: 36.6 (20 @ 16:16)  HR: 55 (20 @ 15:00) (55 - 90)  BP: 164/49 (20 @ 15:00) (136/39 - 213/50)  RR: 16 (20 @ 15:00) (10 - 19)  SpO2: 100% (01-13-20 @ 15:00) (99% - 100%)  Wt(kg): --  Respiratory: clear anteriorly, decreased BS at bases  Cardiovascular: S1 S2  Gastrointestinal: soft NT ND +BS  Extremities:   1 edema                                    8.8    10.29 )-----------( 129      ( 2020 04:22 )             26.2         139  |  107  |  74<H>  ----------------------------<  101<H>  3.3<L>   |  22  |  3.89<H>    Ca    7.0<L>      2020 04:22  Phos  4.5       Mg     2.0         TPro  4.4<L>  /  Alb  1.4<L>  /  TBili  0.3  /  DBili  x   /  AST  12<L>  /  ALT  19  /  AlkPhos  56        LIVER FUNCTIONS - ( 2020 04:22 )  Alb: 1.4 g/dL / Pro: 4.4 gm/dL / ALK PHOS: 56 U/L / ALT: 19 U/L / AST: 12 U/L / GGT: x           Creatinine Trend: 3.89<--, 3.88<--, 3.87<--, 4.17<--, 4.11<--, 4.36<--    Immunoelectrophoresis, Serum (20 @ 12:04)    Protein Total, Serum: 3.7 g/dL    Total Protein, Serum: 3.7 g/dL    Quantitative Ig mg/dL    Quantitative IgA: 262 mg/dL    Quantitative IgM: 21 mg/dL    TRIPP Kappa: 11.50 mg/dL    TRIPP Lambda: 6.94 mg/dL    Headrick/Lambda Free Light Chain Ratio, Serum: 1.66 Ratio      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    Plan  IV Labetalol prn  Added clonidine 0.1 mg q 6  Continued warranted diuresis, supportive care.  Follow paraprotein screen.  Would defer prospect of aggressive work up or HD support as overall prognosis poor.      Obed Jamison MD

## 2020-01-13 NOTE — PROGRESS NOTE ADULT - ASSESSMENT
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 Hospital course complicated by acute blood loss anemia 2 to UGIB acute on CKD and acute respiratory failure requiring intubation.    Resp: If pt weans well tomorrow may extubate unless EGD is scheduled  ID: Off Abx  CVS: May need to increase pt's antiHTN  Heme: Trend Hb  FEN: Replace K; pt hypokalemic/ NPO  GI: ? EGD tomorrow  Renal: Cr ? stabilizing/ f/u as per Renal  Neuro: Lighten up sedation    CCT; 40 min

## 2020-01-13 NOTE — PROGRESS NOTE ADULT - SUBJECTIVE AND OBJECTIVE BOX
for EGD today, remains intubated in ICU    Vital Signs Last 24 Hrs  T(C): 36.2 (13 Jan 2020 04:17), Max: 36.6 (12 Jan 2020 16:16)  T(F): 97.1 (13 Jan 2020 04:17), Max: 97.9 (12 Jan 2020 16:16)  HR: 61 (13 Jan 2020 09:39) (54 - 71)  BP: 148/46 (13 Jan 2020 09:30) (135/36 - 213/50)  BP(mean): 71 (13 Jan 2020 09:30) (61 - 98)  RR: 19 (13 Jan 2020 09:30) (10 - 19)  SpO2: 100% (13 Jan 2020 09:39) (99% - 100%)    PHYSICAL EXAM:    general - Intubated on ventilator  HEENT - ETT  CVS - RRR  RS - AE B/L  Abd - soft, NT  Ext - Pulses +        LABS:                        8.8    10.29 )-----------( 129      ( 13 Jan 2020 04:22 )             26.2     01-13    139  |  107  |  74<H>  ----------------------------<  101<H>  3.3<L>   |  22  |  3.89<H>    Ca    7.0<L>      13 Jan 2020 04:22  Phos  4.5     01-13  Mg     2.0     01-13    TPro  4.4<L>  /  Alb  1.4<L>  /  TBili  0.3  /  DBili  x   /  AST  12<L>  /  ALT  19  /  AlkPhos  56  01-13            RADIOLOGY & ADDITIONAL STUDIES:

## 2020-01-13 NOTE — PROGRESS NOTE ADULT - SUBJECTIVE AND OBJECTIVE BOX
Patient is a 79y old  Female who presents with a chief complaint of sob cough (13 Jan 2020 22:27)      INTERVAL HPI/OVERNIGHT EVENTS: ICU s/p UGI bleeding.  extubation failed    MEDICATIONS  (STANDING):  buDESOnide    Inhalation Suspension 0.5 milliGRAM(s) Inhalation every 12 hours  chlorhexidine 0.12% Liquid 15 milliLiter(s) Oral Mucosa every 12 hours  chlorhexidine 4% Liquid 1 Application(s) Topical <User Schedule>  cloNIDine 0.1 milliGRAM(s) Oral every 6 hours  hydrALAZINE 50 milliGRAM(s) Oral every 8 hours  levothyroxine Injectable 25 MICROGram(s) IV Push at bedtime  pantoprazole  Injectable 40 milliGRAM(s) IV Push every 12 hours  propofol Infusion 10 MICROgram(s)/kG/Min (3.678 mL/Hr) IV Continuous <Continuous>  sodium chloride 0.65% Nasal 1 Spray(s) Both Nostrils three times a day    MEDICATIONS  (PRN):  albuterol/ipratropium for Nebulization 3 milliLiter(s) Nebulizer every 6 hours PRN Shortness of Breath and/or Wheezing      Allergies    No Known Allergies    Intolerances        REVIEW OF SYSTEMS        Vital Signs Last 24 Hrs  T(C): 35.8 (13 Jan 2020 19:40), Max: 36.2 (13 Jan 2020 04:17)  T(F): 96.5 (13 Jan 2020 19:40), Max: 97.1 (13 Jan 2020 04:17)  HR: 71 (13 Jan 2020 22:02) (55 - 90)  BP: 190/66 (13 Jan 2020 21:00) (136/39 - 213/50)  BP(mean): 100 (13 Jan 2020 21:00) (63 - 100)  RR: 16 (13 Jan 2020 21:00) (14 - 19)  SpO2: 100% (13 Jan 2020 22:02) (99% - 100%)    PHYSICAL EXAM:  general         LABS:                        8.8    10.29 )-----------( 129      ( 13 Jan 2020 04:22 )             26.2     01-13    139  |  107  |  74<H>  ----------------------------<  101<H>  3.3<L>   |  22  |  3.89<H>    Ca    7.0<L>      13 Jan 2020 04:22  Phos  4.5     01-13  Mg     2.0     01-13    TPro  4.4<L>  /  Alb  1.4<L>  /  TBili  0.3  /  DBili  x   /  AST  12<L>  /  ALT  19  /  AlkPhos  56  01-13        CAPILLARY BLOOD GLUCOSE                    RADIOLOGY & ADDITIONAL TESTS:    Imaging Personally Reviewed:  [y ] YES  [ ] NO    Consultant(s) Notes Reviewed:  [y ] YES  [ ] NO    Care Discussed with Consultants/Other Providers [ ] YES  [ ] NO    PROBLEMS:  CONGESTIVE HEART FAILURE;RSV INFECTION  CONGESTIVE BABB FAILURE;RSV INFECTION  SHORTNESS OF BREATH  CHF (congestive heart failure)  Gastrointestinal hemorrhage, unspecified gastrointestinal hemorrhage type  Goals of care, counseling/discussion  HTN (hypertension)  Hyperlipidemia  Alzheimers disease  MI, old  Hypothyroidism  RSV infection  MVS  CHF (congestive heart failure)  GI bleed  Anemia      Care discussed with family,         [  ]   yes  [  ]  No    imp:    stable[ ]    unstable[ x ]     improving [   ]       unchanged  [  ]                Plans:  extubation, f/u H/H, cardilology, GI, nephrology  prognosis guarded

## 2020-01-13 NOTE — PROGRESS NOTE ADULT - PROBLEM SELECTOR PLAN 1
ICU #1, Intubated. 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 1) Hold Carafate for EGD 2) clear liquids 3) F/U EGD  4) F/U CBC

## 2020-01-14 LAB
ANION GAP SERPL CALC-SCNC: 12 MMOL/L — SIGNIFICANT CHANGE UP (ref 5–17)
BUN SERPL-MCNC: 75 MG/DL — HIGH (ref 7–23)
CALCIUM SERPL-MCNC: 7.4 MG/DL — LOW (ref 8.5–10.1)
CHLORIDE SERPL-SCNC: 109 MMOL/L — HIGH (ref 96–108)
CO2 SERPL-SCNC: 19 MMOL/L — LOW (ref 22–31)
CREAT SERPL-MCNC: 3.79 MG/DL — HIGH (ref 0.5–1.3)
CRP SERPL-MCNC: 5.72 MG/DL — HIGH (ref 0–0.4)
ERYTHROCYTE [SEDIMENTATION RATE] IN BLOOD: 42 MM/HR — HIGH (ref 0–20)
GLUCOSE SERPL-MCNC: 82 MG/DL — SIGNIFICANT CHANGE UP (ref 70–99)
HCT VFR BLD CALC: 28.3 % — LOW (ref 34.5–45)
HGB BLD-MCNC: 9.4 G/DL — LOW (ref 11.5–15.5)
M PROTEIN 24H UR ELPH-MRATE: SIGNIFICANT CHANGE UP
MAGNESIUM SERPL-MCNC: 2 MG/DL — SIGNIFICANT CHANGE UP (ref 1.6–2.6)
MCHC RBC-ENTMCNC: 30.4 PG — SIGNIFICANT CHANGE UP (ref 27–34)
MCHC RBC-ENTMCNC: 33.2 GM/DL — SIGNIFICANT CHANGE UP (ref 32–36)
MCV RBC AUTO: 91.6 FL — SIGNIFICANT CHANGE UP (ref 80–100)
NRBC # BLD: 0 /100 WBCS — SIGNIFICANT CHANGE UP (ref 0–0)
PHOSPHATE SERPL-MCNC: 4.6 MG/DL — HIGH (ref 2.5–4.5)
PLATELET # BLD AUTO: 148 K/UL — LOW (ref 150–400)
POTASSIUM SERPL-MCNC: 3.7 MMOL/L — SIGNIFICANT CHANGE UP (ref 3.5–5.3)
POTASSIUM SERPL-SCNC: 3.7 MMOL/L — SIGNIFICANT CHANGE UP (ref 3.5–5.3)
RBC # BLD: 3.09 M/UL — LOW (ref 3.8–5.2)
RBC # FLD: 16.8 % — HIGH (ref 10.3–14.5)
SODIUM SERPL-SCNC: 140 MMOL/L — SIGNIFICANT CHANGE UP (ref 135–145)
WBC # BLD: 9.61 K/UL — SIGNIFICANT CHANGE UP (ref 3.8–10.5)
WBC # FLD AUTO: 9.61 K/UL — SIGNIFICANT CHANGE UP (ref 3.8–10.5)

## 2020-01-14 PROCEDURE — 99291 CRITICAL CARE FIRST HOUR: CPT

## 2020-01-14 PROCEDURE — 99231 SBSQ HOSP IP/OBS SF/LOW 25: CPT

## 2020-01-14 RX ORDER — CARVEDILOL PHOSPHATE 80 MG/1
12.5 CAPSULE, EXTENDED RELEASE ORAL EVERY 12 HOURS
Refills: 0 | Status: DISCONTINUED | OUTPATIENT
Start: 2020-01-14 | End: 2020-01-18

## 2020-01-14 RX ORDER — HYDRALAZINE HCL 50 MG
10 TABLET ORAL ONCE
Refills: 0 | Status: COMPLETED | OUTPATIENT
Start: 2020-01-14 | End: 2020-01-14

## 2020-01-14 RX ORDER — POTASSIUM CHLORIDE 20 MEQ
10 PACKET (EA) ORAL ONCE
Refills: 0 | Status: COMPLETED | OUTPATIENT
Start: 2020-01-14 | End: 2020-01-14

## 2020-01-14 RX ORDER — HYDRALAZINE HCL 50 MG
10 TABLET ORAL EVERY 6 HOURS
Refills: 0 | Status: DISCONTINUED | OUTPATIENT
Start: 2020-01-14 | End: 2020-01-27

## 2020-01-14 RX ADMIN — Medication 0.1 MILLIGRAM(S): at 16:23

## 2020-01-14 RX ADMIN — Medication 50 MILLIGRAM(S): at 21:30

## 2020-01-14 RX ADMIN — Medication 0.1 MILLIGRAM(S): at 01:00

## 2020-01-14 RX ADMIN — Medication 1 SPRAY(S): at 06:58

## 2020-01-14 RX ADMIN — Medication 25 MICROGRAM(S): at 22:17

## 2020-01-14 RX ADMIN — Medication 50 MILLIGRAM(S): at 16:21

## 2020-01-14 RX ADMIN — Medication 50 MILLIGRAM(S): at 05:00

## 2020-01-14 RX ADMIN — Medication 10 MILLIGRAM(S): at 15:31

## 2020-01-14 RX ADMIN — CHLORHEXIDINE GLUCONATE 15 MILLILITER(S): 213 SOLUTION TOPICAL at 17:01

## 2020-01-14 RX ADMIN — Medication 0.1 MILLIGRAM(S): at 06:58

## 2020-01-14 RX ADMIN — Medication 0.5 MILLIGRAM(S): at 17:03

## 2020-01-14 RX ADMIN — Medication 0.5 MILLIGRAM(S): at 06:18

## 2020-01-14 RX ADMIN — Medication 1 SPRAY(S): at 22:18

## 2020-01-14 RX ADMIN — PANTOPRAZOLE SODIUM 40 MILLIGRAM(S): 20 TABLET, DELAYED RELEASE ORAL at 09:13

## 2020-01-14 RX ADMIN — PANTOPRAZOLE SODIUM 40 MILLIGRAM(S): 20 TABLET, DELAYED RELEASE ORAL at 21:30

## 2020-01-14 RX ADMIN — Medication 100 MILLIEQUIVALENT(S): at 09:13

## 2020-01-14 RX ADMIN — Medication 1 SPRAY(S): at 13:13

## 2020-01-14 RX ADMIN — CHLORHEXIDINE GLUCONATE 1 APPLICATION(S): 213 SOLUTION TOPICAL at 04:30

## 2020-01-14 RX ADMIN — CHLORHEXIDINE GLUCONATE 15 MILLILITER(S): 213 SOLUTION TOPICAL at 07:05

## 2020-01-14 NOTE — PHYSICAL THERAPY INITIAL EVALUATION ADULT - TRANSFER TRAINING, PT EVAL
Pt will independently perform sit to/from stand transfers without LOB using rolling walker by 4-6 weeks

## 2020-01-14 NOTE — PROGRESS NOTE ADULT - ASSESSMENT
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 Hospital course complicated by acute blood loss anemia 2 to UGIB, acute on CKD and acute respiratory failure requiring intubation.    Resp/GI; Pt scheduled for EGD this afternoon/ Post EGD aggressive weaning trial for extubation  ID: Off Abx  CVS: Increase pt's antiHTN; pt hypertensive  Heme: Trend Hb  FEN: Replace K; pt hypokalemic/ NPO for EGD  Renal: Cr decreasing/ Follow BUN/Cr and UO/ F/u as per Renal  Neuro: Off sedation    CCT; 35 min

## 2020-01-14 NOTE — PROGRESS NOTE ADULT - SUBJECTIVE AND OBJECTIVE BOX
INTERVAL HPI:  79 year female with HTN, HLD, MI hx, CVA, Gi bleed, Anemia Depression and Alzheimer's dementia.  Sent from [facility] due to SOB. Possibility of pneumonia vs CHF was raised.   In ED with Respiratory distress required BIPAP support and RVP positive for RSV. Pt not able to provide more details due to dementia. Information from transfer papers and ED physician.  Nods no when asked for smoking.  01/09/20: With massive GI bleed, intubated and transferred to ICU.  01/09/20:  EGD+ Colonoscopy.    OVERNIGHT EVENTS:  Tolerating CPAP    Vital Signs Last 24 Hrs  T(C): 36 (14 Jan 2020 12:30), Max: 36 (14 Jan 2020 05:00)  T(F): 96.8 (14 Jan 2020 12:30), Max: 96.8 (14 Jan 2020 05:00)  HR: 80 (14 Jan 2020 13:00) (54 - 121)  BP: 172/63 (14 Jan 2020 13:00) (135/50 - 214/73)  BP(mean): 91 (14 Jan 2020 13:00) (69 - 111)  RR: 14 (14 Jan 2020 13:00) (14 - 27)  SpO2: 98% (14 Jan 2020 13:00) (97% - 100%)    Mode: CPAP with PS  FiO2: 35  PEEP: 5  PS: 5  ITime: 1  MAP: 7    PHYSICAL EXAM:  GEN:        Resting., comfortable.  HEENT:      ETT   RESP:          no distress  CVS:             Regular rate and rhythm.   ABD:         Soft, non-tender, non-distended;     MEDICATIONS  (STANDING):  buDESOnide    Inhalation Suspension 0.5 milliGRAM(s) Inhalation every 12 hours  chlorhexidine 0.12% Liquid 15 milliLiter(s) Oral Mucosa every 12 hours  chlorhexidine 4% Liquid 1 Application(s) Topical <User Schedule>  cloNIDine 0.1 milliGRAM(s) Oral every 6 hours  hydrALAZINE 50 milliGRAM(s) Oral every 8 hours  levothyroxine Injectable 25 MICROGram(s) IV Push at bedtime  pantoprazole  Injectable 40 milliGRAM(s) IV Push every 12 hours  propofol Infusion 10 MICROgram(s)/kG/Min (3.678 mL/Hr) IV Continuous <Continuous>  sodium chloride 0.65% Nasal 1 Spray(s) Both Nostrils three times a day    MEDICATIONS  (PRN):  albuterol/ipratropium for Nebulization 3 milliLiter(s) Nebulizer every 6 hours PRN Shortness of Breath and/or Wheezing    LABS:                        9.4    9.61  )-----------( 148      ( 14 Jan 2020 04:24 )             28.3     01-14    140  |  109<H>  |  75<H>  ----------------------------<  82  3.7   |  19<L>  |  3.79<H>    Ca    7.4<L>      14 Jan 2020 04:24  Phos  4.6     01-14  Mg     2.0     01-14    TPro  4.4<L>  /  Alb  1.4<L>  /  TBili  0.3  /  DBili  x   /  AST  12<L>  /  ALT  19  /  AlkPhos  56  01-13 01-09 @ 22:08  pH: 7.48  pCO2: 29  pO2: 205  SaO2: 100      ASSESSMENT AND PLAN:  ·	Acute hypoxic  Respiratory failure .  ·	RSV tracheobronchitis.  ·	Left pleural effusion.  ·	Hypothyroidism with very high TSH.  ·	Anemia.  ·	VRE UTI  ·	Leukocytosis.  ·	Renal Insuffiencey.  ·	CVA by history.  ·	HTN.  ·	HLD.  ·	Alzheimer's dementia.  ·	GI bleed,    EGD planned for today.  Wean as tolerated.

## 2020-01-14 NOTE — PHYSICAL THERAPY INITIAL EVALUATION ADULT - PRECAUTIONS/LIMITATIONS, REHAB EVAL
isolation precautions/oxygen therapy device and L/min/cardiac precautions/fall precautions/intuabted

## 2020-01-14 NOTE — PROGRESS NOTE ADULT - SUBJECTIVE AND OBJECTIVE BOX
Patient is a 79y old  Female who presents with a chief complaint of sob cough (13 Jan 2020 22:38)      INTERVAL HPI/OVERNIGHT EVENTS: on vent , opening eyes , on ventilator    MEDICATIONS  (STANDING):  buDESOnide    Inhalation Suspension 0.5 milliGRAM(s) Inhalation every 12 hours  chlorhexidine 0.12% Liquid 15 milliLiter(s) Oral Mucosa every 12 hours  chlorhexidine 4% Liquid 1 Application(s) Topical <User Schedule>  cloNIDine 0.1 milliGRAM(s) Oral every 6 hours  hydrALAZINE 50 milliGRAM(s) Oral every 8 hours  levothyroxine Injectable 25 MICROGram(s) IV Push at bedtime  pantoprazole  Injectable 40 milliGRAM(s) IV Push every 12 hours  potassium chloride  10 mEq/100 mL IVPB 10 milliEquivalent(s) IV Intermittent once  propofol Infusion 10 MICROgram(s)/kG/Min (3.678 mL/Hr) IV Continuous <Continuous>  sodium chloride 0.65% Nasal 1 Spray(s) Both Nostrils three times a day    MEDICATIONS  (PRN):  albuterol/ipratropium for Nebulization 3 milliLiter(s) Nebulizer every 6 hours PRN Shortness of Breath and/or Wheezing      Allergies    No Known Allergies    Intolerances        REVIEW OF SYSTEMS:    NG dinabe  ET      Vital Signs Last 24 Hrs  T(C): 36 (14 Jan 2020 05:00), Max: 36.1 (13 Jan 2020 12:15)  T(F): 96.8 (14 Jan 2020 05:00), Max: 96.9 (13 Jan 2020 12:15)  HR: 64 (14 Jan 2020 07:32) (54 - 90)  BP: 182/44 (14 Jan 2020 07:00) (135/50 - 197/73)  BP(mean): 83 (14 Jan 2020 07:00) (69 - 103)  RR: 16 (14 Jan 2020 07:22) (14 - 19)  SpO2: 100% (14 Jan 2020 07:32) (99% - 100%)    PHYSICAL EXAM:  general   comfortable no distress    HEENT wnl  CHEST/LUNG: fairly clear scattered conductive rhonchi  HEART: Regular rate and rhythm; No murmurs, rubs, or gallops  ABDOMEN: Soft, Nontender, Nondistended; Bowel sounds present  EXTREMITIES:  2+ Peripheral  edema  LYMPH: No lymphadenopathy noted  SKIN: No rashes or lesions    LABS:                        9.4    9.61  )-----------( 148      ( 14 Jan 2020 04:24 )             28.3     01-14    140  |  109<H>  |  75<H>  ----------------------------<  82  3.7   |  19<L>  |  3.79<H>    Ca    7.4<L>      14 Jan 2020 04:24  Phos  4.6     01-14  Mg     2.0     01-14    TPro  4.4<L>  /  Alb  1.4<L>  /  TBili  0.3  /  DBili  x   /  AST  12<L>  /  ALT  19  /  AlkPhos  56  01-13        CAPILLARY BLOOD GLUCOSE                    RADIOLOGY & ADDITIONAL TESTS:    Imaging Personally Reviewed:  [y ] YES  [ ] NO    Consultant(s) Notes Reviewed:  [y ] YES  [ ] NO    Care Discussed with Consultants/Other Providers [ ] YES  [ ] NO    PROBLEMS:  CONGESTIVE HEART FAILURE;RSV INFECTION  CONGESTIVE BABB FAILURE;RSV INFECTION  SHORTNESS OF BREATH  CHF (congestive heart failure)  Gastrointestinal hemorrhage, unspecified gastrointestinal hemorrhage type  Goals of care, counseling/discussion  HTN (hypertension)  Hyperlipidemia  Alzheimers disease  MI, old  Hypothyroidism  RSV infection  CHF (congestive heart failure) MS  GI bleed  Anemia  CKD 4      Care discussed with family,         [  ]   yes  [  ]  No    imp:    stable[ ]    unstable[  ]     improving [   ]       unchanged  [ x ]                Plans:  EGD today,appropriate risk for the procedure. Extubation when possible

## 2020-01-14 NOTE — PROGRESS NOTE ADULT - SUBJECTIVE AND OBJECTIVE BOX
Patient is a 79y old  Female who presents with a chief complaint of sob cough (14 Jan 2020 15:46)      HPI:  · HPI Objective Statement: 79 years old female from [facility] rehab c/o coughing sob for three to four days. Pt is sent here to rule out chf vs pneumonia. Pt is alert and oriented to person only unable to give detail hx due to hx of Alzheimer according to the [facility] pt had sat of 91 to 92 %,	    PAST MEDICAL/SURGICAL/FAMILY/SOCIAL HISTORY:    Past Medical History:  Alzheimers disease    CVA (cerebral vascular accident)    Depression    HTN (hypertension)    MI, old.  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, hematochezia, hematemesis, nausea, vomiting, abdominal pain, constipation, diarrhea, or change in bowel movements     MEDICATIONS  (STANDING):  buDESOnide    Inhalation Suspension 0.5 milliGRAM(s) Inhalation every 12 hours  chlorhexidine 0.12% Liquid 15 milliLiter(s) Oral Mucosa every 12 hours  chlorhexidine 4% Liquid 1 Application(s) Topical <User Schedule>  cloNIDine 0.1 milliGRAM(s) Oral every 6 hours  hydrALAZINE 50 milliGRAM(s) Oral every 8 hours  levothyroxine Injectable 25 MICROGram(s) IV Push at bedtime  pantoprazole  Injectable 40 milliGRAM(s) IV Push every 12 hours  propofol Infusion 10 MICROgram(s)/kG/Min (3.678 mL/Hr) IV Continuous <Continuous>  sodium chloride 0.65% Nasal 1 Spray(s) Both Nostrils three times a day    MEDICATIONS  (PRN):  albuterol/ipratropium for Nebulization 3 milliLiter(s) Nebulizer every 6 hours PRN Shortness of Breath and/or Wheezing      FAMILY HISTORY:  Family history of essential hypertension (Child)  Family history of stroke      Allergies    No Known Allergies    Intolerances        PMH/PSH:  Hypothyroidism  Constipation  Iron deficiency anemia  Major depressive disorder  Hyperlipidemia  GI bleed  Depression  Alzheimers disease  MI, old  CVA (cerebral vascular accident)  HTN (hypertension)  No pertinent past medical history  No significant past surgical history        REVIEW OF SYSTEMS: Intubated  CONSTITUTIONAL: No fever, weight loss, or fatigue  EYES: No eye pain, visual disturbances, or discharge  ENMT:  No difficulty hearing, tinnitus, vertigo; No sinus or throat pain  NECK: No pain or stiffness  BREASTS: No pain, masses, or nipple discharge  RESPIRATORY: No cough, wheezing, chills or hemoptysis; No shortness of breath  CARDIOVASCULAR: No chest pain, palpitations, dizziness, or leg swelling  GASTROINTESTINAL: See above  GENITOURINARY: No dysuria, frequency, hematuria, or incontinence  NEUROLOGICAL: No headaches, memory loss, loss of strength, numbness, or tremors  SKIN: No itching, burning, rashes, or lesions   LYMPH NODES: No enlarged glands  ENDOCRINE: No heat or cold intolerance; No hair loss  MUSCULOSKELETAL: No joint pain or swelling; No muscle, back, or extremity pain  PSYCHIATRIC: No depression, anxiety, mood swings, or difficulty sleeping  HEME/LYMPH: No easy bruising, or bleeding gums  ALLERGY AND IMMUNOLOGIC: No hives or eczema    Vital Signs Last 24 Hrs  T(C): 35.8 (14 Jan 2020 15:25), Max: 36 (14 Jan 2020 05:00)  T(F): 96.5 (14 Jan 2020 15:25), Max: 96.8 (14 Jan 2020 05:00)  HR: 93 (14 Jan 2020 17:03) (54 - 125)  BP: 173/54 (14 Jan 2020 17:00) (135/50 - 231/78)  BP(mean): 842 (14 Jan 2020 17:00) (69 - 842)  RR: 17 (14 Jan 2020 17:00) (13 - 27)  SpO2: 99% (14 Jan 2020 17:03) (97% - 100%)    PHYSICAL EXAM:  GENERAL: NAD, well-groomed, well-developed  HEAD:  Atraumatic, Normocephalic  EYES: EOMI, PERRLA, conjunctiva and sclera clear  ENMT: No tonsillar erythema, exudates, or enlargement; Moist mucous membranes, Good dentition, No lesions  NECK: Supple, No JVD, Normal thyroid  NERVOUS SYSTEM:  hard to arouse  CHEST/LUNG: Clear to percussion bilaterally; No rales, rhonchi, wheezing, or rubs  HEART: Regular rate and rhythm; No murmurs, rubs, or gallops  ABDOMEN: Soft, Nontender, Nondistended; Bowel sounds present  EXTREMITIES:  2+ Peripheral Pulses, No clubbing, cyanosis, or edema  LYMPH: No lymphadenopathy noted  SKIN: No rashes or lesions    LAB                          9.4    9.61  )-----------( 148      ( 14 Jan 2020 04:24 )             28.3       CBC:  01-14 @ 04:24  WBC 9.61   Hgb 9.4   Hct 28.3   Plts 148  MCV 91.6  01-13 @ 04:22  WBC 10.29   Hgb 8.8   Hct 26.2   Plts 129  MCV 91.3  01-12 @ 02:36  WBC 10.14   Hgb 9.0   Hct 25.8   Plts 121  MCV 89.6  01-11 @ 16:58  WBC 16.89   Hgb 12.1   Hct 35.4   Plts 181  MCV 89.6  01-11 @ 03:06  WBC 11.89   Hgb 7.7   Hct 22.9   Plts 129  MCV 89.8  01-10 @ 14:21  WBC 13.91   Hgb 8.2   Hct 24.7   Plts 110  MCV 93.9  01-10 @ 03:27  WBC 13.01   Hgb 9.1   Hct 26.5   Plts 132  MCV 88.9  01-09 @ 22:16  WBC 14.12   Hgb 10.6   Hct 30.7   Plts 149  MCV 88.5  01-09 @ 12:58  WBC 7.23   Hgb 7.2   Hct 23.0   Plts 172  MCV 99.1  01-09 @ 08:25  WBC 7.13   Hgb 8.4   Hct 26.8   Plts 160  MCV 97.8      Chemistry:  01-14 @ 04:24  Na+ 140  K+ 3.7  Cl- 109  CO2 19  BUN 75  Cr 3.79     01-13 @ 04:22  Na+ 139  K+ 3.3  Cl- 107  CO2 22  BUN 74  Cr 3.89     01-12 @ 02:36  Na+ 139  K+ 3.6  Cl- 107  CO2 22  BUN 82  Cr 3.88     01-11 @ 03:06  Na+ 137  K+ 2.9  Cl- 105  CO2 21  BUN 79  Cr 3.87     01-10 @ 03:27  Na+ 140  K+ 3.7  Cl- 108  CO2 22  BUN 93  Cr 4.17     01-09 @ 22:16  Na+ 140  K+ 3.5  Cl- 106  CO2 25  BUN 89  Cr 4.11     01-09 @ 12:58  Na+ 139  K+ 4.0  Cl- 106  CO2 25  BUN 85  Cr 4.36     01-09 @ 08:25  Na+ 139  K+ 3.9  Cl- 106  CO2 22  BUN 81  Cr 4.27         Glucose, Serum: 82 mg/dL (01-14 @ 04:24)  Glucose, Serum: 101 mg/dL (01-13 @ 04:22)  Glucose, Serum: 80 mg/dL (01-12 @ 02:36)  Glucose, Serum: 384 mg/dL (01-11 @ 03:06)  Glucose, Serum: 152 mg/dL (01-10 @ 03:27)  Glucose, Serum: 150 mg/dL (01-09 @ 22:16)  Glucose, Serum: 231 mg/dL (01-09 @ 12:58)  Glucose, Serum: 157 mg/dL (01-09 @ 08:25)      14 Jan 2020 04:24    140    |  109    |  75     ----------------------------<  82     3.7     |  19     |  3.79   13 Jan 2020 04:22    139    |  107    |  74     ----------------------------<  101    3.3     |  22     |  3.89   12 Jan 2020 02:36    139    |  107    |  82     ----------------------------<  80     3.6     |  22     |  3.88   11 Jan 2020 03:06    137    |  105    |  79     ----------------------------<  384    2.9     |  21     |  3.87   10 Jan 2020 03:27    140    |  108    |  93     ----------------------------<  152    3.7     |  22     |  4.17   09 Jan 2020 22:16    140    |  106    |  89     ----------------------------<  150    3.5     |  25     |  4.11   09 Jan 2020 12:58    139    |  106    |  85     ----------------------------<  231    4.0     |  25     |  4.36     Ca    7.4        14 Jan 2020 04:24  Ca    7.0        13 Jan 2020 04:22  Ca    7.5        12 Jan 2020 02:36  Ca    6.8        11 Jan 2020 03:06  Ca    7.6        10 Jan 2020 03:27  Ca    7.8        09 Jan 2020 22:16  Ca    7.7        09 Jan 2020 12:58  Phos  4.6       14 Jan 2020 04:24  Phos  4.5       13 Jan 2020 04:22  Phos  4.2       12 Jan 2020 02:36  Phos  2.8       11 Jan 2020 03:06  Phos  3.1       09 Jan 2020 22:16  Mg     2.0       14 Jan 2020 04:24  Mg     2.0       13 Jan 2020 04:22  Mg     2.1       12 Jan 2020 02:36  Mg     1.9       11 Jan 2020 03:06  Mg     2.2       10 Jan 2020 03:27  Mg     2.3       09 Jan 2020 22:16    TPro  4.4    /  Alb  1.4    /  TBili  0.3    /  DBili  x      /  AST  12     /  ALT  19     /  AlkPhos  56     13 Jan 2020 04:22  TPro  4.2    /  Alb  1.4    /  TBili  0.4    /  DBili  x      /  AST  12     /  ALT  21     /  AlkPhos  55     12 Jan 2020 02:36  TPro  3.7    /  Alb  1.7    /  TBili  x      /  DBili  x      /  AST  x      /  ALT  x      /  AlkPhos  x      11 Jan 2020 12:04  TPro  4.6    /  Alb  1.6    /  TBili  0.6    /  DBili  x      /  AST  15     /  ALT  33     /  AlkPhos  59     10 Jan 2020 03:27  TPro  5.2    /  Alb  1.8    /  TBili  0.7    /  DBili  x      /  AST  21     /  ALT  42     /  AlkPhos  70     09 Jan 2020 22:16              CAPILLARY BLOOD GLUCOSE          C-Reactive Protein, Serum: 5.72 mg/dL (01-14 @ 09:19)  C-Reactive Protein, Serum: 2.28 mg/dL (01-09 @ 11:35)      RADIOLOGY & ADDITIONAL TESTS:    Imaging Personally Reviewed:  [ ] YES  [ ] NO    Consultant(s) Notes Reviewed:  [ ] YES  [ ] NO    Care Discussed with Consultants/Other Providers [ ] YES  [ ] NO

## 2020-01-14 NOTE — PROGRESS NOTE ADULT - SUBJECTIVE AND OBJECTIVE BOX
TMAX - 96 - 97.1    Off Ab rx     Vital Signs Last 24 Hrs  T(C): 36 (14 Jan 2020 12:30), Max: 36 (14 Jan 2020 05:00)  T(F): 96.8 (14 Jan 2020 12:30), Max: 96.8 (14 Jan 2020 05:00)  HR: 80 (14 Jan 2020 13:00) (54 - 121)  BP: 172/63 (14 Jan 2020 13:00) (135/50 - 214/73)  BP(mean): 91 (14 Jan 2020 13:00) (69 - 111)  RR: 14 (14 Jan 2020 13:00) (14 - 27)  SpO2: 98% (14 Jan 2020 13:00) (97% - 100%)  Mode: CPAP with PS  FiO2: 35  PEEP: 5  PS: 5  ITime: 1  MAP: 7  Supplemental O2:  on Ventilator      Awakens, on ventilator and denies any pain or SOB now - nodding to questions.      PHYSICAL EXAM  General:  awakens on Ventilator on 35% FIO2 + 5 PEEP, lying in bed in semi-upright position, in NAD  HEENT:  conj pink, sclerae anicteric, PERRLA, orally intubated and with NGT in place  Neck:  semi-supple, no nodes noted  Heart:  RR  Lungs:  few moist rhonchi bilaterally  Abdomen:  soft, BS+, nontender to palpation  Back: no CVA or Spinal tenderness noted  Extremities:  no edema LE's                     mild swelling of the UE's with Lt > Rt  Skin:  warm, dry, no rash noted        I&O's Summary :    13 Jan 2020 07:01  -  14 Jan 2020 07:00  --------------------------------------------------------  IN: 100 mL / OUT: 300 mL / NET: -200 mL    14 Jan 2020 07:01  -  14 Jan 2020 13:20  --------------------------------------------------------  IN: 0 mL / OUT: 200 mL / NET: -200 mL        LABS:  CBC Full  -  ( 14 Jan 2020 04:24 )  WBC Count : 9.61 K/uL  RBC Count : 3.09 M/uL  Hemoglobin : 9.4 g/dL  Hematocrit : 28.3 %  Platelet Count - Automated : 148 K/uL  Mean Cell Volume : 91.6 fl  Mean Cell Hemoglobin : 30.4 pg  Mean Cell Hemoglobin Concentration : 33.2 gm/dL  Auto Neutrophil # : x  Auto Lymphocyte # : x  Auto Monocyte # : x  Auto Eosinophil # : x  Auto Basophil # : x  Auto Neutrophil % : x  Auto Lymphocyte % : x  Auto Monocyte % : x  Auto Eosinophil % : x  Auto Basophil % : x    01-14    140  |  109<H>  |  75<H>  ----------------------------<  82  3.7   |  19<L>  |  3.79<H>    Ca    7.4<L>      14 Jan 2020 04:24  Phos  4.6     01-14  Mg     2.0     01-14    TPro  4.4<L>  /  Alb  1.4<L>  /  TBili  0.3  /  DBili  x   /  AST  12<L>  /  ALT  19  /  AlkPhos  56  01-13    LIVER FUNCTIONS - ( 13 Jan 2020 04:22 )  Alb: 1.4 g/dL / Pro: 4.4 gm/dL / ALK PHOS: 56 U/L / ALT: 19 U/L / AST: 12 U/L / GGT: x             Sedimentation Rate, Erythrocyte: 42 mm/hr (01-14 @ 04:24)      CRP - pending        MICROBIOLOGY:    Specimen Source: .Sputum Sputum (01-13 @ 17:08)    Culture - Sputum . (01.13.20 @ 17:08)    Gram Stain:   Few Squamous epithelial cells per low power field  Few polymorphonuclear leukocytes per low power field  Rare Yeast like cells per oil power field  Few Gram variable coccobacilli per oil power field    Specimen Source: .Sputum Sputum      Culture - Urine (01.01.20 @ 17:13)    -  Ciprofloxacin: R >2    -  Ampicillin: S <=2 Predicts results to ampicillin/sulbactam, amoxacillin-clavulanate and  piperacillin-tazobactam.    -  Tetra/Doxy: R >8    -  Vancomycin: R >16    -  Nitrofurantoin: S <=32 Should not be used to treat pyelonephritis.    -  Linezolid: S 2    -  Daptomycin: S 1    -  Levofloxacin: R >4    Specimen Source: .Urine Catheterized    Culture Results:   50,000 - 99,000 CFU/mL Enterococcus faecalis (vancomycin resistant)    Organism Identification: Observation    Organism: Observation    Method Type: RONALD    Culture - Blood (01.01.20 @ 17:11)    Specimen Source: .Blood Blood-Peripheral    Culture Results:   No growth at 5 days.      Culture - Blood (01.01.20 @ 17:11)    Specimen Source: .Blood Blood-Peripheral    Culture Results:   No growth at 5 days.    FLU A B RSV Detection by PCR (01.01.20 @ 14:04)    Flu A Result: NotDetec: The Flu A B RSV assay is a Real-Time PCR test for the qualitative  detection and differentiation of Influenza A, Influenza B, and  Respiratory Syncytial Virus on nasopharyngeal swabs. The results should  be interpreted in the context of all clinical and laboratory findings.    Flu B Result: NotDetec    RSV Result: Detected            Radiology:  < from: Xray Chest 1 View- PORTABLE-Routine (01.13.20 @ 07:28) >  EXAM:  XR CHEST PORTABLE ROUTINE 1V                          PROCEDURE DATE:  01/13/2020      INTERPRETATION:  Chest portable.    Clinical History: Pneumonia, follow-up exam. Gastrointestinal bleed.    Comparison: 1/10/2020.    Single AP viewsubmitted.  The patient's lines and tubes remain unchanged. There are multiple leads overlying the chest.    The evaluation of the cardiomediastinal silhouette is limited on portable technique.  Prominent cardiac silhouette.    Pulmonary vascular congestion with small bilateral pleural effusions.    Impression:    Findings as discussed above.            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  Noted CXR with Pulmonary Congestion and Bilateral Pleural Effusions and new Sputum Cx pending.      Suggestions:  Will continue to observe off ab rx and follow-up Sputum Cx, CXR, and labs.  Await repeat EGD.

## 2020-01-14 NOTE — PHYSICAL THERAPY INITIAL EVALUATION ADULT - BALANCE TRAINING, PT EVAL
Pt will increase static/dynamic standing balance to WFL to perform all functional mobility without LOB, by 4-6 weeks

## 2020-01-14 NOTE — PHYSICAL THERAPY INITIAL EVALUATION ADULT - PERTINENT HX OF CURRENT PROBLEM, REHAB EVAL
Pt admitted from [facility] secondary to congestive heart failure. Pt noted with desaturation 01% in [facility]

## 2020-01-14 NOTE — PROGRESS NOTE ADULT - SUBJECTIVE AND OBJECTIVE BOX
Surgery NP note  Patient seen and examined bedside   No Acute bleeding    T(F): 96.8 (20 @ 12:30), Max: 96.8 (20 @ 05:00)  HR: 80 (20 @ 13:00) (54 - 121)  BP: 172/63 (20 @ 13:00) (135/50 - 214/73)  RR: 14 (20 @ 13:00) (14 - 27)  SpO2: 98% (20 @ 13:00) (97% - 100%)  Wt(kg): --  CAPILLARY BLOOD GLUCOSE    PHYSICAL EXAM:  General: NAD, WDWN.   Neuro:  Awake & responsive  HEENT: NCAT, EOMI, conjunctiva clear, orally intubated  CV: +S1+S2 regular rate and rhythm  Lung: Coarse breath sounds on vent support  Abdomen: soft, Non tender, No Distension. Normal active BS  Extremities: no pedal edema or calf tenderness noted     LABS:                        9.4    9.61  )-----------( 148      ( 2020 04:24 )             28.3     -    140  |  109<H>  |  75<H>  ----------------------------<  82  3.7   |  19<L>  |  3.79<H>    Ca    7.4<L>      2020 04:24  Phos  4.6       Mg     2.0         TPro  4.4<L>  /  Alb  1.4<L>  /  TBili  0.3  /  DBili  x   /  AST  12<L>  /  ALT  19  /  AlkPhos  56  01-13    LIVER FUNCTIONS - ( 2020 04:22 )  Alb: 1.4 g/dL / Pro: 4.4 gm/dL / ALK PHOS: 56 U/L / ALT: 19 U/L / AST: 12 U/L / GGT: x             I&O's Detail    2020 07:01  -  2020 07:00  --------------------------------------------------------  IN:    Miscellaneous Tube Feedin mL  Total IN: 100 mL    OUT:    Incontinent per Collection Ba mL  Total OUT: 300 mL    Total NET: -200 mL      2020 07:01  -  2020 15:00  --------------------------------------------------------  IN:  Total IN: 0 mL    OUT:    Incontinent per Collection Ba mL  Total OUT: 200 mL    Total NET: -200 mL    Impression: 78 y/o female with PMH Alzheimer's, CVA, Depression, CHF, HTN, MI (remote), recent dx of hypothyroidism with GI Bleed, S/P EGD and Colonoscopy , s/p 3 units of PRBC total, last on . No new bleeding episodes  Plan:  -No acute surgical intervention Trend h/h, monitor for signs of bleeding and transfuse as needed.    -PPI IVP BID    -GI Follow up, for repeat EGD today  -Continue medical management and supportive care as per ICU team.   -Discussed with Dr Mckeon will Follow PRN,

## 2020-01-14 NOTE — PROGRESS NOTE ADULT - SUBJECTIVE AND OBJECTIVE BOX
79 years old female from [facility] rehab c/o coughing sob for three to four days. Pt is sent here to rule out chf vs pneumonia. Pt is alert and oriented to person only unable to give detail hx due to hx of Alzheimer according to the [facility] pt had sat of 91 to 92 %,	    PAST MEDICAL/SURGICAL/FAMILY/SOCIAL HISTORY:    Past Medical History:  Alzheimers disease    CVA (cerebral vascular accident)    Depression    HTN (hypertension)    MI, old.  recently Dx c hypothyroidism, TSH > 111 started Levothyroxin 50mcg qd as per Dr Vigil (2020 11:41)      Chief Complaint:  Patient is a 79y old  Female who presents with a chief complaint of sob cough (2020 22:56)      Review of Systems:    General:  No wt loss, fevers, chills, night sweats  Eyes:  Good vision, no reported pain  ENT:  No sore throat, pain, runny nose, dysphagia  CV:  No pain, palpitations, hypo/hypertension  Resp:  dyspnea, cough  GI:  No pain, nausea, vomiting, diarrhea, constipation           Social History/Family History  SOCHX:   tobacco,  -  alcohol    FMHX: FA/MO  - contributory       Discussed with:  PMD, Family    Physical Exam:    Vital Signs:  Vital Signs Last 24 Hrs  T(C): 36.2 (2020 17:06), Max: 36.9 (2020 00:18)  T(F): 97.2 (2020 17:06), Max: 98.5 (2020 00:18)  HR: 74 (2020 21:55) (68 - 87)  BP: 179/78 (2020 17:06) (132/71 - 179/78)  BP(mean): --  RR: 18 (2020 17:06) (18 - 18)  SpO2: 97% (2020 21:55) (94% - 98%)  Daily     Daily Weight in k.8 (2020 04:59)  I&O's Summary    2020 07:01  -  2020 07:00  --------------------------------------------------------  IN: 560 mL / OUT: 0 mL / NET: 560 mL          Chest:  fair air entry bilateral  Cardiovascular:  Regular rhythm, S1, S2, no murmur/rub/S3/S4, no carotid/femoral/abdominal bruit, radial/pedal pulses 2+,   Abdomen:  Soft, non-tender, non-distended, normoactive bowel sounds, no HSM        Laboratory:          138  |  104  |  69<H>  ----------------------------<  111<H>  3.8   |  25  |  3.77<H>    Ca    8.1<L>      2020 07:22          Assessment:  I am asked to assist this patient admitted with shortness of breath  The patient has multifactorial causes of shortness of breath, renal insufficiency, anemia  And now after a diagnostic echocardiogram within normal ejection fraction the reading shows mitral regurgitation and tricuspid regurgitation  After review of the studies this regurgitation is not considered severe but in the moderate to severe but more moderate range  the patient now is transferred to the intensive care unit after a noted upper GI bleed  GI noted, repeat EGD   The patient was intubated and remains on ventilator support ,   optimize systolic blood pressure  aggressive weaning trials

## 2020-01-14 NOTE — PROGRESS NOTE ADULT - ASSESSMENT
HPI:  · HPI Objective Statement: 79 years old female from [facility] rehab c/o coughing sob for three to four days. Pt is sent here to rule out chf vs pneumonia. Pt is alert and oriented to person only unable to give detail hx due to hx of Alzheimer according to the [facility] pt had sat of 91 to 92 %,	  ---------------- As Above ---------------------------------------------  Patient is a poor historian. Patient admitted with Bronchitis and UTI. Called for hematemsis. Patient had three episodes of bloody vomitus. ( 1/3 of a cup ) . Patient complaining of mid epigastric / chest pain but patient can not characterize the pain.   Patient has chronic anemia. but HGB fell 8.4 --> 7.2. Was on Carafate, now on IV PPI  KUB pending  HX of CRI    Addendum : Patient had another episode of bloody vomitus    Upper GI Bleed - Secondary to (?). 1) NPO  2)Check KUB  3) IV PPI 4) telemetry  5) NGT 6) EGD 7) Hold Carafate  8) Hold iron  ---Will speak with daughter.

## 2020-01-14 NOTE — PROGRESS NOTE ADULT - PROBLEM SELECTOR PLAN 1
ICU #1, Intubated. 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 repeat EGD cancelled for high BP 1) Hold Carafate for EGD 2) clear liquids 3) F/U EGD Thursday 4) F/U CBC

## 2020-01-14 NOTE — PHYSICAL THERAPY INITIAL EVALUATION ADULT - ADDITIONAL COMMENTS
Pt poor historian and non-verbal at this time. Unable to maintain PLOF. Pt admitted from [facility]

## 2020-01-14 NOTE — PROGRESS NOTE ADULT - SUBJECTIVE AND OBJECTIVE BOX
lying in bed, intubated on ventilator    Vital Signs Last 24 Hrs  T(C): 36 (14 Jan 2020 05:00), Max: 36.1 (13 Jan 2020 12:15)  T(F): 96.8 (14 Jan 2020 05:00), Max: 96.9 (13 Jan 2020 12:15)  HR: 64 (14 Jan 2020 07:32) (54 - 90)  BP: 182/44 (14 Jan 2020 07:00) (135/50 - 197/73)  BP(mean): 83 (14 Jan 2020 07:00) (69 - 103)  RR: 16 (14 Jan 2020 07:22) (14 - 19)  SpO2: 100% (14 Jan 2020 07:32) (99% - 100%)    PHYSICAL EXAM:    general - intubated on ventilator  HEENT - ETT +  CVS - RRR  RS - AE B/L  Abd - soft, NT  Ext - Pulses +        LABS:                        9.4    9.61  )-----------( 148      ( 14 Jan 2020 04:24 )             28.3     01-14    140  |  109<H>  |  75<H>  ----------------------------<  82  3.7   |  19<L>  |  3.79<H>    Ca    7.4<L>      14 Jan 2020 04:24  Phos  4.6     01-14  Mg     2.0     01-14    TPro  4.4<L>  /  Alb  1.4<L>  /  TBili  0.3  /  DBili  x   /  AST  12<L>  /  ALT  19  /  AlkPhos  56  01-13          Culture - Sputum (collected 13 Jan 2020 17:08)  Source: .Sputum Sputum  Gram Stain (13 Jan 2020 20:10):    Few Squamous epithelial cells per low power field    Few polymorphonuclear leukocytes per low power field    Rare Yeast like cells per oil power field    Few Gram variable coccobacilli per oil power field        RADIOLOGY & ADDITIONAL STUDIES:

## 2020-01-14 NOTE — PROGRESS NOTE ADULT - SUBJECTIVE AND OBJECTIVE BOX
[facility] NEPHROLOGY SERVICES, [facility]  NEPHROLOGY AND HYPERTENSION  [address]  [address]  [place] [address]  [phone]    MD MART LORD, MD CHING ROTHMAN, MD JU BLAS, MD ISABELLA SLADE, MD ALAYNA WALLACE MD          Patient vent dependent    MEDICATIONS  (STANDING):  buDESOnide    Inhalation Suspension 0.5 milliGRAM(s) Inhalation every 12 hours  carvedilol 12.5 milliGRAM(s) Oral every 12 hours  chlorhexidine 0.12% Liquid 15 milliLiter(s) Oral Mucosa every 12 hours  chlorhexidine 4% Liquid 1 Application(s) Topical <User Schedule>  cloNIDine 0.1 milliGRAM(s) Oral every 6 hours  hydrALAZINE 50 milliGRAM(s) Oral every 8 hours  levothyroxine Injectable 25 MICROGram(s) IV Push at bedtime  pantoprazole  Injectable 40 milliGRAM(s) IV Push every 12 hours  propofol Infusion 10 MICROgram(s)/kG/Min (3.678 mL/Hr) IV Continuous <Continuous>  sodium chloride 0.65% Nasal 1 Spray(s) Both Nostrils three times a day    MEDICATIONS  (PRN):  albuterol/ipratropium for Nebulization 3 milliLiter(s) Nebulizer every 6 hours PRN Shortness of Breath and/or Wheezing  hydrALAZINE Injectable 10 milliGRAM(s) IV Push every 6 hours PRN SBP >170      01-13-20 @ 07:01 - 01-14-20 @ 07:00  --------------------------------------------------------  IN: 100 mL / OUT: 300 mL / NET: -200 mL    01-14-20 @ 07:01 - 01-14-20 @ 20:14  --------------------------------------------------------  IN: 120 mL / OUT: 400 mL / NET: -280 mL      PHYSICAL EXAM:      T(C): 36.1 (01-14-20 @ 19:28), Max: 36.1 (01-14-20 @ 19:28)  HR: 72 (01-14-20 @ 20:00) (54 - 125)  BP: 167/50 (01-14-20 @ 20:00) (135/50 - 231/78)  RR: 16 (01-14-20 @ 20:00) (13 - 27)  SpO2: 100% (01-14-20 @ 20:00) (97% - 100%)  Wt(kg): --  Respiratory: rhonchi anteriorly, decreased BS at bases  Cardiovascular: S1 S2  Gastrointestinal: soft NT ND +BS  Extremities:   1 edema                                    9.4    9.61  )-----------( 148      ( 14 Jan 2020 04:24 )             28.3     01-14    140  |  109<H>  |  75<H>  ----------------------------<  82  3.7   |  19<L>  |  3.79<H>    Ca    7.4<L>      14 Jan 2020 04:24  Phos  4.6     01-14  Mg     2.0     01-14    TPro  4.4<L>  /  Alb  1.4<L>  /  TBili  0.3  /  DBili  x   /  AST  12<L>  /  ALT  19  /  AlkPhos  56  01-13      LIVER FUNCTIONS - ( 13 Jan 2020 04:22 )  Alb: 1.4 g/dL / Pro: 4.4 gm/dL / ALK PHOS: 56 U/L / ALT: 19 U/L / AST: 12 U/L / GGT: x           Creatinine Trend: 3.79<--, 3.89<--, 3.88<--, 3.87<--, 4.17<--, 4.11<--            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    Plan  IV Labetalol prn  Added clonidine 0.1 mg q 6  Continued warranted diuresis, supportive care.  Follow paraprotein screen.  Would defer prospect of aggressive work up or HD support as overall prognosis poor.      Obed Jamison MD

## 2020-01-14 NOTE — PROGRESS NOTE ADULT - SUBJECTIVE AND OBJECTIVE BOX
HPI:  Pt is an 78 yo HF with h/o CAD, MI, dCHF, HTN, CKD with solitary kidney, hypothyroidism, Alzheimer's dementia, CVA x2 ,  with residual L weakness,achalasia s/p POEMS (2018) and UGIB presents from rehab for RSV PNA, VRE UTI, CHF exacerbation. Hospital course complicated by acute blood loss anemia 2 to UGIB, acute on CKD and acute respiratory failure requiring intubation.      ## Labs:  CBC:                        9.4    9.61  )-----------( 148      ( 2020 04:24 )             28.3     Chem:      140  |  109<H>  |  75<H>  ----------------------------<  82  3.7   |  19<L>  |  3.79<H>    Ca    7.4<L>      2020 04:24  Phos  4.6       Mg     2.0         TPro  4.4<L>  /  Alb  1.4<L>  /  TBili  0.3  /  DBili  x   /  AST  12<L>  /  ALT  19  /  AlkPhos  56  -    Coags:          ## Imaging:    ## Medications:    cloNIDine 0.1 milliGRAM(s) Oral every 6 hours  hydrALAZINE 50 milliGRAM(s) Oral every 8 hours    albuterol/ipratropium for Nebulization 3 milliLiter(s) Nebulizer every 6 hours PRN  buDESOnide    Inhalation Suspension 0.5 milliGRAM(s) Inhalation every 12 hours    levothyroxine Injectable 25 MICROGram(s) IV Push at bedtime      pantoprazole  Injectable 40 milliGRAM(s) IV Push every 12 hours    propofol Infusion 10 MICROgram(s)/kG/Min IV Continuous <Continuous>      ## Vitals:  T(C): 35.8 (20 @ 15:25), Max: 36 (20 @ 05:00)  HR: 94 (20 @ 15:20) (54 - 125)  BP: 231/78 (20 @ 15:13) (135/50 - 231/78)  BP(mean): 116 (20 @ 15:13) (69 - 156)  RR: 16 (20 @ 15:20) (13 - 27)  SpO2: 99% (20 @ 15:20) (97% - 100%)  Wt(kg): --  Vent: Mode: AC/ CMV (Assist Control/ Continuous Mandatory Ventilation), RR (machine): 16, RR (patient): 24, TV (machine): 400, FiO2: 35, PEEP: 5, PIP: 31  AB-13 @ 07:  -   @ 07:00  --------------------------------------------------------  IN: 100 mL / OUT: 300 mL / NET: -200 mL     @ 07:01  -   @ 15:47  --------------------------------------------------------  IN: 0 mL / OUT: 200 mL / NET: -200 mL          ## P/E:  Gen: lying comfortably in bed in no apparent distress  Mouth: (+) ETT  Lungs: Rhonchi  Heart: RRR  Abd: Soft/+BS  Ext: UE edema  Neuro: More awake    CENTRAL LINE: [ ] YES [ ] NO  LOCATION:   DATE INSERTED:  REMOVE: [ ] YES [ ] NO      VIANEY: [ ] YES [ ] NO    DATE INSERTED:  REMOVE:  [ ] YES [ ] NO      A-LINE:  [ ] YES [ ] NO  LOCATION:   DATE INSERTED:  REMOVE:  [ ] YES [ ] NO  EXPLAIN:    CODE STATUS: [x] full code  [ ] DNR  [ ] DNI  [ ] [facility]  Goals of care discussion: [ ] yes

## 2020-01-14 NOTE — PHYSICAL THERAPY INITIAL EVALUATION ADULT - BED MOBILITY TRAINING, PT EVAL
Pt will independently perform all aspects of bed mobility to help prevent pressure ulcers, by 4-6 weeks

## 2020-01-15 LAB
-  AMPICILLIN/SULBACTAM: SIGNIFICANT CHANGE UP
-  CEFAZOLIN: SIGNIFICANT CHANGE UP
-  CLINDAMYCIN: SIGNIFICANT CHANGE UP
-  ERYTHROMYCIN: SIGNIFICANT CHANGE UP
-  GENTAMICIN: SIGNIFICANT CHANGE UP
-  LINEZOLID: SIGNIFICANT CHANGE UP
-  OXACILLIN: SIGNIFICANT CHANGE UP
-  PENICILLIN: SIGNIFICANT CHANGE UP
-  RIFAMPIN: SIGNIFICANT CHANGE UP
-  TETRACYCLINE: SIGNIFICANT CHANGE UP
-  TRIMETHOPRIM/SULFAMETHOXAZOLE: SIGNIFICANT CHANGE UP
-  VANCOMYCIN: SIGNIFICANT CHANGE UP
ANION GAP SERPL CALC-SCNC: 13 MMOL/L — SIGNIFICANT CHANGE UP (ref 5–17)
BUN SERPL-MCNC: 70 MG/DL — HIGH (ref 7–23)
CALCIUM SERPL-MCNC: 7.9 MG/DL — LOW (ref 8.5–10.1)
CHLORIDE SERPL-SCNC: 107 MMOL/L — SIGNIFICANT CHANGE UP (ref 96–108)
CO2 SERPL-SCNC: 20 MMOL/L — LOW (ref 22–31)
CREAT SERPL-MCNC: 3.75 MG/DL — HIGH (ref 0.5–1.3)
CULTURE RESULTS: SIGNIFICANT CHANGE UP
GLUCOSE BLDC GLUCOMTR-MCNC: 77 MG/DL — SIGNIFICANT CHANGE UP (ref 70–99)
GLUCOSE BLDC GLUCOMTR-MCNC: 97 MG/DL — SIGNIFICANT CHANGE UP (ref 70–99)
GLUCOSE SERPL-MCNC: 56 MG/DL — LOW (ref 70–99)
GRAM STN FLD: SIGNIFICANT CHANGE UP
HCT VFR BLD CALC: 33.3 % — LOW (ref 34.5–45)
HGB BLD-MCNC: 11 G/DL — LOW (ref 11.5–15.5)
MAGNESIUM SERPL-MCNC: 2 MG/DL — SIGNIFICANT CHANGE UP (ref 1.6–2.6)
MCHC RBC-ENTMCNC: 30.7 PG — SIGNIFICANT CHANGE UP (ref 27–34)
MCHC RBC-ENTMCNC: 33 GM/DL — SIGNIFICANT CHANGE UP (ref 32–36)
MCV RBC AUTO: 93 FL — SIGNIFICANT CHANGE UP (ref 80–100)
METHOD TYPE: SIGNIFICANT CHANGE UP
NRBC # BLD: 0 /100 WBCS — SIGNIFICANT CHANGE UP (ref 0–0)
ORGANISM # SPEC MICROSCOPIC CNT: SIGNIFICANT CHANGE UP
ORGANISM # SPEC MICROSCOPIC CNT: SIGNIFICANT CHANGE UP
PHOSPHATE SERPL-MCNC: 4.9 MG/DL — HIGH (ref 2.5–4.5)
PLATELET # BLD AUTO: 229 K/UL — SIGNIFICANT CHANGE UP (ref 150–400)
POTASSIUM SERPL-MCNC: 3.7 MMOL/L — SIGNIFICANT CHANGE UP (ref 3.5–5.3)
POTASSIUM SERPL-SCNC: 3.7 MMOL/L — SIGNIFICANT CHANGE UP (ref 3.5–5.3)
RBC # BLD: 3.58 M/UL — LOW (ref 3.8–5.2)
RBC # FLD: 17 % — HIGH (ref 10.3–14.5)
SODIUM SERPL-SCNC: 140 MMOL/L — SIGNIFICANT CHANGE UP (ref 135–145)
SPECIMEN SOURCE: SIGNIFICANT CHANGE UP
WBC # BLD: 13.14 K/UL — HIGH (ref 3.8–10.5)
WBC # FLD AUTO: 13.14 K/UL — HIGH (ref 3.8–10.5)

## 2020-01-15 PROCEDURE — 99291 CRITICAL CARE FIRST HOUR: CPT

## 2020-01-15 RX ORDER — SUCRALFATE 1 G
1 TABLET ORAL
Refills: 0 | Status: COMPLETED | OUTPATIENT
Start: 2020-01-15 | End: 2020-01-16

## 2020-01-15 RX ORDER — POTASSIUM CHLORIDE 20 MEQ
10 PACKET (EA) ORAL ONCE
Refills: 0 | Status: COMPLETED | OUTPATIENT
Start: 2020-01-15 | End: 2020-01-15

## 2020-01-15 RX ADMIN — Medication 0.5 MILLIGRAM(S): at 06:50

## 2020-01-15 RX ADMIN — Medication 0.5 MILLIGRAM(S): at 18:42

## 2020-01-15 RX ADMIN — Medication 25 MICROGRAM(S): at 21:17

## 2020-01-15 RX ADMIN — CHLORHEXIDINE GLUCONATE 15 MILLILITER(S): 213 SOLUTION TOPICAL at 06:40

## 2020-01-15 RX ADMIN — CHLORHEXIDINE GLUCONATE 1 APPLICATION(S): 213 SOLUTION TOPICAL at 05:00

## 2020-01-15 RX ADMIN — Medication 1 SPRAY(S): at 21:19

## 2020-01-15 RX ADMIN — Medication 0.1 MILLIGRAM(S): at 00:08

## 2020-01-15 RX ADMIN — Medication 1 GRAM(S): at 17:09

## 2020-01-15 RX ADMIN — CARVEDILOL PHOSPHATE 12.5 MILLIGRAM(S): 80 CAPSULE, EXTENDED RELEASE ORAL at 06:40

## 2020-01-15 RX ADMIN — Medication 0.1 MILLIGRAM(S): at 17:09

## 2020-01-15 RX ADMIN — Medication 100 MILLIEQUIVALENT(S): at 08:12

## 2020-01-15 RX ADMIN — CARVEDILOL PHOSPHATE 12.5 MILLIGRAM(S): 80 CAPSULE, EXTENDED RELEASE ORAL at 17:09

## 2020-01-15 RX ADMIN — Medication 50 MILLIGRAM(S): at 06:40

## 2020-01-15 RX ADMIN — PANTOPRAZOLE SODIUM 40 MILLIGRAM(S): 20 TABLET, DELAYED RELEASE ORAL at 08:12

## 2020-01-15 RX ADMIN — Medication 50 MILLIGRAM(S): at 21:17

## 2020-01-15 RX ADMIN — Medication 0.1 MILLIGRAM(S): at 11:27

## 2020-01-15 RX ADMIN — Medication 1 SPRAY(S): at 14:16

## 2020-01-15 RX ADMIN — Medication 50 MILLIGRAM(S): at 11:27

## 2020-01-15 RX ADMIN — PANTOPRAZOLE SODIUM 40 MILLIGRAM(S): 20 TABLET, DELAYED RELEASE ORAL at 21:17

## 2020-01-15 RX ADMIN — Medication 0.1 MILLIGRAM(S): at 06:41

## 2020-01-15 RX ADMIN — Medication 1 SPRAY(S): at 06:41

## 2020-01-15 NOTE — PROGRESS NOTE ADULT - SUBJECTIVE AND OBJECTIVE BOX
HPI:  Pt is an 78 yo HF with h/o CAD, MI, dCHF, HTN, CKD with solitary kidney, hypothyroidism, Alzheimer's dementia, CVA x2 ,  with residual L weakness,achalasia s/p POEMS (2018) and UGIB presents from rehab for RSV PNA, VRE UTI, CHF exacerbation. Hospital course complicated by acute blood loss anemia 2 to UGIB, acute on CKD and acute respiratory failure requiring intubation.      ## Labs:  CBC:                        11.0   13.14 )-----------( 229      ( 15 Ousmane 2020 06:23 )             33.3     Chem:  01-15    140  |  107  |  70<H>  ----------------------------<  56<L>  3.7   |  20<L>  |  3.75<H>    Ca    7.9<L>      15 Ousmane 2020 06:23  Phos  4.9     01-15  Mg     2.0     01-15      Coags:    culture blood:  --   @ 17:08            culture sputum:     Moderate Staphylococcus aureus           culture urine:  --  @ 17:08        ## Imaging:    ## Medications:    carvedilol 12.5 milliGRAM(s) Oral every 12 hours  cloNIDine 0.1 milliGRAM(s) Oral every 6 hours  hydrALAZINE 50 milliGRAM(s) Oral every 8 hours  hydrALAZINE Injectable 10 milliGRAM(s) IV Push every 6 hours PRN    albuterol/ipratropium for Nebulization 3 milliLiter(s) Nebulizer every 6 hours PRN  buDESOnide    Inhalation Suspension 0.5 milliGRAM(s) Inhalation every 12 hours    levothyroxine Injectable 25 MICROGram(s) IV Push at bedtime      pantoprazole  Injectable 40 milliGRAM(s) IV Push every 12 hours        ## Vitals:  T(C): 36.4 (01-15-20 @ 07:37), Max: 37.2 (01-15-20 @ 05:20)  HR: 66 (01-15-20 @ 10:00) (65 - 125)  BP: 139/44 (01-15-20 @ 10:00) (139/44 - 231/78)  BP(mean): 67 (01-15-20 @ 10:00) (61 - 842)  RR: 10 (01-15-20 @ 10:00) (10 - 27)  SpO2: 99% (01-15-20 @ 10:00) (97% - 100%)  Wt(kg): --  Vent: Mode: CPAP with PS, RR (patient): 13, FiO2: 35, PEEP: 5, PS: 5, PIP: 11  AB-14 @ 07:  -  01-15 @ 07:00  --------------------------------------------------------  IN: 380 mL / OUT: 600 mL / NET: -220 mL    01-15 @ 07:01  -  01-15 @ 10:47  --------------------------------------------------------  IN: 170 mL / OUT: 0 mL / NET: 170 mL          ## P/E:  Gen: lying comfortably in bed in no apparent distress  Mouth: (+) ETT  Lungs: Rhonchi  Heart: RRR  Abd: Soft/+BS  Ext: UE edema  Neuro: Awake/alert    CENTRAL LINE: [ ] YES [ ] NO  LOCATION:   DATE INSERTED:  REMOVE: [ ] YES [ ] NO      VIANEY: [ ] YES [ ] NO    DATE INSERTED:  REMOVE:  [ ] YES [ ] NO      A-LINE:  [ ] YES [ ] NO  LOCATION:   DATE INSERTED:  REMOVE:  [ ] YES [ ] NO  EXPLAIN:    CODE STATUS: [x] full code  [ ] DNR  [ ] DNI  [ ] [facility]  Goals of care discussion: [ ] yes

## 2020-01-15 NOTE — PROGRESS NOTE ADULT - SUBJECTIVE AND OBJECTIVE BOX
Patient is a 79y old  Female who presents with a chief complaint of sob cough (14 Jan 2020 20:14)      INTERVAL HPI/OVERNIGHT EVENTS:alert , rsponsive. on ventilator, no bleeding, NG clear liquid diet    MEDICATIONS  (STANDING):  buDESOnide    Inhalation Suspension 0.5 milliGRAM(s) Inhalation every 12 hours  carvedilol 12.5 milliGRAM(s) Oral every 12 hours  chlorhexidine 4% Liquid 1 Application(s) Topical <User Schedule>  cloNIDine 0.1 milliGRAM(s) Oral every 6 hours  hydrALAZINE 50 milliGRAM(s) Oral every 8 hours  levothyroxine Injectable 25 MICROGram(s) IV Push at bedtime  pantoprazole  Injectable 40 milliGRAM(s) IV Push every 12 hours  sodium chloride 0.65% Nasal 1 Spray(s) Both Nostrils three times a day    MEDICATIONS  (PRN):  albuterol/ipratropium for Nebulization 3 milliLiter(s) Nebulizer every 6 hours PRN Shortness of Breath and/or Wheezing  hydrALAZINE Injectable 10 milliGRAM(s) IV Push every 6 hours PRN SBP >170      Allergies    No Known Allergies    Intolerances        REVIEW OF SYSTEMS:              Vital Signs Last 24 Hrs  T(C): 36.4 (15 Ousmane 2020 07:37), Max: 37.2 (15 Ousmane 2020 05:20)  T(F): 97.6 (15 Ousmane 2020 07:37), Max: 98.9 (15 Ousmane 2020 05:20)  HR: 71 (15 Ousmane 2020 08:00) (65 - 125)  BP: 182/51 (15 Ousmane 2020 07:00) (146/43 - 231/78)  BP(mean): 83 (15 Ousmane 2020 07:00) (61 - 842)  RR: 10 (15 Ousmane 2020 08:00) (10 - 27)  SpO2: 99% (15 Ousmane 2020 08:00) (97% - 100%)    PHYSICAL EXAM:  general       HEENT wnl  CHEST/LUNG: coarse rhonchi b/l   HEART: Regular rate and rhythm; No murmurs, rubs, or gallops  ABDOMEN: Soft, Nontender, Nondistended; Bowel sounds present  EXTREMITIES:  2+ Peripheral Pulses, No clubbing, cyanosis, or edema  LYMPH: No lymphadenopathy noted  SKIN: No rashes or lesions    LABS:                        11.0   13.14 )-----------( 229      ( 15 Ousmane 2020 06:23 )             33.3     01-15    140  |  107  |  70<H>  ----------------------------<  56<L>  3.7   |  20<L>  |  3.75<H>    Ca    7.9<L>      15 Ousmane 2020 06:23  Phos  4.9     01-15  Mg     2.0     01-15          CAPILLARY BLOOD GLUCOSE      POCT Blood Glucose.: 77 mg/dL (15 Ousmane 2020 07:55)                RADIOLOGY & ADDITIONAL TESTS:    Imaging Personally Reviewed:  [y ] YES  [ ] NO    Consultant(s) Notes Reviewed:  [y ] YES  [ ] NO    Care Discussed with Consultants/Other Providers [ ] YES  [ ] NO    PROBLEMS:  CONGESTIVE HEART FAILURE;RSV INFECTION  CONGESTIVE BABB FAILURE;RSV INFECTION  SHORTNESS OF BREATH  CHF (congestive heart failure)  Gastrointestinal hemorrhage, unspecified gastrointestinal hemorrhage type  Goals of care, counseling/discussion  HTN (hypertension)  Hyperlipidemia  Alzheimers disease  MI, old  Hypothyroidism  RSV infection  CHF (congestive heart failure)  GI bleed  Anemia      Care discussed with family,         [  ]   yes  [  ]  No    imp:    stable[ ]    unstable[  ]     improving [   ]       unchanged  [x  ]                Plans:  EGD as per GI;  ?extubation  BP control  CXR

## 2020-01-15 NOTE — PROGRESS NOTE ADULT - SUBJECTIVE AND OBJECTIVE BOX
lying in bed, extubated	    Vital Signs Last 24 Hrs  T(C): 36.4 (15 Ousmane 2020 07:37), Max: 37.2 (15 Ousmane 2020 05:20)  T(F): 97.6 (15 Ousmane 2020 07:37), Max: 98.9 (15 Ousmane 2020 05:20)  HR: 90 (15 Ousmane 2020 09:00) (65 - 125)  BP: 184/94 (15 Ousmane 2020 09:00) (146/43 - 231/78)  BP(mean): 117 (15 Ousmane 2020 09:00) (61 - 842)  RR: 16 (15 Ousmane 2020 09:00) (10 - 27)  SpO2: 98% (15 Ousmane 2020 09:00) (97% - 100%)    PHYSICAL EXAM:    general - weak looking  HEENT - No Icterus  CVS - RRR  RS - AE B/L  Abd - soft, NT  Ext - Pulses +        LABS:                        11.0   13.14 )-----------( 229      ( 15 Ousmane 2020 06:23 )             33.3     01-15    140  |  107  |  70<H>  ----------------------------<  56<L>  3.7   |  20<L>  |  3.75<H>    Ca    7.9<L>      15 Ousmane 2020 06:23  Phos  4.9     01-15  Mg     2.0     01-15            Culture - Sputum (collected 13 Jan 2020 17:08)  Source: .Sputum Sputum  Gram Stain (prelim) (14 Jan 2020 16:56):    Few Squamous epithelial cells per low power field    Few polymorphonuclear leukocytes per low power field    Rare Yeast like cells per oil power field    Few Gram variable coccobacilli per oil power field  Preliminary Report (14 Jan 2020 16:56):    Moderate Staphylococcus aureus        RADIOLOGY & ADDITIONAL STUDIES:

## 2020-01-15 NOTE — PROGRESS NOTE ADULT - SUBJECTIVE AND OBJECTIVE BOX
79 years old female from [facility] rehab c/o coughing sob for three to four days. Pt is sent here to rule out chf vs pneumonia. Pt is alert and oriented to person only unable to give detail hx due to hx of Alzheimer according to the [facility] pt had sat of 91 to 92 %,	    PAST MEDICAL/SURGICAL/FAMILY/SOCIAL HISTORY:    Past Medical History:  Alzheimers disease    CVA (cerebral vascular accident)    Depression    HTN (hypertension)    MI, old.  recently Dx c hypothyroidism, TSH > 111 started Levothyroxin 50mcg qd as per Dr Vigil (2020 11:41)      Chief Complaint:  Patient is a 79y old  Female who presents with a chief complaint of sob cough (2020 22:56)      Review of Systems:    General:  No wt loss, fevers, chills, night sweats  Eyes:  Good vision, no reported pain  ENT:  No sore throat, pain, runny nose, dysphagia  CV:  No pain, palpitations, hypo/hypertension  Resp:  dyspnea, cough  GI:  No pain, nausea, vomiting, diarrhea, constipation           Social History/Family History  SOCHX:   tobacco,  -  alcohol    FMHX: FA/MO  - contributory       Discussed with:  PMD, Family    Physical Exam:    Vital Signs:  Vital Signs Last 24 Hrs  T(C): 36.2 (2020 17:06), Max: 36.9 (2020 00:18)  T(F): 97.2 (2020 17:06), Max: 98.5 (2020 00:18)  HR: 74 (2020 21:55) (68 - 87)  BP: 179/78 (2020 17:06) (132/71 - 179/78)  BP(mean): --  RR: 18 (2020 17:06) (18 - 18)  SpO2: 97% (2020 21:55) (94% - 98%)  Daily     Daily Weight in k.8 (2020 04:59)  I&O's Summary    2020 07:01  -  2020 07:00  --------------------------------------------------------  IN: 560 mL / OUT: 0 mL / NET: 560 mL          Chest:  fair air entry bilateral  Cardiovascular:  Regular rhythm, S1, S2, no murmur/rub/S3/S4, no carotid/femoral/abdominal bruit, radial/pedal pulses 2+,   Abdomen:  Soft, non-tender, non-distended, normoactive bowel sounds, no HSM        Laboratory:          138  |  104  |  69<H>  ----------------------------<  111<H>  3.8   |  25  |  3.77<H>    Ca    8.1<L>      2020 07:22          Assessment:  I am asked to assist this patient admitted with shortness of breath  The patient has multifactorial causes of shortness of breath, renal insufficiency, anemia  And now after a diagnostic echocardiogram within normal ejection fraction the reading shows mitral regurgitation and tricuspid regurgitation  After review of the studies this regurgitation is not considered severe but in the moderate to severe but more moderate range  the patient now is transferred to the intensive care unit after a noted upper GI bleed  GI noted, repeat EGD   optimize systolic blood pressure  extubated

## 2020-01-15 NOTE — PROGRESS NOTE ADULT - SUBJECTIVE AND OBJECTIVE BOX
[facility] NEPHROLOGY SERVICES, [facility]  NEPHROLOGY AND HYPERTENSION  [address]  [address]  [place] [address]  [phone]    MD MART LORD, MD RAMESH MURRELL, MD CHING GARZA, MD JU BLAS, MD ISABELLA SLADE, MD ALAYNA WALLACE MD          Patient extubated;  no distress    MEDICATIONS  (STANDING):  buDESOnide    Inhalation Suspension 0.5 milliGRAM(s) Inhalation every 12 hours  carvedilol 12.5 milliGRAM(s) Oral every 12 hours  chlorhexidine 4% Liquid 1 Application(s) Topical <User Schedule>  cloNIDine 0.1 milliGRAM(s) Oral every 6 hours  hydrALAZINE 50 milliGRAM(s) Oral every 8 hours  levothyroxine Injectable 25 MICROGram(s) IV Push at bedtime  pantoprazole  Injectable 40 milliGRAM(s) IV Push every 12 hours  sodium chloride 0.65% Nasal 1 Spray(s) Both Nostrils three times a day  sucralfate 1 Gram(s) Oral four times a day    MEDICATIONS  (PRN):  albuterol/ipratropium for Nebulization 3 milliLiter(s) Nebulizer every 6 hours PRN Shortness of Breath and/or Wheezing  hydrALAZINE Injectable 10 milliGRAM(s) IV Push every 6 hours PRN SBP >170      20 @ 07:  -  01-15-20 @ 07:00  --------------------------------------------------------  IN: 380 mL / OUT: 600 mL / NET: -220 mL    01-15-20 @ 07:  -  01-15-20 @ 12:51  --------------------------------------------------------  IN: 240 mL / OUT: 0 mL / NET: 240 mL      PHYSICAL EXAM:      T(C): 36.4 (01-15-20 @ 07:37), Max: 37.2 (01-15-20 @ 05:20)  HR: 75 (01-15-20 @ 12:00) (65 - 125)  BP: 161/56 (01-15-20 @ 12:00) (139/44 - 231/78)  RR: 14 (01-15-20 @ 12:00) (10 - 24)  SpO2: 100% (01-15-20 @ 12:00) (98% - 100%)  Wt(kg): --  Respiratory: clear anteriorly, decreased BS at bases  Cardiovascular: S1 S2  Gastrointestinal: soft NT ND +BS  Extremities:  1 edema                                    11.0   13.14 )-----------( 229      ( 15 Ousmane 2020 06:23 )             33.3     01-15    140  |  107  |  70<H>  ----------------------------<  56<L>  3.7   |  20<L>  |  3.75<H>    Ca    7.9<L>      15 Ousmane 2020 06:23  Phos  4.9     01-15  Mg     2.0     01-15    Immunoelectrophoresis, Serum (20 @ 12:04)    Protein Total, Serum: 3.7 g/dL    Total Protein, Serum: 3.7 g/dL    Albumin, Serum: 1.7 g/dL    Albumin/Globulin Ratio: 0.8 Ratio    Alpha 1: 0.3 g/dL    Alpha 2: 0.5 g/dL    Beta Globulin: 0.6 g/dL    Gamma Globulin: 0.6 g/dL    M-Earl: Unable to Quantitate    Serum Protein Electrophoresis Interp: Weak Gamma-Migrating Paraprotein Identified    % Albumin: 45.8 %    % Alpha 1: 8.0 %    % Alpha 2: 14.6 %    % Beta: 15.0 %    % Gamma: 16.6 %    % M Earl: Unable to Quantitate    Immunofixation, Serum: Weak IgG Lambda Band Identified    Reference Range: None Detected    Quantitative Ig mg/dL    Quantitative IgA: 262 mg/dL    Quantitative IgM: 21 mg/dL    TRIPP Kappa: 11.50 mg/dL    TRIPP Lambda: 6.94 mg/dL    Emet/Lambda Free Light Chain Ratio, Serum: 1.66 Ratio    Bence Rice/Protein, Urine (01.10.20 @ 16:44)    Bence Rice/Protein, Urine: Absent          Creatinine Trend: 3.75<--, 3.79<--, 3.89<--, 3.88<--, 3.87<--, 4.17<--      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 + paraprotein noted  Clinically improving, extubated    Plan  IV Labetalol prn  Added clonidine 0.1 mg q 6  Continued warranted diuresis, supportive care.  Would defer prospect of aggressive work up or HD support as overall prognosis poor.    Obed Jamison MD

## 2020-01-15 NOTE — PROGRESS NOTE ADULT - SUBJECTIVE AND OBJECTIVE BOX
INTERVAL HPI:  79 year female with HTN, HLD, MI hx, CVA, Gi bleed, Anemia Depression and Alzheimer's dementia.  Sent from [facility] Re[facility] due to SOB. Possibility of pneumonia vs CHF was raised.   In ED with Respiratory distress required BIPAP support and RVP positive for RSV. Pt not able to provide more details due to dementia. Information from transfer papers and ED physician.  Nods no when asked for smoking.  01/09/20: With massive GI bleed, intubated and transferred to ICU.  01/09/20:  EGD+ Colonoscopy.  01/15/20:  Extubated.    OVERNIGHT EVENTS:  Some what lethargic    Vital Signs Last 24 Hrs  T(C): 36.1 (15 Ousmane 2020 16:22), Max: 37.2 (15 Ousmane 2020 05:20)  T(F): 97 (15 Ousmane 2020 16:22), Max: 98.9 (15 Ousmane 2020 05:20)  HR: 64 (15 Ousmane 2020 19:16) (63 - 108)  BP: 167/44 (15 Ousmane 2020 19:00) (139/44 - 193/55)  BP(mean): 77 (15 Ousmane 2020 19:00) (61 - 163)  RR: 12 (15 Ousmane 2020 19:16) (9 - 20)  SpO2: 100% (15 Ousmane 2020 19:16) (90% - 100%)    Mode: CPAP with PS  FiO2: 35  PEEP: 5  PS: 5  MAP: 7.1  PIP: 11    PHYSICAL EXAM:  GEN:        Resting, comfortable.  HEENT:    Normal.    RESP:       no wheezing.  CVS:          Regular rate and rhythm.   ABD:         Soft, non-tender, non-distended;     MEDICATIONS  (STANDING):  buDESOnide    Inhalation Suspension 0.5 milliGRAM(s) Inhalation every 12 hours  carvedilol 12.5 milliGRAM(s) Oral every 12 hours  chlorhexidine 4% Liquid 1 Application(s) Topical <User Schedule>  cloNIDine 0.1 milliGRAM(s) Oral every 6 hours  hydrALAZINE 50 milliGRAM(s) Oral every 8 hours  levothyroxine Injectable 25 MICROGram(s) IV Push at bedtime  pantoprazole  Injectable 40 milliGRAM(s) IV Push every 12 hours  sodium chloride 0.65% Nasal 1 Spray(s) Both Nostrils three times a day  sucralfate 1 Gram(s) Oral four times a day    MEDICATIONS  (PRN):  albuterol/ipratropium for Nebulization 3 milliLiter(s) Nebulizer every 6 hours PRN Shortness of Breath and/or Wheezing  hydrALAZINE Injectable 10 milliGRAM(s) IV Push every 6 hours PRN SBP >170    LABS:                        11.0   13.14 )-----------( 229      ( 15 Ousmane 2020 06:23 )             33.3     01-15    140  |  107  |  70<H>  ----------------------------<  56<L>  3.7   |  20<L>  |  3.75<H>    Ca    7.9<L>      15 Ousmane 2020 06:23  Phos  4.9     01-15  Mg     2.0     01-15    01-09 @ 22:08  pH: 7.48  pCO2: 29  pO2: 205  SaO2: 100    ASSESSMENT AND PLAN:  ·	Acute hypoxic  Respiratory failure .  ·	RSV tracheobronchitis.  ·	Left pleural effusion.  ·	Hypothyroidism with very high TSH.  ·	Anemia.  ·	VRE UTI  ·	Leukocytosis.  ·	Renal Insuffiencey.  ·	CVA by history.  ·	HTN.  ·	HLD.  ·	Alzheimer's dementia.  ·	GI bleed,    Extubated.  Continue budesonide and duoneb inhalation.

## 2020-01-15 NOTE — PROGRESS NOTE ADULT - SUBJECTIVE AND OBJECTIVE BOX
Patient is a 79y old  Female who presents with a chief complaint of sob cough (15 Ousmane 2020 10:47)      HPI:  · HPI Objective Statement: 79 years old female from [facility] rehab c/o coughing sob for three to four days. Pt is sent here to rule out chf vs pneumonia. Pt is alert and oriented to person only unable to give detail hx due to hx of Alzheimer according to the [facility] pt had sat of 91 to 92 %,	    PAST MEDICAL/SURGICAL/FAMILY/SOCIAL HISTORY:    Past Medical History:  Alzheimers disease    CVA (cerebral vascular accident)    Depression    HTN (hypertension)    MI, old.  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, hematochezia, hematemesis, nausea, vomiting, abdominal pain, constipation, diarrhea, or change in bowel movements     MEDICATIONS  (STANDING):  buDESOnide    Inhalation Suspension 0.5 milliGRAM(s) Inhalation every 12 hours  carvedilol 12.5 milliGRAM(s) Oral every 12 hours  chlorhexidine 4% Liquid 1 Application(s) Topical <User Schedule>  cloNIDine 0.1 milliGRAM(s) Oral every 6 hours  hydrALAZINE 50 milliGRAM(s) Oral every 8 hours  levothyroxine Injectable 25 MICROGram(s) IV Push at bedtime  pantoprazole  Injectable 40 milliGRAM(s) IV Push every 12 hours  sodium chloride 0.65% Nasal 1 Spray(s) Both Nostrils three times a day    MEDICATIONS  (PRN):  albuterol/ipratropium for Nebulization 3 milliLiter(s) Nebulizer every 6 hours PRN Shortness of Breath and/or Wheezing  hydrALAZINE Injectable 10 milliGRAM(s) IV Push every 6 hours PRN SBP >170      FAMILY HISTORY:  Family history of essential hypertension (Child)  Family history of stroke      Allergies    No Known Allergies    Intolerances        PMH/PSH:  Hypothyroidism  Constipation  Iron deficiency anemia  Major depressive disorder  Hyperlipidemia  GI bleed  Depression  Alzheimers disease  MI, old  CVA (cerebral vascular accident)  HTN (hypertension)  No pertinent past medical history  No significant past surgical history        REVIEW OF SYSTEMS:  CONSTITUTIONAL: No fever, weight loss,  EYES: No eye pain, visual disturbances, or discharge  ENMT:  No difficulty hearing, tinnitus, vertigo; No sinus or throat pain  NECK: No pain or stiffness  BREASTS: No pain, masses, or nipple discharge  RESPIRATORY: No cough, wheezing, chills or hemoptysis; No shortness of breath  CARDIOVASCULAR: No chest pain, palpitations, dizziness, or leg swelling  GASTROINTESTINAL: see above  GENITOURINARY: No dysuria, frequency, hematuria, or incontinence  NEUROLOGICAL: No headaches, memory loss, loss of strength, numbness, or tremors  SKIN: No itching, burning, rashes, or lesions   LYMPH NODES: No enlarged glands  ENDOCRINE: No heat or cold intolerance; No hair loss  MUSCULOSKELETAL: No joint pain or swelling; No muscle, back, or extremity pain  PSYCHIATRIC: No depression, anxiety, mood swings, or difficulty sleeping  HEME/LYMPH: No easy bruising, or bleeding gums  ALLERGY AND IMMUNOLOGIC: No hives or eczema    Vital Signs Last 24 Hrs  T(C): 36.4 (15 Ousmane 2020 07:37), Max: 37.2 (15 Ousmane 2020 05:20)  T(F): 97.6 (15 Ousmane 2020 07:37), Max: 98.9 (15 Ousmane 2020 05:20)  HR: 82 (15 Ousmane 2020 11:00) (65 - 125)  BP: 169/54 (15 Ousmane 2020 11:00) (139/44 - 231/78)  BP(mean): 85 (15 Ousmane 2020 11:00) (61 - 842)  RR: 13 (15 Ousmane 2020 11:00) (10 - 24)  SpO2: 98% (15 Ousmane 2020 11:00) (98% - 100%)    PHYSICAL EXAM:  GENERAL: NAD, well-groomed, well-developed  HEAD:  Atraumatic, Normocephalic  EYES: EOMI, PERRLA, conjunctiva and sclera clear  NECK: Supple, No JVD, Normal thyroid  NERVOUS SYSTEM:  Alert & at baseline  CHEST/LUNG: Clear to percussion bilaterally; No rales, rhonchi, wheezing, or rubs  HEART: Regular rate and rhythm; No murmurs, rubs, or gallops  ABDOMEN: Soft, Nontender, Nondistended; Bowel sounds present  EXTREMITIES:  2+ Peripheral Pulses, No clubbing, cyanosis, or edema  LYMPH: No lymphadenopathy noted  SKIN: No rashes or lesions    LAB                          11.0   13.14 )-----------( 229      ( 15 Ousmane 2020 06:23 )             33.3       CBC:  01-15 @ 06:23  WBC 13.14   Hgb 11.0   Hct 33.3   Plts 229  MCV 93.0  01-14 @ 04:24  WBC 9.61   Hgb 9.4   Hct 28.3   Plts 148  MCV 91.6  01-13 @ 04:22  WBC 10.29   Hgb 8.8   Hct 26.2   Plts 129  MCV 91.3  01-12 @ 02:36  WBC 10.14   Hgb 9.0   Hct 25.8   Plts 121  MCV 89.6  01-11 @ 16:58  WBC 16.89   Hgb 12.1   Hct 35.4   Plts 181  MCV 89.6  01-11 @ 03:06  WBC 11.89   Hgb 7.7   Hct 22.9   Plts 129  MCV 89.8  01-10 @ 14:21  WBC 13.91   Hgb 8.2   Hct 24.7   Plts 110  MCV 93.9  01-10 @ 03:27  WBC 13.01   Hgb 9.1   Hct 26.5   Plts 132  MCV 88.9  01-09 @ 22:16  WBC 14.12   Hgb 10.6   Hct 30.7   Plts 149  MCV 88.5  01-09 @ 12:58  WBC 7.23   Hgb 7.2   Hct 23.0   Plts 172  MCV 99.1      Chemistry:  01-15 @ 06:23  Na+ 140  K+ 3.7  Cl- 107  CO2 20  BUN 70  Cr 3.75     01-14 @ 04:24  Na+ 140  K+ 3.7  Cl- 109  CO2 19  BUN 75  Cr 3.79     01-13 @ 04:22  Na+ 139  K+ 3.3  Cl- 107  CO2 22  BUN 74  Cr 3.89     01-12 @ 02:36  Na+ 139  K+ 3.6  Cl- 107  CO2 22  BUN 82  Cr 3.88     01-11 @ 03:06  Na+ 137  K+ 2.9  Cl- 105  CO2 21  BUN 79  Cr 3.87     01-10 @ 03:27  Na+ 140  K+ 3.7  Cl- 108  CO2 22  BUN 93  Cr 4.17     01-09 @ 22:16  Na+ 140  K+ 3.5  Cl- 106  CO2 25  BUN 89  Cr 4.11     01-09 @ 12:58  Na+ 139  K+ 4.0  Cl- 106  CO2 25  BUN 85  Cr 4.36     01-09 @ 08:25  Na+ 139  K+ 3.9  Cl- 106  CO2 22  BUN 81  Cr 4.27         Glucose, Serum: 56 mg/dL (01-15 @ 06:23)  Glucose, Serum: 82 mg/dL (01-14 @ 04:24)  Glucose, Serum: 101 mg/dL (01-13 @ 04:22)  Glucose, Serum: 80 mg/dL (01-12 @ 02:36)  Glucose, Serum: 384 mg/dL (01-11 @ 03:06)  Glucose, Serum: 152 mg/dL (01-10 @ 03:27)  Glucose, Serum: 150 mg/dL (01-09 @ 22:16)  Glucose, Serum: 231 mg/dL (01-09 @ 12:58)  Glucose, Serum: 157 mg/dL (01-09 @ 08:25)      15 Jan 2020 06:23    140    |  107    |  70     ----------------------------<  56     3.7     |  20     |  3.75   14 Jan 2020 04:24    140    |  109    |  75     ----------------------------<  82     3.7     |  19     |  3.79   13 Jan 2020 04:22    139    |  107    |  74     ----------------------------<  101    3.3     |  22     |  3.89   12 Jan 2020 02:36    139    |  107    |  82     ----------------------------<  80     3.6     |  22     |  3.88   11 Jan 2020 03:06    137    |  105    |  79     ----------------------------<  384    2.9     |  21     |  3.87   10 Jan 2020 03:27    140    |  108    |  93     ----------------------------<  152    3.7     |  22     |  4.17   09 Jan 2020 22:16    140    |  106    |  89     ----------------------------<  150    3.5     |  25     |  4.11     Ca    7.9        15 Jan 2020 06:23  Ca    7.4        14 Jan 2020 04:24  Ca    7.0        13 Jan 2020 04:22  Ca    7.5        12 Jan 2020 02:36  Ca    6.8        11 Jan 2020 03:06  Ca    7.6        10 Jan 2020 03:27  Ca    7.8        09 Jan 2020 22:16  Phos  4.9       15 Jan 2020 06:23  Phos  4.6       14 Jan 2020 04:24  Phos  4.5       13 Jan 2020 04:22  Phos  4.2       12 Jan 2020 02:36  Phos  2.8       11 Jan 2020 03:06  Phos  3.1       09 Jan 2020 22:16  Mg     2.0       15 Jan 2020 06:23  Mg     2.0       14 Jan 2020 04:24  Mg     2.0       13 Jan 2020 04:22  Mg     2.1       12 Jan 2020 02:36  Mg     1.9       11 Jan 2020 03:06  Mg     2.2       10 Jan 2020 03:27  Mg     2.3       09 Jan 2020 22:16    TPro  4.4    /  Alb  1.4    /  TBili  0.3    /  DBili  x      /  AST  12     /  ALT  19     /  AlkPhos  56     13 Jan 2020 04:22  TPro  4.2    /  Alb  1.4    /  TBili  0.4    /  DBili  x      /  AST  12     /  ALT  21     /  AlkPhos  55     12 Jan 2020 02:36  TPro  3.7    /  Alb  1.7    /  TBili  x      /  DBili  x      /  AST  x      /  ALT  x      /  AlkPhos  x      11 Jan 2020 12:04  TPro  4.6    /  Alb  1.6    /  TBili  0.6    /  DBili  x      /  AST  15     /  ALT  33     /  AlkPhos  59     10 Ousmane 2020 03:27  TPro  5.2    /  Alb  1.8    /  TBili  0.7    /  DBili  x      /  AST  21     /  ALT  42     /  AlkPhos  70     09 Jan 2020 22:16              CAPILLARY BLOOD GLUCOSE      POCT Blood Glucose.: 77 mg/dL (15 Ousmane 2020 07:55)      C-Reactive Protein, Serum: 5.72 mg/dL (01-14 @ 09:19)  C-Reactive Protein, Serum: 2.28 mg/dL (01-09 @ 11:35)      RADIOLOGY & ADDITIONAL TESTS:    Imaging Personally Reviewed:  [ ] YES  [ ] NO    Consultant(s) Notes Reviewed:  [ ] YES  [ ] NO    Care Discussed with Consultants/Other Providers [ ] YES  [ ] NO

## 2020-01-15 NOTE — PROGRESS NOTE ADULT - ASSESSMENT
HPI:  · HPI Objective Statement: 79 years old female from [facility] rehab c/o coughing sob for three to four days. Pt is sent here to rule out chf vs pneumonia. Pt is alert and oriented to person only unable to give detail hx due to hx of Alzheimer according to the [facility] pt had sat of 91 to 92 %,	  ---------------- As Above ---------------------------------------------  Patient is a poor historian. Patient admitted with Bronchitis and UTI. Called for hematemsis. Patient had three episodes of bloody vomitus. ( 1/3 of a cup ) . Patient complaining of mid epigastric / chest pain but patient can not characterize the pain.   Patient has chronic anemia. but HGB fell 8.4 --> 7.2. Was on Carafate, now on IV PPI  KUB pending  HX of CRI    Addendum : Patient had another episode of bloody vomitus    Upper GI Bleed - Secondary to (?). 1) NPO  2)Check KUB  3) IV PPI 4) telemetry  5) NGT 6) EGD 7) Hold Carafate  8) Hold iron  ---Will speak with daughter.

## 2020-01-15 NOTE — PROGRESS NOTE ADULT - PROBLEM SELECTOR PLAN 1
ICU #1, Intubated. 8.2 --> 7.7 -- ( one unit PRBC ) --> 9.0--> 8.8  --> 11.0 S/P EGD ( presumed AVMs s/p APC ) / colonoscopy ( blood through out colon including TI ) On IV PPI . repeat EGD cancelled for high BP 1) Hold Carafate for EGD 2) clear liquids and advance as tolerated  3) F/U EGD Thursday 4) F/U CBC

## 2020-01-15 NOTE — PROGRESS NOTE ADULT - ASSESSMENT
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 Hospital course complicated by acute blood loss anemia 2 to UGIB, acute on CKD and acute respiratory failure requiring intubation.    Resp/GI; Pt weaning well on CPAP 5 + PS 5 with good MS; extubate/ Probable bedside EGD tomorrow   ID: Off Abx  CVS: Increase pt's antiHTN; pt still hypertensive/ Pt was on Hydralazine as outpt  Heme: Trend Hb  FEN:  Will discuss with GI if pt may have clears today  Renal: Cr decreasing/ Follow BUN/Cr and UO/ F/u as per Renal  Neuro: Off sedation    CCT; 40 min

## 2020-01-16 LAB
ANION GAP SERPL CALC-SCNC: 10 MMOL/L — SIGNIFICANT CHANGE UP (ref 5–17)
BLD GP AB SCN SERPL QL: SIGNIFICANT CHANGE UP
BUN SERPL-MCNC: 71 MG/DL — HIGH (ref 7–23)
CALCIUM SERPL-MCNC: 7.5 MG/DL — LOW (ref 8.5–10.1)
CHLORIDE SERPL-SCNC: 108 MMOL/L — SIGNIFICANT CHANGE UP (ref 96–108)
CO2 SERPL-SCNC: 20 MMOL/L — LOW (ref 22–31)
CREAT SERPL-MCNC: 3.86 MG/DL — HIGH (ref 0.5–1.3)
GLUCOSE SERPL-MCNC: 135 MG/DL — HIGH (ref 70–99)
HCT VFR BLD CALC: 29.9 % — LOW (ref 34.5–45)
HGB BLD-MCNC: 9.7 G/DL — LOW (ref 11.5–15.5)
MAGNESIUM SERPL-MCNC: 2 MG/DL — SIGNIFICANT CHANGE UP (ref 1.6–2.6)
MCHC RBC-ENTMCNC: 30.7 PG — SIGNIFICANT CHANGE UP (ref 27–34)
MCHC RBC-ENTMCNC: 32.4 GM/DL — SIGNIFICANT CHANGE UP (ref 32–36)
MCV RBC AUTO: 94.6 FL — SIGNIFICANT CHANGE UP (ref 80–100)
NRBC # BLD: 0 /100 WBCS — SIGNIFICANT CHANGE UP (ref 0–0)
PHOSPHATE SERPL-MCNC: 6 MG/DL — HIGH (ref 2.5–4.5)
PLATELET # BLD AUTO: 221 K/UL — SIGNIFICANT CHANGE UP (ref 150–400)
POTASSIUM SERPL-MCNC: 3.6 MMOL/L — SIGNIFICANT CHANGE UP (ref 3.5–5.3)
POTASSIUM SERPL-SCNC: 3.6 MMOL/L — SIGNIFICANT CHANGE UP (ref 3.5–5.3)
RBC # BLD: 3.16 M/UL — LOW (ref 3.8–5.2)
RBC # FLD: 17.1 % — HIGH (ref 10.3–14.5)
SODIUM SERPL-SCNC: 138 MMOL/L — SIGNIFICANT CHANGE UP (ref 135–145)
WBC # BLD: 12.09 K/UL — HIGH (ref 3.8–10.5)
WBC # FLD AUTO: 12.09 K/UL — HIGH (ref 3.8–10.5)

## 2020-01-16 PROCEDURE — 99233 SBSQ HOSP IP/OBS HIGH 50: CPT

## 2020-01-16 RX ADMIN — Medication 10 MILLIGRAM(S): at 03:52

## 2020-01-16 RX ADMIN — PANTOPRAZOLE SODIUM 40 MILLIGRAM(S): 20 TABLET, DELAYED RELEASE ORAL at 21:36

## 2020-01-16 RX ADMIN — Medication 25 MICROGRAM(S): at 21:45

## 2020-01-16 RX ADMIN — Medication 0.5 MILLIGRAM(S): at 05:55

## 2020-01-16 RX ADMIN — CARVEDILOL PHOSPHATE 12.5 MILLIGRAM(S): 80 CAPSULE, EXTENDED RELEASE ORAL at 17:42

## 2020-01-16 RX ADMIN — Medication 1 SPRAY(S): at 14:54

## 2020-01-16 RX ADMIN — Medication 50 MILLIGRAM(S): at 05:32

## 2020-01-16 RX ADMIN — CHLORHEXIDINE GLUCONATE 1 APPLICATION(S): 213 SOLUTION TOPICAL at 05:34

## 2020-01-16 RX ADMIN — Medication 0.1 MILLIGRAM(S): at 05:32

## 2020-01-16 RX ADMIN — Medication 1 GRAM(S): at 01:39

## 2020-01-16 RX ADMIN — Medication 1 SPRAY(S): at 05:32

## 2020-01-16 RX ADMIN — Medication 0.5 MILLIGRAM(S): at 18:21

## 2020-01-16 RX ADMIN — Medication 0.1 MILLIGRAM(S): at 17:42

## 2020-01-16 RX ADMIN — Medication 0.1 MILLIGRAM(S): at 01:39

## 2020-01-16 RX ADMIN — Medication 50 MILLIGRAM(S): at 21:35

## 2020-01-16 RX ADMIN — CARVEDILOL PHOSPHATE 12.5 MILLIGRAM(S): 80 CAPSULE, EXTENDED RELEASE ORAL at 05:43

## 2020-01-16 RX ADMIN — Medication 1 SPRAY(S): at 21:36

## 2020-01-16 RX ADMIN — Medication 50 MILLIGRAM(S): at 12:13

## 2020-01-16 RX ADMIN — PANTOPRAZOLE SODIUM 40 MILLIGRAM(S): 20 TABLET, DELAYED RELEASE ORAL at 08:01

## 2020-01-16 NOTE — PROGRESS NOTE ADULT - SUBJECTIVE AND OBJECTIVE BOX
79 years old female from [facility] rehab c/o coughing sob for three to four days. Pt is sent here to rule out chf vs pneumonia. Pt is alert and oriented to person only unable to give detail hx due to hx of Alzheimer according to the [facility] pt had sat of 91 to 92 %,	    PAST MEDICAL/SURGICAL/FAMILY/SOCIAL HISTORY:    Past Medical History:  Alzheimers disease    CVA (cerebral vascular accident)    Depression    HTN (hypertension)    MI, old.  recently Dx c hypothyroidism, TSH > 111 started Levothyroxin 50mcg qd as per Dr Vigil (2020 11:41)      Chief Complaint:  Patient is a 79y old  Female who presents with a chief complaint of sob cough (2020 22:56)      Review of Systems:    General:  No wt loss, fevers, chills, night sweats  Eyes:  Good vision, no reported pain  ENT:  No sore throat, pain, runny nose, dysphagia  CV:  No pain, palpitations, hypo/hypertension  Resp:  dyspnea, cough  GI:  No pain, nausea, vomiting, diarrhea, constipation           Social History/Family History  SOCHX:   tobacco,  -  alcohol    FMHX: FA/MO  - contributory       Discussed with:  PMD, Family    Physical Exam:    Vital Signs:  Vital Signs Last 24 Hrs  T(C): 36.2 (2020 17:06), Max: 36.9 (2020 00:18)  T(F): 97.2 (2020 17:06), Max: 98.5 (2020 00:18)  HR: 74 (2020 21:55) (68 - 87)  BP: 179/78 (2020 17:06) (132/71 - 179/78)  BP(mean): --  RR: 18 (2020 17:06) (18 - 18)  SpO2: 97% (2020 21:55) (94% - 98%)  Daily     Daily Weight in k.8 (2020 04:59)  I&O's Summary    2020 07:01  -  2020 07:00  --------------------------------------------------------  IN: 560 mL / OUT: 0 mL / NET: 560 mL          Chest:  fair air entry bilateral  Cardiovascular:  Regular rhythm, S1, S2, no murmur/rub/S3/S4, no carotid/femoral/abdominal bruit, radial/pedal pulses 2+,   Abdomen:  Soft, non-tender, non-distended, normoactive bowel sounds, no HSM        Laboratory:          138  |  104  |  69<H>  ----------------------------<  111<H>  3.8   |  25  |  3.77<H>    Ca    8.1<L>      2020 07:22          Assessment:  I am asked to assist this patient admitted with shortness of breath  The patient has multifactorial causes of shortness of breath, renal insufficiency, anemia  And now after a diagnostic echocardiogram within normal ejection fraction the reading shows mitral regurgitation and tricuspid regurgitation  After review of the studies this regurgitation is not considered severe but in the moderate to severe but more moderate range  upper GI bleed  GI noted, repeat EGD   The patient is now extubated and ready for transfer to the regular medical floor  optimize systolic blood pressure

## 2020-01-16 NOTE — PROGRESS NOTE ADULT - SUBJECTIVE AND OBJECTIVE BOX
TMAX - 96.7 - 98.9    Off ab rx     Vital Signs Last 24 Hrs  T(C): 35.9 (16 Jan 2020 12:00), Max: 36.2 (15 Ousmane 2020 20:30)  T(F): 96.7 (16 Jan 2020 12:00), Max: 97.2 (15 Ousmane 2020 20:30)  HR: 76 (16 Jan 2020 12:00) (64 - 108)  BP: 111/62 (16 Jan 2020 12:00) (63/42 - 216/63)  BP(mean): 74 (16 Jan 2020 12:00) (46 - 176)  RR: 28 (16 Jan 2020 12:00) (9 - 28)  SpO2: 100% (16 Jan 2020 12:00) (90% - 100%)  Supplemental O2:  on NC O2       Extubated yesterday and now on NCO2.  Repeat EGD to be done possibly today.  No c/o cp or SOB at present    PHYSICAL EXAM  General:  awakens, lying in bed now with audible moist cough noted, in NAD but appears fatigued, and with NC O2 in place   HEENT: conj pink, sclerae anicteric, PERRLA, no oral lesions noted, NGT in place and clamped at present   Neck:  supple, no nodes noted  Heart: RR   Lungs:  diffuse moist upper airway rhonchi  Abdomen: soft, BS+, nontender  Back; no CVA or Spinal tenderness noted   Extremities:  no edema LE's                     LUE with moderate swelling  Skin:  warm, dry, no rash noted        I&O's Summary :    15 Ousmane 2020 07:01  -  16 Jan 2020 07:00  ----------------------------------------------------  ----  IN: 750 mL / OUT: 400 mL / NET: 350 mL      LABS:  CBC Full  -  ( 16 Jan 2020 04:07 )  WBC Count : 12.09 K/uL  RBC Count : 3.16 M/uL  Hemoglobin : 9.7 g/dL  Hematocrit : 29.9 %  Platelet Count - Automated : 221 K/uL  Mean Cell Volume : 94.6 fl  Mean Cell Hemoglobin : 30.7 pg  Mean Cell Hemoglobin Concentration : 32.4 gm/dL  Auto Neutrophil # : x  Auto Lymphocyte # : x  Auto Monocyte # : x  Auto Eosinophil # : x  Auto Basophil # : x  Auto Neutrophil % : x  Auto Lymphocyte % : x  Auto Monocyte % : x  Auto Eosinophil % : x  Auto Basophil % : x    01-16    138  |  108  |  71<H>  ----------------------------<  135<H>  3.6   |  20<L>  |  3.86<H>    Ca    7.5<L>      16 Jan 2020 04:07  Phos  6.0     01-16  Mg     2.0     01-16        Sedimentation Rate, Erythrocyte: 42 mm/hr (01-14 @ 04:24)              MICROBIOLOGY:    Specimen Source: .Sputum Sputum (01-13 @ 17:08)  Culture Results:   Moderate Methicillin resistant Staphylococcus aureus  Normal Respiratory Kayce absent (01-13 @ 17:08)  Gram Stain:   Few Squamous epithelial cells per low power field  Few polymorphonuclear leukocytes per low power field  Rare Yeast like cells per oil power field  Few Gram variable coccobacilli per oil power field (01.13.20 @ 17:08)  Culture - Sputum . (01.13.20 @ 17:08)    Gram Stain:   Few Squamous epithelial cells per low power field  Few polymorphonuclear leukocytes per low power field  Rare Yeast like cells per oil power field  Few Gram variable coccobacilli per oil power field    -  Ampicillin/Sulbactam: R 16/8    -  Cefazolin: R >16    -  Clindamycin: R >4    -  Erythromycin: R >4    -  Gentamicin: S <=1 Should not be used as monotherapy    -  Linezolid: S 2    -  Oxacillin: R >2    -  Penicillin: R >8    -  RIF- Rifampin: S <=1 Should not be used as monotherapy    -  Tetra/Doxy: S 2    -  Trimethoprim/Sulfamethoxazole: S <=0.5/9.5    -  Vancomycin: S 2    Specimen Source: .Sputum Sputum    Culture Results:   Moderate Methicillin resistant Staphylococcus aureus  Normal Respiratory Kayce absent    Organism Identification: Methicillin resistant Staphylococcus aureus    Organism: Methicillin resistant Staphylococcus aureus    Method Type: RONALD        Culture - Urine (01.01.20 @ 17:13)    -  Ampicillin: S <=2 Predicts results to ampicillin/sulbactam, amoxacillin-clavulanate and  piperacillin-tazobactam.    -  Vancomycin: R >16    -  Tetra/Doxy: R >8    -  Nitrofurantoin: S <=32 Should not be used to treat pyelonephritis.    -  Linezolid: S 2    -  Levofloxacin: R >4    -  Daptomycin: S 1    -  Ciprofloxacin: R >2    Specimen Source: .Urine Catheterized    Culture Results:   50,000 - 99,000 CFU/mL Enterococcus faecalis (vancomycin resistant)    Organism Identification: Observation    Organism: Observation    Method Type: RONALD    Culture - Blood (01.01.20 @ 17:11)    Specimen Source: .Blood Blood-Peripheral    Culture Results:   No growth at 5 days.      Culture - Blood (01.01.20 @ 17:11)    Specimen Source: .Blood Blood-Peripheral    Culture Results:   No growth at 5 days.        FLU A B RSV Detection by PCR (01.01.20 @ 14:04)    Flu A Result: NotDete: The Flu A B RSV assay is a Real-Time PCR test for the qualitative  detection and differentiation of Influenza A, Influenza B, and  Respiratory Syncytial Virus on nasopharyngeal swabs. The results should  be interpreted in the context of all clinical and laboratory findings.    Flu B Result: NotDete    RSV Result: Detected        Radiology:  < from: Xray Chest 1 View- PORTABLE-Routine (01.13.20 @ 07:28) >    EXAM:  XR CHEST PORTABLE ROUTINE 1V                          PROCEDURE DATE:  01/13/2020      INTERPRETATION:  Chest portable.    Clinical History: Pneumonia, follow-up exam. Gastrointestinal bleed.    Comparison: 1/10/2020.    Single AP viewsubmitted.  The patient's lines and tubes remain unchanged. There are multiple leads overlying the chest.    The evaluation of the cardiomediastinal silhouette is limited on portable technique.  Prominent cardiac silhouette.    Pulmonary vascular congestion with small bilateral pleural effusions.    Impression:    Findings as discussed above.          Impression:  Remains afebrile off ab rx after initial rx for Sepsis due to RSV Bronchitis/ PNA with VRE UTI and now with Sputum + MRSA.  Pt. now extubated and on NC O2 with last CXR with Pulmonary congestion + small pleural effusions and WBC's decreased to 12.09.  MRSA likely represents colonization at this time.    Suggestions:  Wiil therefore continue to observe off ab rx and repeat CXR to re-evaluate.  Continue to follow-up temps and labs.  For possible repeat EGD today.

## 2020-01-16 NOTE — PROGRESS NOTE ADULT - SUBJECTIVE AND OBJECTIVE BOX
HPI:  · HPI Objective Statement: 79 years old female from [facility] rehab c/o coughing sob for three to four days. Pt is sent here to rule out chf vs pneumonia. Pt is alert and oriented to person only unable to give detail hx due to hx of Alzheimer according to the [facility] pt had sat of 91 to 92 %,	    PAST MEDICAL/SURGICAL/FAMILY/SOCIAL HISTORY:    Past Medical History:  Alzheimers disease    CVA (cerebral vascular accident)    Depression    HTN (hypertension)    MI, old.  recently Dx c hypothyroidism, TSH > 111 started Levothyroxin 50mcg qd as per Dr Vigil (2020 11:41)      24 hr events:      ## Labs:  CBC:                        9.7    12.09 )-----------( 221      ( 2020 04:07 )             29.9     Chem:      138  |  108  |  71<H>  ----------------------------<  135<H>  3.6   |  20<L>  |  3.86<H>    Ca    7.5<L>      2020 04:07  Phos  6.0       Mg     2.0           Coags:          ## Imaging:    ## Medications:    carvedilol 12.5 milliGRAM(s) Oral every 12 hours  cloNIDine 0.1 milliGRAM(s) Oral every 6 hours  hydrALAZINE 50 milliGRAM(s) Oral every 8 hours  hydrALAZINE Injectable 10 milliGRAM(s) IV Push every 6 hours PRN    albuterol/ipratropium for Nebulization 3 milliLiter(s) Nebulizer every 6 hours PRN  buDESOnide    Inhalation Suspension 0.5 milliGRAM(s) Inhalation every 12 hours    levothyroxine Injectable 25 MICROGram(s) IV Push at bedtime      pantoprazole  Injectable 40 milliGRAM(s) IV Push every 12 hours        ## Vitals:  T(C): 36.4 (20 @ 23:00), Max: 36.9 (20 @ 20:00)  HR: 85 (20 @ 22:30) (65 - 94)  BP: 113/54 (20 @ 22:00) (63/42 - 216/63)  BP(mean): 68 (20 @ 22:00) (46 - 176)  RR: 12 (20 @ 22:30) (9 - 28)  SpO2: 100% (20 @ 22:30) (98% - 100%)  Wt(kg): --  Vent:   AB-15 @ 07:  -   @ 07:00  --------------------------------------------------------  IN: 750 mL / OUT: 400 mL / NET: 350 mL     @ 07:  -   @ 23:20  --------------------------------------------------------  IN: 120 mL / OUT: 200 mL / NET: -80 mL          ## P/E:  Gen: lying comfortably in bed in no apparent distress  Mouth:   Neck:  Lungs:   Heart:   Abd:  Ext:  Neuro:    CENTRAL LINE: [ ] YES [ ] NO  LOCATION:   DATE INSERTED:  REMOVE: [ ] YES [ ] NO      VIANEY: [ ] YES [ ] NO    DATE INSERTED:  REMOVE:  [ ] YES [ ] NO      A-LINE:  [ ] YES [ ] NO  LOCATION:   DATE INSERTED:  REMOVE:  [ ] YES [ ] NO  EXPLAIN:    GLOBAL ISSUE/BEST PRACTICE:  Analgesia:  Sedation:  HOB elevation: yes  Stress ulcer prophylaxis:  VTE prophylaxis:  Oral Care:  Glycemic control:  Nutrition:    CODE STATUS: [ ] full code  [ ] DNR  [ ] DNI  [ ] [facility]  Goals of care discussion: [ ] yes HPI:  Pt is an 80 yo HF with h/o CAD, MI, dCHF, HTN, CKD with solitary kidney, hypothyroidism, Alzheimer's dementia, CVA x2 ,  with residual L weakness,achalasia s/p POEMS (2018) and UGIB presents from rehab for RSV PNA, VRE UTI, CHF exacerbation. Hospital course complicated by acute blood loss anemia 2 to UGIB, acute on CKD and acute respiratory failure requiring intubation. s/p extubation 1/15      ## Labs:  CBC:                        9.7    12.09 )-----------( 221      ( 2020 04:07 )             29.9     Chem:      138  |  108  |  71<H>  ----------------------------<  135<H>  3.6   |  20<L>  |  3.86<H>    Ca    7.5<L>      2020 04:07  Phos  6.0       Mg     2.0           Coags:          ## Imaging:    ## Medications:    carvedilol 12.5 milliGRAM(s) Oral every 12 hours  cloNIDine 0.1 milliGRAM(s) Oral every 6 hours  hydrALAZINE 50 milliGRAM(s) Oral every 8 hours  hydrALAZINE Injectable 10 milliGRAM(s) IV Push every 6 hours PRN    albuterol/ipratropium for Nebulization 3 milliLiter(s) Nebulizer every 6 hours PRN  buDESOnide    Inhalation Suspension 0.5 milliGRAM(s) Inhalation every 12 hours    levothyroxine Injectable 25 MICROGram(s) IV Push at bedtime      pantoprazole  Injectable 40 milliGRAM(s) IV Push every 12 hours        ## Vitals:  T(C): 36.4 (20 @ 23:00), Max: 36.9 (20 @ 20:00)  HR: 85 (20 @ 22:30) (65 - 94)  BP: 113/54 (20 @ 22:00) (63/42 - 216/63)  BP(mean): 68 (20 @ 22:00) (46 - 176)  RR: 12 (20 @ 22:30) (9 - 28)  SpO2: 100% (20 @ 22:30) (98% - 100%)  Wt(kg): --  Vent:   AB-15 @ 07: @ 07:00  --------------------------------------------------------  IN: 750 mL / OUT: 400 mL / NET: 350 mL     @ 07: @ 23:20  --------------------------------------------------------  IN: 120 mL / OUT: 200 mL / NET: -80 mL          ## P/E:  Gen: lying comfortably in bed in no apparent distress  Lungs: Coarse BS  Heart: RRR  Abd: Soft/+BS  Ext: L hand edema  Neuro: Awake/ ? nonverbal    CENTRAL LINE: [ ] YES [ ] NO  LOCATION:   DATE INSERTED:  REMOVE: [ ] YES [ ] NO      VIANEY: [ ] YES [ ] NO    DATE INSERTED:  REMOVE:  [ ] YES [ ] NO      A-LINE:  [ ] YES [ ] NO  LOCATION:   DATE INSERTED:  REMOVE:  [ ] YES [ ] NO  EXPLAIN:    CODE STATUS: [x] full code  [ ] DNR  [ ] DNI  [ ] [facility]  Goals of care discussion: [ ] yes

## 2020-01-16 NOTE — PROGRESS NOTE ADULT - SUBJECTIVE AND OBJECTIVE BOX
Subjective: easily aroused. Does not verbalize.       MEDICATIONS  (STANDING):  buDESOnide    Inhalation Suspension 0.5 milliGRAM(s) Inhalation every 12 hours  carvedilol 12.5 milliGRAM(s) Oral every 12 hours  chlorhexidine 4% Liquid 1 Application(s) Topical <User Schedule>  cloNIDine 0.1 milliGRAM(s) Oral every 6 hours  hydrALAZINE 50 milliGRAM(s) Oral every 8 hours  levothyroxine Injectable 25 MICROGram(s) IV Push at bedtime  pantoprazole  Injectable 40 milliGRAM(s) IV Push every 12 hours  sodium chloride 0.65% Nasal 1 Spray(s) Both Nostrils three times a day    MEDICATIONS  (PRN):  albuterol/ipratropium for Nebulization 3 milliLiter(s) Nebulizer every 6 hours PRN Shortness of Breath and/or Wheezing  hydrALAZINE Injectable 10 milliGRAM(s) IV Push every 6 hours PRN SBP >170          T(C): 35.9 (20 @ 07:15), Max: 36.2 (01-15-20 @ 12:00)  HR: 80 (20 @ 07:30) (63 - 108)  BP: 111/48 (20 @ 07:00) (63/42 - 216/63)  RR: 18 (20 @ 07:30) (9 - 28)  SpO2: 100% (20 @ 07:30) (90% - 100%)  Wt(kg): --        I&O's Detail    15 Ousmane 2020 07:01  -  2020 07:00  --------------------------------------------------------  IN:    Miscellaneous Tube Feedin mL    Solution: 100 mL  Total IN: 750 mL    OUT:    Incontinent per Collection Ba mL  Total OUT: 400 mL    Total NET: 350 mL               PHYSICAL EXAM:    GENERAL: NAD  EYES: EOMI, PERRLA, conjunctiva and sclera clear  NECK: Supple, no inc in JVP  CHEST/LUNG: exp wheezes  HEART: S1S2  ABDOMEN: Soft, Nontender, Nondistended; Bowel sounds present  EXTREMITIES:  min edema.         LABS:  CBC Full  -  ( 2020 04:07 )  WBC Count : 12.09 K/uL  RBC Count : 3.16 M/uL  Hemoglobin : 9.7 g/dL  Hematocrit : 29.9 %  Platelet Count - Automated : 221 K/uL  Mean Cell Volume : 94.6 fl  Mean Cell Hemoglobin : 30.7 pg  Mean Cell Hemoglobin Concentration : 32.4 gm/dL  Auto Neutrophil # : x  Auto Lymphocyte # : x  Auto Monocyte # : x  Auto Eosinophil # : x  Auto Basophil # : x  Auto Neutrophil % : x  Auto Lymphocyte % : x  Auto Monocyte % : x  Auto Eosinophil % : x  Auto Basophil % : x        138  |  108  |  71<H>  ----------------------------<  135<H>  3.6   |  20<L>  |  3.86<H>    Ca    7.5<L>      2020 04:07  Phos  6.0       Mg     2.0                     Impression:  JONY - suspected ischemic ATN. CTD/ Vasculitic serologies negative  CKD 3-4 solitary functioning kidney, RVD  Recent hospitalization for UGI bleed. Cresencio Cr 1.8 2019   Severe pulm HTN, TR  Hx of POEM?      Recommendations:   Keep -150  Monitor Cr daily  Avoid nephrotoxins.   Diuretics PRN increase WOB, oliguria

## 2020-01-16 NOTE — PROGRESS NOTE ADULT - ASSESSMENT
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 Hospital course complicated by acute blood loss anemia 2 to UGIB, acute on CKD and acute respiratory failure requiring intubation.    Resp/GI; Pt weaning well on CPAP 5 + PS 5 with good MS; extubate/ Probable bedside EGD tomorrow   ID: Off Abx  CVS: Increase pt's antiHTN; pt still hypertensive/ Pt was on Hydralazine as outpt  Heme: Trend Hb  FEN:  Will discuss with GI if pt may have clears today  Renal: Cr decreasing/ Follow BUN/Cr and UO/ F/u as per Renal  Neuro: Off sedation    CCT; 40 min Pt is an 80 yo HF with h/o CAD, MI, dCHF, HTN, CKD with solitary kidney, hypothyroidism, Alzheimer's dementia, CVA x2 1993, 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 Hospital course complicated by acute blood loss anemia 2 to UGIB, acute on CKD and acute respiratory failure requiring intubation. s/p extubation 1/15    Resp: Supplemental O2 prn   ID: Off Abx  CVS: Cont pt's antiHTN meds  Heme: Trend Hb  FEN/GI:  Pt was NPO for EGD but not done 2 to technical difficulties   Renal: Cr decreasing/ Follow BUN/Cr and UO/ F/u as per Renal  Social: May transfer to F

## 2020-01-16 NOTE — PROGRESS NOTE ADULT - ASSESSMENT
HPI:  · HPI Objective Statement: 79 years old female from [facility] rehab c/o coughing sob for three to four days. Pt is sent here to rule out chf vs pneumonia. Pt is alert and oriented to person only unable to give detail hx due to hx of Alzheimer according to the [facility] pt had sat of 91 to 92 %,	  ---------------- As Above ---------------------------------------------  Patient is a poor historian. Patient admitted with Bronchitis and UTI. Called for hematemsis. Patient had three episodes of bloody vomitus. ( 1/3 of a cup ) . Patient complaining of mid epigastric / chest pain but patient can not characterize the pain.   Patient has chronic anemia. but HGB fell 8.4 --> 7.2. Was on Carafate, now on IV PPI  KUB pending  HX of CRI    Addendum : Patient had another episode of bloody vomitus    Upper GI Bleed - Secondary to (?). 1) NPO  2)Check KUB  3) IV PPI 4) telemetry  5) NGT 6) EGD 7) Hold Carafate  8) Hold iron  ---Will speak with daughter.

## 2020-01-16 NOTE — PROGRESS NOTE ADULT - SUBJECTIVE AND OBJECTIVE BOX
Patient is a 79y old  Female who presents with a chief complaint of sob cough (16 Jan 2020 13:00)      HPI:  · HPI Objective Statement: 79 years old female from [facility] rehab c/o coughing sob for three to four days. Pt is sent here to rule out chf vs pneumonia. Pt is alert and oriented to person only unable to give detail hx due to hx of Alzheimer according to the [facility] pt had sat of 91 to 92 %,	    PAST MEDICAL/SURGICAL/FAMILY/SOCIAL HISTORY:    Past Medical History:  Alzheimers disease    CVA (cerebral vascular accident)    Depression    HTN (hypertension)    MI, old.  recently Dx c hypothyroidism, TSH > 111 started Levothyroxin 50mcg qd as per Dr Vigil (02 Jan 2020 11:41)      INTERVAL HPI/OVERNIGHT EVENTS:  ICU # !, Extubated  The nurse denies melena, hematochezia, hematemesis, nausea, vomiting, abdominal pain, constipation, diarrhea, or change in bowel movements     MEDICATIONS  (STANDING):  buDESOnide    Inhalation Suspension 0.5 milliGRAM(s) Inhalation every 12 hours  carvedilol 12.5 milliGRAM(s) Oral every 12 hours  chlorhexidine 4% Liquid 1 Application(s) Topical <User Schedule>  cloNIDine 0.1 milliGRAM(s) Oral every 6 hours  hydrALAZINE 50 milliGRAM(s) Oral every 8 hours  levothyroxine Injectable 25 MICROGram(s) IV Push at bedtime  pantoprazole  Injectable 40 milliGRAM(s) IV Push every 12 hours  sodium chloride 0.65% Nasal 1 Spray(s) Both Nostrils three times a day    MEDICATIONS  (PRN):  albuterol/ipratropium for Nebulization 3 milliLiter(s) Nebulizer every 6 hours PRN Shortness of Breath and/or Wheezing  hydrALAZINE Injectable 10 milliGRAM(s) IV Push every 6 hours PRN SBP >170      FAMILY HISTORY:  Family history of essential hypertension (Child)  Family history of stroke      Allergies    No Known Allergies    Intolerances        PMH/PSH:  Hypothyroidism  Constipation  Iron deficiency anemia  Major depressive disorder  Hyperlipidemia  GI bleed  Depression  Alzheimers disease  MI, old  CVA (cerebral vascular accident)  HTN (hypertension)  No pertinent past medical history  No significant past surgical history        REVIEW OF SYSTEMS:  CONSTITUTIONAL: No fever, weight loss,   EYES: No eye pain, visual disturbances, or discharge  ENMT:  No difficulty hearing, tinnitus, vertigo; No sinus or throat pain  NECK: No pain or stiffness  BREASTS: No pain, masses, or nipple discharge  RESPIRATORY: No cough, wheezing, chills or hemoptysis; No shortness of breath  CARDIOVASCULAR: No chest pain, palpitations, dizziness, or leg swelling  GASTROINTESTINAL: See above  GENITOURINARY: No dysuria, frequency, hematuria, or incontinence  NEUROLOGICAL: No headaches, , numbness, or tremors  SKIN: No itching, burning, rashes, or lesions   LYMPH NODES: No enlarged glands  ENDOCRINE: No heat or cold intolerance; No hair loss  MUSCULOSKELETAL: No joint pain or swelling; No muscle, back, or extremity pain  PSYCHIATRIC: No depression, anxiety, mood swings, or difficulty sleeping  HEME/LYMPH: No easy bruising, or bleeding gums  ALLERGY AND IMMUNOLOGIC: No hives or eczema    Vital Signs Last 24 Hrs  T(C): 36.1 (16 Jan 2020 15:00), Max: 36.2 (15 Ousmane 2020 20:30)  T(F): 97 (16 Jan 2020 15:00), Max: 97.2 (15 Ousmane 2020 20:30)  HR: 83 (16 Jan 2020 19:29) (65 - 93)  BP: 121/83 (16 Jan 2020 19:29) (63/42 - 216/63)  BP(mean): 91 (16 Jan 2020 19:29) (46 - 176)  RR: 16 (16 Jan 2020 19:29) (9 - 28)  SpO2: 99% (16 Jan 2020 19:29) (98% - 100%)    PHYSICAL EXAM:  GENERAL: NAD, well-groomed, well-developed  HEAD:  Atraumatic, Normocephalic  EYES: EOMI, PERRLA, conjunctiva and sclera clear  NECK: Supple, No JVD, Normal thyroid  NERVOUS SYSTEM:  Alert  CHEST/LUNG: Clear to percussion bilaterally; No rales, rhonchi, wheezing, or rubs  HEART: Regular rate and rhythm; No murmurs, rubs, or gallops  ABDOMEN: Soft, Nontender, Nondistended; Bowel sounds present  EXTREMITIES:  2+ Peripheral Pulses, No clubbing, cyanosis, or edema  LYMPH: No lymphadenopathy noted  SKIN: No rashes or lesions    LAB                          9.7    12.09 )-----------( 221      ( 16 Jan 2020 04:07 )             29.9       CBC:  01-16 @ 04:07  WBC 12.09   Hgb 9.7   Hct 29.9   Plts 221  MCV 94.6  01-15 @ 06:23  WBC 13.14   Hgb 11.0   Hct 33.3   Plts 229  MCV 93.0  01-14 @ 04:24  WBC 9.61   Hgb 9.4   Hct 28.3   Plts 148  MCV 91.6  01-13 @ 04:22  WBC 10.29   Hgb 8.8   Hct 26.2   Plts 129  MCV 91.3  01-12 @ 02:36  WBC 10.14   Hgb 9.0   Hct 25.8   Plts 121  MCV 89.6  01-11 @ 16:58  WBC 16.89   Hgb 12.1   Hct 35.4   Plts 181  MCV 89.6  01-11 @ 03:06  WBC 11.89   Hgb 7.7   Hct 22.9   Plts 129  MCV 89.8  01-10 @ 14:21  WBC 13.91   Hgb 8.2   Hct 24.7   Plts 110  MCV 93.9  01-10 @ 03:27  WBC 13.01   Hgb 9.1   Hct 26.5   Plts 132  MCV 88.9  01-09 @ 22:16  WBC 14.12   Hgb 10.6   Hct 30.7   Plts 149  MCV 88.5      Chemistry:  01-16 @ 04:07  Na+ 138  K+ 3.6  Cl- 108  CO2 20  BUN 71  Cr 3.86     01-15 @ 06:23  Na+ 140  K+ 3.7  Cl- 107  CO2 20  BUN 70  Cr 3.75     01-14 @ 04:24  Na+ 140  K+ 3.7  Cl- 109  CO2 19  BUN 75  Cr 3.79     01-13 @ 04:22  Na+ 139  K+ 3.3  Cl- 107  CO2 22  BUN 74  Cr 3.89     01-12 @ 02:36  Na+ 139  K+ 3.6  Cl- 107  CO2 22  BUN 82  Cr 3.88     01-11 @ 03:06  Na+ 137  K+ 2.9  Cl- 105  CO2 21  BUN 79  Cr 3.87     01-10 @ 03:27  Na+ 140  K+ 3.7  Cl- 108  CO2 22  BUN 93  Cr 4.17     01-09 @ 22:16  Na+ 140  K+ 3.5  Cl- 106  CO2 25  BUN 89  Cr 4.11         Glucose, Serum: 135 mg/dL (01-16 @ 04:07)  Glucose, Serum: 56 mg/dL (01-15 @ 06:23)  Glucose, Serum: 82 mg/dL (01-14 @ 04:24)  Glucose, Serum: 101 mg/dL (01-13 @ 04:22)  Glucose, Serum: 80 mg/dL (01-12 @ 02:36)  Glucose, Serum: 384 mg/dL (01-11 @ 03:06)  Glucose, Serum: 152 mg/dL (01-10 @ 03:27)  Glucose, Serum: 150 mg/dL (01-09 @ 22:16)      16 Jan 2020 04:07    138    |  108    |  71     ----------------------------<  135    3.6     |  20     |  3.86   15 Ousmane 2020 06:23    140    |  107    |  70     ----------------------------<  56     3.7     |  20     |  3.75   14 Jan 2020 04:24    140    |  109    |  75     ----------------------------<  82     3.7     |  19     |  3.79   13 Jan 2020 04:22    139    |  107    |  74     ----------------------------<  101    3.3     |  22     |  3.89   12 Jan 2020 02:36    139    |  107    |  82     ----------------------------<  80     3.6     |  22     |  3.88   11 Jan 2020 03:06    137    |  105    |  79     ----------------------------<  384    2.9     |  21     |  3.87   10 Ousmane 2020 03:27    140    |  108    |  93     ----------------------------<  152    3.7     |  22     |  4.17     Ca    7.5        16 Jan 2020 04:07  Ca    7.9        15 Jan 2020 06:23  Ca    7.4        14 Jan 2020 04:24  Ca    7.0        13 Jan 2020 04:22  Ca    7.5        12 Jan 2020 02:36  Ca    6.8        11 Jan 2020 03:06  Ca    7.6        10 Jan 2020 03:27  Phos  6.0       16 Jan 2020 04:07  Phos  4.9       15 Jan 2020 06:23  Phos  4.6       14 Jan 2020 04:24  Phos  4.5       13 Jan 2020 04:22  Phos  4.2       12 Jan 2020 02:36  Phos  2.8       11 Jan 2020 03:06  Phos  3.1       09 Jan 2020 22:16  Mg     2.0       16 Jan 2020 04:07  Mg     2.0       15 Jan 2020 06:23  Mg     2.0       14 Jan 2020 04:24  Mg     2.0       13 Jan 2020 04:22  Mg     2.1       12 Jan 2020 02:36  Mg     1.9       11 Jan 2020 03:06  Mg     2.2       10 Jan 2020 03:27    TPro  4.4    /  Alb  1.4    /  TBili  0.3    /  DBili  x      /  AST  12     /  ALT  19     /  AlkPhos  56     13 Jan 2020 04:22  TPro  4.2    /  Alb  1.4    /  TBili  0.4    /  DBili  x      /  AST  12     /  ALT  21     /  AlkPhos  55     12 Jan 2020 02:36  TPro  3.7    /  Alb  1.7    /  TBili  x      /  DBili  x      /  AST  x      /  ALT  x      /  AlkPhos  x      11 Jan 2020 12:04  TPro  4.6    /  Alb  1.6    /  TBili  0.6    /  DBili  x      /  AST  15     /  ALT  33     /  AlkPhos  59     10 Jan 2020 03:27  TPro  5.2    /  Alb  1.8    /  TBili  0.7    /  DBili  x      /  AST  21     /  ALT  42     /  AlkPhos  70     09 Jan 2020 22:16              CAPILLARY BLOOD GLUCOSE          C-Reactive Protein, Serum: 5.72 mg/dL (01-14 @ 09:19)      RADIOLOGY & ADDITIONAL TESTS:    Imaging Personally Reviewed:  [ ] YES  [ ] NO    Consultant(s) Notes Reviewed:  [ ] YES  [ ] NO    Care Discussed with Consultants/Other Providers [ ] YES  [ ] NO

## 2020-01-16 NOTE — PROGRESS NOTE ADULT - PROBLEM SELECTOR PLAN 1
ICU #1, Intubated. 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 repeat EGD cancelled for mechanical malfunction 1) Restart  Carafate 2)  Advance as tolerated  3) F/U EGD  4) F/U CBC

## 2020-01-16 NOTE — CHART NOTE - NSCHARTNOTEFT_GEN_A_CORE
Mr. 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 Hospital course complicated by acute blood loss anemia 2 to UGIB, acute on CKD and acute respiratory failure requiring intubation. Mr. 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 Hospital course complicated by acute blood loss anemia 2 to GIB, acute on CKD, and acute respiratory failure requiring intubation s/p RRT on 1/9.     Pt has been followed by GI service with further plans to do upper/lower scope, which is still pending at this time. She was extubated on 1/15, and has been without respiratory distress, remains alert, is HDS, and with stable h/h with no further bleeding episodes. Of note, her sputum culture was positive for MRSA, please follow up on this matter. Patient has been seen/examined by ICU attending and is deemed stable for transfer to med/surg. She was being followed by Dr. Stone from medicine, however Dr. Kirk has been covering and has accepted this patient to his service. I have discussed the case with him as noted above.     Please follow up with the following:  -GI plan for upper/lower scope per Dr. Laird  -MRSA culture from sputum   -trend h/h for further bleeding  -antihypertensive regimen evaluation

## 2020-01-16 NOTE — PROGRESS NOTE ADULT - SUBJECTIVE AND OBJECTIVE BOX
lying in bed    Vital Signs Last 24 Hrs  T(C): 35.9 (16 Jan 2020 07:15), Max: 36.2 (15 Ousmane 2020 12:00)  T(F): 96.6 (16 Jan 2020 07:15), Max: 97.2 (15 Ousmane 2020 20:30)  HR: 80 (16 Jan 2020 07:30) (63 - 108)  BP: 111/48 (16 Jan 2020 07:00) (63/42 - 216/63)  BP(mean): 63 (16 Jan 2020 07:00) (46 - 176)  RR: 18 (16 Jan 2020 07:30) (9 - 28)  SpO2: 100% (16 Jan 2020 07:30) (90% - 100%)    PHYSICAL EXAM:    general - weak looking  HEENT - No Icterus  CVS - RRR  RS - AE B/L  Abd - soft, NT  Ext - Pulses +        LABS:                        9.7    12.09 )-----------( 221      ( 16 Jan 2020 04:07 )             29.9     01-16    138  |  108  |  71<H>  ----------------------------<  135<H>  3.6   |  20<L>  |  3.86<H>    Ca    7.5<L>      16 Jan 2020 04:07  Phos  6.0     01-16  Mg     2.0     01-16            Culture - Sputum (collected 13 Jan 2020 17:08)  Source: .Sputum Sputum  Gram Stain (15 Ousmane 2020 16:59):    Few Squamous epithelial cells per low power field    Few polymorphonuclear leukocytes per low power field    Rare Yeast like cells per oil power field    Few Gram variable coccobacilli per oil power field  Final Report (15 Ousmane 2020 16:59):    Moderate Methicillin resistant Staphylococcus aureus    Normal Respiratory Kayce absent  Organism: Methicillin resistant Staphylococcus aureus (15 Ousmane 2020 16:59)  Organism: Methicillin resistant Staphylococcus aureus (15 Ousmane 2020 16:59)        RADIOLOGY & ADDITIONAL STUDIES:

## 2020-01-17 LAB
ANION GAP SERPL CALC-SCNC: 10 MMOL/L — SIGNIFICANT CHANGE UP (ref 5–17)
BUN SERPL-MCNC: 89 MG/DL — HIGH (ref 7–23)
CALCIUM SERPL-MCNC: 7.5 MG/DL — LOW (ref 8.5–10.1)
CHLORIDE SERPL-SCNC: 108 MMOL/L — SIGNIFICANT CHANGE UP (ref 96–108)
CO2 SERPL-SCNC: 21 MMOL/L — LOW (ref 22–31)
CREAT SERPL-MCNC: 3.63 MG/DL — HIGH (ref 0.5–1.3)
GLUCOSE SERPL-MCNC: 104 MG/DL — HIGH (ref 70–99)
HCT VFR BLD CALC: 25.8 % — LOW (ref 34.5–45)
HGB BLD-MCNC: 8.1 G/DL — LOW (ref 11.5–15.5)
MAGNESIUM SERPL-MCNC: 2 MG/DL — SIGNIFICANT CHANGE UP (ref 1.6–2.6)
MCHC RBC-ENTMCNC: 30.5 PG — SIGNIFICANT CHANGE UP (ref 27–34)
MCHC RBC-ENTMCNC: 31.4 GM/DL — LOW (ref 32–36)
MCV RBC AUTO: 97 FL — SIGNIFICANT CHANGE UP (ref 80–100)
NRBC # BLD: 0 /100 WBCS — SIGNIFICANT CHANGE UP (ref 0–0)
PHOSPHATE SERPL-MCNC: 5.3 MG/DL — HIGH (ref 2.5–4.5)
PLATELET # BLD AUTO: 219 K/UL — SIGNIFICANT CHANGE UP (ref 150–400)
POTASSIUM SERPL-MCNC: 3.4 MMOL/L — LOW (ref 3.5–5.3)
POTASSIUM SERPL-SCNC: 3.4 MMOL/L — LOW (ref 3.5–5.3)
RBC # BLD: 2.66 M/UL — LOW (ref 3.8–5.2)
RBC # FLD: 17.2 % — HIGH (ref 10.3–14.5)
SODIUM SERPL-SCNC: 139 MMOL/L — SIGNIFICANT CHANGE UP (ref 135–145)
WBC # BLD: 14.09 K/UL — HIGH (ref 3.8–10.5)
WBC # FLD AUTO: 14.09 K/UL — HIGH (ref 3.8–10.5)

## 2020-01-17 PROCEDURE — 71045 X-RAY EXAM CHEST 1 VIEW: CPT | Mod: 26

## 2020-01-17 RX ORDER — HYDRALAZINE HCL 50 MG
50 TABLET ORAL EVERY 8 HOURS
Refills: 0 | Status: DISCONTINUED | OUTPATIENT
Start: 2020-01-17 | End: 2020-01-18

## 2020-01-17 RX ADMIN — PANTOPRAZOLE SODIUM 40 MILLIGRAM(S): 20 TABLET, DELAYED RELEASE ORAL at 21:46

## 2020-01-17 RX ADMIN — CARVEDILOL PHOSPHATE 12.5 MILLIGRAM(S): 80 CAPSULE, EXTENDED RELEASE ORAL at 17:34

## 2020-01-17 RX ADMIN — Medication 50 MILLIGRAM(S): at 05:11

## 2020-01-17 RX ADMIN — Medication 0.1 MILLIGRAM(S): at 12:24

## 2020-01-17 RX ADMIN — PANTOPRAZOLE SODIUM 40 MILLIGRAM(S): 20 TABLET, DELAYED RELEASE ORAL at 08:45

## 2020-01-17 RX ADMIN — Medication 25 MICROGRAM(S): at 21:46

## 2020-01-17 RX ADMIN — Medication 0.5 MILLIGRAM(S): at 17:24

## 2020-01-17 RX ADMIN — Medication 50 MILLIGRAM(S): at 21:46

## 2020-01-17 RX ADMIN — Medication 1 SPRAY(S): at 13:43

## 2020-01-17 RX ADMIN — CARVEDILOL PHOSPHATE 12.5 MILLIGRAM(S): 80 CAPSULE, EXTENDED RELEASE ORAL at 05:11

## 2020-01-17 RX ADMIN — Medication 0.5 MILLIGRAM(S): at 05:59

## 2020-01-17 RX ADMIN — Medication 0.1 MILLIGRAM(S): at 05:11

## 2020-01-17 RX ADMIN — Medication 1 SPRAY(S): at 21:45

## 2020-01-17 RX ADMIN — Medication 50 MILLIGRAM(S): at 12:23

## 2020-01-17 RX ADMIN — Medication 0.1 MILLIGRAM(S): at 01:26

## 2020-01-17 RX ADMIN — Medication 1 SPRAY(S): at 05:10

## 2020-01-17 RX ADMIN — Medication 0.1 MILLIGRAM(S): at 17:34

## 2020-01-17 NOTE — PROGRESS NOTE ADULT - SUBJECTIVE AND OBJECTIVE BOX
79 years old female from [facility] rehab c/o coughing sob for three to four days. Pt is sent here to rule out chf vs pneumonia. Pt is alert and oriented to person only unable to give detail hx due to hx of Alzheimer according to the [facility] pt had sat of 91 to 92 %,	    PAST MEDICAL/SURGICAL/FAMILY/SOCIAL HISTORY:    Past Medical History:  Alzheimers disease    CVA (cerebral vascular accident)    Depression    HTN (hypertension)    MI, old.  recently Dx c hypothyroidism, TSH > 111 started Levothyroxin 50mcg qd as per Dr Vigil (2020 11:41)      Chief Complaint:  Patient is a 79y old  Female who presents with a chief complaint of sob cough (2020 22:56)      Review of Systems:    General:  No wt loss, fevers, chills, night sweats  Eyes:  Good vision, no reported pain  ENT:  No sore throat, pain, runny nose, dysphagia  CV:  No pain, palpitations, hypo/hypertension  Resp:  dyspnea, cough  GI:  No pain, nausea, vomiting, diarrhea, constipation           Social History/Family History  SOCHX:   tobacco,  -  alcohol    FMHX: FA/MO  - contributory       Discussed with:  PMD, Family    Physical Exam:    Vital Signs:  Vital Signs Last 24 Hrs  T(C): 36.2 (2020 17:06), Max: 36.9 (2020 00:18)  T(F): 97.2 (2020 17:06), Max: 98.5 (2020 00:18)  HR: 74 (2020 21:55) (68 - 87)  BP: 179/78 (2020 17:06) (132/71 - 179/78)  BP(mean): --  RR: 18 (2020 17:06) (18 - 18)  SpO2: 97% (2020 21:55) (94% - 98%)  Daily     Daily Weight in k.8 (2020 04:59)  I&O's Summary    2020 07:01  -  2020 07:00  --------------------------------------------------------  IN: 560 mL / OUT: 0 mL / NET: 560 mL          Chest:  fair air entry bilateral  Cardiovascular:  Regular rhythm, S1, S2, no murmur/rub/S3/S4, no carotid/femoral/abdominal bruit, radial/pedal pulses 2+,   Abdomen:  Soft, non-tender, non-distended, normoactive bowel sounds, no HSM        Laboratory:          138  |  104  |  69<H>  ----------------------------<  111<H>  3.8   |  25  |  3.77<H>    Ca    8.1<L>      2020 07:22          Assessment:  I am asked to assist this patient admitted with shortness of breath  The patient has multifactorial causes of shortness of breath, renal insufficiency, anemia  And now after a diagnostic echocardiogram within normal ejection fraction the reading shows mitral regurgitation and tricuspid regurgitation  After review of the studies this regurgitation is not considered severe but in the moderate to severe but more moderate range  upper GI bleed  GI noted,  EGD   The patient is  extubated on medical floor  optimize systolic blood pressure

## 2020-01-17 NOTE — PROGRESS NOTE ADULT - SUBJECTIVE AND OBJECTIVE BOX
[facility] NEPHROLOGY SERVICES, [facility]  NEPHROLOGY AND HYPERTENSION  [address]  [address]  [place] [address]  [phone]    MD MART LORD, MD CHING ROTHMAN, MD JU BLAS, MD ISABELLA SLADE, MD ALAYNA WALLACE MD          Patient comfortable  MEDICATIONS  (STANDING):  buDESOnide    Inhalation Suspension 0.5 milliGRAM(s) Inhalation every 12 hours  carvedilol 12.5 milliGRAM(s) Oral every 12 hours  cloNIDine 0.1 milliGRAM(s) Oral every 6 hours  hydrALAZINE 50 milliGRAM(s) Oral every 8 hours  levothyroxine Injectable 25 MICROGram(s) IV Push at bedtime  pantoprazole  Injectable 40 milliGRAM(s) IV Push every 12 hours  sodium chloride 0.65% Nasal 1 Spray(s) Both Nostrils three times a day    MEDICATIONS  (PRN):  albuterol/ipratropium for Nebulization 3 milliLiter(s) Nebulizer every 6 hours PRN Shortness of Breath and/or Wheezing  hydrALAZINE Injectable 10 milliGRAM(s) IV Push every 6 hours PRN SBP >170      01-16-20 @ 07:01  -  01-17-20 @ 07:00  --------------------------------------------------------  IN: 560 mL / OUT: 200 mL / NET: 360 mL      PHYSICAL EXAM:      T(C): 35.8 (01-17-20 @ 11:00), Max: 36.9 (01-16-20 @ 20:00)  HR: 76 (01-17-20 @ 12:28) (70 - 94)  BP: 120/60 (01-17-20 @ 12:28) (112/61 - 159/70)  RR: 18 (01-17-20 @ 12:28) (9 - 24)  SpO2: 98% (01-17-20 @ 12:28) (98% - 100%)  Wt(kg): --  Respiratory: scattered rhonchi mild anteriorly, decreased BS at bases  Cardiovascular: S1 S2  Gastrointestinal: soft NT ND +BS  Extremities:  tr  edema                                    8.1    14.09 )-----------( 219      ( 17 Jan 2020 07:59 )             25.8     01-17    139  |  108  |  89<H>  ----------------------------<  104<H>  3.4<L>   |  21<L>  |  3.63<H>    Ca    7.5<L>      17 Jan 2020 07:59  Phos  5.3     01-17  Mg     2.0     01-17          Creatinine Trend: 3.63<--, 3.86<--, 3.75<--, 3.79<--, 3.89<--, 3.88<--    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 + paraprotein noted  Clinically improving, extubated    Plan  IV Labetalol prn  Continued warranted diuresis, supportive care.  Would defer prospect of aggressive work up or HD support as overall prognosis poor.      Obed Jamison MD

## 2020-01-17 NOTE — PROGRESS NOTE ADULT - SUBJECTIVE AND OBJECTIVE BOX
INTERVAL HPI:  79 year female with HTN, HLD, MI hx, CVA, Gi bleed, Anemia Depression and Alzheimer's dementia.  Sent from [facility] due to SOB. Possibility of pneumonia vs CHF was raised.   In ED with Respiratory distress required BIPAP support and RVP positive for RSV. Pt not able to provide more details due to dementia. Information from transfer papers and ED physician.  Nods no when asked for smoking.  01/09/20: With massive GI bleed, intubated and transferred to ICU.  01/09/20:  EGD+ Colonoscopy.  01/15/20:  Extubated.  01/16/20:  Out of ICU    OVERNIGHT EVENTS:  Awake, comfortable. With NGT    Vital Signs Last 24 Hrs  T(C): 36.3 (17 Jan 2020 05:44), Max: 36.9 (16 Jan 2020 20:00)  T(F): 97.3 (17 Jan 2020 05:44), Max: 98.4 (16 Jan 2020 20:00)  HR: 90 (17 Jan 2020 05:44) (65 - 94)  BP: 154/71 (17 Jan 2020 05:44) (111/62 - 159/70)  BP(mean): 72 (17 Jan 2020 00:00) (60 - 91)  RR: 16 (17 Jan 2020 05:44) (9 - 28)  SpO2: 100% (17 Jan 2020 05:44) (98% - 100%)    PHYSICAL EXAM:  GEN:         Awake, responsive and comfortable.  HEENT:    NGT   RESP:       no wheezing.      CVS:          Regular rate and rhythm.   ABD:         Soft, non-tender, non-distended;     MEDICATIONS  (STANDING):  buDESOnide    Inhalation Suspension 0.5 milliGRAM(s) Inhalation every 12 hours  carvedilol 12.5 milliGRAM(s) Oral every 12 hours  cloNIDine 0.1 milliGRAM(s) Oral every 6 hours  hydrALAZINE 50 milliGRAM(s) Oral every 8 hours  levothyroxine Injectable 25 MICROGram(s) IV Push at bedtime  pantoprazole  Injectable 40 milliGRAM(s) IV Push every 12 hours  sodium chloride 0.65% Nasal 1 Spray(s) Both Nostrils three times a day    MEDICATIONS  (PRN):  albuterol/ipratropium for Nebulization 3 milliLiter(s) Nebulizer every 6 hours PRN Shortness of Breath and/or Wheezing  hydrALAZINE Injectable 10 milliGRAM(s) IV Push every 6 hours PRN SBP >170    LABS:                        8.1    14.09 )-----------( 219      ( 17 Jan 2020 07:59 )             25.8     01-17    139  |  108  |  89<H>  ----------------------------<  104<H>  3.4<L>   |  21<L>  |  3.63<H>    Ca    7.5<L>      17 Jan 2020 07:59  Phos  5.3     01-17  Mg     2.0     01-17  ASSESSMENT AND PLAN:  ·	Acute hypoxic  Respiratory failure .  ·	RSV tracheobronchitis.  ·	Left pleural effusion.  ·	Hypothyroidism with very high TSH.  ·	Anemia.  ·	VRE UTI  ·	Leukocytosis.  ·	Renal Insuffiencey.  ·	CVA by history.  ·	HTN.  ·	HLD.  ·	Alzheimer's dementia.  ·	GI bleed.    Continue nebulizer and Protonix

## 2020-01-17 NOTE — CHART NOTE - NSCHARTNOTEFT_GEN_A_CORE
Pt admitted from [facility] c cough, SOB; found to have CHF exacerbation; intubated during admission for respiratory distress & extubated 1/15; hospital course complicated by UGIB; pt awaiting EGD. Per GI request Ensure Clear for nutrition support (11/11) due to active bleed. NGT placed during intubation & never removed. PTA pt on solid diet at [facility] (DASH, soft cut-up consistency) & prior to intubation (1/3-1/9); Soft diet per SLP recommendation (1/2).  Per discussion c GI, RN to conduct bedside swallow & if pt passess, will pull NGT & advance diet.    Factors impacting intake: [ ] none [ ] nausea  [ ] vomiting [ ] diarrhea [ ] constipation  [ ]chewing problems [ ] swallowing issues  [X ] other: ability to self-feed; AMS    Diet Prescription: Diet, NPO with Tube Feed:   Tube Feeding Modality: Nasogastric  Ensure Clear  Total Volume for 24 Hours (mL): 1200  Continuous  Starting Tube Feed Rate {mL per Hour}: 25  Increase Tube Feed Rate by (mL): 10     Every 8 hours  Until Goal Tube Feed Rate (mL per Hour): 50  Tube Feed Duration (in Hours): 24  Tube Feed Start Time: 13:00 (01-11-20 @ 12:14)    Intake:  above TF provides 1200 ml, 1200 kcal, 40 g protein, 996 ml H2O)    Current Weight: Weight (kg): 62.7 (1/16); admission wt 61.3 kg (1/2)  % Weight Change: 2.2% gain x 2 weeks    Physical Appearance:  1+ generalized edema    Pertinent Medications: MEDICATIONS  (STANDING):  buDESOnide    Inhalation Suspension 0.5 milliGRAM(s) Inhalation every 12 hours  carvedilol 12.5 milliGRAM(s) Oral every 12 hours  cloNIDine 0.1 milliGRAM(s) Oral every 6 hours  hydrALAZINE 50 milliGRAM(s) Oral every 8 hours  levothyroxine Injectable 25 MICROGram(s) IV Push at bedtime  pantoprazole  Injectable 40 milliGRAM(s) IV Push every 12 hours  sodium chloride 0.65% Nasal 1 Spray(s) Both Nostrils three times a day    MEDICATIONS  (PRN):  albuterol/ipratropium for Nebulization 3 milliLiter(s) Nebulizer every 6 hours PRN Shortness of Breath and/or Wheezing  hydrALAZINE Injectable 10 milliGRAM(s) IV Push every 6 hours PRN SBP >170    Pertinent Labs:   Skin:     Estimated Needs:   [ ] no change since previous assessment  [ ] recalculated:     Previous Nutrition Diagnosis:   [ ] Inadequate Energy Intake [ ]Inadequate Oral Intake [ ] Excessive Energy Intake   [ ] Underweight [ ] Increased Nutrient Needs [ ] Overweight/Obesity   [ ] Altered GI Function [ ] Unintended Weight Loss [ ] Food & Nutrition Related Knowledge Deficit [ ] Malnutrition     Nutrition Diagnosis is [ ] ongoing  [ ] resolved [ ] not applicable     New Nutrition Diagnosis: [ ] not applicable      Interventions:   Recommend  [ ] Change Diet To:  [ ] Nutrition Supplement  [ ] Nutrition Support  [ ] Other:     Monitoring and Evaluation:   [ ] PO intake [ x ] Tolerance to diet prescription [ x ] weights [ x ] labs[ x ] follow up per protocol  [ ] other: Pt admitted from [facility] c cough, SOB; found to have CHF exacerbation; intubated during admission for respiratory distress & extubated 1/15; hospital course complicated by UGIB; pt awaiting EGD. Per GI request Ensure Clear for nutrition support (11/11) due to active bleed. NGT placed during intubation & never removed. PTA pt on solid diet at [facility] (DASH, soft cut-up consistency) & prior to intubation (1/3-1/9); Soft diet per SLP recommendation (1/2).  Per discussion c GI, RN to conduct bedside swallow & if pt passess, will pull NGT & advance diet.    Factors impacting intake: [ ] none [ ] nausea  [ ] vomiting [ ] diarrhea [ ] constipation  [ ]chewing problems [ ] swallowing issues  [X ] other: ability to self-feed; AMS    Diet Prescription: Diet, NPO with Tube Feed:   Tube Feeding Modality: Nasogastric  Ensure Clear  Total Volume for 24 Hours (mL): 1200  Continuous  Starting Tube Feed Rate {mL per Hour}: 25  Increase Tube Feed Rate by (mL): 10     Every 8 hours  Until Goal Tube Feed Rate (mL per Hour): 50  Tube Feed Duration (in Hours): 24  Tube Feed Start Time: 13:00 (01-11-20 @ 12:14)    Intake:  above TF provides 1200 ml, 1200 kcal, 40 g protein, 996 ml H2O)    Current Weight: Weight (kg): 62.7 (1/16); admission wt 61.3 kg (1/2)  % Weight Change: 2.2% gain x 2 weeks    Physical Appearance:  1+ generalized edema    Pertinent Medications: MEDICATIONS  (STANDING):  buDESOnide    Inhalation Suspension 0.5 milliGRAM(s) Inhalation every 12 hours  carvedilol 12.5 milliGRAM(s) Oral every 12 hours  cloNIDine 0.1 milliGRAM(s) Oral every 6 hours  hydrALAZINE 50 milliGRAM(s) Oral every 8 hours  levothyroxine Injectable 25 MICROGram(s) IV Push at bedtime  pantoprazole  Injectable 40 milliGRAM(s) IV Push every 12 hours  sodium chloride 0.65% Nasal 1 Spray(s) Both Nostrils three times a day    MEDICATIONS  (PRN):  albuterol/ipratropium for Nebulization 3 milliLiter(s) Nebulizer every 6 hours PRN Shortness of Breath and/or Wheezing  hydrALAZINE Injectable 10 milliGRAM(s) IV Push every 6 hours PRN SBP >170    Pertinent Labs:  01-17 Na139 mmol/L Glu 104 mg/dL<H> K+ 3.4 mmol/L<L> Cr  3.63 mg/dL<H> BUN 89 mg/dL<H> 01-17 Phos 5.3 mg/dL<H> 01-13 Alb 1.4 g/dL<L>    Skin:   stage II pressure ulcer on coccyx    Estimated Needs:   [X ] no change since previous assessment  (1/3)  [ ] recalculated:     Previous Nutrition Diagnosis:    [X ] Moderate Malnutrition - chronic  Etiology:  Inadequate energy/protein intake in setting of altered swallow, CVA, dementia, cardiac hx  Signs & Symptoms:  physical signs of mild/moderate fat depletion & muscle wasting as noted of 1/10 chart note    GOAL:  pt to consume > 75% meals/supplements - met at this time via present TF    Nutrition Diagnosis is [X ] ongoing  [ ] resolved [ ] not applicable     New Nutrition Diagnosis: [x ] not applicable    Interventions:   Recommend  [X ] Change Diet To:  PO diet as medically appropriate; recommend soft diet when appropriate to advance to solid food  [ ] Nutrition Supplement  [ ] Nutrition Support  [X ] Other:   new swallow eval by SLP to determine appropriate consistencies (PA entered rx)    Monitoring and Evaluation:   [X ] PO intake [ x ] Tolerance to diet prescription [ x ] weights [ x ] labs[ x ] follow up per protocol  [ ] other:

## 2020-01-17 NOTE — PROGRESS NOTE ADULT - SUBJECTIVE AND OBJECTIVE BOX
Patient is a 79y old  Female who presents with a chief complaint of sob cough (17 Jan 2020 16:23)      HPI:  · HPI Objective Statement: 79 years old female from [facility] rehab c/o coughing sob for three to four days. Pt is sent here to rule out chf vs pneumonia. Pt is alert and oriented to person only unable to give detail hx due to hx of Alzheimer according to the [facility] pt had sat of 91 to 92 %,	    PAST MEDICAL/SURGICAL/FAMILY/SOCIAL HISTORY:    Past Medical History:  Alzheimers disease    CVA (cerebral vascular accident)    Depression    HTN (hypertension)    MI, old.  recently Dx c hypothyroidism, TSH > 111 started Levothyroxin 50mcg qd as per Dr Vigil (02 Jan 2020 11:41)      INTERVAL HPI/OVERNIGHT EVENTS:  Patient seen earlier today  The patient denies melena, hematochezia, hematemesis, nausea, vomiting, abdominal pain, constipation, diarrhea, or change in bowel movements Tolerating liquids via NGT. NGT removed ( tolerated swallowing     MEDICATIONS  (STANDING):  buDESOnide    Inhalation Suspension 0.5 milliGRAM(s) Inhalation every 12 hours  carvedilol 12.5 milliGRAM(s) Oral every 12 hours  cloNIDine 0.1 milliGRAM(s) Oral every 6 hours  hydrALAZINE 50 milliGRAM(s) Oral every 8 hours  levothyroxine Injectable 25 MICROGram(s) IV Push at bedtime  pantoprazole  Injectable 40 milliGRAM(s) IV Push every 12 hours  sodium chloride 0.65% Nasal 1 Spray(s) Both Nostrils three times a day    MEDICATIONS  (PRN):  albuterol/ipratropium for Nebulization 3 milliLiter(s) Nebulizer every 6 hours PRN Shortness of Breath and/or Wheezing  hydrALAZINE Injectable 10 milliGRAM(s) IV Push every 6 hours PRN SBP >170      FAMILY HISTORY:  Family history of essential hypertension (Child)  Family history of stroke      Allergies    No Known Allergies    Intolerances        PMH/PSH:  Hypothyroidism  Constipation  Iron deficiency anemia  Major depressive disorder  Hyperlipidemia  GI bleed  Depression  Alzheimers disease  MI, old  CVA (cerebral vascular accident)  HTN (hypertension)  No pertinent past medical history  No significant past surgical history        REVIEW OF SYSTEMS: Does not communicate  CONSTITUTIONAL: No fever, weight loss,   EYES: No eye pain, visual disturbances, or discharge  ENMT:  No difficulty hearing, tinnitus, vertigo; No sinus or throat pain  NECK: No pain or stiffness  BREASTS: No pain, masses, or nipple discharge  RESPIRATORY: No cough, wheezing, chills or hemoptysis; No shortness of breath  CARDIOVASCULAR: No chest pain, palpitations, dizziness, or leg swelling  GASTROINTESTINAL: See above  GENITOURINARY: No dysuria, frequency, hematuria, or incontinence  NEUROLOGICAL: No headaches, memory loss, loss of strength, numbness, or tremors  SKIN: No itching, burning, rashes, or lesions   LYMPH NODES: No enlarged glands  ENDOCRINE: No heat or cold intolerance; No hair loss  MUSCULOSKELETAL: No joint pain or swelling; No muscle, back, or extremity pain  PSYCHIATRIC: No depression, anxiety, mood swings, or difficulty sleeping  HEME/LYMPH: No easy bruising, or bleeding gums  ALLERGY AND IMMUNOLOGIC: No hives or eczema    Vital Signs Last 24 Hrs  T(C): 36 (17 Jan 2020 17:04), Max: 36.4 (16 Jan 2020 23:00)  T(F): 96.8 (17 Jan 2020 17:04), Max: 97.5 (16 Jan 2020 23:00)  HR: 80 (17 Jan 2020 18:03) (74 - 94)  BP: 148/75 (17 Jan 2020 17:04) (112/61 - 159/70)  BP(mean): 72 (17 Jan 2020 00:00) (68 - 89)  RR: 17 (17 Jan 2020 17:04) (11 - 24)  SpO2: 98% (17 Jan 2020 18:03) (98% - 100%)    PHYSICAL EXAM:  GENERAL: NAD, well-groomed, well-developed  HEAD:  Atraumatic, Normocephalic  EYES: EOMI, PERRLA, conjunctiva and sclera clear  NECK: Supple, No JVD, Normal thyroid  NERVOUS SYSTEM:  Alert but does not communicate  CHEST/LUNG: Clear to percussion bilaterally; No rales, rhonchi, wheezing, or rubs  HEART: Regular rate and rhythm; No murmurs, rubs, or gallops  ABDOMEN: Soft, Nontender, Nondistended; Bowel sounds present  EXTREMITIES:  2+ Peripheral Pulses, No clubbing, cyanosis, or edema  LYMPH: No lymphadenopathy noted  SKIN: No rashes or lesions    LAB                          8.1    14.09 )-----------( 219      ( 17 Jan 2020 07:59 )             25.8       CBC:  01-17 @ 07:59  WBC 14.09   Hgb 8.1   Hct 25.8   Plts 219  MCV 97.0  01-16 @ 04:07  WBC 12.09   Hgb 9.7   Hct 29.9   Plts 221  MCV 94.6  01-15 @ 06:23  WBC 13.14   Hgb 11.0   Hct 33.3   Plts 229  MCV 93.0  01-14 @ 04:24  WBC 9.61   Hgb 9.4   Hct 28.3   Plts 148  MCV 91.6  01-13 @ 04:22  WBC 10.29   Hgb 8.8   Hct 26.2   Plts 129  MCV 91.3  01-12 @ 02:36  WBC 10.14   Hgb 9.0   Hct 25.8   Plts 121  MCV 89.6  01-11 @ 16:58  WBC 16.89   Hgb 12.1   Hct 35.4   Plts 181  MCV 89.6  01-11 @ 03:06  WBC 11.89   Hgb 7.7   Hct 22.9   Plts 129  MCV 89.8      Chemistry:  01-17 @ 07:59  Na+ 139  K+ 3.4  Cl- 108  CO2 21  BUN 89  Cr 3.63     01-16 @ 04:07  Na+ 138  K+ 3.6  Cl- 108  CO2 20  BUN 71  Cr 3.86     01-15 @ 06:23  Na+ 140  K+ 3.7  Cl- 107  CO2 20  BUN 70  Cr 3.75     01-14 @ 04:24  Na+ 140  K+ 3.7  Cl- 109  CO2 19  BUN 75  Cr 3.79     01-13 @ 04:22  Na+ 139  K+ 3.3  Cl- 107  CO2 22  BUN 74  Cr 3.89     01-12 @ 02:36  Na+ 139  K+ 3.6  Cl- 107  CO2 22  BUN 82  Cr 3.88     01-11 @ 03:06  Na+ 137  K+ 2.9  Cl- 105  CO2 21  BUN 79  Cr 3.87         Glucose, Serum: 104 mg/dL (01-17 @ 07:59)  Glucose, Serum: 135 mg/dL (01-16 @ 04:07)  Glucose, Serum: 56 mg/dL (01-15 @ 06:23)  Glucose, Serum: 82 mg/dL (01-14 @ 04:24)  Glucose, Serum: 101 mg/dL (01-13 @ 04:22)  Glucose, Serum: 80 mg/dL (01-12 @ 02:36)  Glucose, Serum: 384 mg/dL (01-11 @ 03:06)      17 Jan 2020 07:59    139    |  108    |  89     ----------------------------<  104    3.4     |  21     |  3.63   16 Jan 2020 04:07    138    |  108    |  71     ----------------------------<  135    3.6     |  20     |  3.86   15 Ousmane 2020 06:23    140    |  107    |  70     ----------------------------<  56     3.7     |  20     |  3.75   14 Jan 2020 04:24    140    |  109    |  75     ----------------------------<  82     3.7     |  19     |  3.79   13 Jan 2020 04:22    139    |  107    |  74     ----------------------------<  101    3.3     |  22     |  3.89   12 Jan 2020 02:36    139    |  107    |  82     ----------------------------<  80     3.6     |  22     |  3.88   11 Jan 2020 03:06    137    |  105    |  79     ----------------------------<  384    2.9     |  21     |  3.87     Ca    7.5        17 Jan 2020 07:59  Ca    7.5        16 Jan 2020 04:07  Ca    7.9        15 Jan 2020 06:23  Ca    7.4        14 Jan 2020 04:24  Ca    7.0        13 Jan 2020 04:22  Ca    7.5        12 Jan 2020 02:36  Ca    6.8        11 Jan 2020 03:06  Phos  5.3       17 Jan 2020 07:59  Phos  6.0       16 Jan 2020 04:07  Phos  4.9       15 Jan 2020 06:23  Phos  4.6       14 Jan 2020 04:24  Phos  4.5       13 Jan 2020 04:22  Phos  4.2       12 Jan 2020 02:36  Phos  2.8       11 Jan 2020 03:06  Mg     2.0       17 Jan 2020 07:59  Mg     2.0       16 Jan 2020 04:07  Mg     2.0       15 Jan 2020 06:23  Mg     2.0       14 Jan 2020 04:24  Mg     2.0       13 Jan 2020 04:22  Mg     2.1       12 Jan 2020 02:36  Mg     1.9       11 Jan 2020 03:06    TPro  4.4    /  Alb  1.4    /  TBili  0.3    /  DBili  x      /  AST  12     /  ALT  19     /  AlkPhos  56     13 Jan 2020 04:22  TPro  4.2    /  Alb  1.4    /  TBili  0.4    /  DBili  x      /  AST  12     /  ALT  21     /  AlkPhos  55     12 Jan 2020 02:36  TPro  3.7    /  Alb  1.7    /  TBili  x      /  DBili  x      /  AST  x      /  ALT  x      /  AlkPhos  x      11 Jan 2020 12:04              CAPILLARY BLOOD GLUCOSE          C-Reactive Protein, Serum: 5.72 mg/dL (01-14 @ 09:19)      RADIOLOGY & ADDITIONAL TESTS:    Imaging Personally Reviewed:  [ ] YES  [ ] NO    Consultant(s) Notes Reviewed:  [ ] YES  [ ] NO    Care Discussed with Consultants/Other Providers [ ] YES  [ ] NO

## 2020-01-17 NOTE — PROGRESS NOTE ADULT - ASSESSMENT
Hypothyroidism  Constipation  Iron deficiency anemia  Major depressive disorder  Hyperlipidemia  GI bleed.   single [place].   Depression  Alzheimers disease  MI, old  CVA (cerebral vascular accident)  HTN (hypertension)  No significant past surgical history

## 2020-01-17 NOTE — PROGRESS NOTE ADULT - PROBLEM SELECTOR PLAN 1
ICU #1, Intubated. 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 repeat EGD cancelled for mechanical malfunction 1) Restart  Carafate 2)  Advance as tolerated  3) F/U EGD Tuesdy 4) F/U CBC

## 2020-01-17 NOTE — PROGRESS NOTE ADULT - SUBJECTIVE AND OBJECTIVE BOX
Patient is a 79y old  Female who presents with a chief complaint of sob cough (17 Jan 2020 09:10) 9dr zandra covering for Dr Harrington till 01-20)  Patient admitted with possible pneumonia/ Gi bleed. . needed intubation, extubated 01-15. Now with NG tube in place   alert oriented . No distress. wants to have food. 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 Hospital course complicated by acute blood loss anemia 2 to UGIB, acute on CKD and acute respiratory failure requiring intubation. s/p extubation 1/15  cbc stable. vitals stable.     INTERVAL HPI/OVERNIGHT EVENTS: patient  hemodynamically stable. uneventful night.  PAST MEDICAL & SURGICAL HISTORY:      MEDICATIONS  (STANDING):  buDESOnide    Inhalation Suspension 0.5 milliGRAM(s) Inhalation every 12 hours  carvedilol 12.5 milliGRAM(s) Oral every 12 hours  cloNIDine 0.1 milliGRAM(s) Oral every 6 hours  hydrALAZINE 50 milliGRAM(s) Oral every 8 hours  levothyroxine Injectable 25 MICROGram(s) IV Push at bedtime  pantoprazole  Injectable 40 milliGRAM(s) IV Push every 12 hours  sodium chloride 0.65% Nasal 1 Spray(s) Both Nostrils three times a day    MEDICATIONS  (PRN):  albuterol/ipratropium for Nebulization 3 milliLiter(s) Nebulizer every 6 hours PRN Shortness of Breath and/or Wheezing  hydrALAZINE Injectable 10 milliGRAM(s) IV Push every 6 hours PRN SBP >170      Allergies    No Known Allergies    Intolerances        REVIEW OF SYSTEMS:  CONSTITUTIONAL: No fever, weight loss, or fatigue  EYES: No eye pain, visual disturbances, or discharge  ENMT:  No difficulty hearing, tinnitus, vertigo; No sinus or throat pain  NECK: No pain or stiffness  BREASTS: No pain, masses, or nipple discharge  RESPIRATORY: No cough, wheezing, chills or hemoptysis; No shortness of breath  CARDIOVASCULAR: No chest pain, palpitations, dizziness, or leg swelling  GASTROINTESTINAL: No abdominal or epigastric pain. No nausea, vomiting, or hematemesis; No diarrhea or constipation. No melena or hematochezia.  GENITOURINARY: No dysuria, frequency, hematuria, or incontinence  NEUROLOGICAL: No headaches, memory loss, loss of strength, numbness, or tremors  SKIN: No itching, burning, rashes, or lesions   LYMPH NODES: No enlarged glands  ENDOCRINE: No heat or cold intolerance; No hair loss  MUSCULOSKELETAL: No joint pain or swelling; No muscle, back, or extremity pain  PSYCHIATRIC: No depression, anxiety, mood swings, or difficulty sleeping  HEME/LYMPH: No easy bruising, or bleeding gums  ALLERY AND IMMUNOLOGIC: No hives or eczema    Vital Signs Last 24 Hrs  T(C): 36.3 (17 Jan 2020 05:44), Max: 36.9 (16 Jan 2020 20:00)  T(F): 97.3 (17 Jan 2020 05:44), Max: 98.4 (16 Jan 2020 20:00)  HR: 90 (17 Jan 2020 05:44) (65 - 94)  BP: 154/71 (17 Jan 2020 05:44) (105/52 - 159/70)  BP(mean): 72 (17 Jan 2020 00:00) (60 - 91)  RR: 16 (17 Jan 2020 05:44) (9 - 28)  SpO2: 100% (17 Jan 2020 05:44) (98% - 100%)    PHYSICAL EXAM:  GENERAL: NAD, well-groomed, well-developed  HEAD:  Atraumatic, Normocephalic  EYES: EOMI, PERRLA, conjunctiva and sclera clear  ENMT: No tonsillar erythema, exudates, or enlargement; Moist mucous membranes, Good dentition, No lesions  NECK: Supple, No JVD, Normal thyroid  NERVOUS SYSTEM:  Alert & Oriented X3, Good concentration; Motor Strength 5/5 B/L upper and lower extremities; DTRs 2+ intact and symmetric  CHEST/LUNG: Clear to percussion bilaterally; No rales, rhonchi, wheezing, or rubs  HEART: Regular rate and rhythm; No murmurs, rubs, or gallops  ABDOMEN: Soft, Nontender, Nondistended; Bowel sounds present  EXTREMITIES:  2+ Peripheral Pulses, No clubbing, cyanosis, or edema  LYMPH: No lymphadenopathy noted  SKIN: No rashes or lesions    LABS:                        8.1    14.09 )-----------( 219      ( 17 Jan 2020 07:59 )             25.8     01-17    139  |  108  |  89<H>  ----------------------------<  104<H>  3.4<L>   |  21<L>  |  3.63<H>    Ca    7.5<L>      17 Jan 2020 07:59  Phos  5.3     01-17  Mg     2.0     01-17            CAPILLARY BLOOD GLUCOSE                    RADIOLOGY & ADDITIONAL TESTS:    Imaging Personally Reviewed:  [ ] YES  [ ] NO    Consultant(s) Notes Reviewed:  [ ] YES  [ ] NO    Care Discussed with Consultants/Other Providers [ ] YES  [ ] NO    Care discussed with family,         [  ]   yes  [  ]  No    imp:    stable[ ]    unstable[  ]     improving [  x ]       unchanged  [  ]                Plans:  Continue present plans  [x  ] continue palns as per GI and  Hematology.......               order test[  ]    test name.  cbc/ bmp in am                Discharge Planning  [  ]

## 2020-01-17 NOTE — PROGRESS NOTE ADULT - ASSESSMENT
HPI:  · HPI Objective Statement: 79 years old female from [facility] rehab c/o coughing sob for three to four days. Pt is sent here to rule out chf vs pneumonia. Pt is alert and oriented to person only unable to give detail hx due to hx of Alzheimer according to the [facility] pt had sat of 91 to 92 %,	  ---------------- As Above ---------------------------------------------  Patient is a poor historian. Patient admitted with Bronchitis and UTI. Called for hematemsis. Patient had three episodes of bloody vomitus. ( 1/3 of a cup ) . Patient complaining of mid epigastric / chest pain but patient can not characterize the pain.   Patient has chronic anemia. but HGB fell 8.4 --> 7.2. Was on Carafate, now on IV PPI  KUB pending  HX of CRI    Addendum : Patient had another episode of bloody vomitus    Upper GI Bleed - Secondary to (?). 1) NPO  2)Check KUB  3) IV PPI 4) telemetry  5) NGT 6) EGD 7) Hold Carafate  8) Hold iron  ---Will speak with daughter.

## 2020-01-17 NOTE — PROGRESS NOTE ADULT - SUBJECTIVE AND OBJECTIVE BOX
lying in bed    Vital Signs Last 24 Hrs  T(C): 36.3 (17 Jan 2020 05:44), Max: 36.9 (16 Jan 2020 20:00)  T(F): 97.3 (17 Jan 2020 05:44), Max: 98.4 (16 Jan 2020 20:00)  HR: 90 (17 Jan 2020 05:44) (65 - 94)  BP: 154/71 (17 Jan 2020 05:44) (82/46 - 159/70)  BP(mean): 72 (17 Jan 2020 00:00) (54 - 91)  RR: 16 (17 Jan 2020 05:44) (9 - 28)  SpO2: 100% (17 Jan 2020 05:44) (98% - 100%)    PHYSICAL EXAM:    general - weak looking  HEENT - No Icterus  CVS - RRR  RS - AE B/L  Abd - soft, NT  Ext - Pulses +        LABS:                        8.1    14.09 )-----------( 219      ( 17 Jan 2020 07:59 )             25.8     01-17    139  |  108  |  89<H>  ----------------------------<  104<H>  3.4<L>   |  21<L>  |  3.63<H>    Ca    7.5<L>      17 Jan 2020 07:59  Phos  5.3     01-17  Mg     2.0     01-17            Culture - Sputum (collected 13 Jan 2020 17:08)  Source: .Sputum Sputum  Gram Stain (15 Ousmane 2020 16:59):    Few Squamous epithelial cells per low power field    Few polymorphonuclear leukocytes per low power field    Rare Yeast like cells per oil power field    Few Gram variable coccobacilli per oil power field  Final Report (15 Ousmane 2020 16:59):    Moderate Methicillin resistant Staphylococcus aureus    Normal Respiratory Kayce absent  Organism: Methicillin resistant Staphylococcus aureus (15 Ousmane 2020 16:59)  Organism: Methicillin resistant Staphylococcus aureus (15 Ousmane 2020 16:59)        RADIOLOGY & ADDITIONAL STUDIES:

## 2020-01-18 LAB
ANION GAP SERPL CALC-SCNC: 8 MMOL/L — SIGNIFICANT CHANGE UP (ref 5–17)
BLD GP AB SCN SERPL QL: SIGNIFICANT CHANGE UP
BUN SERPL-MCNC: 90 MG/DL — HIGH (ref 7–23)
CALCIUM SERPL-MCNC: 7 MG/DL — LOW (ref 8.5–10.1)
CHLORIDE SERPL-SCNC: 113 MMOL/L — HIGH (ref 96–108)
CO2 SERPL-SCNC: 22 MMOL/L — SIGNIFICANT CHANGE UP (ref 22–31)
CREAT SERPL-MCNC: 3.42 MG/DL — HIGH (ref 0.5–1.3)
GLUCOSE BLDC GLUCOMTR-MCNC: 134 MG/DL — HIGH (ref 70–99)
GLUCOSE SERPL-MCNC: 139 MG/DL — HIGH (ref 70–99)
HCT VFR BLD CALC: 20.7 % — CRITICAL LOW (ref 34.5–45)
HCT VFR BLD CALC: 34.7 % — SIGNIFICANT CHANGE UP (ref 34.5–45)
HGB BLD-MCNC: 11.4 G/DL — LOW (ref 11.5–15.5)
HGB BLD-MCNC: 6.8 G/DL — CRITICAL LOW (ref 11.5–15.5)
INR BLD: 1.09 RATIO — SIGNIFICANT CHANGE UP (ref 0.88–1.16)
MCHC RBC-ENTMCNC: 27.4 PG — SIGNIFICANT CHANGE UP (ref 27–34)
MCHC RBC-ENTMCNC: 29.2 PG — SIGNIFICANT CHANGE UP (ref 27–34)
MCHC RBC-ENTMCNC: 32.9 GM/DL — SIGNIFICANT CHANGE UP (ref 32–36)
MCHC RBC-ENTMCNC: 32.9 GM/DL — SIGNIFICANT CHANGE UP (ref 32–36)
MCV RBC AUTO: 83.4 FL — SIGNIFICANT CHANGE UP (ref 80–100)
MCV RBC AUTO: 88.8 FL — SIGNIFICANT CHANGE UP (ref 80–100)
NRBC # BLD: 0 /100 WBCS — SIGNIFICANT CHANGE UP (ref 0–0)
NRBC # BLD: 0 /100 WBCS — SIGNIFICANT CHANGE UP (ref 0–0)
PLATELET # BLD AUTO: 118 K/UL — LOW (ref 150–400)
PLATELET # BLD AUTO: 142 K/UL — LOW (ref 150–400)
POTASSIUM SERPL-MCNC: 3.3 MMOL/L — LOW (ref 3.5–5.3)
POTASSIUM SERPL-SCNC: 3.3 MMOL/L — LOW (ref 3.5–5.3)
PROTHROM AB SERPL-ACNC: 12.2 SEC — SIGNIFICANT CHANGE UP (ref 10–12.9)
RBC # BLD: 2.33 M/UL — LOW (ref 3.8–5.2)
RBC # BLD: 4.16 M/UL — SIGNIFICANT CHANGE UP (ref 3.8–5.2)
RBC # FLD: 18.7 % — HIGH (ref 10.3–14.5)
RBC # FLD: 19.1 % — HIGH (ref 10.3–14.5)
SODIUM SERPL-SCNC: 143 MMOL/L — SIGNIFICANT CHANGE UP (ref 135–145)
WBC # BLD: 10.6 K/UL — HIGH (ref 3.8–10.5)
WBC # BLD: 13.22 K/UL — HIGH (ref 3.8–10.5)
WBC # FLD AUTO: 10.6 K/UL — HIGH (ref 3.8–10.5)
WBC # FLD AUTO: 13.22 K/UL — HIGH (ref 3.8–10.5)

## 2020-01-18 PROCEDURE — 71045 X-RAY EXAM CHEST 1 VIEW: CPT | Mod: 26

## 2020-01-18 PROCEDURE — 93971 EXTREMITY STUDY: CPT | Mod: 26,LT

## 2020-01-18 PROCEDURE — 74018 RADEX ABDOMEN 1 VIEW: CPT | Mod: 26

## 2020-01-18 PROCEDURE — 99231 SBSQ HOSP IP/OBS SF/LOW 25: CPT

## 2020-01-18 PROCEDURE — 36600 WITHDRAWAL OF ARTERIAL BLOOD: CPT

## 2020-01-18 PROCEDURE — 99233 SBSQ HOSP IP/OBS HIGH 50: CPT

## 2020-01-18 RX ORDER — HYDRALAZINE HCL 50 MG
10 TABLET ORAL ONCE
Refills: 0 | Status: COMPLETED | OUTPATIENT
Start: 2020-01-18 | End: 2020-01-18

## 2020-01-18 RX ORDER — FUROSEMIDE 40 MG
40 TABLET ORAL ONCE
Refills: 0 | Status: COMPLETED | OUTPATIENT
Start: 2020-01-18 | End: 2020-01-18

## 2020-01-18 RX ORDER — HYDRALAZINE HCL 50 MG
10 TABLET ORAL EVERY 8 HOURS
Refills: 0 | Status: DISCONTINUED | OUTPATIENT
Start: 2020-01-18 | End: 2020-01-20

## 2020-01-18 RX ORDER — METOPROLOL TARTRATE 50 MG
5 TABLET ORAL ONCE
Refills: 0 | Status: COMPLETED | OUTPATIENT
Start: 2020-01-18 | End: 2020-01-18

## 2020-01-18 RX ORDER — PANTOPRAZOLE SODIUM 20 MG/1
8 TABLET, DELAYED RELEASE ORAL
Qty: 80 | Refills: 0 | Status: DISCONTINUED | OUTPATIENT
Start: 2020-01-18 | End: 2020-01-21

## 2020-01-18 RX ORDER — METOPROLOL TARTRATE 50 MG
5 TABLET ORAL EVERY 6 HOURS
Refills: 0 | Status: DISCONTINUED | OUTPATIENT
Start: 2020-01-18 | End: 2020-01-19

## 2020-01-18 RX ORDER — CHLORHEXIDINE GLUCONATE 213 G/1000ML
1 SOLUTION TOPICAL
Refills: 0 | Status: DISCONTINUED | OUTPATIENT
Start: 2020-01-18 | End: 2020-01-28

## 2020-01-18 RX ORDER — SODIUM CHLORIDE 9 MG/ML
1000 INJECTION INTRAMUSCULAR; INTRAVENOUS; SUBCUTANEOUS ONCE
Refills: 0 | Status: COMPLETED | OUTPATIENT
Start: 2020-01-18 | End: 2020-01-18

## 2020-01-18 RX ORDER — LABETALOL HCL 100 MG
10 TABLET ORAL ONCE
Refills: 0 | Status: COMPLETED | OUTPATIENT
Start: 2020-01-18 | End: 2020-01-18

## 2020-01-18 RX ORDER — SUCRALFATE 1 G
1 TABLET ORAL
Refills: 0 | Status: DISCONTINUED | OUTPATIENT
Start: 2020-01-18 | End: 2020-01-27

## 2020-01-18 RX ADMIN — Medication 1 SPRAY(S): at 21:30

## 2020-01-18 RX ADMIN — Medication 5 MILLIGRAM(S): at 11:39

## 2020-01-18 RX ADMIN — Medication 1 PATCH: at 17:07

## 2020-01-18 RX ADMIN — Medication 10 MILLIGRAM(S): at 23:54

## 2020-01-18 RX ADMIN — Medication 0.5 MILLIGRAM(S): at 05:10

## 2020-01-18 RX ADMIN — Medication 10 MILLIGRAM(S): at 11:39

## 2020-01-18 RX ADMIN — Medication 1 PATCH: at 19:41

## 2020-01-18 RX ADMIN — Medication 10 MILLIGRAM(S): at 23:53

## 2020-01-18 RX ADMIN — Medication 5 MILLIGRAM(S): at 23:00

## 2020-01-18 RX ADMIN — Medication 1 SPRAY(S): at 05:59

## 2020-01-18 RX ADMIN — PANTOPRAZOLE SODIUM 10 MG/HR: 20 TABLET, DELAYED RELEASE ORAL at 23:54

## 2020-01-18 RX ADMIN — CHLORHEXIDINE GLUCONATE 1 APPLICATION(S): 213 SOLUTION TOPICAL at 06:00

## 2020-01-18 RX ADMIN — Medication 10 MILLIGRAM(S): at 20:17

## 2020-01-18 RX ADMIN — Medication 40 MILLIGRAM(S): at 10:30

## 2020-01-18 RX ADMIN — Medication 40 MILLIGRAM(S): at 04:50

## 2020-01-18 RX ADMIN — PANTOPRAZOLE SODIUM 10 MG/HR: 20 TABLET, DELAYED RELEASE ORAL at 04:30

## 2020-01-18 RX ADMIN — Medication 40 MILLIGRAM(S): at 21:51

## 2020-01-18 RX ADMIN — Medication 5 MILLIGRAM(S): at 17:05

## 2020-01-18 RX ADMIN — Medication 25 MICROGRAM(S): at 21:29

## 2020-01-18 RX ADMIN — Medication 0.5 MILLIGRAM(S): at 18:24

## 2020-01-18 RX ADMIN — SODIUM CHLORIDE 1000 MILLILITER(S): 9 INJECTION INTRAMUSCULAR; INTRAVENOUS; SUBCUTANEOUS at 02:05

## 2020-01-18 NOTE — PROCEDURE NOTE - NSPROCDETAILS_GEN_ALL_CORE
patient pre-oxygenated, tube inserted, placement confirmed
connected to a pressurized flush line/all materials/supplies accounted for at end of procedure/positive blood return obtained via catheter/sutured in place/location identified, draped/prepped, sterile technique used, needle inserted/introduced/hemostasis with direct pressure, dressing applied

## 2020-01-18 NOTE — PROGRESS NOTE ADULT - SUBJECTIVE AND OBJECTIVE BOX
79 years old female from [facility] rehab c/o coughing sob for three to four days. Pt is sent here to rule out chf vs pneumonia. Pt is alert and oriented to person only unable to give detail hx due to hx of Alzheimer according to the [facility] pt had sat of 91 to 92 %,	    PAST MEDICAL/SURGICAL/FAMILY/SOCIAL HISTORY:    Past Medical History:  Alzheimers disease    CVA (cerebral vascular accident)    Depression    HTN (hypertension)    MI, old.  recently Dx c hypothyroidism, TSH > 111 started Levothyroxin 50mcg qd as per Dr Vigil (2020 11:41)      Chief Complaint:  Patient is a 79y old  Female who presents with a chief complaint of sob cough (2020 22:56)      Review of Systems:    General:  No wt loss, fevers, chills, night sweats  Eyes:  Good vision, no reported pain  ENT:  No sore throat, pain, runny nose, dysphagia  CV:  No pain, palpitations, hypo/hypertension  Resp:  dyspnea, cough  GI:  No pain, nausea, vomiting, diarrhea, constipation           Social History/Family History  SOCHX:   tobacco,  -  alcohol    FMHX: FA/MO  - contributory       Discussed with:  PMD, Family    Physical Exam:    Vital Signs:  Vital Signs Last 24 Hrs  T(C): 36.2 (2020 17:06), Max: 36.9 (2020 00:18)  T(F): 97.2 (2020 17:06), Max: 98.5 (2020 00:18)  HR: 74 (2020 21:55) (68 - 87)  BP: 179/78 (2020 17:06) (132/71 - 179/78)  BP(mean): --  RR: 18 (2020 17:06) (18 - 18)  SpO2: 97% (2020 21:55) (94% - 98%)  Daily     Daily Weight in k.8 (2020 04:59)  I&O's Summary    2020 07:01  -  2020 07:00  --------------------------------------------------------  IN: 560 mL / OUT: 0 mL / NET: 560 mL          Chest:  fair air entry bilateral  Cardiovascular:  Regular rhythm, S1, S2, no murmur/rub/S3/S4, no carotid/femoral/abdominal bruit, radial/pedal pulses 2+,   Abdomen:  Soft, non-tender, non-distended, normoactive bowel sounds, no HSM        Laboratory:          138  |  104  |  69<H>  ----------------------------<  111<H>  3.8   |  25  |  3.77<H>    Ca    8.1<L>      2020 07:22          Assessment:  I am asked to assist this patient admitted with shortness of breath  The patient has multifactorial causes of shortness of breath, renal insufficiency, anemia  And now after a diagnostic echocardiogram within normal ejection fraction the reading shows mitral regurgitation and tricuspid regurgitation  After review of the studies this regurgitation is not considered severe but in the moderate to severe but more moderate range  upper GI bleed  GI noted,  EGD   The patient is  extubated on medical floor  optimize systolic blood pressure  PRBCs

## 2020-01-18 NOTE — PROGRESS NOTE ADULT - SUBJECTIVE AND OBJECTIVE BOX
HPI:  Pt is an 80 yo HF with h/o CAD, MI, dCHF, HTN, CKD with solitary kidney, hypothyroidism, Alzheimer's dementia, CVA x2 ,  with residual L weakness,achalasia s/p POEMS (2018) and UGIB presents from rehab for RSV PNA, VRE UTI, CHF exacerbation. Hospital course complicated by acute blood loss anemia 2 to UGIB, acute on CKD and acute respiratory failure requiring intubation. s/p extubation 1/15. Pt never had EGD while in the ICU and pt transferred to [facility] on . Early this am RRT called 2 to hematemesis and melena; pt transfused PRBC + FFP and transferred back to the ICU. Admitting dx: Acute blood loss anemia 2 to UGIB      ## Labs:  CBC:                        6.8    13.22 )-----------( 142      ( 2020 05:13 )             20.7     Chem:      143  |  113<H>  |  90<H>  ----------------------------<  139<H>  3.3<L>   |  22  |  3.42<H>    Ca    7.0<L>      2020 03:02  Phos  5.3       Mg     2.0           Coags:  PT/INR - ( 2020 03:02 )   PT: 12.2 sec;   INR: 1.09 ratio                 ## Imaging:    ## Medications:    cloNIDine Patch 0.1 mG/24Hr(s) 1 patch Transdermal every 7 days  hydrALAZINE Injectable 10 milliGRAM(s) IV Push every 6 hours PRN  metoprolol tartrate Injectable 5 milliGRAM(s) IV Push every 6 hours    albuterol/ipratropium for Nebulization 3 milliLiter(s) Nebulizer every 6 hours PRN  buDESOnide    Inhalation Suspension 0.5 milliGRAM(s) Inhalation every 12 hours    levothyroxine Injectable 25 MICROGram(s) IV Push at bedtime      pantoprazole Infusion 8 mG/Hr IV Continuous <Continuous>  sucralfate suspension 1 Gram(s) Oral four times a day        ## Vitals:  T(C): 35.6 (20 @ 06:45), Max: 36.4 (20 @ 23:54)  HR: 75 (20 @ 08:30) (54 - 100)  BP: 232/79 (20 @ 08:30) (90/62 - 269/89)  BP(mean): 115 (20 @ 08:30) (59 - 137)  RR: 11 (20 @ 08:30) (10 - 23)  SpO2: 100% (20 @ 08:30) (98% - 100%)  Wt(kg): --  Vent:   AB-17 @ 07:01  -   @ 07:00  --------------------------------------------------------  IN: 723 mL / OUT: 400 mL / NET: 323 mL          ## P/E:  Gen: lying comfortably in bed in no apparent distress  Lungs: CTA  Heart: RRR  Abd: Soft/+BS  Ext: L UE edema  Neuro: Awake/ ? nonverbal    CENTRAL LINE: [ ] YES [ ] NO  LOCATION:   DATE INSERTED:  REMOVE: [ ] YES [ ] NO      WINTERS: [ ] YES [ ] NO    DATE INSERTED:  REMOVE:  [ ] YES [ ] NO      A-LINE:  [ ] YES [ ] NO  LOCATION:   DATE INSERTED:  REMOVE:  [ ] YES [ ] NO  EXPLAIN:    CODE STATUS: [x] full code  [ ] DNR  [ ] DNI  [ ] [facility]  Goals of care discussion: [ ] yes

## 2020-01-18 NOTE — CHART NOTE - NSCHARTNOTEFT_GEN_A_CORE
Patient with recurrent GI Bleed brought back into the unit. Dark hemetemasis x vomiting x2 and melena. Hb 6.8  Patient transfused FFP, RBC x2  Protonix gtt in progress  NGT to intermittent suction   Dr. Cruz on service for Dr. Gerry mai and health care proxy informed

## 2020-01-18 NOTE — PROGRESS NOTE ADULT - SUBJECTIVE AND OBJECTIVE BOX
INTERVAL HPI:  79 year female with HTN, HLD, MI hx, CVA, Gi bleed, Anemia Depression and Alzheimer's dementia.  Sent from [facility] due to SOB. Possibility of pneumonia vs CHF was raised.   In ED with Respiratory distress required BIPAP support and RVP positive for RSV. Pt not able to provide more details due to dementia. Information from transfer papers and ED physician.  Nods no when asked for smoking.  01/09/20: With massive GI bleed, intubated and transferred to ICU.  01/09/20:  EGD+ Colonoscopy.  01/15/20:  Extubated.  01/16/20:  Out of ICU  01/18/20:  Back to ICU for upper GI bleed and significant drop in H & H.    OVERNIGHT EVENTS:  Lethargic, on warming blanket for hypothermia.    Vital Signs Last 24 Hrs  T(C): 37 (18 Jan 2020 17:20), Max: 37.1 (18 Jan 2020 15:56)  T(F): 98.6 (18 Jan 2020 17:20), Max: 98.8 (18 Jan 2020 15:56)  HR: 94 (18 Jan 2020 18:30) (54 - 102)  BP: 192/56 (18 Jan 2020 18:30) (90/62 - 269/89)  BP(mean): 87 (18 Jan 2020 18:30) (59 - 137)  RR: 21 (18 Jan 2020 18:30) (10 - 23)  SpO2: 99% (18 Jan 2020 18:30) (98% - 100%)    PHYSICAL EXAM:  GEN:       Lethargic, comfortable.  HEENT:    Normal.    RESP:       decreased air entry.  CVS:          Regular rate and rhythm.   ABD:         Soft, non-tender, non-distended;     MEDICATIONS  (STANDING):  buDESOnide    Inhalation Suspension 0.5 milliGRAM(s) Inhalation every 12 hours  chlorhexidine 4% Liquid 1 Application(s) Topical <User Schedule>  cloNIDine Patch 0.1 mG/24Hr(s) 1 patch Transdermal every 7 days  levothyroxine Injectable 25 MICROGram(s) IV Push at bedtime  metoprolol tartrate Injectable 5 milliGRAM(s) IV Push every 6 hours  pantoprazole Infusion 8 mG/Hr (10 mL/Hr) IV Continuous <Continuous>  sodium chloride 0.65% Nasal 1 Spray(s) Both Nostrils three times a day  sucralfate suspension 1 Gram(s) Oral four times a day    MEDICATIONS  (PRN):  albuterol/ipratropium for Nebulization 3 milliLiter(s) Nebulizer every 6 hours PRN Shortness of Breath and/or Wheezing  hydrALAZINE Injectable 10 milliGRAM(s) IV Push every 6 hours PRN SBP >170    LABS:                        6.8    13.22 )-----------( 142      ( 18 Jan 2020 05:13 )             20.7     01-18    143  |  113<H>  |  90<H>  ----------------------------<  139<H>  3.3<L>   |  22  |  3.42<H>    Ca    7.0<L>      18 Jan 2020 03:02  Phos  5.3     01-17  Mg     2.0     01-17    PT/INR - ( 18 Jan 2020 03:02 )   PT: 12.2 sec;   INR: 1.09 ratio       ASSESSMENT AND PLAN:  ·	S/P Acute hypoxic  Respiratory failure .  ·	RSV tracheobronchitis.  ·	Left pleural effusion.  ·	Hypothyroidism with very high TSH.  ·	Anemia.  ·	VRE UTI  ·	Leukocytosis.  ·	Renal Insuffiencey.  ·	CVA by history.  ·	HTN.  ·	HLD.  ·	Alzheimer's dementia.  ·	GI bleed.    On 3rd unit of PRBC.  Continue Protonix and Carafate.  Continue O2 and symbicort.

## 2020-01-18 NOTE — PROGRESS NOTE ADULT - SUBJECTIVE AND OBJECTIVE BOX
Events Noted, back in ICU lying in bed, weak looking    Vital Signs Last 24 Hrs  T(C): 35.6 (18 Jan 2020 06:45), Max: 36.4 (17 Jan 2020 23:54)  T(F): 96.1 (18 Jan 2020 06:45), Max: 97.5 (17 Jan 2020 23:54)  HR: 75 (18 Jan 2020 08:30) (54 - 100)  BP: 232/79 (18 Jan 2020 08:30) (90/62 - 269/89)  BP(mean): 115 (18 Jan 2020 08:30) (59 - 137)  RR: 11 (18 Jan 2020 08:30) (10 - 23)  SpO2: 100% (18 Jan 2020 08:30) (98% - 100%)    PHYSICAL EXAM:    general - weak looking, lethargic  HEENT - No Icterus  CVS - RRR  RS - AE B/L  Abd - soft, NT  Ext - Pulses +        LABS:                        6.8    13.22 )-----------( 142      ( 18 Jan 2020 05:13 )             20.7     01-18    143  |  113<H>  |  90<H>  ----------------------------<  139<H>  3.3<L>   |  22  |  3.42<H>    Ca    7.0<L>      18 Jan 2020 03:02  Phos  5.3     01-17  Mg     2.0     01-17      PT/INR - ( 18 Jan 2020 03:02 )   PT: 12.2 sec;   INR: 1.09 ratio               Culture - Sputum (collected 13 Jan 2020 17:08)  Source: .Sputum Sputum  Gram Stain (15 Ousmane 2020 16:59):    Few Squamous epithelial cells per low power field    Few polymorphonuclear leukocytes per low power field    Rare Yeast like cells per oil power field    Few Gram variable coccobacilli per oil power field  Final Report (15 Ousmane 2020 16:59):    Moderate Methicillin resistant Staphylococcus aureus    Normal Respiratory Kayce absent  Organism: Methicillin resistant Staphylococcus aureus (15 Ousmane 2020 16:59)  Organism: Methicillin resistant Staphylococcus aureus (15 Ousmane 2020 16:59)        RADIOLOGY & ADDITIONAL STUDIES:

## 2020-01-18 NOTE — PROGRESS NOTE ADULT - ASSESSMENT
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 Hospital course complicated by acute blood loss anemia 2 to UGIB, acute on CKD and acute respiratory failure requiring intubation. s/p extubation 1/15. Pt never had EGD while in the ICU and pt transferred to [facility] on 1/16. Early this am RRT called 2 to hematemesis and melena; pt transfused PRBC + FFP and transferred back to the ICU. Admitting dx: Acute blood loss anemia 2 to UGIB    Resp: Supplemental O2 prn   CVS: Cont antiHTN meds; avoid extreme increased BP 2 to UGIB  Heme: Trend Hb  FEN/GI: NPO/ GI f/u for EGD/ PPI drip  Renal: Cr decreasing/ Follow BUN/Cr and UO/ F/u as per Renal  Social: Family notified that pt was transferred back to the ICU

## 2020-01-18 NOTE — PROGRESS NOTE ADULT - SUBJECTIVE AND OBJECTIVE BOX
Subjective:  Lethargic, receiving a unit of blood. Large coffee ground aspirate per NGT.     MEDICATIONS  (STANDING):  buDESOnide    Inhalation Suspension 0.5 milliGRAM(s) Inhalation every 12 hours  chlorhexidine 4% Liquid 1 Application(s) Topical <User Schedule>  levothyroxine Injectable 25 MICROGram(s) IV Push at bedtime  pantoprazole  Injectable 40 milliGRAM(s) IV Push every 12 hours  pantoprazole Infusion 8 mG/Hr (10 mL/Hr) IV Continuous <Continuous>  sodium chloride 0.65% Nasal 1 Spray(s) Both Nostrils three times a day  sucralfate suspension 1 Gram(s) Oral four times a day    MEDICATIONS  (PRN):  albuterol/ipratropium for Nebulization 3 milliLiter(s) Nebulizer every 6 hours PRN Shortness of Breath and/or Wheezing  hydrALAZINE Injectable 10 milliGRAM(s) IV Push every 6 hours PRN SBP >170          T(C): 35.7 (01-18-20 @ 06:16), Max: 36.4 (01-17-20 @ 23:54)  HR: 78 (01-18-20 @ 07:15) (54 - 96)  BP: 254/72 (01-18-20 @ 07:15) (90/62 - 268/71)  RR: 11 (01-18-20 @ 07:15) (11 - 23)  SpO2: 100% (01-18-20 @ 07:15) (98% - 100%)  Wt(kg): --        I&O's Detail           PHYSICAL EXAM:    GENERAL: NAD  EYES: EOMI, PERRLA, conjunctiva and sclera clear  NECK: Supple, pos inc in JVP  CHEST/LUNG: exp wheezes  HEART: S1S2  ABDOMEN: Soft, Nontender, Nondistended; Bowel sounds present  EXTREMITIES:  no edema.         LABS:  CBC Full  -  ( 18 Jan 2020 05:13 )  WBC Count : 13.22 K/uL  RBC Count : 2.33 M/uL  Hemoglobin : 6.8 g/dL  Hematocrit : 20.7 %  Platelet Count - Automated : 142 K/uL  Mean Cell Volume : 88.8 fl  Mean Cell Hemoglobin : 29.2 pg  Mean Cell Hemoglobin Concentration : 32.9 gm/dL  Auto Neutrophil # : x  Auto Lymphocyte # : x  Auto Monocyte # : x  Auto Eosinophil # : x  Auto Basophil # : x  Auto Neutrophil % : x  Auto Lymphocyte % : x  Auto Monocyte % : x  Auto Eosinophil % : x  Auto Basophil % : x    01-18    143  |  113<H>  |  90<H>  ----------------------------<  139<H>  3.3<L>   |  22  |  3.42<H>    Ca    7.0<L>      18 Jan 2020 03:02  Phos  5.3     01-17  Mg     2.0     01-17      PT/INR - ( 18 Jan 2020 03:02 )   PT: 12.2 sec;   INR: 1.09 ratio                 Impression:  JONY - suspected ischemic ATN. 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 Cresencio Cr 1.8 12/2019   Severe pulm HTN, TR  Hx of POEM?      Recommendations:   Keep -150  Monitor Cr daily  Avoid nephrotoxins.   Diuretics PRN increase WOB, oliguria

## 2020-01-18 NOTE — PROGRESS NOTE ADULT - ATTENDING COMMENTS
Ms. Arteaga was examined on am surgical rounds. Overnight transfused two units prbc. Continues to have melena and bloody gastric nasogastric tube drainage. Her systolic blood pressure has been elevated consistently above 200's since 0300. On examination she is lethargic, abdomen is soft, nontender, nondistended. Recommend q6 hrs CBC, transfuse as warranted, blood pressure control. Upper endoscopy pending today. Will be on standby for acute surgical intervention if warranted.

## 2020-01-18 NOTE — PROGRESS NOTE ADULT - SUBJECTIVE AND OBJECTIVE BOX
Patient is a 79y old  Female who presents with a chief complaint of sob cough (18 Jan 2020 09:37)  Patient developed Upper GI bleed again and  had to go back into Critical care unit. received one unit of PRBC already today, now resting.    BP elevated.     INTERVAL HPI/OVERNIGHT EVENTS:  PAST MEDICAL & SURGICAL HISTORY:  Hypothyroidism  Constipation  Iron deficiency anemia  Major depressive disorder  Hyperlipidemia  GI bleed  Depression  Alzheimers disease  MI, old  CVA (cerebral vascular accident)  HTN (hypertension)  No significant past surgical history      MEDICATIONS  (STANDING):  buDESOnide    Inhalation Suspension 0.5 milliGRAM(s) Inhalation every 12 hours  chlorhexidine 4% Liquid 1 Application(s) Topical <User Schedule>  furosemide   Injectable 40 milliGRAM(s) IV Push once  hydrALAZINE Injectable 10 milliGRAM(s) IV Push once  levothyroxine Injectable 25 MICROGram(s) IV Push at bedtime  pantoprazole  Injectable 40 milliGRAM(s) IV Push every 12 hours  pantoprazole Infusion 8 mG/Hr (10 mL/Hr) IV Continuous <Continuous>  sodium chloride 0.65% Nasal 1 Spray(s) Both Nostrils three times a day  sucralfate suspension 1 Gram(s) Oral four times a day    MEDICATIONS  (PRN):  albuterol/ipratropium for Nebulization 3 milliLiter(s) Nebulizer every 6 hours PRN Shortness of Breath and/or Wheezing  hydrALAZINE Injectable 10 milliGRAM(s) IV Push every 6 hours PRN SBP >170      Allergies    No Known Allergies    Intolerances        REVIEW OF SYSTEMS:  CONSTITUTIONAL: No fever, weight loss, or fatigue  EYES: No eye pain, visual disturbances, or discharge  ENMT:  No difficulty hearing, tinnitus, vertigo; No sinus or throat pain  NECK: No pain or stiffness  BREASTS: No pain, masses, or nipple discharge  RESPIRATORY: No cough, wheezing, chills or hemoptysis; No shortness of breath  CARDIOVASCULAR: No chest pain, palpitations, dizziness, or leg swelling  GASTROINTESTINAL: No abdominal or epigastric pain. No nausea, vomiting, or hematemesis; No diarrhea or constipation. No melena or hematochezia.  GENITOURINARY: No dysuria, frequency, hematuria, or incontinence  NEUROLOGICAL: No headaches, memory loss, loss of strength, numbness, or tremors  SKIN: No itching, burning, rashes, or lesions   LYMPH NODES: No enlarged glands  ENDOCRINE: No heat or cold intolerance; No hair loss  MUSCULOSKELETAL: No joint pain or swelling; No muscle, back, or extremity pain  PSYCHIATRIC: No depression, anxiety, mood swings, or difficulty sleeping  HEME/LYMPH: No easy bruising, or bleeding gums  ALLERY AND IMMUNOLOGIC: No hives or eczema    Vital Signs Last 24 Hrs  T(C): 35.6 (18 Jan 2020 06:45), Max: 36.4 (17 Jan 2020 23:54)  T(F): 96.1 (18 Jan 2020 06:45), Max: 97.5 (17 Jan 2020 23:54)  HR: 75 (18 Jan 2020 08:30) (54 - 100)  BP: 232/79 (18 Jan 2020 08:30) (90/62 - 269/89)  BP(mean): 115 (18 Jan 2020 08:30) (59 - 137)  RR: 11 (18 Jan 2020 08:30) (10 - 23)  SpO2: 100% (18 Jan 2020 08:30) (98% - 100%)    PHYSICAL EXAM:  GENERAL: NAD, well-groomed, well-developed  HEAD:  Atraumatic, Normocephalic  EYES: EOMI, PERRLA, conjunctiva and sclera clear  ENMT: No tonsillar erythema, exudates, or enlargement; Moist mucous membranes, Good dentition, No lesions  NECK: Supple, No JVD, Normal thyroid  NERVOUS SYSTEM:  Alert & Oriented X3, Good concentration; Motor Strength 5/5 B/L upper and lower extremities; DTRs 2+ intact and symmetric  CHEST/LUNG: Clear to percussion bilaterally; No rales, rhonchi, wheezing, or rubs  HEART: Regular rate and rhythm; No murmurs, rubs, or gallops  ABDOMEN: Soft, Nontender, Nondistended; Bowel sounds present. NG tube in place.No active bleed now.  EXTREMITIES:  2+ Peripheral Pulses, No clubbing, cyanosis, or edema  LYMPH: No lymphadenopathy noted  SKIN: No rashes or lesions    LABS:                        6.8    13.22 )-----------( 142      ( 18 Jan 2020 05:13 )             20.7     01-18    143  |  113<H>  |  90<H>  ----------------------------<  139<H>  3.3<L>   |  22  |  3.42<H>    Ca    7.0<L>      18 Jan 2020 03:02  Phos  5.3     01-17  Mg     2.0     01-17        PT/INR - ( 18 Jan 2020 03:02 )   PT: 12.2 sec;   INR: 1.09 ratio             CAPILLARY BLOOD GLUCOSE      POCT Blood Glucose.: 134 mg/dL (18 Jan 2020 00:57)                RADIOLOGY & ADDITIONAL TESTS:    Imaging Personally Reviewed:  [ ] YES  [ ] NO    Consultant(s) Notes Reviewed:  [ ] YES  [ ] NO    Care Discussed with Consultants/Other Providers [ ] YES  [ ] NO    Care discussed with family,         [  ]   yes  [  ]  No    imp:    stable[ ]    unstable[  ]     improving [   ]       unchanged  [  ]                Plans:  Continue present plans  [ x ] as per critical care.               New consult [  ]   specialty  .......               order test[  ]    test name.                  Discharge Planning  [  ]

## 2020-01-18 NOTE — PROGRESS NOTE ADULT - SUBJECTIVE AND OBJECTIVE BOX
coverage for Dr Laird    s/p UGI Bleed; ?AVM  Pt had recurrent hematemesis during night  Transfused 2 u prbc's so far      MEDICATIONS  (STANDING):  buDESOnide    Inhalation Suspension 0.5 milliGRAM(s) Inhalation every 12 hours  chlorhexidine 4% Liquid 1 Application(s) Topical <User Schedule>  cloNIDine Patch 0.1 mG/24Hr(s) 1 patch Transdermal every 7 days  levothyroxine Injectable 25 MICROGram(s) IV Push at bedtime  metoprolol tartrate Injectable 5 milliGRAM(s) IV Push every 6 hours  pantoprazole Infusion 8 mG/Hr (10 mL/Hr) IV Continuous <Continuous>  sodium chloride 0.65% Nasal 1 Spray(s) Both Nostrils three times a day  sucralfate suspension 1 Gram(s) Oral four times a day    MEDICATIONS  (PRN):  albuterol/ipratropium for Nebulization 3 milliLiter(s) Nebulizer every 6 hours PRN Shortness of Breath and/or Wheezing  hydrALAZINE Injectable 10 milliGRAM(s) IV Push every 6 hours PRN SBP >170      Allergies    No Known Allergies    Intolerances        Vital Signs Last 24 Hrs  T(C): 35.6 (18 Jan 2020 06:45), Max: 36.4 (17 Jan 2020 23:54)  T(F): 96.1 (18 Jan 2020 06:45), Max: 97.5 (17 Jan 2020 23:54)  HR: 75 (18 Jan 2020 08:30) (54 - 100)  BP: 232/79 (18 Jan 2020 08:30) (90/62 - 269/89)  BP(mean): 115 (18 Jan 2020 08:30) (59 - 137)  RR: 11 (18 Jan 2020 08:30) (10 - 23)  SpO2: 100% (18 Jan 2020 08:30) (98% - 100%)    PHYSICAL EXAM:  General: NAD.  CVS: S1, S2  Chest: air entry bilaterally present  Abd: BS present, soft, non-tender      LABS:                        6.8    13.22 )-----------( 142      ( 18 Jan 2020 05:13 )             20.7     01-18    143  |  113<H>  |  90<H>  ----------------------------<  139<H>  3.3<L>   |  22  |  3.42<H>    Ca    7.0<L>      18 Jan 2020 03:02  Phos  5.3     01-17  Mg     2.0     01-17      PT/INR - ( 18 Jan 2020 03:02 )   PT: 12.2 sec;   INR: 1.09 ratio             continue to transfuse to Hgb >8  IV protonix and PO carafate  follow CBC  will need repeat egd

## 2020-01-18 NOTE — CHART NOTE - NSCHARTNOTEFT_GEN_A_CORE
HPI:   79 years old female from [facility] rehab c/o coughing sob for three to four days. Pt is sent here to rule out chf vs pneumonia. Pt is alert and oriented to person only unable to give detail hx due to hx of Alzheimer according to the [facility] pt had sat of 91 to 92 %. 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 repeat EGD cancelled for mechanical malfunction     Patient was extubated and transferred to floor. Today dies was advanced. Tonight rapid response status post  bright red vomitus     PAST MEDICAL/SURGICAL/FAMILY/SOCIAL HISTORY:    Past Medical History:  Alzheimers disease    CVA (cerebral vascular accident)    Depression    HTN (hypertension)    MI, old.  Newly diagnosed hypothyroidism     BP 98/51  Patient awake and alert  Lungs: crackles  Abdomen benign     Acute GI bleed- NS bolus                          CBC , PT/INR                          Readmit ICU, Transfuse PRBC  and I Unit FFP                          NPO , GI consult HPI:     Pt is an 78 yo female transferred from [facility]  with h/o CAD, MI, dCHF, HTN, CKD with solitary kidney, new hypothyroidism, Alzheimer's dementia, CVA x2 1993, 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 . 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 repeat EGD cancelled for mechanical malfunction     In summary, Hospital course complicated by acute blood loss anemia 2 to UGIB, acute on CKD and acute respiratory failure requiring intubation.  PAST MEDICAL/SURGICAL/FAMILY/SOCIAL HISTORY:    Past Medical History:  Alzheimers disease    CVA (cerebral vascular accident)    Depression    HTN (hypertension)    MI, old.  Newly diagnosed hypothyroidism     BP 98/51  Patient awake and alert  Lungs: crackles  Abdomen benign     Acute GI bleed- NS bolus                          CBC , PT/INR                          Readmit ICU, Transfuse PRBC  and I Unit FFP                          NPO , GI consult

## 2020-01-18 NOTE — PROGRESS NOTE ADULT - SUBJECTIVE AND OBJECTIVE BOX
Patient seen and examined at bedside in ICU with Dr. Mckeon.    RRT called overnight for hematemesis and melena.  Pt upgraded to ICU.  2 uPRBC, FFP given.    Pt currently with melena and NGT output of blood gastric content.    Systolic BP has been elevated (over 200s) since this AM.      Vital Signs Last 24 Hrs  T(F): 96.1 (01-18-20 @ 06:45), Max: 97.5 (01-17-20 @ 23:54)  HR: 75 (01-18-20 @ 08:30)  BP: 232/79 (01-18-20 @ 08:30)  RR: 11 (01-18-20 @ 08:30)  SpO2: 100% (01-18-20 @ 08:30)        POCT Blood Glucose.: 134 mg/dL (18 Jan 2020 00:57)      GENERAL: Lethargic, opening eyes to stimulation, NAD  HEENT: NGT draining dark bloody output (~600cc)  CHEST/LUNG: Respirations nonlabored  HEART: S1S2, Regular rate and rhythm   ABDOMEN: + Bowel sounds, soft, Nontender, Nondistended  EXTREMITIES:  no calf tenderness, No edema    I&O's Detail    17 Jan 2020 07:01  -  18 Jan 2020 07:00  --------------------------------------------------------  IN:    Packed Red Blood Cells: 317 mL    pantoprazole Infusion: 30 mL    Plasma: 376 mL  Total IN: 723 mL    OUT:    Nasoenteral Tube: 400 mL  Total OUT: 400 mL    Total NET: 323 mL          LABS:                        6.8    13.22 )-----------( 142      ( 18 Jan 2020 05:13 )             20.7     01-18    143  |  113<H>  |  90<H>  ----------------------------<  139<H>  3.3<L>   |  22  |  3.42<H>    Ca    7.0<L>      18 Jan 2020 03:02  Phos  5.3     01-17  Mg     2.0     01-17      PT/INR - ( 18 Jan 2020 03:02 )   PT: 12.2 sec;   INR: 1.09 ratio         Impression: 78 y/o female with PMH Alzheimer's, CVA, Depression, CHF, HTN, MI (remote), recent dx of hypothyroidism with GI Bleed, S/P EGD and Colonoscopy 1/9, s/p 3 units of PRBC total.  RRT called 1/18 for new episode of hematemesis and melena s/p 2 uPRBC and 1 uFFP.  Uncontrolled BP. Repeat EGD today with GI.      Plan:  -No acute surgical intervention at this moment.  Will continue to monitor.     -Will f/u EGD today with GI.   -Continue to trend h/h, recommend serial CBC q6hr, monitor for signs of bleeding and transfuse as needed.    -Blood pressure control and medical management and supportive care per ICU team.   -Discussed with Dr Mckeon.

## 2020-01-19 LAB
ALBUMIN SERPL ELPH-MCNC: 1.7 G/DL — LOW (ref 3.3–5)
ALP SERPL-CCNC: 56 U/L — SIGNIFICANT CHANGE UP (ref 40–120)
ALT FLD-CCNC: 15 U/L — SIGNIFICANT CHANGE UP (ref 12–78)
ANION GAP SERPL CALC-SCNC: 10 MMOL/L — SIGNIFICANT CHANGE UP (ref 5–17)
ANION GAP SERPL CALC-SCNC: 10 MMOL/L — SIGNIFICANT CHANGE UP (ref 5–17)
ANION GAP SERPL CALC-SCNC: 6 MMOL/L — SIGNIFICANT CHANGE UP (ref 5–17)
AST SERPL-CCNC: 21 U/L — SIGNIFICANT CHANGE UP (ref 15–37)
BILIRUB SERPL-MCNC: 0.3 MG/DL — SIGNIFICANT CHANGE UP (ref 0.2–1.2)
BUN SERPL-MCNC: 80 MG/DL — HIGH (ref 7–23)
BUN SERPL-MCNC: 86 MG/DL — HIGH (ref 7–23)
BUN SERPL-MCNC: 87 MG/DL — HIGH (ref 7–23)
CALCIUM SERPL-MCNC: 7.3 MG/DL — LOW (ref 8.5–10.1)
CALCIUM SERPL-MCNC: 7.5 MG/DL — LOW (ref 8.5–10.1)
CALCIUM SERPL-MCNC: 7.7 MG/DL — LOW (ref 8.5–10.1)
CHLORIDE SERPL-SCNC: 115 MMOL/L — HIGH (ref 96–108)
CHLORIDE SERPL-SCNC: 115 MMOL/L — HIGH (ref 96–108)
CHLORIDE SERPL-SCNC: 117 MMOL/L — HIGH (ref 96–108)
CO2 SERPL-SCNC: 20 MMOL/L — LOW (ref 22–31)
CO2 SERPL-SCNC: 21 MMOL/L — LOW (ref 22–31)
CO2 SERPL-SCNC: 24 MMOL/L — SIGNIFICANT CHANGE UP (ref 22–31)
CREAT SERPL-MCNC: 3.48 MG/DL — HIGH (ref 0.5–1.3)
CREAT SERPL-MCNC: 3.53 MG/DL — HIGH (ref 0.5–1.3)
CREAT SERPL-MCNC: 3.54 MG/DL — HIGH (ref 0.5–1.3)
GLUCOSE SERPL-MCNC: 124 MG/DL — HIGH (ref 70–99)
GLUCOSE SERPL-MCNC: 86 MG/DL — SIGNIFICANT CHANGE UP (ref 70–99)
GLUCOSE SERPL-MCNC: 88 MG/DL — SIGNIFICANT CHANGE UP (ref 70–99)
HCT VFR BLD CALC: 33.6 % — LOW (ref 34.5–45)
HCT VFR BLD CALC: 34.5 % — SIGNIFICANT CHANGE UP (ref 34.5–45)
HCT VFR BLD CALC: 35.4 % — SIGNIFICANT CHANGE UP (ref 34.5–45)
HGB BLD-MCNC: 11 G/DL — LOW (ref 11.5–15.5)
HGB BLD-MCNC: 11.4 G/DL — LOW (ref 11.5–15.5)
HGB BLD-MCNC: 11.6 G/DL — SIGNIFICANT CHANGE UP (ref 11.5–15.5)
MAGNESIUM SERPL-MCNC: 1.8 MG/DL — SIGNIFICANT CHANGE UP (ref 1.6–2.6)
MAGNESIUM SERPL-MCNC: 2 MG/DL — SIGNIFICANT CHANGE UP (ref 1.6–2.6)
MCHC RBC-ENTMCNC: 27.4 PG — SIGNIFICANT CHANGE UP (ref 27–34)
MCHC RBC-ENTMCNC: 27.4 PG — SIGNIFICANT CHANGE UP (ref 27–34)
MCHC RBC-ENTMCNC: 27.6 PG — SIGNIFICANT CHANGE UP (ref 27–34)
MCHC RBC-ENTMCNC: 32.7 GM/DL — SIGNIFICANT CHANGE UP (ref 32–36)
MCHC RBC-ENTMCNC: 32.8 GM/DL — SIGNIFICANT CHANGE UP (ref 32–36)
MCHC RBC-ENTMCNC: 33 GM/DL — SIGNIFICANT CHANGE UP (ref 32–36)
MCV RBC AUTO: 82.9 FL — SIGNIFICANT CHANGE UP (ref 80–100)
MCV RBC AUTO: 83.6 FL — SIGNIFICANT CHANGE UP (ref 80–100)
MCV RBC AUTO: 84.1 FL — SIGNIFICANT CHANGE UP (ref 80–100)
NRBC # BLD: 0 /100 WBCS — SIGNIFICANT CHANGE UP (ref 0–0)
PHOSPHATE SERPL-MCNC: 5.4 MG/DL — HIGH (ref 2.5–4.5)
PLATELET # BLD AUTO: 127 K/UL — LOW (ref 150–400)
PLATELET # BLD AUTO: 136 K/UL — LOW (ref 150–400)
PLATELET # BLD AUTO: 136 K/UL — LOW (ref 150–400)
POTASSIUM SERPL-MCNC: 2.9 MMOL/L — CRITICAL LOW (ref 3.5–5.3)
POTASSIUM SERPL-MCNC: 3 MMOL/L — LOW (ref 3.5–5.3)
POTASSIUM SERPL-MCNC: 4.3 MMOL/L — SIGNIFICANT CHANGE UP (ref 3.5–5.3)
POTASSIUM SERPL-SCNC: 2.9 MMOL/L — CRITICAL LOW (ref 3.5–5.3)
POTASSIUM SERPL-SCNC: 3 MMOL/L — LOW (ref 3.5–5.3)
POTASSIUM SERPL-SCNC: 4.3 MMOL/L — SIGNIFICANT CHANGE UP (ref 3.5–5.3)
PROT SERPL-MCNC: 5.1 GM/DL — LOW (ref 6–8.3)
RBC # BLD: 4.02 M/UL — SIGNIFICANT CHANGE UP (ref 3.8–5.2)
RBC # BLD: 4.16 M/UL — SIGNIFICANT CHANGE UP (ref 3.8–5.2)
RBC # BLD: 4.21 M/UL — SIGNIFICANT CHANGE UP (ref 3.8–5.2)
RBC # FLD: 19.4 % — HIGH (ref 10.3–14.5)
RBC # FLD: 19.8 % — HIGH (ref 10.3–14.5)
RBC # FLD: 20.9 % — HIGH (ref 10.3–14.5)
SODIUM SERPL-SCNC: 145 MMOL/L — SIGNIFICANT CHANGE UP (ref 135–145)
SODIUM SERPL-SCNC: 146 MMOL/L — HIGH (ref 135–145)
SODIUM SERPL-SCNC: 147 MMOL/L — HIGH (ref 135–145)
WBC # BLD: 11.96 K/UL — HIGH (ref 3.8–10.5)
WBC # BLD: 12.41 K/UL — HIGH (ref 3.8–10.5)
WBC # BLD: 12.45 K/UL — HIGH (ref 3.8–10.5)
WBC # FLD AUTO: 11.96 K/UL — HIGH (ref 3.8–10.5)
WBC # FLD AUTO: 12.41 K/UL — HIGH (ref 3.8–10.5)
WBC # FLD AUTO: 12.45 K/UL — HIGH (ref 3.8–10.5)

## 2020-01-19 PROCEDURE — 99231 SBSQ HOSP IP/OBS SF/LOW 25: CPT

## 2020-01-19 PROCEDURE — 99233 SBSQ HOSP IP/OBS HIGH 50: CPT

## 2020-01-19 RX ORDER — NICARDIPINE HYDROCHLORIDE 30 MG/1
5 CAPSULE, EXTENDED RELEASE ORAL
Qty: 40 | Refills: 0 | Status: DISCONTINUED | OUTPATIENT
Start: 2020-01-19 | End: 2020-01-20

## 2020-01-19 RX ORDER — POTASSIUM CHLORIDE 20 MEQ
40 PACKET (EA) ORAL EVERY 4 HOURS
Refills: 0 | Status: COMPLETED | OUTPATIENT
Start: 2020-01-19 | End: 2020-01-19

## 2020-01-19 RX ORDER — METOPROLOL TARTRATE 50 MG
5 TABLET ORAL EVERY 6 HOURS
Refills: 0 | Status: DISCONTINUED | OUTPATIENT
Start: 2020-01-19 | End: 2020-01-20

## 2020-01-19 RX ORDER — SODIUM CHLORIDE 9 MG/ML
1000 INJECTION, SOLUTION INTRAVENOUS
Refills: 0 | Status: DISCONTINUED | OUTPATIENT
Start: 2020-01-19 | End: 2020-01-20

## 2020-01-19 RX ORDER — POTASSIUM CHLORIDE 20 MEQ
10 PACKET (EA) ORAL
Refills: 0 | Status: DISCONTINUED | OUTPATIENT
Start: 2020-01-19 | End: 2020-01-19

## 2020-01-19 RX ORDER — NICARDIPINE HYDROCHLORIDE 30 MG/1
5 CAPSULE, EXTENDED RELEASE ORAL
Qty: 40 | Refills: 0 | Status: DISCONTINUED | OUTPATIENT
Start: 2020-01-19 | End: 2020-01-19

## 2020-01-19 RX ORDER — HYDRALAZINE HCL 50 MG
20 TABLET ORAL ONCE
Refills: 0 | Status: COMPLETED | OUTPATIENT
Start: 2020-01-19 | End: 2020-01-19

## 2020-01-19 RX ORDER — MAGNESIUM SULFATE 500 MG/ML
1 VIAL (ML) INJECTION ONCE
Refills: 0 | Status: COMPLETED | OUTPATIENT
Start: 2020-01-19 | End: 2020-01-19

## 2020-01-19 RX ADMIN — Medication 0.5 MILLIGRAM(S): at 06:37

## 2020-01-19 RX ADMIN — Medication 1.25 MILLIGRAM(S): at 06:17

## 2020-01-19 RX ADMIN — Medication 1 PATCH: at 19:30

## 2020-01-19 RX ADMIN — SODIUM CHLORIDE 50 MILLILITER(S): 9 INJECTION, SOLUTION INTRAVENOUS at 06:37

## 2020-01-19 RX ADMIN — CHLORHEXIDINE GLUCONATE 1 APPLICATION(S): 213 SOLUTION TOPICAL at 06:11

## 2020-01-19 RX ADMIN — Medication 1 GRAM(S): at 23:00

## 2020-01-19 RX ADMIN — Medication 1 SPRAY(S): at 06:12

## 2020-01-19 RX ADMIN — Medication 5 MILLIGRAM(S): at 23:00

## 2020-01-19 RX ADMIN — Medication 10 MILLIGRAM(S): at 06:10

## 2020-01-19 RX ADMIN — Medication 10 MILLIGRAM(S): at 04:11

## 2020-01-19 RX ADMIN — SODIUM CHLORIDE 50 MILLILITER(S): 9 INJECTION, SOLUTION INTRAVENOUS at 23:00

## 2020-01-19 RX ADMIN — NICARDIPINE HYDROCHLORIDE 25 MG/HR: 30 CAPSULE, EXTENDED RELEASE ORAL at 23:56

## 2020-01-19 RX ADMIN — Medication 10 MILLIGRAM(S): at 14:04

## 2020-01-19 RX ADMIN — Medication 1.25 MILLIGRAM(S): at 22:59

## 2020-01-19 RX ADMIN — PANTOPRAZOLE SODIUM 10 MG/HR: 20 TABLET, DELAYED RELEASE ORAL at 10:08

## 2020-01-19 RX ADMIN — Medication 1 SPRAY(S): at 14:05

## 2020-01-19 RX ADMIN — PANTOPRAZOLE SODIUM 10 MG/HR: 20 TABLET, DELAYED RELEASE ORAL at 21:32

## 2020-01-19 RX ADMIN — Medication 1 GRAM(S): at 00:30

## 2020-01-19 RX ADMIN — Medication 1 GRAM(S): at 06:10

## 2020-01-19 RX ADMIN — Medication 1 GRAM(S): at 12:23

## 2020-01-19 RX ADMIN — Medication 1.25 MILLIGRAM(S): at 11:36

## 2020-01-19 RX ADMIN — Medication 1 SPRAY(S): at 21:32

## 2020-01-19 RX ADMIN — Medication 5 MILLIGRAM(S): at 17:33

## 2020-01-19 RX ADMIN — Medication 1.25 MILLIGRAM(S): at 17:32

## 2020-01-19 RX ADMIN — Medication 10 MILLIGRAM(S): at 21:31

## 2020-01-19 RX ADMIN — Medication 100 GRAM(S): at 08:57

## 2020-01-19 RX ADMIN — Medication 1 PATCH: at 07:55

## 2020-01-19 RX ADMIN — Medication 20 MILLIGRAM(S): at 23:25

## 2020-01-19 RX ADMIN — Medication 1 GRAM(S): at 17:32

## 2020-01-19 RX ADMIN — Medication 1.25 MILLIGRAM(S): at 00:29

## 2020-01-19 RX ADMIN — Medication 40 MILLIEQUIVALENT(S): at 09:04

## 2020-01-19 RX ADMIN — Medication 0.5 MILLIGRAM(S): at 18:21

## 2020-01-19 RX ADMIN — Medication 40 MILLIEQUIVALENT(S): at 14:05

## 2020-01-19 RX ADMIN — Medication 5 MILLIGRAM(S): at 11:36

## 2020-01-19 RX ADMIN — Medication 5 MILLIGRAM(S): at 06:37

## 2020-01-19 RX ADMIN — Medication 25 MICROGRAM(S): at 21:35

## 2020-01-19 NOTE — PROGRESS NOTE ADULT - SUBJECTIVE AND OBJECTIVE BOX
Patient is a 79y old  Female who presents with a chief complaint of sob cough (19 Jan 2020 09:45)  80 y/o female with PMH Alzheimer's, CVA, Depression, CHF, HTN, MI (remote), recent dx of hypothyroidism with GI Bleed, S/P EGD and Colonoscopy 1/9, s/p 3 units of PRBC total.  S/p RRT 1/18 for new episode of hematemesis and melena, upgraded to ICU, s/p 4u PRBC and 1u FFP  H/h stable, still with evidence of GIB   had rectal bleed last night. HB stable. Hypertensive      INTERVAL HPI/OVERNIGHT EVENTS:  PAST MEDICAL & SURGICAL HISTORY:  Hypothyroidism  Constipation  Iron deficiency anemia  Major depressive disorder  Hyperlipidemia  GI bleed  Depression  Alzheimers disease  MI, old  CVA (cerebral vascular accident)  HTN (hypertension)  No significant past surgical history      MEDICATIONS  (STANDING):  buDESOnide    Inhalation Suspension 0.5 milliGRAM(s) Inhalation every 12 hours  chlorhexidine 4% Liquid 1 Application(s) Topical <User Schedule>  cloNIDine Patch 0.1 mG/24Hr(s) 1 patch Transdermal every 7 days  dextrose 5% + sodium chloride 0.45%. 1000 milliLiter(s) (50 mL/Hr) IV Continuous <Continuous>  enalaprilat Injectable 1.25 milliGRAM(s) IV Push every 6 hours  hydrALAZINE Injectable 10 milliGRAM(s) IV Push every 8 hours  levothyroxine Injectable 25 MICROGram(s) IV Push at bedtime  metoprolol tartrate Injectable 5 milliGRAM(s) IV Push every 6 hours  pantoprazole Infusion 8 mG/Hr (10 mL/Hr) IV Continuous <Continuous>  potassium chloride   Powder 40 milliEquivalent(s) Oral every 4 hours  sodium chloride 0.65% Nasal 1 Spray(s) Both Nostrils three times a day  sucralfate suspension 1 Gram(s) Oral four times a day    MEDICATIONS  (PRN):  albuterol/ipratropium for Nebulization 3 milliLiter(s) Nebulizer every 6 hours PRN Shortness of Breath and/or Wheezing  hydrALAZINE Injectable 10 milliGRAM(s) IV Push every 6 hours PRN SBP >170      Allergies    No Known Allergies    Intolerances        REVIEW OF SYSTEMS:  CONSTITUTIONAL: No fever, weight loss, or fatigue  EYES: No eye pain, visual disturbances, or discharge  ENMT:  No difficulty hearing, tinnitus, vertigo; No sinus or throat pain  NECK: No pain or stiffness  BREASTS: No pain, masses, or nipple discharge  RESPIRATORY: No cough, wheezing, chills or hemoptysis; No shortness of breath  CARDIOVASCULAR: No chest pain, palpitations, dizziness, or leg swelling  GASTROINTESTINAL: No abdominal or epigastric pain. No nausea, vomiting, or hematemesis; No diarrhea or constipation. No melena or hematochezia.  GENITOURINARY: No dysuria, frequency, hematuria, or incontinence  NEUROLOGICAL: No headaches, memory loss, loss of strength, numbness, or tremors  SKIN: No itching, burning, rashes, or lesions   LYMPH NODES: No enlarged glands  ENDOCRINE: No heat or cold intolerance; No hair loss  MUSCULOSKELETAL: No joint pain or swelling; No muscle, back, or extremity pain  PSYCHIATRIC: No depression, anxiety, mood swings, or difficulty sleeping  HEME/LYMPH: No easy bruising, or bleeding gums  ALLERY AND IMMUNOLOGIC: No hives or eczema    Vital Signs Last 24 Hrs  T(C): 36.4 (19 Jan 2020 07:30), Max: 38.1 (18 Jan 2020 19:25)  T(F): 97.5 (19 Jan 2020 07:30), Max: 100.5 (18 Jan 2020 19:25)  HR: 75 (19 Jan 2020 10:00) (66 - 107)  BP: 245/76 (18 Jan 2020 23:00) (167/49 - 257/73)  BP(mean): 119 (18 Jan 2020 23:00) (74 - 119)  RR: 10 (19 Jan 2020 10:00) (8 - 23)  SpO2: 100% (19 Jan 2020 10:00) (95% - 100%)    PHYSICAL EXAM:  GENERAL: NAD, well-groomed, well-developed  HEAD:  Atraumatic, Normocephalic  EYES: EOMI, PERRLA, conjunctiva and sclera clear  ENMT: No tonsillar erythema, exudates, or enlargement; Moist mucous membranes, Good dentition, No lesions  NECK: Supple, No JVD, Normal thyroid  NERVOUS SYSTEM:  Alert & Oriented X3, Good concentration; Motor Strength 5/5 B/L upper and lower extremities; DTRs 2+ intact and symmetric  CHEST/LUNG: Clear to percussion bilaterally; No rales, rhonchi, wheezing, or rubs  HEART: Regular rate and rhythm; No murmurs, rubs, or gallops  ABDOMEN: Soft, Nontender, Nondistended; Bowel sounds present  EXTREMITIES:  2+ Peripheral Pulses, No clubbing, cyanosis, or edema  LYMPH: No lymphadenopathy noted  SKIN: No rashes or lesions    LABS:                        11.0   11.96 )-----------( 136      ( 19 Jan 2020 04:20 )             33.6     01-19    145  |  115<H>  |  87<H>  ----------------------------<  86  3.0<L>   |  20<L>  |  3.53<H>    Ca    7.5<L>      19 Jan 2020 04:20  Phos  5.4     01-19  Mg     1.8     01-19    TPro  5.1<L>  /  Alb  1.7<L>  /  TBili  0.3  /  DBili  x   /  AST  21  /  ALT  15  /  AlkPhos  56  01-19      PT/INR - ( 18 Jan 2020 03:02 )   PT: 12.2 sec;   INR: 1.09 ratio             CAPILLARY BLOOD GLUCOSE                    RADIOLOGY & ADDITIONAL TESTS:    Imaging Personally Reviewed:  [ ] YES  [ ] NO    Consultant(s) Notes Reviewed:  [ ] YES  [ ] NO    Care Discussed with Consultants/Other Providers [ ] YES  [ ] NO    Care discussed with family,         [  ]   yes  [  ]  No    imp:    stable[ ]    unstable[  ]     improving [   ]       unchanged  [ x ]                Plans:  Continue present plans  [ x ] for EGD Tuesday. management as per  Critical care. potassium repletion               New consult [  ]   specialty  .......               order test[  ]    test name.                  Discharge Planning  [  ]

## 2020-01-19 NOTE — PROGRESS NOTE ADULT - SUBJECTIVE AND OBJECTIVE BOX
HPI:  Pt is an 78 yo HF with h/o CAD, MI, dCHF, HTN, CKD with solitary kidney, hypothyroidism, Alzheimer's dementia, CVA x2 ,  with residual L weakness,achalasia s/p POEMS (2018) and UGIB presents from rehab for RSV PNA, VRE UTI, CHF exacerbation. Hospital course complicated by acute blood loss anemia 2 to UGIB, acute on CKD and acute respiratory failure requiring intubation. s/p extubation 1/15. Pt never had EGD while in the ICU and pt transferred to [facility] on . Early this am RRT called 2 to hematemesis and melena; pt transfused PRBC + FFP and transferred back to the ICU. Admitting dx: Acute blood loss anemia 2 to UGIB      ## Labs:  CBC:                        11.0   11.96 )-----------( 136      ( 2020 04:20 )             33.6     Chem:      145  |  115<H>  |  87<H>  ----------------------------<  86  3.0<L>   |  20<L>  |  3.53<H>    Ca    7.5<L>      2020 04:20  Phos  5.4       Mg     1.8         TPro  5.1<L>  /  Alb  1.7<L>  /  TBili  0.3  /  DBili  x   /  AST  21  /  ALT  15  /  AlkPhos  56  -    Coags:  PT/INR - ( 2020 03:02 )   PT: 12.2 sec;   INR: 1.09 ratio                 ## Imaging:    ## Medications:    cloNIDine Patch 0.1 mG/24Hr(s) 1 patch Transdermal every 7 days  enalaprilat Injectable 1.25 milliGRAM(s) IV Push every 6 hours  hydrALAZINE Injectable 10 milliGRAM(s) IV Push every 8 hours  hydrALAZINE Injectable 10 milliGRAM(s) IV Push every 6 hours PRN  metoprolol tartrate Injectable 5 milliGRAM(s) IV Push every 6 hours    albuterol/ipratropium for Nebulization 3 milliLiter(s) Nebulizer every 6 hours PRN  buDESOnide    Inhalation Suspension 0.5 milliGRAM(s) Inhalation every 12 hours    levothyroxine Injectable 25 MICROGram(s) IV Push at bedtime      pantoprazole Infusion 8 mG/Hr IV Continuous <Continuous>  sucralfate suspension 1 Gram(s) Oral four times a day        ## Vitals:  T(C): 36.1 (20 @ 11:30), Max: 38.1 (20 @ 19:25)  HR: 71 (20 @ 12:00) (66 - 107)  BP: 245/76 (20 @ 23:00) (167/49 - 257/73)  BP(mean): 119 (20 @ 23:00) (75 - 119)  RR: 15 (20 @ 12:00) (8 - 23)  SpO2: 100% (20 @ 12:00) (95% - 100%)  Wt(kg): --  Vent:   AB-18 @ 07:  -   @ 07:00  --------------------------------------------------------  IN: 290 mL / OUT: 1500 mL / NET: -1210 mL     @ 07:01  -   @ 12:47  --------------------------------------------------------  IN: 400 mL / OUT: 0 mL / NET: 400 mL          ## P/E:  Gen: lying comfortably in bed in no apparent distress  Lungs: L rhonchi  Heart: RRR  Abd: Soft/+BS  Ext: L UE edema  Neuro: Awake/ nonverbal    CENTRAL LINE: [ ] YES [ ] NO  LOCATION:   DATE INSERTED:  REMOVE: [ ] YES [ ] NO      WINTERS: [ ] YES [ ] NO    DATE INSERTED:  REMOVE:  [ ] YES [ ] NO      A-LINE:  [ ] YES [ ] NO  LOCATION:   DATE INSERTED:  REMOVE:  [ ] YES [ ] NO  EXPLAIN:    CODE STATUS: [x] full code  [ ] DNR  [ ] DNI  [ ] MOLST  Goals of care discussion: [ ] yes

## 2020-01-19 NOTE — PROGRESS NOTE ADULT - SUBJECTIVE AND OBJECTIVE BOX
Pt had some bloody stools overnight  H/H stable  Pt is lethargic      MEDICATIONS  (STANDING):  buDESOnide    Inhalation Suspension 0.5 milliGRAM(s) Inhalation every 12 hours  chlorhexidine 4% Liquid 1 Application(s) Topical <User Schedule>  cloNIDine Patch 0.1 mG/24Hr(s) 1 patch Transdermal every 7 days  dextrose 5% + sodium chloride 0.45%. 1000 milliLiter(s) (50 mL/Hr) IV Continuous <Continuous>  enalaprilat Injectable 1.25 milliGRAM(s) IV Push every 6 hours  hydrALAZINE Injectable 10 milliGRAM(s) IV Push every 8 hours  levothyroxine Injectable 25 MICROGram(s) IV Push at bedtime  metoprolol tartrate Injectable 5 milliGRAM(s) IV Push every 6 hours  pantoprazole Infusion 8 mG/Hr (10 mL/Hr) IV Continuous <Continuous>  potassium chloride   Powder 40 milliEquivalent(s) Oral every 4 hours  sodium chloride 0.65% Nasal 1 Spray(s) Both Nostrils three times a day  sucralfate suspension 1 Gram(s) Oral four times a day    MEDICATIONS  (PRN):  albuterol/ipratropium for Nebulization 3 milliLiter(s) Nebulizer every 6 hours PRN Shortness of Breath and/or Wheezing  hydrALAZINE Injectable 10 milliGRAM(s) IV Push every 6 hours PRN SBP >170      Allergies    No Known Allergies    Intolerances        Vital Signs Last 24 Hrs  T(C): 36.4 (19 Jan 2020 07:30), Max: 38.1 (18 Jan 2020 19:25)  T(F): 97.5 (19 Jan 2020 07:30), Max: 100.5 (18 Jan 2020 19:25)  HR: 75 (19 Jan 2020 10:00) (66 - 107)  BP: 245/76 (18 Jan 2020 23:00) (167/49 - 257/73)  BP(mean): 119 (18 Jan 2020 23:00) (74 - 119)  RR: 10 (19 Jan 2020 10:00) (8 - 23)  SpO2: 100% (19 Jan 2020 10:00) (95% - 100%)    PHYSICAL EXAM:  General: NAD.  CVS: S1, S2  Chest: air entry bilaterally present  Abd: BS present, soft, non-tender      LABS:                        11.0   11.96 )-----------( 136      ( 19 Jan 2020 04:20 )             33.6     01-19    145  |  115<H>  |  87<H>  ----------------------------<  86  3.0<L>   |  20<L>  |  3.53<H>    Ca    7.5<L>      19 Jan 2020 04:20  Phos  5.4     01-19  Mg     1.8     01-19    TPro  5.1<L>  /  Alb  1.7<L>  /  TBili  0.3  /  DBili  x   /  AST  21  /  ALT  15  /  AlkPhos  56  01-19    PT/INR - ( 18 Jan 2020 03:02 )   PT: 12.2 sec;   INR: 1.09 ratio           follow CBC  for tentative EGd on Tuesday

## 2020-01-19 NOTE — PROGRESS NOTE ADULT - SUBJECTIVE AND OBJECTIVE BOX
Patient seen and examined bedside in ICU,   Systolic BP >200 overnight, s/p a line insertion for closer monitoring  +bloody stools x 2 overnight per RN  Protonix gtt running    T(F): 97.7 (01-19-20 @ 04:01), Max: 100.5 (01-18-20 @ 19:25)  HR: 80 (01-19-20 @ 06:38) (66 - 107)  BP: 245/76 (01-18-20 @ 23:00) (167/49 - 269/89)  RR: 11 (01-19-20 @ 06:00) (10 - 23)  SpO2: 100% (01-19-20 @ 06:38) (95% - 100%)      PHYSICAL EXAM:  General: somnolent, NAD  HEENT: NGT to LWS, output 400cc  CV: +S1+S2   Lung: good air entry  Abdomen: soft, NTND. Normoactive BS.  Extremities: no pedal edema or calf tenderness noted   : primafit indwelling, urine output 900cc    LABS:                        11.0   11.96 )-----------( 136      ( 19 Jan 2020 04:20 )             33.6     01-19    145  |  115<H>  |  87<H>  ----------------------------<  86  3.0<L>   |  20<L>  |  3.53<H>    Ca    7.5<L>      19 Jan 2020 04:20  Phos  5.4     01-19  Mg     1.8     01-19    TPro  5.1<L>  /  Alb  1.7<L>  /  TBili  0.3  /  DBili  x   /  AST  21  /  ALT  15  /  AlkPhos  56  01-19    PT/INR - ( 18 Jan 2020 03:02 )   PT: 12.2 sec;   INR: 1.09 ratio         Culture Results:   Moderate Methicillin resistant Staphylococcus aureus  Normal Respiratory Kayce absent (01-13 @ 17:08)         Impression: 78 y/o female with PMH Alzheimer's, CVA, Depression, CHF, HTN, MI (remote), recent dx of hypothyroidism with GI Bleed, S/P EGD and Colonoscopy 1/9, s/p 3 units of PRBC total.  S/p RRT 1/18 for new episode of hematemesis and melena, upgraded to ICU, s/p 2 uPRBC and 1 uFFP  H/h stable, still with evidence of GIB    Plan:  -EGD scheduled for Tuesday per GI.   -Continue ICU management and supportive care, serial CBCs, transfuse as needed. Antihypertensives resumed per ICU  -will discuss with surgical attending, will continue to follow Patient seen and examined bedside in ICU,   Systolic BP >200 overnight, s/p a line insertion for closer monitoring  +bloody stools x 2 overnight per RN  Protonix gtt running    T(F): 97.7 (01-19-20 @ 04:01), Max: 100.5 (01-18-20 @ 19:25)  HR: 80 (01-19-20 @ 06:38) (66 - 107)  BP: 245/76 (01-18-20 @ 23:00) (167/49 - 269/89)  RR: 11 (01-19-20 @ 06:00) (10 - 23)  SpO2: 100% (01-19-20 @ 06:38) (95% - 100%)    PHYSICAL EXAM:  General: somnolent, NAD  HEENT: NGT to LWS, output 400cc  CV: +S1+S2   Lung: good air entry  Abdomen: soft, NTND. Normoactive BS.  Extremities: no pedal edema or calf tenderness noted   : primafit indwelling, urine output 900cc    LABS:                        11.0   11.96 )-----------( 136      ( 19 Jan 2020 04:20 )             33.6     01-19    145  |  115<H>  |  87<H>  ----------------------------<  86  3.0<L>   |  20<L>  |  3.53<H>    Ca    7.5<L>      19 Jan 2020 04:20  Phos  5.4     01-19  Mg     1.8     01-19    TPro  5.1<L>  /  Alb  1.7<L>  /  TBili  0.3  /  DBili  x   /  AST  21  /  ALT  15  /  AlkPhos  56  01-19    PT/INR - ( 18 Jan 2020 03:02 )   PT: 12.2 sec;   INR: 1.09 ratio         Culture Results:   Moderate Methicillin resistant Staphylococcus aureus  Normal Respiratory Kayce absent (01-13 @ 17:08)       Impression: 78 y/o female with PMH Alzheimer's, CVA, Depression, CHF, HTN, MI (remote), recent dx of hypothyroidism with GI Bleed, S/P EGD and Colonoscopy 1/9, s/p 3 units of PRBC total.  S/p RRT 1/18 for new episode of hematemesis and melena, upgraded to ICU, s/p 4u PRBC and 1u FFP  H/h stable, still with evidence of GIB    Plan:  -EGD scheduled for Tuesday per GI.   -Continue ICU management and supportive care, serial CBCs, transfuse as needed. Antihypertensives resumed per ICU  -discussed with Dr Chaparro, will continue to follow

## 2020-01-19 NOTE — PROGRESS NOTE ADULT - SUBJECTIVE AND OBJECTIVE BOX
lying in bed, remains in ICU	    Vital Signs Last 24 Hrs  T(C): 36.4 (19 Jan 2020 07:30), Max: 38.1 (18 Jan 2020 19:25)  T(F): 97.5 (19 Jan 2020 07:30), Max: 100.5 (18 Jan 2020 19:25)  HR: 79 (19 Jan 2020 09:00) (66 - 107)  BP: 245/76 (18 Jan 2020 23:00) (167/49 - 257/73)  BP(mean): 119 (18 Jan 2020 23:00) (74 - 119)  RR: 12 (19 Jan 2020 09:00) (8 - 23)  SpO2: 100% (19 Jan 2020 09:00) (95% - 100%)    PHYSICAL EXAM:    general - weak looking  HEENT - NGT +  CVS - RRR  RS - AE B/L  Abd - soft, NT  Ext - Pulses +        LABS:                        11.0   11.96 )-----------( 136      ( 19 Jan 2020 04:20 )             33.6     01-19    145  |  115<H>  |  87<H>  ----------------------------<  86  3.0<L>   |  20<L>  |  3.53<H>    Ca    7.5<L>      19 Jan 2020 04:20  Phos  5.4     01-19  Mg     1.8     01-19    TPro  5.1<L>  /  Alb  1.7<L>  /  TBili  0.3  /  DBili  x   /  AST  21  /  ALT  15  /  AlkPhos  56  01-19    PT/INR - ( 18 Jan 2020 03:02 )   PT: 12.2 sec;   INR: 1.09 ratio               Culture - Sputum (collected 13 Jan 2020 17:08)  Source: .Sputum Sputum  Gram Stain (15 Ousmane 2020 16:59):    Few Squamous epithelial cells per low power field    Few polymorphonuclear leukocytes per low power field    Rare Yeast like cells per oil power field    Few Gram variable coccobacilli per oil power field  Final Report (15 Ousmane 2020 16:59):    Moderate Methicillin resistant Staphylococcus aureus    Normal Respiratory Kayce absent  Organism: Methicillin resistant Staphylococcus aureus (15 Ousmane 2020 16:59)  Organism: Methicillin resistant Staphylococcus aureus (15 Ousmane 2020 16:59)        RADIOLOGY & ADDITIONAL STUDIES:

## 2020-01-19 NOTE — PROGRESS NOTE ADULT - ATTENDING COMMENTS
I saw and examined the patient at bedside and agree with the findings and assessment and plan. No acute surgical intervention at this time, awaiting EGD by GI. Continue care as per ICU.

## 2020-01-19 NOTE — PROGRESS NOTE ADULT - SUBJECTIVE AND OBJECTIVE BOX
79 years old female from [facility] rehab c/o coughing sob for three to four days. Pt is sent here to rule out chf vs pneumonia. Pt is alert and oriented to person only unable to give detail hx due to hx of Alzheimer according to the [facility] pt had sat of 91 to 92 %,	    PAST MEDICAL/SURGICAL/FAMILY/SOCIAL HISTORY:    Past Medical History:  Alzheimers disease    CVA (cerebral vascular accident)    Depression    HTN (hypertension)    MI, old.  recently Dx c hypothyroidism, TSH > 111 started Levothyroxin 50mcg qd as per Dr Vigil (2020 11:41)      Chief Complaint:  Patient is a 79y old  Female who presents with a chief complaint of sob cough (2020 22:56)      Review of Systems:    General:  No wt loss, fevers, chills, night sweats  Eyes:  Good vision, no reported pain  ENT:  No sore throat, pain, runny nose, dysphagia  CV:  No pain, palpitations, hypo/hypertension  Resp:  dyspnea, cough  GI:  No pain, nausea, vomiting, diarrhea, constipation           Social History/Family History  SOCHX:   tobacco,  -  alcohol    FMHX: FA/MO  - contributory       Discussed with:  PMD, Family    Physical Exam:    Vital Signs:  Vital Signs Last 24 Hrs  T(C): 36.2 (2020 17:06), Max: 36.9 (2020 00:18)  T(F): 97.2 (2020 17:06), Max: 98.5 (2020 00:18)  HR: 74 (2020 21:55) (68 - 87)  BP: 179/78 (2020 17:06) (132/71 - 179/78)  BP(mean): --  RR: 18 (2020 17:06) (18 - 18)  SpO2: 97% (2020 21:55) (94% - 98%)  Daily     Daily Weight in k.8 (2020 04:59)  I&O's Summary    2020 07:01  -  2020 07:00  --------------------------------------------------------  IN: 560 mL / OUT: 0 mL / NET: 560 mL          Chest:  fair air entry bilateral  Cardiovascular:  Regular rhythm, S1, S2, no murmur/rub/S3/S4, no carotid/femoral/abdominal bruit, radial/pedal pulses 2+,   Abdomen:  Soft, non-tender, non-distended, normoactive bowel sounds, no HSM        Laboratory:          138  |  104  |  69<H>  ----------------------------<  111<H>  3.8   |  25  |  3.77<H>    Ca    8.1<L>      2020 07:22          Assessment:  I am asked to assist this patient admitted with shortness of breath  The patient has multifactorial causes of shortness of breath, renal insufficiency, anemia  And now after a diagnostic echocardiogram within normal ejection fraction the reading shows mitral regurgitation and tricuspid regurgitation  After review of the studies this regurgitation is not considered severe but in the moderate to severe but more moderate range  upper GI bleed  GI noted,  EGD   optimize systolic blood pressure  PRBCs  potential EGD on Tuesday per GI

## 2020-01-19 NOTE — PROGRESS NOTE ADULT - SUBJECTIVE AND OBJECTIVE BOX
INTERVAL HPI:  79 year female with HTN, HLD, MI hx, CVA, Gi bleed, Anemia Depression and Alzheimer's dementia.  Sent from [facility] due to SOB. Possibility of pneumonia vs CHF was raised.   In ED with Respiratory distress required BIPAP support and RVP positive for RSV. Pt not able to provide more details due to dementia. Information from transfer papers and ED physician.  Nods no when asked for smoking.  01/09/20: With massive GI bleed, intubated and transferred to ICU.  01/09/20:  EGD+ Colonoscopy.  01/15/20:  Extubated.  01/16/20:  Out of ICU  01/18/20:  Back to ICU for upper GI bleed and significant drop in H & H.    OVERNIGHT EVENTS:  In ICU, resting.    Vital Signs Last 24 Hrs  T(C): 36.3 (19 Jan 2020 15:35), Max: 37.8 (18 Jan 2020 20:20)  T(F): 97.3 (19 Jan 2020 15:35), Max: 100.1 (18 Jan 2020 20:20)  HR: 77 (19 Jan 2020 19:10) (64 - 107)  BP: 245/76 (18 Jan 2020 23:00) (193/54 - 257/73)  BP(mean): 119 (18 Jan 2020 23:00) (87 - 119)  RR: 13 (19 Jan 2020 19:10) (8 - 23)  SpO2: 100% (19 Jan 2020 19:10) (95% - 100%)    PHYSICAL EXAM:  GEN:         Resting, comfortablecomfortable.  HEENT:    Normal.    RESP:       no distress    CVS:          Regular rate and rhythm.   ABD:         Soft, non-tender, non-distended;     MEDICATIONS  (STANDING):  buDESOnide    Inhalation Suspension 0.5 milliGRAM(s) Inhalation every 12 hours  chlorhexidine 4% Liquid 1 Application(s) Topical <User Schedule>  cloNIDine Patch 0.1 mG/24Hr(s) 1 patch Transdermal every 7 days  dextrose 5% + sodium chloride 0.45%. 1000 milliLiter(s) (50 mL/Hr) IV Continuous <Continuous>  enalaprilat Injectable 1.25 milliGRAM(s) IV Push every 6 hours  hydrALAZINE Injectable 10 milliGRAM(s) IV Push every 8 hours  levothyroxine Injectable 25 MICROGram(s) IV Push at bedtime  metoprolol tartrate Injectable 5 milliGRAM(s) IV Push every 6 hours  pantoprazole Infusion 8 mG/Hr (10 mL/Hr) IV Continuous <Continuous>  sodium chloride 0.65% Nasal 1 Spray(s) Both Nostrils three times a day  sucralfate suspension 1 Gram(s) Oral four times a day    MEDICATIONS  (PRN):  albuterol/ipratropium for Nebulization 3 milliLiter(s) Nebulizer every 6 hours PRN Shortness of Breath and/or Wheezing  hydrALAZINE Injectable 10 milliGRAM(s) IV Push every 6 hours PRN SBP >170    LABS:                        11.6   12.41 )-----------( 136      ( 19 Jan 2020 16:07 )             35.4     01-19    147<H>  |  117<H>  |  80<H>  ----------------------------<  124<H>  4.3   |  24  |  3.54<H>    Ca    7.7<L>      19 Jan 2020 16:07  Phos  5.4     01-19  Mg     2.0     01-19    TPro  5.1<L>  /  Alb  1.7<L>  /  TBili  0.3  /  DBili  x   /  AST  21  /  ALT  15  /  AlkPhos  56  01-19    PT/INR - ( 18 Jan 2020 03:02 )   PT: 12.2 sec;   INR: 1.09 ratio       ASSESSMENT AND PLAN:  ·	S/P Acute hypoxic  Respiratory failure .  ·	RSV tracheobronchitis.  ·	Left pleural effusion.  ·	Hypothyroidism with very high TSH.  ·	Anemia.  ·	VRE UTI  ·	Leukocytosis.  ·	Renal Insuffiencey.  ·	CVA by history.  ·	HTN.  ·	HLD.  ·	Alzheimer's dementia.  ·	GI bleed.    Continue Protonix and Carafate.  Continue O2 and Symbicort.  Follow H & H.

## 2020-01-19 NOTE — PROGRESS NOTE ADULT - SUBJECTIVE AND OBJECTIVE BOX
Subjective: no change in status. Lethargic this am.       MEDICATIONS  (STANDING):  buDESOnide    Inhalation Suspension 0.5 milliGRAM(s) Inhalation every 12 hours  chlorhexidine 4% Liquid 1 Application(s) Topical <User Schedule>  cloNIDine Patch 0.1 mG/24Hr(s) 1 patch Transdermal every 7 days  dextrose 5% + sodium chloride 0.45%. 1000 milliLiter(s) (50 mL/Hr) IV Continuous <Continuous>  enalaprilat Injectable 1.25 milliGRAM(s) IV Push every 6 hours  hydrALAZINE Injectable 10 milliGRAM(s) IV Push every 8 hours  levothyroxine Injectable 25 MICROGram(s) IV Push at bedtime  magnesium sulfate  IVPB 1 Gram(s) IV Intermittent once  metoprolol tartrate Injectable 5 milliGRAM(s) IV Push every 6 hours  pantoprazole Infusion 8 mG/Hr (10 mL/Hr) IV Continuous <Continuous>  potassium chloride   Powder 40 milliEquivalent(s) Oral every 4 hours  sodium chloride 0.65% Nasal 1 Spray(s) Both Nostrils three times a day  sucralfate suspension 1 Gram(s) Oral four times a day    MEDICATIONS  (PRN):  albuterol/ipratropium for Nebulization 3 milliLiter(s) Nebulizer every 6 hours PRN Shortness of Breath and/or Wheezing  hydrALAZINE Injectable 10 milliGRAM(s) IV Push every 6 hours PRN SBP >170          T(C): 36.5 (20 @ 04:01), Max: 38.1 (20 @ 19:25)  HR: 70 (20 @ 07:30) (66 - 107)  BP: 245/76 (20 @ 23:00) (167/49 - 257/73)  RR: 8 (20 @ 07:30) (8 - 23)  SpO2: 100% (20 @ 07:30) (95% - 100%)  Wt(kg): --        I&O's Detail    2020 07:01  -  2020 07:00  --------------------------------------------------------  IN:    dextrose 5% + sodium chloride 0.45%.: 50 mL    pantoprazole Infusion: 240 mL  Total IN: 290 mL    OUT:    Incontinent per Collection Ba mL    Nasoenteral Tube: 400 mL  Total OUT: 1500 mL    Total NET: -1210 mL               PHYSICAL EXAM:    GENERAL: NAD  EYES: EOMI, PERRLA, conjunctiva and sclera clear  NECK: Supple, pos inc in JVP  CHEST/LUNG: exp wheezes  HEART: S1S2  ABDOMEN: Soft, Nontender, Nondistended; Bowel sounds present  EXTREMITIES:  no edema.         LABS:  CBC Full  -  ( 2020 04:20 )  WBC Count : 11.96 K/uL  RBC Count : 4.02 M/uL  Hemoglobin : 11.0 g/dL  Hematocrit : 33.6 %  Platelet Count - Automated : 136 K/uL  Mean Cell Volume : 83.6 fl  Mean Cell Hemoglobin : 27.4 pg  Mean Cell Hemoglobin Concentration : 32.7 gm/dL  Auto Neutrophil # : x  Auto Lymphocyte # : x  Auto Monocyte # : x  Auto Eosinophil # : x  Auto Basophil # : x  Auto Neutrophil % : x  Auto Lymphocyte % : x  Auto Monocyte % : x  Auto Eosinophil % : x  Auto Basophil % : x    -    145  |  115<H>  |  87<H>  ----------------------------<  86  3.0<L>   |  20<L>  |  3.53<H>    Ca    7.5<L>      2020 04:20  Phos  5.4       Mg     1.8         TPro  5.1<L>  /  Alb  1.7<L>  /  TBili  0.3  /  DBili  x   /  AST  21  /  ALT  15  /  AlkPhos  56  -    PT/INR - ( 2020 03:02 )   PT: 12.2 sec;   INR: 1.09 ratio               Impression:  JONY - suspected ischemic ATN. 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 Cresencio Cr 1.8 2019   Severe pulm HTN, TR  Hx of POEM?      Recommendations:   Keep -150  Monitor Cr daily  Avoid nephrotoxins.   Diuretics PRN increase WOB, oliguria

## 2020-01-19 NOTE — PROGRESS NOTE ADULT - ASSESSMENT
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 Hospital course complicated by acute blood loss anemia 2 to UGIB, acute on CKD and acute respiratory failure requiring intubation. s/p extubation 1/15. Pt never had EGD while in the ICU and pt transferred to [facility] on 1/16. Early this am RRT called 2 to hematemesis and melena; pt transfused PRBC + FFP and transferred back to the ICU. Admitting dx: Acute blood loss anemia 2 to UGIB    Resp: Supplemental O2 prn   CVS: Cont antiHTN meds (BP labile at times very elevated and then decreases); avoid extreme increased BP 2 to UGIB  Heme: Trend Hb (stable in the 11 range)  FEN/GI: NPO/ IVF/ GI f/u for EGD/ PPI drip/ Pt hypokalemic; replace K  Endo: Cont Synthroid  Renal: Cr in the mid 3 range/Follow BUN/Cr and UO/ F/u as per Renal

## 2020-01-20 LAB
ANION GAP SERPL CALC-SCNC: 7 MMOL/L — SIGNIFICANT CHANGE UP (ref 5–17)
BUN SERPL-MCNC: 83 MG/DL — HIGH (ref 7–23)
CALCIUM SERPL-MCNC: 7.8 MG/DL — LOW (ref 8.5–10.1)
CHLORIDE SERPL-SCNC: 116 MMOL/L — HIGH (ref 96–108)
CO2 SERPL-SCNC: 22 MMOL/L — SIGNIFICANT CHANGE UP (ref 22–31)
CREAT SERPL-MCNC: 3.46 MG/DL — HIGH (ref 0.5–1.3)
GLUCOSE SERPL-MCNC: 130 MG/DL — HIGH (ref 70–99)
HCT VFR BLD CALC: 34.7 % — SIGNIFICANT CHANGE UP (ref 34.5–45)
HGB BLD-MCNC: 11.1 G/DL — LOW (ref 11.5–15.5)
MAGNESIUM SERPL-MCNC: 2 MG/DL — SIGNIFICANT CHANGE UP (ref 1.6–2.6)
MCHC RBC-ENTMCNC: 27.3 PG — SIGNIFICANT CHANGE UP (ref 27–34)
MCHC RBC-ENTMCNC: 32 GM/DL — SIGNIFICANT CHANGE UP (ref 32–36)
MCV RBC AUTO: 85.3 FL — SIGNIFICANT CHANGE UP (ref 80–100)
NRBC # BLD: 0 /100 WBCS — SIGNIFICANT CHANGE UP (ref 0–0)
PHOSPHATE SERPL-MCNC: 5 MG/DL — HIGH (ref 2.5–4.5)
PLATELET # BLD AUTO: 137 K/UL — LOW (ref 150–400)
POTASSIUM SERPL-MCNC: 3.8 MMOL/L — SIGNIFICANT CHANGE UP (ref 3.5–5.3)
POTASSIUM SERPL-SCNC: 3.8 MMOL/L — SIGNIFICANT CHANGE UP (ref 3.5–5.3)
RBC # BLD: 4.07 M/UL — SIGNIFICANT CHANGE UP (ref 3.8–5.2)
RBC # FLD: 21.1 % — HIGH (ref 10.3–14.5)
SODIUM SERPL-SCNC: 145 MMOL/L — SIGNIFICANT CHANGE UP (ref 135–145)
WBC # BLD: 11.86 K/UL — HIGH (ref 3.8–10.5)
WBC # FLD AUTO: 11.86 K/UL — HIGH (ref 3.8–10.5)

## 2020-01-20 PROCEDURE — 99233 SBSQ HOSP IP/OBS HIGH 50: CPT

## 2020-01-20 RX ORDER — CARVEDILOL PHOSPHATE 80 MG/1
6.25 CAPSULE, EXTENDED RELEASE ORAL ONCE
Refills: 0 | Status: COMPLETED | OUTPATIENT
Start: 2020-01-20 | End: 2020-01-20

## 2020-01-20 RX ORDER — HYDRALAZINE HCL 50 MG
50 TABLET ORAL EVERY 6 HOURS
Refills: 0 | Status: DISCONTINUED | OUTPATIENT
Start: 2020-01-20 | End: 2020-01-27

## 2020-01-20 RX ORDER — LABETALOL HCL 100 MG
10 TABLET ORAL ONCE
Refills: 0 | Status: COMPLETED | OUTPATIENT
Start: 2020-01-20 | End: 2020-01-20

## 2020-01-20 RX ORDER — CARVEDILOL PHOSPHATE 80 MG/1
12.5 CAPSULE, EXTENDED RELEASE ORAL EVERY 12 HOURS
Refills: 0 | Status: DISCONTINUED | OUTPATIENT
Start: 2020-01-20 | End: 2020-01-30

## 2020-01-20 RX ORDER — CARVEDILOL PHOSPHATE 80 MG/1
6.25 CAPSULE, EXTENDED RELEASE ORAL EVERY 12 HOURS
Refills: 0 | Status: DISCONTINUED | OUTPATIENT
Start: 2020-01-20 | End: 2020-01-20

## 2020-01-20 RX ORDER — AMLODIPINE BESYLATE 2.5 MG/1
10 TABLET ORAL DAILY
Refills: 0 | Status: DISCONTINUED | OUTPATIENT
Start: 2020-01-20 | End: 2020-01-27

## 2020-01-20 RX ORDER — HYDRALAZINE HCL 50 MG
10 TABLET ORAL ONCE
Refills: 0 | Status: COMPLETED | OUTPATIENT
Start: 2020-01-20 | End: 2020-01-20

## 2020-01-20 RX ADMIN — Medication 10 MILLIGRAM(S): at 17:07

## 2020-01-20 RX ADMIN — Medication 1.25 MILLIGRAM(S): at 05:53

## 2020-01-20 RX ADMIN — Medication 0.5 MILLIGRAM(S): at 05:07

## 2020-01-20 RX ADMIN — Medication 10 MILLIGRAM(S): at 13:49

## 2020-01-20 RX ADMIN — Medication 1 GRAM(S): at 05:53

## 2020-01-20 RX ADMIN — Medication 1 SPRAY(S): at 05:53

## 2020-01-20 RX ADMIN — Medication 1 SPRAY(S): at 13:50

## 2020-01-20 RX ADMIN — PANTOPRAZOLE SODIUM 10 MG/HR: 20 TABLET, DELAYED RELEASE ORAL at 07:45

## 2020-01-20 RX ADMIN — Medication 1 GRAM(S): at 23:07

## 2020-01-20 RX ADMIN — Medication 10 MILLIGRAM(S): at 22:02

## 2020-01-20 RX ADMIN — Medication 5 MILLIGRAM(S): at 05:52

## 2020-01-20 RX ADMIN — CHLORHEXIDINE GLUCONATE 1 APPLICATION(S): 213 SOLUTION TOPICAL at 11:55

## 2020-01-20 RX ADMIN — Medication 1.25 MILLIGRAM(S): at 12:06

## 2020-01-20 RX ADMIN — Medication 0.5 MILLIGRAM(S): at 18:18

## 2020-01-20 RX ADMIN — AMLODIPINE BESYLATE 10 MILLIGRAM(S): 2.5 TABLET ORAL at 15:04

## 2020-01-20 RX ADMIN — Medication 50 MILLIGRAM(S): at 23:07

## 2020-01-20 RX ADMIN — PANTOPRAZOLE SODIUM 10 MG/HR: 20 TABLET, DELAYED RELEASE ORAL at 17:07

## 2020-01-20 RX ADMIN — Medication 10 MILLIGRAM(S): at 05:53

## 2020-01-20 RX ADMIN — Medication 50 MILLIGRAM(S): at 15:04

## 2020-01-20 RX ADMIN — Medication 25 MICROGRAM(S): at 21:09

## 2020-01-20 RX ADMIN — Medication 1 GRAM(S): at 17:08

## 2020-01-20 RX ADMIN — SODIUM CHLORIDE 50 MILLILITER(S): 9 INJECTION, SOLUTION INTRAVENOUS at 17:07

## 2020-01-20 RX ADMIN — Medication 1 SPRAY(S): at 21:10

## 2020-01-20 RX ADMIN — Medication 10 MILLIGRAM(S): at 11:45

## 2020-01-20 RX ADMIN — CARVEDILOL PHOSPHATE 6.25 MILLIGRAM(S): 80 CAPSULE, EXTENDED RELEASE ORAL at 22:02

## 2020-01-20 RX ADMIN — CARVEDILOL PHOSPHATE 6.25 MILLIGRAM(S): 80 CAPSULE, EXTENDED RELEASE ORAL at 19:57

## 2020-01-20 RX ADMIN — Medication 1 GRAM(S): at 12:06

## 2020-01-20 RX ADMIN — Medication 5 MILLIGRAM(S): at 12:06

## 2020-01-20 RX ADMIN — Medication 1 PATCH: at 07:12

## 2020-01-20 NOTE — PROGRESS NOTE ADULT - SUBJECTIVE AND OBJECTIVE BOX
Patient seen and examined at bedside in ICU.    Per ICU PA, no hematemesis or melena overnight.    Protonix gtt.    Systolic BP improved (147/42).    NGT clamped.      Vital Signs Last 24 Hrs  T(F): 98.7 (20 @ 23:30), Max: 98.7 (20 @ 23:30)  HR: 68 (20 @ 04:00)  RR: 14 (20 @ 04:00)  SpO2: 100% (20 @ 04:00)      GENERAL: Somnolent, NAD, responds to stimuli  HEENT: NGT clamped, gastric output in canister   CHEST/LUNG: Respirations nonlabored  HEART: S1S2, Regular rate and rhythm   ABDOMEN: +Bowel sounds, soft, Nontender, Nondistended  : Primafit draining 300cc/24 hr clear yellow urine   EXTREMITIES:  no calf tenderness, No edema    I&O's Detail    2020 07:01  -  2020 07:00  --------------------------------------------------------  IN:    dextrose 5% + sodium chloride 0.45%.: 50 mL    pantoprazole Infusion: 240 mL  Total IN: 290 mL    OUT:    Incontinent per Collection Ba mL    Nasoenteral Tube: 400 mL  Total OUT: 1500 mL    Total NET: -1210 mL      2020 07:01  -  2020 04:49  --------------------------------------------------------  IN:    dextrose 5% + sodium chloride 0.45%.: 1050 mL    niCARdipine Infusion: 25 mL    pantoprazole Infusion: 210 mL    Solution: 100 mL  Total IN: 1385 mL    OUT:    Incontinent per Collection Ba mL  Total OUT: 300 mL    Total NET: 1085 mL          LABS:                        11.1   11.86 )-----------( 137      ( 2020 02:48 )             34.7     0120    145  |  116<H>  |  83<H>  ----------------------------<  130<H>  3.8   |  22  |  3.46<H>    Ca    7.8<L>      2020 02:48  Phos  5.0     01-  Mg     2.0     -    TPro  5.1<L>  /  Alb  1.7<L>  /  TBili  0.3  /  DBili  x   /  AST  21  /  ALT  15  /  AlkPhos  56          Impression: 80 y/o female with PMH Alzheimer's, CVA, Depression, CHF, HTN, MI (remote), recent dx of hypothyroidism with GI Bleed, S/P EGD and Colonoscopy , s/p 3 units of PRBC total.  S/p RRT  for new episode of hematemesis and melena, upgraded to ICU, s/p 4u PRBC and 1u FFP.    H/h remains stable today, no bleeding overnight.      Plan:  -EGD scheduled for Tuesday per GI.   -Continue ICU management, BP control, and supportive care  -Recommend serial CBCs, transfuse as needed.   -Will continue to follow.    -Will discuss with Dr. Chaparro

## 2020-01-20 NOTE — PROGRESS NOTE ADULT - SUBJECTIVE AND OBJECTIVE BOX
Patient is a 79y old  Female who presents with a chief complaint of sob cough (20 Jan 2020 17:17)      HPI:  · HPI Objective Statement: 79 years old female from [facility] rehab c/o coughing sob for three to four days. Pt is sent here to rule out chf vs pneumonia. Pt is alert and oriented to person only unable to give detail hx due to hx of Alzheimer according to the [facility] pt had sat of 91 to 92 %,	    PAST MEDICAL/SURGICAL/FAMILY/SOCIAL HISTORY:    Past Medical History:  Alzheimers disease    CVA (cerebral vascular accident)    Depression    HTN (hypertension)    MI, old.  recently Dx c hypothyroidism, TSH > 111 started Levothyroxin 50mcg qd as per Dr Vigil (02 Jan 2020 11:41)      INTERVAL HPI/OVERNIGHT EVENTS:  Patient seen earlier today.  ICU #7  The nurse denies  hematemesis, nausea, vomiting, abdominal pain, constipation, diarrhea, or change in bowel movements     MEDICATIONS  (STANDING):  amLODIPine   Tablet 10 milliGRAM(s) Oral daily  buDESOnide    Inhalation Suspension 0.5 milliGRAM(s) Inhalation every 12 hours  chlorhexidine 4% Liquid 1 Application(s) Topical <User Schedule>  hydrALAZINE 50 milliGRAM(s) Oral every 6 hours  levothyroxine Injectable 25 MICROGram(s) IV Push at bedtime  pantoprazole Infusion 8 mG/Hr (10 mL/Hr) IV Continuous <Continuous>  sodium chloride 0.65% Nasal 1 Spray(s) Both Nostrils three times a day  sucralfate suspension 1 Gram(s) Oral four times a day    MEDICATIONS  (PRN):  albuterol/ipratropium for Nebulization 3 milliLiter(s) Nebulizer every 6 hours PRN Shortness of Breath and/or Wheezing  hydrALAZINE Injectable 10 milliGRAM(s) IV Push every 6 hours PRN SBP >170      FAMILY HISTORY:  Family history of essential hypertension (Child)  Family history of stroke      Allergies    No Known Allergies    Intolerances        PMH/PSH:  Hypothyroidism  Constipation  Iron deficiency anemia  Major depressive disorder  Hyperlipidemia  GI bleed  Depression  Alzheimers disease  MI, old  CVA (cerebral vascular accident)  HTN (hypertension)  No pertinent past medical history  No significant past surgical history        REVIEW OF SYSTEMS: Uncommunicative  CONSTITUTIONAL: No fever, weight loss, or fatigue  EYES: No eye pain, visual disturbances, or discharge  ENMT:  No difficulty hearing, tinnitus, vertigo; No sinus or throat pain  NECK: No pain or stiffness  BREASTS: No pain, masses, or nipple discharge  RESPIRATORY: No cough, wheezing, chills or hemoptysis; No shortness of breath  CARDIOVASCULAR: No chest pain, palpitations, dizziness, or leg swelling  GASTROINTESTINAL: See above  GENITOURINARY: No dysuria, frequency, hematuria, or incontinence  NEUROLOGICAL: No headaches, memory loss, loss of strength, numbness, or tremors  SKIN: No itching, burning, rashes, or lesions   LYMPH NODES: No enlarged glands  ENDOCRINE: No heat or cold intolerance; No hair loss  MUSCULOSKELETAL: No joint pain or swelling; No muscle, back, or extremity pain  PSYCHIATRIC: No depression, anxiety, mood swings, or difficulty sleeping  HEME/LYMPH: No easy bruising, or bleeding gums  ALLERGY AND IMMUNOLOGIC: No hives or eczema    Vital Signs Last 24 Hrs  T(C): 36 (20 Jan 2020 15:45), Max: 37.1 (19 Jan 2020 23:30)  T(F): 96.8 (20 Jan 2020 15:45), Max: 98.7 (19 Jan 2020 23:30)  HR: 67 (20 Jan 2020 18:00) (62 - 84)  BP: --  BP(mean): --  RR: 9 (20 Jan 2020 18:00) (6 - 32)  SpO2: 98% (20 Jan 2020 18:00) (96% - 100%)    PHYSICAL EXAM:  GENERAL: NAD, well-groomed, well-developed  HEAD:  Atraumatic, Normocephalic  EYES: EOMI, PERRLA, conjunctiva and sclera clear  NECK: Supple, No JVD, Normal thyroid  NERVOUS SYSTEM:  Uncommunicative  CHEST/LUNG: Clear to percussion bilaterally; No rales, rhonchi, wheezing, or rubs  HEART: Regular rate and rhythm; No murmurs, rubs, or gallops  ABDOMEN: Soft, Nontender, Nondistended; Bowel sounds present  EXTREMITIES:  2+ Peripheral Pulses, No clubbing, cyanosis, or edema  LYMPH: No lymphadenopathy noted  SKIN: No rashes or lesions    LAB                          11.1   11.86 )-----------( 137      ( 20 Jan 2020 02:48 )             34.7       CBC:  01-20 @ 02:48  WBC 11.86   Hgb 11.1   Hct 34.7   Plts 137  MCV 85.3  01-19 @ 16:07  WBC 12.41   Hgb 11.6   Hct 35.4   Plts 136  MCV 84.1  01-19 @ 04:20  WBC 11.96   Hgb 11.0   Hct 33.6   Plts 136  MCV 83.6  01-19 @ 01:02  WBC 12.45   Hgb 11.4   Hct 34.5   Plts 127  MCV 82.9  01-18 @ 21:06  WBC 10.60   Hgb 11.4   Hct 34.7   Plts 118  MCV 83.4  01-18 @ 05:13  WBC 13.22   Hgb 6.8   Hct 20.7   Plts 142  MCV 88.8  01-17 @ 07:59  WBC 14.09   Hgb 8.1   Hct 25.8   Plts 219  MCV 97.0  01-16 @ 04:07  WBC 12.09   Hgb 9.7   Hct 29.9   Plts 221  MCV 94.6  01-15 @ 06:23  WBC 13.14   Hgb 11.0   Hct 33.3   Plts 229  MCV 93.0  01-14 @ 04:24  WBC 9.61   Hgb 9.4   Hct 28.3   Plts 148  MCV 91.6      Chemistry:  01-20 @ 02:48  Na+ 145  K+ 3.8  Cl- 116  CO2 22  BUN 83  Cr 3.46     01-19 @ 16:07  Na+ 147  K+ 4.3  Cl- 117  CO2 24  BUN 80  Cr 3.54     01-19 @ 04:20  Na+ 145  K+ 3.0  Cl- 115  CO2 20  BUN 87  Cr 3.53     01-18 @ 03:02  Na+ 143  K+ 3.3  Cl- 113  CO2 22  BUN 90  Cr 3.42     01-17 @ 07:59  Na+ 139  K+ 3.4  Cl- 108  CO2 21  BUN 89  Cr 3.63     01-16 @ 04:07  Na+ 138  K+ 3.6  Cl- 108  CO2 20  BUN 71  Cr 3.86     01-15 @ 06:23  Na+ 140  K+ 3.7  Cl- 107  CO2 20  BUN 70  Cr 3.75     01-14 @ 04:24  Na+ 140  K+ 3.7  Cl- 109  CO2 19  BUN 75  Cr 3.79         Glucose, Serum: 130 mg/dL (01-20 @ 02:48)  Glucose, Serum: 124 mg/dL (01-19 @ 16:07)  Glucose, Serum: 86 mg/dL (01-19 @ 04:20)  Glucose, Serum: 88 mg/dL (01-19 @ 04:20)  Glucose, Serum: 139 mg/dL (01-18 @ 03:02)  Glucose, Serum: 104 mg/dL (01-17 @ 07:59)  Glucose, Serum: 135 mg/dL (01-16 @ 04:07)  Glucose, Serum: 56 mg/dL (01-15 @ 06:23)  Glucose, Serum: 82 mg/dL (01-14 @ 04:24)      20 Jan 2020 02:48    145    |  116    |  83     ----------------------------<  130    3.8     |  22     |  3.46   19 Jan 2020 16:07    147    |  117    |  80     ----------------------------<  124    4.3     |  24     |  3.54   19 Jan 2020 04:20    145    |  115    |  87     ----------------------------<  86     3.0     |  20     |  3.53   18 Jan 2020 03:02    143    |  113    |  90     ----------------------------<  139    3.3     |  22     |  3.42   17 Jan 2020 07:59    139    |  108    |  89     ----------------------------<  104    3.4     |  21     |  3.63   16 Jan 2020 04:07    138    |  108    |  71     ----------------------------<  135    3.6     |  20     |  3.86   15 Ousmane 2020 06:23    140    |  107    |  70     ----------------------------<  56     3.7     |  20     |  3.75     Ca    7.8        20 Jan 2020 02:48  Ca    7.7        19 Jan 2020 16:07  Ca    7.5        19 Jan 2020 04:20  Ca    7.0        18 Jan 2020 03:02  Ca    7.5        17 Jan 2020 07:59  Ca    7.5        16 Jan 2020 04:07  Ca    7.9        15 Jan 2020 06:23  Phos  5.0       20 Jan 2020 02:48  Phos  5.4       19 Jan 2020 04:20  Phos  5.3       17 Jan 2020 07:59  Phos  6.0       16 Jan 2020 04:07  Phos  4.9       15 Jan 2020 06:23  Phos  4.6       14 Jan 2020 04:24  Mg     2.0       20 Jan 2020 02:48  Mg     2.0       19 Jan 2020 16:07  Mg     1.8       19 Jan 2020 04:20  Mg     2.0       17 Jan 2020 07:59  Mg     2.0       16 Jan 2020 04:07  Mg     2.0       15 Jan 2020 06:23  Mg     2.0       14 Jan 2020 04:24    TPro  5.1    /  Alb  1.7    /  TBili  0.3    /  DBili  x      /  AST  21     /  ALT  15     /  AlkPhos  56     19 Jan 2020 04:20              CAPILLARY BLOOD GLUCOSE          C-Reactive Protein, Serum: 5.72 mg/dL (01-14 @ 09:19)      RADIOLOGY & ADDITIONAL TESTS:    Imaging Personally Reviewed:  [ ] YES  [ ] NO    Consultant(s) Notes Reviewed:  [ ] YES  [ ] NO    Care Discussed with Consultants/Other Providers [ ] YES  [ ] NO

## 2020-01-20 NOTE — PROGRESS NOTE ADULT - PROBLEM SELECTOR PLAN 1
ICU #1, extubated. Repeat upper GI bleed during the weekend. S/P EGD ( presumed AVMs s/p APC ) / colonoscopy ( blood through out colon including TI ) On IV PPI . repeat EGD cancelled for mechanical malfunction 1) Stop Carafate 2)  NPO  3) F/U EGD Tuesdy 4) F/U CBC

## 2020-01-20 NOTE — PROGRESS NOTE ADULT - ASSESSMENT
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 Hospital course complicated by acute blood loss anemia 2 to UGIB, acute on CKD and acute respiratory failure requiring intubation. s/p extubation 1/15. Pt never had EGD while in the ICU and pt transferred to [facility] on 1/16. Early this am RRT called 2 to hematemesis and melena; pt transfused PRBC + FFP and transferred back to the ICU. 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 Hospital course complicated by acute blood loss anemia 2 to UGIB, acute on CKD and acute respiratory failure requiring intubation. s/p extubation 1/15. Pt never had EGD while in the ICU and pt transferred to [facility] on 1/16. Early this am RRT called 2 to hematemesis and melena; pt transfused PRBC + FFP and transferred back to the ICU. Admitting dx: Acute blood loss anemia 2 to UGIB    Resp:   Supplemental O2 prn   mrsa+ sputum but clinically no PNA (SOB, sputum..)  and radiogrpahics have improved significantly from earlier in admission  will cont monitor     CVS:   Cont antiHTN meds -BP labile   cont ivp meds but start po meds   avoid extreme increased BP 2 to UGIB    Heme:   Trend Hb (stable in the 11 range)    GI:   NPO for EGD tomorrow   PPI drip   appreciate GI reccs    Endo:   Cont Synthroid  Renal:   Cr in the mid 3 range   F/u as per Renal Pt is an 80 yo HF with h/o CAD, MI, dCHF, HTN, CKD with solitary kidney, hypothyroidism, Alzheimer's dementia, CVA x2 1993, 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 Hospital course complicated by acute blood loss anemia 2 to UGIB, acute on CKD and acute respiratory failure requiring intubation. s/p extubation 1/15. Pt never had EGD while in the ICU and pt transferred to [facility] on 1/16. Early this am RRT called 2 to hematemesis and melena; pt transfused PRBC + FFP and transferred back to the ICU. Admitting dx: Acute blood loss anemia 2 to UGIB    Resp:   Supplemental O2 prn   mrsa+ sputum but clinically no PNA (SOB, sputum..)  and radiogrpahics have improved significantly from earlier in admission  will cont monitor   pt w/ severe pHTN RVSP 90 on last echo w/ severe TR -presumably msotly group 2 given diastolic dysfxn and severe MR as well  does not seem to have primary pulmonary/vascular condition    CVS:   Cont antiHTN meds -BP labile   cont ivp meds but start po meds   avoid extreme increased BP 2 to UGIB    Heme:   Trend Hb (stable in the 11 range)    GI:   NPO for EGD tomorrow   PPI drip   appreciate GI reccs    Endo:   Cont Synthroid  Renal:   Cr in the mid 3 range   F/u as per Renal

## 2020-01-20 NOTE — PROGRESS NOTE ADULT - SUBJECTIVE AND OBJECTIVE BOX
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 Hospital course complicated by acute blood loss anemia 2 to UGIB, acute on CKD and acute respiratory failure requiring intubation. s/p extubation 1/15. Pt never had EGD while in the ICU and pt transferred to [facility] on 1/16. Early this am RRT called 2 to hematemesis and melena; pt transfused PRBC + FFP and transferred back to the ICU. Admitting dx: Acute blood loss anemia 2 to UGIB    24h Events:  No active bleeding  Labile BP on a-line precipitated by stimulus     ROS:  Unable as pt encephalopathic           ICU Vital Signs Last 24 Hrs  T(C): 36 (20 Jan 2020 15:45), Max: 37.1 (19 Jan 2020 23:30)  T(F): 96.8 (20 Jan 2020 15:45), Max: 98.7 (19 Jan 2020 23:30)  HR: 65 (20 Jan 2020 16:00) (62 - 84)  BP: --  BP(mean): --  ABP: 195/66 (20 Jan 2020 16:00) (129/38 - 219/77)  ABP(mean): 111 (20 Jan 2020 16:00) (67 - 131)  RR: 8 (20 Jan 2020 16:00) (6 - 32)  SpO2: 100% (20 Jan 2020 16:00) (96% - 100%)      ## P/E:  Gen: lying comfortably in bed in no apparent distress  Lungs: b/l cta  Heart: s1s2 reg no murmur  Abd: Soft/+BS nontender  Ext: L UE edema  Neuro: Awake/ nonverbal

## 2020-01-20 NOTE — PROGRESS NOTE ADULT - SUBJECTIVE AND OBJECTIVE BOX
79 years old female from [facility] rehab c/o coughing sob for three to four days. Pt is sent here to rule out chf vs pneumonia. Pt is alert and oriented to person only unable to give detail hx due to hx of Alzheimer according to the [facility] pt had sat of 91 to 92 %,	    PAST MEDICAL/SURGICAL/FAMILY/SOCIAL HISTORY:    Past Medical History:  Alzheimers disease    CVA (cerebral vascular accident)    Depression    HTN (hypertension)    MI, old.  recently Dx c hypothyroidism, TSH > 111 started Levothyroxin 50mcg qd as per Dr Vigil (2020 11:41)      Chief Complaint:  Patient is a 79y old  Female who presents with a chief complaint of sob cough (2020 22:56)      Review of Systems:    General:  No wt loss, fevers, chills, night sweats  Eyes:  Good vision, no reported pain  ENT:  No sore throat, pain, runny nose, dysphagia  CV:  No pain, palpitations, hypo/hypertension  Resp:  dyspnea, cough  GI:  No pain, nausea, vomiting, diarrhea, constipation           Social History/Family History  SOCHX:   tobacco,  -  alcohol    FMHX: FA/MO  - contributory       Discussed with:  PMD, Family    Physical Exam:    Vital Signs:  Vital Signs Last 24 Hrs  T(C): 36.2 (2020 17:06), Max: 36.9 (2020 00:18)  T(F): 97.2 (2020 17:06), Max: 98.5 (2020 00:18)  HR: 74 (2020 21:55) (68 - 87)  BP: 179/78 (2020 17:06) (132/71 - 179/78)  BP(mean): --  RR: 18 (2020 17:06) (18 - 18)  SpO2: 97% (2020 21:55) (94% - 98%)  Daily     Daily Weight in k.8 (2020 04:59)  I&O's Summary    2020 07:01  -  2020 07:00  --------------------------------------------------------  IN: 560 mL / OUT: 0 mL / NET: 560 mL          Chest:  fair air entry bilateral  Cardiovascular:  Regular rhythm, S1, S2, no murmur/rub/S3/S4, no carotid/femoral/abdominal bruit, radial/pedal pulses 2+,   Abdomen:  Soft, non-tender, non-distended, normoactive bowel sounds, no HSM        Laboratory:          138  |  104  |  69<H>  ----------------------------<  111<H>  3.8   |  25  |  3.77<H>    Ca    8.1<L>      2020 07:22          Assessment:  I am asked to assist this patient admitted with shortness of breath  The patient has multifactorial causes of shortness of breath, renal insufficiency, anemia  And now after a diagnostic echocardiogram within normal ejection fraction the reading shows mitral regurgitation and tricuspid regurgitation  After review of the studies this regurgitation is not considered severe but in the moderate to severe but more moderate range  upper GI bleed  GI noted,  EGD   optimize systolic blood pressure  PRBCs  potential EGD on Tuesday per GI

## 2020-01-20 NOTE — PROGRESS NOTE ADULT - SUBJECTIVE AND OBJECTIVE BOX
lying in bed    Vital Signs Last 24 Hrs  T(C): 36.4 (20 Jan 2020 08:00), Max: 37.1 (19 Jan 2020 23:30)  T(F): 97.5 (20 Jan 2020 08:00), Max: 98.7 (19 Jan 2020 23:30)  HR: 71 (20 Jan 2020 08:30) (62 - 84)  BP: --  BP(mean): --  RR: 7 (20 Jan 2020 08:30) (6 - 24)  SpO2: 99% (20 Jan 2020 08:30) (97% - 100%)    PHYSICAL EXAM:    general - weak looking  HEENT - NGT +  CVS - RRR  RS - AE B/L  Abd - soft, NT  Ext - Pulses +        LABS:                        11.1   11.86 )-----------( 137      ( 20 Jan 2020 02:48 )             34.7     01-20    145  |  116<H>  |  83<H>  ----------------------------<  130<H>  3.8   |  22  |  3.46<H>    Ca    7.8<L>      20 Jan 2020 02:48  Phos  5.0     01-20  Mg     2.0     01-20    TPro  5.1<L>  /  Alb  1.7<L>  /  TBili  0.3  /  DBili  x   /  AST  21  /  ALT  15  /  AlkPhos  56  01-19          Culture - Sputum (collected 13 Jan 2020 17:08)  Source: .Sputum Sputum  Gram Stain (15 Ousmane 2020 16:59):    Few Squamous epithelial cells per low power field    Few polymorphonuclear leukocytes per low power field    Rare Yeast like cells per oil power field    Few Gram variable coccobacilli per oil power field  Final Report (15 Ousmane 2020 16:59):    Moderate Methicillin resistant Staphylococcus aureus    Normal Respiratory Kayce absent  Organism: Methicillin resistant Staphylococcus aureus (15 Ousmane 2020 16:59)  Organism: Methicillin resistant Staphylococcus aureus (15 Ousmane 2020 16:59)        RADIOLOGY & ADDITIONAL STUDIES:

## 2020-01-20 NOTE — PROGRESS NOTE ADULT - ASSESSMENT
HPI:  · HPI Objective Statement: 79 years old female from [facility] rehab c/o coughing sob for three to four days. Pt is sent here to rule out chf vs pneumonia. Pt is alert and oriented to person only unable to give detail hx due to hx of Alzheimer according to the [facility] pt had sat of 91 to 92 %,	  ---------------- As Above ---------------------------------------------  Patient is a poor historian. Patient admitted with Bronchitis and UTI. Called for hematemsis. Patient had three episodes of bloody vomitus. ( 1/3 of a cup ) . Patient complaining of mid epigastric / chest pain but patient can not characterize the pain.   Patient has chronic anemia. but HGB fell 8.4 --> 7.2. Was on Carafate, now on IV PPI  KUB pending  HX of CRI    Addendum : Patient had another episode of bloody vomitus    Upper GI Bleed - Secondary to (?). 1) NPO  2)Check KUB  3) IV PPI 4) telemetry  5) NGT 6) EGD 7) Hold Carafate  8) Hold iron  ---Will speak with daughter.

## 2020-01-20 NOTE — PROGRESS NOTE ADULT - SUBJECTIVE AND OBJECTIVE BOX
[facility] NEPHROLOGY SERVICES, [facility]  NEPHROLOGY AND HYPERTENSION  [address]  [address]  [place] [address]  [phone]    MD MART LORD, MD RAMESH MURRELL, MD CHING GARZA, MD JU BLAS, MD ISABELLA SLADE, MD ALAYNA WALLACE MD          Patient comfortable no distress;    MEDICATIONS  (STANDING):  amLODIPine   Tablet 10 milliGRAM(s) Oral daily  buDESOnide    Inhalation Suspension 0.5 milliGRAM(s) Inhalation every 12 hours  chlorhexidine 4% Liquid 1 Application(s) Topical <User Schedule>  dextrose 5% + sodium chloride 0.45%. 1000 milliLiter(s) (50 mL/Hr) IV Continuous <Continuous>  hydrALAZINE 50 milliGRAM(s) Oral every 6 hours  levothyroxine Injectable 25 MICROGram(s) IV Push at bedtime  pantoprazole Infusion 8 mG/Hr (10 mL/Hr) IV Continuous <Continuous>  sodium chloride 0.65% Nasal 1 Spray(s) Both Nostrils three times a day  sucralfate suspension 1 Gram(s) Oral four times a day    MEDICATIONS  (PRN):  albuterol/ipratropium for Nebulization 3 milliLiter(s) Nebulizer every 6 hours PRN Shortness of Breath and/or Wheezing  hydrALAZINE Injectable 10 milliGRAM(s) IV Push every 6 hours PRN SBP >170      01-19-20 @ 07:01 - 01-20-20 @ 07:00  --------------------------------------------------------  IN: 1565 mL / OUT: 400 mL / NET: 1165 mL    01-20-20 @ 07:01 - 01-20-20 @ 17:17  --------------------------------------------------------  IN: 540 mL / OUT: 0 mL / NET: 540 mL      PHYSICAL EXAM:      T(C): 36 (01-20-20 @ 15:45), Max: 37.1 (01-19-20 @ 23:30)  HR: 64 (01-20-20 @ 17:00) (62 - 84)  BP: --  RR: 8 (01-20-20 @ 17:00) (6 - 32)  SpO2: 99% (01-20-20 @ 17:00) (96% - 100%)  Wt(kg): --  Respiratory: mild rhonchi anteriorly, decreased BS at bases  Cardiovascular: S1 S2  Gastrointestinal: soft NT ND +BS  Extremities:  1 edema                                    11.1   11.86 )-----------( 137      ( 20 Jan 2020 02:48 )             34.7     01-20    145  |  116<H>  |  83<H>  ----------------------------<  130<H>  3.8   |  22  |  3.46<H>    Ca    7.8<L>      20 Jan 2020 02:48  Phos  5.0     01-20  Mg     2.0     01-20    TPro  5.1<L>  /  Alb  1.7<L>  /  TBili  0.3  /  DBili  x   /  AST  21  /  ALT  15  /  AlkPhos  56  01-19      LIVER FUNCTIONS - ( 19 Jan 2020 04:20 )  Alb: 1.7 g/dL / Pro: 5.1 gm/dL / ALK PHOS: 56 U/L / ALT: 15 U/L / AST: 21 U/L / GGT: x           Creatinine Trend: 3.46<--, 3.54<--, 3.53<--, 3.42<--, 3.63<--, 3.86<--    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 + paraprotein noted  Clinically improving, extubated    Plan  IV Labetalol prn  D/C IVF  Would defer prospect of aggressive work up or HD support as overall prognosis poor.      Obed Jamison MD [facility] NEPHROLOGY SERVICES, [facility]  NEPHROLOGY AND HYPERTENSION  [address]  [address]  [place] [address]  [phone]    MD MART LORD, MD RAMESH MURRELL, MD CHING GARZA, MD JU BLAS, MD ISABELLA SLADE, MD ALAYNA WALLACE MD          Patient comfortable no distress;    MEDICATIONS  (STANDING):  amLODIPine   Tablet 10 milliGRAM(s) Oral daily  buDESOnide    Inhalation Suspension 0.5 milliGRAM(s) Inhalation every 12 hours  chlorhexidine 4% Liquid 1 Application(s) Topical <User Schedule>  dextrose 5% + sodium chloride 0.45%. 1000 milliLiter(s) (50 mL/Hr) IV Continuous <Continuous>  hydrALAZINE 50 milliGRAM(s) Oral every 6 hours  levothyroxine Injectable 25 MICROGram(s) IV Push at bedtime  pantoprazole Infusion 8 mG/Hr (10 mL/Hr) IV Continuous <Continuous>  sodium chloride 0.65% Nasal 1 Spray(s) Both Nostrils three times a day  sucralfate suspension 1 Gram(s) Oral four times a day    MEDICATIONS  (PRN):  albuterol/ipratropium for Nebulization 3 milliLiter(s) Nebulizer every 6 hours PRN Shortness of Breath and/or Wheezing  hydrALAZINE Injectable 10 milliGRAM(s) IV Push every 6 hours PRN SBP >170      01-19-20 @ 07:01 - 01-20-20 @ 07:00  --------------------------------------------------------  IN: 1565 mL / OUT: 400 mL / NET: 1165 mL    01-20-20 @ 07:01 - 01-20-20 @ 17:17  --------------------------------------------------------  IN: 540 mL / OUT: 0 mL / NET: 540 mL      PHYSICAL EXAM:      T(C): 36 (01-20-20 @ 15:45), Max: 37.1 (01-19-20 @ 23:30)  HR: 64 (01-20-20 @ 17:00) (62 - 84)  BP: --  RR: 8 (01-20-20 @ 17:00) (6 - 32)  SpO2: 99% (01-20-20 @ 17:00) (96% - 100%)  Wt(kg): --  Respiratory: mild rhonchi anteriorly, decreased BS at bases  Cardiovascular: S1 S2  Gastrointestinal: soft NT ND +BS  Extremities:  1 edema                                    11.1   11.86 )-----------( 137      ( 20 Jan 2020 02:48 )             34.7     01-20    145  |  116<H>  |  83<H>  ----------------------------<  130<H>  3.8   |  22  |  3.46<H>    Ca    7.8<L>      20 Jan 2020 02:48  Phos  5.0     01-20  Mg     2.0     01-20    TPro  5.1<L>  /  Alb  1.7<L>  /  TBili  0.3  /  DBili  x   /  AST  21  /  ALT  15  /  AlkPhos  56  01-19      LIVER FUNCTIONS - ( 19 Jan 2020 04:20 )  Alb: 1.7 g/dL / Pro: 5.1 gm/dL / ALK PHOS: 56 U/L / ALT: 15 U/L / AST: 21 U/L / GGT: x           Creatinine Trend: 3.46<--, 3.54<--, 3.53<--, 3.42<--, 3.63<--, 3.86<--    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 + paraprotein noted  Clinically improving, extubated    Plan  IV hydralazine prn  D/C IVF  Would defer prospect of aggressive work up or HD support as overall prognosis poor.      Obed Jamison MD

## 2020-01-20 NOTE — PROGRESS NOTE ADULT - SUBJECTIVE AND OBJECTIVE BOX
Patient is a 79y old  Female who presents with a chief complaint of sob cough (20 Jan 2020 04:48)      INTERVAL HPI/OVERNIGHT EVENTS: ICU NG tube. Lethargic arousable. NO active bleeding    MEDICATIONS  (STANDING):  buDESOnide    Inhalation Suspension 0.5 milliGRAM(s) Inhalation every 12 hours  chlorhexidine 4% Liquid 1 Application(s) Topical <User Schedule>  cloNIDine Patch 0.1 mG/24Hr(s) 1 patch Transdermal every 7 days  dextrose 5% + sodium chloride 0.45%. 1000 milliLiter(s) (50 mL/Hr) IV Continuous <Continuous>  enalaprilat Injectable 1.25 milliGRAM(s) IV Push every 6 hours  hydrALAZINE Injectable 10 milliGRAM(s) IV Push every 8 hours  levothyroxine Injectable 25 MICROGram(s) IV Push at bedtime  metoprolol tartrate Injectable 5 milliGRAM(s) IV Push every 6 hours  niCARdipine Infusion 5 mG/Hr (25 mL/Hr) IV Continuous <Continuous>  pantoprazole Infusion 8 mG/Hr (10 mL/Hr) IV Continuous <Continuous>  sodium chloride 0.65% Nasal 1 Spray(s) Both Nostrils three times a day  sucralfate suspension 1 Gram(s) Oral four times a day    MEDICATIONS  (PRN):  albuterol/ipratropium for Nebulization 3 milliLiter(s) Nebulizer every 6 hours PRN Shortness of Breath and/or Wheezing  hydrALAZINE Injectable 10 milliGRAM(s) IV Push every 6 hours PRN SBP >170      Allergies    No Known Allergies    Intolerances        REVIEW OF SYSTEMS:    lethargic, opening eyes to verbal stimulation        Vital Signs Last 24 Hrs  T(C): 36.4 (20 Jan 2020 08:00), Max: 37.1 (19 Jan 2020 23:30)  T(F): 97.5 (20 Jan 2020 08:00), Max: 98.7 (19 Jan 2020 23:30)  HR: 75 (20 Jan 2020 08:00) (62 - 84)  BP: --  BP(mean): --  RR: 20 (20 Jan 2020 08:00) (6 - 24)  SpO2: 99% (20 Jan 2020 08:00) (97% - 100%)    PHYSICAL EXAM:  general       HEENT wnl  CHEST/LUNG: Clear to percussion bilaterally; No rales, rhonchi, wheezing, or rubs  HEART: Regular rate and rhythm; No murmurs, rubs, or gallops  ABDOMEN: Soft, Nontender, Nondistended; Bowel sounds present  EXTREMITIES:  peripheral 2+ edema  LYMPH: No lymphadenopathy noted  SKIN: No rashes or lesions    LABS:                        11.1   11.86 )-----------( 137      ( 20 Jan 2020 02:48 )             34.7     01-20    145  |  116<H>  |  83<H>  ----------------------------<  130<H>  3.8   |  22  |  3.46<H>    Ca    7.8<L>      20 Jan 2020 02:48  Phos  5.0     01-20  Mg     2.0     01-20    TPro  5.1<L>  /  Alb  1.7<L>  /  TBili  0.3  /  DBili  x   /  AST  21  /  ALT  15  /  AlkPhos  56  01-19        CAPILLARY BLOOD GLUCOSE                    RADIOLOGY & ADDITIONAL TESTS:    Imaging Personally Reviewed:  [y ] YES  [ ] NO    Consultant(s) Notes Reviewed:  [y ] YES  [ ] NO    Care Discussed with Consultants/Other Providers [ ] YES  [ ] NO    PROBLEMS:  CONGESTIVE HEART FAILURE;RSV INFECTION  SHORTNESS OF BREATH  CHF (congestive heart failure)  Gastrointestinal hemorrhage, unspecified gastrointestinal hemorrhage type  s/p Respiratory Failure  Goals of care, counseling/discussion  HTN (hypertension)  Hyperlipidemia  Alzheimers disease  MI, old  Hypothyroidism  RSV infection  CHF (congestive heart failure)  GI bleed  Anemia      Care discussed with family,         [  ]   yes  [  ]  No    imp:    stable[ ]    unstable[  ]     improving [   ]       unchanged  [ x ]                Plans: awaiting EGD, CPM,prognosis guarded

## 2020-01-20 NOTE — PROGRESS NOTE ADULT - SUBJECTIVE AND OBJECTIVE BOX
INTERVAL HPI:  79 year female with HTN, HLD, MI hx, CVA, Gi bleed, Anemia Depression and Alzheimer's dementia.  Sent from [facility] due to SOB. Possibility of pneumonia vs CHF was raised.   In ED with Respiratory distress required BIPAP support and RVP positive for RSV. Pt not able to provide more details due to dementia. Information from transfer papers and ED physician.  Nods no when asked for smoking.  01/09/20: With massive GI bleed, intubated and transferred to ICU.  01/09/20:  EGD+ Colonoscopy.  01/15/20:  Extubated.  01/16/20:  Out of ICU  01/18/20:  Back to ICU for upper GI bleed and significant drop in H & H.    OVERNIGHT EVENTS:  In ICU, some what lethargic. No active bleeding in last 24 hours.    Vital Signs Last 24 Hrs  T(C): 36.1 (20 Jan 2020 19:24), Max: 37.1 (19 Jan 2020 23:30)  T(F): 97 (20 Jan 2020 19:24), Max: 98.7 (19 Jan 2020 23:30)  HR: 79 (20 Jan 2020 20:00) (62 - 84)  BP: --  BP(mean): --  RR: 13 (20 Jan 2020 20:00) (6 - 32)  SpO2: 100% (20 Jan 2020 20:00) (96% - 100%)    PHYSICAL EXAM:  GEN:        Resting, comfortable.  HEENT:    NGT   RESP:        no distress  CVS:           Regular rate and rhythm.     MEDICATIONS  (STANDING):  amLODIPine   Tablet 10 milliGRAM(s) Oral daily  buDESOnide    Inhalation Suspension 0.5 milliGRAM(s) Inhalation every 12 hours  carvedilol 6.25 milliGRAM(s) Oral every 12 hours  chlorhexidine 4% Liquid 1 Application(s) Topical <User Schedule>  hydrALAZINE 50 milliGRAM(s) Oral every 6 hours  levothyroxine Injectable 25 MICROGram(s) IV Push at bedtime  pantoprazole Infusion 8 mG/Hr (10 mL/Hr) IV Continuous <Continuous>  sodium chloride 0.65% Nasal 1 Spray(s) Both Nostrils three times a day  sucralfate suspension 1 Gram(s) Oral four times a day    MEDICATIONS  (PRN):  albuterol/ipratropium for Nebulization 3 milliLiter(s) Nebulizer every 6 hours PRN Shortness of Breath and/or Wheezing  hydrALAZINE Injectable 10 milliGRAM(s) IV Push every 6 hours PRN SBP >170    LABS:                        11.1   11.86 )-----------( 137      ( 20 Jan 2020 02:48 )             34.7     01-20    145  |  116<H>  |  83<H>  ----------------------------<  130<H>  3.8   |  22  |  3.46<H>    Ca    7.8<L>      20 Jan 2020 02:48  Phos  5.0     01-20  Mg     2.0     01-20    TPro  5.1<L>  /  Alb  1.7<L>  /  TBili  0.3  /  DBili  x   /  AST  21  /  ALT  15  /  AlkPhos  56  01-19     ASSESSMENT AND PLAN:  ·	S/P Acute hypoxic  Respiratory failure .  ·	RSV tracheobronchitis.  ·	Left pleural effusion.  ·	Hypothyroidism with very high TSH.  ·	Anemia.  ·	VRE UTI  ·	Leukocytosis.  ·	Renal Insuffiencey.  ·	CVA by history.  ·	HTN.  ·	HLD.  ·	Alzheimer's dementia.  ·	GI bleed.    On Protonix and Carafate.  Continue Symbicort.

## 2020-01-20 NOTE — CHART NOTE - NSCHARTNOTEFT_GEN_A_CORE
Assessment: Pt seen for critical care readmission & follow-up & continues chronic moderate malnutrition. Pt with anemia s/p transfusion due to UGIB; awaiting EGD 1/21.     Factors impacting intake: [ ] none [ ] nausea  [ ] vomiting [ ] diarrhea [ ] constipation  [ ]chewing problems [ ] swallowing issues  [x ] other: Melena & Hematemesis    Diet Prescription: Diet, NPO (01-18-20 @ 23:45)    Intake: NPO x 2 days pending EGD 1/21    Current Weight: Weight (kg): Wt=63kg(1/20), Wt=62.8kg(1/7)  % Weight Change   0.2kg wt gain x 13 dasy    Physical appearance: +2 gen edema, +3 edema Lt hand    Pertinent Medications: MEDICATIONS  (STANDING):  buDESOnide    Inhalation Suspension 0.5 milliGRAM(s) Inhalation every 12 hours  chlorhexidine 4% Liquid 1 Application(s) Topical <User Schedule>  cloNIDine Patch 0.1 mG/24Hr(s) 1 patch Transdermal every 7 days  dextrose 5% + sodium chloride 0.45%. 1000 milliLiter(s) (50 mL/Hr) IV Continuous <Continuous>  enalaprilat Injectable 1.25 milliGRAM(s) IV Push every 6 hours  hydrALAZINE Injectable 10 milliGRAM(s) IV Push every 8 hours  levothyroxine Injectable 25 MICROGram(s) IV Push at bedtime  metoprolol tartrate Injectable 5 milliGRAM(s) IV Push every 6 hours  niCARdipine Infusion 5 mG/Hr (25 mL/Hr) IV Continuous <Continuous>  pantoprazole Infusion 8 mG/Hr (10 mL/Hr) IV Continuous <Continuous>  sodium chloride 0.65% Nasal 1 Spray(s) Both Nostrils three times a day  sucralfate suspension 1 Gram(s) Oral four times a day    MEDICATIONS  (PRN):  albuterol/ipratropium for Nebulization 3 milliLiter(s) Nebulizer every 6 hours PRN Shortness of Breath and/or Wheezing  hydrALAZINE Injectable 10 milliGRAM(s) IV Push every 6 hours PRN SBP >170    Pertinent Labs: 01-20 Na 145 mmol/L Glu 130 mg/dL<H> K+ 3.8 mmol/L Cr 3.46 mg/dL<H> BUN 83 mg/dL<H> Phos 5.0 mg/dL<H> Alb 1.7(1/19)   PAB n/a   Hgb 11.1 g/dL<L> Hct 34.7 % HgA1C n/a    Glucose, Serum: 130 mg/dL <H>, WBC=11.86(1/20), GFR=12  Glucose, Serum: 124 mg/dL <H>   24Hr FS:  Skin: Coccyx stage II    Estimated Needs:   [x ] no change since previous assessment (1/3/2020 )  [ ] recalculated:     Previous Nutrition Diagnosis: Moderate Malnutrition - chronic  Etiology:  Inadequate energy/protein intake in setting of altered swallow, CVA, dementia, cardiac hx  Signs & Symptoms:  physical signs of mild/moderate fat depletion & muscle wasting as noted of 1/10 chart note    GOAL:  pt to consume > 75% meals/supplements ; Not met (Pt NPO)    Nutrition Diagnosis is [ x] ongoing  [ ] resolved  [ ] improved  [ ] not applicable       New Nutrition Diagnosis: [x ] not applicable       Interventions:   Recommend  [ ] Continue:  [x ] Change Diet To: Resume po diet pending EGD results  [ ] Nutrition Supplement:   [ ] Nutrition Support:  [ ] Other:     Monitoring and Evaluation:   [ ] PO intake [ x ] Tolerance to diet prescription [ x ] weights [ x ] labs[ x ] follow up per protocol  [ ] other: Assessment: Pt seen for critical care readmission & follow-up & continues chronic moderate malnutrition. Pt lethargic presents with anemia s/p transfusion due to UGIB; awaiting EGD 1/21.     Factors impacting intake: [ ] none [ ] nausea  [ ] vomiting [ ] diarrhea [ ] constipation  [ ]chewing problems [ ] swallowing issues  [x ] other: Melena & Hematemesis, lethargy    Diet Prescription: Diet, NPO (01-18-20 @ 23:45)    Intake: NPO x 2 days pending EGD 1/21    Current Weight: Weight (kg): Wt=63kg(1/20), Wt=62.8kg(1/7)  % Weight Change   0.2kg wt gain x 13 dasy    Physical appearance: +2 gen edema, +3 edema Lt hand    Pertinent Medications: MEDICATIONS  (STANDING):  buDESOnide    Inhalation Suspension 0.5 milliGRAM(s) Inhalation every 12 hours  chlorhexidine 4% Liquid 1 Application(s) Topical <User Schedule>  cloNIDine Patch 0.1 mG/24Hr(s) 1 patch Transdermal every 7 days  dextrose 5% + sodium chloride 0.45%. 1000 milliLiter(s) (50 mL/Hr) IV Continuous <Continuous>  enalaprilat Injectable 1.25 milliGRAM(s) IV Push every 6 hours  hydrALAZINE Injectable 10 milliGRAM(s) IV Push every 8 hours  levothyroxine Injectable 25 MICROGram(s) IV Push at bedtime  metoprolol tartrate Injectable 5 milliGRAM(s) IV Push every 6 hours  niCARdipine Infusion 5 mG/Hr (25 mL/Hr) IV Continuous <Continuous>  pantoprazole Infusion 8 mG/Hr (10 mL/Hr) IV Continuous <Continuous>  sodium chloride 0.65% Nasal 1 Spray(s) Both Nostrils three times a day  sucralfate suspension 1 Gram(s) Oral four times a day    MEDICATIONS  (PRN):  albuterol/ipratropium for Nebulization 3 milliLiter(s) Nebulizer every 6 hours PRN Shortness of Breath and/or Wheezing  hydrALAZINE Injectable 10 milliGRAM(s) IV Push every 6 hours PRN SBP >170    Pertinent Labs: 01-20 Na 145 mmol/L Glu 130 mg/dL<H> K+ 3.8 mmol/L Cr 3.46 mg/dL<H> BUN 83 mg/dL<H> Phos 5.0 mg/dL<H> Alb 1.7(1/19)   PAB n/a   Hgb 11.1 g/dL<L> Hct 34.7 % HgA1C n/a    Glucose, Serum: 130 mg/dL <H>, WBC=11.86(1/20), GFR=12  Glucose, Serum: 124 mg/dL <H>   24Hr FS:  Skin: Coccyx stage II    Estimated Needs:   [x ] no change since previous assessment (1/3/2020 )  [ ] recalculated:     Previous Nutrition Diagnosis: Moderate Malnutrition - chronic  Etiology:  Inadequate energy/protein intake in setting of altered swallow, CVA, dementia, cardiac hx  Signs & Symptoms:  physical signs of mild/moderate fat depletion & muscle wasting as noted of 1/10 chart note    GOAL:  pt to consume > 75% meals/supplements ; Not met (Pt NPO)    Nutrition Diagnosis is [ x] ongoing  [ ] resolved  [ ] improved  [ ] not applicable       New Nutrition Diagnosis: [x ] not applicable       Interventions:   Recommend  [ ] Continue:  [x ] Change Diet To: Resume po diet pending EGD results  [ ] Nutrition Supplement:   [ ] Nutrition Support:  [ ] Other:     Monitoring and Evaluation:   [ ] PO intake [ x ] Tolerance to diet prescription [ x ] weights [ x ] labs[ x ] follow up per protocol  [ ] other: Assessment: Pt seen for critical care readmission & follow-up & continues chronic moderate malnutrition. Pt lethargic presents with anemia s/p transfusion due to UGIB; awaiting EGD 1/21.     Factors impacting intake: [ ] none [ ] nausea  [ ] vomiting [ ] diarrhea [ ] constipation  [ ]chewing problems [ ] swallowing issues  [x ] other: Melena & Hematemesis, lethargy    Diet Prescription: Diet, NPO (01-18-20 @ 23:45)    Intake: NPO/Cl liquids x 11 days pending EGD 1/21    Current Weight: Weight (kg): Wt=63kg(1/20), Wt=62.8kg(1/7)  % Weight Change   0.2kg wt gain x 13 dasy    Physical appearance: +2 gen edema, +3 edema Lt hand    Pertinent Medications: MEDICATIONS  (STANDING):  buDESOnide    Inhalation Suspension 0.5 milliGRAM(s) Inhalation every 12 hours  chlorhexidine 4% Liquid 1 Application(s) Topical <User Schedule>  cloNIDine Patch 0.1 mG/24Hr(s) 1 patch Transdermal every 7 days  dextrose 5% + sodium chloride 0.45%. 1000 milliLiter(s) (50 mL/Hr) IV Continuous <Continuous>  enalaprilat Injectable 1.25 milliGRAM(s) IV Push every 6 hours  hydrALAZINE Injectable 10 milliGRAM(s) IV Push every 8 hours  levothyroxine Injectable 25 MICROGram(s) IV Push at bedtime  metoprolol tartrate Injectable 5 milliGRAM(s) IV Push every 6 hours  niCARdipine Infusion 5 mG/Hr (25 mL/Hr) IV Continuous <Continuous>  pantoprazole Infusion 8 mG/Hr (10 mL/Hr) IV Continuous <Continuous>  sodium chloride 0.65% Nasal 1 Spray(s) Both Nostrils three times a day  sucralfate suspension 1 Gram(s) Oral four times a day    MEDICATIONS  (PRN):  albuterol/ipratropium for Nebulization 3 milliLiter(s) Nebulizer every 6 hours PRN Shortness of Breath and/or Wheezing  hydrALAZINE Injectable 10 milliGRAM(s) IV Push every 6 hours PRN SBP >170    Pertinent Labs: 01-20 Na 145 mmol/L Glu 130 mg/dL<H> K+ 3.8 mmol/L Cr 3.46 mg/dL<H> BUN 83 mg/dL<H> Phos 5.0 mg/dL<H> Alb 1.7(1/19)   PAB n/a   Hgb 11.1 g/dL<L> Hct 34.7 % HgA1C n/a    Glucose, Serum: 130 mg/dL <H>, WBC=11.86(1/20), GFR=12  Glucose, Serum: 124 mg/dL <H>   24Hr FS:  Skin: Coccyx stage II    Estimated Needs:   [x ] no change since previous assessment (1/3/2020 )  [ ] recalculated:     Previous Nutrition Diagnosis: Moderate Malnutrition - chronic  Etiology:  Inadequate energy/protein intake in setting of altered swallow, CVA, dementia, cardiac hx  Signs & Symptoms:  physical signs of mild/moderate fat depletion & muscle wasting as noted of 1/10 chart note    GOAL:  pt to consume > 75% meals/supplements ; Not met (Pt NPO)    Nutrition Diagnosis is [ x] ongoing  (NPO/Clear liquids x 11 days)      New Nutrition Diagnosis: [x ] not applicable       Interventions:   Recommend  [ ] Continue:  [x ] Change Diet To: Resume po diet pending EGD results  [ ] Nutrition Supplement:   [ ] Nutrition Support:  [ ] Other:     Monitoring and Evaluation:   [ ] PO intake [ x ] Tolerance to diet prescription [ x ] weights [ x ] labs[ x ] follow up per protocol  [ ] other: Assessment: Pt seen for critical care readmission & follow-up & continues chronic moderate malnutrition. Pt lethargic presents with anemia s/p transfusion due to UGIB; awaiting EGD 1/21.     Factors impacting intake: [ ] none [ ] nausea  [ ] vomiting [ ] diarrhea [ ] constipation  [ ]chewing problems [ ] swallowing issues  [x ] other: Melena & Hematemesis, lethargy    Diet Prescription: Diet, NPO (01-18-20 @ 23:45)    Intake: NPO/Cl liquids x 11 days pending EGD 1/21    Current Weight: Weight (kg): Wt=63kg(1/20), Wt=62.8kg(1/7)  % Weight Change   0.2kg wt gain x 13 days    Physical appearance: +2 gen edema, +3 edema Lt hand; Nutrition focused physical exam conducted: Subcutaneous fat loss: [mild ] Orbital fat pads region, [moderate ]Buccal fat region, [edematous ]Triceps region,  [ ]Ribs region.  Muscle wasting: [ ]Temples region, [ ]Clavicle region, [ ]Shoulder region, [ ]Scapula region, [edematous ]Interosseous region,  [mild ]thigh region, [moderate ]Calf region    Pertinent Medications: MEDICATIONS  (STANDING):  buDESOnide    Inhalation Suspension 0.5 milliGRAM(s) Inhalation every 12 hours  chlorhexidine 4% Liquid 1 Application(s) Topical <User Schedule>  cloNIDine Patch 0.1 mG/24Hr(s) 1 patch Transdermal every 7 days  dextrose 5% + sodium chloride 0.45%. 1000 milliLiter(s) (50 mL/Hr) IV Continuous <Continuous>  enalaprilat Injectable 1.25 milliGRAM(s) IV Push every 6 hours  hydrALAZINE Injectable 10 milliGRAM(s) IV Push every 8 hours  levothyroxine Injectable 25 MICROGram(s) IV Push at bedtime  metoprolol tartrate Injectable 5 milliGRAM(s) IV Push every 6 hours  niCARdipine Infusion 5 mG/Hr (25 mL/Hr) IV Continuous <Continuous>  pantoprazole Infusion 8 mG/Hr (10 mL/Hr) IV Continuous <Continuous>  sodium chloride 0.65% Nasal 1 Spray(s) Both Nostrils three times a day  sucralfate suspension 1 Gram(s) Oral four times a day    MEDICATIONS  (PRN):  albuterol/ipratropium for Nebulization 3 milliLiter(s) Nebulizer every 6 hours PRN Shortness of Breath and/or Wheezing  hydrALAZINE Injectable 10 milliGRAM(s) IV Push every 6 hours PRN SBP >170    Pertinent Labs: 01-20 Na 145 mmol/L Glu 130 mg/dL<H> K+ 3.8 mmol/L Cr 3.46 mg/dL<H> BUN 83 mg/dL<H> Phos 5.0 mg/dL<H> Alb 1.7(1/19)   PAB n/a   Hgb 11.1 g/dL<L> Hct 34.7 % HgA1C n/a    Glucose, Serum: 130 mg/dL <H>, WBC=11.86(1/20), GFR=12  Glucose, Serum: 124 mg/dL <H>   24Hr FS:  Skin: Coccyx stage II    Estimated Needs:   [x ] no change since previous assessment (1/3/2020 )  [ ] recalculated:     Previous Nutrition Diagnosis: Moderate Malnutrition - chronic  Etiology:  Inadequate energy/protein intake in setting of altered swallow, CVA, dementia, cardiac hx  Signs & Symptoms:  physical signs of mild/moderate fat depletion & muscle wasting as noted of 1/10 chart note    GOAL:  pt to consume > 75% meals/supplements ; Not met (Pt NPO)    Nutrition Diagnosis is [ x] ongoing  (NPO/Clear liquids x 11 days)      New Nutrition Diagnosis: [x ] not applicable       Interventions:   Recommend  [ ] Continue:  [x ] Change Diet To: Resume po diet pending EGD results  [ ] Nutrition Supplement:   [ ] Nutrition Support:  [ ] Other:     Monitoring and Evaluation:   [ ] PO intake [ x ] Tolerance to diet prescription [ x ] weights [ x ] labs[ x ] follow up per protocol  [ ] other: Assessment: Pt seen for critical care readmission & follow-up & continues chronic moderate malnutrition. Pt lethargic presents with anemia s/p transfusion due to UGIB; awaiting EGD 1/21.     Factors impacting intake: [ ] none [ ] nausea  [ ] vomiting [ ] diarrhea [ ] constipation  [ ]chewing problems [ ] swallowing issues  [x ] other: Melena & Hematemesis, lethargy    Diet Prescription: Diet, NPO (01-18-20 @ 23:45)    Intake: NPO/Cl liquids x 11 days pending EGD 1/21    Current Weight: Weight (kg): Wt=63kg(1/20), Wt=62.8kg(1/7)  % Weight Change   0.2kg wt gain x 13 days    Physical appearance: +2 gen edema, +3 edema Lt hand; Nutrition focused physical exam conducted: Subcutaneous fat loss: [moderate ] Orbital fat pads region, [moderate ]Buccal fat region, [edematous ]Triceps region,  [WNL ]Ribs region.  Muscle wasting: [moderate ]Temples region, [mild ]Clavicle region, [Moderate ]Shoulder region, [unable ]Scapula region, [edematous ]Interosseous region,  [edematous ]thigh region, [moderate ]Calf region    Pertinent Medications: MEDICATIONS  (STANDING):  buDESOnide    Inhalation Suspension 0.5 milliGRAM(s) Inhalation every 12 hours  chlorhexidine 4% Liquid 1 Application(s) Topical <User Schedule>  cloNIDine Patch 0.1 mG/24Hr(s) 1 patch Transdermal every 7 days  dextrose 5% + sodium chloride 0.45%. 1000 milliLiter(s) (50 mL/Hr) IV Continuous <Continuous>  enalaprilat Injectable 1.25 milliGRAM(s) IV Push every 6 hours  hydrALAZINE Injectable 10 milliGRAM(s) IV Push every 8 hours  levothyroxine Injectable 25 MICROGram(s) IV Push at bedtime  metoprolol tartrate Injectable 5 milliGRAM(s) IV Push every 6 hours  niCARdipine Infusion 5 mG/Hr (25 mL/Hr) IV Continuous <Continuous>  pantoprazole Infusion 8 mG/Hr (10 mL/Hr) IV Continuous <Continuous>  sodium chloride 0.65% Nasal 1 Spray(s) Both Nostrils three times a day  sucralfate suspension 1 Gram(s) Oral four times a day    MEDICATIONS  (PRN):  albuterol/ipratropium for Nebulization 3 milliLiter(s) Nebulizer every 6 hours PRN Shortness of Breath and/or Wheezing  hydrALAZINE Injectable 10 milliGRAM(s) IV Push every 6 hours PRN SBP >170    Pertinent Labs: 01-20 Na 145 mmol/L Glu 130 mg/dL<H> K+ 3.8 mmol/L Cr 3.46 mg/dL<H> BUN 83 mg/dL<H> Phos 5.0 mg/dL<H> Alb 1.7(1/19)   PAB n/a   Hgb 11.1 g/dL<L> Hct 34.7 % HgA1C n/a    Glucose, Serum: 130 mg/dL <H>, WBC=11.86(1/20), GFR=12  Glucose, Serum: 124 mg/dL <H>   24Hr FS:  Skin: Coccyx stage II    Estimated Needs:   [x ] no change since previous assessment (1/3/2020 )  [ ] recalculated:     Previous Nutrition Diagnosis: Moderate Malnutrition - chronic  Etiology:  Inadequate energy/protein intake in setting of altered swallow, CVA, dementia, cardiac hx  Signs & Symptoms:  physical signs of mild/moderate fat depletion & muscle wasting as noted of 1/10 chart note    GOAL:  pt to consume > 75% meals/supplements ; Not met (Pt NPO)    Nutrition Diagnosis is [ x] ongoing  (NPO/Clear liquids x 11 days)      New Nutrition Diagnosis: [x ] not applicable       Interventions:   Recommend  [ ] Continue:  [x ] Change Diet To: Resume po diet pending EGD results  [ ] Nutrition Supplement:   [ ] Nutrition Support:  [ ] Other:     Monitoring and Evaluation:   [ ] PO intake [ x ] Tolerance to diet prescription [ x ] weights [ x ] labs[ x ] follow up per protocol  [ ] other:

## 2020-01-21 ENCOUNTER — RESULT REVIEW (OUTPATIENT)
Age: 80
End: 2020-01-21

## 2020-01-21 LAB
ALBUMIN SERPL ELPH-MCNC: 1.7 G/DL — LOW (ref 3.3–5)
ALP SERPL-CCNC: 66 U/L — SIGNIFICANT CHANGE UP (ref 40–120)
ALT FLD-CCNC: 18 U/L — SIGNIFICANT CHANGE UP (ref 12–78)
ANION GAP SERPL CALC-SCNC: 8 MMOL/L — SIGNIFICANT CHANGE UP (ref 5–17)
ANISOCYTOSIS BLD QL: SLIGHT — SIGNIFICANT CHANGE UP
AST SERPL-CCNC: 13 U/L — LOW (ref 15–37)
BASOPHILS # BLD AUTO: 0.02 K/UL — SIGNIFICANT CHANGE UP (ref 0–0.2)
BASOPHILS NFR BLD AUTO: 0.2 % — SIGNIFICANT CHANGE UP (ref 0–2)
BILIRUB SERPL-MCNC: 0.3 MG/DL — SIGNIFICANT CHANGE UP (ref 0.2–1.2)
BUN SERPL-MCNC: 74 MG/DL — HIGH (ref 7–23)
CALCIUM SERPL-MCNC: 7.7 MG/DL — LOW (ref 8.5–10.1)
CHLORIDE SERPL-SCNC: 118 MMOL/L — HIGH (ref 96–108)
CO2 SERPL-SCNC: 21 MMOL/L — LOW (ref 22–31)
CREAT SERPL-MCNC: 3.41 MG/DL — HIGH (ref 0.5–1.3)
EOSINOPHIL # BLD AUTO: 0.04 K/UL — SIGNIFICANT CHANGE UP (ref 0–0.5)
EOSINOPHIL NFR BLD AUTO: 0.4 % — SIGNIFICANT CHANGE UP (ref 0–6)
GLUCOSE SERPL-MCNC: 107 MG/DL — HIGH (ref 70–99)
HCT VFR BLD CALC: 33.8 % — LOW (ref 34.5–45)
HGB BLD-MCNC: 10.6 G/DL — LOW (ref 11.5–15.5)
IMM GRANULOCYTES NFR BLD AUTO: 0.7 % — SIGNIFICANT CHANGE UP (ref 0–1.5)
LYMPHOCYTES # BLD AUTO: 0.96 K/UL — LOW (ref 1–3.3)
LYMPHOCYTES # BLD AUTO: 9.7 % — LOW (ref 13–44)
MACROCYTES BLD QL: SLIGHT — SIGNIFICANT CHANGE UP
MAGNESIUM SERPL-MCNC: 1.8 MG/DL — SIGNIFICANT CHANGE UP (ref 1.6–2.6)
MANUAL SMEAR VERIFICATION: SIGNIFICANT CHANGE UP
MCHC RBC-ENTMCNC: 27.4 PG — SIGNIFICANT CHANGE UP (ref 27–34)
MCHC RBC-ENTMCNC: 31.4 GM/DL — LOW (ref 32–36)
MCV RBC AUTO: 87.3 FL — SIGNIFICANT CHANGE UP (ref 80–100)
MICROCYTES BLD QL: SLIGHT — SIGNIFICANT CHANGE UP
MONOCYTES # BLD AUTO: 0.5 K/UL — SIGNIFICANT CHANGE UP (ref 0–0.9)
MONOCYTES NFR BLD AUTO: 5.1 % — SIGNIFICANT CHANGE UP (ref 2–14)
NEUTROPHILS # BLD AUTO: 8.3 K/UL — HIGH (ref 1.8–7.4)
NEUTROPHILS NFR BLD AUTO: 83.9 % — HIGH (ref 43–77)
NRBC # BLD: 0 /100 WBCS — SIGNIFICANT CHANGE UP (ref 0–0)
OVALOCYTES BLD QL SMEAR: SLIGHT — SIGNIFICANT CHANGE UP
PHOSPHATE SERPL-MCNC: 4.3 MG/DL — SIGNIFICANT CHANGE UP (ref 2.5–4.5)
PLAT MORPH BLD: NORMAL — SIGNIFICANT CHANGE UP
PLATELET # BLD AUTO: 130 K/UL — LOW (ref 150–400)
POTASSIUM SERPL-MCNC: 3.5 MMOL/L — SIGNIFICANT CHANGE UP (ref 3.5–5.3)
POTASSIUM SERPL-SCNC: 3.5 MMOL/L — SIGNIFICANT CHANGE UP (ref 3.5–5.3)
PROT SERPL-MCNC: 5.1 GM/DL — LOW (ref 6–8.3)
RBC # BLD: 3.87 M/UL — SIGNIFICANT CHANGE UP (ref 3.8–5.2)
RBC # FLD: 21.3 % — HIGH (ref 10.3–14.5)
RBC BLD AUTO: SIGNIFICANT CHANGE UP
SODIUM SERPL-SCNC: 147 MMOL/L — HIGH (ref 135–145)
T3 SERPL-MCNC: 34 NG/DL — LOW (ref 80–200)
T4 AB SER-ACNC: 3.4 UG/DL — LOW (ref 4.6–12)
TSH SERPL-MCNC: 72.8 UU/ML — HIGH (ref 0.36–3.74)
WBC # BLD: 9.89 K/UL — SIGNIFICANT CHANGE UP (ref 3.8–10.5)
WBC # FLD AUTO: 9.89 K/UL — SIGNIFICANT CHANGE UP (ref 3.8–10.5)

## 2020-01-21 PROCEDURE — 88312 SPECIAL STAINS GROUP 1: CPT | Mod: 26

## 2020-01-21 PROCEDURE — 99231 SBSQ HOSP IP/OBS SF/LOW 25: CPT

## 2020-01-21 PROCEDURE — 88305 TISSUE EXAM BY PATHOLOGIST: CPT | Mod: 26

## 2020-01-21 PROCEDURE — 99232 SBSQ HOSP IP/OBS MODERATE 35: CPT

## 2020-01-21 RX ORDER — LEVOTHYROXINE SODIUM 125 MCG
50 TABLET ORAL AT BEDTIME
Refills: 0 | Status: DISCONTINUED | OUTPATIENT
Start: 2020-01-22 | End: 2020-01-22

## 2020-01-21 RX ORDER — SODIUM CHLORIDE 9 MG/ML
1000 INJECTION, SOLUTION INTRAVENOUS
Refills: 0 | Status: DISCONTINUED | OUTPATIENT
Start: 2020-01-21 | End: 2020-01-24

## 2020-01-21 RX ORDER — PANTOPRAZOLE SODIUM 20 MG/1
40 TABLET, DELAYED RELEASE ORAL EVERY 12 HOURS
Refills: 0 | Status: DISCONTINUED | OUTPATIENT
Start: 2020-01-21 | End: 2020-01-23

## 2020-01-21 RX ORDER — LEVOTHYROXINE SODIUM 125 MCG
50 TABLET ORAL ONCE
Refills: 0 | Status: COMPLETED | OUTPATIENT
Start: 2020-01-21 | End: 2020-01-21

## 2020-01-21 RX ADMIN — Medication 50 MILLIGRAM(S): at 23:16

## 2020-01-21 RX ADMIN — Medication 0.5 MILLIGRAM(S): at 17:44

## 2020-01-21 RX ADMIN — Medication 50 MICROGRAM(S): at 11:11

## 2020-01-21 RX ADMIN — Medication 10 MILLIGRAM(S): at 08:28

## 2020-01-21 RX ADMIN — Medication 1 SPRAY(S): at 05:21

## 2020-01-21 RX ADMIN — Medication 1 SPRAY(S): at 13:29

## 2020-01-21 RX ADMIN — Medication 1 GRAM(S): at 11:10

## 2020-01-21 RX ADMIN — Medication 1 GRAM(S): at 23:16

## 2020-01-21 RX ADMIN — Medication 1 GRAM(S): at 16:40

## 2020-01-21 RX ADMIN — PANTOPRAZOLE SODIUM 10 MG/HR: 20 TABLET, DELAYED RELEASE ORAL at 13:29

## 2020-01-21 RX ADMIN — CARVEDILOL PHOSPHATE 12.5 MILLIGRAM(S): 80 CAPSULE, EXTENDED RELEASE ORAL at 16:40

## 2020-01-21 RX ADMIN — Medication 50 MILLIGRAM(S): at 16:40

## 2020-01-21 RX ADMIN — Medication 50 MILLIGRAM(S): at 05:18

## 2020-01-21 RX ADMIN — Medication 1 GRAM(S): at 05:18

## 2020-01-21 RX ADMIN — Medication 0.5 MILLIGRAM(S): at 05:15

## 2020-01-21 RX ADMIN — CHLORHEXIDINE GLUCONATE 1 APPLICATION(S): 213 SOLUTION TOPICAL at 10:33

## 2020-01-21 RX ADMIN — Medication 1 SPRAY(S): at 23:15

## 2020-01-21 RX ADMIN — CARVEDILOL PHOSPHATE 12.5 MILLIGRAM(S): 80 CAPSULE, EXTENDED RELEASE ORAL at 05:19

## 2020-01-21 RX ADMIN — Medication 50 MILLIGRAM(S): at 11:11

## 2020-01-21 RX ADMIN — SODIUM CHLORIDE 75 MILLILITER(S): 9 INJECTION, SOLUTION INTRAVENOUS at 23:16

## 2020-01-21 RX ADMIN — SODIUM CHLORIDE 75 MILLILITER(S): 9 INJECTION, SOLUTION INTRAVENOUS at 11:38

## 2020-01-21 RX ADMIN — PANTOPRAZOLE SODIUM 10 MG/HR: 20 TABLET, DELAYED RELEASE ORAL at 03:35

## 2020-01-21 RX ADMIN — SODIUM CHLORIDE 75 MILLILITER(S): 9 INJECTION, SOLUTION INTRAVENOUS at 13:30

## 2020-01-21 RX ADMIN — AMLODIPINE BESYLATE 10 MILLIGRAM(S): 2.5 TABLET ORAL at 05:18

## 2020-01-21 RX ADMIN — Medication 3 MILLILITER(S): at 17:47

## 2020-01-21 NOTE — PROGRESS NOTE ADULT - SUBJECTIVE AND OBJECTIVE BOX
INTERVAL HPI:  79 year female with HTN, HLD, MI hx, CVA, Gi bleed, Anemia Depression and Alzheimer's dementia.  Sent from [facility] due to SOB. Possibility of pneumonia vs CHF was raised.   In ED with Respiratory distress required BIPAP support and RVP positive for RSV. Pt not able to provide more details due to dementia. Information from transfer papers and ED physician.  Nods no when asked for smoking.  01/09/20: With massive GI bleed, intubated and transferred to ICU.  01/09/20:  EGD+ Colonoscopy.  01/15/20:  Extubated.  01/16/20:  Out of ICU  01/18/20:  Back to ICU for upper GI bleed and significant drop in H & H.  01/21/20:  EGD, acute gastric ulcer.    OVERNIGHT EVENTS:  Had EGD as above.    Vital Signs Last 24 Hrs  T(C): 36.3 (21 Jan 2020 19:56), Max: 36.4 (21 Jan 2020 03:02)  T(F): 97.3 (21 Jan 2020 19:56), Max: 97.5 (21 Jan 2020 03:02)  HR: 67 (21 Jan 2020 20:00) (49 - 77)  BP: 148/45 (21 Jan 2020 20:00) (122/33 - 173/50)  BP(mean): 70 (21 Jan 2020 20:00) (57 - 81)  RR: 13 (21 Jan 2020 20:00) (7 - 19)  SpO2: 100% (21 Jan 2020 20:00) (97% - 100%)    PHYSICAL EXAM:  GEN:       Resting, comfortable.  HEENT:    Normal.    RESP:       no distress.  CVS:          Regular rate and rhythm.   ABD:         Soft, non-tender, non-distended;     MEDICATIONS  (STANDING):  amLODIPine   Tablet 10 milliGRAM(s) Oral daily  buDESOnide    Inhalation Suspension 0.5 milliGRAM(s) Inhalation every 12 hours  carvedilol 12.5 milliGRAM(s) Oral every 12 hours  chlorhexidine 4% Liquid 1 Application(s) Topical <User Schedule>  dextrose 5% + sodium chloride 0.45%. 1000 milliLiter(s) (75 mL/Hr) IV Continuous <Continuous>  hydrALAZINE 50 milliGRAM(s) Oral every 6 hours  levothyroxine Injectable 50 MICROGram(s) IV Push at bedtime  pantoprazole  Injectable 40 milliGRAM(s) IV Push every 12 hours  sodium chloride 0.65% Nasal 1 Spray(s) Both Nostrils three times a day  sucralfate suspension 1 Gram(s) Oral four times a day    MEDICATIONS  (PRN):  albuterol/ipratropium for Nebulization 3 milliLiter(s) Nebulizer every 6 hours PRN Shortness of Breath and/or Wheezing  hydrALAZINE Injectable 10 milliGRAM(s) IV Push every 6 hours PRN SBP >170    LABS:                        10.6   9.89  )-----------( 130      ( 21 Jan 2020 02:54 )             33.8     01-21    147<H>  |  118<H>  |  74<H>  ----------------------------<  107<H>  3.5   |  21<L>  |  3.41<H>    Ca    7.7<L>      21 Jan 2020 02:54  Phos  4.3     01-21  Mg     1.8     01-21    TPro  5.1<L>  /  Alb  1.7<L>  /  TBili  0.3  /  DBili  x   /  AST  13<L>  /  ALT  18  /  AlkPhos  66  01-21   ASSESSMENT AND PLAN:  ·	S/P Acute hypoxic  Respiratory failure .  ·	RSV tracheobronchitis.  ·	Left pleural effusion.  ·	Hypothyroidism with very high TSH.  ·	Anemia.  ·	VRE UTI  ·	Leukocytosis.  ·	Renal Insuffiencey.  ·	CVA by history.  ·	HTN.  ·	HLD.  ·	Alzheimer's dementia.  ·	GI bleed.    EGD findings noted.  Continue treatment.

## 2020-01-21 NOTE — BRIEF OPERATIVE NOTE - NSICDXBRIEFPOSTOP_GEN_ALL_CORE_FT
POST-OP DIAGNOSIS:  Acute gastric ulcer 21-Jan-2020 18:07:09  Geraldo Laird  GI bleed 09-Jan-2020 21:35:43  Geraldo Laird

## 2020-01-21 NOTE — PROGRESS NOTE ADULT - SUBJECTIVE AND OBJECTIVE BOX
Patient is a 79y old  Female who presents with a chief complaint of sob cough (21 Jan 2020 05:18)      INTERVAL HPI/OVERNIGHT EVENTS:  arousable, NG tube in place. Warming blanket - hypothermia    MEDICATIONS  (STANDING):  amLODIPine   Tablet 10 milliGRAM(s) Oral daily  buDESOnide    Inhalation Suspension 0.5 milliGRAM(s) Inhalation every 12 hours  carvedilol 12.5 milliGRAM(s) Oral every 12 hours  chlorhexidine 4% Liquid 1 Application(s) Topical <User Schedule>  hydrALAZINE 50 milliGRAM(s) Oral every 6 hours  levothyroxine Injectable 25 MICROGram(s) IV Push at bedtime  pantoprazole Infusion 8 mG/Hr (10 mL/Hr) IV Continuous <Continuous>  sodium chloride 0.65% Nasal 1 Spray(s) Both Nostrils three times a day  sucralfate suspension 1 Gram(s) Oral four times a day    MEDICATIONS  (PRN):  albuterol/ipratropium for Nebulization 3 milliLiter(s) Nebulizer every 6 hours PRN Shortness of Breath and/or Wheezing  hydrALAZINE Injectable 10 milliGRAM(s) IV Push every 6 hours PRN SBP >170      Allergies    No Known Allergies    Intolerances        REVIEW OF SYSTEMS:        Vital Signs Last 24 Hrs  T(C): 34.9 (21 Jan 2020 08:29), Max: 36.4 (21 Jan 2020 03:02)  T(F): 94.9 (21 Jan 2020 08:29), Max: 97.5 (21 Jan 2020 03:02)  HR: 53 (21 Jan 2020 08:45) (53 - 81)  BP: --  BP(mean): --  RR: 9 (21 Jan 2020 08:45) (6 - 32)  SpO2: 99% (21 Jan 2020 08:45) (96% - 100%)    PHYSICAL EXAM:  general       HEENT wnl  CHEST/LUNG: Clear  b/l < BS at bases  HEART: Regular rate and rhythm; No murmurs, rubs, or gallops  ABDOMEN: Soft, Nontender, Nondistended; Bowel sounds present  EXTREMITIES:  2+ Peripheral edema  LYMPH: No lymphadenopathy noted  SKIN: No rashes or lesions    LABS:                        10.6   9.89  )-----------( 130      ( 21 Jan 2020 02:54 )             33.8     01-21    147<H>  |  118<H>  |  74<H>  ----------------------------<  107<H>  3.5   |  21<L>  |  3.41<H>    Ca    7.7<L>      21 Jan 2020 02:54  Phos  4.3     01-21  Mg     1.8     01-21    TPro  5.1<L>  /  Alb  1.7<L>  /  TBili  0.3  /  DBili  x   /  AST  13<L>  /  ALT  18  /  AlkPhos  66  01-21        CAPILLARY BLOOD GLUCOSE                    RADIOLOGY & ADDITIONAL TESTS:    Imaging Personally Reviewed:  [y ] YES  [ ] NO    Consultant(s) Notes Reviewed:  [y ] YES  [ ] NO    Care Discussed with Consultants/Other Providers [ ] YES  [ ] NO    PROBLEMS:  CONGESTIVE HEART FAILURE;RSV INFECTION  CONGESTIVE BABB FAILURE;RSV INFECTION  SHORTNESS OF BREATH  CHF (congestive heart failure)  Gastrointestinal hemorrhage, unspecified gastrointestinal hemorrhage type  Goals of care, counseling/discussion  HTN (hypertension)  Hyperlipidemia  Alzheimers disease  MI, old  Hypothyroidism  RSV infection  CHF (congestive heart failure)  GI bleed AVM   Anemia  s/p 5 PRBC    Care discussed with family,         [  ]   yes  [  ]  No    imp:    stable[x ]    unstable[  ]     improving [   ]       unchanged  [  ]                Plans:  Continue present plans  , EGD to repeat when stable enough

## 2020-01-21 NOTE — PROGRESS NOTE ADULT - SUBJECTIVE AND OBJECTIVE BOX
Patient seen and examined at bedside in ICU in no acute distress.    Per RN, no hematemesis or melena overnight.  Protonix gtt.   BP: 162/49.      Vital Signs Last 24 Hrs  T(F): 97.5 (20 @ 03:02), Max: 97.5 (20 @ 08:00)  HR: 62 (20 @ 04:00)  BP: --  RR: 12 (20 @ 04:00)  SpO2: 100% (20 @ 04:00)        GENERAL: Somnolent, NAD  CHEST/LUNG: Respirations nonlabored  HEART: S1S2, Regular rate and rhythm  ABDOMEN: +Bowel sounds, soft, Nontender, Nondistended  EXTREMITIES:  no calf tenderness, No edema    I&O's Detail    2020 07:01  -  2020 07:00  --------------------------------------------------------  IN:    dextrose 5% + sodium chloride 0.45%.: 1200 mL    niCARdipine Infusion: 25 mL    pantoprazole Infusion: 240 mL    Solution: 100 mL  Total IN: 1565 mL    OUT:    Incontinent per Collection Ba mL  Total OUT: 400 mL    Total NET: 1165 mL      2020 07:01  -  2020 05:18  --------------------------------------------------------  IN:    dextrose 5% + sodium chloride 0.45%.: 500 mL    Enteral Tube Flush: 20 mL    pantoprazole Infusion: 210 mL  Total IN: 730 mL    OUT:    Incontinent per Collection Ba mL  Total OUT: 150 mL    Total NET: 580 mL          LABS:                        10.6   9.89  )-----------( 130      ( 2020 02:54 )             33.8         147<H>  |  118<H>  |  74<H>  ----------------------------<  107<H>  3.5   |  21<L>  |  3.41<H>    Ca    7.7<L>      2020 02:54  Phos  4.3       Mg     1.8         TPro  5.1<L>  /  Alb  1.7<L>  /  TBili  0.3  /  DBili  x   /  AST  13<L>  /  ALT  18  /  AlkPhos  66        Impression: 78 y/o female with PMH Alzheimer's, CVA, Depression, CHF, HTN, MI (remote), recent dx of hypothyroidism with GI Bleed, S/P EGD and Colonoscopy , s/p 3 units of PRBC total.  S/p RRT  for new episode of hematemesis and melena, upgraded to ICU, s/p 4u PRBC and 1u FFP.    H/h remains stable today, no bleeding overnight.      Plan:  -EGD scheduled for today per GI.   -Continue ICU management, BP control, and supportive care.    -Recommend serial CBCs, transfuse as needed.    -Will discuss with Dr. Chaparro

## 2020-01-21 NOTE — PROGRESS NOTE ADULT - SUBJECTIVE AND OBJECTIVE BOX
79 years old female from [facility] rehab c/o coughing sob for three to four days. Pt is sent here to rule out chf vs pneumonia. Pt is alert and oriented to person only unable to give detail hx due to hx of Alzheimer according to the [facility] pt had sat of 91 to 92 %,	    PAST MEDICAL/SURGICAL/FAMILY/SOCIAL HISTORY:    Past Medical History:  Alzheimers disease    CVA (cerebral vascular accident)    Depression    HTN (hypertension)    MI, old.  recently Dx c hypothyroidism, TSH > 111 started Levothyroxin 50mcg qd as per Dr Vigil (2020 11:41)      Chief Complaint:  Patient is a 79y old  Female who presents with a chief complaint of sob cough (2020 22:56)      Review of Systems:    General:  No wt loss, fevers, chills, night sweats  Eyes:  Good vision, no reported pain  ENT:  No sore throat, pain, runny nose, dysphagia  CV:  No pain, palpitations, hypo/hypertension  Resp:  dyspnea, cough  GI:  No pain, nausea, vomiting, diarrhea, constipation           Social History/Family History  SOCHX:   tobacco,  -  alcohol    FMHX: FA/MO  - contributory       Discussed with:  PMD, Family    Physical Exam:    Vital Signs:  Vital Signs Last 24 Hrs  T(C): 36.2 (2020 17:06), Max: 36.9 (2020 00:18)  T(F): 97.2 (2020 17:06), Max: 98.5 (2020 00:18)  HR: 74 (2020 21:55) (68 - 87)  BP: 179/78 (2020 17:06) (132/71 - 179/78)  BP(mean): --  RR: 18 (2020 17:06) (18 - 18)  SpO2: 97% (2020 21:55) (94% - 98%)  Daily     Daily Weight in k.8 (2020 04:59)  I&O's Summary    2020 07:01  -  2020 07:00  --------------------------------------------------------  IN: 560 mL / OUT: 0 mL / NET: 560 mL          Chest:  fair air entry bilateral  Cardiovascular:  Regular rhythm, S1, S2, no murmur/rub/S3/S4, no carotid/femoral/abdominal bruit, radial/pedal pulses 2+,   Abdomen:  Soft, non-tender, non-distended, normoactive bowel sounds, no HSM        Laboratory:          138  |  104  |  69<H>  ----------------------------<  111<H>  3.8   |  25  |  3.77<H>    Ca    8.1<L>      2020 07:22          Assessment:  I am asked to assist this patient admitted with shortness of breath  The patient has multifactorial causes of shortness of breath, renal insufficiency, anemia  And now after a diagnostic echocardiogram within normal ejection fraction the reading shows mitral regurgitation and tricuspid regurgitation  After review of the studies this regurgitation is not considered severe but in the moderate to severe but more moderate range  upper GI bleed  GI noted,  EGD   optimize systolic blood pressure  PRBCs  EGD noted with gastric ulcer, GI treatment continues

## 2020-01-21 NOTE — PROGRESS NOTE ADULT - SUBJECTIVE AND OBJECTIVE BOX
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 Hospital course complicated by acute blood loss anemia 2 to UGIB, acute on CKD and acute respiratory failure requiring intubation. s/p extubation 1/15. Pt never had EGD while in the ICU and pt transferred to [facility] on 1/16. Early this am RRT called 2 to hematemesis and melena; pt transfused PRBC + FFP and transferred back to the ICU. Admitting dx: Acute blood loss anemia 2 to UGIB    24h Events:  No active bleeding  BP better controlled today  A-line removed     ROS:  Unable as pt encephalopathic  hypothermic and placed on warming blanket           ICU Vital Signs Last 24 Hrs  T(C): 35.4 (21 Jan 2020 13:48), Max: 36.4 (21 Jan 2020 03:02)  T(F): 95.7 (21 Jan 2020 13:48), Max: 97.5 (21 Jan 2020 03:02)  HR: 67 (21 Jan 2020 13:00) (49 - 79)  BP: 148/40 (21 Jan 2020 13:00) (122/33 - 148/40)  BP(mean): 68 (21 Jan 2020 13:00) (57 - 68)  ABP: 118/68 (21 Jan 2020 11:43) (118/68 - 205/67)  ABP(mean): 85 (21 Jan 2020 11:43) (61 - 120)  RR: 14 (21 Jan 2020 13:00) (7 - 19)  SpO2: 97% (21 Jan 2020 13:00) (97% - 100%)        ## P/E:  Gen: lying comfortably in bed in no apparent distress  Lungs: b/l cta  Heart: s1s2 reg no murmur  Abd: Soft/+BS nontender  Ext: L UE edema  Neuro: Awake/ nonverbal

## 2020-01-21 NOTE — PROGRESS NOTE ADULT - SUBJECTIVE AND OBJECTIVE BOX
Procedure:           Upper GI endoscopy 01-21-20 @ 18:07    Indications:                     Monitored Anesthesia Care Provided by :     ____________________________________________________________________________________________________  Procedure:           Pre-Anesthesia Assessment:                       - Prior to the procedure, a History and Physical was performed, and patient                        medications and allergies were reviewed. The patient is competent. The risks                        and benefits of the procedure and the sedation options and risks were                        discussed with the patient. All questions were answered and informed consent                        was obtained. Patient identification and proposed procedure were verified by                        the physician, the nurse and the anesthesiologist in the procedure room.                        Mental Status Examination:   alert and oriented. Airway Examination: normal                        oropharyngeal airway and neck mobility. Respiratory Examination: clear to                        auscultation. CV Examination: normal. Prophylactic Antibiotics: --The patient                        does not require prophylactic antibiotics.                        Grade Assessment: -. After                        reviewing the risks and benefits, the patient was deemed in satisfactory                        condition to undergo the procedure. The anesthesia plan was to use monitored                        anesthesia care (MAC). Immediately prior to administration of medications,                        the patient was re-assessed for adequacy to receive sedatives. The heart        		     rate, respiratory rate, oxygen saturations, blood pressure, adequacy of                        pulmonary ventilation, and response to care were monitored throughout the                        procedure. The physical status of the patient was re-assessed after the                        procedure.                       After obtaining informed consent, the endoscope was passed under direct                        vision. Throughout the procedure, the patient's blood pressure, pulse, and                        oxygen saturations were monitored continuously. The Endoscope was introduced                        through the mouth, and advanced to the second part of duodenum. retroflexion was done in the stomachThe upper GI                        endoscopy was accomplished with ease. The patient tolerated the procedure                        well.    ESOPHAGUS:   WNL    STOMACH:   1 x 1 cm ulcer in fundus s/p biopsy, mild antral gastritis s/p biopsy    DUODENUM:  WNL      Assessment : Gastric ulcer    PLAN :  PPI / Carafate / check histology / full liquid diet

## 2020-01-21 NOTE — PROGRESS NOTE ADULT - SUBJECTIVE AND OBJECTIVE BOX
lying in bed, weak looking    Vital Signs Last 24 Hrs  T(C): 34.9 (21 Jan 2020 08:29), Max: 36.4 (21 Jan 2020 03:02)  T(F): 94.9 (21 Jan 2020 08:29), Max: 97.5 (21 Jan 2020 03:02)  HR: 55 (21 Jan 2020 09:00) (53 - 81)  BP: --  BP(mean): --  RR: 7 (21 Jan 2020 09:00) (6 - 32)  SpO2: 100% (21 Jan 2020 09:00) (96% - 100%)    PHYSICAL EXAM:    general - weak  HEENT - No Icterus  CVS - RRR  RS - AE B/L  Abd - soft, NT  Ext - Pulses +        LABS:                        10.6   9.89  )-----------( 130      ( 21 Jan 2020 02:54 )             33.8     01-21    147<H>  |  118<H>  |  74<H>  ----------------------------<  107<H>  3.5   |  21<L>  |  3.41<H>    Ca    7.7<L>      21 Jan 2020 02:54  Phos  4.3     01-21  Mg     1.8     01-21    TPro  5.1<L>  /  Alb  1.7<L>  /  TBili  0.3  /  DBili  x   /  AST  13<L>  /  ALT  18  /  AlkPhos  66  01-21          Culture - Sputum (collected 13 Jan 2020 17:08)  Source: .Sputum Sputum  Gram Stain (15 Ousmane 2020 16:59):    Few Squamous epithelial cells per low power field    Few polymorphonuclear leukocytes per low power field    Rare Yeast like cells per oil power field    Few Gram variable coccobacilli per oil power field  Final Report (15 Ousmane 2020 16:59):    Moderate Methicillin resistant Staphylococcus aureus    Normal Respiratory Kayce absent  Organism: Methicillin resistant Staphylococcus aureus (15 Ousmane 2020 16:59)  Organism: Methicillin resistant Staphylococcus aureus (15 Ousmane 2020 16:59)        RADIOLOGY & ADDITIONAL STUDIES:

## 2020-01-21 NOTE — PROGRESS NOTE ADULT - ATTENDING COMMENTS
Ms. Arteaga was examined. Overall stable. Her blood pressure is controlled. Her H/H has stabilized. Her abdominal examination is benign. Will follow-up EGD as planned.

## 2020-01-21 NOTE — PROGRESS NOTE ADULT - ASSESSMENT
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 Hospital course complicated by acute blood loss anemia 2 to UGIB, acute on CKD and acute respiratory failure requiring intubation. s/p extubation 1/15. Pt never had EGD while in the ICU and pt transferred to [facility] on 1/16. Early this am RRT called 2 to hematemesis and melena; pt transfused PRBC + FFP and transferred back to the ICU. Admitting dx: Acute blood loss anemia 2 to UGIB    Resp:   Supplemental O2 prn   mrsa+ sputum but clinically no PNA (SOB, sputum..)  and radiogrpahics have improved significantly from earlier in admission  will cont monitor   pt w/ severe pHTN RVSP 90 on last echo w/ severe TR -presumably msotly group 2 given diastolic dysfxn and severe MR as well  does not seem to have primary pulmonary/vascular condition    CVS:   Cont antiHTN meds -BP labile   cont ivp meds and cont po meds       Heme:   Trend Hb (stable in the 11 range)    GI:  NPO for EGD today  no active bleeding currently  PPI drip   appreciate GI reccs    Endo:  will increase Synthroid dose given very high tsh  endo eval  some of pt's sx may be related to this      Renal:   Cr in the mid 3 range   F/u as per Renal      Critical Care Time 40min

## 2020-01-21 NOTE — PROGRESS NOTE ADULT - SUBJECTIVE AND OBJECTIVE BOX
[facility] NEPHROLOGY SERVICES, [facility]  NEPHROLOGY AND HYPERTENSION  [address]  [address]  [place] [address]  [phone]    MD MART LORD, MD RAMESH MURRELL, MD CHING GARZA, MD JU BLAS, MD ISABELLA SLADE, MD ALAYNA WALLACE MD          Patient poorly responsive    MEDICATIONS  (STANDING):  amLODIPine   Tablet 10 milliGRAM(s) Oral daily  buDESOnide    Inhalation Suspension 0.5 milliGRAM(s) Inhalation every 12 hours  carvedilol 12.5 milliGRAM(s) Oral every 12 hours  chlorhexidine 4% Liquid 1 Application(s) Topical <User Schedule>  hydrALAZINE 50 milliGRAM(s) Oral every 6 hours  levothyroxine Injectable 25 MICROGram(s) IV Push at bedtime  pantoprazole Infusion 8 mG/Hr (10 mL/Hr) IV Continuous <Continuous>  sodium chloride 0.65% Nasal 1 Spray(s) Both Nostrils three times a day  sucralfate suspension 1 Gram(s) Oral four times a day    MEDICATIONS  (PRN):  albuterol/ipratropium for Nebulization 3 milliLiter(s) Nebulizer every 6 hours PRN Shortness of Breath and/or Wheezing  hydrALAZINE Injectable 10 milliGRAM(s) IV Push every 6 hours PRN SBP >170      01-20-20 @ 07:01 - 01-21-20 @ 07:00  --------------------------------------------------------  IN: 760 mL / OUT: 250 mL / NET: 510 mL    01-21-20 @ 07:01 - 01-21-20 @ 10:12  --------------------------------------------------------  IN: 30 mL / OUT: 0 mL / NET: 30 mL      PHYSICAL EXAM:      T(C): 34.9 (01-21-20 @ 08:29), Max: 36.4 (01-21-20 @ 03:02)  HR: 55 (01-21-20 @ 09:00) (53 - 81)  BP: --  RR: 7 (01-21-20 @ 09:00) (6 - 32)  SpO2: 100% (01-21-20 @ 09:00) (96% - 100%)  Wt(kg): --  Respiratory: rhonchi anteriorly,   Cardiovascular: S1 S2  Gastrointestinal: soft NT ND +BS  Extremities:   1 edema                                    10.6   9.89  )-----------( 130      ( 21 Jan 2020 02:54 )             33.8     01-21    147<H>  |  118<H>  |  74<H>  ----------------------------<  107<H>  3.5   |  21<L>  |  3.41<H>    Ca    7.7<L>      21 Jan 2020 02:54  Phos  4.3     01-21  Mg     1.8     01-21    TPro  5.1<L>  /  Alb  1.7<L>  /  TBili  0.3  /  DBili  x   /  AST  13<L>  /  ALT  18  /  AlkPhos  66  01-21      LIVER FUNCTIONS - ( 21 Jan 2020 02:54 )  Alb: 1.7 g/dL / Pro: 5.1 gm/dL / ALK PHOS: 66 U/L / ALT: 18 U/L / AST: 13 U/L / GGT: x           Creatinine Trend: 3.41<--, 3.46<--, 3.54<--, 3.53<--, 3.42<--, 3.63<--    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 + paraprotein noted      Plan  IV hydralazine prn  D/C IVF  Would defer prospect of aggressive work up or HD support as overall prognosis poor.    Obed Jamison MD

## 2020-01-22 DIAGNOSIS — E03.9 HYPOTHYROIDISM, UNSPECIFIED: ICD-10-CM

## 2020-01-22 LAB
ALBUMIN SERPL ELPH-MCNC: 1.9 G/DL — LOW (ref 3.3–5)
ALP SERPL-CCNC: 71 U/L — SIGNIFICANT CHANGE UP (ref 40–120)
ALT FLD-CCNC: 18 U/L — SIGNIFICANT CHANGE UP (ref 12–78)
ANION GAP SERPL CALC-SCNC: 3 MMOL/L — LOW (ref 5–17)
ANION GAP SERPL CALC-SCNC: 6 MMOL/L — SIGNIFICANT CHANGE UP (ref 5–17)
AST SERPL-CCNC: 14 U/L — LOW (ref 15–37)
BASOPHILS # BLD AUTO: 0.01 K/UL — SIGNIFICANT CHANGE UP (ref 0–0.2)
BASOPHILS NFR BLD AUTO: 0.1 % — SIGNIFICANT CHANGE UP (ref 0–2)
BILIRUB SERPL-MCNC: 0.4 MG/DL — SIGNIFICANT CHANGE UP (ref 0.2–1.2)
BUN SERPL-MCNC: 56 MG/DL — HIGH (ref 7–23)
BUN SERPL-MCNC: 69 MG/DL — HIGH (ref 7–23)
CALCIUM SERPL-MCNC: 8.1 MG/DL — LOW (ref 8.5–10.1)
CALCIUM SERPL-MCNC: 8.7 MG/DL — SIGNIFICANT CHANGE UP (ref 8.5–10.1)
CHLORIDE SERPL-SCNC: 108 MMOL/L — SIGNIFICANT CHANGE UP (ref 96–108)
CHLORIDE SERPL-SCNC: 118 MMOL/L — HIGH (ref 96–108)
CO2 SERPL-SCNC: 21 MMOL/L — LOW (ref 22–31)
CO2 SERPL-SCNC: 32 MMOL/L — HIGH (ref 22–31)
CREAT SERPL-MCNC: 0.73 MG/DL — SIGNIFICANT CHANGE UP (ref 0.5–1.3)
CREAT SERPL-MCNC: 3.35 MG/DL — HIGH (ref 0.5–1.3)
EOSINOPHIL # BLD AUTO: 0.03 K/UL — SIGNIFICANT CHANGE UP (ref 0–0.5)
EOSINOPHIL NFR BLD AUTO: 0.3 % — SIGNIFICANT CHANGE UP (ref 0–6)
GLUCOSE SERPL-MCNC: 134 MG/DL — HIGH (ref 70–99)
GLUCOSE SERPL-MCNC: 139 MG/DL — HIGH (ref 70–99)
HCT VFR BLD CALC: 37.7 % — SIGNIFICANT CHANGE UP (ref 34.5–45)
HGB BLD-MCNC: 11.6 G/DL — SIGNIFICANT CHANGE UP (ref 11.5–15.5)
IMM GRANULOCYTES NFR BLD AUTO: 0.4 % — SIGNIFICANT CHANGE UP (ref 0–1.5)
LYMPHOCYTES # BLD AUTO: 0.86 K/UL — LOW (ref 1–3.3)
LYMPHOCYTES # BLD AUTO: 8.6 % — LOW (ref 13–44)
MAGNESIUM SERPL-MCNC: 1.9 MG/DL — SIGNIFICANT CHANGE UP (ref 1.6–2.6)
MAGNESIUM SERPL-MCNC: 2.2 MG/DL — SIGNIFICANT CHANGE UP (ref 1.6–2.6)
MCHC RBC-ENTMCNC: 27.2 PG — SIGNIFICANT CHANGE UP (ref 27–34)
MCHC RBC-ENTMCNC: 30.8 GM/DL — LOW (ref 32–36)
MCV RBC AUTO: 88.5 FL — SIGNIFICANT CHANGE UP (ref 80–100)
MONOCYTES # BLD AUTO: 0.51 K/UL — SIGNIFICANT CHANGE UP (ref 0–0.9)
MONOCYTES NFR BLD AUTO: 5.1 % — SIGNIFICANT CHANGE UP (ref 2–14)
NEUTROPHILS # BLD AUTO: 8.6 K/UL — HIGH (ref 1.8–7.4)
NEUTROPHILS NFR BLD AUTO: 85.5 % — HIGH (ref 43–77)
NRBC # BLD: 0 /100 WBCS — SIGNIFICANT CHANGE UP (ref 0–0)
PHOSPHATE SERPL-MCNC: 3.2 MG/DL — SIGNIFICANT CHANGE UP (ref 2.5–4.5)
PHOSPHATE SERPL-MCNC: 3.6 MG/DL — SIGNIFICANT CHANGE UP (ref 2.5–4.5)
PLATELET # BLD AUTO: 114 K/UL — LOW (ref 150–400)
POTASSIUM SERPL-MCNC: 3.5 MMOL/L — SIGNIFICANT CHANGE UP (ref 3.5–5.3)
POTASSIUM SERPL-MCNC: 4.6 MMOL/L — SIGNIFICANT CHANGE UP (ref 3.5–5.3)
POTASSIUM SERPL-SCNC: 3.5 MMOL/L — SIGNIFICANT CHANGE UP (ref 3.5–5.3)
POTASSIUM SERPL-SCNC: 4.6 MMOL/L — SIGNIFICANT CHANGE UP (ref 3.5–5.3)
PREALB SERPL-MCNC: 14 MG/DL — LOW (ref 20–40)
PROT SERPL-MCNC: 5.6 GM/DL — LOW (ref 6–8.3)
RBC # BLD: 4.26 M/UL — SIGNIFICANT CHANGE UP (ref 3.8–5.2)
RBC # FLD: 22.1 % — HIGH (ref 10.3–14.5)
SODIUM SERPL-SCNC: 143 MMOL/L — SIGNIFICANT CHANGE UP (ref 135–145)
SODIUM SERPL-SCNC: 145 MMOL/L — SIGNIFICANT CHANGE UP (ref 135–145)
WBC # BLD: 10.05 K/UL — SIGNIFICANT CHANGE UP (ref 3.8–10.5)
WBC # FLD AUTO: 10.05 K/UL — SIGNIFICANT CHANGE UP (ref 3.8–10.5)

## 2020-01-22 RX ORDER — LEVOTHYROXINE SODIUM 125 MCG
88 TABLET ORAL DAILY
Refills: 0 | Status: DISCONTINUED | OUTPATIENT
Start: 2020-01-22 | End: 2020-01-28

## 2020-01-22 RX ADMIN — PANTOPRAZOLE SODIUM 40 MILLIGRAM(S): 20 TABLET, DELAYED RELEASE ORAL at 06:41

## 2020-01-22 RX ADMIN — SODIUM CHLORIDE 75 MILLILITER(S): 9 INJECTION, SOLUTION INTRAVENOUS at 21:45

## 2020-01-22 RX ADMIN — Medication 1 SPRAY(S): at 05:08

## 2020-01-22 RX ADMIN — Medication 0.5 MILLIGRAM(S): at 17:29

## 2020-01-22 RX ADMIN — Medication 1 GRAM(S): at 12:13

## 2020-01-22 RX ADMIN — Medication 1 SPRAY(S): at 21:45

## 2020-01-22 RX ADMIN — PANTOPRAZOLE SODIUM 40 MILLIGRAM(S): 20 TABLET, DELAYED RELEASE ORAL at 17:12

## 2020-01-22 RX ADMIN — Medication 1 GRAM(S): at 17:12

## 2020-01-22 RX ADMIN — SODIUM CHLORIDE 75 MILLILITER(S): 9 INJECTION, SOLUTION INTRAVENOUS at 08:12

## 2020-01-22 RX ADMIN — CHLORHEXIDINE GLUCONATE 1 APPLICATION(S): 213 SOLUTION TOPICAL at 08:07

## 2020-01-22 RX ADMIN — Medication 1 GRAM(S): at 05:08

## 2020-01-22 RX ADMIN — CARVEDILOL PHOSPHATE 12.5 MILLIGRAM(S): 80 CAPSULE, EXTENDED RELEASE ORAL at 05:07

## 2020-01-22 RX ADMIN — AMLODIPINE BESYLATE 10 MILLIGRAM(S): 2.5 TABLET ORAL at 05:07

## 2020-01-22 RX ADMIN — Medication 1 SPRAY(S): at 13:05

## 2020-01-22 RX ADMIN — Medication 88 MICROGRAM(S): at 12:13

## 2020-01-22 RX ADMIN — Medication 50 MILLIGRAM(S): at 05:07

## 2020-01-22 NOTE — PROGRESS NOTE ADULT - SUBJECTIVE AND OBJECTIVE BOX
[facility] NEPHROLOGY SERVICES, [facility]  NEPHROLOGY AND HYPERTENSION  [address]  [address]  [place] [address]  [phone]    MD MART LORD, MD CHING ROTHMAN MD YELENA ROSENBERG, MD ISABELLA SLADE, MD ALAYNA WALLACE MD          Patient comfortable no distress      MEDICATIONS  (STANDING):  amLODIPine   Tablet 10 milliGRAM(s) Oral daily  buDESOnide    Inhalation Suspension 0.5 milliGRAM(s) Inhalation every 12 hours  carvedilol 12.5 milliGRAM(s) Oral every 12 hours  chlorhexidine 4% Liquid 1 Application(s) Topical <User Schedule>  dextrose 5% + sodium chloride 0.45%. 1000 milliLiter(s) (75 mL/Hr) IV Continuous <Continuous>  hydrALAZINE 50 milliGRAM(s) Oral every 6 hours  levothyroxine 88 MICROGram(s) Oral daily  pantoprazole  Injectable 40 milliGRAM(s) IV Push every 12 hours  sodium chloride 0.65% Nasal 1 Spray(s) Both Nostrils three times a day  sucralfate suspension 1 Gram(s) Oral four times a day    MEDICATIONS  (PRN):  albuterol/ipratropium for Nebulization 3 milliLiter(s) Nebulizer every 6 hours PRN Shortness of Breath and/or Wheezing  hydrALAZINE Injectable 10 milliGRAM(s) IV Push every 6 hours PRN SBP >170      01-21-20 @ 07:01  -  01-22-20 @ 07:00  --------------------------------------------------------  IN: 2060 mL / OUT: 351 mL / NET: 1709 mL      PHYSICAL EXAM:      T(C): 35.8 (01-22-20 @ 18:06), Max: 36.6 (01-22-20 @ 11:30)  HR: 77 (01-22-20 @ 18:06) (67 - 80)  BP: 124/65 (01-22-20 @ 18:06) (91/50 - 179/60)  RR: 18 (01-22-20 @ 18:06) (13 - 21)  SpO2: 100% (01-22-20 @ 18:06) (97% - 100%)  Wt(kg): --  Respiratory: clear anteriorly, decreased BS at bases  Cardiovascular: S1 S2  Gastrointestinal: soft NT ND +BS  Extremities:  1 edema                                    11.6   10.05 )-----------( 114      ( 22 Jan 2020 05:19 )             37.7     01-22    145  |  118<H>  |  69<H>  ----------------------------<  134<H>  3.5   |  21<L>  |  3.35<H>    Ca    8.1<L>      22 Jan 2020 05:19  Phos  3.6     01-22  Mg     1.9     01-22    TPro  5.6<L>  /  Alb  1.9<L>  /  TBili  0.4  /  DBili  x   /  AST  14<L>  /  ALT  18  /  AlkPhos  71  01-22      LIVER FUNCTIONS - ( 22 Jan 2020 05:19 )  Alb: 1.9 g/dL / Pro: 5.6 gm/dL / ALK PHOS: 71 U/L / ALT: 18 U/L / AST: 14 U/L / GGT: x           Creatinine Trend: 3.35<--,<--, 3.41<--, 3.46<--, 3.54<--, 3.53<--    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 + paraprotein noted      Plan  IV hydralazine prn  D/C IVF  Would defer prospect of aggressive work up or HD support as overall prognosis poor.        Obed Jamison MD

## 2020-01-22 NOTE — PROGRESS NOTE ADULT - ASSESSMENT
HPI:  · HPI Objective Statement: 79 years old female from [facility] rehab c/o coughing sob for three to four days. Pt is sent here to rule out chf vs pneumonia. Pt is alert and oriented to person only unable to give detail hx due to hx of Alzheimer according to the [facility] pt had sat of 91 to 92 %,	  ---------------- As Above ---------------------------------------------  Patient is a poor historian. Patient admitted with Bronchitis and UTI. Called for hematemsis. Patient had three episodes of bloody vomitus. ( 1/3 of a cup ) . Patient complaining of mid epigastric / chest pain but patient can not characterize the pain.   Patient has chronic anemia. but HGB fell 8.4 --> 7.2. Was on Carafate, now on IV PPI  KUB pending  HX of CRI    Addendum : Patient had another episode of bloody vomitus    Upper GI Bleed - Secondary to (?). 1) NPO  2)Check KUB  3) IV PPI 4) telemetry  5) NGT 6) EGD 7) Hold Carafate  8) Hold iron  ---Will speak with daughter.

## 2020-01-22 NOTE — PROGRESS NOTE ADULT - SUBJECTIVE AND OBJECTIVE BOX
lying in bed    Vital Signs Last 24 Hrs  T(C): 35.3 (22 Jan 2020 06:35), Max: 36.4 (21 Jan 2020 17:38)  T(F): 95.5 (22 Jan 2020 06:35), Max: 97.5 (21 Jan 2020 17:38)  HR: 69 (22 Jan 2020 06:35) (49 - 80)  BP: 91/50 (22 Jan 2020 06:35) (91/50 - 179/60)  BP(mean): 93 (22 Jan 2020 05:00) (57 - 102)  RR: 18 (22 Jan 2020 06:35) (8 - 21)  SpO2: 97% (22 Jan 2020 06:35) (97% - 100%)    PHYSICAL EXAM:    general - weak looking  HEENT - No Icterus  CVS - RRR  RS - AE B/L  Abd - soft, NT  Ext - Pulses +        LABS:                        11.6   10.05 )-----------( 114      ( 22 Jan 2020 05:19 )             37.7     01-22    145  |  118<H>  |  69<H>  ----------------------------<  134<H>  3.5   |  21<L>  |  3.35<H>    Ca    8.1<L>      22 Jan 2020 05:19  Phos  3.6     01-22  Mg     1.9     01-22    TPro  5.6<L>  /  Alb  1.9<L>  /  TBili  0.4  /  DBili  x   /  AST  14<L>  /  ALT  18  /  AlkPhos  71  01-22          Culture - Sputum (collected 13 Jan 2020 17:08)  Source: .Sputum Sputum  Gram Stain (15 Ousmane 2020 16:59):    Few Squamous epithelial cells per low power field    Few polymorphonuclear leukocytes per low power field    Rare Yeast like cells per oil power field    Few Gram variable coccobacilli per oil power field  Final Report (15 Ousmane 2020 16:59):    Moderate Methicillin resistant Staphylococcus aureus    Normal Respiratory Kayce absent  Organism: Methicillin resistant Staphylococcus aureus (15 Ousmane 2020 16:59)  Organism: Methicillin resistant Staphylococcus aureus (15 Ousmane 2020 16:59)        RADIOLOGY & ADDITIONAL STUDIES:

## 2020-01-22 NOTE — PROGRESS NOTE ADULT - PROBLEM SELECTOR PLAN 1
1B, HGB 11.6, Repeat EGD gastric ulcer in Fundus  On IV PPI .Carafate Tolerating clear liquids. 1)  Advance to full liquids 2) F/U CBC. Reviewed with daughter today

## 2020-01-22 NOTE — PROGRESS NOTE ADULT - SUBJECTIVE AND OBJECTIVE BOX
79 years old female from [facility] rehab c/o coughing sob for three to four days. Pt is sent here to rule out chf vs pneumonia. Pt is alert and oriented to person only unable to give detail hx due to hx of Alzheimer according to the [facility] pt had sat of 91 to 92 %,	    PAST MEDICAL/SURGICAL/FAMILY/SOCIAL HISTORY:    Past Medical History:  Alzheimers disease    CVA (cerebral vascular accident)    Depression    HTN (hypertension)    MI, old.  recently Dx c hypothyroidism, TSH > 111 started Levothyroxin 50mcg qd as per Dr Vigil (2020 11:41)      Chief Complaint:  Patient is a 79y old  Female who presents with a chief complaint of sob cough (2020 22:56)      Review of Systems:    General:  No wt loss, fevers, chills, night sweats  Eyes:  Good vision, no reported pain  ENT:  No sore throat, pain, runny nose, dysphagia  CV:  No pain, palpitations, hypo/hypertension  Resp:  dyspnea, cough  GI:  No pain, nausea, vomiting, diarrhea, constipation           Social History/Family History  SOCHX:   tobacco,  -  alcohol    FMHX: FA/MO  - contributory       Discussed with:  PMD, Family    Physical Exam:    Vital Signs:  Vital Signs Last 24 Hrs  T(C): 36.2 (2020 17:06), Max: 36.9 (2020 00:18)  T(F): 97.2 (2020 17:06), Max: 98.5 (2020 00:18)  HR: 74 (2020 21:55) (68 - 87)  BP: 179/78 (2020 17:06) (132/71 - 179/78)  BP(mean): --  RR: 18 (2020 17:06) (18 - 18)  SpO2: 97% (2020 21:55) (94% - 98%)  Daily     Daily Weight in k.8 (2020 04:59)  I&O's Summary    2020 07:01  -  2020 07:00  --------------------------------------------------------  IN: 560 mL / OUT: 0 mL / NET: 560 mL          Chest:  fair air entry bilateral  Cardiovascular:  Regular rhythm, S1, S2, no murmur/rub/S3/S4, no carotid/femoral/abdominal bruit, radial/pedal pulses 2+,   Abdomen:  Soft, non-tender, non-distended, normoactive bowel sounds, no HSM        Laboratory:          138  |  104  |  69<H>  ----------------------------<  111<H>  3.8   |  25  |  3.77<H>    Ca    8.1<L>      2020 07:22          Assessment:  I am asked to assist this patient admitted with shortness of breath  The patient has multifactorial causes of shortness of breath, renal insufficiency, anemia  And now after a diagnostic echocardiogram within normal ejection fraction the reading shows mitral regurgitation and tricuspid regurgitation  After review of the studies this regurgitation is not considered severe but in the moderate to severe but more moderate range  upper GI bleed  GI noted,  EGD   optimize systolic blood pressure  PRBCs  EGD noted with gastric ulcer, GI treatment continues

## 2020-01-22 NOTE — PROGRESS NOTE ADULT - SUBJECTIVE AND OBJECTIVE BOX
Patient is a 79y old  Female who presents with a chief complaint of sob cough (22 Jan 2020 09:40)      HPI:  · HPI Objective Statement: 79 years old female from [facility] rehab c/o coughing sob for three to four days. Pt is sent here to rule out chf vs pneumonia. Pt is alert and oriented to person only unable to give detail hx due to hx of Alzheimer according to the [facility] pt had sat of 91 to 92 %,	    PAST MEDICAL/SURGICAL/FAMILY/SOCIAL HISTORY:    Past Medical History:  Alzheimers disease    CVA (cerebral vascular accident)    Depression    HTN (hypertension)    MI, old.  recently Dx c hypothyroidism, TSH > 111 started Levothyroxin 50mcg qd as per Dr Vigil (02 Jan 2020 11:41)      INTERVAL HPI/OVERNIGHT EVENTS:  The patient denies melena, hematochezia, hematemesis, nausea, vomiting, abdominal pain, constipation, diarrhea, or change in bowel movements Tolerating clear liquids    MEDICATIONS  (STANDING):  amLODIPine   Tablet 10 milliGRAM(s) Oral daily  buDESOnide    Inhalation Suspension 0.5 milliGRAM(s) Inhalation every 12 hours  carvedilol 12.5 milliGRAM(s) Oral every 12 hours  chlorhexidine 4% Liquid 1 Application(s) Topical <User Schedule>  dextrose 5% + sodium chloride 0.45%. 1000 milliLiter(s) (75 mL/Hr) IV Continuous <Continuous>  hydrALAZINE 50 milliGRAM(s) Oral every 6 hours  pantoprazole  Injectable 40 milliGRAM(s) IV Push every 12 hours  sodium chloride 0.65% Nasal 1 Spray(s) Both Nostrils three times a day  sucralfate suspension 1 Gram(s) Oral four times a day    MEDICATIONS  (PRN):  albuterol/ipratropium for Nebulization 3 milliLiter(s) Nebulizer every 6 hours PRN Shortness of Breath and/or Wheezing  hydrALAZINE Injectable 10 milliGRAM(s) IV Push every 6 hours PRN SBP >170      FAMILY HISTORY:  Family history of essential hypertension (Child)  Family history of stroke      Allergies    No Known Allergies    Intolerances        PMH/PSH:  Hypothyroidism  Constipation  Iron deficiency anemia  Major depressive disorder  Hyperlipidemia  GI bleed  Depression  Alzheimers disease  MI, old  CVA (cerebral vascular accident)  HTN (hypertension)  No pertinent past medical history  No significant past surgical history        REVIEW OF SYSTEMS:  CONSTITUTIONAL: No fever, weight loss,  EYES: No eye pain, visual disturbances, or discharge  ENMT:  No difficulty hearing, tinnitus, vertigo; No sinus or throat pain  NECK: No pain or stiffness  BREASTS: No pain, masses, or nipple discharge  RESPIRATORY: No cough, wheezing, chills or hemoptysis; No shortness of breath  CARDIOVASCULAR: No chest pain, palpitations, dizziness, or leg swelling  GASTROINTESTINAL: See above  GENITOURINARY: No dysuria, frequency, hematuria, or incontinence  NEUROLOGICAL: No headaches, memory loss, loss of strength, numbness, or tremors  SKIN: No itching, burning, rashes, or lesions   LYMPH NODES: No enlarged glands  ENDOCRINE: No heat or cold intolerance; No hair loss  MUSCULOSKELETAL: No joint pain or swelling; No muscle, back, or extremity pain  PSYCHIATRIC: No depression, anxiety, mood swings, or difficulty sleeping  HEME/LYMPH: No easy bruising, or bleeding gums  ALLERGY AND IMMUNOLOGIC: No hives or eczema    Vital Signs Last 24 Hrs  T(C): 35.3 (22 Jan 2020 06:35), Max: 36.4 (21 Jan 2020 17:38)  T(F): 95.5 (22 Jan 2020 06:35), Max: 97.5 (21 Jan 2020 17:38)  HR: 69 (22 Jan 2020 06:35) (56 - 80)  BP: 91/50 (22 Jan 2020 06:35) (91/50 - 179/60)  BP(mean): 93 (22 Jan 2020 05:00) (57 - 102)  RR: 18 (22 Jan 2020 06:35) (8 - 21)  SpO2: 97% (22 Jan 2020 06:35) (97% - 100%)    PHYSICAL EXAM:  GENERAL: NAD, well-groomed, well-developed  HEAD:  Atraumatic, Normocephalic  EYES: EOMI, PERRLA, conjunctiva and sclera clear  NECK: Supple, No JVD, Normal thyroid  NERVOUS SYSTEM:  Alert & at baseline.  Fair concentration;   CHEST/LUNG: Clear to percussion bilaterally; No rales, rhonchi, wheezing, or rubs  HEART: Regular rate and rhythm; No murmurs, rubs, or gallops  ABDOMEN: Soft, Nontender, Nondistended; Bowel sounds present  EXTREMITIES:  2+ Peripheral Pulses, No clubbing, cyanosis, or edema  LYMPH: No lymphadenopathy noted  SKIN: No rashes or lesions    LAB                          11.6   10.05 )-----------( 114      ( 22 Jan 2020 05:19 )             37.7       CBC:  01-22 @ 05:19  WBC 10.05   Hgb 11.6   Hct 37.7   Plts 114  MCV 88.5  01-21 @ 02:54  WBC 9.89   Hgb 10.6   Hct 33.8   Plts 130  MCV 87.3  01-20 @ 02:48  WBC 11.86   Hgb 11.1   Hct 34.7   Plts 137  MCV 85.3  01-19 @ 16:07  WBC 12.41   Hgb 11.6   Hct 35.4   Plts 136  MCV 84.1  01-19 @ 04:20  WBC 11.96   Hgb 11.0   Hct 33.6   Plts 136  MCV 83.6  01-19 @ 01:02  WBC 12.45   Hgb 11.4   Hct 34.5   Plts 127  MCV 82.9  01-18 @ 21:06  WBC 10.60   Hgb 11.4   Hct 34.7   Plts 118  MCV 83.4  01-18 @ 05:13  WBC 13.22   Hgb 6.8   Hct 20.7   Plts 142  MCV 88.8  01-17 @ 07:59  WBC 14.09   Hgb 8.1   Hct 25.8   Plts 219  MCV 97.0  01-16 @ 04:07  WBC 12.09   Hgb 9.7   Hct 29.9   Plts 221  MCV 94.6      Chemistry:  01-22 @ 05:19  Na+ 145  K+ 3.5  Cl- 118  CO2 21  BUN 69  Cr 3.35     01-22 @ 04:04  Na+ 143  K+ 4.6  Cl- 108  CO2 32  BUN 56  Cr 0.73     01-21 @ 02:54  Na+ 147  K+ 3.5  Cl- 118  CO2 21  BUN 74  Cr 3.41     01-20 @ 02:48  Na+ 145  K+ 3.8  Cl- 116  CO2 22  BUN 83  Cr 3.46     01-19 @ 16:07  Na+ 147  K+ 4.3  Cl- 117  CO2 24  BUN 80  Cr 3.54     01-19 @ 04:20  Na+ 145  K+ 3.0  Cl- 115  CO2 20  BUN 87  Cr 3.53     01-18 @ 03:02  Na+ 143  K+ 3.3  Cl- 113  CO2 22  BUN 90  Cr 3.42     01-17 @ 07:59  Na+ 139  K+ 3.4  Cl- 108  CO2 21  BUN 89  Cr 3.63     01-16 @ 04:07  Na+ 138  K+ 3.6  Cl- 108  CO2 20  BUN 71  Cr 3.86         Glucose, Serum: 134 mg/dL (01-22 @ 05:19)  Glucose, Serum: 139 mg/dL (01-22 @ 04:04)  Glucose, Serum: 107 mg/dL (01-21 @ 02:54)  Glucose, Serum: 130 mg/dL (01-20 @ 02:48)  Glucose, Serum: 124 mg/dL (01-19 @ 16:07)  Glucose, Serum: 86 mg/dL (01-19 @ 04:20)  Glucose, Serum: 88 mg/dL (01-19 @ 04:20)  Glucose, Serum: 139 mg/dL (01-18 @ 03:02)  Glucose, Serum: 104 mg/dL (01-17 @ 07:59)  Glucose, Serum: 135 mg/dL (01-16 @ 04:07)      22 Jan 2020 05:19    145    |  118    |  69     ----------------------------<  134    3.5     |  21     |  3.35   22 Jan 2020 04:04    143    |  108    |  56     ----------------------------<  139    4.6     |  32     |  0.73   21 Jan 2020 02:54    147    |  118    |  74     ----------------------------<  107    3.5     |  21     |  3.41   20 Jan 2020 02:48    145    |  116    |  83     ----------------------------<  130    3.8     |  22     |  3.46   19 Jan 2020 16:07    147    |  117    |  80     ----------------------------<  124    4.3     |  24     |  3.54   19 Jan 2020 04:20    145    |  115    |  87     ----------------------------<  86     3.0     |  20     |  3.53   18 Jan 2020 03:02    143    |  113    |  90     ----------------------------<  139    3.3     |  22     |  3.42     Ca    8.1        22 Jan 2020 05:19  Ca    8.7        22 Jan 2020 04:04  Ca    7.7        21 Jan 2020 02:54  Ca    7.8        20 Jan 2020 02:48  Ca    7.7        19 Jan 2020 16:07  Ca    7.5        19 Jan 2020 04:20  Ca    7.0        18 Jan 2020 03:02  Phos  3.6       22 Jan 2020 05:19  Phos  3.2       22 Jan 2020 04:04  Phos  4.3       21 Jan 2020 02:54  Phos  5.0       20 Jan 2020 02:48  Phos  5.4       19 Jan 2020 04:20  Phos  5.3       17 Jan 2020 07:59  Phos  6.0       16 Jan 2020 04:07  Mg     1.9       22 Jan 2020 05:19  Mg     2.2       22 Jan 2020 04:04  Mg     1.8       21 Jan 2020 02:54  Mg     2.0       20 Jan 2020 02:48  Mg     2.0       19 Jan 2020 16:07  Mg     1.8       19 Jan 2020 04:20  Mg     2.0       17 Jan 2020 07:59    TPro  5.6    /  Alb  1.9    /  TBili  0.4    /  DBili  x      /  AST  14     /  ALT  18     /  AlkPhos  71     22 Jan 2020 05:19  TPro  5.1    /  Alb  1.7    /  TBili  0.3    /  DBili  x      /  AST  13     /  ALT  18     /  AlkPhos  66     21 Jan 2020 02:54  TPro  5.1    /  Alb  1.7    /  TBili  0.3    /  DBili  x      /  AST  21     /  ALT  15     /  AlkPhos  56     19 Jan 2020 04:20              CAPILLARY BLOOD GLUCOSE              RADIOLOGY & ADDITIONAL TESTS:    Imaging Personally Reviewed:  [ ] YES  [ ] NO    Consultant(s) Notes Reviewed:  [ ] YES  [ ] NO    Care Discussed with Consultants/Other Providers [ ] YES  [ ] NO

## 2020-01-22 NOTE — CHART NOTE - NSCHARTNOTEFT_GEN_A_CORE
Pt is a         24hr events:    ICU Vital Signs Last 24 Hrs  T(C): 36.4 (22 Jan 2020 00:24), Max: 36.4 (21 Jan 2020 03:02)  T(F): 97.5 (22 Jan 2020 00:24), Max: 97.5 (21 Jan 2020 03:02)  HR: 70 (22 Jan 2020 01:00) (49 - 80)  BP: 147/67 (22 Jan 2020 01:00) (122/33 - 176/72)  BP(mean): 85 (22 Jan 2020 01:00) (57 - 94)  ABP: 118/68 (21 Jan 2020 11:43) (118/68 - 201/65)  ABP(mean): 85 (21 Jan 2020 11:43) (61 - 120)  RR: 14 (22 Jan 2020 01:00) (7 - 21)  SpO2: 100% (22 Jan 2020 01:00) (97% - 100%)          CBC Full  -  ( 21 Jan 2020 02:54 )  WBC Count : 9.89 K/uL  RBC Count : 3.87 M/uL  Hemoglobin : 10.6 g/dL  Hematocrit : 33.8 %  Platelet Count - Automated : 130 K/uL  Mean Cell Volume : 87.3 fl  Mean Cell Hemoglobin : 27.4 pg  Mean Cell Hemoglobin Concentration : 31.4 gm/dL  Auto Neutrophil # : 8.30 K/uL  Auto Lymphocyte # : 0.96 K/uL  Auto Monocyte # : 0.50 K/uL  Auto Eosinophil # : 0.04 K/uL  Auto Basophil # : 0.02 K/uL  Auto Neutrophil % : 83.9 %  Auto Lymphocyte % : 9.7 %  Auto Monocyte % : 5.1 %  Auto Eosinophil % : 0.4 %  Auto Basophil % : 0.2 %        01-21    147<H>  |  118<H>  |  74<H>  ----------------------------<  107<H>  3.5   |  21<L>  |  3.41<H>    Ca    7.7<L>      21 Jan 2020 02:54  Phos  4.3     01-21  Mg     1.8     01-21    TPro  5.1<L>  /  Alb  1.7<L>  /  TBili  0.3  /  DBili  x   /  AST  13<L>  /  ALT  18  /  AlkPhos  66  01-21        CULTURES:    I&O's Summary    20 Jan 2020 07:01  -  21 Jan 2020 07:00  --------------------------------------------------------  IN: 760 mL / OUT: 250 mL / NET: 510 mL    21 Jan 2020 07:01  -  22 Jan 2020 02:40  --------------------------------------------------------  IN: 1520 mL / OUT: 1 mL / NET: 1519 mL      MEDICATIONS  (STANDING):  amLODIPine   Tablet 10 milliGRAM(s) Oral daily  buDESOnide    Inhalation Suspension 0.5 milliGRAM(s) Inhalation every 12 hours  carvedilol 12.5 milliGRAM(s) Oral every 12 hours  chlorhexidine 4% Liquid 1 Application(s) Topical <User Schedule>  dextrose 5% + sodium chloride 0.45%. 1000 milliLiter(s) (75 mL/Hr) IV Continuous <Continuous>  hydrALAZINE 50 milliGRAM(s) Oral every 6 hours  levothyroxine Injectable 50 MICROGram(s) IV Push at bedtime  pantoprazole  Injectable 40 milliGRAM(s) IV Push every 12 hours  sodium chloride 0.65% Nasal 1 Spray(s) Both Nostrils three times a day  sucralfate suspension 1 Gram(s) Oral four times a day    MEDICATIONS  (PRN):  albuterol/ipratropium for Nebulization 3 milliLiter(s) Nebulizer every 6 hours PRN Shortness of Breath and/or Wheezing  hydrALAZINE Injectable 10 milliGRAM(s) IV Push every 6 hours PRN SBP >170    No Known Allergies    FAMILY HISTORY:  Family history of essential hypertension (Child)  Family history of stroke    PAST MEDICAL & SURGICAL HISTORY:  Hypothyroidism  Constipation  Iron deficiency anemia  Major depressive disorder  Hyperlipidemia  GI bleed  Depression  Alzheimers disease  MI, old  CVA (cerebral vascular accident)  HTN (hypertension)  No significant past surgical history    RADIOLOGY/IMAGING/ECHO    Social History:   ROS:    Physical Examination:  General:   Neuro:  HEENT:   PULM:   CVS:   ABD:   EXT:   SKIN:     Neuro:  Pulm:  Cardiac:  ID:  GI:  :  Endo:  Prophylaxis: Pt is an 79yr old woman with PMhx including HTN, CAD s/p MI, CHF, CVA with residual left sided weakness, CKD with solitary kidney, achalasia s/p POEMS (1/2018), Alzheimers disease, recurrent GIB, hypothyroidism and depression who presented to the ER on 1/1/20 with chief complaint of SOB and was admitted to a medical unit for management of pneumonia and CHF exacerbation with hospital course notable for +RSV, urine culture +VRE, acute on chronic renal insufficiency, respiratory failure requiring intubation, GIB now s/p Esophagogastroduodenoscopy with argon plasma coagulation (1/9/20) and total colonoscopy (1/9/20 and 1/21/20) with finding of AVM and gastric  ulcer, anemia due to blood loss requiring blood transfusions, elevated TSH and electrolyte abnormalities.      ICU Vital Signs Last 24 Hrs  T(C): 36.4 (22 Jan 2020 00:24), Max: 36.4 (21 Jan 2020 03:02)  T(F): 97.5 (22 Jan 2020 00:24), Max: 97.5 (21 Jan 2020 03:02)  HR: 70 (22 Jan 2020 01:00) (49 - 80)  BP: 147/67 (22 Jan 2020 01:00) (122/33 - 176/72)  BP(mean): 85 (22 Jan 2020 01:00) (57 - 94)  ABP: 118/68 (21 Jan 2020 11:43) (118/68 - 201/65)  ABP(mean): 85 (21 Jan 2020 11:43) (61 - 120)  RR: 14 (22 Jan 2020 01:00) (7 - 21)  SpO2: 100% (22 Jan 2020 01:00) (97% - 100%)    CBC Full  -  ( 21 Jan 2020 02:54 )  WBC Count : 9.89 K/uL  RBC Count : 3.87 M/uL  Hemoglobin : 10.6 g/dL  Hematocrit : 33.8 %  Platelet Count - Automated : 130 K/uL  Mean Cell Volume : 87.3 fl  Mean Cell Hemoglobin : 27.4 pg  Mean Cell Hemoglobin Concentration : 31.4 gm/dL  Auto Neutrophil # : 8.30 K/uL  Auto Lymphocyte # : 0.96 K/uL  Auto Monocyte # : 0.50 K/uL  Auto Eosinophil # : 0.04 K/uL  Auto Basophil # : 0.02 K/uL  Auto Neutrophil % : 83.9 %  Auto Lymphocyte % : 9.7 %  Auto Monocyte % : 5.1 %  Auto Eosinophil % : 0.4 %  Auto Basophil % : 0.2 %    01-21    147<H>  |  118<H>  |  74<H>  ----------------------------<  107<H>  3.5   |  21<L>  |  3.41<H>    Ca    7.7<L>      21 Jan 2020 02:54  Phos  4.3     01-21  Mg     1.8     01-21    TPro  5.1<L>  /  Alb  1.7<L>  /  TBili  0.3  /  DBili  x   /  AST  13<L>  /  ALT  18  /  AlkPhos  66  01-21    CULTURES:    I&O's Summary    20 Jan 2020 07:01  -  21 Jan 2020 07:00  --------------------------------------------------------  IN: 760 mL / OUT: 250 mL / NET: 510 mL    21 Jan 2020 07:01  -  22 Jan 2020 02:40  --------------------------------------------------------  IN: 1520 mL / OUT: 1 mL / NET: 1519 mL      MEDICATIONS  (STANDING):  amLODIPine   Tablet 10 milliGRAM(s) Oral daily  buDESOnide    Inhalation Suspension 0.5 milliGRAM(s) Inhalation every 12 hours  carvedilol 12.5 milliGRAM(s) Oral every 12 hours  chlorhexidine 4% Liquid 1 Application(s) Topical <User Schedule>  dextrose 5% + sodium chloride 0.45%. 1000 milliLiter(s) (75 mL/Hr) IV Continuous <Continuous>  hydrALAZINE 50 milliGRAM(s) Oral every 6 hours  levothyroxine Injectable 50 MICROGram(s) IV Push at bedtime  pantoprazole  Injectable 40 milliGRAM(s) IV Push every 12 hours  sodium chloride 0.65% Nasal 1 Spray(s) Both Nostrils three times a day  sucralfate suspension 1 Gram(s) Oral four times a day    MEDICATIONS  (PRN):  albuterol/ipratropium for Nebulization 3 milliLiter(s) Nebulizer every 6 hours PRN Shortness of Breath and/or Wheezing  hydrALAZINE Injectable 10 milliGRAM(s) IV Push every 6 hours PRN SBP >170    No Known Allergies    FAMILY HISTORY:  Family history of essential hypertension (Child)  Family history of stroke    PAST MEDICAL & SURGICAL HISTORY:  Hypothyroidism  Constipation  Iron deficiency anemia  Major depressive disorder  Hyperlipidemia  GI bleed  Depression  Alzheimers disease  MI, old  CVA (cerebral vascular accident)  HTN (hypertension)  No significant past surgical history    RADIOLOGY/IMAGING/ECHO    Social History:   ROS: Unable to assess due to confusion. Pt offered  (in Tristanian), states she wants to sleep.     Physical Examination:  General: Resting in bed in no apparent distress  Neuro: Pt easily awoken, opens eyes, no facial droop appreciated. When asked in Tristanian if she felt ok pt states "lucinda" and endorses wanting to sleep  HEENT: Atraumatic  PULM: No adventitious breath sounds appreciated, pulse ox 99% on 3LNC  CVS: SBP 150s  ABD: +bs  EXT: Trace BLE edema  SKIN: Warm/dry    Case discussed with Dr. Baker and endorsed to Dr. Singh    Recommendations  --Continue current management to include twice daily PPI and carafate (Dr. Laird following)  --f/u thyroid panel, consider endo consult   --monitor mental status. Upon exam pt awake but confused (review of chart states pt confused at baseline, confirmed with bedside RN), appreciated chronic left sided deficit.

## 2020-01-22 NOTE — PROGRESS NOTE ADULT - SUBJECTIVE AND OBJECTIVE BOX
INTERVAL HPI:    79 year female with HTN, HLD, MI hx, CVA, Gi bleed, Anemia Depression and Alzheimer's dementia.  Sent from [facility] due to SOB. Possibility of pneumonia vs CHF was raised.   In ED with Respiratory distress required BIPAP support and RVP positive for RSV. Pt not able to provide more details due to dementia. Information from transfer papers and ED physician.  Nods no when asked for smoking.  01/09/20: With massive GI bleed, intubated and transferred to ICU.  01/09/20:  EGD+ Colonoscopy.  01/15/20:  Extubated.  01/16/20:  Out of ICU  01/18/20:  Back to ICU for upper GI bleed and significant drop in H & H.  01/21/20:  EGD, acute gastric ulcer.  01/22/20:  Out of ICU.    OVERNIGHT EVENTS:  Weak and tired looking, responds to verbal commands.    Vital Signs Last 24 Hrs  T(C): 36.6 (22 Jan 2020 11:30), Max: 36.6 (22 Jan 2020 11:30)  T(F): 97.8 (22 Jan 2020 11:30), Max: 97.8 (22 Jan 2020 11:30)  HR: 67 (22 Jan 2020 11:30) (67 - 80)  BP: 99/51 (22 Jan 2020 11:30) (91/50 - 179/60)  BP(mean): 93 (22 Jan 2020 05:00) (70 - 102)  RR: 16 (22 Jan 2020 11:30) (9 - 21)  SpO2: 97% (22 Jan 2020 11:30) (97% - 100%)    PHYSICAL EXAM:  GEN:         Awake, responsive and comfortable.  HEENT:    Normal.    RESP:       no wheezing  CVS:           Regular rate and rhythm.   ABD:         Soft, non-tender, non-distended;     MEDICATIONS  (STANDING):  amLODIPine   Tablet 10 milliGRAM(s) Oral daily  buDESOnide    Inhalation Suspension 0.5 milliGRAM(s) Inhalation every 12 hours  carvedilol 12.5 milliGRAM(s) Oral every 12 hours  chlorhexidine 4% Liquid 1 Application(s) Topical <User Schedule>  dextrose 5% + sodium chloride 0.45%. 1000 milliLiter(s) (75 mL/Hr) IV Continuous <Continuous>  hydrALAZINE 50 milliGRAM(s) Oral every 6 hours  levothyroxine 88 MICROGram(s) Oral daily  pantoprazole  Injectable 40 milliGRAM(s) IV Push every 12 hours  sodium chloride 0.65% Nasal 1 Spray(s) Both Nostrils three times a day  sucralfate suspension 1 Gram(s) Oral four times a day    MEDICATIONS  (PRN):  albuterol/ipratropium for Nebulization 3 milliLiter(s) Nebulizer every 6 hours PRN Shortness of Breath and/or Wheezing  hydrALAZINE Injectable 10 milliGRAM(s) IV Push every 6 hours PRN SBP >170    LABS:                        11.6   10.05 )-----------( 114      ( 22 Jan 2020 05:19 )             37.7     01-22    145  |  118<H>  |  69<H>  ----------------------------<  134<H>  3.5   |  21<L>  |  3.35<H>    Ca    8.1<L>      22 Jan 2020 05:19  Phos  3.6     01-22  Mg     1.9     01-22    TPro  5.6<L>  /  Alb  1.9<L>  /  TBili  0.4  /  DBili  x   /  AST  14<L>  /  ALT  18  /  AlkPhos  71  01-22    ASSESSMENT AND PLAN:  ·	S/P Acute hypoxic  Respiratory failure .  ·	RSV tracheobronchitis.  ·	Left pleural effusion.  ·	Hypothyroidism with very high TSH.  ·	Anemia.  ·	VRE UTI  ·	Leukocytosis.  ·	Renal Insuffiencey.  ·	CVA by history.  ·	HTN.  ·	HLD.  ·	Alzheimer's dementia.  ·	GI bleed.    Continue O2, Symbicort.  Continue nebulizer as needed.

## 2020-01-22 NOTE — PROGRESS NOTE ADULT - SUBJECTIVE AND OBJECTIVE BOX
Patient seen and examined at bedside in ICU in no acute distress.   No hematemesis or melena overnight.    No new events.      Vital Signs Last 24 Hrs  T(F): 97.5 (20 @ 00:24), Max: 97.5 (20 @ 17:38)  HR: 75 (20 @ 04:00)  BP: 161/59 (20 @ 04:00)  RR: 13 (20 @ 04:00)  SpO2: 100% (20 @ 04:00)        GENERAL: Alert, NAD  CHEST/LUNG: Clear to auscultation bilaterally, respirations nonlabored  HEART: S1S2, Regular rate and rhythm  ABDOMEN: + Bowel sounds, soft, Nontender, Nondistended  EXTREMITIES:  no calf tenderness, No edema    I&O's Detail    2020 07:01  -  2020 07:00  --------------------------------------------------------  IN:    dextrose 5% + sodium chloride 0.45%.: 500 mL    Enteral Tube Flush: 20 mL    pantoprazole Infusion: 240 mL  Total IN: 760 mL    OUT:    Incontinent per Collection Ba mL  Total OUT: 250 mL    Total NET: 510 mL      2020 07:01  -  2020 05:22  --------------------------------------------------------  IN:    dextrose 5% + sodium chloride 0.45%.: 1350 mL    Oral Fluid: 600 mL    pantoprazole Infusion: 110 mL  Total IN: 2060 mL    OUT:    Incontinent per Collection Ba mL  Total OUT: 1 mL    Total NET: 9 mL          LABS:                        10.6   9.89  )-----------( 130      ( 2020 02:54 )             33.8         143  |  108  |  56<H>  ----------------------------<  139<H>  4.6   |  32<H>  |  0.73    Ca    8.7      2020 04:04  Phos  3.2       Mg     2.2         TPro  5.1<L>  /  Alb  1.7<L>  /  TBili  0.3  /  DBili  x   /  AST  13<L>  /  ALT  18  /  AlkPhos  66        Impression: 80 y/o female with PMH Alzheimer's, CVA, Depression, CHF, HTN, MI (remote), recent dx of hypothyroidism with GI Bleed, S/P EGD and Colonoscopy , s/p 3 units of PRBC total.  S/p RRT  for new episode of hematemesis and melena, upgraded to ICU, s/p 4u PRBC and 1u FFP.  S/p EGD : gastric ulcer in fundus s/p biopsy.    H/h remains stable today, no bleeding overnight.      Plan:  -No acute surgical intervention at this time.    -Continue ICU management, BP control, and supportive care.    -Recommend serial CBCs, transfuse as needed.    -Will discuss with Dr. Chaparro

## 2020-01-22 NOTE — CONSULT NOTE ADULT - SUBJECTIVE AND OBJECTIVE BOX
Patient is a 79y old  Female who presents with a chief complaint of sob cough (22 Jan 2020 09:40)      HPI:  · HPI Objective Statement: 79 years old female from [facility] rehab c/o coughing sob for three to four days. Pt is sent here to rule out chf vs pneumonia. Pt is alert and oriented to person only unable to give detail hx due to hx of Alzheimer according to the [facility] pt had sat of 91 to 92 %,  endocrine called for hypothyroidism  I had evaluated pt at [facility] 1/2/20 for TSH of 123, started on synthroid 50mcg then  pt poor historian, unable to provide prior thyroid disease hx  lethargic	    PAST MEDICAL/SURGICAL/FAMILY/SOCIAL HISTORY:    Past Medical History:  Alzheimers disease    CVA (cerebral vascular accident)    Depression    HTN (hypertension)    MI, old.  recently Dx c hypothyroidism      PAST MEDICAL & SURGICAL HISTORY:  Hypothyroidism  Constipation  Iron deficiency anemia  Major depressive disorder  Hyperlipidemia  GI bleed  Depression  Alzheimers disease  MI, old  CVA (cerebral vascular accident)  HTN (hypertension)  No significant past surgical history      Diabetes History:    FAMILY HISTORY:  Family history of essential hypertension (Child)  Family history of stroke        Social History:    Allergies    No Known Allergies    Intolerances        MEDICATIONS  (STANDING):  amLODIPine   Tablet 10 milliGRAM(s) Oral daily  buDESOnide    Inhalation Suspension 0.5 milliGRAM(s) Inhalation every 12 hours  carvedilol 12.5 milliGRAM(s) Oral every 12 hours  chlorhexidine 4% Liquid 1 Application(s) Topical <User Schedule>  dextrose 5% + sodium chloride 0.45%. 1000 milliLiter(s) (75 mL/Hr) IV Continuous <Continuous>  hydrALAZINE 50 milliGRAM(s) Oral every 6 hours  levothyroxine 88 MICROGram(s) Oral daily  pantoprazole  Injectable 40 milliGRAM(s) IV Push every 12 hours  sodium chloride 0.65% Nasal 1 Spray(s) Both Nostrils three times a day  sucralfate suspension 1 Gram(s) Oral four times a day    MEDICATIONS  (PRN):  albuterol/ipratropium for Nebulization 3 milliLiter(s) Nebulizer every 6 hours PRN Shortness of Breath and/or Wheezing  hydrALAZINE Injectable 10 milliGRAM(s) IV Push every 6 hours PRN SBP >170      REVIEW OF SYSTEMS:  confused and lethargic but arousable, unable to provide hx    T(C): 35.3 (01-22-20 @ 06:35), Max: 36.4 (01-21-20 @ 17:38)  HR: 69 (01-22-20 @ 06:35) (56 - 80)  BP: 91/50 (01-22-20 @ 06:35) (91/50 - 179/60)  RR: 18 (01-22-20 @ 06:35) (8 - 21)  SpO2: 97% (01-22-20 @ 06:35) (97% - 100%)  Wt(kg): --    PHYSICAL EXAM:  GENERAL: NAD, well-groomed, well-developed  HEAD:  Atraumatic, Normocephalic  NECK: Supple, No JVD,palpable  thyroid  CHEST/LUNG: Clear to percussion bilaterally; No rales, rhonchi, wheezing, or rubs  HEART: Regular rate and rhythm; No murmurs, rubs, or gallops  ABDOMEN: Soft, Nontender, Nondistended; Bowel sounds present        CAPILLARY BLOOD GLUCOSE                                11.6   10.05 )-----------( 114      ( 22 Jan 2020 05:19 )             37.7       CMP:  01-22 @ 05:19  SGPT 18  Albumin 1.9   Alk Phos 71   Anion Gap 6   SGOT 14   Total Bili 0.4   BUN 69   Calcium Total 8.1   CO2 21   Chloride 118   Creatinine 3.35   eGFR if AA 14   eGFR if non AA 12   Glucose 134   Potassium 3.5   Protein 5.6   Sodium 145      Thyroid Function Tests:  01-21 @ 14:14 TSH -- FreeT4 -- T3 34 Anti TPO -- Anti Thyroglobulin Ab -- TSI --  01-21 @ 11:46 TSH 72.800 FreeT4 -- T3 -- Anti TPO -- Anti Thyroglobulin Ab -- TSI --

## 2020-01-22 NOTE — PROGRESS NOTE ADULT - SUBJECTIVE AND OBJECTIVE BOX
Patient is a 79y old  Female who presents with a chief complaint of sob cough (22 Jan 2020 10:37)      INTERVAL HPI/OVERNIGHT EVENTS: transferred to reg floor  responsive. Tolerated Clear liquid diet  EGD P    MEDICATIONS  (STANDING):  amLODIPine   Tablet 10 milliGRAM(s) Oral daily  buDESOnide    Inhalation Suspension 0.5 milliGRAM(s) Inhalation every 12 hours  carvedilol 12.5 milliGRAM(s) Oral every 12 hours  chlorhexidine 4% Liquid 1 Application(s) Topical <User Schedule>  dextrose 5% + sodium chloride 0.45%. 1000 milliLiter(s) (75 mL/Hr) IV Continuous <Continuous>  hydrALAZINE 50 milliGRAM(s) Oral every 6 hours  levothyroxine 88 MICROGram(s) Oral daily  pantoprazole  Injectable 40 milliGRAM(s) IV Push every 12 hours  sodium chloride 0.65% Nasal 1 Spray(s) Both Nostrils three times a day  sucralfate suspension 1 Gram(s) Oral four times a day    MEDICATIONS  (PRN):  albuterol/ipratropium for Nebulization 3 milliLiter(s) Nebulizer every 6 hours PRN Shortness of Breath and/or Wheezing  hydrALAZINE Injectable 10 milliGRAM(s) IV Push every 6 hours PRN SBP >170      Allergies    No Known Allergies    Intolerances        REVIEW OF SYSTEMS:    denies any pain, lehargic  no bleeding        Vital Signs Last 24 Hrs  T(C): 36.6 (22 Jan 2020 11:30), Max: 36.6 (22 Jan 2020 11:30)  T(F): 97.8 (22 Jan 2020 11:30), Max: 97.8 (22 Jan 2020 11:30)  HR: 67 (22 Jan 2020 11:30) (56 - 80)  BP: 99/51 (22 Jan 2020 11:30) (91/50 - 179/60)  BP(mean): 93 (22 Jan 2020 05:00) (57 - 102)  RR: 16 (22 Jan 2020 11:30) (8 - 21)  SpO2: 97% (22 Jan 2020 11:30) (97% - 100%)    PHYSICAL EXAM:  general       HEENT wnl  CHEST/LUNG: conductive rhonchi  < BS at bases  HEART: Regular rate and rhythm; No murmurs, rubs, or gallops  ABDOMEN: Soft, Nontender, Nondistended; Bowel sounds present  EXTREMITIES:  2+ Peripheral Pulses, No clubbing, cyanosis, or edema  LYMPH: No lymphadenopathy noted  SKIN: No rashes or lesions    LABS:                        11.6   10.05 )-----------( 114      ( 22 Jan 2020 05:19 )             37.7     01-22    145  |  118<H>  |  69<H>  ----------------------------<  134<H>  3.5   |  21<L>  |  3.35<H>    Ca    8.1<L>      22 Jan 2020 05:19  Phos  3.6     01-22  Mg     1.9     01-22    TPro  5.6<L>  /  Alb  1.9<L>  /  TBili  0.4  /  DBili  x   /  AST  14<L>  /  ALT  18  /  AlkPhos  71  01-22        CAPILLARY BLOOD GLUCOSE                    RADIOLOGY & ADDITIONAL TESTS:    Imaging Personally Reviewed:  [y ] YES  [ ] NO    Consultant(s) Notes Reviewed:  [y ] YES  [ ] NO    Care Discussed with Consultants/Other Providers [ ] YES  [ ] NO    PROBLEMS:  CONGESTIVE HEART FAILURE;RSV INFECTION  CONGESTIVE BABB FAILURE;RSV INFECTION  SHORTNESS OF BREATH  CHF (congestive heart failure)  Hypothyroidism, unspecified type  Gastrointestinal hemorrhage, unspecified gastrointestinal hemorrhage type  Goals of care, counseling/discussion  HTN (hypertension)  Hyperlipidemia  Alzheimers disease  MI, old  Hypothyroidism  RSV infection, tracheobronchitis  CHF (congestive heart failure)  GI bleed  Anemia  UTI VRE in urine      Care discussed with family,         [ x ]   yes  [  ]  No    imp:    stable[ ]    unstable[  ]     improving [  x ]       unchanged  [  ]                Plans:  > diet  PT daily  REpeat EGD?  f/u H/h BMP  ? Protonix to PO  REhab if stable within 24-48 h

## 2020-01-23 DIAGNOSIS — Z51.5 ENCOUNTER FOR PALLIATIVE CARE: ICD-10-CM

## 2020-01-23 DIAGNOSIS — G81.94 HEMIPLEGIA, UNSPECIFIED AFFECTING LEFT NONDOMINANT SIDE: ICD-10-CM

## 2020-01-23 DIAGNOSIS — R53.81 OTHER MALAISE: ICD-10-CM

## 2020-01-23 DIAGNOSIS — Z71.89 OTHER SPECIFIED COUNSELING: ICD-10-CM

## 2020-01-23 DIAGNOSIS — G93.40 ENCEPHALOPATHY, UNSPECIFIED: ICD-10-CM

## 2020-01-23 DIAGNOSIS — F03.90 UNSPECIFIED DEMENTIA WITHOUT BEHAVIORAL DISTURBANCE: ICD-10-CM

## 2020-01-23 LAB — SURGICAL PATHOLOGY STUDY: SIGNIFICANT CHANGE UP

## 2020-01-23 PROCEDURE — 99223 1ST HOSP IP/OBS HIGH 75: CPT

## 2020-01-23 PROCEDURE — 99232 SBSQ HOSP IP/OBS MODERATE 35: CPT

## 2020-01-23 RX ORDER — ACETAMINOPHEN 500 MG
650 TABLET ORAL EVERY 6 HOURS
Refills: 0 | Status: DISCONTINUED | OUTPATIENT
Start: 2020-01-23 | End: 2020-01-30

## 2020-01-23 RX ADMIN — Medication 650 MILLIGRAM(S): at 22:22

## 2020-01-23 RX ADMIN — PANTOPRAZOLE SODIUM 40 MILLIGRAM(S): 20 TABLET, DELAYED RELEASE ORAL at 06:26

## 2020-01-23 RX ADMIN — Medication 1 GRAM(S): at 12:07

## 2020-01-23 RX ADMIN — Medication 50 MILLIGRAM(S): at 06:25

## 2020-01-23 RX ADMIN — Medication 0.5 MILLIGRAM(S): at 05:20

## 2020-01-23 RX ADMIN — Medication 0.5 MILLIGRAM(S): at 17:30

## 2020-01-23 RX ADMIN — Medication 1 SPRAY(S): at 22:22

## 2020-01-23 RX ADMIN — Medication 650 MILLIGRAM(S): at 23:22

## 2020-01-23 RX ADMIN — Medication 1 GRAM(S): at 00:23

## 2020-01-23 RX ADMIN — Medication 88 MICROGRAM(S): at 06:25

## 2020-01-23 RX ADMIN — Medication 1 GRAM(S): at 06:25

## 2020-01-23 RX ADMIN — Medication 1 SPRAY(S): at 12:08

## 2020-01-23 RX ADMIN — CARVEDILOL PHOSPHATE 12.5 MILLIGRAM(S): 80 CAPSULE, EXTENDED RELEASE ORAL at 06:26

## 2020-01-23 RX ADMIN — CHLORHEXIDINE GLUCONATE 1 APPLICATION(S): 213 SOLUTION TOPICAL at 09:47

## 2020-01-23 RX ADMIN — SODIUM CHLORIDE 75 MILLILITER(S): 9 INJECTION, SOLUTION INTRAVENOUS at 22:22

## 2020-01-23 RX ADMIN — Medication 1 GRAM(S): at 17:54

## 2020-01-23 RX ADMIN — AMLODIPINE BESYLATE 10 MILLIGRAM(S): 2.5 TABLET ORAL at 06:25

## 2020-01-23 RX ADMIN — Medication 1 SPRAY(S): at 06:26

## 2020-01-23 NOTE — CONSULT NOTE ADULT - PROBLEM SELECTOR RECOMMENDATION 2
pt has left residual weakness from CVA suffered in 1993 and has had another smaller CVA in the years since  prior to initial hospitalization in Nov/Dec pt was using a walker and has HHA during the week all day while daughter is at work.  Pt has not been home in last 2 months as she was repeatedly in hospital and rehab  do not expect her to return to baseline

## 2020-01-23 NOTE — CONSULT NOTE ADULT - ATTENDING COMMENTS
I saw and examined the patient and agree with the findings and assessment and plan as provided. Continue care as per ICU and possible repeat endoscopy by GI. No acute surgical intervention at this present time.
Pt seen and examined at RRT and on arrival to ICU 1/9.    79F PMH Alzheimer's dementia, CVA x2 1993, 2017 with residual left weakness, HTN, CAD, MI, HFpEF/CHF, hypothyroidism, CKD, achalasia s/p POEMS (Jan 2018), upper GI bleed with coffee ground emesis in Nov (no EGD performed, self resolved was to get outpt EGD), another upper GI bleed in early Dec 2019 with coffee ground emesis and melena hospitalized at [facility] transfused 1 unit pRBC, bleeding resolved EGD deferred. Pt presents from [facility] rehab for SOB, cough x3-4 days. Admitted to medical service for PNA, CHF exacerbation. Pt had + RVP for RSV and urine culture with VRE. Acute on chronic renal insufficiency. Being treated with ampicillin for UTI. Today pt started to have hematemesis. Pt had bloody vomitus a little over 1/2 a cup at first RRT which stopped and pt protecting airway, awake answering questions in Romanian. Vitals stable. Started on protonix drip and GI called. Pt then a few hours later with another RRT for a small amount of hematemesis with clots. KUB done showing distended stomach with debris. NGT inserted and 700-800 bloody red gastric output evacuated from stomach. Pt transferred to ICU for monitoring and emergent EGD. Upon arrival to ICU pt noted to become more lethargic but arousable. Intubated for airway protection and for GI procedure. DX: acute blood loss anemia, upper GI bleed, acute on chronic renal insufficiency, RSV PNA, VRE UTI.     1. NEURO  - progressively more lethargic, intubated for airway protection  - hx of prior CVA with left weakness and noted left hand contraction with mild left hemiparesis    2. PULM  - intubated for airway protection  - weaning as tolerates  - supportive care for RVS PNA    3. CV  - BP stable prior to intubation  - levophed started with propofol given for sedation and transient hypotension to SBP 90s  - wean off levophed, hypotension likely secondary to sedative effectives of medication  - hold chemical DVT prophylaxis, no antiplatelets  - hold antihypertensives    4. GI  - acute upper GI bleed with hematemesis  - spoke with GI: will do emergent bedside endoscopy   - 2 units pRBC ordered  - NGT to intermittent suctioning  - protonix drip  - monitor serial H/H  - no melena or BM during event  - NPO    5. ID  - supportive care for RSV PNA  - on day 5 of antibx for UTI  - ID follow up    6. RENAL  - JONY on CKD due to ischemic ATN  - with acute blood loss/volume loss will hold on further diuresis  - lee  - monitor output  - renal follow up    7. GEN  - daughter informed by Dr Leung over phone of events and transfer to ICU  - advanced directives addressed with family and pt remains full code for now    Critical Care Time: 90 min not including procedures
Thank you for the courtesy of this consult, will continue to follow with you  >50% of encounter time spent counseling family

## 2020-01-23 NOTE — CONSULT NOTE ADULT - PROBLEM SELECTOR RECOMMENDATION 3
pt is forgetful and limited in her communication per daughter's report pt can make few word utterances and understands what is being said but speaks little.  FAST 6

## 2020-01-23 NOTE — PROGRESS NOTE ADULT - SUBJECTIVE AND OBJECTIVE BOX
79 years old female from [facility] rehab c/o coughing sob for three to four days. Pt is sent here to rule out chf vs pneumonia. Pt is alert and oriented to person only unable to give detail hx due to hx of Alzheimer according to the [facility] pt had sat of 91 to 92 %,	    PAST MEDICAL/SURGICAL/FAMILY/SOCIAL HISTORY:    Past Medical History:  Alzheimers disease    CVA (cerebral vascular accident)    Depression    HTN (hypertension)    MI, old.  recently Dx c hypothyroidism, TSH > 111 started Levothyroxin 50mcg qd as per Dr Vigil (2020 11:41)      Chief Complaint:  Patient is a 79y old  Female who presents with a chief complaint of sob cough (2020 22:56)      Review of Systems:    General:  No wt loss, fevers, chills, night sweats  Eyes:  Good vision, no reported pain  ENT:  No sore throat, pain, runny nose, dysphagia  CV:  No pain, palpitations, hypo/hypertension  Resp:  dyspnea, cough  GI:  No pain, nausea, vomiting, diarrhea, constipation           Social History/Family History  SOCHX:   tobacco,  -  alcohol    FMHX: FA/MO  - contributory       Discussed with:  PMD, Family    Physical Exam:    Vital Signs:  Vital Signs Last 24 Hrs  T(C): 36.2 (2020 17:06), Max: 36.9 (2020 00:18)  T(F): 97.2 (2020 17:06), Max: 98.5 (2020 00:18)  HR: 74 (2020 21:55) (68 - 87)  BP: 179/78 (2020 17:06) (132/71 - 179/78)  BP(mean): --  RR: 18 (2020 17:06) (18 - 18)  SpO2: 97% (2020 21:55) (94% - 98%)  Daily     Daily Weight in k.8 (2020 04:59)  I&O's Summary    2020 07:01  -  2020 07:00  --------------------------------------------------------  IN: 560 mL / OUT: 0 mL / NET: 560 mL          Chest:  fair air entry bilateral  Cardiovascular:  Regular rhythm, S1, S2, no murmur/rub/S3/S4, no carotid/femoral/abdominal bruit, radial/pedal pulses 2+,   Abdomen:  Soft, non-tender, non-distended, normoactive bowel sounds, no HSM        Laboratory:          138  |  104  |  69<H>  ----------------------------<  111<H>  3.8   |  25  |  3.77<H>    Ca    8.1<L>      2020 07:22          Assessment:  I am asked to assist this patient admitted with shortness of breath  The patient has multifactorial causes of shortness of breath, renal insufficiency, anemia  And now after a diagnostic echocardiogram within normal ejection fraction the reading shows mitral regurgitation and tricuspid regurgitation  After review of the studies this regurgitation is not considered severe but in the moderate to severe but more moderate range  upper GI bleed  GI noted,  EGD   optimize systolic blood pressure  PRBCs  EGD noted with gastric ulcer, GI treatment continues

## 2020-01-23 NOTE — CONSULT NOTE ADULT - PROBLEM SELECTOR RECOMMENDATION 9
- continue Anemia work-up  - Epogen for Anemia of renal insufficecny  - Physical Therapy  - stool occult blood
EGD and Colonoscopy reports reviewed  Serial CBCs and transfuse as needed  No Surgical intervention at tis time  Monitor for Acute bleeding  Repeat Endoscopic examination when patient stable
increase levothyroxine to 88mcg daily and change to po  recheck tfts in 6wks
attempted to communicate with patient in English and Libyan, she did make eye contact and track but did not vocalize or nod just waved her right hand as if to excuse me. Per discussion with daughter Katelyn via phone pt has had more difficulty hearing and she ascribes her mother's lack of communication to this.

## 2020-01-23 NOTE — PROGRESS NOTE ADULT - SUBJECTIVE AND OBJECTIVE BOX
VSS  Labs stable  PRealbumin <<  Long discussion c daughter Katelyn. She states that pt's hearing declined p she was intubated, "Ensure irritates her stomach", she needs regular diet.  Pt refused blood work  I'll increase diet.  Katelyn agrees c calories count .  Unrealistic expectations.  Probably a candidate for Palliative care

## 2020-01-23 NOTE — CONSULT NOTE ADULT - PROBLEM SELECTOR RECOMMENDATION 6
residual from prior large territory CVA in 1993  was using a walker per daughter prior to initial hospital admission

## 2020-01-23 NOTE — CONSULT NOTE ADULT - SUBJECTIVE AND OBJECTIVE BOX
HPI:  79 years old female from [facility] rehab c/o coughing sob for three to four days. Pt is sent here to rule out chf vs pneumonia. Pt is alert and oriented to person only unable to give detail hx due to hx of Alzheimer according to the [facility] pt had sat of 91 to 92 %,	Pt has had some decline over the past few years but moreso over the past few months starting in November when pt was hospitalized in [facility], then admitted in December at [facility] for GIB from there to [facility] for rehab and admitted at [facility] VS for RSV and EGD    PAST MEDICAL/SURGICAL/FAMILY/SOCIAL HISTORY:    Past Medical History:  Alzheimers disease    CVA (cerebral vascular accident)    Depression    HTN (hypertension)    MI, old.  recently Dx c hypothyroidism, TSH > 111 started Levothyroxin 50mcg qd as per Dr Vigil (02 Jan 2020 11:41)    PERTINENT PM/SXH:   Hypothyroidism  Constipation  Iron deficiency anemia  Major depressive disorder  Hyperlipidemia  GI bleed  Depression  Alzheimers disease  MI, old  CVA (cerebral vascular accident)  HTN (hypertension)  No pertinent past medical history  POEMS procedure at [facility] for Achalasia  Jan 2017 or 2018?  No significant past surgical history    FAMILY HISTORY:  Family history of essential hypertension (Child)  Family history of stroke      SOCIAL HISTORY:   Significant other/partner: Yes [ ]  No [x ] Children:  Yes [ x]  No [ ] Oriental orthodox/Spirituality: Baptist  Substance hx: Yes[ ]  No [x ]   Tobacco hx:  Yes [ ] No [x ]   Alcohol hx: Yes [ ] No [x ]   Home Opioid hx:  Yes [ ] No [ x]  [ ] I-Stop Reference No:  Living Situation: [ ]Home  [ ]Long term care  [x ]Rehab [ ]Other    ADVANCE DIRECTIVES:    DNR  MOLST  Yes [ ] No [x ]  Living Will  Yes [ ]  No [ x]     [x ] Health Care Proxy(s)  [ ] Surrogate(s)  [ ] Guardian           Name(s): Phone Number(s): 2 daughters Katelyn Henderson  Laurent Odonnell  (Laurent is currently battling cancer and is unavailable)    BASELINE (I)ADL(s) (prior to admission):  [place]: [ ]Total  [ ] Moderate [ x]Dependent    Allergies    No Known Allergies    Intolerances    MEDICATIONS  (STANDING):  amLODIPine   Tablet 10 milliGRAM(s) Oral daily  buDESOnide    Inhalation Suspension 0.5 milliGRAM(s) Inhalation every 12 hours  carvedilol 12.5 milliGRAM(s) Oral every 12 hours  chlorhexidine 4% Liquid 1 Application(s) Topical <User Schedule>  dextrose 5% + sodium chloride 0.45%. 1000 milliLiter(s) (75 mL/Hr) IV Continuous <Continuous>  hydrALAZINE 50 milliGRAM(s) Oral every 6 hours  levothyroxine 88 MICROGram(s) Oral daily  sodium chloride 0.65% Nasal 1 Spray(s) Both Nostrils three times a day  sucralfate suspension 1 Gram(s) Oral four times a day    MEDICATIONS  (PRN):  albuterol/ipratropium for Nebulization 3 milliLiter(s) Nebulizer every 6 hours PRN Shortness of Breath and/or Wheezing  hydrALAZINE Injectable 10 milliGRAM(s) IV Push every 6 hours PRN SBP >170    PRESENT SYMPTOMS: [ x]Unable to obtain due to poor mentation   Source if other than patient:  [ ]Family   [ ]Team     Pain (Impact on QOL):    Location -   Severity -        Minimal acceptable level (0-10 scale):  Quality:   Onset:   Duration:                 Aggravating factors -  Relieving factors -  Radiation -    PAIN AD Score:     [email or web address]/[email or web address] (press ctrl +  left click to view)    Dyspnea:  Yes [ ] No [ ] - [ ]Mild [ ]Moderate [ ]Severe  Anxiety:    Yes [ ] No [ ] - [ ]Mild [ ]Moderate [ ]Severe  Fatigue:    Yes [ ] No [ ] - [ ]Mild [ ]Moderate [ ]Severe  Nausea:    Yes [ ] No [ ] - [ ]Mild [ ]Moderate [ ]Severe                         Loss of appetite: Yes [ ] No [ ] - [ ]Mild [ ]Moderate [ ]Severe             Constipation:  Yes [ ] No [ ] - [ ]Mild [ ]Moderate [ ]Severe  Grief: Yes [ ] No [ ]     Other Symptoms:  [ ]All other review of systems negative     Karnofsky Performance Score/Palliative Performance Status Version 2:      30   %    http://palliative.info/resource_material/[email or web address].pdf    PHYSICAL EXAM:  Vital Signs Last 24 Hrs  T(C): 36.1 (23 Jan 2020 11:34), Max: 36.6 (22 Jan 2020 16:02)  T(F): 97 (23 Jan 2020 11:34), Max: 97.8 (22 Jan 2020 16:02)  HR: 71 (23 Jan 2020 11:34) (71 - 81)  BP: 92/54 (23 Jan 2020 11:34) (92/54 - 124/65)  BP(mean): --  RR: 18 (23 Jan 2020 11:34) (18 - 18)  SpO2: 98% (23 Jan 2020 11:34) (97% - 100%) I&O's Summary    22 Jan 2020 07:01  -  23 Jan 2020 07:00  --------------------------------------------------------  IN: 12 mL / OUT: 250 mL / NET: -238 mL    23 Jan 2020 07:01  -  23 Jan 2020 13:46  --------------------------------------------------------  IN: 120 mL / OUT: 0 mL / NET: 120 mL        GENERAL:  [x]Alert  [ ]Oriented x   [ ]Lethargic  [ ]Cachexia  [ ]Unarousable  [ ]Verbal  [x ]Non-Verbal  Behavioral:   [ ] Anxiety  [ ] Delirium [ ] Agitation [ ] Other  HEENT:  [ ]Normal   [ x]Dry mouth   [ ]ET Tube/Trach  [ ]Oral lesions  PULMONARY:   [ ]Clear  [x ]Tachypnea  [ ]Audible excessive secretions   [ ]Rhonchi        [ ]Right [ ]Left [ ]Bilateral  [ ]Crackles        [ ]Right [ ]Left [ ]Bilateral  [ ]Wheezing     [ ]Right [ ]Left [ ]Bilateral  CARDIOVASCULAR:    [x ]Regular [ ]Irregular [ ]Tachy  [ ]Tolu [ ]Murmur [ ]Other  GASTROINTESTINAL:  [x ]Soft  [ ]Distended   [x ]+BS  [x ]Non tender [ ]Tender  [ ]PEG [ ]OGT/ NGT  Last BM:     GENITOURINARY:  [ ]Normal [ x] Incontinent   [ ]Oliguria/Anuria   [ ][place]  MUSCULOSKELETAL:   [ ]Normal   [ ]Weakness  [ ]Bed/Wheelchair bound [ ]Edema  NEUROLOGIC:   [ ]No focal deficits  [ ] Cognitive impairment  [ ] Dysphagia [ ]Dysarthria [ ] Paresis [ ]Other   SKIN:   [ ]Normal   [x ]Pressure ulcer(s) stage II coccyx  [ ]Rash    LABS:                        11.6   10.05 )-----------( 114      ( 22 Jan 2020 05:19 )             37.7   01-22    145  |  118<H>  |  69<H>  ----------------------------<  134<H>  3.5   |  21<L>  |  3.35<H>    Ca    8.1<L>      22 Jan 2020 05:19  Phos  3.6     01-22  Mg     1.9     01-22    < from: Xray Kidney Ureter Bladder (01.18.20 @ 08:16) >    EXAM:  XR KUB 1 VIEW                            PROCEDURE DATE:  01/18/2020          INTERPRETATION:  Portable abdominal film.    Study of January 9 showed extensive foreign material in a distended stomach.    On present examination an NG tube coils in the stomach. Stomach is largely decompressed. Foreign material in the stomach has been essentially removed.    There is some air contents scattered in distal bowel.    IMPRESSION: As above.                WAYNE MANN M.D., ATTENDING RADIOLOGIST  This document has been electronically signed. Jan 18 2020 11:00AM        < end of copied text >  TPro  5.6<L>  /  Alb  1.9<L>  /  TBili  0.4  /  DBili  x   /  AST  14<L>  /  ALT  18  /  AlkPhos  71  01-22        RADIOLOGY & ADDITIONAL STUDIES: < from: US Duplex Venous Upper Ext Ltd, Left (01.18.20 @ 14:37) >  EXAM:  US DPLX UPR EXT VEINS LTD LT                            PROCEDURE DATE:  01/18/2020          INTERPRETATION:  History:   GI bleed. Swelling in the left arm    Technique: Grayscale, color Doppler ultrasound, and spectral analysis of the left upper extremity veins    Comparison: None    Findings/  Impression:    The left internal jugular, subclavian, axillary, brachial, cephalic, basilic, radial, and ulnar veins are patent with no evidence of thrombus.                MALIK LINARES M.D., ATTENDING RADIOLOGIST  This document has been electronically signed. Jan 18 2020  2:47PM              < end of copied text >      PROTEIN CALORIE MALNUTRITION PRESENT: [ x] Yes [ ] No  [x ] PPSV2 < or = to 30% [ x] significant weight loss  [ x] poor nutritional intake [ ] catabolic state [x ] anasarca     Prealbumin, Serum: 14 mg/dL (01-22-20 @ 14:31)      REFERRALS:   [ ]Chaplaincy  [ ] Hospice  [ ]Child Life  [ ]Social Work  [ ]Case management [ ]Holistic Therapy   Goals of Care Discussion Document: HPI:  79 years old female from [facility] rehab c/o coughing sob for three to four days. Pt is sent here to rule out chf vs pneumonia. Pt is alert and oriented to person only unable to give detail hx due to hx of Alzheimer according to the [facility] pt had sat of 91 to 92 %,	Pt has had some decline over the past few years but moreso over the past few months starting in November when pt was hospitalized in [facility], then admitted in December at [facility] for GIB from there to [facility] for rehab and admitted at [facility] VS for RSV and started to have GI Bleeding necessitating EGD, pt was in ICU, downgraded and bled again needing repeat EGD.    PAST MEDICAL/SURGICAL/FAMILY/SOCIAL HISTORY:    Past Medical History:  Alzheimers disease    CVA (cerebral vascular accident)    Depression    HTN (hypertension)    MI, old.  recently Dx c hypothyroidism, TSH > 111 started Levothyroxin 50mcg qd as per Dr Vigil (02 Jan 2020 11:41)    PERTINENT PM/SXH:   Hypothyroidism  Constipation  Iron deficiency anemia  Major depressive disorder  Hyperlipidemia  GI bleed  Depression  Alzheimers disease  MI, old  CVA (cerebral vascular accident)  HTN (hypertension)  No pertinent past medical history  POEMS procedure at [facility] for Achalasia  Jan 2017 or 2018?  No significant past surgical history    FAMILY HISTORY:  Family history of essential hypertension (Child)  Family history of stroke      SOCIAL HISTORY: prior to hospitalization and rehab was at home w A on weekdays 8a-5pm using a walker  Significant other/partner: Yes [ ]  No [x ] Children:  Yes [ x]  No [ ] Samaritan/Spirituality: Pentecostalism  Substance hx: Yes[ ]  No [x ]   Tobacco hx:  Yes [ ] No [x ]   Alcohol hx: Yes [ ] No [x ]   Home Opioid hx:  Yes [ ] No [ x]  [ ] I-Stop Reference No:[id number]   Living Situation: [ ]Home  [ ]Long term care  [x ]Rehab [ ]Other    ADVANCE DIRECTIVES:    DNR  MOLST  Yes [ ] No [x ]  Living Will  Yes [ ]  No [ x]     [x ] Health Care Proxy(s)  [ ] Surrogate(s)  [ ] Guardian           Name(s): Phone Number(s): 2 daughters Katelyn Henderson  Laurent Odonnell  (Laurent is currently battling cancer and is unavailable)    BASELINE (I)ADL(s) (prior to admission):  [place]: [ ]Total  [ ] Moderate [ x]Dependent    Allergies    No Known Allergies    Intolerances    MEDICATIONS  (STANDING):  amLODIPine   Tablet 10 milliGRAM(s) Oral daily  buDESOnide    Inhalation Suspension 0.5 milliGRAM(s) Inhalation every 12 hours  carvedilol 12.5 milliGRAM(s) Oral every 12 hours  chlorhexidine 4% Liquid 1 Application(s) Topical <User Schedule>  dextrose 5% + sodium chloride 0.45%. 1000 milliLiter(s) (75 mL/Hr) IV Continuous <Continuous>  hydrALAZINE 50 milliGRAM(s) Oral every 6 hours  levothyroxine 88 MICROGram(s) Oral daily  sodium chloride 0.65% Nasal 1 Spray(s) Both Nostrils three times a day  sucralfate suspension 1 Gram(s) Oral four times a day    MEDICATIONS  (PRN):  albuterol/ipratropium for Nebulization 3 milliLiter(s) Nebulizer every 6 hours PRN Shortness of Breath and/or Wheezing  hydrALAZINE Injectable 10 milliGRAM(s) IV Push every 6 hours PRN SBP >170    PRESENT SYMPTOMS: [ x]Unable to obtain due to poor mentation   Source if other than patient:  [ ]Family   [ ]Team     Pain (Impact on QOL):    Location -   Severity -        Minimal acceptable level (0-10 scale):  Quality:   Onset:   Duration:                 Aggravating factors -  Relieving factors -  Radiation -    PAIN AD Score:     [email or web address].[place].[email or web address]/painad.pdf (press ctrl +  left click to view)    Dyspnea:  Yes [ ] No [ ] - [ ]Mild [ ]Moderate [ ]Severe  Anxiety:    Yes [ ] No [ ] - [ ]Mild [ ]Moderate [ ]Severe  Fatigue:    Yes [ ] No [ ] - [ ]Mild [ ]Moderate [ ]Severe  Nausea:    Yes [ ] No [ ] - [ ]Mild [ ]Moderate [ ]Severe                         Loss of appetite: Yes [ ] No [ ] - [ ]Mild [ ]Moderate [ ]Severe             Constipation:  Yes [ ] No [ ] - [ ]Mild [ ]Moderate [ ]Severe  Grief: Yes [ ] No [ ]     Other Symptoms:  [ ]All other review of systems negative     Karnofsky Performance Score/Palliative Performance Status Version 2:      30   %    http://palliative.info/resource_material/PPSv2.pdf    PHYSICAL EXAM:  Vital Signs Last 24 Hrs  T(C): 36.1 (23 Jan 2020 11:34), Max: 36.6 (22 Jan 2020 16:02)  T(F): 97 (23 Jan 2020 11:34), Max: 97.8 (22 Jan 2020 16:02)  HR: 71 (23 Jan 2020 11:34) (71 - 81)  BP: 92/54 (23 Jan 2020 11:34) (92/54 - 124/65)  BP(mean): --  RR: 18 (23 Jan 2020 11:34) (18 - 18)  SpO2: 98% (23 Jan 2020 11:34) (97% - 100%) I&O's Summary    22 Jan 2020 07:01  -  23 Jan 2020 07:00  --------------------------------------------------------  IN: 12 mL / OUT: 250 mL / NET: -238 mL    23 Jan 2020 07:01  -  23 Jan 2020 13:46  --------------------------------------------------------  IN: 120 mL / OUT: 0 mL / NET: 120 mL        GENERAL:  [x]Alert  [ ]Oriented x   [ ]Lethargic  [ ]Cachexia  [ ]Unarousable  [ ]Verbal  [x ]Non-Verbal  Behavioral:   [ ] Anxiety  [ ] Delirium [ ] Agitation [ x] Other  HEENT:  [ ]Normal   [ x]Dry mouth   [ ]ET Tube/Trach  [ ]Oral lesions  PULMONARY:   [ ]Clear  [x ]Tachypnea  [ ]Audible excessive secretions   [ ]Rhonchi        [ ]Right [ ]Left [ ]Bilateral  [ ]Crackles        [ ]Right [ ]Left [ ]Bilateral  [ ]Wheezing     [ ]Right [ ]Left [ ]Bilateral  CARDIOVASCULAR:    [x ]Regular [ ]Irregular [ ]Tachy  [ ]Tolu [ ]Murmur [ ]Other  GASTROINTESTINAL:  [x ]Soft  [ ]Distended   [x ]+BS  [x ]Non tender [ ]Tender  [ ]PEG [ ]OGT/ NGT  Last BM:     GENITOURINARY:  [ ]Normal [ x] Incontinent   [ ]Oliguria/Anuria   [ ]Stevenson  MUSCULOSKELETAL:   [ ]Normal   [ ]Weakness  [x]Bed/Wheelchair bound [ x]Edema  NEUROLOGIC:   [ ]No focal deficits  [x ] Cognitive impairment  [x ] Dysphagia [x ]Dysarthria [x ] L sided Paresis [x ]Other - hard of hearing  SKIN:   [ ]Normal   [x ]Pressure ulcer(s) stage II coccyx  [ ]Rash    LABS:                        11.6   10.05 )-----------( 114      ( 22 Jan 2020 05:19 )             37.7   01-22    145  |  118<H>  |  69<H>  ----------------------------<  134<H>  3.5   |  21<L>  |  3.35<H>    Ca    8.1<L>      22 Jan 2020 05:19  Phos  3.6     01-22  Mg     1.9     01-22    < from: Xray Kidney Ureter Bladder (01.18.20 @ 08:16) >    EXAM:  XR KUB 1 VIEW                            PROCEDURE DATE:  01/18/2020          INTERPRETATION:  Portable abdominal film.    Study of January 9 showed extensive foreign material in a distended stomach.    On present examination an NG tube coils in the stomach. Stomach is largely decompressed. Foreign material in the stomach has been essentially removed.    There is some air contents scattered in distal bowel.    IMPRESSION: As above.                WAYNE MANN M.D., ATTENDING RADIOLOGIST  This document has been electronically signed. Jan 18 2020 11:00AM        < end of copied text >  TPro  5.6<L>  /  Alb  1.9<L>  /  TBili  0.4  /  DBili  x   /  AST  14<L>  /  ALT  18  /  AlkPhos  71  01-22        RADIOLOGY & ADDITIONAL STUDIES: < from: US Duplex Venous Upper Ext Ltd, Left (01.18.20 @ 14:37) >  EXAM:  US DPLX UPR EXT VEINS LTD LT                            PROCEDURE DATE:  01/18/2020          INTERPRETATION:  History:   GI bleed. Swelling in the left arm    Technique: Grayscale, color Doppler ultrasound, and spectral analysis of the left upper extremity veins    Comparison: None    Findings/  Impression:    The left internal jugular, subclavian, axillary, brachial, cephalic, basilic, radial, and ulnar veins are patent with no evidence of thrombus.                MALIK LINARES M.D., ATTENDING RADIOLOGIST  This document has been electronically signed. Jan 18 2020  2:47PM              < end of copied text >      PROTEIN CALORIE MALNUTRITION PRESENT: [ x] Yes [ ] No  [x ] PPSV2 < or = to 30% [ x] significant weight loss  [ x] poor nutritional intake [ ] catabolic state [x ] anasarca     Prealbumin, Serum: 14 mg/dL (01-22-20 @ 14:31)      REFERRALS:   [ ]Chaplaincy  [ ] Hospice  [ ]Child Life  [ ]Social Work  [ ]Case management [ ]Holistic Therapy   Goals of Care Discussion Document:

## 2020-01-23 NOTE — PROGRESS NOTE ADULT - SUBJECTIVE AND OBJECTIVE BOX
Patient is a 79y old  Female who presents with a chief complaint of sob cough (23 Jan 2020 09:35)      INTERVAL HPI/OVERNIGHT EVENTS:  pt NAD  lethargic but arousable    MEDICATIONS  (STANDING):  amLODIPine   Tablet 10 milliGRAM(s) Oral daily  buDESOnide    Inhalation Suspension 0.5 milliGRAM(s) Inhalation every 12 hours  carvedilol 12.5 milliGRAM(s) Oral every 12 hours  chlorhexidine 4% Liquid 1 Application(s) Topical <User Schedule>  dextrose 5% + sodium chloride 0.45%. 1000 milliLiter(s) (75 mL/Hr) IV Continuous <Continuous>  hydrALAZINE 50 milliGRAM(s) Oral every 6 hours  levothyroxine 88 MICROGram(s) Oral daily  pantoprazole  Injectable 40 milliGRAM(s) IV Push every 12 hours  sodium chloride 0.65% Nasal 1 Spray(s) Both Nostrils three times a day  sucralfate suspension 1 Gram(s) Oral four times a day    MEDICATIONS  (PRN):  albuterol/ipratropium for Nebulization 3 milliLiter(s) Nebulizer every 6 hours PRN Shortness of Breath and/or Wheezing  hydrALAZINE Injectable 10 milliGRAM(s) IV Push every 6 hours PRN SBP >170      REVIEW OF SYSTEMS:  CONSTITUTIONAL: No fever  RESPIRATORY:  No shortness of breath  CARDIOVASCULAR: No chest pain      Vital Signs Last 24 Hrs  T(C): 36.2 (23 Jan 2020 06:20), Max: 36.6 (22 Jan 2020 11:30)  T(F): 97.1 (23 Jan 2020 06:20), Max: 97.8 (22 Jan 2020 11:30)  HR: 81 (23 Jan 2020 06:20) (67 - 81)  BP: 123/65 (23 Jan 2020 06:20) (96/52 - 124/65)  BP(mean): --  RR: 18 (23 Jan 2020 06:20) (16 - 18)  SpO2: 98% (23 Jan 2020 06:13) (97% - 100%)    PHYSICAL EXAM:  GENERAL: NAD    LABS:                        11.6   10.05 )-----------( 114      ( 22 Jan 2020 05:19 )             37.7     01-22    145  |  118<H>  |  69<H>  ----------------------------<  134<H>  3.5   |  21<L>  |  3.35<H>    Ca    8.1<L>      22 Jan 2020 05:19  Phos  3.6     01-22  Mg     1.9     01-22    TPro  5.6<L>  /  Alb  1.9<L>  /  TBili  0.4  /  DBili  x   /  AST  14<L>  /  ALT  18  /  AlkPhos  71  01-22        CAPILLARY BLOOD GLUCOSE        Lipid panel:               RADIOLOGY & ADDITIONAL TESTS:

## 2020-01-23 NOTE — PROGRESS NOTE ADULT - SUBJECTIVE AND OBJECTIVE BOX
Patient is a 79y old  Female who presents with a chief complaint of sob cough (23 Jan 2020 13:43)      HPI:  · HPI Objective Statement: 79 years old female from [facility] rehab c/o coughing sob for three to four days. Pt is sent here to rule out chf vs pneumonia. Pt is alert and oriented to person only unable to give detail hx due to hx of Alzheimer according to the [facility] pt had sat of 91 to 92 %,	    PAST MEDICAL/SURGICAL/FAMILY/SOCIAL HISTORY:    Past Medical History:  Alzheimers disease    CVA (cerebral vascular accident)    Depression    HTN (hypertension)    MI, old.  recently Dx c hypothyroidism, TSH > 111 started Levothyroxin 50mcg qd as per Dr Vigil (02 Jan 2020 11:41)      INTERVAL HPI/OVERNIGHT EVENTS:   Patient now on regular food. The nurse denies melena, hematochezia, hematemesis, nausea, vomiting, abdominal pain, constipation, diarrhea, or change in bowel movements     MEDICATIONS  (STANDING):  amLODIPine   Tablet 10 milliGRAM(s) Oral daily  buDESOnide    Inhalation Suspension 0.5 milliGRAM(s) Inhalation every 12 hours  carvedilol 12.5 milliGRAM(s) Oral every 12 hours  chlorhexidine 4% Liquid 1 Application(s) Topical <User Schedule>  dextrose 5% + sodium chloride 0.45%. 1000 milliLiter(s) (75 mL/Hr) IV Continuous <Continuous>  hydrALAZINE 50 milliGRAM(s) Oral every 6 hours  levothyroxine 88 MICROGram(s) Oral daily  sodium chloride 0.65% Nasal 1 Spray(s) Both Nostrils three times a day  sucralfate suspension 1 Gram(s) Oral four times a day    MEDICATIONS  (PRN):  albuterol/ipratropium for Nebulization 3 milliLiter(s) Nebulizer every 6 hours PRN Shortness of Breath and/or Wheezing  hydrALAZINE Injectable 10 milliGRAM(s) IV Push every 6 hours PRN SBP >170      FAMILY HISTORY:  Family history of essential hypertension (Child)  Family history of stroke      Allergies    No Known Allergies    Intolerances        PMH/PSH:  Hypothyroidism  Constipation  Iron deficiency anemia  Major depressive disorder  Hyperlipidemia  GI bleed  Depression  Alzheimers disease  MI, old  CVA (cerebral vascular accident)  HTN (hypertension)  No pertinent past medical history  No significant past surgical history        REVIEW OF SYSTEMS:  CONSTITUTIONAL: No fever, weight loss, or fatigue  EYES: No eye pain, visual disturbances, or discharge  ENMT:  No difficulty hearing, tinnitus, vertigo; No sinus or throat pain  NECK: No pain or stiffness  BREASTS: No pain, masses, or nipple discharge  RESPIRATORY: No cough, wheezing, chills or hemoptysis; No shortness of breath  CARDIOVASCULAR: No chest pain, palpitations, dizziness, or leg swelling  GASTROINTESTINAL:  see above  GENITOURINARY: No dysuria, frequency, hematuria, or incontinence  NEUROLOGICAL: No headaches, numbness, or tremors  SKIN: No itching, burning, rashes, or lesions   LYMPH NODES: No enlarged glands  ENDOCRINE: No heat or cold intolerance; No hair loss  MUSCULOSKELETAL: No joint pain or swelling; No muscle, back, or extremity pain  PSYCHIATRIC: No depression, anxiety, mood swings, or difficulty sleeping  HEME/LYMPH: No easy bruising, or bleeding gums  ALLERGY AND IMMUNOLOGIC: No hives or eczema    Vital Signs Last 24 Hrs  T(C): 36.1 (23 Jan 2020 11:34), Max: 36.2 (23 Jan 2020 06:20)  T(F): 97 (23 Jan 2020 11:34), Max: 97.1 (23 Jan 2020 06:20)  HR: 71 (23 Jan 2020 11:34) (71 - 81)  BP: 92/54 (23 Jan 2020 11:34) (92/54 - 124/65)  BP(mean): --  RR: 18 (23 Jan 2020 11:34) (18 - 18)  SpO2: 98% (23 Jan 2020 11:34) (97% - 100%)    PHYSICAL EXAM:  GENERAL: NAD, well-groomed, well-developed  HEAD:  Atraumatic, Normocephalic  EYES: EOMI, PERRLA, conjunctiva and sclera clear  NECK: Supple, No JVD, Normal thyroid  NERVOUS SYSTEM:  Alert & at baseline  CHEST/LUNG: Clear to percussion bilaterally; No rales, rhonchi, wheezing, or rubs  HEART: Regular rate and rhythm; No murmurs, rubs, or gallops  ABDOMEN: Soft, Nontender, Nondistended; Bowel sounds present  EXTREMITIES:  2+ Peripheral Pulses, No clubbing, cyanosis, or edema  LYMPH: No lymphadenopathy noted  SKIN: No rashes or lesions    LAB                          11.6   10.05 )-----------( 114      ( 22 Jan 2020 05:19 )             37.7       CBC:  01-22 @ 05:19  WBC 10.05   Hgb 11.6   Hct 37.7   Plts 114  MCV 88.5  01-21 @ 02:54  WBC 9.89   Hgb 10.6   Hct 33.8   Plts 130  MCV 87.3  01-20 @ 02:48  WBC 11.86   Hgb 11.1   Hct 34.7   Plts 137  MCV 85.3  01-19 @ 16:07  WBC 12.41   Hgb 11.6   Hct 35.4   Plts 136  MCV 84.1  01-19 @ 04:20  WBC 11.96   Hgb 11.0   Hct 33.6   Plts 136  MCV 83.6  01-19 @ 01:02  WBC 12.45   Hgb 11.4   Hct 34.5   Plts 127  MCV 82.9  01-18 @ 21:06  WBC 10.60   Hgb 11.4   Hct 34.7   Plts 118  MCV 83.4  01-18 @ 05:13  WBC 13.22   Hgb 6.8   Hct 20.7   Plts 142  MCV 88.8  01-17 @ 07:59  WBC 14.09   Hgb 8.1   Hct 25.8   Plts 219  MCV 97.0      Chemistry:  01-22 @ 05:19  Na+ 145  K+ 3.5  Cl- 118  CO2 21  BUN 69  Cr 3.35     01-22 @ 04:04  Na+ 143  K+ 4.6  Cl- 108  CO2 32  BUN 56  Cr 0.73     01-21 @ 02:54  Na+ 147  K+ 3.5  Cl- 118  CO2 21  BUN 74  Cr 3.41     01-20 @ 02:48  Na+ 145  K+ 3.8  Cl- 116  CO2 22  BUN 83  Cr 3.46     01-19 @ 16:07  Na+ 147  K+ 4.3  Cl- 117  CO2 24  BUN 80  Cr 3.54     01-19 @ 04:20  Na+ 145  K+ 3.0  Cl- 115  CO2 20  BUN 87  Cr 3.53     01-18 @ 03:02  Na+ 143  K+ 3.3  Cl- 113  CO2 22  BUN 90  Cr 3.42     01-17 @ 07:59  Na+ 139  K+ 3.4  Cl- 108  CO2 21  BUN 89  Cr 3.63         Glucose, Serum: 134 mg/dL (01-22 @ 05:19)  Glucose, Serum: 139 mg/dL (01-22 @ 04:04)  Glucose, Serum: 107 mg/dL (01-21 @ 02:54)  Glucose, Serum: 130 mg/dL (01-20 @ 02:48)  Glucose, Serum: 124 mg/dL (01-19 @ 16:07)  Glucose, Serum: 86 mg/dL (01-19 @ 04:20)  Glucose, Serum: 88 mg/dL (01-19 @ 04:20)  Glucose, Serum: 139 mg/dL (01-18 @ 03:02)  Glucose, Serum: 104 mg/dL (01-17 @ 07:59)      22 Jan 2020 05:19    145    |  118    |  69     ----------------------------<  134    3.5     |  21     |  3.35   22 Jan 2020 04:04    143    |  108    |  56     ----------------------------<  139    4.6     |  32     |  0.73   21 Jan 2020 02:54    147    |  118    |  74     ----------------------------<  107    3.5     |  21     |  3.41   20 Jan 2020 02:48    145    |  116    |  83     ----------------------------<  130    3.8     |  22     |  3.46   19 Jan 2020 16:07    147    |  117    |  80     ----------------------------<  124    4.3     |  24     |  3.54   19 Jan 2020 04:20    145    |  115    |  87     ----------------------------<  86     3.0     |  20     |  3.53   18 Jan 2020 03:02    143    |  113    |  90     ----------------------------<  139    3.3     |  22     |  3.42     Ca    8.1        22 Jan 2020 05:19  Ca    8.7        22 Jan 2020 04:04  Ca    7.7        21 Jan 2020 02:54  Ca    7.8        20 Jan 2020 02:48  Ca    7.7        19 Jan 2020 16:07  Ca    7.5        19 Jan 2020 04:20  Ca    7.0        18 Jan 2020 03:02  Phos  3.6       22 Jan 2020 05:19  Phos  3.2       22 Jan 2020 04:04  Phos  4.3       21 Jan 2020 02:54  Phos  5.0       20 Jan 2020 02:48  Phos  5.4       19 Jan 2020 04:20  Phos  5.3       17 Jan 2020 07:59  Mg     1.9       22 Jan 2020 05:19  Mg     2.2       22 Jan 2020 04:04  Mg     1.8       21 Jan 2020 02:54  Mg     2.0       20 Jan 2020 02:48  Mg     2.0       19 Jan 2020 16:07  Mg     1.8       19 Jan 2020 04:20  Mg     2.0       17 Jan 2020 07:59    TPro  5.6    /  Alb  1.9    /  TBili  0.4    /  DBili  x      /  AST  14     /  ALT  18     /  AlkPhos  71     22 Jan 2020 05:19  TPro  5.1    /  Alb  1.7    /  TBili  0.3    /  DBili  x      /  AST  13     /  ALT  18     /  AlkPhos  66     21 Jan 2020 02:54  TPro  5.1    /  Alb  1.7    /  TBili  0.3    /  DBili  x      /  AST  21     /  ALT  15     /  AlkPhos  56     19 Jan 2020 04:20              CAPILLARY BLOOD GLUCOSE              RADIOLOGY & ADDITIONAL TESTS:    Imaging Personally Reviewed:  [ ] YES  [ ] NO    Consultant(s) Notes Reviewed:  [ ] YES  [ ] NO    Care Discussed with Consultants/Other Providers [ ] YES  [ ] NO

## 2020-01-23 NOTE — CONSULT NOTE ADULT - ASSESSMENT
78year old female 78year old female w PMH HTN, CVA residual left weakness, Hypothyroid, Dementia, Achalasia s/p POEMS admitted from rehab with RSV/resp distress hospital course complicated by UGIB requiring ICU stay and EGD x 2. Pt now transferred to medical floor, Palliative Care consulted for GOC.

## 2020-01-23 NOTE — CONSULT NOTE ADULT - PROBLEM SELECTOR RECOMMENDATION 8
as above, spoke at length with daughter/HCP via phone regarding pt's overall condition, decline in function, recurrent hospitalization and risk for further events in detail, Katelyn did not appear to fully grasp how sick pt is and wants to review MOLST and speak to her sister prior to making any decisions known.

## 2020-01-23 NOTE — PROGRESS NOTE ADULT - SUBJECTIVE AND OBJECTIVE BOX
Subjective: no change in status.       MEDICATIONS  (STANDING):  amLODIPine   Tablet 10 milliGRAM(s) Oral daily  buDESOnide    Inhalation Suspension 0.5 milliGRAM(s) Inhalation every 12 hours  carvedilol 12.5 milliGRAM(s) Oral every 12 hours  chlorhexidine 4% Liquid 1 Application(s) Topical <User Schedule>  dextrose 5% + sodium chloride 0.45%. 1000 milliLiter(s) (75 mL/Hr) IV Continuous <Continuous>  hydrALAZINE 50 milliGRAM(s) Oral every 6 hours  levothyroxine 88 MICROGram(s) Oral daily  sodium chloride 0.65% Nasal 1 Spray(s) Both Nostrils three times a day  sucralfate suspension 1 Gram(s) Oral four times a day    MEDICATIONS  (PRN):  albuterol/ipratropium for Nebulization 3 milliLiter(s) Nebulizer every 6 hours PRN Shortness of Breath and/or Wheezing  hydrALAZINE Injectable 10 milliGRAM(s) IV Push every 6 hours PRN SBP >170          T(C): 36.1 (20 @ 11:34), Max: 36.6 (20 @ 16:02)  HR: 71 (20 @ 11:34) (71 - 81)  BP: 92/54 (20 @ 11:34) (92/54 - 124/65)  RR: 18 (20 @ 11:34) (18 - 18)  SpO2: 98% (20 @ 11:34) (97% - 100%)  Wt(kg): --        I&O's Detail    2020 07:01  -  2020 07:00  --------------------------------------------------------  IN:    Oral Fluid: 12 mL  Total IN: 12 mL    OUT:    Incontinent per Collection Ba mL  Total OUT: 250 mL    Total NET: -238 mL      2020 07:01  -  2020 12:45  --------------------------------------------------------  IN:    Oral Fluid: 120 mL  Total IN: 120 mL    OUT:  Total OUT: 0 mL    Total NET: 120 mL               PHYSICAL EXAM:    GENERAL: NAD  EYES: EOMI, PERRLA, conjunctiva and sclera clear  NECK: Supple, pos inc in JVP  CHEST/LUNG: exp wheezes  HEART: S1S2  ABDOMEN: Soft, Nontender, Nondistended; Bowel sounds present  EXTREMITIES:  no edema.         LABS:  CBC Full  -  ( 2020 05:19 )  WBC Count : 10.05 K/uL  RBC Count : 4.26 M/uL  Hemoglobin : 11.6 g/dL  Hematocrit : 37.7 %  Platelet Count - Automated : 114 K/uL  Mean Cell Volume : 88.5 fl  Mean Cell Hemoglobin : 27.2 pg  Mean Cell Hemoglobin Concentration : 30.8 gm/dL  Auto Neutrophil # : 8.60 K/uL  Auto Lymphocyte # : 0.86 K/uL  Auto Monocyte # : 0.51 K/uL  Auto Eosinophil # : 0.03 K/uL  Auto Basophil # : 0.01 K/uL  Auto Neutrophil % : 85.5 %  Auto Lymphocyte % : 8.6 %  Auto Monocyte % : 5.1 %  Auto Eosinophil % : 0.3 %  Auto Basophil % : 0.1 %        145  |  118<H>  |  69<H>  ----------------------------<  134<H>  3.5   |  21<L>  |  3.35<H>    Ca    8.1<L>      2020 05:19  Phos  3.6       Mg     1.9         TPro  5.6<L>  /  Alb  1.9<L>  /  TBili  0.4  /  DBili  x   /  AST  14<L>  /  ALT  18  /  AlkPhos  71          Impression:  JONY - suspected ischemic ATN. 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 Cresencio Cr 1.8 2019   Severe pulm HTN, TR  Hx of POEM?      Recommendations:   Keep -150  Monitor Cr daily  D/c saline  Avoid nephrotoxins.   Diuretics PRN increase WOB, oliguria

## 2020-01-23 NOTE — PROGRESS NOTE ADULT - PROBLEM SELECTOR PLAN 1
1B, HGB 11.6, Repeat EGD gastric ulcer in Fundus  On IV PPI .Carafate Tolerating diet. Biopsies are benign. - Will follow on PRN basis. THX

## 2020-01-23 NOTE — PROGRESS NOTE ADULT - SUBJECTIVE AND OBJECTIVE BOX
INTERVAL HPI:   79 year female with HTN, HLD, MI hx, CVA, Gi bleed, Anemia Depression and Alzheimer's dementia.  Sent from [facility] due to SOB. Possibility of pneumonia vs CHF was raised.   In ED with Respiratory distress required BIPAP support and RVP positive for RSV. Pt not able to provide more details due to dementia. Information from transfer papers and ED physician.  Nods no when asked for smoking.  01/09/20: With massive GI bleed, intubated and transferred to ICU.  01/09/20:  EGD+ Colonoscopy.  01/15/20:  Extubated.  01/16/20:  Out of ICU  01/18/20:  Back to ICU for upper GI bleed and significant drop in H & H.  01/21/20:  EGD, acute gastric ulcer.  01/22/20:  Out of ICU.    OVERNIGHT EVENTS:  Lethargic but responds to verbal commands.    Vital Signs Last 24 Hrs  T(C): 36.2 (23 Jan 2020 06:20), Max: 36.6 (22 Jan 2020 11:30)  T(F): 97.1 (23 Jan 2020 06:20), Max: 97.8 (22 Jan 2020 11:30)  HR: 81 (23 Jan 2020 06:20) (67 - 81)  BP: 123/65 (23 Jan 2020 06:20) (96/52 - 124/65)  BP(mean): --  RR: 18 (23 Jan 2020 06:20) (16 - 18)  SpO2: 98% (23 Jan 2020 06:13) (97% - 100%)    PHYSICAL EXAM:  GEN:         responsive and comfortable.  HEENT:    Normal.    RESP:       no wheezing  CVS:           Regular rate and rhythm.   ABD:         Soft, non-tender, non-distended;     MEDICATIONS  (STANDING):  amLODIPine   Tablet 10 milliGRAM(s) Oral daily  buDESOnide    Inhalation Suspension 0.5 milliGRAM(s) Inhalation every 12 hours  carvedilol 12.5 milliGRAM(s) Oral every 12 hours  chlorhexidine 4% Liquid 1 Application(s) Topical <User Schedule>  dextrose 5% + sodium chloride 0.45%. 1000 milliLiter(s) (75 mL/Hr) IV Continuous <Continuous>  hydrALAZINE 50 milliGRAM(s) Oral every 6 hours  levothyroxine 88 MICROGram(s) Oral daily  sodium chloride 0.65% Nasal 1 Spray(s) Both Nostrils three times a day  sucralfate suspension 1 Gram(s) Oral four times a day    MEDICATIONS  (PRN):  albuterol/ipratropium for Nebulization 3 milliLiter(s) Nebulizer every 6 hours PRN Shortness of Breath and/or Wheezing  hydrALAZINE Injectable 10 milliGRAM(s) IV Push every 6 hours PRN SBP >170    LABS:                        11.6   10.05 )-----------( 114      ( 22 Jan 2020 05:19 )             37.7     01-22    145  |  118<H>  |  69<H>  ----------------------------<  134<H>  3.5   |  21<L>  |  3.35<H>    Ca    8.1<L>      22 Jan 2020 05:19  Phos  3.6     01-22  Mg     1.9     01-22    TPro  5.6<L>  /  Alb  1.9<L>  /  TBili  0.4  /  DBili  x   /  AST  14<L>  /  ALT  18  /  AlkPhos  71  01-22    ASSESSMENT AND PLAN:  ·	S/P Acute hypoxic  Respiratory failure .  ·	RSV tracheobronchitis.  ·	Left pleural effusion.  ·	Hypothyroidism with very high TSH.  ·	Anemia.  ·	VRE UTI  ·	Leukocytosis.  ·	Renal Insuffiencey.  ·	CVA by history.  ·	HTN.  ·	HLD.  ·	Alzheimer's dementia.  ·	GI bleed.    Pulmonary status improved and stable.  Continue O2, budesonide and nebulizer.  Seen by palliative care.

## 2020-01-23 NOTE — PROGRESS NOTE ADULT - ASSESSMENT
HPI:  · HPI Objective Statement: 79 years old female from [facility] rehab c/o coughing sob for three to four days. Pt is sent here to rule out chf vs pneumonia. Pt is alert and oriented to person only unable to give detail hx due to hx of Alzheimer according to the [facility] pt had sat of 91 to 92 %,	  ---------------- As Above ---------------------------------------------  Patient is a poor historian. Patient admitted with Bronchitis and UTI. Called for hematemsis. Patient had three episodes of bloody vomitus. ( 1/3 of a cup ) . Patient complaining of mid epigastric / chest pain but patient can not characterize the pain.   Patient has chronic anemia. but HGB fell 8.4 --> 7.2. Was on Carafate, now on IV PPI  KUB pending  HX of CRI    Addendum : Patient had another episode of bloody vomitus    Upper GI Bleed - Secondary to (?). 1) NPO  2)Check KUB  3) IV PPI 4) telemetry  5) NGT 6) EGD 7) Hold Carafate  8) Hold iron  ---Will speak with daughter.

## 2020-01-23 NOTE — PROGRESS NOTE ADULT - SUBJECTIVE AND OBJECTIVE BOX
Surgery NP note  Patient seen and examined bedside resting comfortably.  No complaints offered. NO Abdominal pain  Denies nausea and vomiting. Tolerating liquid diet.  Normal flatus/BM. NO Melena or hematochezia per RN    T(F): 97.1 (20 @ 06:20), Max: 97.8 (20 @ 11:30)  HR: 81 (20 @ 06:20) (67 - 81)  BP: 123/65 (20 @ 06:20) (96/52 - 124/65)  RR: 18 (20 @ 06:20) (16 - 18)  SpO2: 98% (20 @ 06:13) (97% - 100%)  Wt(kg): --  CAPILLARY BLOOD GLUCOSE          PHYSICAL EXAM:  General: NAD, WDWN.   Neuro:  Alert & responsive  HEENT: NCAT, EOMI, conjunctiva clear  CV: +S1+S2 regular rate and rhythm  Lung: clear to ausculation bilaterally, respirations nonlabored, good inspiratory effort  Abdomen: soft, Non tender, No Distension. Normal active BS  Extremities: no pedal edema or calf tenderness noted     LABS:                        11.6   10.05 )-----------( 114      ( 2020 05:19 )             37.7         145  |  118<H>  |  69<H>  ----------------------------<  134<H>  3.5   |  21<L>  |  3.35<H>    Ca    8.1<L>      2020 05:19  Phos  3.6       Mg     1.9         TPro  5.6<L>  /  Alb  1.9<L>  /  TBili  0.4  /  DBili  x   /  AST  14<L>  /  ALT  18  /  AlkPhos  71      LIVER FUNCTIONS - ( 2020 05:19 )  Alb: 1.9 g/dL / Pro: 5.6 gm/dL / ALK PHOS: 71 U/L / ALT: 18 U/L / AST: 14 U/L / GGT: x             I&O's Detail    2020 07:01  -  2020 07:00  --------------------------------------------------------  IN:    Oral Fluid: 12 mL  Total IN: 12 mL    OUT:    Incontinent per Collection Ba mL  Total OUT: 250 mL    Total NET: -238 mL          Impression: 80 y/o female with PMH Alzheimer's, CVA, Depression, CHF, HTN, MI (remote), recent dx of hypothyroidism with GI Bleed, S/P EGD and Colonoscopy , s/p 3 units of PRBC total.  S/p RRT  for new episode of hematemesis and melena, upgraded to ICU, s/p 4u PRBC and 1u FFP.  S/p EGD : gastric ulcer in fundus s/p biopsy.    H/h remains stable today, no active bleeding       Plan:  -No acute surgical intervention at this time.    -Continue medical/GI management, and supportive care.    -Follow CBCs, transfuse as needed.    -Will discuss with Dr. Chaparro/Mike Surgery NP note  Patient seen and examined bedside resting comfortably.  No complaints offered. NO Abdominal pain  Denies nausea and vomiting. Tolerating liquid diet.  Normal flatus/BM. NO Melena or hematochezia per RN    T(F): 97.1 (20 @ 06:20), Max: 97.8 (20 @ 11:30)  HR: 81 (20 @ 06:20) (67 - 81)  BP: 123/65 (20 @ 06:20) (96/52 - 124/65)  RR: 18 (20 @ 06:20) (16 - 18)  SpO2: 98% (20 @ 06:13) (97% - 100%)  Wt(kg): --  CAPILLARY BLOOD GLUCOSE          PHYSICAL EXAM:  General: NAD, WDWN.   Neuro:  Alert & responsive  HEENT: NCAT, EOMI, conjunctiva clear  CV: +S1+S2 regular rate and rhythm  Lung: clear to ausculation bilaterally, respirations nonlabored, good inspiratory effort  Abdomen: soft, Non tender, No Distension. Normal active BS  Extremities: no pedal edema or calf tenderness noted     LABS:                        11.6   10.05 )-----------( 114      ( 2020 05:19 )             37.7         145  |  118<H>  |  69<H>  ----------------------------<  134<H>  3.5   |  21<L>  |  3.35<H>    Ca    8.1<L>      2020 05:19  Phos  3.6       Mg     1.9         TPro  5.6<L>  /  Alb  1.9<L>  /  TBili  0.4  /  DBili  x   /  AST  14<L>  /  ALT  18  /  AlkPhos  71      LIVER FUNCTIONS - ( 2020 05:19 )  Alb: 1.9 g/dL / Pro: 5.6 gm/dL / ALK PHOS: 71 U/L / ALT: 18 U/L / AST: 14 U/L / GGT: x             I&O's Detail    2020 07:01  -  2020 07:00  --------------------------------------------------------  IN:    Oral Fluid: 12 mL  Total IN: 12 mL    OUT:    Incontinent per Collection Ba mL  Total OUT: 250 mL    Total NET: -238 mL          Impression: 80 y/o female with PMH Alzheimer's, CVA, Depression, CHF, HTN, MI (remote), recent dx of hypothyroidism with GI Bleed, S/P EGD and Colonoscopy , s/p 3 units of PRBC total.  S/p RRT  for new episode of hematemesis and melena, upgraded to ICU, s/p 4u PRBC and 1u FFP.  S/p EGD : gastric ulcer in fundus s/p biopsy.    H/h remains stable today, no active bleeding       Plan:  -No acute surgical intervention at this time.    -Continue medical/GI management, and supportive care.    -Follow CBCs, transfuse as needed.    -discussed with Mike will follow as needed, reconsult PRN

## 2020-01-23 NOTE — CONSULT NOTE ADULT - PROBLEM SELECTOR PROBLEM 1
Anemia
Gastrointestinal hemorrhage, unspecified gastrointestinal hemorrhage type
Hypothyroidism, unspecified type
GI bleed
Encephalopathy, unspecified

## 2020-01-23 NOTE — PROGRESS NOTE ADULT - SUBJECTIVE AND OBJECTIVE BOX
lying in bed    Vital Signs Last 24 Hrs  T(C): 36.2 (23 Jan 2020 06:20), Max: 36.6 (22 Jan 2020 11:30)  T(F): 97.1 (23 Jan 2020 06:20), Max: 97.8 (22 Jan 2020 11:30)  HR: 81 (23 Jan 2020 06:20) (67 - 81)  BP: 123/65 (23 Jan 2020 06:20) (96/52 - 124/65)  BP(mean): --  RR: 18 (23 Jan 2020 06:20) (16 - 18)  SpO2: 98% (23 Jan 2020 06:13) (97% - 100%)    PHYSICAL EXAM:    general - weak looking  HEENT - No Icterus  CVS - RRR  RS - AE B/L  Abd - soft, NT  Ext - Pulses +        LABS:                        11.6   10.05 )-----------( 114      ( 22 Jan 2020 05:19 )             37.7     01-22    145  |  118<H>  |  69<H>  ----------------------------<  134<H>  3.5   |  21<L>  |  3.35<H>    Ca    8.1<L>      22 Jan 2020 05:19  Phos  3.6     01-22  Mg     1.9     01-22    TPro  5.6<L>  /  Alb  1.9<L>  /  TBili  0.4  /  DBili  x   /  AST  14<L>  /  ALT  18  /  AlkPhos  71  01-22          Culture - Sputum (collected 13 Jan 2020 17:08)  Source: .Sputum Sputum  Gram Stain (15 Ousmane 2020 16:59):    Few Squamous epithelial cells per low power field    Few polymorphonuclear leukocytes per low power field    Rare Yeast like cells per oil power field    Few Gram variable coccobacilli per oil power field  Final Report (15 Ousmane 2020 16:59):    Moderate Methicillin resistant Staphylococcus aureus    Normal Respiratory Kayce absent  Organism: Methicillin resistant Staphylococcus aureus (15 Ousmane 2020 16:59)  Organism: Methicillin resistant Staphylococcus aureus (15 Ousmane 2020 16:59)        RADIOLOGY & ADDITIONAL STUDIES:

## 2020-01-23 NOTE — CONSULT NOTE ADULT - PROBLEM SELECTOR RECOMMENDATION 7
spoke to daughter Katelyn Henderson who is pt's HCP (form in chart), she gave a summary of mother's recent medical issues and prior baseline function, she explained her understanding of her mother's condition. She was somewhat surprised to discuss advanced care plans such as intubation and resuscitation and did not appear to think her mother was in any danger of further decline despite discussing prior intubation and EGD etc. She did state that her mother told her when the proxy was written that she didn't want "extreme measures" - we discussed what that meant as well as CPR and life supportive measures. Katelyn wants to discuss with her sister who is also on HCP but has cancer and hasn't been participating in mother's care. Katelyn did say she was amenable to temporary things like intubation for procedure but not long term vent support. I left MOLST pamphlet and my card at bedside for her to review.

## 2020-01-24 PROCEDURE — 99233 SBSQ HOSP IP/OBS HIGH 50: CPT

## 2020-01-24 RX ORDER — CARBAMIDE PEROXIDE 81.86 MG/ML
1 SOLUTION/ DROPS AURICULAR (OTIC)
Refills: 0 | Status: DISCONTINUED | OUTPATIENT
Start: 2020-01-24 | End: 2020-01-30

## 2020-01-24 RX ADMIN — AMLODIPINE BESYLATE 10 MILLIGRAM(S): 2.5 TABLET ORAL at 06:54

## 2020-01-24 RX ADMIN — Medication 1 SPRAY(S): at 14:43

## 2020-01-24 RX ADMIN — Medication 1 PATCH: at 08:24

## 2020-01-24 RX ADMIN — CARBAMIDE PEROXIDE 1 DROP(S): 81.86 SOLUTION/ DROPS AURICULAR (OTIC) at 18:20

## 2020-01-24 RX ADMIN — CHLORHEXIDINE GLUCONATE 1 APPLICATION(S): 213 SOLUTION TOPICAL at 06:53

## 2020-01-24 RX ADMIN — Medication 1 GRAM(S): at 00:19

## 2020-01-24 RX ADMIN — Medication 1 SPRAY(S): at 06:55

## 2020-01-24 RX ADMIN — Medication 1 GRAM(S): at 06:53

## 2020-01-24 RX ADMIN — Medication 1 GRAM(S): at 23:50

## 2020-01-24 RX ADMIN — CARVEDILOL PHOSPHATE 12.5 MILLIGRAM(S): 80 CAPSULE, EXTENDED RELEASE ORAL at 06:54

## 2020-01-24 RX ADMIN — Medication 50 MILLIGRAM(S): at 00:19

## 2020-01-24 RX ADMIN — Medication 0.5 MILLIGRAM(S): at 18:22

## 2020-01-24 RX ADMIN — SODIUM CHLORIDE 75 MILLILITER(S): 9 INJECTION, SOLUTION INTRAVENOUS at 14:43

## 2020-01-24 RX ADMIN — Medication 0.5 MILLIGRAM(S): at 05:43

## 2020-01-24 RX ADMIN — Medication 1 SPRAY(S): at 21:10

## 2020-01-24 RX ADMIN — Medication 1 GRAM(S): at 11:17

## 2020-01-24 RX ADMIN — Medication 50 MILLIGRAM(S): at 23:50

## 2020-01-24 RX ADMIN — Medication 50 MILLIGRAM(S): at 06:53

## 2020-01-24 RX ADMIN — Medication 1 GRAM(S): at 18:20

## 2020-01-24 RX ADMIN — Medication 88 MICROGRAM(S): at 06:53

## 2020-01-24 NOTE — PROGRESS NOTE ADULT - SUBJECTIVE AND OBJECTIVE BOX
79 years old female from [facility] rehab c/o coughing sob for three to four days. Pt is sent here to rule out chf vs pneumonia. Pt is alert and oriented to person only unable to give detail hx due to hx of Alzheimer according to the [facility] pt had sat of 91 to 92 %,	    PAST MEDICAL/SURGICAL/FAMILY/SOCIAL HISTORY:    Past Medical History:  Alzheimers disease    CVA (cerebral vascular accident)    Depression    HTN (hypertension)    MI, old.  recently Dx c hypothyroidism, TSH > 111 started Levothyroxin 50mcg qd as per Dr Vigil (2020 11:41)      Chief Complaint:  Patient is a 79y old  Female who presents with a chief complaint of sob cough (2020 22:56)      Review of Systems:    General:  No wt loss, fevers, chills, night sweats  Eyes:  Good vision, no reported pain  ENT:  No sore throat, pain, runny nose, dysphagia  CV:  No pain, palpitations, hypo/hypertension  Resp:  dyspnea, cough  GI:  No pain, nausea, vomiting, diarrhea, constipation           Social History/Family History  SOCHX:   tobacco,  -  alcohol    FMHX: FA/MO  - contributory       Discussed with:  PMD, Family    Physical Exam:    Vital Signs:  Vital Signs Last 24 Hrs  T(C): 36.2 (2020 17:06), Max: 36.9 (2020 00:18)  T(F): 97.2 (2020 17:06), Max: 98.5 (2020 00:18)  HR: 74 (2020 21:55) (68 - 87)  BP: 179/78 (2020 17:06) (132/71 - 179/78)  BP(mean): --  RR: 18 (2020 17:06) (18 - 18)  SpO2: 97% (2020 21:55) (94% - 98%)  Daily     Daily Weight in k.8 (2020 04:59)  I&O's Summary    2020 07:01  -  2020 07:00  --------------------------------------------------------  IN: 560 mL / OUT: 0 mL / NET: 560 mL          Chest:  fair air entry bilateral  Cardiovascular:  Regular rhythm, S1, S2, no murmur/rub/S3/S4, no carotid/femoral/abdominal bruit, radial/pedal pulses 2+,   Abdomen:  Soft, non-tender, non-distended, normoactive bowel sounds, no HSM        Laboratory:          138  |  104  |  69<H>  ----------------------------<  111<H>  3.8   |  25  |  3.77<H>    Ca    8.1<L>      2020 07:22          Assessment:  I am asked to assist this patient admitted with shortness of breath  The patient has multifactorial causes of shortness of breath, renal insufficiency, anemia  And now after a diagnostic echocardiogram within normal ejection fraction the reading shows mitral regurgitation and tricuspid regurgitation  After review of the studies this regurgitation is not considered severe but in the moderate to severe but more moderate range  upper GI bleed  GI noted,  EGD   optimize systolic blood pressure  PRBCs  EGD noted with gastric ulcer, GI treatment continues

## 2020-01-24 NOTE — PROGRESS NOTE ADULT - ASSESSMENT
78year old female w PMH HTN, CVA residual left weakness, Hypothyroid, Dementia, Achalasia s/p POEMS admitted from rehab with RSV/resp distress hospital course complicated by UGIB requiring ICU stay and EGD x 2. Pt now transferred to medical floor, Palliative Care consulted for GOC. 78year old female w PMH HTN, CVA residual left weakness, Hypothyroid, Dementia, Achalasia s/p POEMS admitted from rehab with RSV/resp distress hospital course complicated by UGIB requiring ICU stay and EGD x 2. Pt now transferred to medical floor

## 2020-01-24 NOTE — PROGRESS NOTE ADULT - SUBJECTIVE AND OBJECTIVE BOX
Patient is a 79y old  Female who presents with a chief complaint of sob cough (24 Jan 2020 09:49)      INTERVAL HPI/OVERNIGHT EVENTS:  NAD  no events overnight  no complaints    MEDICATIONS  (STANDING):  amLODIPine   Tablet 10 milliGRAM(s) Oral daily  buDESOnide    Inhalation Suspension 0.5 milliGRAM(s) Inhalation every 12 hours  carvedilol 12.5 milliGRAM(s) Oral every 12 hours  chlorhexidine 4% Liquid 1 Application(s) Topical <User Schedule>  dextrose 5% + sodium chloride 0.45%. 1000 milliLiter(s) (75 mL/Hr) IV Continuous <Continuous>  hydrALAZINE 50 milliGRAM(s) Oral every 6 hours  levothyroxine 88 MICROGram(s) Oral daily  sodium chloride 0.65% Nasal 1 Spray(s) Both Nostrils three times a day  sucralfate suspension 1 Gram(s) Oral four times a day    MEDICATIONS  (PRN):  acetaminophen   Tablet .. 650 milliGRAM(s) Oral every 6 hours PRN Mild Pain (1 - 3)  albuterol/ipratropium for Nebulization 3 milliLiter(s) Nebulizer every 6 hours PRN Shortness of Breath and/or Wheezing  hydrALAZINE Injectable 10 milliGRAM(s) IV Push every 6 hours PRN SBP >170      REVIEW OF SYSTEMS:  CONSTITUTIONAL: No fever, weight loss, or fatigue  RESPIRATORY:  No shortness of breath  CARDIOVASCULAR: No chest pain, palpitations  GASTROINTESTINAL: No abdominal or epigastric pain        Vital Signs Last 24 Hrs  T(C): 36.3 (24 Jan 2020 07:30), Max: 36.3 (24 Jan 2020 07:30)  T(F): 97.3 (24 Jan 2020 07:30), Max: 97.3 (24 Jan 2020 07:30)  HR: 72 (24 Jan 2020 07:30) (70 - 78)  BP: 155/65 (24 Jan 2020 07:30) (92/54 - 193/68)  BP(mean): --  RR: 18 (24 Jan 2020 07:30) (18 - 20)  SpO2: 97% (24 Jan 2020 07:30) (94% - 99%)    PHYSICAL EXAM:  GENERAL: NAD      LABS:              CAPILLARY BLOOD GLUCOSE        Lipid panel:               RADIOLOGY & ADDITIONAL TESTS:

## 2020-01-24 NOTE — PROGRESS NOTE ADULT - SUBJECTIVE AND OBJECTIVE BOX
INTERVAL HPI/OVERNIGHT EVENTS:    Code Status:   Allergies    No Known Allergies    Intolerances    MEDICATIONS  (STANDING):  amLODIPine   Tablet 10 milliGRAM(s) Oral daily  buDESOnide    Inhalation Suspension 0.5 milliGRAM(s) Inhalation every 12 hours  carvedilol 12.5 milliGRAM(s) Oral every 12 hours  chlorhexidine 4% Liquid 1 Application(s) Topical <User Schedule>  dextrose 5% + sodium chloride 0.45%. 1000 milliLiter(s) (75 mL/Hr) IV Continuous <Continuous>  hydrALAZINE 50 milliGRAM(s) Oral every 6 hours  levothyroxine 88 MICROGram(s) Oral daily  sodium chloride 0.65% Nasal 1 Spray(s) Both Nostrils three times a day  sucralfate suspension 1 Gram(s) Oral four times a day    MEDICATIONS  (PRN):  acetaminophen   Tablet .. 650 milliGRAM(s) Oral every 6 hours PRN Mild Pain (1 - 3)  albuterol/ipratropium for Nebulization 3 milliLiter(s) Nebulizer every 6 hours PRN Shortness of Breath and/or Wheezing  hydrALAZINE Injectable 10 milliGRAM(s) IV Push every 6 hours PRN SBP >170      PRESENT SYMPTOMS: [ ]Unable to obtain due to poor mentation   Source if other than patient:  [ ]Family   [ ]Team     Pain (Impact on QOL):    Location:  Severity:  Minimal acceptable level (0-10 scale):       Quality:       Onset:  Duration:  Aggravating factors:  Relieving Factors  Radiation:    Dyspnea:  Yes [ ] No [ ] - [ ]Mild [ ]Moderate [ ]Severe  Anxiety:    Yes [ ] No [ ] - [ ]Mild [ ]Moderate [ ]Severe  Fatigue:    Yes [ ] No [ ] - [ ]Mild [ ]Moderate [ ]Severe  Nausea:    Yes [ ] No [ ] - [ ]Mild [ ]Moderate [ ]Severe                         Loss of appetite: Yes [ ] No [ ] - [ ]Mild [ ]Moderate [ ]Severe             Constipation:  Yes [ ] No [ ] - [ ]Mild [ ]Moderate [ ]Severe  Grief: Yes [ ] No [ ]     PAIN AD Score:	  http://geriatrictoolkit.[place].[email or web address]/cog/painad.pdf (Ctrl + left click to view)    Other Symptoms:  [ ]All other review of systems negative     Karnofsky Performance Score/Palliative Performance Status Version 2:         %    http://palliative.info/resource_material/PPSv2.pdf    PHYSICAL EXAM:  Vital Signs Last 24 Hrs  T(C): 36.6 (24 Jan 2020 11:36), Max: 36.6 (24 Jan 2020 11:36)  T(F): 97.9 (24 Jan 2020 11:36), Max: 97.9 (24 Jan 2020 11:36)  HR: 70 (24 Jan 2020 11:36) (70 - 78)  BP: 93/54 (24 Jan 2020 11:36) (93/54 - 193/68)  BP(mean): --  RR: 18 (24 Jan 2020 11:36) (18 - 20)  SpO2: 98% (24 Jan 2020 11:36) (94% - 99%) I&O's Summary    23 Jan 2020 07:01  -  24 Jan 2020 07:00  --------------------------------------------------------  IN: 345 mL / OUT: 0 mL / NET: 345 mL         GENERAL:  [ ]Alert  [ ]Oriented x   [ ]Lethargic  [ ]Cachexia  [ ]Unarousable  [ ]Verbal  [ ]Non-Verbal  Behavioral:   [ ] Anxiety  [ ] Delirium [ ] Agitation [ ] Other  HEENT:  [ ]Normal   [ ]Dry mouth   [ ]ET Tube/Trach  [ ]Oral lesions  PULMONARY:   [ ]Clear [ ]Tachypnea  [ ]Audible excessive secretions   [ ]Rhonchi        [ ]Right [ ]Left [ ]Bilateral  [ ]Crackles        [ ]Right [ ]Left [ ]Bilateral  [ ]Wheezing     [ ]Right [ ]Left [ ]Bilateral  CARDIOVASCULAR:    [ ]Regular [ ]Irregular [ ]Tachy  [ ]Tolu [ ]Murmur [ ]Other  GASTROINTESTINAL:  [ ]Soft  [ ]Distended   [ ]+BS  [ ]Non tender [ ]Tender  [ ]PEG [ ]OGT/ NGT   Last BM:      GENITOURINARY:  [ ]Normal [ ] Incontinent   [ ]Oliguria/Anuria   [ ]Stevenson  MUSCULOSKELETAL:   [ ]Normal   [ ]Weakness  [ ]Bed/Wheelchair bound [ ]Edema  NEUROLOGIC:   [ ]No focal deficits  [ ] Cognitive impairment  [ ] Dysphagia [ ]Dysarthria [ ] Paresis [ ]Other   SKIN:   [ ]Normal   [ ]Pressure ulcer(s)  [ ]Rash    CRITICAL CARE:  [ ] Shock Present  [ ]Septic [ ]Cardiogenic [ ]Neurologic [ ]Hypovolemic  [ ]  Vasopressors [ ]  Inotropes   [ ] Respiratory failure present  [ ] Acute  [ ] Chronic [ ] Hypoxic  [ ] Hypercarbic [ ] Other  [ ] Other organ failure     LABS:            RADIOLOGY & ADDITIONAL STUDIES:    Protein Calorie Malnutrition Present: [ ] yes [ ] no  [ ] PPSV2 < or = 30%  [ ] significant weight loss [ ] poor nutritional intake [ ] anasarca [ ] catabolic state Prealbumin, Serum: 14 mg/dL (01-22-20 @ 14:31)      REFERRALS:   [ ]Chaplaincy  [ ] Hospice  [ ]Child Life  [ ]Social Work  [ ]Case management [ ]Holistic Therapy   Goals of Care Document: INTERVAL HPI/OVERNIGHT EVENTS: none    Code Status: full  Allergies    No Known Allergies    Intolerances    MEDICATIONS  (STANDING):  amLODIPine   Tablet 10 milliGRAM(s) Oral daily  buDESOnide    Inhalation Suspension 0.5 milliGRAM(s) Inhalation every 12 hours  carvedilol 12.5 milliGRAM(s) Oral every 12 hours  chlorhexidine 4% Liquid 1 Application(s) Topical <User Schedule>  dextrose 5% + sodium chloride 0.45%. 1000 milliLiter(s) (75 mL/Hr) IV Continuous <Continuous>  hydrALAZINE 50 milliGRAM(s) Oral every 6 hours  levothyroxine 88 MICROGram(s) Oral daily  sodium chloride 0.65% Nasal 1 Spray(s) Both Nostrils three times a day  sucralfate suspension 1 Gram(s) Oral four times a day    MEDICATIONS  (PRN):  acetaminophen   Tablet .. 650 milliGRAM(s) Oral every 6 hours PRN Mild Pain (1 - 3)  albuterol/ipratropium for Nebulization 3 milliLiter(s) Nebulizer every 6 hours PRN Shortness of Breath and/or Wheezing  hydrALAZINE Injectable 10 milliGRAM(s) IV Push every 6 hours PRN SBP >170      PRESENT SYMPTOMS: [ x]Unable to obtain due to poor mentation - pt not verbalizing but able to nod yes/no to questions in [place] (spoken by writer)  Source if other than patient:  [ ]Family   [x ]Team - per nursing aide pt ate well with assist and then fed herself a portion of food on plate    Pain (Impact on QOL):  denies  Location:  Severity:  Minimal acceptable level (0-10 scale):       Quality:       Onset:  Duration:  Aggravating factors:  Relieving Factors  Radiation:    Dyspnea:  Yes [ ] No [x ] - [ ]Mild [ ]Moderate [ ]Severe  Anxiety:    Yes [ ] No [x ] - [ ]Mild [ ]Moderate [ ]Severe  Fatigue:    Yes [ ] No [x ] - [ ]Mild [ ]Moderate [ ]Severe  Nausea:    Yes [ ] No [ x] - [ ]Mild [ ]Moderate [ ]Severe                         Loss of appetite: Yes [ ] No [x] - [ ]Mild [ ]Moderate [ ]Severe             Constipation:  Yes [ ] No [ x] - [ ]Mild [ ]Moderate [ ]Severe  Grief: Yes [ ] No [x ]     PAIN AD Score:	0  [email or web address].[email or web address]/cog/painad.pdf (Ctrl + left click to view)    Other Symptoms:  [x ]All other review of systems negative     Karnofsky Performance Score/Palliative Performance Status Version 2:        30 %    http://palliative.info/resource_material/PPSv2.pdf    PHYSICAL EXAM:  Vital Signs Last 24 Hrs  T(C): 36.6 (24 Jan 2020 11:36), Max: 36.6 (24 Jan 2020 11:36)  T(F): 97.9 (24 Jan 2020 11:36), Max: 97.9 (24 Jan 2020 11:36)  HR: 70 (24 Jan 2020 11:36) (70 - 78)  BP: 93/54 (24 Jan 2020 11:36) (93/54 - 193/68)  BP(mean): --  RR: 18 (24 Jan 2020 11:36) (18 - 20)  SpO2: 98% (24 Jan 2020 11:36) (94% - 99%) I&O's Summary    23 Jan 2020 07:01  -  24 Jan 2020 07:00  --------------------------------------------------------  IN: 345 mL / OUT: 0 mL / NET: 345 mL         GENERAL:  [x ]Alert  [ x]Oriented x 1  [ ]Lethargic  [ ]Cachexia  [ ]Unarousable  [ ]Verbal  [x ]Non-Verbal  Behavioral:   [ ] Anxiety  [ ] Delirium [ ] Agitation [ ] Other  HEENT: left facial droop  [x ]Normal   [ ]Dry mouth   [ ]ET Tube/Trach  [ ]Oral lesions  PULMONARY:   [x ]Clear [ ]Tachypnea  [ ]Audible excessive secretions   [ ]Rhonchi        [ ]Right [ ]Left [ ]Bilateral  [ ]Crackles        [ ]Right [ ]Left [ ]Bilateral  [ ]Wheezing     [ ]Right [ ]Left [ ]Bilateral  CARDIOVASCULAR:    x ]Regular [ ]Irregular [ ]Tachy  [ ]Tolu [ ]Murmur [ ]Other  GASTROINTESTINAL:  [ x]Soft  [ ]Distended   [x ]+BS  [x ]Non tender [ ]Tender  [ ]PEG [ ]OGT/ NGT   Last BM: 1/24     GENITOURINARY:  [ ]Normal [x ] Incontinent   [ ]Oliguria/Anuria   [ ]Stevenson  MUSCULOSKELETAL:   [ ]Normal   [ ]Weakness  [ x]Bed/Wheelchair bound [x ]Edema  NEUROLOGIC:   [ ]No focal deficits  [x ] Cognitive impairment  [x ] Dysphagia [x ]Dysarthria [ x] Paresis [ ]Other   SKIN:   [ ]Normal   [ x]Pressure ulcer(s) -stage II Coccyx [ ]Rash    CRITICAL CARE:  [ ] Shock Present  [ ]Septic [ ]Cardiogenic [ ]Neurologic [ ]Hypovolemic  [ ]  Vasopressors [ ]  Inotropes   [ ] Respiratory failure present  [ ] Acute  [ ] Chronic [ ] Hypoxic  [ ] Hypercarbic [ ] Other  [ ] Other organ failure     LABS:            RADIOLOGY & ADDITIONAL STUDIES:    Protein Calorie Malnutrition Present: [x ] yes [ ] no  [ x] PPSV2 < or = 30%  [x ] significant weight loss [ x] poor nutritional intake [x ] anasarca [x ] catabolic state Prealbumin, Serum: 14 mg/dL (01-22-20 @ 14:31)      REFERRALS:   [ ]Chaplaincy  [ ] Hospice  [ ]Child Life  [ ]Social Work  [ ]Case management [ ]Holistic Therapy   Goals of Care Document:

## 2020-01-24 NOTE — PROGRESS NOTE ADULT - SUBJECTIVE AND OBJECTIVE BOX
lying in bed    Vital Signs Last 24 Hrs  T(C): 36.3 (24 Jan 2020 07:30), Max: 36.3 (24 Jan 2020 07:30)  T(F): 97.3 (24 Jan 2020 07:30), Max: 97.3 (24 Jan 2020 07:30)  HR: 72 (24 Jan 2020 07:30) (70 - 78)  BP: 155/65 (24 Jan 2020 07:30) (92/54 - 193/68)  BP(mean): --  RR: 18 (24 Jan 2020 07:30) (18 - 20)  SpO2: 97% (24 Jan 2020 07:30) (94% - 99%)    PHYSICAL EXAM:    general - weak looking  HEENT - No Icterus  CVS - RRR  RS - AE B/L  Abd - soft, NT  Ext - Pulses +        LABS:                  RADIOLOGY & ADDITIONAL STUDIES:

## 2020-01-24 NOTE — PROGRESS NOTE ADULT - SUBJECTIVE AND OBJECTIVE BOX
[facility] NEPHROLOGY SERVICES, [facility]  NEPHROLOGY AND HYPERTENSION  [address]  [address]  [place] [address]  [phone]    MD MART LORD MD ANDREY GONCHARUK, MD MADHU KORRAPATI, MD YELENA ROSENBERG, MD BINNY KOSHY, MD CHRISTOPHER CAPUTO, MD ALAYNA WALLACE MD          Patient appears comfortable no distress. Seen earlier in am    MEDICATIONS  (STANDING):  amLODIPine   Tablet 10 milliGRAM(s) Oral daily  buDESOnide    Inhalation Suspension 0.5 milliGRAM(s) Inhalation every 12 hours  carbamide peroxide Otic Solution 1 Drop(s) Both Ears two times a day  carvedilol 12.5 milliGRAM(s) Oral every 12 hours  chlorhexidine 4% Liquid 1 Application(s) Topical <User Schedule>  hydrALAZINE 50 milliGRAM(s) Oral every 6 hours  levothyroxine 88 MICROGram(s) Oral daily  sodium chloride 0.65% Nasal 1 Spray(s) Both Nostrils three times a day  sucralfate suspension 1 Gram(s) Oral four times a day    MEDICATIONS  (PRN):  acetaminophen   Tablet .. 650 milliGRAM(s) Oral every 6 hours PRN Mild Pain (1 - 3)  albuterol/ipratropium for Nebulization 3 milliLiter(s) Nebulizer every 6 hours PRN Shortness of Breath and/or Wheezing  hydrALAZINE Injectable 10 milliGRAM(s) IV Push every 6 hours PRN SBP >170      01-23-20 @ 07:01  -  01-24-20 @ 07:00  --------------------------------------------------------  IN: 345 mL / OUT: 0 mL / NET: 345 mL      PHYSICAL EXAM:      T(C): 35.8 (01-24-20 @ 18:30), Max: 36.6 (01-24-20 @ 11:36)  HR: 79 (01-24-20 @ 19:02) (70 - 80)  BP: 102/63 (01-24-20 @ 17:34) (93/54 - 155/65)  RR: 18 (01-24-20 @ 17:34) (18 - 20)  SpO2: 95% (01-24-20 @ 19:02) (95% - 99%)  Wt(kg): --  Respiratory: clear anteriorly, decreased BS at bases  Cardiovascular: S1 S2  Gastrointestinal: soft NT ND +BS  Extremities:  2 edema                          Creatinine Trend: 3.35<--, 0.73<--, 3.41<--, 3.46<--, 3.54<--, 3.53<--      Assessment   Solitary kidney, cannot exclude significant EL contributing,  Cresencio Cr 1.8 12/2019; suspected ischemic ATN; underlying renovascular disease;   CTD/ Vasculitic serologies negative  Hx of POEM? + paraprotein noted      Plan  IV hydralazine prn  D/C IVF  Would defer prospect of aggressive work up or HD support as overall prognosis poor.      Plan:      Obed Jamison MD

## 2020-01-24 NOTE — PROGRESS NOTE ADULT - SUBJECTIVE AND OBJECTIVE BOX
Yes, no prior auth required per [facility] PPO reference #[id number]   Patient is a 79y old  Female who presents with a chief complaint of sob cough (28 Jan 2020 10:05)      INTERVAL HPI/OVERNIGHT EVENTS: pt seen in AM , cough vs vomiting coffee ground material  Hb <<    MEDICATIONS  (STANDING):  buDESOnide    Inhalation Suspension 0.5 milliGRAM(s) Inhalation every 12 hours  carbamide peroxide Otic Solution 1 Drop(s) Both Ears two times a day  carvedilol 12.5 milliGRAM(s) Oral every 12 hours  dextrose 5% + sodium chloride 0.9%. 1000 milliLiter(s) (100 mL/Hr) IV Continuous <Continuous>  epoetin tawanna Injectable 35787 Unit(s) SubCutaneous once  lactated ringers. 1000 milliLiter(s) (50 mL/Hr) IV Continuous <Continuous>  levothyroxine 88 MICROGram(s) Oral daily  pantoprazole  Injectable 40 milliGRAM(s) IV Push two times a day  sodium chloride 0.65% Nasal 1 Spray(s) Both Nostrils three times a day  sucralfate 1 Gram(s) Oral four times a day    MEDICATIONS  (PRN):  acetaminophen   Tablet .. 650 milliGRAM(s) Oral every 6 hours PRN Mild Pain (1 - 3)  acetaminophen   Tablet .. 650 milliGRAM(s) Oral every 6 hours PRN Mild Pain (1 - 3)  albuterol/ipratropium for Nebulization 3 milliLiter(s) Nebulizer every 6 hours PRN Shortness of Breath and/or Wheezing  hydrALAZINE Injectable 10 milliGRAM(s) IV Push every 6 hours PRN HTN  HYDROmorphone  Injectable 0.5 milliGRAM(s) IV Push every 4 hours PRN mod pain/dyspnea  ondansetron Injectable 4 milliGRAM(s) IV Push every 6 hours PRN Nausea and/or Vomiting      Allergies    No Known Allergies    Intolerances        REVIEW OF SYSTEMS:        sob vomiting, melena      Vital Signs Last 24 Hrs  T(C): 34.4 (28 Jan 2020 11:31), Max: 36.7 (27 Jan 2020 13:54)  T(F): 93.9 (28 Jan 2020 11:31), Max: 98 (27 Jan 2020 13:54)  HR: 76 (28 Jan 2020 11:31) (75 - 88)  BP: 111/64 (28 Jan 2020 11:31) (85/56 - 165/60)  BP(mean): --  RR: 16 (28 Jan 2020 11:31) (16 - 18)  SpO2: 98% (28 Jan 2020 11:31) (97% - 99%)    PHYSICAL EXAM:  general       HEENT wnl  CHEST/LUNG: rhonchi b/l  HEART: Regular rate and rhythm; distant heart sounds  ABDOMEN: Soft, Nontender, Nondistended; Bowel sounds present  EXTREMITIES:  2+ peripheral edema  LYMPH: No lymphadenopathy noted  SKIN: No rashes or lesions    LABS:                        8.3    6.97  )-----------( 147      ( 28 Jan 2020 09:59 )             26.4     01-27    145  |  116<H>  |  64<H>  ----------------------------<  113<H>  3.6   |  20<L>  |  2.60<H>    Ca    8.1<L>      27 Jan 2020 08:16          CAPILLARY BLOOD GLUCOSE                    RADIOLOGY & ADDITIONAL TESTS:    Imaging Personally Reviewed:  [y ] YES  [ ] NO    Consultant(s) Notes Reviewed:  [y ] YES  [ ] NO    Care Discussed with Consultants/Other Providers [ ] YES  [ ] NO    PROBLEMS:  CONGESTIVE HEART FAILURE;RSV INFECTION  CONGESTIVE BABB FAILURE;RSV INFECTION  SHORTNESS OF BREATH  CHF (congestive heart failure)  Encounter for palliative care  Advanced care planning/counseling discussion  Left hemiparesis  Dementia without behavioral disturbance, unspecified dementia type  Debility  Encephalopathy, unspecified  Hypothyroidism, unspecified type  Gastrointestinal hemorrhage, unspecified gastrointestinal hemorrhage type  Goals of care, counseling/discussion  HTN (hypertension)  Hyperlipidemia  Alzheimers disease  MI, old  Hypothyroidism  RSV infection  CHF (congestive heart failure)  GI bleed  Anemia  PUD  FTT      Care discussed with family,         [  ]   yes  [  ]  No    imp:    stable[ ]    unstable[x  ]     improving [   ]       unchanged  [  ]                Plans:  monitoring H/H, GI to f/u

## 2020-01-24 NOTE — PROGRESS NOTE ADULT - SUBJECTIVE AND OBJECTIVE BOX
INTERVAL HPI:  79 year female with HTN, HLD, MI hx, CVA, Gi bleed, Anemia Depression and Alzheimer's dementia.  Sent from [facility] due to SOB. Possibility of pneumonia vs CHF was raised.   In ED with Respiratory distress required BIPAP support and RVP positive for RSV. Pt not able to provide more details due to dementia. Information from transfer papers and ED physician.  Nods no when asked for smoking.  01/09/20: With massive GI bleed, intubated and transferred to ICU.  01/09/20:  EGD+ Colonoscopy.  01/15/20:  Extubated.  01/16/20:  Out of ICU  01/18/20:  Back to ICU for upper GI bleed and significant drop in H & H.  01/21/20:  EGD, acute gastric ulcer.  01/22/20:  Out of ICU.    OVERNIGHT EVENTS:  Responsive but tired looking.    Vital Signs Last 24 Hrs  T(C): 36.6 (24 Jan 2020 11:36), Max: 36.6 (24 Jan 2020 11:36)  T(F): 97.9 (24 Jan 2020 11:36), Max: 97.9 (24 Jan 2020 11:36)  HR: 77 (24 Jan 2020 17:34) (70 - 78)  BP: 102/63 (24 Jan 2020 17:34) (93/54 - 193/68)  BP(mean): --  RR: 18 (24 Jan 2020 17:34) (18 - 20)  SpO2: 98% (24 Jan 2020 17:34) (94% - 99%)    PHYSICAL EXAM:  GEN:         Awake, responsive and comfortable.  HEENT:    Normal.    RESP:        no wheezing.  CVS:           Regular rate and rhythm.   ABD:         Soft, non-tender, non-distended;     MEDICATIONS  (STANDING):  amLODIPine   Tablet 10 milliGRAM(s) Oral daily  buDESOnide    Inhalation Suspension 0.5 milliGRAM(s) Inhalation every 12 hours  carbamide peroxide Otic Solution 1 Drop(s) Both Ears two times a day  carvedilol 12.5 milliGRAM(s) Oral every 12 hours  chlorhexidine 4% Liquid 1 Application(s) Topical <User Schedule>  hydrALAZINE 50 milliGRAM(s) Oral every 6 hours  levothyroxine 88 MICROGram(s) Oral daily  sodium chloride 0.65% Nasal 1 Spray(s) Both Nostrils three times a day  sucralfate suspension 1 Gram(s) Oral four times a day    MEDICATIONS  (PRN):  acetaminophen   Tablet .. 650 milliGRAM(s) Oral every 6 hours PRN Mild Pain (1 - 3)  albuterol/ipratropium for Nebulization 3 milliLiter(s) Nebulizer every 6 hours PRN Shortness of Breath and/or Wheezing  hydrALAZINE Injectable 10 milliGRAM(s) IV Push every 6 hours PRN SBP >170    ASSESSMENT AND PLAN:  ·	S/P Acute hypoxic  Respiratory failure .  ·	RSV tracheobronchitis.  ·	Left pleural effusion.  ·	Hypothyroidism with very high TSH.  ·	Anemia.  ·	VRE UTI  ·	Leukocytosis.  ·	Renal Insuffiencey.  ·	CVA by history.  ·	HTN.  ·	HLD.  ·	Alzheimer's dementia.  ·	GI bleed.    Continue nebulizer.  Increase dose of levothyroxine as TSH is high.  Nutritional support.

## 2020-01-24 NOTE — PROGRESS NOTE ADULT - PROBLEM SELECTOR PLAN 7
daughter has MOLST pamphlet and MOLST form in her possession but needs to meet with her sister (who is ill as well) to discuss before any directives declared

## 2020-01-25 RX ADMIN — Medication 0.5 MILLIGRAM(S): at 17:23

## 2020-01-25 RX ADMIN — Medication 1 GRAM(S): at 13:01

## 2020-01-25 RX ADMIN — Medication 88 MICROGRAM(S): at 05:38

## 2020-01-25 RX ADMIN — CARVEDILOL PHOSPHATE 12.5 MILLIGRAM(S): 80 CAPSULE, EXTENDED RELEASE ORAL at 05:39

## 2020-01-25 RX ADMIN — Medication 3 MILLILITER(S): at 06:34

## 2020-01-25 RX ADMIN — AMLODIPINE BESYLATE 10 MILLIGRAM(S): 2.5 TABLET ORAL at 05:39

## 2020-01-25 RX ADMIN — Medication 1 SPRAY(S): at 17:22

## 2020-01-25 RX ADMIN — Medication 50 MILLIGRAM(S): at 17:24

## 2020-01-25 RX ADMIN — Medication 0.5 MILLIGRAM(S): at 06:34

## 2020-01-25 RX ADMIN — CARBAMIDE PEROXIDE 1 DROP(S): 81.86 SOLUTION/ DROPS AURICULAR (OTIC) at 17:22

## 2020-01-25 RX ADMIN — Medication 1 GRAM(S): at 05:39

## 2020-01-25 RX ADMIN — Medication 1 SPRAY(S): at 21:02

## 2020-01-25 RX ADMIN — CARVEDILOL PHOSPHATE 12.5 MILLIGRAM(S): 80 CAPSULE, EXTENDED RELEASE ORAL at 17:23

## 2020-01-25 RX ADMIN — CHLORHEXIDINE GLUCONATE 1 APPLICATION(S): 213 SOLUTION TOPICAL at 07:58

## 2020-01-25 RX ADMIN — Medication 1 SPRAY(S): at 05:39

## 2020-01-25 RX ADMIN — Medication 1 GRAM(S): at 17:24

## 2020-01-25 RX ADMIN — Medication 50 MILLIGRAM(S): at 13:01

## 2020-01-25 RX ADMIN — CARBAMIDE PEROXIDE 1 DROP(S): 81.86 SOLUTION/ DROPS AURICULAR (OTIC) at 05:39

## 2020-01-25 RX ADMIN — Medication 50 MILLIGRAM(S): at 05:39

## 2020-01-25 RX ADMIN — Medication 1 SPRAY(S): at 13:02

## 2020-01-25 NOTE — PROGRESS NOTE ADULT - SUBJECTIVE AND OBJECTIVE BOX
lying in bed    Vital Signs Last 24 Hrs  T(C): 35.8 (25 Jan 2020 05:21), Max: 36.6 (24 Jan 2020 11:36)  T(F): 96.4 (25 Jan 2020 05:21), Max: 97.9 (24 Jan 2020 11:36)  HR: 74 (25 Jan 2020 06:54) (70 - 80)  BP: 110/62 (25 Jan 2020 05:21) (93/54 - 110/62)  BP(mean): --  RR: 18 (25 Jan 2020 05:21) (18 - 18)  SpO2: 97% (25 Jan 2020 06:54) (95% - 98%)    PHYSICAL EXAM:    general - AAO x 3  HEENT - No Icterus  CVS - RRR  RS - AE B/L  Abd - soft, NT  Ext - Pulses +

## 2020-01-25 NOTE — PROGRESS NOTE ADULT - SUBJECTIVE AND OBJECTIVE BOX
79 years old female from [facility] rehab c/o coughing sob for three to four days. Pt is sent here to rule out chf vs pneumonia. Pt is alert and oriented to person only unable to give detail hx due to hx of Alzheimer according to the [facility] pt had sat of 91 to 92 %,	    PAST MEDICAL/SURGICAL/FAMILY/SOCIAL HISTORY:    Past Medical History:  Alzheimers disease    CVA (cerebral vascular accident)    Depression    HTN (hypertension)    MI, old.  recently Dx c hypothyroidism, TSH > 111 started Levothyroxin 50mcg qd as per Dr Vigil (2020 11:41)      Chief Complaint:  Patient is a 79y old  Female who presents with a chief complaint of sob cough (2020 22:56)      Review of Systems:    General:  No wt loss, fevers, chills, night sweats  Eyes:  Good vision, no reported pain  ENT:  No sore throat, pain, runny nose, dysphagia  CV:  No pain, palpitations, hypo/hypertension  Resp:  dyspnea, cough  GI:  No pain, nausea, vomiting, diarrhea, constipation           Social History/Family History  SOCHX:   tobacco,  -  alcohol    FMHX: FA/MO  - contributory       Discussed with:  PMD, Family    Physical Exam:    Vital Signs:  Vital Signs Last 24 Hrs  T(C): 36.2 (2020 17:06), Max: 36.9 (2020 00:18)  T(F): 97.2 (2020 17:06), Max: 98.5 (2020 00:18)  HR: 74 (2020 21:55) (68 - 87)  BP: 179/78 (2020 17:06) (132/71 - 179/78)  BP(mean): --  RR: 18 (2020 17:06) (18 - 18)  SpO2: 97% (2020 21:55) (94% - 98%)  Daily     Daily Weight in k.8 (2020 04:59)  I&O's Summary    2020 07:01  -  2020 07:00  --------------------------------------------------------  IN: 560 mL / OUT: 0 mL / NET: 560 mL          Chest:  fair air entry bilateral  Cardiovascular:  Regular rhythm, S1, S2, no murmur/rub/S3/S4, no carotid/femoral/abdominal bruit, radial/pedal pulses 2+,   Abdomen:  Soft, non-tender, non-distended, normoactive bowel sounds, no HSM        Laboratory:          138  |  104  |  69<H>  ----------------------------<  111<H>  3.8   |  25  |  3.77<H>    Ca    8.1<L>      2020 07:22          Assessment:  I am asked to assist this patient admitted with shortness of breath  The patient has multifactorial causes of shortness of breath, renal insufficiency, anemia  And now after a diagnostic echocardiogram within normal ejection fraction the reading shows mitral regurgitation and tricuspid regurgitation  After review of the studies this regurgitation is not considered severe but in the moderate to severe but more moderate range  upper GI bleed  GI noted,  EGD   optimize systolic blood pressure  PRBCs  EGD noted with gastric ulcer, GI treatment continues

## 2020-01-25 NOTE — PROGRESS NOTE ADULT - SUBJECTIVE AND OBJECTIVE BOX
Patient is a 79y old  Female who presents with a chief complaint of sob cough (24 Jan 2020 23:39)      INTERVAL HPI/OVERNIGHT EVENTS:  pt NAD  confused  seen earlier    MEDICATIONS  (STANDING):  amLODIPine   Tablet 10 milliGRAM(s) Oral daily  buDESOnide    Inhalation Suspension 0.5 milliGRAM(s) Inhalation every 12 hours  carbamide peroxide Otic Solution 1 Drop(s) Both Ears two times a day  carvedilol 12.5 milliGRAM(s) Oral every 12 hours  chlorhexidine 4% Liquid 1 Application(s) Topical <User Schedule>  hydrALAZINE 50 milliGRAM(s) Oral every 6 hours  levothyroxine 88 MICROGram(s) Oral daily  sodium chloride 0.65% Nasal 1 Spray(s) Both Nostrils three times a day  sucralfate suspension 1 Gram(s) Oral four times a day    MEDICATIONS  (PRN):  acetaminophen   Tablet .. 650 milliGRAM(s) Oral every 6 hours PRN Mild Pain (1 - 3)  albuterol/ipratropium for Nebulization 3 milliLiter(s) Nebulizer every 6 hours PRN Shortness of Breath and/or Wheezing  hydrALAZINE Injectable 10 milliGRAM(s) IV Push every 6 hours PRN SBP >170      REVIEW OF SYSTEMS:  RESPIRATORY: No shortness of breath  CARDIOVASCULAR: No chest pain        Vital Signs Last 24 Hrs  T(C): 35.8 (25 Jan 2020 05:21), Max: 36.6 (24 Jan 2020 11:36)  T(F): 96.4 (25 Jan 2020 05:21), Max: 97.9 (24 Jan 2020 11:36)  HR: 74 (25 Jan 2020 06:54) (70 - 80)  BP: 110/62 (25 Jan 2020 05:21) (93/54 - 110/62)  BP(mean): --  RR: 18 (25 Jan 2020 05:21) (18 - 18)  SpO2: 97% (25 Jan 2020 06:54) (95% - 98%)    PHYSICAL EXAM:  GENERAL: NAD    LABS:              CAPILLARY BLOOD GLUCOSE        Lipid panel:               RADIOLOGY & ADDITIONAL TESTS:

## 2020-01-25 NOTE — PROGRESS NOTE ADULT - SUBJECTIVE AND OBJECTIVE BOX
Patient is a 79y old  Female who presents with a chief complaint of sob cough (25 Jan 2020 10:52)      INTERVAL HPI/OVERNIGHT EVENTS:  lethargic poor appetite  Calories count couldn't locate  no bleeding reported    MEDICATIONS  (STANDING):  amLODIPine   Tablet 10 milliGRAM(s) Oral daily  buDESOnide    Inhalation Suspension 0.5 milliGRAM(s) Inhalation every 12 hours  carbamide peroxide Otic Solution 1 Drop(s) Both Ears two times a day  carvedilol 12.5 milliGRAM(s) Oral every 12 hours  chlorhexidine 4% Liquid 1 Application(s) Topical <User Schedule>  hydrALAZINE 50 milliGRAM(s) Oral every 6 hours  levothyroxine 88 MICROGram(s) Oral daily  sodium chloride 0.65% Nasal 1 Spray(s) Both Nostrils three times a day  sucralfate suspension 1 Gram(s) Oral four times a day    MEDICATIONS  (PRN):  acetaminophen   Tablet .. 650 milliGRAM(s) Oral every 6 hours PRN Mild Pain (1 - 3)  albuterol/ipratropium for Nebulization 3 milliLiter(s) Nebulizer every 6 hours PRN Shortness of Breath and/or Wheezing  hydrALAZINE Injectable 10 milliGRAM(s) IV Push every 6 hours PRN SBP >170      Allergies    No Known Allergies    Intolerances        REVIEW OF SYSTEMS:        lethargic        Vital Signs Last 24 Hrs  T(C): 36.1 (25 Jan 2020 11:19), Max: 36.1 (25 Jan 2020 11:19)  T(F): 97 (25 Jan 2020 11:19), Max: 97 (25 Jan 2020 11:19)  HR: 81 (25 Jan 2020 11:19) (74 - 81)  BP: 128/68 (25 Jan 2020 11:19) (102/63 - 128/68)  BP(mean): --  RR: 18 (25 Jan 2020 11:19) (18 - 18)  SpO2: 94% (25 Jan 2020 11:19) (94% - 98%)    PHYSICAL EXAM:  general       HEENT wnl  CHEST/LUNG: Clear to percussion bilaterally; < BS at bases  HEART: Regular rate and rhythm; No murmurs, rubs, or gallops  ABDOMEN: Soft, Nontender, Nondistended; Bowel sounds present  EXTREMITIES:  2+ Peripheral edema  LYMPH: No lymphadenopathy noted  SKIN: No rashes or lesions    LABS:              CAPILLARY BLOOD GLUCOSE                    RADIOLOGY & ADDITIONAL TESTS:    Imaging Personally Reviewed:  [y ] YES  [ ] NO    Consultant(s) Notes Reviewed:  [y ] YES  [ ] NO    Care Discussed with Consultants/Other Providers [ ] YES  [ ] NO    PROBLEMS:  CONGESTIVE HEART FAILURE;RSV INFECTION  CONGESTIVE BABB FAILURE;RSV INFECTION  SHORTNESS OF BREATH  CHF (congestive heart failure)  Encounter for palliative care  Advanced care planning/counseling discussion  Left hemiparesis  Dementia without behavioral disturbance, unspecified dementia type  Debility  Encephalopathy, unspecified  Hypothyroidism, unspecified type  Gastrointestinal hemorrhage, unspecified gastrointestinal hemorrhage type  Goals of care, counseling/discussion  HTN (hypertension)  Hyperlipidemia  Alzheimers disease  MI, old  Hypothyroidism  RSV infection  CHF (congestive heart failure)  GI bleed  Anemia  FTT      Care discussed with family,         [  ]   yes  [  ]  No    imp:    stable[ ]    unstable[  ]     improving [   ]       unchanged  [x  ]                Plans:  Calories count  Palliative care reviewed  Considering daughter's concern re << hearing CT head s contrast

## 2020-01-25 NOTE — PROGRESS NOTE ADULT - SUBJECTIVE AND OBJECTIVE BOX
INTERVAL HPI:  79 year female with HTN, HLD, MI hx, CVA, Gi bleed, Anemia Depression and Alzheimer's dementia.  Sent from [facility] due to SOB. Possibility of pneumonia vs CHF was raised.   In ED with Respiratory distress required BIPAP support and RVP positive for RSV. Pt not able to provide more details due to dementia. Information from transfer papers and ED physician.  Nods no when asked for smoking.  01/09/20: With massive GI bleed, intubated and transferred to ICU.  01/09/20:  EGD+ Colonoscopy.  01/15/20:  Extubated.  01/16/20:  Out of ICU  01/18/20:  Back to ICU for upper GI bleed and significant drop in H & H.  01/21/20:  EGD, acute gastric ulcer.  01/22/20:  Out of ICU.    OVERNIGHT EVENTS:  Responsive but extremely tired looking. Very poor Po intake.    Vital Signs Last 24 Hrs  T(C): 36.1 (25 Jan 2020 11:19), Max: 36.1 (25 Jan 2020 11:19)  T(F): 97 (25 Jan 2020 11:19), Max: 97 (25 Jan 2020 11:19)  HR: 81 (25 Jan 2020 11:19) (74 - 81)  BP: 128/68 (25 Jan 2020 11:19) (110/62 - 128/68)  BP(mean): --  RR: 18 (25 Jan 2020 11:19) (18 - 18)  SpO2: 94% (25 Jan 2020 11:19) (94% - 98%)    PHYSICAL EXAM:  GEN:         Awake, responsive and comfortable.  HEENT:    Normal.    RESP:        no wheezing.  CVS:          Regular rate and rhythm.   ABD:         Soft, non-tender, non-distended;     MEDICATIONS  (STANDING):  amLODIPine   Tablet 10 milliGRAM(s) Oral daily  buDESOnide    Inhalation Suspension 0.5 milliGRAM(s) Inhalation every 12 hours  carbamide peroxide Otic Solution 1 Drop(s) Both Ears two times a day  carvedilol 12.5 milliGRAM(s) Oral every 12 hours  chlorhexidine 4% Liquid 1 Application(s) Topical <User Schedule>  hydrALAZINE 50 milliGRAM(s) Oral every 6 hours  levothyroxine 88 MICROGram(s) Oral daily  sodium chloride 0.65% Nasal 1 Spray(s) Both Nostrils three times a day  sucralfate suspension 1 Gram(s) Oral four times a day    MEDICATIONS  (PRN):  acetaminophen   Tablet .. 650 milliGRAM(s) Oral every 6 hours PRN Mild Pain (1 - 3)  albuterol/ipratropium for Nebulization 3 milliLiter(s) Nebulizer every 6 hours PRN Shortness of Breath and/or Wheezing  hydrALAZINE Injectable 10 milliGRAM(s) IV Push every 6 hours PRN SBP >170    ASSESSMENT AND PLAN:  ·	S/P Acute hypoxic  Respiratory failure .  ·	RSV tracheobronchitis.  ·	Left pleural effusion.  ·	Hypothyroidism with very high TSH.  ·	Anemia.  ·	VRE UTI  ·	Leukocytosis.  ·	Renal Insuffiencey.  ·	CVA by history.  ·	HTN.  ·	HLD.  ·	Alzheimer's dementia.  ·	GI bleed.    Continue budesonide with PRN nebulizer.  Nutritional support.

## 2020-01-26 PROCEDURE — 70450 CT HEAD/BRAIN W/O DYE: CPT | Mod: 26

## 2020-01-26 RX ORDER — ACETAMINOPHEN 500 MG
650 TABLET ORAL EVERY 6 HOURS
Refills: 0 | Status: DISCONTINUED | OUTPATIENT
Start: 2020-01-26 | End: 2020-01-30

## 2020-01-26 RX ADMIN — Medication 88 MICROGRAM(S): at 05:32

## 2020-01-26 RX ADMIN — Medication 1 SPRAY(S): at 14:15

## 2020-01-26 RX ADMIN — Medication 1 SPRAY(S): at 05:32

## 2020-01-26 RX ADMIN — Medication 1 GRAM(S): at 01:56

## 2020-01-26 RX ADMIN — AMLODIPINE BESYLATE 10 MILLIGRAM(S): 2.5 TABLET ORAL at 05:32

## 2020-01-26 RX ADMIN — CARBAMIDE PEROXIDE 1 DROP(S): 81.86 SOLUTION/ DROPS AURICULAR (OTIC) at 05:32

## 2020-01-26 RX ADMIN — Medication 1 GRAM(S): at 05:32

## 2020-01-26 RX ADMIN — Medication 50 MILLIGRAM(S): at 05:32

## 2020-01-26 RX ADMIN — Medication 0.5 MILLIGRAM(S): at 17:48

## 2020-01-26 RX ADMIN — Medication 650 MILLIGRAM(S): at 15:36

## 2020-01-26 RX ADMIN — Medication 1 GRAM(S): at 12:03

## 2020-01-26 RX ADMIN — CARVEDILOL PHOSPHATE 12.5 MILLIGRAM(S): 80 CAPSULE, EXTENDED RELEASE ORAL at 05:32

## 2020-01-26 RX ADMIN — Medication 50 MILLIGRAM(S): at 12:02

## 2020-01-26 RX ADMIN — Medication 650 MILLIGRAM(S): at 16:39

## 2020-01-26 RX ADMIN — Medication 1 GRAM(S): at 17:57

## 2020-01-26 RX ADMIN — CARBAMIDE PEROXIDE 1 DROP(S): 81.86 SOLUTION/ DROPS AURICULAR (OTIC) at 17:57

## 2020-01-26 RX ADMIN — Medication 0.5 MILLIGRAM(S): at 05:41

## 2020-01-26 RX ADMIN — Medication 650 MILLIGRAM(S): at 21:35

## 2020-01-26 RX ADMIN — Medication 650 MILLIGRAM(S): at 22:00

## 2020-01-26 RX ADMIN — CHLORHEXIDINE GLUCONATE 1 APPLICATION(S): 213 SOLUTION TOPICAL at 07:53

## 2020-01-26 NOTE — PROGRESS NOTE ADULT - SUBJECTIVE AND OBJECTIVE BOX
lying in bed, weak looking    Vital Signs Last 24 Hrs  T(C): 36.2 (26 Jan 2020 05:30), Max: 36.6 (25 Jan 2020 17:37)  T(F): 97.2 (26 Jan 2020 05:30), Max: 97.8 (25 Jan 2020 17:37)  HR: 65 (26 Jan 2020 06:35) (65 - 81)  BP: 121/67 (26 Jan 2020 05:30) (116/75 - 139/69)  BP(mean): --  RR: 16 (26 Jan 2020 05:30) (16 - 18)  SpO2: 97% (26 Jan 2020 06:35) (94% - 99%)    PHYSICAL EXAM:    general - weak looking  HEENT - No Icterus  CVS - RRR  RS - AE B/L  Abd - soft, NT  Ext - Pulses +

## 2020-01-26 NOTE — PROGRESS NOTE ADULT - SUBJECTIVE AND OBJECTIVE BOX
Patient is a 79y old  Female who presents with a chief complaint of sob cough (25 Jan 2020 17:37)      INTERVAL HPI/OVERNIGHT EVENTS:  pt NAD  confused  awake and alert  no events overnight    MEDICATIONS  (STANDING):  amLODIPine   Tablet 10 milliGRAM(s) Oral daily  buDESOnide    Inhalation Suspension 0.5 milliGRAM(s) Inhalation every 12 hours  carbamide peroxide Otic Solution 1 Drop(s) Both Ears two times a day  carvedilol 12.5 milliGRAM(s) Oral every 12 hours  chlorhexidine 4% Liquid 1 Application(s) Topical <User Schedule>  hydrALAZINE 50 milliGRAM(s) Oral every 6 hours  levothyroxine 88 MICROGram(s) Oral daily  sodium chloride 0.65% Nasal 1 Spray(s) Both Nostrils three times a day  sucralfate suspension 1 Gram(s) Oral four times a day    MEDICATIONS  (PRN):  acetaminophen   Tablet .. 650 milliGRAM(s) Oral every 6 hours PRN Mild Pain (1 - 3)  albuterol/ipratropium for Nebulization 3 milliLiter(s) Nebulizer every 6 hours PRN Shortness of Breath and/or Wheezing  hydrALAZINE Injectable 10 milliGRAM(s) IV Push every 6 hours PRN SBP >170      REVIEW OF SYSTEMS:  RESPIRATORY: No shortness of breath  CARDIOVASCULAR: No chest pain, palpitations, dizziness, or leg swelling  GASTROINTESTINAL: No abdominal or epigastric pain      Vital Signs Last 24 Hrs  T(C): 36.2 (26 Jan 2020 05:30), Max: 36.6 (25 Jan 2020 17:37)  T(F): 97.2 (26 Jan 2020 05:30), Max: 97.8 (25 Jan 2020 17:37)  HR: 65 (26 Jan 2020 06:35) (65 - 81)  BP: 121/67 (26 Jan 2020 05:30) (116/75 - 139/69)  BP(mean): --  RR: 16 (26 Jan 2020 05:30) (16 - 18)  SpO2: 97% (26 Jan 2020 06:35) (94% - 99%)    PHYSICAL EXAM:  GENERAL: NAD

## 2020-01-26 NOTE — PROGRESS NOTE ADULT - SUBJECTIVE AND OBJECTIVE BOX
INTERVAL HPI:  79 year female with HTN, HLD, MI hx, CVA, Gi bleed, Anemia Depression and Alzheimer's dementia.  Sent from [facility] due to SOB. Possibility of pneumonia vs CHF was raised.   In ED with Respiratory distress required BIPAP support and RVP positive for RSV. Pt not able to provide more details due to dementia. Information from transfer papers and ED physician.  Nods no when asked for smoking.  01/09/20: With massive GI bleed, intubated and transferred to ICU.  01/09/20:  EGD+ Colonoscopy.  01/15/20:  Extubated.  01/16/20:  Out of ICU  01/18/20:  Back to ICU for upper GI bleed and significant drop in H & H.  01/21/20:  EGD, acute gastric ulcer.  01/22/20:  Out of ICU.    OVERNIGHT EVENTS:  More respomsive    Vital Signs Last 24 Hrs  T(C): 36.6 (26 Jan 2020 12:00), Max: 36.6 (25 Jan 2020 17:37)  T(F): 97.8 (26 Jan 2020 12:00), Max: 97.8 (25 Jan 2020 17:37)  HR: 79 (26 Jan 2020 12:00) (65 - 79)  BP: 120/68 (26 Jan 2020 12:00) (116/75 - 139/69)  BP(mean): --  RR: 18 (26 Jan 2020 12:00) (16 - 18)  SpO2: 99% (26 Jan 2020 12:00) (94% - 99%)    PHYSICAL EXAM:  GEN:         Awake, responsive and comfortable.  HEENT:    Normal.    RESP:       no wheezing  CVS:          Regular rate and rhythm.   ABD:         Soft, non-tender, non-distended;     MEDICATIONS  (STANDING):  amLODIPine   Tablet 10 milliGRAM(s) Oral daily  buDESOnide    Inhalation Suspension 0.5 milliGRAM(s) Inhalation every 12 hours  carbamide peroxide Otic Solution 1 Drop(s) Both Ears two times a day  carvedilol 12.5 milliGRAM(s) Oral every 12 hours  chlorhexidine 4% Liquid 1 Application(s) Topical <User Schedule>  hydrALAZINE 50 milliGRAM(s) Oral every 6 hours  levothyroxine 88 MICROGram(s) Oral daily  sodium chloride 0.65% Nasal 1 Spray(s) Both Nostrils three times a day  sucralfate suspension 1 Gram(s) Oral four times a day    MEDICATIONS  (PRN):  acetaminophen   Tablet .. 650 milliGRAM(s) Oral every 6 hours PRN Mild Pain (1 - 3)  acetaminophen   Tablet .. 650 milliGRAM(s) Oral every 6 hours PRN Mild Pain (1 - 3)  albuterol/ipratropium for Nebulization 3 milliLiter(s) Nebulizer every 6 hours PRN Shortness of Breath and/or Wheezing  hydrALAZINE Injectable 10 milliGRAM(s) IV Push every 6 hours PRN SBP >170    ASSESSMENT AND PLAN:  ·	S/P Acute hypoxic  Respiratory failure .  ·	RSV tracheobronchitis.  ·	Left pleural effusion.  ·	Hypothyroidism with very high TSH.  ·	Anemia.  ·	VRE UTI  ·	Leukocytosis.  ·	Renal Insuffiencey.  ·	CVA by history.  ·	HTN.  ·	HLD.  ·	Alzheimer's dementia.  ·	GI bleed.    More responsive.  Breathing better.  Continue budesonide with PRN nebulizer.  Nutritional support.  Discussed with daughter at bed side

## 2020-01-26 NOTE — PROGRESS NOTE ADULT - SUBJECTIVE AND OBJECTIVE BOX
79 years old female from [facility] rehab c/o coughing sob for three to four days. Pt is sent here to rule out chf vs pneumonia. Pt is alert and oriented to person only unable to give detail hx due to hx of Alzheimer according to the [facility] pt had sat of 91 to 92 %,	    PAST MEDICAL/SURGICAL/FAMILY/SOCIAL HISTORY:    Past Medical History:  Alzheimers disease    CVA (cerebral vascular accident)    Depression    HTN (hypertension)    MI, old.  recently Dx c hypothyroidism, TSH > 111 started Levothyroxin 50mcg qd as per Dr Vigil (2020 11:41)      Chief Complaint:  Patient is a 79y old  Female who presents with a chief complaint of sob cough (2020 22:56)      Review of Systems:    General:  No wt loss, fevers, chills, night sweats  Eyes:  Good vision, no reported pain  ENT:  No sore throat, pain, runny nose, dysphagia  CV:  No pain, palpitations, hypo/hypertension  Resp:  dyspnea, cough  GI:  No pain, nausea, vomiting, diarrhea, constipation           Social History/Family History  SOCHX:   tobacco,  -  alcohol    FMHX: FA/MO  - contributory       Discussed with:  PMD, Family    Physical Exam:    Vital Signs:  Vital Signs Last 24 Hrs  T(C): 36.2 (2020 17:06), Max: 36.9 (2020 00:18)  T(F): 97.2 (2020 17:06), Max: 98.5 (2020 00:18)  HR: 74 (2020 21:55) (68 - 87)  BP: 179/78 (2020 17:06) (132/71 - 179/78)  BP(mean): --  RR: 18 (2020 17:06) (18 - 18)  SpO2: 97% (2020 21:55) (94% - 98%)  Daily     Daily Weight in k.8 (2020 04:59)  I&O's Summary    2020 07:01  -  2020 07:00  --------------------------------------------------------  IN: 560 mL / OUT: 0 mL / NET: 560 mL          Chest:  fair air entry bilateral  Cardiovascular:  Regular rhythm, S1, S2, no murmur/rub/S3/S4, no carotid/femoral/abdominal bruit, radial/pedal pulses 2+,   Abdomen:  Soft, non-tender, non-distended, normoactive bowel sounds, no HSM        Laboratory:          138  |  104  |  69<H>  ----------------------------<  111<H>  3.8   |  25  |  3.77<H>    Ca    8.1<L>      2020 07:22          Assessment:  I am asked to assist this patient admitted with shortness of breath  The patient has multifactorial causes of shortness of breath, renal insufficiency, anemia  And now after a diagnostic echocardiogram within normal ejection fraction the reading shows mitral regurgitation and tricuspid regurgitation  After review of the studies this regurgitation is not considered severe but in the moderate to severe but more moderate range  upper GI bleed  GI noted,  EGD   optimize systolic blood pressure  PRBCs  EGD noted with gastric ulcer, GI treatment continues

## 2020-01-27 LAB
ANION GAP SERPL CALC-SCNC: 8 MMOL/L — SIGNIFICANT CHANGE UP (ref 5–17)
ANION GAP SERPL CALC-SCNC: 9 MMOL/L — SIGNIFICANT CHANGE UP (ref 5–17)
BLD GP AB SCN SERPL QL: SIGNIFICANT CHANGE UP
BUN SERPL-MCNC: 63 MG/DL — HIGH (ref 7–23)
BUN SERPL-MCNC: 64 MG/DL — HIGH (ref 7–23)
CALCIUM SERPL-MCNC: 8 MG/DL — LOW (ref 8.5–10.1)
CALCIUM SERPL-MCNC: 8.1 MG/DL — LOW (ref 8.5–10.1)
CHLORIDE SERPL-SCNC: 116 MMOL/L — HIGH (ref 96–108)
CHLORIDE SERPL-SCNC: 116 MMOL/L — HIGH (ref 96–108)
CO2 SERPL-SCNC: 20 MMOL/L — LOW (ref 22–31)
CO2 SERPL-SCNC: 20 MMOL/L — LOW (ref 22–31)
CREAT SERPL-MCNC: 2.6 MG/DL — HIGH (ref 0.5–1.3)
CREAT SERPL-MCNC: 2.74 MG/DL — HIGH (ref 0.5–1.3)
GLUCOSE SERPL-MCNC: 113 MG/DL — HIGH (ref 70–99)
GLUCOSE SERPL-MCNC: 125 MG/DL — HIGH (ref 70–99)
HCT VFR BLD CALC: 29.4 % — LOW (ref 34.5–45)
HCT VFR BLD CALC: 29.9 % — LOW (ref 34.5–45)
HCT VFR BLD CALC: 32.6 % — LOW (ref 34.5–45)
HGB BLD-MCNC: 10.2 G/DL — LOW (ref 11.5–15.5)
HGB BLD-MCNC: 9.2 G/DL — LOW (ref 11.5–15.5)
HGB BLD-MCNC: 9.5 G/DL — LOW (ref 11.5–15.5)
MCHC RBC-ENTMCNC: 27.3 PG — SIGNIFICANT CHANGE UP (ref 27–34)
MCHC RBC-ENTMCNC: 27.7 PG — SIGNIFICANT CHANGE UP (ref 27–34)
MCHC RBC-ENTMCNC: 27.8 PG — SIGNIFICANT CHANGE UP (ref 27–34)
MCHC RBC-ENTMCNC: 31.3 GM/DL — LOW (ref 32–36)
MCHC RBC-ENTMCNC: 31.3 GM/DL — LOW (ref 32–36)
MCHC RBC-ENTMCNC: 31.8 GM/DL — LOW (ref 32–36)
MCV RBC AUTO: 87.2 FL — SIGNIFICANT CHANGE UP (ref 80–100)
MCV RBC AUTO: 87.2 FL — SIGNIFICANT CHANGE UP (ref 80–100)
MCV RBC AUTO: 88.8 FL — SIGNIFICANT CHANGE UP (ref 80–100)
NRBC # BLD: 0 /100 WBCS — SIGNIFICANT CHANGE UP (ref 0–0)
NRBC # BLD: 1 /100 WBCS — HIGH (ref 0–0)
NRBC # BLD: 1 /100 WBCS — HIGH (ref 0–0)
OB PNL STL: POSITIVE
PLATELET # BLD AUTO: 121 K/UL — LOW (ref 150–400)
PLATELET # BLD AUTO: 147 K/UL — LOW (ref 150–400)
PLATELET # BLD AUTO: 153 K/UL — SIGNIFICANT CHANGE UP (ref 150–400)
POTASSIUM SERPL-MCNC: 3.6 MMOL/L — SIGNIFICANT CHANGE UP (ref 3.5–5.3)
POTASSIUM SERPL-MCNC: 3.8 MMOL/L — SIGNIFICANT CHANGE UP (ref 3.5–5.3)
POTASSIUM SERPL-SCNC: 3.6 MMOL/L — SIGNIFICANT CHANGE UP (ref 3.5–5.3)
POTASSIUM SERPL-SCNC: 3.8 MMOL/L — SIGNIFICANT CHANGE UP (ref 3.5–5.3)
RBC # BLD: 3.37 M/UL — LOW (ref 3.8–5.2)
RBC # BLD: 3.43 M/UL — LOW (ref 3.8–5.2)
RBC # BLD: 3.67 M/UL — LOW (ref 3.8–5.2)
RBC # FLD: 21.9 % — HIGH (ref 10.3–14.5)
RBC # FLD: 22 % — HIGH (ref 10.3–14.5)
RBC # FLD: 22.3 % — HIGH (ref 10.3–14.5)
SODIUM SERPL-SCNC: 144 MMOL/L — SIGNIFICANT CHANGE UP (ref 135–145)
SODIUM SERPL-SCNC: 145 MMOL/L — SIGNIFICANT CHANGE UP (ref 135–145)
WBC # BLD: 6.7 K/UL — SIGNIFICANT CHANGE UP (ref 3.8–10.5)
WBC # BLD: 8.22 K/UL — SIGNIFICANT CHANGE UP (ref 3.8–10.5)
WBC # BLD: 9.44 K/UL — SIGNIFICANT CHANGE UP (ref 3.8–10.5)
WBC # FLD AUTO: 6.7 K/UL — SIGNIFICANT CHANGE UP (ref 3.8–10.5)
WBC # FLD AUTO: 8.22 K/UL — SIGNIFICANT CHANGE UP (ref 3.8–10.5)
WBC # FLD AUTO: 9.44 K/UL — SIGNIFICANT CHANGE UP (ref 3.8–10.5)

## 2020-01-27 PROCEDURE — 99233 SBSQ HOSP IP/OBS HIGH 50: CPT

## 2020-01-27 PROCEDURE — 43752 NASAL/OROGASTRIC W/TUBE PLMT: CPT

## 2020-01-27 RX ORDER — SODIUM CHLORIDE 9 MG/ML
500 INJECTION INTRAMUSCULAR; INTRAVENOUS; SUBCUTANEOUS ONCE
Refills: 0 | Status: COMPLETED | OUTPATIENT
Start: 2020-01-27 | End: 2020-01-27

## 2020-01-27 RX ORDER — SODIUM CHLORIDE 9 MG/ML
1000 INJECTION, SOLUTION INTRAVENOUS
Refills: 0 | Status: DISCONTINUED | OUTPATIENT
Start: 2020-01-27 | End: 2020-01-29

## 2020-01-27 RX ORDER — HYDRALAZINE HCL 50 MG
25 TABLET ORAL EVERY 6 HOURS
Refills: 0 | Status: DISCONTINUED | OUTPATIENT
Start: 2020-01-27 | End: 2020-01-27

## 2020-01-27 RX ORDER — SUCRALFATE 1 G
1 TABLET ORAL
Refills: 0 | Status: DISCONTINUED | OUTPATIENT
Start: 2020-01-27 | End: 2020-01-28

## 2020-01-27 RX ORDER — PANTOPRAZOLE SODIUM 20 MG/1
40 TABLET, DELAYED RELEASE ORAL
Refills: 0 | Status: DISCONTINUED | OUTPATIENT
Start: 2020-01-27 | End: 2020-01-30

## 2020-01-27 RX ORDER — HYDRALAZINE HCL 50 MG
10 TABLET ORAL ONCE
Refills: 0 | Status: COMPLETED | OUTPATIENT
Start: 2020-01-27 | End: 2020-01-27

## 2020-01-27 RX ORDER — HYDRALAZINE HCL 50 MG
10 TABLET ORAL EVERY 6 HOURS
Refills: 0 | Status: DISCONTINUED | OUTPATIENT
Start: 2020-01-27 | End: 2020-01-29

## 2020-01-27 RX ADMIN — Medication 10 MILLIGRAM(S): at 10:12

## 2020-01-27 RX ADMIN — CARVEDILOL PHOSPHATE 12.5 MILLIGRAM(S): 80 CAPSULE, EXTENDED RELEASE ORAL at 06:17

## 2020-01-27 RX ADMIN — Medication 0.5 MILLIGRAM(S): at 17:10

## 2020-01-27 RX ADMIN — SODIUM CHLORIDE 50 MILLILITER(S): 9 INJECTION, SOLUTION INTRAVENOUS at 01:32

## 2020-01-27 RX ADMIN — CHLORHEXIDINE GLUCONATE 1 APPLICATION(S): 213 SOLUTION TOPICAL at 08:45

## 2020-01-27 RX ADMIN — SODIUM CHLORIDE 1000 MILLILITER(S): 9 INJECTION INTRAMUSCULAR; INTRAVENOUS; SUBCUTANEOUS at 17:58

## 2020-01-27 RX ADMIN — Medication 0.5 MILLIGRAM(S): at 05:47

## 2020-01-27 RX ADMIN — Medication 1 GRAM(S): at 06:17

## 2020-01-27 RX ADMIN — Medication 25 MILLIGRAM(S): at 06:16

## 2020-01-27 RX ADMIN — PANTOPRAZOLE SODIUM 40 MILLIGRAM(S): 20 TABLET, DELAYED RELEASE ORAL at 17:59

## 2020-01-27 RX ADMIN — CARBAMIDE PEROXIDE 1 DROP(S): 81.86 SOLUTION/ DROPS AURICULAR (OTIC) at 17:59

## 2020-01-27 RX ADMIN — SODIUM CHLORIDE 100 MILLILITER(S): 9 INJECTION, SOLUTION INTRAVENOUS at 18:30

## 2020-01-27 RX ADMIN — Medication 88 MICROGRAM(S): at 06:17

## 2020-01-27 RX ADMIN — PANTOPRAZOLE SODIUM 40 MILLIGRAM(S): 20 TABLET, DELAYED RELEASE ORAL at 01:23

## 2020-01-27 RX ADMIN — Medication 25 MILLIGRAM(S): at 11:29

## 2020-01-27 RX ADMIN — Medication 1 SPRAY(S): at 06:17

## 2020-01-27 RX ADMIN — Medication 1 GRAM(S): at 01:27

## 2020-01-27 RX ADMIN — CARBAMIDE PEROXIDE 1 DROP(S): 81.86 SOLUTION/ DROPS AURICULAR (OTIC) at 06:17

## 2020-01-27 RX ADMIN — Medication 1 SPRAY(S): at 13:06

## 2020-01-27 NOTE — PROGRESS NOTE ADULT - SUBJECTIVE AND OBJECTIVE BOX
INTERVAL HPI/OVERNIGHT EVENTS: having melena, hematochezia    Code Status: full  Allergies    No Known Allergies    Intolerances    MEDICATIONS  (STANDING):  buDESOnide    Inhalation Suspension 0.5 milliGRAM(s) Inhalation every 12 hours  carbamide peroxide Otic Solution 1 Drop(s) Both Ears two times a day  carvedilol 12.5 milliGRAM(s) Oral every 12 hours  chlorhexidine 4% Liquid 1 Application(s) Topical <User Schedule>  hydrALAZINE 25 milliGRAM(s) Oral every 6 hours  lactated ringers. 1000 milliLiter(s) (50 mL/Hr) IV Continuous <Continuous>  levothyroxine 88 MICROGram(s) Oral daily  pantoprazole  Injectable 40 milliGRAM(s) IV Push two times a day  sodium chloride 0.65% Nasal 1 Spray(s) Both Nostrils three times a day    MEDICATIONS  (PRN):  acetaminophen   Tablet .. 650 milliGRAM(s) Oral every 6 hours PRN Mild Pain (1 - 3)  acetaminophen   Tablet .. 650 milliGRAM(s) Oral every 6 hours PRN Mild Pain (1 - 3)  albuterol/ipratropium for Nebulization 3 milliLiter(s) Nebulizer every 6 hours PRN Shortness of Breath and/or Wheezing      PRESENT SYMPTOMS: [x ]Unable to obtain due to poor mentation   Source if other than patient:  [ ]Family   [ ]Team     Pain (Impact on QOL):    Location:  Severity:  Minimal acceptable level (0-10 scale):       Quality:       Onset:  Duration:  Aggravating factors:  Relieving Factors  Radiation:    Dyspnea:  Yes [ ] No [ ] - [ ]Mild [ ]Moderate [ ]Severe  Anxiety:    Yes [ ] No [ ] - [ ]Mild [ ]Moderate [ ]Severe  Fatigue:    Yes [ ] No [ ] - [ ]Mild [ ]Moderate [ ]Severe  Nausea:    Yes [ ] No [ ] - [ ]Mild [ ]Moderate [ ]Severe                         Loss of appetite: Yes [ ] No [ ] - [ ]Mild [ ]Moderate [ ]Severe             Constipation:  Yes [ ] No [ ] - [ ]Mild [ ]Moderate [ ]Severe  Grief: Yes [ ] No [ ]     PAIN AD Score:	  [email or web address] (Ctrl + left click to view)    Other Symptoms:  [ ]All other review of systems negative     Karnofsky Performance Score/Palliative Performance Status Version 2:         %    http://palliative.info/resource_material/[email or web address].pdf    PHYSICAL EXAM:  Vital Signs Last 24 Hrs  T(C): 36.6 (27 Jan 2020 09:26), Max: 36.6 (26 Jan 2020 12:00)  T(F): 97.9 (27 Jan 2020 09:26), Max: 97.9 (27 Jan 2020 09:26)  HR: 78 (27 Jan 2020 10:26) (59 - 82)  BP: 183/63 (27 Jan 2020 10:26) (104/65 - 200/71)  BP(mean): --  RR: 18 (27 Jan 2020 10:26) (18 - 20)  SpO2: 100% (27 Jan 2020 10:26) (92% - 100%) I&O's Summary    26 Jan 2020 07:01  -  27 Jan 2020 07:00  --------------------------------------------------------  IN: 0 mL / OUT: 0 mL / NET: 0 mL         GENERAL:  [ ]Alert  [ ]Oriented x   [ ]Lethargic  [ ]Cachexia  [ ]Unarousable  [ ]Verbal  [ ]Non-Verbal  Behavioral:   [ ] Anxiety  [ ] Delirium [ ] Agitation [ ] Other  HEENT:  [ ]Normal   [ ]Dry mouth   [ ]ET Tube/Trach  [ ]Oral lesions  PULMONARY:   [ ]Clear [ ]Tachypnea  [ ]Audible excessive secretions   [ ]Rhonchi        [ ]Right [ ]Left [ ]Bilateral  [ ]Crackles        [ ]Right [ ]Left [ ]Bilateral  [ ]Wheezing     [ ]Right [ ]Left [ ]Bilateral  CARDIOVASCULAR:    [ ]Regular [ ]Irregular [ ]Tachy  [ ]Tolu [ ]Murmur [ ]Other  GASTROINTESTINAL:  [ ]Soft  [ ]Distended   [ ]+BS  [ ]Non tender [ ]Tender  [ ]PEG [ ]OGT/ NGT   Last BM:   01-26-20 @ 07:01  -  01-27-20 @ 07:00  --------------------------------------------------------  OUT: 0 mL       GENITOURINARY:  [ ]Normal [ ] Incontinent   [ ]Oliguria/Anuria   [ ]Stevenson  MUSCULOSKELETAL:   [ ]Normal   [ ]Weakness  [ ]Bed/Wheelchair bound [ ]Edema  NEUROLOGIC:   [ ]No focal deficits  [ ] Cognitive impairment  [ ] Dysphagia [ ]Dysarthria [ ] Paresis [ ]Other   SKIN:   [ ]Normal   [ ]Pressure ulcer(s)  [ ]Rash    CRITICAL CARE:  [ ] Shock Present  [ ]Septic [ ]Cardiogenic [ ]Neurologic [ ]Hypovolemic  [ ]  Vasopressors [ ]  Inotropes   [ ] Respiratory failure present  [ ] Acute  [ ] Chronic [ ] Hypoxic  [ ] Hypercarbic [ ] Other  [ ] Other organ failure     LABS:                        9.5    9.44  )-----------( 153      ( 27 Jan 2020 09:27 )             29.9   01-27    145  |  116<H>  |  64<H>  ----------------------------<  113<H>  3.6   |  20<L>  |  2.60<H>    Ca    8.1<L>      27 Jan 2020 08:16          RADIOLOGY & ADDITIONAL STUDIES:    Protein Calorie Malnutrition Present: [ ] yes [ ] no  [ ] PPSV2 < or = 30%  [ ] significant weight loss [ ] poor nutritional intake [ ] anasarca [ ] catabolic state Prealbumin, Serum: 14 mg/dL (01-22-20 @ 14:31)      REFERRALS:   [ ]Chaplaincy  [ ] Hospice  [ ]Child Life  [ ]Social Work  [ ]Case management [ ]Holistic Therapy   Goals of Care Document: INTERVAL HPI/OVERNIGHT EVENTS: having melena, hematochezia    Code Status: full  Allergies    No Known Allergies    Intolerances    MEDICATIONS  (STANDING):  buDESOnide    Inhalation Suspension 0.5 milliGRAM(s) Inhalation every 12 hours  carbamide peroxide Otic Solution 1 Drop(s) Both Ears two times a day  carvedilol 12.5 milliGRAM(s) Oral every 12 hours  chlorhexidine 4% Liquid 1 Application(s) Topical <User Schedule>  hydrALAZINE 25 milliGRAM(s) Oral every 6 hours  lactated ringers. 1000 milliLiter(s) (50 mL/Hr) IV Continuous <Continuous>  levothyroxine 88 MICROGram(s) Oral daily  pantoprazole  Injectable 40 milliGRAM(s) IV Push two times a day  sodium chloride 0.65% Nasal 1 Spray(s) Both Nostrils three times a day    MEDICATIONS  (PRN):  acetaminophen   Tablet .. 650 milliGRAM(s) Oral every 6 hours PRN Mild Pain (1 - 3)  acetaminophen   Tablet .. 650 milliGRAM(s) Oral every 6 hours PRN Mild Pain (1 - 3)  albuterol/ipratropium for Nebulization 3 milliLiter(s) Nebulizer every 6 hours PRN Shortness of Breath and/or Wheezing      PRESENT SYMPTOMS: [x ]Unable to obtain due to poor mentation   Source if other than patient:  [ ]Family   [ ]Team     Pain (Impact on QOL):    Location:  Severity:  Minimal acceptable level (0-10 scale):       Quality:       Onset:  Duration:  Aggravating factors:  Relieving Factors  Radiation:    Dyspnea:  Yes [ ] No [ ] - [ ]Mild [ ]Moderate [ ]Severe  Anxiety:    Yes [ ] No [ ] - [ ]Mild [ ]Moderate [ ]Severe  Fatigue:    Yes [ ] No [ ] - [ ]Mild [ ]Moderate [ ]Severe  Nausea:    Yes [ ] No [ ] - [ ]Mild [ ]Moderate [ ]Severe                         Loss of appetite: Yes [ ] No [ ] - [ ]Mild [ ]Moderate [ ]Severe             Constipation:  Yes [ ] No [ ] - [ ]Mild [ ]Moderate [ ]Severe  Grief: Yes [ ] No [ ]     PAIN AD Score:	3  [email or web address] (Ctrl + left click to view)    Other Symptoms:  [x]All other review of systems negative   Communicated with pt in common spoken language of [place]- pt able to say no at times and nod otherwise- denies pain or SOB, affirms that she is fatigued, denies nausea, hunger or thirst  Karnofsky Performance Score/Palliative Performance Status Version 2:     20    %    http://palliative.info/resource_material/[email or web address].pdf    PHYSICAL EXAM:  Vital Signs Last 24 Hrs  T(C): 36.6 (27 Jan 2020 09:26), Max: 36.6 (26 Jan 2020 12:00)  T(F): 97.9 (27 Jan 2020 09:26), Max: 97.9 (27 Jan 2020 09:26)  HR: 78 (27 Jan 2020 10:26) (59 - 82)  BP: 183/63 (27 Jan 2020 10:26) (104/65 - 200/71)  BP(mean): --  RR: 18 (27 Jan 2020 10:26) (18 - 20)  SpO2: 100% (27 Jan 2020 10:26) (92% - 100%) I&O's Summary    26 Jan 2020 07:01  -  27 Jan 2020 07:00  --------------------------------------------------------  IN: 0 mL / OUT: 0 mL / NET: 0 mL         GENERAL:  [ x]Alert  [x ]Oriented x  1 [ ]Lethargic  [ ]Cachexia  [ ]Unarousable  [ ]Verbal  [ x]Non-Verbal  Behavioral:   [ ] Anxiety  [ ] Delirium [ ] Agitation [ ] Other  HEENT:  [ ]Normal   [x ]Dry mouth   [ ]ET Tube/Trach  [ ]Oral lesions  PULMONARY:   [ ]Clear [ x]Tachypnea  [x ]Audible excessive secretions   [ x]Rhonchi        [ ]Right [ ]Left [x ]Bilateral  [ ]Crackles        [ ]Right [ ]Left [ ]Bilateral  [ ]Wheezing     [ ]Right [ ]Left [ ]Bilateral  CARDIOVASCULAR:    [x ]Regular [ ]Irregular [ ]Tachy  [ ]Tolu [ ]Murmur [ ]Other  GASTROINTESTINAL:  [x ]Soft  [ ]Distended   [x ]+BS  [x ]Non tender [ ]Tender  [ ]PEG [ ]OGT/ NGT   Last BM: 1/27 01-26-20 @ 07:01  -  01-27-20 @ 07:00  --------------------------------------------------------  OUT: 0 mL       GENITOURINARY:  [ ]Normal [ x] Incontinent   [ ]Oliguria/Anuria   [ ]Stevenson  MUSCULOSKELETAL:   [ ]Normal   [ ]Weakness  [x ]Bed/Wheelchair bound [x ]Edema  NEUROLOGIC:   [ ]No focal deficits  [x ] Cognitive impairment  [ x] Dysphagia [ x]Dysarthria [ x] Paresis [ ]Other   SKIN:   [ ]Normal   [x ]Pressure ulcer(s)  [ ]Rash    CRITICAL CARE:  [ ] Shock Present  [ ]Septic [ ]Cardiogenic [ ]Neurologic [ ]Hypovolemic  [ ]  Vasopressors [ ]  Inotropes   [ ] Respiratory failure present  [ ] Acute  [ ] Chronic [ ] Hypoxic  [ ] Hypercarbic [ ] Other  [ ] Other organ failure     LABS:                        9.5    9.44  )-----------( 153      ( 27 Jan 2020 09:27 )             29.9   01-27    145  |  116<H>  |  64<H>  ----------------------------<  113<H>  3.6   |  20<L>  |  2.60<H>    Ca    8.1<L>      27 Jan 2020 08:16          RADIOLOGY & ADDITIONAL STUDIES:    Protein Calorie Malnutrition Present: [ x] yes [ ] no  [ x] PPSV2 < or = 30%  [x ] significant weight loss [x ] poor nutritional intake [x ] anasarca [x ] catabolic state Prealbumin, Serum: 14 mg/dL (01-22-20 @ 14:31)      REFERRALS:   [ ]Chaplaincy  [ ] Hospice  [ ]Child Life  [ ]Social Work  [ ]Case management [ ]Holistic Therapy   Goals of Care Document:

## 2020-01-27 NOTE — PROGRESS NOTE ADULT - ASSESSMENT
78year old female w PMH HTN, CVA residual left weakness, Hypothyroid, Dementia, Achalasia s/p POEMS admitted from rehab with RSV/resp distress hospital course complicated by UGIB requiring ICU stay and EGD x 2. Pt now transferred to medical floor 78year old female w PMH HTN, CVA residual left weakness, Hypothyroid, Dementia, Achalasia s/p POEMS admitted from rehab with RSV/resp distress hospital course complicated by UGIB requiring ICU stay and EGD x 2. Pt now transferred to medical floor, developed bloody stools this am now upgraded to telemetry

## 2020-01-27 NOTE — CHART NOTE - NSCHARTNOTEFT_GEN_A_CORE
Medicine Hospitalist PA    Called by RN overnight to evaluate patient for bloody bowel movements. Patient seen and examined at bedside.   Patient is a 80 y/o female with PMHx MDD, HTN, HLD, hypothyroidism, MI, CVA, admitted for CHF, RSV PNA respiratory failure requiring intubation, UGIB 2/2 to gastric ulcer and AVM, now having dark maroon bloody liquid BMs. Patient laying in bed complaining of epigastric pain. Patient is a poor historian. Denies N/V, chest pain, palpitations, sob.     VITALS:  BP: 128/75  HR: 77  Spo2: 95% on room air  T: 97 oral  RR: 18    General: Alert and awake, NAD, PERRL, EOM intact.  Cardio: S1S2 regular rate/rhythm  Resp: Good air entry B/L. No rales, rhonchi, wheezes.  Abd: Soft ND. +BS. +mild tenderness to epigastric palpation. No guarding or rebound tenderness  Ext: No lower extremity edema. 2+ pulses b/l  Neuro: HANNAH. FC. Good handgrip. No arm or leg drift.    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 Stone and Dr. Laird of acute overnight event  -signed out to day team for closer monitoring and f/u

## 2020-01-27 NOTE — PROGRESS NOTE ADULT - SUBJECTIVE AND OBJECTIVE BOX
[facility] NEPHROLOGY SERVICES, [facility]  NEPHROLOGY AND HYPERTENSION  [address]  [address]  [place] [address]  [phone]    MD MART LORD MD ANDREY GONCHARUK, MD MADHU KORRAPATI, MD YELENA ROSENBERG, MD BINNY KOSHY, MD CHRISTOPHER CAPUTO, MD ALAYNA WALLACE MD          Patient in distress, events noted;     MEDICATIONS  (STANDING):  buDESOnide    Inhalation Suspension 0.5 milliGRAM(s) Inhalation every 12 hours  carbamide peroxide Otic Solution 1 Drop(s) Both Ears two times a day  carvedilol 12.5 milliGRAM(s) Oral every 12 hours  chlorhexidine 4% Liquid 1 Application(s) Topical <User Schedule>  hydrALAZINE 25 milliGRAM(s) Oral every 6 hours  hydrALAZINE Injectable 10 milliGRAM(s) IV Push once  lactated ringers. 1000 milliLiter(s) (50 mL/Hr) IV Continuous <Continuous>  levothyroxine 88 MICROGram(s) Oral daily  pantoprazole  Injectable 40 milliGRAM(s) IV Push two times a day  sodium chloride 0.65% Nasal 1 Spray(s) Both Nostrils three times a day    MEDICATIONS  (PRN):  acetaminophen   Tablet .. 650 milliGRAM(s) Oral every 6 hours PRN Mild Pain (1 - 3)  acetaminophen   Tablet .. 650 milliGRAM(s) Oral every 6 hours PRN Mild Pain (1 - 3)  albuterol/ipratropium for Nebulization 3 milliLiter(s) Nebulizer every 6 hours PRN Shortness of Breath and/or Wheezing      01-26-20 @ 07:01  -  01-27-20 @ 07:00  --------------------------------------------------------  IN: 0 mL / OUT: 0 mL / NET: 0 mL      PHYSICAL EXAM:      T(C): 36.1 (01-27-20 @ 05:00), Max: 36.6 (01-26-20 @ 12:00)  HR: 78 (01-27-20 @ 06:43) (59 - 82)  BP: 133/55 (01-27-20 @ 05:00) (104/65 - 133/55)  RR: 20 (01-27-20 @ 05:00) (18 - 20)  SpO2: 95% (01-27-20 @ 06:43) (92% - 99%)  Wt(kg): --  Respiratory: clear anteriorly, decreased BS at bases  Cardiovascular: S1 S2  Gastrointestinal: soft NT ND +BS  Extremities:  LUE > RUE 1-2 edema                                    10.2   8.22  )-----------( 147      ( 27 Jan 2020 00:59 )             32.6     01-27    145  |  116<H>  |  64<H>  ----------------------------<  113<H>  3.6   |  20<L>  |  2.60<H>    Ca    8.1<L>      27 Jan 2020 08:16          Creatinine Trend: 2.60<--, 2.74<--, 3.35<--, 0.73<--, 3.41<--, 3.46<--      Assessment     Assessment   Solitary kidney, cannot exclude significant EL contributing,  GI bleed  Cresencio Cr 1.8 12/2019; suspected ischemic ATN; underlying renovascular disease;   CTD/ Vasculitic serologies negative  Hx of POEM? + paraprotein noted      Plan  IV hydralazine prn, stat now  Would defer prospect of aggressive work up or HD support as overall prognosis poor.          Obed Jamison MD

## 2020-01-27 NOTE — PROGRESS NOTE ADULT - SUBJECTIVE AND OBJECTIVE BOX
Patient is a 79y old  Female who presents with a chief complaint of sob cough (27 Jan 2020 15:26)      INTERVAL HPI/OVERNIGHT EVENTS: s/p EGD PUD   lethargic, Vomited hydralazine tab    MEDICATIONS  (STANDING):  buDESOnide    Inhalation Suspension 0.5 milliGRAM(s) Inhalation every 12 hours  carbamide peroxide Otic Solution 1 Drop(s) Both Ears two times a day  carvedilol 12.5 milliGRAM(s) Oral every 12 hours  chlorhexidine 4% Liquid 1 Application(s) Topical <User Schedule>  lactated ringers. 1000 milliLiter(s) (50 mL/Hr) IV Continuous <Continuous>  levothyroxine 88 MICROGram(s) Oral daily  pantoprazole  Injectable 40 milliGRAM(s) IV Push two times a day  sodium chloride 0.65% Nasal 1 Spray(s) Both Nostrils three times a day  sucralfate 1 Gram(s) Oral four times a day    MEDICATIONS  (PRN):  acetaminophen   Tablet .. 650 milliGRAM(s) Oral every 6 hours PRN Mild Pain (1 - 3)  acetaminophen   Tablet .. 650 milliGRAM(s) Oral every 6 hours PRN Mild Pain (1 - 3)  albuterol/ipratropium for Nebulization 3 milliLiter(s) Nebulizer every 6 hours PRN Shortness of Breath and/or Wheezing  hydrALAZINE Injectable 10 milliGRAM(s) IV Push every 6 hours PRN HTN      Allergies    No Known Allergies    Intolerances        REVIEW OF SYSTEMS:        lethargic        Vital Signs Last 24 Hrs  T(C): 36.7 (27 Jan 2020 13:54), Max: 36.7 (27 Jan 2020 11:00)  T(F): 98 (27 Jan 2020 13:54), Max: 98 (27 Jan 2020 11:00)  HR: 85 (27 Jan 2020 15:30) (59 - 89)  BP: 116/60 (27 Jan 2020 15:30) (104/65 - 200/71)  BP(mean): --  RR: 17 (27 Jan 2020 15:30) (17 - 20)  SpO2: 99% (27 Jan 2020 15:30) (92% - 100%)    PHYSICAL EXAM:  general       HEENT wnl  CHEST/LUNG: Clear b/l  HEART: Regular rate and rhythm; No murmurs, rubs, or gallops  ABDOMEN: Soft, Nontender, Nondistended; Bowel sounds present evaluated, large amount melena in bed  EXTREMITIES:  < pulse both legs  LYMPH: No lymphadenopathy noted  SKIN: No rashes or lesions    LABS:                        9.5    9.44  )-----------( 153      ( 27 Jan 2020 09:27 )             29.9     01-27    145  |  116<H>  |  64<H>  ----------------------------<  113<H>  3.6   |  20<L>  |  2.60<H>    Ca    8.1<L>      27 Jan 2020 08:16          CAPILLARY BLOOD GLUCOSE                    RADIOLOGY & ADDITIONAL TESTS:    Imaging Personally Reviewed:  [y ] YES  [ ] NO    Consultant(s) Notes Reviewed:  [y ] YES  [ ] NO    Care Discussed with Consultants/Other Providers [ ] YES  [ ] NO    PROBLEMS:  CONGESTIVE HEART FAILURE;RSV INFECTION  CONGESTIVE BABB FAILURE;RSV INFECTION  SHORTNESS OF BREATH  CHF (congestive heart failure)  Encounter for palliative care  Advanced care planning/counseling discussion  Left hemiparesis  Dementia without behavioral disturbance, unspecified dementia type  Debility  Encephalopathy, unspecified  Hypothyroidism, unspecified type  Gastrointestinal hemorrhage, unspecified gastrointestinal hemorrhage type  Goals of care, counseling/discussion  HTN (hypertension)  Hyperlipidemia  Alzheimers disease  MI, old  Hypothyroidism  RSV infection  CHF (congestive heart failure)  GI bleed  Anemia      Care discussed with family,         [ x ]   yes  [  ]  No    imp:    stable[ ]    unstable[ x ]     improving [   ]       unchanged  [  ]                Plans:  H/H  stat, CBC AM  CHANGE HYDRALAZINE TO iv

## 2020-01-27 NOTE — PROGRESS NOTE ADULT - SUBJECTIVE AND OBJECTIVE BOX
Patient is a 79y old  Female who presents with a chief complaint of sob cough (27 Jan 2020 10:59)       Interval History: stable thyroid function tests     MEDICATIONS  (STANDING):  buDESOnide    Inhalation Suspension 0.5 milliGRAM(s) Inhalation every 12 hours  carbamide peroxide Otic Solution 1 Drop(s) Both Ears two times a day  carvedilol 12.5 milliGRAM(s) Oral every 12 hours  chlorhexidine 4% Liquid 1 Application(s) Topical <User Schedule>  hydrALAZINE 25 milliGRAM(s) Oral every 6 hours  lactated ringers. 1000 milliLiter(s) (50 mL/Hr) IV Continuous <Continuous>  levothyroxine 88 MICROGram(s) Oral daily  pantoprazole  Injectable 40 milliGRAM(s) IV Push two times a day  sodium chloride 0.65% Nasal 1 Spray(s) Both Nostrils three times a day    MEDICATIONS  (PRN):  acetaminophen   Tablet .. 650 milliGRAM(s) Oral every 6 hours PRN Mild Pain (1 - 3)  acetaminophen   Tablet .. 650 milliGRAM(s) Oral every 6 hours PRN Mild Pain (1 - 3)  albuterol/ipratropium for Nebulization 3 milliLiter(s) Nebulizer every 6 hours PRN Shortness of Breath and/or Wheezing      Allergies    No Known Allergies    Intolerances        REVIEW OF SYSTEMS:  CONSTITUTIONAL: no changes  EYES: No eye pain, visual disturbances, or discharge  ENMT:  No difficulty hearing, No sinus or throat pain  NECK: No pain or stiffness  RESPIRATORY: No cough, wheezing, chills or hemoptysis; No shortness of breath  CARDIOVASCULAR: No chest pain, palpitations or leg swelling  GASTROINTESTINAL: No abdominal or epigastric pain. No nausea, vomiting, or hematemesis; No diarrhea or constipation. No melena or hematochezia.  GENITOURINARY: No dysuria, frequency, hematuria, or incontinence  NEUROLOGICAL: No headaches, memory loss, loss of strength, numbness, or tremors  SKIN: No itching, burning, rashes, or lesions   ENDOCRINE: No heat or cold intolerance; No hair loss  MUSCULOSKELETAL: No joint pain or swelling; No muscle, back, or extremity pain  PSYCHIATRIC: No depression, anxiety, mood swings, or difficulty sleeping  HEME/LYMPH: No easy bruising, or bleeding gums  ALLERY AND IMMUNOLOGIC: No hives or eczema    Vital Signs Last 24 Hrs  T(C): 36.7 (27 Jan 2020 11:25), Max: 36.7 (27 Jan 2020 11:00)  T(F): 98 (27 Jan 2020 11:25), Max: 98 (27 Jan 2020 11:00)  HR: 88 (27 Jan 2020 11:25) (59 - 89)  BP: 175/60 (27 Jan 2020 11:25) (104/65 - 200/71)  BP(mean): --  RR: 17 (27 Jan 2020 11:25) (17 - 20)  SpO2: 98% (27 Jan 2020 11:25) (92% - 100%)    PHYSICAL EXAM:  GENERAL:   HEAD: Atraumatic, Normocephalic  EYES: PERRLA, conjunctiva and sclera clear  ENMT: No tonsillar erythema, exudates, or enlargement; Moist mucous membranes, Good dentition, No lesions  NECK: Supple, No JVD, Normal thyroid  NERVOUS SYSTEM:  Alert & Oriented X3, Good concentration; Motor Strength 5/5 B/L upper and lower extremities  CHEST/LUNG: Clear to auscultaion bilaterally; No rales, rhonchi, wheezing, or rubs  HEART: Regular rate and rhythm; No murmurs, rubs, or gallops  ABDOMEN: Soft, Nontender, Nondistended; Bowel sounds present  EXTREMITIES:  2+ Peripheral Pulses, no edema  SKIN: No rashes or lesions    LABS:        CAPILLARY BLOOD GLUCOSE        Lipid panel:           Thyroid:  Diabetes Tests:  Parathyroid Panel:  Adrenals:  RADIOLOGY & ADDITIONAL TESTS:    Imaging Personally Reviewed:  [ ] YES  [ ] NO    Consultant(s) Notes Reviewed:  [ ] YES  [ ] NO    Care Discussed with Consultants/Other Providers [ ] YES  [ ] NO

## 2020-01-27 NOTE — CHART NOTE - NSCHARTNOTEFT_GEN_A_CORE
Medicine Hospitalist PA    Requested to place ngtube. Placed saleraissa sump after multiple attempts. Pt tolerated the procedure. Confirmed with auscultation and +coffee ground output. Pt now going to EGD. Will confirm placement in EGD suite.

## 2020-01-27 NOTE — PROGRESS NOTE ADULT - ATTENDING COMMENTS
copy of MOLST left at bedside per daughter's request, she will be in later today.  >50% encounter spent counseling family as described above.

## 2020-01-27 NOTE — PROGRESS NOTE ADULT - SUBJECTIVE AND OBJECTIVE BOX
INTERVAL HPI:  79 year female with HTN, HLD, MI hx, CVA, Gi bleed, Anemia Depression and Alzheimer's dementia.  [facility]. Possibility of pneumonia vs CHF was raised.   In ED with Respiratory distress required BIPAP support and RVP positive for RSV. Pt not able to provide more details due to dementia. Information from transfer papers and ED physician.  Nods no when asked for smoking.  01/09/20: With massive GI bleed, intubated and transferred to ICU.  01/09/20:  EGD+ Colonoscopy.  01/15/20:  Extubated.  01/16/20:  Out of ICU  01/18/20:  Back to ICU for upper GI bleed and significant drop in H & H.  01/21/20:  EGD, acute gastric ulcer.  01/22/20:  Out of ICU.  01/27/20:  EGD    OVERNIGHT EVENTS:  Again with GI bleed , underwent repeat EGD for acute gastric ulcer.    Vital Signs Last 24 Hrs  T(C): 36.7 (27 Jan 2020 13:54), Max: 36.7 (27 Jan 2020 11:00)  T(F): 98 (27 Jan 2020 13:54), Max: 98 (27 Jan 2020 11:00)  HR: 85 (27 Jan 2020 15:30) (59 - 89)  BP: 116/60 (27 Jan 2020 15:30) (104/65 - 200/71)  BP(mean): --  RR: 17 (27 Jan 2020 15:30) (17 - 20)  SpO2: 99% (27 Jan 2020 15:30) (92% - 100%)    PHYSICAL EXAM:  GEN:         Awake, responsive .  HEENT:    Normal.    RESP:       no distress.  CVS:           Regular rate and rhythm.   ABD:         Soft, non-tender, non-distended;     MEDICATIONS  (STANDING):  buDESOnide    Inhalation Suspension 0.5 milliGRAM(s) Inhalation every 12 hours  carbamide peroxide Otic Solution 1 Drop(s) Both Ears two times a day  carvedilol 12.5 milliGRAM(s) Oral every 12 hours  chlorhexidine 4% Liquid 1 Application(s) Topical <User Schedule>  dextrose 5% + sodium chloride 0.9%. 1000 milliLiter(s) (100 mL/Hr) IV Continuous <Continuous>  lactated ringers. 1000 milliLiter(s) (50 mL/Hr) IV Continuous <Continuous>  levothyroxine 88 MICROGram(s) Oral daily  pantoprazole  Injectable 40 milliGRAM(s) IV Push two times a day  sodium chloride 0.65% Nasal 1 Spray(s) Both Nostrils three times a day  sodium chloride 0.9% Bolus 500 milliLiter(s) IV Bolus once  sucralfate 1 Gram(s) Oral four times a day    MEDICATIONS  (PRN):  acetaminophen   Tablet .. 650 milliGRAM(s) Oral every 6 hours PRN Mild Pain (1 - 3)  acetaminophen   Tablet .. 650 milliGRAM(s) Oral every 6 hours PRN Mild Pain (1 - 3)  albuterol/ipratropium for Nebulization 3 milliLiter(s) Nebulizer every 6 hours PRN Shortness of Breath and/or Wheezing  hydrALAZINE Injectable 10 milliGRAM(s) IV Push every 6 hours PRN HTN    LABS:                        9.5    9.44  )-----------( 153      ( 27 Jan 2020 09:27 )             29.9     01-27    145  |  116<H>  |  64<H>  ----------------------------<  113<H>  3.6   |  20<L>  |  2.60<H>    Ca    8.1<L>      27 Jan 2020 08:16    ASSESSMENT AND PLAN:  ·	S/P Acute hypoxic  Respiratory failure .  ·	RSV tracheobronchitis.  ·	Left pleural effusion.  ·	Hypothyroidism with very high TSH.  ·	Anemia.  ·	VRE UTI  ·	Leukocytosis.  ·	Renal Insuffiencey.  ·	CVA by history.  ·	HTN.  ·	HLD.  ·	Alzheimer's dementia.  ·	GI bleed.    Continues to have GI bleed from acute gastric ulcer. Had repeat EGD.  Now Hypotensive, will try fluid bolus and start maintenance IV fluids.  On Protonix, Carafate , .  Continue O2 and Symbicort.

## 2020-01-27 NOTE — PROGRESS NOTE ADULT - PROBLEM SELECTOR PLAN 1
more awake today, able to follow commands able to follow commands in [place]  can shake head yes/ no   occasional one word answers

## 2020-01-27 NOTE — PROGRESS NOTE ADULT - PROBLEM SELECTOR PLAN 8
as above-Spoke to Katelyn (dtr/HCP) on phone today, made her aware of pt's clinical change. She reported to me that she and her sister discussed the MOLST and would like to make her mother DNR at this point, continue medical treatments including transfusions as needed and would be amenable to temporary intubation for EGD if needed but no long term vent.
Spoke to Katelyn (dtr/HCP) on phone today, she is going to discuss with sister about MOLST. Katelyn has expressed extreme concern for her mother's hearing which she states has not been an issue until after she was admitted here from her ICU stay onward. She is asking that some diagnostics be done to find cause of hearing loss and attempt to treat it if possible.

## 2020-01-27 NOTE — BRIEF OPERATIVE NOTE - NSICDXBRIEFPROCEDURE_GEN_ALL_CORE_FT
PROCEDURES:  Esophagogastroduodenoscopy (EGD) with argon plasma coagulation (APC) 27-Jan-2020 15:37:03  Geraldo Laird

## 2020-01-27 NOTE — PROGRESS NOTE ADULT - SUBJECTIVE AND OBJECTIVE BOX
79 years old female from [facility] rehab c/o coughing sob for three to four days. Pt is sent here to rule out chf vs pneumonia. Pt is alert and oriented to person only unable to give detail hx due to hx of Alzheimer according to the [facility] pt had sat of 91 to 92 %,	    PAST MEDICAL/SURGICAL/FAMILY/SOCIAL HISTORY:    Past Medical History:  Alzheimers disease    CVA (cerebral vascular accident)    Depression    HTN (hypertension)    MI, old.  recently Dx c hypothyroidism, TSH > 111 started Levothyroxin 50mcg qd as per Dr Vigil (2020 11:41)      Chief Complaint:  Patient is a 79y old  Female who presents with a chief complaint of sob cough (2020 22:56)      Review of Systems:    General:  No wt loss, fevers, chills, night sweats  Eyes:  Good vision, no reported pain  ENT:  No sore throat, pain, runny nose, dysphagia  CV:  No pain, palpitations, hypo/hypertension  Resp:  dyspnea, cough  GI:  No pain, nausea, vomiting, diarrhea, constipation           Social History/Family History  SOCHX:   tobacco,  -  alcohol    FMHX: FA/MO  - contributory       Discussed with:  PMD, Family    Physical Exam:    Vital Signs:  Vital Signs Last 24 Hrs  T(C): 36.2 (2020 17:06), Max: 36.9 (2020 00:18)  T(F): 97.2 (2020 17:06), Max: 98.5 (2020 00:18)  HR: 74 (2020 21:55) (68 - 87)  BP: 179/78 (2020 17:06) (132/71 - 179/78)  BP(mean): --  RR: 18 (2020 17:06) (18 - 18)  SpO2: 97% (2020 21:55) (94% - 98%)  Daily     Daily Weight in k.8 (2020 04:59)  I&O's Summary    2020 07:01  -  2020 07:00  --------------------------------------------------------  IN: 560 mL / OUT: 0 mL / NET: 560 mL          Chest:  fair air entry bilateral  Cardiovascular:  Regular rhythm, S1, S2, no murmur/rub/S3/S4, no carotid/femoral/abdominal bruit, radial/pedal pulses 2+,   Abdomen:  Soft, non-tender, non-distended, normoactive bowel sounds, no HSM        Laboratory:          138  |  104  |  69<H>  ----------------------------<  111<H>  3.8   |  25  |  3.77<H>    Ca    8.1<L>      2020 07:22          Assessment:  I am asked to assist this patient admitted with shortness of breath  The patient has multifactorial causes of shortness of breath, renal insufficiency, anemia  And now after a diagnostic echocardiogram within normal ejection fraction the reading shows mitral regurgitation and tricuspid regurgitation  After review of the studies this regurgitation is not considered severe but in the moderate to severe but more moderate range  upper GI bleed  GI noted,  EGD   optimize systolic blood pressure  PRBCs  EGD noted with gastric ulcer, GI treatment continues  Bloody BM this am

## 2020-01-27 NOTE — PRE-OP CHECKLIST - NOTHING BY MOUTH SINCE
13-Jan-2020 06:00
14-Jan-2020 00:00
16-Jan-2020 00:00
18-Jan-2020
13-Jan-2020 00:00
26-Jan-2020 17:55

## 2020-01-27 NOTE — PROGRESS NOTE ADULT - PROBLEM SELECTOR PLAN 7
daughter has MOLST she was going to bring it in after work today but due to pt's circumstances will fill out verbal consent for MOLST, she would like to make her mother DNR at this point, continue medical treatments including transfusions as needed and would be amenable to temporary intubation for EGD if needed but no long term vent.

## 2020-01-27 NOTE — PROGRESS NOTE ADULT - SUBJECTIVE AND OBJECTIVE BOX
Procedure:           Upper GI endoscopy w/ APC /EPI injections 01-27-20 @ 15:27    Indications:                 Upper GI Bleed    Monitored Anesthesia Care Provided by :     ____________________________________________________________________________________________________  Procedure:           Pre-Anesthesia Assessment:                       - Prior to the procedure, a History and Physical was performed, and patient                        medications and allergies were reviewed. The patient is not  competent. The risks                        and benefits of the procedure and the sedation options and risks were                        discussed with the patient's daughter. All questions were answered and informed consent                        was obtained. Patient identification and proposed procedure were verified by                        the physician, the nurse and the anesthesiologist in the procedure room.                        Mental Status Examination:   Airway Examination: normal                        oropharyngeal airway and neck mobility. Respiratory Examination: clear to                        auscultation. CV Examination: normal. Prophylactic Antibiotics: -The patient                        does not require prophylactic antibiotics.                        Grade Assessment:  After                        reviewing the risks and benefits, the patient was deemed in satisfactory                        condition to undergo the procedure. The anesthesia plan was to use monitored                        anesthesia care (MAC). Immediately prior to administration of medications,                        the patient was re-assessed for adequacy to receive sedatives. The heart        		     rate, respiratory rate, oxygen saturations, blood pressure, adequacy of                        pulmonary ventilation, and response to care were monitored throughout the                        procedure. The physical status of the patient was re-assessed after the                        procedure.                       After obtaining informed consent, the endoscope was passed under direct                        vision. Throughout the procedure, the patient's blood pressure, pulse, and                        oxygen saturations were monitored continuously. The Endoscope was introduced                        through the mouth, and advanced to the second part of duodenum. The upper GI                        endoscopy was accomplished with ease. The patient tolerated the procedure                        well.                                  Stomach lavaged prior to insertion.  No red blood. Areas of blackish mud through out stomach that was easily washed and suctioned    ESOPHAGUS:   WNL    STOMACH:   Linear ulcers in fundus by HH with central ulcer with bright red discloration in center s/p APC  / EPI 0.5 x 5    DUODENUM:   WNL      Assessment :    in fundus, s/p APC / EPI    PLAN :  NPO / IV fluid / carafate / PPI / f/u labs / surgical f/u

## 2020-01-27 NOTE — PROGRESS NOTE ADULT - SUBJECTIVE AND OBJECTIVE BOX
Patient seen and examined at bedside in no acute distress.  Pt is poor historian.    Per RN, patient passed 2 dark black liquid stools overnight.  No further bloody BM this AM.    Patient was reportedly c/o epigastric pain earlier this AM.    Hgb 1/22: 11.6.  Overnight Hgb 10.2 --> 9.5.    Afebrile.  Last /63.  On telemetry.        Vital Signs Last 24 Hrs  T(F): 97.9 (01-27-20 @ 09:26), Max: 97.9 (01-27-20 @ 09:26)  HR: 78 (01-27-20 @ 10:26)  BP: 183/63 (01-27-20 @ 10:26)  RR: 18 (01-27-20 @ 10:26)  SpO2: 100% (01-27-20 @ 10:26)      GENERAL: Alert, NAD  CHEST/LUNG: Respirations nonlabored  HEART: S1S2, Regular rate and rhythm  ABDOMEN: +Bowel sounds, soft, Nontender, Nondistended  EXTREMITIES:  no calf tenderness, No edema    I&O's Detail    26 Jan 2020 07:01  -  27 Jan 2020 07:00  --------------------------------------------------------  IN:  Total IN: 0 mL    OUT:  Total OUT: 0 mL    Total NET: 0 mL          LABS:                        9.5    9.44  )-----------( 153      ( 27 Jan 2020 09:27 )             29.9     01-27    145  |  116<H>  |  64<H>  ----------------------------<  113<H>  3.6   |  20<L>  |  2.60<H>    Ca    8.1<L>      27 Jan 2020 08:16        Impression: 80 y/o female with PMH Alzheimer's, CVA, Depression, CHF, HTN, MI (remote), recent dx of hypothyroidism with GI Bleed, S/P EGD and Colonoscopy 1/9, s/p 3 units of PRBC total.  S/p RRT 1/18 for new episode of hematemesis and melena, upgraded to ICU, s/p 4u PRBC and 1u FFP.  S/p EGD 1/21: gastric ulcer in fundus s/p biopsy.  Surgery reconsulted for to 2 episodes of dark bloody BMs overnight. Last H/H on 1/22 on 11.6/37.7.  Decrease in Hgb overnight 10.2>9.5.        Plan:   - No acute surgical intervention at this time.    - F/u EGD today with GI.    - Continue medical/GI management, and supportive care.    - Follow CBCs, transfuse as needed.    - Will discuss with surgical attending.

## 2020-01-27 NOTE — PROGRESS NOTE ADULT - PROBLEM SELECTOR PLAN 1
continue with same   stable thyroid function tests   Check thyroid function tests in a few weeks as out-patient

## 2020-01-27 NOTE — PROGRESS NOTE ADULT - SUBJECTIVE AND OBJECTIVE BOX
lying in bed, H/H Stable    Vital Signs Last 24 Hrs  T(C): 36.1 (27 Jan 2020 05:00), Max: 36.6 (26 Jan 2020 12:00)  T(F): 97 (27 Jan 2020 05:00), Max: 97.8 (26 Jan 2020 12:00)  HR: 78 (27 Jan 2020 06:43) (59 - 82)  BP: 133/55 (27 Jan 2020 05:00) (104/65 - 133/55)  BP(mean): --  RR: 20 (27 Jan 2020 05:00) (18 - 20)  SpO2: 95% (27 Jan 2020 06:43) (92% - 99%)    PHYSICAL EXAM:    general - weak looking  HEENT - No Icterus  CVS - RRR  RS - AE B/L  Abd - soft, NT  Ext - Pulses +        LABS:                        10.2   8.22  )-----------( 147      ( 27 Jan 2020 00:59 )             32.6     01-27    145  |  116<H>  |  64<H>  ----------------------------<  113<H>  3.6   |  20<L>  |  2.60<H>    Ca    8.1<L>      27 Jan 2020 08:16              RADIOLOGY & ADDITIONAL STUDIES:

## 2020-01-27 NOTE — CHART NOTE - NSCHARTNOTEFT_GEN_A_CORE
Patient seen and examined. Full note to follow. Had two liquid, black BM over night. HGB decreased.   See vitals.  Lungs -clear    Heart - S1S2 WNL  Abd - BS (+), soft ,nontender    Will schedule EGD if ok'd by daughter. Surgical f/u. Telemetry.

## 2020-01-28 DIAGNOSIS — R11.2 NAUSEA WITH VOMITING, UNSPECIFIED: ICD-10-CM

## 2020-01-28 LAB
HCT VFR BLD CALC: 26.4 % — LOW (ref 34.5–45)
HCT VFR BLD CALC: 27.6 % — LOW (ref 34.5–45)
HGB BLD-MCNC: 8.3 G/DL — LOW (ref 11.5–15.5)
HGB BLD-MCNC: 8.6 G/DL — LOW (ref 11.5–15.5)
MCHC RBC-ENTMCNC: 27.3 PG — SIGNIFICANT CHANGE UP (ref 27–34)
MCHC RBC-ENTMCNC: 27.3 PG — SIGNIFICANT CHANGE UP (ref 27–34)
MCHC RBC-ENTMCNC: 31.2 GM/DL — LOW (ref 32–36)
MCHC RBC-ENTMCNC: 31.4 GM/DL — LOW (ref 32–36)
MCV RBC AUTO: 86.8 FL — SIGNIFICANT CHANGE UP (ref 80–100)
MCV RBC AUTO: 87.6 FL — SIGNIFICANT CHANGE UP (ref 80–100)
NRBC # BLD: 1 /100 WBCS — HIGH (ref 0–0)
NRBC # BLD: 1 /100 WBCS — HIGH (ref 0–0)
PLATELET # BLD AUTO: 135 K/UL — LOW (ref 150–400)
PLATELET # BLD AUTO: 147 K/UL — LOW (ref 150–400)
RBC # BLD: 3.04 M/UL — LOW (ref 3.8–5.2)
RBC # BLD: 3.15 M/UL — LOW (ref 3.8–5.2)
RBC # FLD: 22.5 % — HIGH (ref 10.3–14.5)
RBC # FLD: 22.5 % — HIGH (ref 10.3–14.5)
T3 SERPL-MCNC: 37 NG/DL — LOW (ref 80–200)
T4 AB SER-ACNC: 3.2 UG/DL — LOW (ref 4.6–12)
TSH SERPL-MCNC: 39 UU/ML — HIGH (ref 0.36–3.74)
WBC # BLD: 6.97 K/UL — SIGNIFICANT CHANGE UP (ref 3.8–10.5)
WBC # BLD: 7.05 K/UL — SIGNIFICANT CHANGE UP (ref 3.8–10.5)
WBC # FLD AUTO: 6.97 K/UL — SIGNIFICANT CHANGE UP (ref 3.8–10.5)
WBC # FLD AUTO: 7.05 K/UL — SIGNIFICANT CHANGE UP (ref 3.8–10.5)

## 2020-01-28 PROCEDURE — 74018 RADEX ABDOMEN 1 VIEW: CPT | Mod: 26

## 2020-01-28 PROCEDURE — 71045 X-RAY EXAM CHEST 1 VIEW: CPT | Mod: 26

## 2020-01-28 PROCEDURE — 99233 SBSQ HOSP IP/OBS HIGH 50: CPT

## 2020-01-28 RX ORDER — ONDANSETRON 8 MG/1
4 TABLET, FILM COATED ORAL EVERY 6 HOURS
Refills: 0 | Status: DISCONTINUED | OUTPATIENT
Start: 2020-01-28 | End: 2020-02-01

## 2020-01-28 RX ORDER — LEVOTHYROXINE SODIUM 125 MCG
44 TABLET ORAL AT BEDTIME
Refills: 0 | Status: DISCONTINUED | OUTPATIENT
Start: 2020-01-28 | End: 2020-02-01

## 2020-01-28 RX ORDER — PIPERACILLIN AND TAZOBACTAM 4; .5 G/20ML; G/20ML
3.38 INJECTION, POWDER, LYOPHILIZED, FOR SOLUTION INTRAVENOUS ONCE
Refills: 0 | Status: COMPLETED | OUTPATIENT
Start: 2020-01-28 | End: 2020-01-28

## 2020-01-28 RX ORDER — HYDROMORPHONE HYDROCHLORIDE 2 MG/ML
0.5 INJECTION INTRAMUSCULAR; INTRAVENOUS; SUBCUTANEOUS EVERY 4 HOURS
Refills: 0 | Status: DISCONTINUED | OUTPATIENT
Start: 2020-01-28 | End: 2020-01-31

## 2020-01-28 RX ORDER — ERYTHROPOIETIN 10000 [IU]/ML
10000 INJECTION, SOLUTION INTRAVENOUS; SUBCUTANEOUS ONCE
Refills: 0 | Status: COMPLETED | OUTPATIENT
Start: 2020-01-28 | End: 2020-01-28

## 2020-01-28 RX ORDER — METOCLOPRAMIDE HCL 10 MG
10 TABLET ORAL ONCE
Refills: 0 | Status: COMPLETED | OUTPATIENT
Start: 2020-01-28 | End: 2020-01-28

## 2020-01-28 RX ORDER — PIPERACILLIN AND TAZOBACTAM 4; .5 G/20ML; G/20ML
3.38 INJECTION, POWDER, LYOPHILIZED, FOR SOLUTION INTRAVENOUS EVERY 12 HOURS
Refills: 0 | Status: DISCONTINUED | OUTPATIENT
Start: 2020-01-28 | End: 2020-01-30

## 2020-01-28 RX ADMIN — CARBAMIDE PEROXIDE 1 DROP(S): 81.86 SOLUTION/ DROPS AURICULAR (OTIC) at 17:46

## 2020-01-28 RX ADMIN — Medication 0.5 MILLIGRAM(S): at 06:33

## 2020-01-28 RX ADMIN — ERYTHROPOIETIN 10000 UNIT(S): 10000 INJECTION, SOLUTION INTRAVENOUS; SUBCUTANEOUS at 17:44

## 2020-01-28 RX ADMIN — CARVEDILOL PHOSPHATE 12.5 MILLIGRAM(S): 80 CAPSULE, EXTENDED RELEASE ORAL at 17:45

## 2020-01-28 RX ADMIN — Medication 44 MICROGRAM(S): at 23:07

## 2020-01-28 RX ADMIN — Medication 10 MILLIGRAM(S): at 14:32

## 2020-01-28 RX ADMIN — CARBAMIDE PEROXIDE 1 DROP(S): 81.86 SOLUTION/ DROPS AURICULAR (OTIC) at 05:26

## 2020-01-28 RX ADMIN — PIPERACILLIN AND TAZOBACTAM 200 GRAM(S): 4; .5 INJECTION, POWDER, LYOPHILIZED, FOR SOLUTION INTRAVENOUS at 17:57

## 2020-01-28 RX ADMIN — Medication 0.5 MILLIGRAM(S): at 17:05

## 2020-01-28 RX ADMIN — Medication 88 MICROGRAM(S): at 06:28

## 2020-01-28 RX ADMIN — Medication 1 SPRAY(S): at 05:26

## 2020-01-28 RX ADMIN — PIPERACILLIN AND TAZOBACTAM 25 GRAM(S): 4; .5 INJECTION, POWDER, LYOPHILIZED, FOR SOLUTION INTRAVENOUS at 23:07

## 2020-01-28 RX ADMIN — SODIUM CHLORIDE 100 MILLILITER(S): 9 INJECTION, SOLUTION INTRAVENOUS at 19:30

## 2020-01-28 RX ADMIN — CARVEDILOL PHOSPHATE 12.5 MILLIGRAM(S): 80 CAPSULE, EXTENDED RELEASE ORAL at 05:26

## 2020-01-28 RX ADMIN — Medication 1 SPRAY(S): at 14:33

## 2020-01-28 RX ADMIN — PANTOPRAZOLE SODIUM 40 MILLIGRAM(S): 20 TABLET, DELAYED RELEASE ORAL at 17:44

## 2020-01-28 RX ADMIN — Medication 1 SPRAY(S): at 00:28

## 2020-01-28 RX ADMIN — SODIUM CHLORIDE 100 MILLILITER(S): 9 INJECTION, SOLUTION INTRAVENOUS at 05:25

## 2020-01-28 RX ADMIN — Medication 1 GRAM(S): at 05:29

## 2020-01-28 RX ADMIN — Medication 1 SPRAY(S): at 23:08

## 2020-01-28 RX ADMIN — Medication 1 GRAM(S): at 12:26

## 2020-01-28 RX ADMIN — Medication 1 GRAM(S): at 17:45

## 2020-01-28 RX ADMIN — PANTOPRAZOLE SODIUM 40 MILLIGRAM(S): 20 TABLET, DELAYED RELEASE ORAL at 05:25

## 2020-01-28 NOTE — PROGRESS NOTE ADULT - SUBJECTIVE AND OBJECTIVE BOX
Patient seen and examined at bedside with coffee ground emesis in basin.   Admits to flatus/BM. Denies pain, nasuea/vomiting.    Vital Signs Last 24 Hrs  T(F): 96.8 (01-28-20 @ 04:40), Max: 98 (01-27-20 @ 11:00)  HR: 76 (01-28-20 @ 06:58)  BP: 150/68 (01-28-20 @ 04:40)  RR: 18 (01-28-20 @ 04:40)  SpO2: 97% (01-28-20 @ 06:58)    GENERAL: Alert, NAD  CHEST/LUNG: Clear to auscultation bilaterally, respirations nonlabored  HEART: S1S2, Regular rate and rhythm;   ABDOMEN: + Bowel sounds, soft, Nontender, Nondistended  EXTREMITIES:  no calf tenderness, No edema        LABS:                        8.3    6.97  )-----------( 147      ( 28 Jan 2020 09:59 )             26.4     01-27    145  |  116<H>  |  64<H>  ----------------------------<  113<H>  3.6   |  20<L>  |  2.60<H>    Ca    8.1<L>      27 Jan 2020 08:16      A/P  78 y/o female with PMH Alzheimer's, CVA, Depression, CHF, HTN, MI (remote), recent dx of hypothyroidism with GI Bleed, S/P EGD and Colonoscopy 1/9, s/p 3 units of PRBC total.  S/p RRT 1/18 for new episode of hematemesis and melena, upgraded to ICU, s/p 4u PRBC and 1u FFP.  S/p EGD 1/21: gastric ulcer in fundus s/p biopsy.  Surgery reconsulted for to 2 episodes of dark bloody BMs overnight. Last H/H on 1/22 on 11.6/37.7.  Decrease in overnight Hgb 9.2>8.3.      - No acute surgical intervention at this time, will continue to monitor episodes of bleeding  - Continue medical/GI management, and supportive care.    - Follow CBCs, transfuse as needed.

## 2020-01-28 NOTE — PROGRESS NOTE ADULT - SUBJECTIVE AND OBJECTIVE BOX
Patient is a 79y old  Female who presents with a chief complaint of sob cough (28 Jan 2020 12:47)      HPI:  · HPI Objective Statement: 79 years old female from [facility] rehab c/o coughing sob for three to four days. Pt is sent here to rule out chf vs pneumonia. Pt is alert and oriented to person only unable to give detail hx due to hx of Alzheimer according to the [facility] pt had sat of 91 to 92 %,	    PAST MEDICAL/SURGICAL/FAMILY/SOCIAL HISTORY:    Past Medical History:  Alzheimers disease    CVA (cerebral vascular accident)    Depression    HTN (hypertension)    MI, old.  recently Dx c hypothyroidism, TSH > 111 started Levothyroxin 50mcg qd as per Dr Vigil (02 Jan 2020 11:41)      INTERVAL HPI/OVERNIGHT EVENTS:  Coffee ground emesis x 2  No melena, hematochezia. MS unchanged. Temp 93    MEDICATIONS  (STANDING):  buDESOnide    Inhalation Suspension 0.5 milliGRAM(s) Inhalation every 12 hours  carbamide peroxide Otic Solution 1 Drop(s) Both Ears two times a day  carvedilol 12.5 milliGRAM(s) Oral every 12 hours  dextrose 5% + sodium chloride 0.9%. 1000 milliLiter(s) (100 mL/Hr) IV Continuous <Continuous>  epoetin tawanna Injectable 64677 Unit(s) SubCutaneous once  lactated ringers. 1000 milliLiter(s) (50 mL/Hr) IV Continuous <Continuous>  levothyroxine Injectable 44 MICROGram(s) IV Push at bedtime  pantoprazole  Injectable 40 milliGRAM(s) IV Push two times a day  sodium chloride 0.65% Nasal 1 Spray(s) Both Nostrils three times a day  sucralfate 1 Gram(s) Oral four times a day    MEDICATIONS  (PRN):  acetaminophen   Tablet .. 650 milliGRAM(s) Oral every 6 hours PRN Mild Pain (1 - 3)  acetaminophen   Tablet .. 650 milliGRAM(s) Oral every 6 hours PRN Mild Pain (1 - 3)  albuterol/ipratropium for Nebulization 3 milliLiter(s) Nebulizer every 6 hours PRN Shortness of Breath and/or Wheezing  hydrALAZINE Injectable 10 milliGRAM(s) IV Push every 6 hours PRN HTN  HYDROmorphone  Injectable 0.5 milliGRAM(s) IV Push every 4 hours PRN mod pain/dyspnea  ondansetron Injectable 4 milliGRAM(s) IV Push every 6 hours PRN Nausea and/or Vomiting      FAMILY HISTORY:  Family history of essential hypertension (Child)  Family history of stroke      Allergies    No Known Allergies    Intolerances        PMH/PSH:  Hypothyroidism  Constipation  Iron deficiency anemia  Major depressive disorder  Hyperlipidemia  GI bleed  Depression  Alzheimers disease  MI, old  CVA (cerebral vascular accident)  HTN (hypertension)  No pertinent past medical history  No significant past surgical history        REVIEW OF SYSTEMS: Uncommunicative  CONSTITUTIONAL: No fever, weight loss,  EYES: No eye pain, visual disturbances, or discharge  ENMT:  No difficulty hearing, tinnitus, vertigo; No sinus or throat pain  NECK: No pain or stiffness  BREASTS: No pain, masses, or nipple discharge  RESPIRATORY: No cough, wheezing, chills or hemoptysis; No shortness of breath  CARDIOVASCULAR: No chest pain, palpitations, dizziness, or leg swelling  GASTROINTESTINAL: See above  GENITOURINARY: No dysuria, frequency, hematuria, or incontinence  NEUROLOGICAL: No headaches, memory loss, loss of strength, numbness, or tremors  SKIN: No itching, burning, rashes, or lesions   LYMPH NODES: No enlarged glands  ENDOCRINE: No heat or cold intolerance; No hair loss  MUSCULOSKELETAL: No joint pain or swelling; No muscle, back, or extremity pain  PSYCHIATRIC: No depression, anxiety, mood swings, or difficulty sleeping  HEME/LYMPH: No easy bruising, or bleeding gums  ALLERGY AND IMMUNOLOGIC: No hives or eczema    Vital Signs Last 24 Hrs  T(C): 34.4 (28 Jan 2020 11:31), Max: 36.1 (27 Jan 2020 23:43)  T(F): 93.9 (28 Jan 2020 11:31), Max: 97 (27 Jan 2020 23:43)  HR: 76 (28 Jan 2020 11:31) (76 - 88)  BP: 111/64 (28 Jan 2020 11:31) (85/56 - 150/68)  BP(mean): --  RR: 16 (28 Jan 2020 11:31) (16 - 18)  SpO2: 98% (28 Jan 2020 11:31) (97% - 99%)    PHYSICAL EXAM:  GENERAL: NAD, well-groomed, well-developed  HEAD:  Atraumatic, Normocephalic  EYES: EOMI, PERRLA, conjunctiva and sclera clear  NECK: Supple, No JVD, Normal thyroid  NERVOUS SYSTEM:  MS at baseline  CHEST/LUNG: Clear to percussion bilaterally; No rales, rhonchi, wheezing, or rubs  HEART: Regular rate and rhythm; No murmurs, rubs, or gallops  ABDOMEN: Soft, Nontender, Nondistended; Bowel sounds present  EXTREMITIES:  2+ Peripheral Pulses, No clubbing, cyanosis, or edema  LYMPH: No lymphadenopathy noted  SKIN: No rashes or lesions    LAB                          8.3    6.97  )-----------( 147      ( 28 Jan 2020 09:59 )             26.4       CBC:  01-28 @ 09:59  WBC 6.97   Hgb 8.3   Hct 26.4   Plts 147  MCV 86.8  01-28 @ 07:18  WBC 7.05   Hgb 8.6   Hct 27.6   Plts 135  MCV 87.6  01-27 @ 18:14  WBC 6.70   Hgb 9.2   Hct 29.4   Plts 121  MCV 87.2  01-27 @ 09:27  WBC 9.44   Hgb 9.5   Hct 29.9   Plts 153  MCV 87.2  01-27 @ 00:59  WBC 8.22   Hgb 10.2   Hct 32.6   Plts 147  MCV 88.8  01-22 @ 05:19  WBC 10.05   Hgb 11.6   Hct 37.7   Plts 114  MCV 88.5      Chemistry:  01-27 @ 08:16  Na+ 145  K+ 3.6  Cl- 116  CO2 20  BUN 64  Cr 2.60     01-27 @ 00:59  Na+ 144  K+ 3.8  Cl- 116  CO2 20  BUN 63  Cr 2.74     01-22 @ 05:19  Na+ 145  K+ 3.5  Cl- 118  CO2 21  BUN 69  Cr 3.35     01-22 @ 04:04  Na+ 143  K+ 4.6  Cl- 108  CO2 32  BUN 56  Cr 0.73         Glucose, Serum: 113 mg/dL (01-27 @ 08:16)  Glucose, Serum: 125 mg/dL (01-27 @ 00:59)  Glucose, Serum: 134 mg/dL (01-22 @ 05:19)  Glucose, Serum: 139 mg/dL (01-22 @ 04:04)      27 Jan 2020 08:16    145    |  116    |  64     ----------------------------<  113    3.6     |  20     |  2.60   27 Jan 2020 00:59    144    |  116    |  63     ----------------------------<  125    3.8     |  20     |  2.74   22 Jan 2020 05:19    145    |  118    |  69     ----------------------------<  134    3.5     |  21     |  3.35   22 Jan 2020 04:04    143    |  108    |  56     ----------------------------<  139    4.6     |  32     |  0.73     Ca    8.1        27 Jan 2020 08:16  Ca    8.0        27 Jan 2020 00:59  Ca    8.1        22 Jan 2020 05:19  Ca    8.7        22 Jan 2020 04:04  Phos  3.6       22 Jan 2020 05:19  Phos  3.2       22 Jan 2020 04:04  Mg     1.9       22 Jan 2020 05:19  Mg     2.2       22 Jan 2020 04:04    TPro  5.6    /  Alb  1.9    /  TBili  0.4    /  DBili  x      /  AST  14     /  ALT  18     /  AlkPhos  71     22 Jan 2020 05:19              CAPILLARY BLOOD GLUCOSE              RADIOLOGY & ADDITIONAL TESTS:    Imaging Personally Reviewed:  [ ] YES  [ ] NO    Consultant(s) Notes Reviewed:  [ ] YES  [ ] NO    Care Discussed with Consultants/Other Providers [ ] YES  [ ] NO

## 2020-01-28 NOTE — PROGRESS NOTE ADULT - ASSESSMENT
HPI:  · HPI Objective Statement: 79 years old female from [facility] rehab c/o coughing sob for three to four days. Pt is sent here to rule out chf vs pneumonia. Pt is alert and oriented to person only unable to give detail hx due to hx of Alzheimer according to the [facility] pt had sat of 91 to 92 %,	  ---------------- As Above ---------------------------------------------  Patient is a poor historian. Patient admitted with Bronchitis and UTI. Called for hematemsis. Patient had three episodes of bloody vomitus. ( 1/3 of a cup ) . Patient complaining of mid epigastric / chest pain but patient can not characterize the pain.   Patient has chronic anemia. but HGB fell 8.4 --> 7.2. Was on Carafate, now on IV PPI  KUB pending  HX of CRI    Addendum : Patient had another episode of bloody vomitus    Upper GI Bleed - Secondary to (?). 1) NPO  2)Check KUB  3) IV PPI 4) telemetry  5) NGT 6) EGD 7) Hold Carafate  8) Hold iron  ---Will speak with daughter.

## 2020-01-28 NOTE — PROGRESS NOTE ADULT - SUBJECTIVE AND OBJECTIVE BOX
Patient is a 79y old  Female who presents with a chief complaint of sob cough       Interval History: continued on same regimen     MEDICATIONS  (STANDING):  buDESOnide    Inhalation Suspension 0.5 milliGRAM(s) Inhalation every 12 hours  carbamide peroxide Otic Solution 1 Drop(s) Both Ears two times a day  carvedilol 12.5 milliGRAM(s) Oral every 12 hours  levothyroxine Injectable 44 MICROGram(s) IV Push at bedtime  pantoprazole  Injectable 40 milliGRAM(s) IV Push two times a day  piperacillin/tazobactam IVPB.. 3.375 Gram(s) IV Intermittent every 12 hours  sodium chloride 0.65% Nasal 1 Spray(s) Both Nostrils three times a day  sucralfate 1 Gram(s) Oral four times a day    MEDICATIONS  (PRN):  acetaminophen   Tablet .. 650 milliGRAM(s) Oral every 6 hours PRN Mild Pain (1 - 3)  acetaminophen   Tablet .. 650 milliGRAM(s) Oral every 6 hours PRN Mild Pain (1 - 3)  albuterol/ipratropium for Nebulization 3 milliLiter(s) Nebulizer every 6 hours PRN Shortness of Breath and/or Wheezing  HYDROmorphone  Injectable 0.5 milliGRAM(s) IV Push every 4 hours PRN mod pain/dyspnea  ondansetron Injectable 4 milliGRAM(s) IV Push every 6 hours PRN Nausea and/or Vomiting      Allergies    No Known Allergies    Intolerances        REVIEW OF SYSTEMS:   CONSTITUTIONAL: no changes  ENDOCRINE: No heat or cold intolerance; No hair loss    Vital Signs Last 24 Hrs  T(C): 33.7 (29 Jan 2020 18:09), Max: 36.4 (29 Jan 2020 00:14)  T(F): 92.7 (29 Jan 2020 18:09), Max: 97.5 (29 Jan 2020 00:14)  HR: 76 (29 Jan 2020 18:09) (61 - 88)  BP: 78/46 (29 Jan 2020 18:09) (70/44 - 166/55)  BP(mean): --  RR: 18 (29 Jan 2020 18:09) (16 - 21)  SpO2: 100% (29 Jan 2020 18:09) (96% - 100%)    PHYSICAL EXAM:  GENERAL:   HEAD: Atraumatic, Normocephalic  EYES: PERRLA, conjunctiva and sclera clear  ENMT: No tonsillar erythema, exudates, or enlargement; Moist mucous membranes, Good dentition, No lesions  NECK: Supple, No JVD, Normal thyroid  NERVOUS SYSTEM:  Alert & Oriented X3, Good concentration; Motor Strength 5/5 B/L upper and lower extremities  CHEST/LUNG: Clear to auscultaion bilaterally; No rales, rhonchi, wheezing, or rubs  HEART: Regular rate and rhythm; No murmurs, rubs, or gallops  ABDOMEN: Soft, Nontender, Nondistended; Bowel sounds present  EXTREMITIES:  2+ Peripheral Pulses, no edema  SKIN: No rashes or lesions    LABS:        CAPILLARY BLOOD GLUCOSE        Lipid panel:           Thyroid:  Diabetes Tests:  Parathyroid Panel:  Adrenals:  RADIOLOGY & ADDITIONAL TESTS:    Imaging Personally Reviewed:  [ ] YES  [ ] NO    Consultant(s) Notes Reviewed:  [ ] YES  [ ] NO    Care Discussed with Consultants/Other Providers [ ] YES  [ ] NO

## 2020-01-28 NOTE — CHART NOTE - NSCHARTNOTEFT_GEN_A_CORE
Assessment:  Pt seen for follow up for moderate malnutrition & completion of calorie count which was in effect from 01/25-01/27/20.  Pt adm c dx of CHF, RSV infection, S/P EGD, c gastric ulcer, and Colonoscopy,  s/p rapid response team 1/18 for new episode of hematemesis and melena, s/p ICU adm, s/p enteral feeding therapy.   Pt. currently c coffee ground emesis.  PMH include dementia, alzheimer's, debility, encephalopathy, HTN, HLD, MI, hypothyroidism, CKD, CVA, left hemiparesis, depression.   MOLST form noted, pt is DNR, limited medical intervention noted, without selection regarding artificially administered IV fluids/Nutrition.    Calorie count results are as follows:  01/25/20, pt consumed ~220 calories, 5 grams protein  01/26/20, pt consumed ~335 calories, 20 grams protein  01/27/20, NPO marked for all meals.  2 day average intake = ~280 calories, ~ 13 grams protein, which met ~ 20% of energy needs & 28% protein needs.     Factors impacting intake: [ ] none [ ] nausea  [ ] vomiting [ ] diarrhea [ ] constipation  [ ]chewing problems [ x] swallowing issues; 01/02/20, swallow evaluation recommended Dysphagia 3 Soft - Thin Liquids(previous diet order for puree noted by MD)  [ x] other: GI bleed     Diet Prescription: Diet, NPO:   Except Medications (01-27-20 @ 00:20)    Intake: 0%, <50% nutrition needs > 5 days noted since adm c NPO/clear fluid diet regimen & oral intake <50%     Current Weight: 01/28/20, 67.5 kg, 01/02/20, 61.3 kg, c wt. gain of 6.2 kg   % Weight Change: 10.1%      Nutrition focused physical exam conducted; limited exam due to edema, debility; Subcutaneous fat Exam;  [ Moderate   ]  Orbital fat pads region,  [ Moderate ]Buccal fat region,  [ Moderate, right arm, left arm c edema  ]triceps region, [ WNL  ]ribs region.  Muscle Exam; [ Moderate  ]temples region, [ Moderate  ]clavicle region, [ Moderate   ]shoulder region, [ Unable  ]Scapula region, [ Unable   ]Interosseous region, [  Unable ]thigh region, [  Unable ]Calf region    Pertinent Medications: MEDICATIONS  (STANDING):  buDESOnide    Inhalation Suspension 0.5 milliGRAM(s) Inhalation every 12 hours  carbamide peroxide Otic Solution 1 Drop(s) Both Ears two times a day  carvedilol 12.5 milliGRAM(s) Oral every 12 hours  dextrose 5% + sodium chloride 0.9%. 1000 milliLiter(s) (100 mL/Hr) IV Continuous <Continuous>  epoetin tawanna Injectable 64763 Unit(s) SubCutaneous once  lactated ringers. 1000 milliLiter(s) (50 mL/Hr) IV Continuous <Continuous>  levothyroxine 88 MICROGram(s) Oral daily  pantoprazole  Injectable 40 milliGRAM(s) IV Push two times a day  sodium chloride 0.65% Nasal 1 Spray(s) Both Nostrils three times a day  sucralfate 1 Gram(s) Oral four times a day    MEDICATIONS  (PRN):  acetaminophen   Tablet .. 650 milliGRAM(s) Oral every 6 hours PRN Mild Pain (1 - 3)  acetaminophen   Tablet .. 650 milliGRAM(s) Oral every 6 hours PRN Mild Pain (1 - 3)  albuterol/ipratropium for Nebulization 3 milliLiter(s) Nebulizer every 6 hours PRN Shortness of Breath and/or Wheezing  hydrALAZINE Injectable 10 milliGRAM(s) IV Push every 6 hours PRN HTN  ondansetron Injectable 4 milliGRAM(s) IV Push every 6 hours PRN Nausea and/or Vomiting    Pertinent Labs: 01-27 Na145 mmol/L Glu 113 mg/dL<H> K+ 3.6 mmol/L Cr  2.60 mg/dL<H> BUN 64 mg/dL<H> 01-22 Phos 3.6 mg/dL 01-22 Alb 1.9 g/dL<L> 01-22 PAB 14 mg/dL<L>     CAPILLARY BLOOD GLUCOSE        Skin: 01/23/20, healed pressure ulcer stage II coccyx noted     Estimated Needs:   [ x] no change since previous assessment(01/03/20)  [ ] recalculated:     Previous Nutrition Diagnosis:   Malnutrition moderate malnutrition in context of chronic illness.     Etiology inadequate protein-energy intake in setting of altered swallow, CVA, alzheimer's disease, cardiac hx.     Signs/Symptoms physical findings of mild/moderate fat & muscle loss.     Goal/Expected Outcome pt to consume >75% of meals & supplement; unmet       Nutrition Diagnosis is [ x] ongoing, see change in severity of malnutrition  [ ] resolved [ ] not applicable       New Nutrition Diagnosis:   [x ] Malnutrition; severe malnutrition in context of acute illness     Related to: inadequate protein-energy intake in setting of GI bleed     As evidenced by: < 50% nutrition needs > 5days, physical findings of moderate fat & muscle loss     Goal: nutrition/hydration as preferred/tolerated     Interventions:   Recommend  [x] Continue c current diet regimen, progression of diet when appropriate to clear liquids to Low sodium, Dysphagia 3 Soft - Thin Liquids/consistency as tolerated, assist c feeding   [ ] Change Diet To:  [x ] Nutrition Supplement; Ensure Clear 8 oz 3x/day (720 rosa, 24 gm pro)   [ ] Nutrition Support  [x ] Other: if oral intake remains poor after diet progression, recommend enteral tube feeding if consistent c family's wishes     Monitoring and Evaluation:   [ x] PO intake [ x ] Tolerance to diet prescription [ x ] weights [ x ] labs[ x ] follow up per protocol  [ ] other: Assessment:  Pt seen for follow up for moderate malnutrition & completion of calorie count which was in effect from 01/25-01/27/20.  Pt adm c dx of CHF, RSV infection, S/P EGD, c gastric ulcer, and Colonoscopy,  s/p rapid response team 1/18 for new episode of hematemesis and melena, s/p ICU adm, s/p enteral feeding therapy.   Pt. currently c coffee ground emesis.  PMH include dementia, alzheimer's, debility, encephalopathy, HTN, HLD, MI, hypothyroidism, CKD, CVA, left hemiparesis, depression.   MOLST form noted, pt is DNR, limited medical intervention noted, without selection regarding artificially administered IV fluids/Nutrition.    Calorie count results are as follows:  01/25/20, pt consumed ~220 calories, 5 grams protein  01/26/20, pt consumed ~335 calories, 20 grams protein  01/27/20, NPO marked for all meals.  2 day average intake = ~280 calories, ~ 13 grams protein, which met ~ 20% of energy needs & 28% protein needs.     Factors impacting intake: [ ] none [ ] nausea  [ ] vomiting [ ] diarrhea [ ] constipation  [ ]chewing problems [ x] swallowing issues; 01/02/20, swallow evaluation recommended Dysphagia 3 Soft - Thin Liquids(previous diet order for puree noted by MD)  [ x] other: GI bleed     Diet Prescription: Diet, NPO:   Except Medications (01-27-20 @ 00:20)    Intake: 0%, <50% nutrition needs > 5 days noted since adm c NPO/clear fluid diet regimen & oral intake <50%     Current Weight: 01/28/20, 67.5 kg, 01/02/20, 61.3 kg, c wt. gain of 6.2 kg   % Weight Change: 10.1%      Nutrition focused physical exam conducted; limited exam due to edema, debility; Subcutaneous fat Exam;  [ Moderate   ]  Orbital fat pads region,  [ Moderate ]Buccal fat region,  [ Moderate, right arm, left arm c edema  ]triceps region, [ WNL  ]ribs region.  Muscle Exam; [ Moderate  ]temples region, [ Moderate  ]clavicle region, [ Moderate   ]shoulder region, [ Unable  ]Scapula region, [ Unable   ]Interosseous region, [  Unable ]thigh region, [  Unable ]Calf region    Pertinent Medications: MEDICATIONS  (STANDING):  buDESOnide    Inhalation Suspension 0.5 milliGRAM(s) Inhalation every 12 hours  carbamide peroxide Otic Solution 1 Drop(s) Both Ears two times a day  carvedilol 12.5 milliGRAM(s) Oral every 12 hours  dextrose 5% + sodium chloride 0.9%. 1000 milliLiter(s) (100 mL/Hr) IV Continuous <Continuous>  epoetin tawanna Injectable 76006 Unit(s) SubCutaneous once  lactated ringers. 1000 milliLiter(s) (50 mL/Hr) IV Continuous <Continuous>  levothyroxine 88 MICROGram(s) Oral daily  pantoprazole  Injectable 40 milliGRAM(s) IV Push two times a day  sodium chloride 0.65% Nasal 1 Spray(s) Both Nostrils three times a day  sucralfate 1 Gram(s) Oral four times a day    MEDICATIONS  (PRN):  acetaminophen   Tablet .. 650 milliGRAM(s) Oral every 6 hours PRN Mild Pain (1 - 3)  acetaminophen   Tablet .. 650 milliGRAM(s) Oral every 6 hours PRN Mild Pain (1 - 3)  albuterol/ipratropium for Nebulization 3 milliLiter(s) Nebulizer every 6 hours PRN Shortness of Breath and/or Wheezing  hydrALAZINE Injectable 10 milliGRAM(s) IV Push every 6 hours PRN HTN  ondansetron Injectable 4 milliGRAM(s) IV Push every 6 hours PRN Nausea and/or Vomiting    Pertinent Labs: 01-27 Na145 mmol/L Glu 113 mg/dL<H> K+ 3.6 mmol/L Cr  2.60 mg/dL<H> BUN 64 mg/dL<H> 01-22 Phos 3.6 mg/dL 01-22 Alb 1.9 g/dL<L> 01-22 PAB 14 mg/dL<L>     CAPILLARY BLOOD GLUCOSE        Skin: 01/23/20, healed pressure ulcer stage II coccyx noted     Estimated Needs:   [ x] no change since previous assessment(01/03/20)  [ ] recalculated:     Previous Nutrition Diagnosis:   Malnutrition moderate malnutrition in context of chronic illness.     Etiology inadequate protein-energy intake in setting of altered swallow, CVA, alzheimer's disease, cardiac hx.     Signs/Symptoms physical findings of mild/moderate fat & muscle loss.     Goal/Expected Outcome pt to consume >75% of meals & supplement; unmet       Nutrition Diagnosis is [ x] ongoing, see change in severity of malnutrition  [ ] resolved [ ] not applicable       New Nutrition Diagnosis:   [x ] Malnutrition; severe malnutrition in context of acute illness     Related to: inadequate protein-energy intake in setting of GI bleed     As evidenced by: < 50% nutrition needs > 5days, physical findings of moderate fat & muscle loss     Goal: nutrition/hydration as preferred/tolerated     Interventions:   Recommend  [x] Continue c current diet regimen, progression of diet when appropriate to clear liquids to Low sodium, Dysphagia 3 Soft - Thin Liquids/consistency as tolerated, assist c feeding   [ ] Change Diet To:  [x ] Nutrition Supplement; Ensure Clear 8 oz 3x/day (720 rosa, 24 gm pro)   [ x] Nutrition Support: if oral intake remains poor after diet progression, recommend enteral tube feeding if consistent c family's wishes   [x ] Other: swallow re-evaluation     Monitoring and Evaluation:   [ x] PO intake [ x ] Tolerance to diet prescription [ x ] weights [ x ] labs[ x ] follow up per protocol  [ ] other:

## 2020-01-28 NOTE — PROGRESS NOTE ADULT - SUBJECTIVE AND OBJECTIVE BOX
INTERVAL HPI:   79 year female with HTN, HLD, MI hx, CVA, Gi bleed, Anemia Depression and Alzheimer's dementia.  Sent from [facility] due to SOB. Possibility of pneumonia vs CHF was raised.   In ED with Respiratory distress required BIPAP support and RVP positive for RSV. Pt not able to provide more details due to dementia. Information from transfer papers and ED physician.  Nods no when asked for smoking.  01/09/20: With massive GI bleed, intubated and transferred to ICU.  01/09/20:  EGD+ Colonoscopy.  01/15/20:  Extubated.  01/16/20:  Out of ICU  01/18/20:  Back to ICU for upper GI bleed and significant drop in H & H.  01/21/20:  EGD, acute gastric ulcer.  01/22/20:  Out of ICU.  01/27/20:  EGD    OVERNIGHT EVENTS:  Resting    Vital Signs Last 24 Hrs  T(C): 35 (28 Jan 2020 17:26), Max: 36.1 (27 Jan 2020 23:43)  T(F): 95 (28 Jan 2020 17:26), Max: 97 (27 Jan 2020 23:43)  HR: 79 (28 Jan 2020 17:26) (72 - 88)  BP: 198/69 (28 Jan 2020 17:26) (111/64 - 198/69)  BP(mean): --  RR: 20 (28 Jan 2020 17:26) (16 - 20)  SpO2: 98% (28 Jan 2020 17:26) (96% - 99%)    PHYSICAL EXAM:  GEN:        comfortable.  HEENT:    Normal.    RESP:        no distress  CVS:           Regular rate and rhythm.   ABD:         Soft, non-tender, non-distended;     MEDICATIONS  (STANDING):  buDESOnide    Inhalation Suspension 0.5 milliGRAM(s) Inhalation every 12 hours  carbamide peroxide Otic Solution 1 Drop(s) Both Ears two times a day  carvedilol 12.5 milliGRAM(s) Oral every 12 hours  dextrose 5% + sodium chloride 0.9%. 1000 milliLiter(s) (100 mL/Hr) IV Continuous <Continuous>  lactated ringers. 1000 milliLiter(s) (50 mL/Hr) IV Continuous <Continuous>  levothyroxine Injectable 44 MICROGram(s) IV Push at bedtime  pantoprazole  Injectable 40 milliGRAM(s) IV Push two times a day  piperacillin/tazobactam IVPB.. 3.375 Gram(s) IV Intermittent every 12 hours  sodium chloride 0.65% Nasal 1 Spray(s) Both Nostrils three times a day  sucralfate 1 Gram(s) Oral four times a day    MEDICATIONS  (PRN):  acetaminophen   Tablet .. 650 milliGRAM(s) Oral every 6 hours PRN Mild Pain (1 - 3)  acetaminophen   Tablet .. 650 milliGRAM(s) Oral every 6 hours PRN Mild Pain (1 - 3)  albuterol/ipratropium for Nebulization 3 milliLiter(s) Nebulizer every 6 hours PRN Shortness of Breath and/or Wheezing  hydrALAZINE Injectable 10 milliGRAM(s) IV Push every 6 hours PRN HTN  HYDROmorphone  Injectable 0.5 milliGRAM(s) IV Push every 4 hours PRN mod pain/dyspnea  ondansetron Injectable 4 milliGRAM(s) IV Push every 6 hours PRN Nausea and/or Vomiting    LABS:                        8.3    6.97  )-----------( 147      ( 28 Jan 2020 09:59 )             26.4     01-27    145  |  116<H>  |  64<H>  ----------------------------<  113<H>  3.6   |  20<L>  |  2.60<H>    Ca    8.1<L>      27 Jan 2020 08:16    ASSESSMENT AND PLAN:  ·	S/P Acute hypoxic  Respiratory failure .  ·	RSV tracheobronchitis.  ·	Left pleural effusion.  ·	Hypothyroidism with very high TSH.  ·	Anemia.  ·	VRE UTI  ·	Leukocytosis.  ·	Renal Insuffiencey.  ·	CVA by history.  ·	HTN.  ·	HLD.  ·	Alzheimer's dementia.  ·	GI bleed.    Continue supportive care.

## 2020-01-28 NOTE — PROGRESS NOTE ADULT - ASSESSMENT
July 25, 2019       Radha Diehl MD  [address] [facility] [address]  VIA In Basket      Patient: Juan Cohn   YOB: 1951   Date of Visit: 7/24/2019       Dear Dr. Diehl:    I saw your patient, Juan Cohn, for an evaluation. Below are my notes for this visit with him.    If you have questions, please do not hesitate to call me.      Sincerely,        Bernabe Lewis MD        CC: No Recipients  Bernabe Lewis MD  7/24/2019  3:28 PM  Sign at close encounter  New Patient Visit  Date of visit: 07/24/19   Diagnosis:   Stage:   Date of diagnosis:  PCP: Radha Diehl MD   Surgeon:   Radiation Oncologist:           History of Present Illness ***  Location  Timing  Duration  Severity  Quality  Context  Associated signs/symptoms  Modifying factors        Review of Systems  Review of Systems      Active Problems  Patient Active Problem List   Diagnosis   • Benign essential hypertension   • Dyslipidemia   • Type 2 diabetes mellitus, uncontrolled, with retinopathy (CMS/HCC)   • Microalbuminuria   • Incomplete tear of right rotator cuff         Past Medical History  Past Medical History:   Diagnosis Date   • Malignant neoplasm (CMS/HCC)        Past Surgical History  Past Surgical History:   Procedure Laterality Date   • Nephrectomy      partial nephrectomy, benign tumor. at the time of pancreatic surgery   • Pancreatectomy      pancreatic cancer, tail removed. [facility]   • Pr catheter, ablation     • Splenectomy, total         Social History  Social History     Substance and Sexual Activity   Alcohol Use No   • Frequency: Never     Alcohol  Social History     Tobacco Use   Smoking Status Former Smoker   Smokeless Tobacco Never Used   Tobacco Comment    stopped 30 years ago          Family History  Family History   Problem Relation Age of Onset   • Cancer Mother         ovarian    • Myocardial Infarction Mother    • Cancer Father 60        colon cancer   • Multiple Sclerosis Maternal Uncle             Review  Past medical history, problem list, family medical history, surgical history and social history reviewed.       Current Meds  Current Outpatient Medications   Medication Sig Dispense Refill   • glipiZIDE (GLUCOTROL) 10 MG tablet Take 1 tablet by mouth every 12 hours. 180 tablet 0   • traMADol (ULTRAM) 50 MG tablet TAKE 1 TABLET BY MOUTH EVERY 6 HOURS AS NEEDED FOR PAIN 30 tablet 0   • metformin (GLUCOPHAGE) 1000 MG tablet TAKE 1 TABLET BY MOUTH TWICE DAILY 180 tablet 0   • pravastatin (PRAVACHOL) 20 MG tablet TAKE 1 TABLET BY MOUTH AT BEDTIME 90 tablet 0   • apixaBAN (ELIQUIS) 5 MG Tab Take 5 mg by mouth every 12 hours.     • diclofenac (VOLTAREN) 1 % gel Apply 2 g topically 4 times daily as needed (pain). Apply to the hands only 100 g 0   • amLODIPine (NORVASC) 10 MG tablet Take 1 tab daily 90 tablet 1   • empagliflozin-linaGLIPtin (GLYXAMBI) 10-5 MG tablet Take 1 tablet by mouth daily. 90 tablet 3   • Glucosamine-Chondroit-Vit C-Mn (GLUCOSAMINE 1500 COMPLEX) Cap Take 1,500 mg by mouth daily.     • coenzyme Q10 100 MG capsule Take 100 mg by mouth daily.     • saccharomyces boulardii (FLORASTOR) 250 MG capsule Take 250 mg by mouth daily.     • Multiple Vitamins-Minerals (CENTRUM SILVER 50+MEN) Tab Take by mouth daily.     • blood glucose (FREESTYLE LITE) test strip test twice daily 200 strip 1     No current facility-administered medications for this visit.        Current Allergies    ALLERGIES:  No Known Allergies        Vitals  There were no vitals filed for this visit.    Physical Exam  Physical Exam      ECOG: ***    PAIN: ***/10      Labs:   Component      Latest Ref Rng & Units 10/1/2014 5/12/2015 11/14/2017 12/21/18 6/5/19   WHITE BLOOD COUNT      4.2 - 11.0 K/mcL 8.6 7.6 8.8 10.0 10.5   HEMOGLOBIN      13.0 - 17.0 g/dl 15.7 15.2 13.2 16.0 16.6   HEMATOCRIT      39.0 - 51.0 % 49.1 47.5 40.1 50.4 52.3   MEAN CORPUSCULAR VOLUME      78.0 - 100.0 fL 97 95.0 93.3 93.9 92.7   PLATELET COUNT       140 - 450 K/mcL 498 (H) 412 586 (H) 441 765           Imaging:   MRI abdomen 7/1/2019  1.   Post surgical changes of the distal pancreatectomy and splenectomy with postsurgical   scarring the resection bed. No evidence of recurrent or residual disease. No evidence of   metastatic disease within the abdomen.    CT abdomen and pelvis 6/17/2019  0.5 cm left upper lobe pulmonary nodule.  Recommend short-term follow-up chest CT in 3-6 months.   Additional stable 0.4 cm left lower lobe pulmonary nodule.  No definite bulky adenopathy in the chest.  Post surgical change in the region of the pancreatic tail and splenectomy.  There is a reported   history of pancreatic cancer and evaluation for recurrent disease and/or metastatic disease is   suboptimal without intravenous contrast.  Advise follow-up contrast-enhanced multiphasic CT or   MRI abdomen.  Small nonobstructing subcentimeter left renal calculi.  No bulky adenopathy in the abdomen or pelvis.         Assessment  Problem List Items Addressed This Visit     None         Cancer Staging Summary for Juan Cohn     None            Plan  ***        Risks, benefits, alternatives, expectations and preparations were discussed with the patient, who understands and agrees.   Medical compliance with plan discussed and risks of non-compliance reviewed.    Patient education completed on disease process, etiology & prognosis.    Patient expresses understanding of the plan.    Return to clinic as clinically indicated as discussed with patient who verbalized understanding of & agreement with the plan.        Schedule follow-up: {[id number]}    Thank you MD Radha Amanda MD for involving me in the care of this patient. I have sent you a copy of this visit. Please do not hesitate to call me with any questions/concerns.     Roby Lewis MD                     Hypothyroidism

## 2020-01-28 NOTE — PROGRESS NOTE ADULT - SUBJECTIVE AND OBJECTIVE BOX
INTERVAL HPI/OVERNIGHT EVENTS: EGD yesterday afternoon, vomiting coffee grounds this am     Code Status: DNR  Allergies    No Known Allergies    Intolerances    MEDICATIONS  (STANDING):  buDESOnide    Inhalation Suspension 0.5 milliGRAM(s) Inhalation every 12 hours  carbamide peroxide Otic Solution 1 Drop(s) Both Ears two times a day  carvedilol 12.5 milliGRAM(s) Oral every 12 hours  dextrose 5% + sodium chloride 0.9%. 1000 milliLiter(s) (100 mL/Hr) IV Continuous <Continuous>  epoetin tawanna Injectable 16545 Unit(s) SubCutaneous once  lactated ringers. 1000 milliLiter(s) (50 mL/Hr) IV Continuous <Continuous>  levothyroxine Injectable 44 MICROGram(s) IV Push at bedtime  pantoprazole  Injectable 40 milliGRAM(s) IV Push two times a day  sodium chloride 0.65% Nasal 1 Spray(s) Both Nostrils three times a day  sucralfate 1 Gram(s) Oral four times a day    MEDICATIONS  (PRN):  acetaminophen   Tablet .. 650 milliGRAM(s) Oral every 6 hours PRN Mild Pain (1 - 3)  acetaminophen   Tablet .. 650 milliGRAM(s) Oral every 6 hours PRN Mild Pain (1 - 3)  albuterol/ipratropium for Nebulization 3 milliLiter(s) Nebulizer every 6 hours PRN Shortness of Breath and/or Wheezing  hydrALAZINE Injectable 10 milliGRAM(s) IV Push every 6 hours PRN HTN  HYDROmorphone  Injectable 0.5 milliGRAM(s) IV Push every 4 hours PRN mod pain/dyspnea  ondansetron Injectable 4 milliGRAM(s) IV Push every 6 hours PRN Nausea and/or Vomiting      PRESENT SYMPTOMS: [ ]Unable to obtain due to poor mentation   Source if other than patient:  [ ]Family   [ ]Team     Pain (Impact on QOL):    Location:  Severity:  Minimal acceptable level (0-10 scale):       Quality:       Onset:  Duration:  Aggravating factors:  Relieving Factors  Radiation:    Dyspnea:  Yes [ ] No [ ] - [ ]Mild [ ]Moderate [ ]Severe  Anxiety:    Yes [ ] No [ ] - [ ]Mild [ ]Moderate [ ]Severe  Fatigue:    Yes [ ] No [ ] - [ ]Mild [ ]Moderate [ ]Severe  Nausea:    Yes [ ] No [ ] - [ ]Mild [ ]Moderate [ ]Severe                         Loss of appetite: Yes [ ] No [ ] - [ ]Mild [ ]Moderate [ ]Severe             Constipation:  Yes [ ] No [ ] - [ ]Mild [ ]Moderate [ ]Severe  Grief: Yes [ ] No [ ]     PAIN AD Score:	  [email or web address] (Ctrl + left click to view)    Other Symptoms:  [ ]All other review of systems negative     Karnofsky Performance Score/Palliative Performance Status Version 2:   10      %    http://palliative.info/resource_material/PPSv2.pdf    PHYSICAL EXAM:  Vital Signs Last 24 Hrs  T(C): 34.4 (28 Jan 2020 11:31), Max: 36.7 (27 Jan 2020 13:54)  T(F): 93.9 (28 Jan 2020 11:31), Max: 98 (27 Jan 2020 13:54)  HR: 76 (28 Jan 2020 11:31) (75 - 88)  BP: 111/64 (28 Jan 2020 11:31) (85/56 - 165/60)  BP(mean): --  RR: 16 (28 Jan 2020 11:31) (16 - 18)  SpO2: 98% (28 Jan 2020 11:31) (97% - 99%) I&O's Summary    27 Jan 2020 07:01  -  28 Jan 2020 07:00  --------------------------------------------------------  IN: 2050 mL / OUT: 450 mL / NET: 1600 mL         GENERAL:  [x ]Alert  [x ]Oriented x   [ ]Lethargic  [ ]Cachexia  [ ]Unarousable  [ ]Verbal  [ ]Non-Verbal  Behavioral:   [ ] Anxiety  [ ] Delirium [ ] Agitation [ ] Other  HEENT:  [ ]Normal   [ ]Dry mouth   [ ]ET Tube/Trach  [ ]Oral lesions  PULMONARY:   [ ]Clear [ ]Tachypnea  [ ]Audible excessive secretions   [ ]Rhonchi        [ ]Right [ ]Left [ ]Bilateral  [ ]Crackles        [ ]Right [ ]Left [ ]Bilateral  [ ]Wheezing     [ ]Right [ ]Left [ ]Bilateral  CARDIOVASCULAR:    [ ]Regular [ ]Irregular [ ]Tachy  [ ]Tolu [ ]Murmur [ ]Other  GASTROINTESTINAL:  [ ]Soft  [ ]Distended   [ ]+BS  [ ]Non tender [ ]Tender  [ ]PEG [ ]OGT/ NGT   Last BM:   01-26-20 @ 07:01  -  01-27-20 @ 07:00  --------------------------------------------------------  OUT: 0 mL    01-27-20 @ 07:01  -  01-28-20 @ 07:00  --------------------------------------------------------  OUT: 0 mL       GENITOURINARY:  [ ]Normal [x ] Incontinent   [ ]Oliguria/Anuria   [ ]Stevenson  MUSCULOSKELETAL:   [ ]Normal   [ ]Weakness  [ x]Bed/Wheelchair bound [ x]Edema  NEUROLOGIC:   [ ]No focal deficits  [x ] Cognitive impairment  [ x] Dysphagia [x ]Dysarthria [ ] Paresis [ ]Other   SKIN:   [ ]Normal   [ x]Pressure ulcer(s)  [ ]Rash    CRITICAL CARE:  [ ] Shock Present  [ ]Septic [ ]Cardiogenic [ ]Neurologic [ ]Hypovolemic  [ ]  Vasopressors [ ]  Inotropes   [ ] Respiratory failure present  [ ] Acute  [ ] Chronic [ ] Hypoxic  [ ] Hypercarbic [ ] Other  [ ] Other organ failure     LABS:                        8.3    6.97  )-----------( 147      ( 28 Jan 2020 09:59 )             26.4   01-27    145  |  116<H>  |  64<H>  ----------------------------<  113<H>  3.6   |  20<L>  |  2.60<H>    Ca    8.1<L>      27 Jan 2020 08:16          RADIOLOGY & ADDITIONAL STUDIES:    Protein Calorie Malnutrition Present: [ ] yes [ ] no  [ ] PPSV2 < or = 30%  [ ] significant weight loss [ ] poor nutritional intake [ ] anasarca [ ] catabolic state Prealbumin, Serum: 14 mg/dL (01-22-20 @ 14:31)      REFERRALS:   [ ]Chaplaincy  [ ] Hospice  [ ]Child Life  [ ]Social Work  [ ]Case management [ ]Holistic Therapy   Goals of Care Document: INTERVAL HPI/OVERNIGHT EVENTS: EGD yesterday afternoon, vomiting coffee grounds this am     Code Status: DNR  Allergies    No Known Allergies    Intolerances    MEDICATIONS  (STANDING):  buDESOnide    Inhalation Suspension 0.5 milliGRAM(s) Inhalation every 12 hours  carbamide peroxide Otic Solution 1 Drop(s) Both Ears two times a day  carvedilol 12.5 milliGRAM(s) Oral every 12 hours  dextrose 5% + sodium chloride 0.9%. 1000 milliLiter(s) (100 mL/Hr) IV Continuous <Continuous>  epoetin tawanna Injectable 17408 Unit(s) SubCutaneous once  lactated ringers. 1000 milliLiter(s) (50 mL/Hr) IV Continuous <Continuous>  levothyroxine Injectable 44 MICROGram(s) IV Push at bedtime  pantoprazole  Injectable 40 milliGRAM(s) IV Push two times a day  sodium chloride 0.65% Nasal 1 Spray(s) Both Nostrils three times a day  sucralfate 1 Gram(s) Oral four times a day    MEDICATIONS  (PRN):  acetaminophen   Tablet .. 650 milliGRAM(s) Oral every 6 hours PRN Mild Pain (1 - 3)  acetaminophen   Tablet .. 650 milliGRAM(s) Oral every 6 hours PRN Mild Pain (1 - 3)  albuterol/ipratropium for Nebulization 3 milliLiter(s) Nebulizer every 6 hours PRN Shortness of Breath and/or Wheezing  hydrALAZINE Injectable 10 milliGRAM(s) IV Push every 6 hours PRN HTN  HYDROmorphone  Injectable 0.5 milliGRAM(s) IV Push every 4 hours PRN mod pain/dyspnea  ondansetron Injectable 4 milliGRAM(s) IV Push every 6 hours PRN Nausea and/or Vomiting      PRESENT SYMPTOMS: [ ]Unable to obtain due to poor mentation - pt nodding and pointing, occasional utterance in Romansh  Source if other than patient:  [ ]Family   [ ]Team     Pain (Impact on QOL):  pointing to abdomen  Location: abdomen  Severity: pt cannot answer   Minimal acceptable level (0-10 scale):       Quality:       Onset:  Duration:  Aggravating factors:  Relieving Factors  Radiation:    Dyspnea:  Yes [x ] No [ ] - [ ]Mild [x ]Moderate [ ]Severe  Anxiety:    Yes [ ] No [ x] - [ ]Mild [ ]Moderate [ ]Severe  Fatigue:    Yes [ ] No [ x] - [ ]Mild [ ]Moderate [ ]Severe  Nausea:    Yes [x ] No [ ] - [ ]Mild [ ]Moderate [x ]Severe                         Loss of appetite: Yes [x ] No [ ] - [ ]Mild [ ]Moderate [ x]Severe             Constipation:  Yes [ ] No [ x] - [ ]Mild [ ]Moderate [ ]Severe  Grief: Yes [ ] No [ x]     PAIN AD Score:	8  [email or web address] (Ctrl + left click to view)    Other Symptoms:  [ ]All other review of systems negative     Karnofsky Performance Score/Palliative Performance Status Version 2:   10      %    http://palliative.info/resource_material/PPSv2.pdf    PHYSICAL EXAM:  Vital Signs Last 24 Hrs  T(C): 34.4 (28 Jan 2020 11:31), Max: 36.7 (27 Jan 2020 13:54)  T(F): 93.9 (28 Jan 2020 11:31), Max: 98 (27 Jan 2020 13:54)  HR: 76 (28 Jan 2020 11:31) (75 - 88)  BP: 111/64 (28 Jan 2020 11:31) (85/56 - 165/60)  BP(mean): --  RR: 16 (28 Jan 2020 11:31) (16 - 18)  SpO2: 98% (28 Jan 2020 11:31) (97% - 99%) I&O's Summary    27 Jan 2020 07:01  -  28 Jan 2020 07:00  --------------------------------------------------------  IN: 2050 mL / OUT: 450 mL / NET: 1600 mL         GENERAL:  [x ]Alert  [x ]Oriented x 2  [ ]Lethargic  [ ]Cachexia  [ ]Unarousable  [x ]Verbal- minimal verbal responses  [ ]Non-Verbal  Behavioral:   [ ] Anxiety  [ ] Delirium [ ] Agitation [ ] Other  HEENT:  [ ]Normal   [x ]Dry mouth   [ ]ET Tube/Trach  [ ]Oral lesions  PULMONARY:   [ ]Clear [x ]Tachypnea  [ x]Audible excessive secretions   [ ]Rhonchi        [ ]Right [ ]Left [ ]Bilateral  [ ]Crackles        [ ]Right [ ]Left [ ]Bilateral  [ ]Wheezing     [ ]Right [ ]Left [ ]Bilateral  CARDIOVASCULAR:    [ x]Regular [ ]Irregular [ ]Tachy  [ ]Tolu [ ]Murmur [ ]Other  GASTROINTESTINAL:  [ ]Soft  [ x]Distended   [x ]+BS  [ ]Non tender [ x]Tender  [ ]PEG [ ]OGT/ NGT   Last BM: 1/27 01-26-20 @ 07:01  -  01-27-20 @ 07:00  --------------------------------------------------------  OUT: 0 mL    01-27-20 @ 07:01  -  01-28-20 @ 07:00  --------------------------------------------------------  OUT: 0 mL       GENITOURINARY:  [ ]Normal [x ] Incontinent   [ ]Oliguria/Anuria   [ ]Stevenson  MUSCULOSKELETAL:   [ ]Normal   [ ]Weakness  [ x]Bed/Wheelchair bound [ x]Edema  NEUROLOGIC:   [ ]No focal deficits  [x ] Cognitive impairment  [ x] Dysphagia [x ]Dysarthria [ ] Paresis [ ]Other   SKIN:   [ ]Normal   [ x]Pressure ulcer(s)  [ ]Rash    CRITICAL CARE:  [ ] Shock Present  [ ]Septic [ ]Cardiogenic [ ]Neurologic [ ]Hypovolemic  [ ]  Vasopressors [ ]  Inotropes   [ ] Respiratory failure present  [ ] Acute  [ ] Chronic [ ] Hypoxic  [ ] Hypercarbic [ ] Other  [ ] Other organ failure     LABS:                        8.3    6.97  )-----------( 147      ( 28 Jan 2020 09:59 )             26.4   01-27    145  |  116<H>  |  64<H>  ----------------------------<  113<H>  3.6   |  20<L>  |  2.60<H>    Ca    8.1<L>      27 Jan 2020 08:16          RADIOLOGY & ADDITIONAL STUDIES:    Protein Calorie Malnutrition Present: [ x] yes [ ] no  [x ] PPSV2 < or = 30%  [ x] significant weight loss [ x] poor nutritional intake [ x] anasarca [ x] catabolic state Prealbumin, Serum: 14 mg/dL (01-22-20 @ 14:31)      REFERRALS:   [ ]Chaplaincy  [ ] Hospice  [ ]Child Life  [ ]Social Work  [ ]Case management [ ]Holistic Therapy   Goals of Care Document:

## 2020-01-28 NOTE — CHART NOTE - NSCHARTNOTEFT_GEN_A_CORE
Medicine Hospitalist PA    Requested by RN to place an IV heplock in patient. As per RN pt is a difficult stick. Placed IV heplock in right antecub. #20 using ultrasound guidance. IV flushed and secured. RN aware.

## 2020-01-28 NOTE — PROGRESS NOTE ADULT - SUBJECTIVE AND OBJECTIVE BOX
79 years old female from [facility] rehab c/o coughing sob for three to four days. Pt is sent here to rule out chf vs pneumonia. Pt is alert and oriented to person only unable to give detail hx due to hx of Alzheimer according to the [facility] pt had sat of 91 to 92 %,	    PAST MEDICAL/SURGICAL/FAMILY/SOCIAL HISTORY:    Past Medical History:  Alzheimers disease    CVA (cerebral vascular accident)    Depression    HTN (hypertension)    MI, old.  recently Dx c hypothyroidism, TSH > 111 started Levothyroxin 50mcg qd as per Dr Vigil (2020 11:41)      Chief Complaint:  Patient is a 79y old  Female who presents with a chief complaint of sob cough (2020 22:56)      Review of Systems:    General:  No wt loss, fevers, chills, night sweats  Eyes:  Good vision, no reported pain  ENT:  No sore throat, pain, runny nose, dysphagia  CV:  No pain, palpitations, hypo/hypertension  Resp:  dyspnea, cough  GI:  No pain, nausea, vomiting, diarrhea, constipation           Social History/Family History  SOCHX:   tobacco,  -  alcohol    FMHX: FA/MO  - contributory       Discussed with:  PMD, Family    Physical Exam:    Vital Signs:  Vital Signs Last 24 Hrs  T(C): 36.2 (2020 17:06), Max: 36.9 (2020 00:18)  T(F): 97.2 (2020 17:06), Max: 98.5 (2020 00:18)  HR: 74 (2020 21:55) (68 - 87)  BP: 179/78 (2020 17:06) (132/71 - 179/78)  BP(mean): --  RR: 18 (2020 17:06) (18 - 18)  SpO2: 97% (2020 21:55) (94% - 98%)  Daily     Daily Weight in k.8 (2020 04:59)  I&O's Summary    2020 07:01  -  2020 07:00  --------------------------------------------------------  IN: 560 mL / OUT: 0 mL / NET: 560 mL          Chest:  fair air entry bilateral  Cardiovascular:  Regular rhythm, S1, S2, no murmur/rub/S3/S4, no carotid/femoral/abdominal bruit, radial/pedal pulses 2+,   Abdomen:  Soft, non-tender, non-distended, normoactive bowel sounds, no HSM        Laboratory:          138  |  104  |  69<H>  ----------------------------<  111<H>  3.8   |  25  |  3.77<H>    Ca    8.1<L>      2020 07:22          Assessment:  I am asked to assist this patient admitted with shortness of breath  The patient has multifactorial causes of shortness of breath, renal insufficiency, anemia  And now after a diagnostic echocardiogram within normal ejection fraction the reading shows mitral regurgitation and tricuspid regurgitation  After review of the studies this regurgitation is not considered severe but in the moderate to severe but more moderate range  upper GI bleed  GI noted,  EGD   optimize systolic blood pressure  PRBCs  EGD noted with gastric ulcer, GI treatment continues  Bloody BM yesterday

## 2020-01-28 NOTE — PROGRESS NOTE ADULT - PROBLEM SELECTOR PLAN 7
called Katelyn to alert her of her mother's continued bleeding/vomiting blood/resp discomfort and low temperature. called Katelyn to alert her of her mother's continued bleeding/vomiting blood/resp discomfort and low temperature. Explored with her the possibility of her mother bleeding again and potential for there to be limited measures we could take to stop it as well as the option to change the focus of care goals to comfort and not intervention. She stated she would be in later and was going to speak to her sister again about their mother's condition. Pt appears to be in decline and may not survive hospital stay. Pt is DNR with limited medical interventions as discussed with daughter.

## 2020-01-28 NOTE — PROGRESS NOTE ADULT - SUBJECTIVE AND OBJECTIVE BOX
lying in bed    Vital Signs Last 24 Hrs  T(C): 36 (28 Jan 2020 04:40), Max: 36.7 (27 Jan 2020 11:00)  T(F): 96.8 (28 Jan 2020 04:40), Max: 98 (27 Jan 2020 11:00)  HR: 76 (28 Jan 2020 06:58) (75 - 89)  BP: 150/68 (28 Jan 2020 04:40) (85/56 - 200/71)  BP(mean): --  RR: 18 (28 Jan 2020 04:40) (17 - 18)  SpO2: 97% (28 Jan 2020 06:58) (97% - 100%)    PHYSICAL EXAM:    general - confused  HEENT - No Icterus  CVS - RRR  RS - AE B/L  Abd - soft, NT  Ext - Pulses +        LABS:                        8.6    7.05  )-----------( 135      ( 28 Jan 2020 07:18 )             27.6     01-27    145  |  116<H>  |  64<H>  ----------------------------<  113<H>  3.6   |  20<L>  |  2.60<H>    Ca    8.1<L>      27 Jan 2020 08:16              RADIOLOGY & ADDITIONAL STUDIES:

## 2020-01-28 NOTE — PROGRESS NOTE ADULT - PROBLEM SELECTOR PLAN 1
1B, HGB 8.6 --> 8.3 , Repeat EGD gastric ulcer in Fundus s/p APC / EPI yesterday.   On IV PPI .Carafate

## 2020-01-28 NOTE — PROGRESS NOTE ADULT - ASSESSMENT
78year old female w PMH HTN, CVA residual left weakness, Hypothyroid, Dementia, Achalasia s/p POEMS admitted from rehab with RSV/resp distress hospital course complicated by UGIB requiring ICU stay and EGD x 2. Pt now transferred to medical floor, developed bloody stools this am now upgraded to telemetry 78year old female w PMH HTN, CVA residual left weakness, Hypothyroid, Dementia, Achalasia s/p POEMS admitted from rehab with RSV/resp distress hospital course complicated by UGIB requiring ICU stay and EGD x 2. Pt now transferred to medical floor, developed bloody stools transferred to [facility] and had EGD (third this admission) on 1/27 showing ulcer stigmata of recent bleed injected and cauterized.

## 2020-01-28 NOTE — CHART NOTE - NSCHARTNOTEFT_GEN_A_CORE
Upon Nutritional Assessment by the Registered Dietitian your patient was determined to meet criteria / has evidence of the following diagnosis/diagnoses:          [ ]  Mild Protein Calorie Malnutrition        [ ]  Moderate Protein Calorie Malnutrition        [x ] Severe Protein Calorie Malnutrition( acute severe)        [ ] Unspecified Protein Calorie Malnutrition        [ ] Underweight / BMI <19        [ ] Morbid Obesity / BMI > 40      Findings as based on:  •  Comprehensive nutrition assessment and consultation  •  Calorie counts (nutrient intake analysis)  •  Food acceptance and intake status from observations by staff  •  Follow up  •  Patient education  •  Intervention secondary to interdisciplinary rounds  •   concerns      Treatment:    The following diet has been recommended:   progression of diet when appropriate to clear liquids to Low sodium, Dysphagia 3 Soft - Thin Liquids/consistency as tolerated, assist c feeding   +Ensure Clear 8 oz 3x/day (720 rosa, 24 gm pro)    If oral intake remains poor after diet progression, recommend enteral tube feeding if consistent c family's wishes    Recommend swallow re-evaluation      PROVIDER Section:     By signing this assessment you are acknowledging and agree with the diagnosis/diagnoses assigned by the Registered Dietitian    Comments:

## 2020-01-29 LAB
ANION GAP SERPL CALC-SCNC: 8 MMOL/L — SIGNIFICANT CHANGE UP (ref 5–17)
BUN SERPL-MCNC: 62 MG/DL — HIGH (ref 7–23)
CALCIUM SERPL-MCNC: 7.5 MG/DL — LOW (ref 8.5–10.1)
CHLORIDE SERPL-SCNC: 123 MMOL/L — HIGH (ref 96–108)
CO2 SERPL-SCNC: 22 MMOL/L — SIGNIFICANT CHANGE UP (ref 22–31)
CREAT SERPL-MCNC: 2.43 MG/DL — HIGH (ref 0.5–1.3)
GLUCOSE SERPL-MCNC: 121 MG/DL — HIGH (ref 70–99)
GRAM STN FLD: SIGNIFICANT CHANGE UP
GRAM STN FLD: SIGNIFICANT CHANGE UP
HCT VFR BLD CALC: 20.8 % — CRITICAL LOW (ref 34.5–45)
HGB BLD-MCNC: 6.5 G/DL — CRITICAL LOW (ref 11.5–15.5)
MCHC RBC-ENTMCNC: 28.4 PG — SIGNIFICANT CHANGE UP (ref 27–34)
MCHC RBC-ENTMCNC: 31.3 GM/DL — LOW (ref 32–36)
MCV RBC AUTO: 90.8 FL — SIGNIFICANT CHANGE UP (ref 80–100)
METHOD TYPE: SIGNIFICANT CHANGE UP
MRSA SPEC QL CULT: SIGNIFICANT CHANGE UP
NRBC # BLD: 3 /100 WBCS — HIGH (ref 0–0)
PLATELET # BLD AUTO: 98 K/UL — LOW (ref 150–400)
POTASSIUM SERPL-MCNC: 3 MMOL/L — LOW (ref 3.5–5.3)
POTASSIUM SERPL-SCNC: 3 MMOL/L — LOW (ref 3.5–5.3)
RBC # BLD: 2.29 M/UL — LOW (ref 3.8–5.2)
RBC # FLD: 22.9 % — HIGH (ref 10.3–14.5)
SODIUM SERPL-SCNC: 153 MMOL/L — HIGH (ref 135–145)
SPECIMEN SOURCE: SIGNIFICANT CHANGE UP
SPECIMEN SOURCE: SIGNIFICANT CHANGE UP
WBC # BLD: 10.98 K/UL — HIGH (ref 3.8–10.5)
WBC # FLD AUTO: 10.98 K/UL — HIGH (ref 3.8–10.5)

## 2020-01-29 PROCEDURE — 71045 X-RAY EXAM CHEST 1 VIEW: CPT | Mod: 26

## 2020-01-29 PROCEDURE — 99291 CRITICAL CARE FIRST HOUR: CPT

## 2020-01-29 PROCEDURE — 99233 SBSQ HOSP IP/OBS HIGH 50: CPT

## 2020-01-29 RX ORDER — SUCRALFATE 1 G
1 TABLET ORAL
Refills: 0 | Status: DISCONTINUED | OUTPATIENT
Start: 2020-01-29 | End: 2020-01-30

## 2020-01-29 RX ORDER — SODIUM CHLORIDE 9 MG/ML
500 INJECTION INTRAMUSCULAR; INTRAVENOUS; SUBCUTANEOUS ONCE
Refills: 0 | Status: COMPLETED | OUTPATIENT
Start: 2020-01-29 | End: 2020-01-29

## 2020-01-29 RX ORDER — VANCOMYCIN HCL 1 G
1000 VIAL (EA) INTRAVENOUS ONCE
Refills: 0 | Status: COMPLETED | OUTPATIENT
Start: 2020-01-29 | End: 2020-01-29

## 2020-01-29 RX ORDER — SODIUM CHLORIDE 9 MG/ML
1000 INJECTION INTRAMUSCULAR; INTRAVENOUS; SUBCUTANEOUS ONCE
Refills: 0 | Status: DISCONTINUED | OUTPATIENT
Start: 2020-01-29 | End: 2020-01-29

## 2020-01-29 RX ADMIN — Medication 0.5 MILLIGRAM(S): at 17:10

## 2020-01-29 RX ADMIN — SODIUM CHLORIDE 166.67 MILLILITER(S): 9 INJECTION INTRAMUSCULAR; INTRAVENOUS; SUBCUTANEOUS at 16:45

## 2020-01-29 RX ADMIN — Medication 3 MILLILITER(S): at 06:03

## 2020-01-29 RX ADMIN — CARBAMIDE PEROXIDE 1 DROP(S): 81.86 SOLUTION/ DROPS AURICULAR (OTIC) at 06:35

## 2020-01-29 RX ADMIN — PANTOPRAZOLE SODIUM 40 MILLIGRAM(S): 20 TABLET, DELAYED RELEASE ORAL at 06:35

## 2020-01-29 RX ADMIN — PANTOPRAZOLE SODIUM 40 MILLIGRAM(S): 20 TABLET, DELAYED RELEASE ORAL at 17:55

## 2020-01-29 RX ADMIN — PIPERACILLIN AND TAZOBACTAM 25 GRAM(S): 4; .5 INJECTION, POWDER, LYOPHILIZED, FOR SOLUTION INTRAVENOUS at 17:55

## 2020-01-29 RX ADMIN — Medication 0.5 MILLIGRAM(S): at 06:04

## 2020-01-29 RX ADMIN — Medication 1 GRAM(S): at 17:55

## 2020-01-29 RX ADMIN — Medication 1 SPRAY(S): at 13:19

## 2020-01-29 RX ADMIN — CARBAMIDE PEROXIDE 1 DROP(S): 81.86 SOLUTION/ DROPS AURICULAR (OTIC) at 17:55

## 2020-01-29 RX ADMIN — Medication 1 GRAM(S): at 12:32

## 2020-01-29 RX ADMIN — Medication 1 SPRAY(S): at 06:36

## 2020-01-29 RX ADMIN — Medication 10 MILLIGRAM(S): at 06:38

## 2020-01-29 RX ADMIN — PIPERACILLIN AND TAZOBACTAM 25 GRAM(S): 4; .5 INJECTION, POWDER, LYOPHILIZED, FOR SOLUTION INTRAVENOUS at 06:36

## 2020-01-29 RX ADMIN — Medication 250 MILLIGRAM(S): at 13:19

## 2020-01-29 RX ADMIN — CARVEDILOL PHOSPHATE 12.5 MILLIGRAM(S): 80 CAPSULE, EXTENDED RELEASE ORAL at 06:35
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
## 2020-01-29 NOTE — CONSULT NOTE ADULT - CONSULT REQUESTED DATE/TIME
01-Jan-2020 17:17
02-Jan-2020 15:39
06-Jan-2020
07-Jan-2020 09:38
07-Jan-2020 23:15
09-Jan-2020 12:00
09-Jan-2020 14:30
09-Jan-2020 22:44
22-Jan-2020 10:38
23-Jan-2020 13:43
29-Jan-2020 20:00

## 2020-01-29 NOTE — PROGRESS NOTE ADULT - PROBLEM SELECTOR PLAN 1
sick euthyroid syndrome may be present which may complicate the interpretation of thyroid function tests   continue with same   Check thyroid function tests in a few weeks as out-patient

## 2020-01-29 NOTE — CONSULT NOTE ADULT - SUBJECTIVE AND OBJECTIVE BOX
HPI: HPI: 79F with PMH of HTN, MI, CVA, GIB, anemia, and dementia presented from [facility] on ___ for SOB, found to have RSV PNA, course c/b MRSA PNA, E. faecalis UTI, multiple GIBs, multiple MICU stays, now with MRSA bacteremia. MICU consulted for hypotension and hypothermia. History collected from chart review and nursing.    Subjective: At baseline pt apparently oriented only to person. Per nursing pt's mental status has deteriorated over past 24 hours. When we saw her, pt was minimally responsive only to painful stimuli and unable to clear oral secretions.    Objective: HPI: 79F with PMH of HTN, MI, CVA, GIB, anemia, and dementia presented from [facility] on ___ for SOB, found to have RSV PNA, course c/b MRSA PNA, E. faecalis UTI, multiple GIBs, multiple MICU stays, now with MRSA bacteremia. MICU consulted for hypotension and hypothermia. History collected from chart review and nursing.    Subjective: At baseline pt apparently oriented only to person. Per nursing pt's mental status has deteriorated over past 24 hours. When we saw her, pt was minimally responsive only to painful stimuli and unable to clear oral secretions.    Objective:    Physical Exam:  Gen: Obtunded, unable to clear oral secretions, minimally responsive only to painful stimuli  Head: NCAT  Lung:  CV:  Abd:  MSK:  Neuro: Encephalopathic. No focal motor or sensory deficits  Extremities: Bilateral LE pitting edema. No digital clubbing or cyanosis.  Skin: Warm, well perfused; no rashes      Labs:               6.5    10.98 )-----------( 98       ( 29 Jan 2020 14:26 )             20.8     Auto Eosinophil # x     / Auto Eosinophil % x     / Auto Neutrophil # x     / Auto Neutrophil % x     / BANDS % x                            8.3    6.97  )-----------( 147      ( 28 Jan 2020 09:59 )             26.4     Auto Eosinophil # x     / Auto Eosinophil % x     / Auto Neutrophil # x     / Auto Neutrophil % x     / BANDS % x                            8.6    7.05  )-----------( 135      ( 28 Jan 2020 07:18 )             27.6     Auto Eosinophil # x     / Auto Eosinophil % x     / Auto Neutrophil # x     / Auto Neutrophil % x     / BANDS % x        01-29    153<H>  |  123<H>  |  62<H>  ----------------------------<  121<H>  3.0<L>   |  22  |  2.43<H>    Ca    7.5<L>      29 Jan 2020 11:59      Imaging: HPI: 79F with PMH of CAD, Decompensated HF, HTN, MI, CVA, GIB, anemia, CKD, solitary kidney, hypothyroidism and Alzheimer's dementia, CVA X2 1993, 2017 w residual left side weakness, achalasia, presented from [facility] on Jan 1 2020 for SOB, found to have RSV PNA, hospital course complicated by MRSA PNA, E. faecalis UTI, multiple GI bleeds ( gastric ulcer in fundus s/p APC/EPI), multiple MICU stays, now with MRSA bacteremia. MICU consulted for hypotension and hypothermia. History collected from chart review and nursing.    Subjective: At baseline pt apparently oriented only to person. Per nursing pt's mental status has deteriorated over past 24 hours. When we saw her, pt was minimally responsive only to painful stimuli and unable to clear oral secretions.    Objective:    Physical Exam:  Gen: Obtunded, unable to clear oral secretions, minimally responsive only to painful stimuli  Head: NCAT  Pupils: equal and reactive to light b/l  Lung: vesicular breath sounds heard bilaterally, no wheezing  CV: S1, S2, no murmur heard 	  Abd: obese, soft, non distended, non tender, bowel sounds heard   Ext: moves the right side only  Extremities: Bilateral LE pitting edema. No digital clubbing or cyanosis.  Skin: Warm, well perfused; no rashes      Labs:               6.5    10.98 )-----------( 98       ( 29 Jan 2020 14:26 )             20.8     Auto Eosinophil # x     / Auto Eosinophil % x     / Auto Neutrophil # x     / Auto Neutrophil % x     / BANDS % x                            8.3    6.97  )-----------( 147      ( 28 Jan 2020 09:59 )             26.4     Auto Eosinophil # x     / Auto Eosinophil % x     / Auto Neutrophil # x     / Auto Neutrophil % x     / BANDS % x                            8.6    7.05  )-----------( 135      ( 28 Jan 2020 07:18 )             27.6     Auto Eosinophil # x     / Auto Eosinophil % x     / Auto Neutrophil # x     / Auto Neutrophil % x     / BANDS % x        01-29    153<H>  |  123<H>  |  62<H>  ----------------------------<  121<H>  3.0<L>   |  22  |  2.43<H>    Ca    7.5<L>      29 Jan 2020 11:59

## 2020-01-29 NOTE — CONSULT NOTE ADULT - ASSESSMENT
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 faecalis UTI, multiple GI bleeds (gastric ulcer in fundus s/p APC/EPI), now with MRSA bacteremia. MICU consulted for hypotension 78/46 and hypothermic 92.7. Hgb was 6.5. Patient is obtunded but protecting airway. Patient is DNR but not DNI. Patient transferred to ICU for acute blood loss anemia, Hypotension, closer observation of airway and mental status.       Neuro:  Neuro checks q 4 hour   Pain control   Brain CT ordered by primary team    CV:   Continue hemodynamic monitoring   Transfuse to HGB of 7  IVF bolus as needed   Sono IVC for fluid responsiveness   Goal MAP > 65   Carvedilol with holding parameters      Pulmonary:   ABG   Monitor airway closely, may need intubation   Duonebs as needed  Continue Pulmicort  On nasal cannula, Wean down FIO2 as tolerated   Chest PT and nasal tracheal suctioning q 4 hrs and as needed    Aspiration precautions  HOB elevation 30 degrees    GI:   GI prophylaxis with pantoprazole BID  Continue sucralfate  NPO  Will transfuse 2 units of RBCs    Endo:   Monitor fingersticks   Glycemic control with insulin sliding scale   Started on Hydrocortisone 100 q 8 hrs  Continue Synthroid    Renal:   Stevenson in place in a critical ill pt for accurate urinary monitoring   I and O hourly   Monitor electrolytes  Correct electrolyte derangements  Renally dose medications  Nephrology on board   Will dose lasix 80mg between the 2 units of PRBC    ID:   Patient on zosyn and Vanco by level  Monitor vanco trough level   Maintain normothermia   Warming blanket    Heme:   Repeat labs after transfusion   Trend CBC q 6 hrs   DVT prophylaxis with SCD only   No evidence of bleeding, will continue to monitor    Other/Disposition:  Will admit to ICU   Bed rest  Daughter updated via phone  Patient is critically ill   Code Status: DNR but not DNI     Case discussed with Attending

## 2020-01-29 NOTE — PROGRESS NOTE ADULT - SUBJECTIVE AND OBJECTIVE BOX
[facility], [facility]  NEPHROLOGY AND HYPERTENSION  [address]  [address]  [place] [address]  [phone]    MD MART LORD MD ANDREY GONCHARUK, MD MADHU KORRAPATI, MD YELENA ROSENBERG, MD ISABELLA SLADE, MD ALAYNA WALLACE MD          Patient poorly responsive;       MEDICATIONS  (STANDING):  buDESOnide    Inhalation Suspension 0.5 milliGRAM(s) Inhalation every 12 hours  carbamide peroxide Otic Solution 1 Drop(s) Both Ears two times a day  carvedilol 12.5 milliGRAM(s) Oral every 12 hours  levothyroxine Injectable 44 MICROGram(s) IV Push at bedtime  pantoprazole  Injectable 40 milliGRAM(s) IV Push two times a day  piperacillin/tazobactam IVPB.. 3.375 Gram(s) IV Intermittent every 12 hours  sodium chloride 0.65% Nasal 1 Spray(s) Both Nostrils three times a day  sucralfate 1 Gram(s) Oral four times a day    MEDICATIONS  (PRN):  acetaminophen   Tablet .. 650 milliGRAM(s) Oral every 6 hours PRN Mild Pain (1 - 3)  acetaminophen   Tablet .. 650 milliGRAM(s) Oral every 6 hours PRN Mild Pain (1 - 3)  albuterol/ipratropium for Nebulization 3 milliLiter(s) Nebulizer every 6 hours PRN Shortness of Breath and/or Wheezing  hydrALAZINE Injectable 10 milliGRAM(s) IV Push every 6 hours PRN HTN  HYDROmorphone  Injectable 0.5 milliGRAM(s) IV Push every 4 hours PRN mod pain/dyspnea  ondansetron Injectable 4 milliGRAM(s) IV Push every 6 hours PRN Nausea and/or Vomiting      01-28-20 @ 07:01  -  01-29-20 @ 07:00  --------------------------------------------------------  IN: 2500 mL / OUT: 0 mL / NET: 2500 mL      PHYSICAL EXAM:      T(C): 33.7 (01-29-20 @ 18:09), Max: 36.4 (01-29-20 @ 00:14)  HR: 76 (01-29-20 @ 18:09) (61 - 88)  BP: 78/46 (01-29-20 @ 18:09) (70/44 - 166/55)  RR: 18 (01-29-20 @ 18:09) (16 - 21)  SpO2: 100% (01-29-20 @ 18:09) (96% - 100%)  Wt(kg): --  Respiratory: rhonchi anteriorly, decreased BS at bases  Cardiovascular: S1 S2  Gastrointestinal: soft NT ND +BS  Extremities:  2 edema                                    6.5    10.98 )-----------( 98       ( 29 Jan 2020 14:26 )             20.8     01-29    153<H>  |  123<H>  |  62<H>  ----------------------------<  121<H>  3.0<L>   |  22  |  2.43<H>    Ca    7.5<L>      29 Jan 2020 11:59          Creatinine Trend: 2.43<--, 2.60<--, 2.74<--, 3.35<--, 0.73<--, 3.41<--    Assessment   Solitary kidney, cannot exclude significant EL contributing,  Acute on chronic anemia, GI bleed  Cresencio Cr 1.8 12/2019; suspected ischemic ATN; underlying renovascular disease;   CTD/ Vasculitic serologies negative  Hx of POEM? + paraprotein noted      Plan  D/C IVF;   PRBC  Judicious K repletion  Would defer prospect of aggressive work up or HD support as overall prognosis poor.        Obed Jamison MD

## 2020-01-29 NOTE — PROGRESS NOTE ADULT - SUBJECTIVE AND OBJECTIVE BOX
INTERVAL HPI:   79 year female with HTN, HLD, MI hx, CVA, Gi bleed, Anemia Depression and Alzheimer's dementia.  Sent from [facility] due to SOB. Possibility of pneumonia vs CHF was raised.   In ED with Respiratory distress required BIPAP support and RVP positive for RSV. Pt not able to provide more details due to dementia. Information from transfer papers and ED physician.  Nods no when asked for smoking.  01/09/20: With massive GI bleed, intubated and transferred to ICU.  01/09/20:  EGD+ Colonoscopy.  01/15/20:  Extubated.  01/16/20:  Out of ICU  01/18/20:  Back to ICU for upper GI bleed and significant drop in H & H.  01/21/20:  EGD, acute gastric ulcer.  01/22/20:  Out of ICU.  01/27/20:  EGD    OVERNIGHT EVENTS:  Lethargic, low BP, hypothermic    Vital Signs Last 24 Hrs  T(C): 33.7 (29 Jan 2020 18:09), Max: 36.4 (29 Jan 2020 00:14)  T(F): 92.7 (29 Jan 2020 18:09), Max: 97.5 (29 Jan 2020 00:14)  HR: 76 (29 Jan 2020 18:09) (61 - 88)  BP: 78/46 (29 Jan 2020 18:09) (70/44 - 166/55)  BP(mean): --  RR: 18 (29 Jan 2020 18:09) (16 - 21)  SpO2: 100% (29 Jan 2020 18:09) (96% - 100%)    PHYSICAL EXAM:  GEN:        Lethargic.  HEENT:    Normal.    RESP:        no distress  CVS:           Regular rate and rhythm.   ABD:         Soft, non-tender, non-distended;     MEDICATIONS  (STANDING):  buDESOnide    Inhalation Suspension 0.5 milliGRAM(s) Inhalation every 12 hours  carbamide peroxide Otic Solution 1 Drop(s) Both Ears two times a day  carvedilol 12.5 milliGRAM(s) Oral every 12 hours  dextrose 5% + sodium chloride 0.9%. 1000 milliLiter(s) (100 mL/Hr) IV Continuous <Continuous>  lactated ringers. 1000 milliLiter(s) (50 mL/Hr) IV Continuous <Continuous>  levothyroxine Injectable 44 MICROGram(s) IV Push at bedtime  pantoprazole  Injectable 40 milliGRAM(s) IV Push two times a day  piperacillin/tazobactam IVPB.. 3.375 Gram(s) IV Intermittent every 12 hours  sodium chloride 0.65% Nasal 1 Spray(s) Both Nostrils three times a day  sucralfate 1 Gram(s) Oral four times a day    MEDICATIONS  (PRN):  acetaminophen   Tablet .. 650 milliGRAM(s) Oral every 6 hours PRN Mild Pain (1 - 3)  acetaminophen   Tablet .. 650 milliGRAM(s) Oral every 6 hours PRN Mild Pain (1 - 3)  albuterol/ipratropium for Nebulization 3 milliLiter(s) Nebulizer every 6 hours PRN Shortness of Breath and/or Wheezing  hydrALAZINE Injectable 10 milliGRAM(s) IV Push every 6 hours PRN HTN  HYDROmorphone  Injectable 0.5 milliGRAM(s) IV Push every 4 hours PRN mod pain/dyspnea  ondansetron Injectable 4 milliGRAM(s) IV Push every 6 hours PRN Nausea and/or Vomiting    LABS:                        6.5    10.98 )-----------( 98       ( 29 Jan 2020 14:26 )             20.8     01-29    153<H>  |  123<H>  |  62<H>  ----------------------------<  121<H>  3.0<L>   |  22  |  2.43<H>    Ca    7.5<L>      29 Jan 2020 11:59    ASSESSMENT AND PLAN:  ·	S/P Acute hypoxic  Respiratory failure .  ·	RSV tracheobronchitis.  ·	Left pleural effusion.  ·	Hypothyroidism with very high TSH.  ·	Anemia.  ·	VRE UTI  ·	Leukocytosis.  ·	Renal Insuffiencey.  ·	CVA by history.  ·	HTN.  ·	HLD.  ·	Alzheimer's dementia.  ·	GI bleed.  ·	MRSA Bacteremia.  ·	Hypotension.  ·	Hypothermia.    Received IVF bolus.  To receive PRBC.  Additional 500 ml bolus.  One dose of Vancomycin. INTERVAL HPI:   79 year female with HTN, HLD, MI hx, CVA, Gi bleed, Anemia Depression and Alzheimer's dementia.  Sent from [facility] due to SOB. Possibility of pneumonia vs CHF was raised.   In ED with Respiratory distress required BIPAP support and RVP positive for RSV. Pt not able to provide more details due to dementia. Information from transfer papers and ED physician.  Nods no when asked for smoking.  01/09/20: With massive GI bleed, intubated and transferred to ICU.  01/09/20:  EGD+ Colonoscopy.  01/15/20:  Extubated.  01/16/20:  Out of ICU  01/18/20:  Back to ICU for upper GI bleed and significant drop in H & H.  01/21/20:  EGD, acute gastric ulcer.  01/22/20:  Out of ICU.  01/27/20:  EGD    OVERNIGHT EVENTS:  Lethargic, low BP, hypothermic    Vital Signs Last 24 Hrs  T(C): 33.7 (29 Jan 2020 18:09), Max: 36.4 (29 Jan 2020 00:14)  T(F): 92.7 (29 Jan 2020 18:09), Max: 97.5 (29 Jan 2020 00:14)  HR: 76 (29 Jan 2020 18:09) (61 - 88)  BP: 78/46 (29 Jan 2020 18:09) (70/44 - 166/55)  BP(mean): --  RR: 18 (29 Jan 2020 18:09) (16 - 21)  SpO2: 100% (29 Jan 2020 18:09) (96% - 100%)    PHYSICAL EXAM:  GEN:        Lethargic.  HEENT:    Normal.    RESP:        no distress  CVS:           Regular rate and rhythm.   ABD:         Soft, non-tender, non-distended;     MEDICATIONS  (STANDING):  buDESOnide    Inhalation Suspension 0.5 milliGRAM(s) Inhalation every 12 hours  carbamide peroxide Otic Solution 1 Drop(s) Both Ears two times a day  carvedilol 12.5 milliGRAM(s) Oral every 12 hours  dextrose 5% + sodium chloride 0.9%. 1000 milliLiter(s) (100 mL/Hr) IV Continuous <Continuous>  lactated ringers. 1000 milliLiter(s) (50 mL/Hr) IV Continuous <Continuous>  levothyroxine Injectable 44 MICROGram(s) IV Push at bedtime  pantoprazole  Injectable 40 milliGRAM(s) IV Push two times a day  piperacillin/tazobactam IVPB.. 3.375 Gram(s) IV Intermittent every 12 hours  sodium chloride 0.65% Nasal 1 Spray(s) Both Nostrils three times a day  sucralfate 1 Gram(s) Oral four times a day    MEDICATIONS  (PRN):  acetaminophen   Tablet .. 650 milliGRAM(s) Oral every 6 hours PRN Mild Pain (1 - 3)  acetaminophen   Tablet .. 650 milliGRAM(s) Oral every 6 hours PRN Mild Pain (1 - 3)  albuterol/ipratropium for Nebulization 3 milliLiter(s) Nebulizer every 6 hours PRN Shortness of Breath and/or Wheezing  hydrALAZINE Injectable 10 milliGRAM(s) IV Push every 6 hours PRN HTN  HYDROmorphone  Injectable 0.5 milliGRAM(s) IV Push every 4 hours PRN mod pain/dyspnea  ondansetron Injectable 4 milliGRAM(s) IV Push every 6 hours PRN Nausea and/or Vomiting    LABS:                        6.5    10.98 )-----------( 98       ( 29 Jan 2020 14:26 )             20.8     01-29    153<H>  |  123<H>  |  62<H>  ----------------------------<  121<H>  3.0<L>   |  22  |  2.43<H>    Ca    7.5<L>      29 Jan 2020 11:59    ASSESSMENT AND PLAN:  ·	S/P Acute hypoxic  Respiratory failure .  ·	RSV tracheobronchitis.  ·	Left pleural effusion.  ·	Hypothyroidism with very high TSH.  ·	Anemia.  ·	VRE UTI  ·	Leukocytosis.  ·	Renal Insuffiencey.  ·	CVA by history.  ·	HTN.  ·	HLD.  ·	Alzheimer's dementia.  ·	GI bleed.  ·	MRSA Bacteremia.  ·	Hypotension.  ·	Hypothermia.    Received IVF bolus.  To receive PRBC, can be given fast.  Additional 500 ml bolus.  One dose of Vancomycin given earlier today.

## 2020-01-29 NOTE — PROGRESS NOTE ADULT - ASSESSMENT
HPI:  · HPI Objective Statement: 79 years old female from [facility] rehab c/o coughing sob for three to four days. Pt is sent here to rule out chf vs pneumonia. Pt is alert and oriented to person only unable to give detail hx due to hx of Alzheimer according to the [facility] pt had sat of 91 to 92 %,	  ---------------- As Above ---------------------------------------------  Patient is a poor historian. Patient admitted with Bronchitis and UTI. Called for hematemsis. Patient had three episodes of bloody vomitus. ( 1/3 of a cup ) . Patient complaining of mid epigastric / chest pain but patient can not characterize the pain.   Patient has chronic anemia. but HGB fell 8.4 --> 7.2. Was on Carafate, now on IV PPI  KUB pending  HX of CRI    Addendum : Patient had another episode of bloody vomitus    Upper GI Bleed - Secondary to (?). 1) NPO  2)Check KUB  3) IV PPI 4) telemetry  5) NGT 6) EGD 7) Hold Carafate  8) Hold iron  ---Will speak with daughter.

## 2020-01-29 NOTE — PROGRESS NOTE ADULT - PROBLEM SELECTOR PLAN 1
On telemetry / 1B, HGB 8.6 --> 8.3 --> pending , Repeat EGD gastric ulcer in Fundus s/p APC / EPI yesterday.   On IV PPI .Carafate

## 2020-01-29 NOTE — CONSULT NOTE ADULT - CONSULT REASON
Anemia
GI Bleed
GOC
JONY
Multiple episodes of hematemasis
Respiratory distress.
Sepsis/ RSV / VRE UTI
Upper GI Bleed
hypothyroidism
shortness of breath
Low BP

## 2020-01-29 NOTE — PROGRESS NOTE ADULT - SUBJECTIVE AND OBJECTIVE BOX
Patient is a 79y old  Female who presents with a chief complaint of sob cough (29 Jan 2020 09:48)      HPI:  · HPI Objective Statement: 79 years old female from [facility] rehab c/o coughing sob for three to four days. Pt is sent here to rule out chf vs pneumonia. Pt is alert and oriented to person only unable to give detail hx due to hx of Alzheimer according to the [facility] pt had sat of 91 to 92 %,	    PAST MEDICAL/SURGICAL/FAMILY/SOCIAL HISTORY:    Past Medical History:  Alzheimers disease    CVA (cerebral vascular accident)    Depression    HTN (hypertension)    MI, old.  recently Dx c hypothyroidism, TSH > 111 started Levothyroxin 50mcg qd as per Dr Vigil (02 Jan 2020 11:41)      INTERVAL HPI/OVERNIGHT EVENTS:  Uncommunicative. Nurse states that since she had the coffee ground emesis yesterday documented in my note, the patient has no further N/V. No MHH. See KUB from yesterday    MEDICATIONS  (STANDING):  buDESOnide    Inhalation Suspension 0.5 milliGRAM(s) Inhalation every 12 hours  carbamide peroxide Otic Solution 1 Drop(s) Both Ears two times a day  carvedilol 12.5 milliGRAM(s) Oral every 12 hours  dextrose 5% + sodium chloride 0.9%. 1000 milliLiter(s) (100 mL/Hr) IV Continuous <Continuous>  lactated ringers. 1000 milliLiter(s) (50 mL/Hr) IV Continuous <Continuous>  levothyroxine Injectable 44 MICROGram(s) IV Push at bedtime  pantoprazole  Injectable 40 milliGRAM(s) IV Push two times a day  piperacillin/tazobactam IVPB.. 3.375 Gram(s) IV Intermittent every 12 hours  sodium chloride 0.65% Nasal 1 Spray(s) Both Nostrils three times a day    MEDICATIONS  (PRN):  acetaminophen   Tablet .. 650 milliGRAM(s) Oral every 6 hours PRN Mild Pain (1 - 3)  acetaminophen   Tablet .. 650 milliGRAM(s) Oral every 6 hours PRN Mild Pain (1 - 3)  albuterol/ipratropium for Nebulization 3 milliLiter(s) Nebulizer every 6 hours PRN Shortness of Breath and/or Wheezing  hydrALAZINE Injectable 10 milliGRAM(s) IV Push every 6 hours PRN HTN  HYDROmorphone  Injectable 0.5 milliGRAM(s) IV Push every 4 hours PRN mod pain/dyspnea  ondansetron Injectable 4 milliGRAM(s) IV Push every 6 hours PRN Nausea and/or Vomiting      FAMILY HISTORY:  Family history of essential hypertension (Child)  Family history of stroke      Allergies    No Known Allergies    Intolerances        PMH/PSH:  Hypothyroidism  Constipation  Iron deficiency anemia  Major depressive disorder  Hyperlipidemia  GI bleed  Depression  Alzheimers disease  MI, old  CVA (cerebral vascular accident)  HTN (hypertension)  No pertinent past medical history  No significant past surgical history        REVIEW OF SYSTEMS: Uncommunicative    CONSTITUTIONAL: No fever, weight loss,  EYES: No eye pain, visual disturbances, or discharge  ENMT:  No difficulty hearing, tinnitus, vertigo; No sinus or throat pain  NECK: No pain or stiffness  BREASTS: No pain, masses, or nipple discharge  RESPIRATORY: No cough, wheezing, chills or hemoptysis; No shortness of breath  CARDIOVASCULAR: No chest pain, palpitations, dizziness, or leg swelling  GASTROINTESTINAL: See above  GENITOURINARY: No dysuria, frequency, hematuria, or incontinence  NEUROLOGICAL: No headaches, , numbness, or tremors  SKIN: No itching, burning, rashes, or lesions   LYMPH NODES: No enlarged glands  ENDOCRINE: No heat or cold intolerance; No hair loss  MUSCULOSKELETAL: No joint pain or swelling; No muscle, back, or extremity pain  PSYCHIATRIC: No depression, anxiety, mood swings, or difficulty sleeping  HEME/LYMPH: No easy bruising, or bleeding gums  ALLERGY AND IMMUNOLOGIC: No hives or eczema    Vital Signs Last 24 Hrs  T(C): 35 (29 Jan 2020 10:00), Max: 36.4 (29 Jan 2020 00:14)  T(F): 95 (29 Jan 2020 10:00), Max: 97.5 (29 Jan 2020 00:14)  HR: 61 (29 Jan 2020 10:00) (61 - 88)  BP: 108/89 (29 Jan 2020 10:00) (108/89 - 198/69)  BP(mean): --  RR: 16 (29 Jan 2020 10:00) (16 - 21)  SpO2: 99% (29 Jan 2020 10:00) (96% - 99%)    PHYSICAL EXAM:  GENERAL: NAD, well-groomed, well-developed  HEAD:  Atraumatic, Normocephalic  EYES: EOMI, PERRLA, conjunctiva and sclera clear  NECK: Supple, No JVD, Normal thyroid  NERVOUS SYSTEM:  Uncommunicative  CHEST/LUNG: Clear to percussion bilaterally; No rales, rhonchi, wheezing, or rubs  HEART: Regular rate and rhythm; No murmurs, rubs, or gallops  ABDOMEN: Soft, Nontender, Nondistended; Bowel sounds present  EXTREMITIES:  2+ Peripheral Pulses, No clubbing, cyanosis, or edema  LYMPH: No lymphadenopathy noted  SKIN: No rashes or lesions    LAB                          8.3    6.97  )-----------( 147      ( 28 Jan 2020 09:59 )             26.4       CBC:  01-28 @ 09:59  WBC 6.97   Hgb 8.3   Hct 26.4   Plts 147  MCV 86.8  01-28 @ 07:18  WBC 7.05   Hgb 8.6   Hct 27.6   Plts 135  MCV 87.6  01-27 @ 18:14  WBC 6.70   Hgb 9.2   Hct 29.4   Plts 121  MCV 87.2  01-27 @ 09:27  WBC 9.44   Hgb 9.5   Hct 29.9   Plts 153  MCV 87.2  01-27 @ 00:59  WBC 8.22   Hgb 10.2   Hct 32.6   Plts 147  MCV 88.8      Chemistry:  01-27 @ 08:16  Na+ 145  K+ 3.6  Cl- 116  CO2 20  BUN 64  Cr 2.60     01-27 @ 00:59  Na+ 144  K+ 3.8  Cl- 116  CO2 20  BUN 63  Cr 2.74         Glucose, Serum: 113 mg/dL (01-27 @ 08:16)  Glucose, Serum: 125 mg/dL (01-27 @ 00:59)      27 Jan 2020 08:16    145    |  116    |  64     ----------------------------<  113    3.6     |  20     |  2.60   27 Jan 2020 00:59    144    |  116    |  63     ----------------------------<  125    3.8     |  20     |  2.74     Ca    8.1        27 Jan 2020 08:16  Ca    8.0        27 Jan 2020 00:59                CAPILLARY BLOOD GLUCOSE              RADIOLOGY & ADDITIONAL TESTS:    Imaging Personally Reviewed:  [ ] YES  [ ] NO    Consultant(s) Notes Reviewed:  [ ] YES  [ ] NO    Care Discussed with Consultants/Other Providers [ ] YES  [ ] NO

## 2020-01-29 NOTE — PROGRESS NOTE ADULT - SUBJECTIVE AND OBJECTIVE BOX
Patient is a 79y old  Female who presents with a chief complaint of sob cough (29 Jan 2020 19:37)      Interval History: seen earlier   on low dose levothyroxine - 44 mcg IV = 88 mcg PO equivalent   euthyroid ?    MEDICATIONS  (STANDING):  buDESOnide    Inhalation Suspension 0.5 milliGRAM(s) Inhalation every 12 hours  carbamide peroxide Otic Solution 1 Drop(s) Both Ears two times a day  carvedilol 12.5 milliGRAM(s) Oral every 12 hours  levothyroxine Injectable 44 MICROGram(s) IV Push at bedtime  pantoprazole  Injectable 40 milliGRAM(s) IV Push two times a day  piperacillin/tazobactam IVPB.. 3.375 Gram(s) IV Intermittent every 12 hours  sodium chloride 0.65% Nasal 1 Spray(s) Both Nostrils three times a day  sucralfate 1 Gram(s) Oral four times a day    MEDICATIONS  (PRN):  acetaminophen   Tablet .. 650 milliGRAM(s) Oral every 6 hours PRN Mild Pain (1 - 3)  acetaminophen   Tablet .. 650 milliGRAM(s) Oral every 6 hours PRN Mild Pain (1 - 3)  albuterol/ipratropium for Nebulization 3 milliLiter(s) Nebulizer every 6 hours PRN Shortness of Breath and/or Wheezing  HYDROmorphone  Injectable 0.5 milliGRAM(s) IV Push every 4 hours PRN mod pain/dyspnea  ondansetron Injectable 4 milliGRAM(s) IV Push every 6 hours PRN Nausea and/or Vomiting      Allergies    No Known Allergies    Intolerances        REVIEW OF SYSTEMS:  CONSTITUTIONAL: no changes    Vital Signs Last 24 Hrs  T(C): 33.7 (29 Jan 2020 18:09), Max: 36.4 (29 Jan 2020 00:14)  T(F): 92.7 (29 Jan 2020 18:09), Max: 97.5 (29 Jan 2020 00:14)  HR: 76 (29 Jan 2020 18:09) (61 - 88)  BP: 78/46 (29 Jan 2020 18:09) (70/44 - 166/55)  BP(mean): --  RR: 18 (29 Jan 2020 18:09) (16 - 21)  SpO2: 100% (29 Jan 2020 18:09) (96% - 100%)    PHYSICAL EXAM:  GENERAL:  seems clinically euthyroid  HEAD: Atraumatic, Normocephalic  EYES: PERRLA, conjunctiva and sclera clear  ENMT: No tonsillar erythema, exudates, or enlargement; Moist mucous membranes, Good dentition, No lesions  NECK: Supple, No JVD, Normal thyroid  LABS:        CAPILLARY BLOOD GLUCOSE        Lipid panel:           Thyroid:  Diabetes Tests:  Parathyroid Panel:  Adrenals:  RADIOLOGY & ADDITIONAL TESTS:    Imaging Personally Reviewed:  [ ] YES  [ ] NO    Consultant(s) Notes Reviewed:  [ ] YES  [ ] NO    Care Discussed with Consultants/Other Providers [ ] YES  [ ] NO

## 2020-01-29 NOTE — PROGRESS NOTE ADULT - SUBJECTIVE AND OBJECTIVE BOX
lying in bed    Vital Signs Last 24 Hrs  T(C): 35.8 (29 Jan 2020 05:06), Max: 36.4 (29 Jan 2020 00:14)  T(F): 96.5 (29 Jan 2020 05:06), Max: 97.5 (29 Jan 2020 00:14)  HR: 75 (29 Jan 2020 07:02) (71 - 88)  BP: 164/43 (29 Jan 2020 05:06) (111/64 - 198/69)  BP(mean): --  RR: 19 (29 Jan 2020 05:06) (16 - 21)  SpO2: 97% (29 Jan 2020 07:02) (96% - 98%)    PHYSICAL EXAM:    general - dementia  HEENT - No Icterus  CVS - RRR  RS - AE B/L  Abd - soft, NT  Ext - Pulses +        LABS:                        8.3    6.97  )-----------( 147      ( 28 Jan 2020 09:59 )             26.4                   RADIOLOGY & ADDITIONAL STUDIES:

## 2020-01-29 NOTE — PROGRESS NOTE ADULT - SUBJECTIVE AND OBJECTIVE BOX
79 years old female from [facility] rehab c/o coughing sob for three to four days. Pt is sent here to rule out chf vs pneumonia. Pt is alert and oriented to person only unable to give detail hx due to hx of Alzheimer according to the [facility] pt had sat of 91 to 92 %,	    PAST MEDICAL/SURGICAL/FAMILY/SOCIAL HISTORY:    Past Medical History:  Alzheimers disease    CVA (cerebral vascular accident)    Depression    HTN (hypertension)    MI, old.  recently Dx c hypothyroidism, TSH > 111 started Levothyroxin 50mcg qd as per Dr Vigil (2020 11:41)      Chief Complaint:  Patient is a 79y old  Female who presents with a chief complaint of sob cough (2020 22:56)      Review of Systems:    General:  No wt loss, fevers, chills, night sweats  Eyes:  Good vision, no reported pain  ENT:  No sore throat, pain, runny nose, dysphagia  CV:  No pain, palpitations, hypo/hypertension  Resp:  dyspnea, cough  GI:  No pain, nausea, vomiting, diarrhea, constipation           Social History/Family History  SOCHX:   tobacco,  -  alcohol    FMHX: FA/MO  - contributory       Discussed with:  PMD, Family    Physical Exam:    Vital Signs:  Vital Signs Last 24 Hrs  T(C): 36.2 (2020 17:06), Max: 36.9 (2020 00:18)  T(F): 97.2 (2020 17:06), Max: 98.5 (2020 00:18)  HR: 74 (2020 21:55) (68 - 87)  BP: 179/78 (2020 17:06) (132/71 - 179/78)  BP(mean): --  RR: 18 (2020 17:06) (18 - 18)  SpO2: 97% (2020 21:55) (94% - 98%)  Daily     Daily Weight in k.8 (2020 04:59)  I&O's Summary    2020 07:01  -  2020 07:00  --------------------------------------------------------  IN: 560 mL / OUT: 0 mL / NET: 560 mL          Chest:  fair air entry bilateral  Cardiovascular:  Regular rhythm, S1, S2, no murmur/rub/S3/S4, no carotid/femoral/abdominal bruit, radial/pedal pulses 2+,   Abdomen:  Soft, non-tender, non-distended, normoactive bowel sounds, no HSM        Laboratory:          138  |  104  |  69<H>  ----------------------------<  111<H>  3.8   |  25  |  3.77<H>    Ca    8.1<L>      2020 07:22          Assessment:  I am asked to assist this patient admitted with shortness of breath  The patient has multifactorial causes of shortness of breath, renal insufficiency, anemia  And now after a diagnostic echocardiogram within normal ejection fraction the reading shows mitral regurgitation and tricuspid regurgitation  After review of the studies this regurgitation is not considered severe but in the moderate to severe but more moderate range  upper GI bleed  GI noted,  EGD   optimize systolic blood pressure  PRBCs  EGD noted with gastric ulcer, GI treatment continues  Bloody BM yesterday

## 2020-01-29 NOTE — CONSULT NOTE ADULT - PROVIDER SPECIALTY LIST ADULT
MICU
Pulmonology
Cardiology
Critical Care
Infectious Disease
Nephrology
Palliative Care
Gastroenterology
Endocrinology
Heme/Onc
Surgery

## 2020-01-29 NOTE — PROGRESS NOTE ADULT - SUBJECTIVE AND OBJECTIVE BOX
TMAX - 93.9 - 96.5    On day # 2 Zosyn now    Vital Signs Last 24 Hrs  T(C): 35 (29 Jan 2020 10:00), Max: 36.4 (29 Jan 2020 00:14)  T(F): 95 (29 Jan 2020 10:00), Max: 97.5 (29 Jan 2020 00:14)  HR: 61 (29 Jan 2020 10:00) (61 - 88)  BP: 108/89 (29 Jan 2020 10:00) (108/89 - 198/69)  BP(mean): --  RR: 16 (29 Jan 2020 10:00) (16 - 21)  SpO2: 99% (29 Jan 2020 10:00) (96% - 99%)  Supplemental O2:  on NC O2 now      Patient developed hypothermia yesterday and reported with vomiting/ coughing of coffee ground appearing material.  Patient is s/p repeat EGD on 1/27 with findings of an Acute Bleeding Gastric Ulcer and had Argon Plasma Coagulation.      PHYSICAL EXAM  General: lethargic lying in bed with NC O2 in place with audible congestion noted and with warming blanket in place now   HEENT:  conj pink, sclerae anicteric, no conjunctival hemorrhages seen, PERRLA, no oral lesions noted  Neck:  supple, no nodes noted  Heart:  RR  Lungs: bilateral moist rhonchi throughout   Abdomen:  soft, BS+, appears nontender  Back: no CVA or Spinal tenderness noted  Extremities:  LUE with moderate swelling                    RUE with some ecchymoses noted and mild swelling                    1+ edema LE's                     no splinter hemorrhages of the fingernail or toenail beds noted                      no palmar or plantar lesions noted  Skin:  warm, dry, no rash  noted      I&O's Summary :    28 Jan 2020 07:01  -  29 Jan 2020 07:00  --------------------------------------------------------  IN: 2500 mL / OUT: 0 mL / NET: 2500 mL        LABS:  CBC Full  -  ( 28 Jan 2020 09:59 )  WBC Count : 6.97 K/uL  RBC Count : 3.04 M/uL  Hemoglobin : 8.3 g/dL  Hematocrit : 26.4 %  Platelet Count - Automated : 147 K/uL  Mean Cell Volume : 86.8 fl  Mean Cell Hemoglobin : 27.3 pg  Mean Cell Hemoglobin Concentration : 31.4 gm/dL  Auto Neutrophil # : x  Auto Lymphocyte # : x  Auto Monocyte # : x  Auto Eosinophil # : x  Auto Basophil # : x  Auto Neutrophil % : x  Auto Lymphocyte % : x  Auto Monocyte % : x  Auto Eosinophil % : x  Auto Basophil % : x    01-29    153<H>  |  123<H>  |  62<H>  ----------------------------<  121<H>  3.0<L>   |  22  |  2.43<H>    Ca    7.5<L>      29 Jan 2020 11:59            MICROBIOLOGY:    Specimen Source: .Blood Blood (01-29 @ 02:05)  Culture Results:   Growth in aerobic bottle:  Gram Positive Cocci in Clusters  Growth in anaerobic bottle:  Gram Positive Cocci in Clusters  -  Methicillin resistant Staphylococcus aureus (MRSA): Detec (01.29.20 @ 02:05)        Specimen Source: .Blood Blood (01-29 @ 02:05)  Culture Results:   Growth in aerobic bottle: Gram Positive Cocci in Clusters  Growth in anaerobic bottle:  Gram Positive Cocci in Clusters (01-29 @ 02:05)              Radiology:  CXR - < from: Xray Chest 1 View- PORTABLE-Urgent (01.28.20 @ 18:25) >    EXAM:  XR CHEST PORTABLE URGENT 1V                          PROCEDURE DATE:  01/28/2020      INTERPRETATION:  History: Emesis    Portable AP semierect chest x-ray is compared to 1/18/2020.    The NG tube has been removed. The trachea is midline. The heart is again enlarged. There is new infiltrate in the right mid to lower lung. There is persistent opacity at the left lung base. There is osteopenia of the bony structures.    Impression: Suggestion of new extensive right-sided infiltrate  NG tube has been removed                    < from: Xray Kidney Ureter Bladder (01.28.20 @ 10:16) >  EXAM:  XR KUB 1 VIEW                          PROCEDURE DATE:  01/28/2020      INTERPRETATION:  AP supine abdominal film study. Patient has cough and ground emesis.    There is mild distention of mid colonic loops without evidence of obstruction.    No gross organomegaly is seen. Bones are grossly intact.    IMPRESSION: As above.                                                       < from: CT Head No Cont (01.26.20 @ 13:57) >    EXAM:  CT BRAIN                          PROCEDURE DATE:  01/26/2020      INTERPRETATION:  CLINICAL INFORMATION: as per daughter hearing declined after intubation. Lethargy.    TECHNIQUE: Multiple axial CT images of the calvarium and brain were obtained without contrast.     COMPARISON: No prior studies are available for comparison at this institution.    FINDINGS:     Large area of right middle cerebral territory encephalomalacia consistent with an large old infarct.   There is no mass effect, intracranial hemorrhage, or midline shift.    There is no CT evidence of acute territorial infarct.    The ventricles and sulci are appropriate in size and configuration for patient's stated age.    The imaged portions of the paranasal sinuses and mastoids are well aerated.     There is no osseous abnormality.    IMPRESSION:    Large area of right middle cerebral territory encephalomalacia consistent with an large old infarct.   There is no mass effect, intracranial hemorrhage, or midline shift.                Impression:    Suggestions:

## 2020-01-29 NOTE — PROGRESS NOTE ADULT - SUBJECTIVE AND OBJECTIVE BOX
Patient is a 79y old  Female who presents with a chief complaint of sob cough (29 Jan 2020 10:26)      INTERVAL HPI/OVERNIGHT EVENTS:  yesterday cough c coffee ground material, in the afternoon became hypothermal, Discuss[facility] c Dr Rojas, [facility]  Under warming blanket lethargic  MEDICATIONS  (STANDING):  buDESOnide    Inhalation Suspension 0.5 milliGRAM(s) Inhalation every 12 hours  carbamide peroxide Otic Solution 1 Drop(s) Both Ears two times a day  carvedilol 12.5 milliGRAM(s) Oral every 12 hours  dextrose 5% + sodium chloride 0.9%. 1000 milliLiter(s) (100 mL/Hr) IV Continuous <Continuous>  lactated ringers. 1000 milliLiter(s) (50 mL/Hr) IV Continuous <Continuous>  levothyroxine Injectable 44 MICROGram(s) IV Push at bedtime  pantoprazole  Injectable 40 milliGRAM(s) IV Push two times a day  piperacillin/tazobactam IVPB.. 3.375 Gram(s) IV Intermittent every 12 hours  sodium chloride 0.65% Nasal 1 Spray(s) Both Nostrils three times a day    MEDICATIONS  (PRN):  acetaminophen   Tablet .. 650 milliGRAM(s) Oral every 6 hours PRN Mild Pain (1 - 3)  acetaminophen   Tablet .. 650 milliGRAM(s) Oral every 6 hours PRN Mild Pain (1 - 3)  albuterol/ipratropium for Nebulization 3 milliLiter(s) Nebulizer every 6 hours PRN Shortness of Breath and/or Wheezing  hydrALAZINE Injectable 10 milliGRAM(s) IV Push every 6 hours PRN HTN  HYDROmorphone  Injectable 0.5 milliGRAM(s) IV Push every 4 hours PRN mod pain/dyspnea  ondansetron Injectable 4 milliGRAM(s) IV Push every 6 hours PRN Nausea and/or Vomiting      Allergies    No Known Allergies    Intolerances        REVIEW OF SYSTEMS:        lethargic      Vital Signs Last 24 Hrs  T(C): 35 (29 Jan 2020 10:00), Max: 36.4 (29 Jan 2020 00:14)  T(F): 95 (29 Jan 2020 10:00), Max: 97.5 (29 Jan 2020 00:14)  HR: 61 (29 Jan 2020 10:00) (61 - 88)  BP: 108/89 (29 Jan 2020 10:00) (108/89 - 198/69)  BP(mean): --  RR: 16 (29 Jan 2020 10:00) (16 - 21)  SpO2: 99% (29 Jan 2020 10:00) (96% - 99%)    PHYSICAL EXAM:  general       HEENT wnl  CHEST/LUNG: conductive rhonchi  HEART: Regular rate and rhythm; No murmurs, rubs, or gallops  ABDOMEN: Soft, Nontender, Nondistended; Bowel sounds present  EXTREMITIES:  2+ Peripheraledema  LYMPH: No lymphadenopathy noted  SKIN: No rashes or lesions    LABS:                        8.3    6.97  )-----------( 147      ( 28 Jan 2020 09:59 )             26.4               CXR new extensive R infiltrate                    RADIOLOGY & ADDITIONAL TESTS:    Imaging Personally Reviewed:  [y ] YES  [ ] NO    Consultant(s) Notes Reviewed:  [y ] YES  [ ] NO    Care Discussed with Consultants/Other Providers [ ] YES  [ ] NO    PROBLEMS:  CONGESTIVE HEART FAILURE;RSV INFECTION  CONGESTIVE BABB FAILURE;RSV INFECTION  SHORTNESS OF BREATH  CHF (congestive heart failure)  Nausea and vomiting, intractability of vomiting not specified, unspecified vomiting type  Encounter for palliative care  Advanced care planning/counseling discussion  Left hemiparesis  Dementia without behavioral disturbance, unspecified dementia type  Debility  Encephalopathy, unspecified  Hypothyroidism, unspecified type  Gastrointestinal hemorrhage, unspecified gastrointestinal hemorrhage type  Goals of care, counseling/discussion  HTN (hypertension)  Hyperlipidemia  Alzheimers disease  MI, old  Hypothyroidism  RSV infection  CHF (congestive heart failure)  GI bleed  Anemia  Aspiration PNA  FTT  Sepsis    Care discussed with family,         [ x ]   yes  [  ]  No    imp:    stable[ ]    unstable[ x ]     improving [   ]       unchanged  [  ]                Plans:  Continue present AB ID to f/u  CBC to f/u  Pulmonary reevaluation  Prognosis guarded Patient is a 79y old  Female who presents with a chief complaint of sob cough (29 Jan 2020 10:26)      INTERVAL HPI/OVERNIGHT EVENTS:  yesterday cough c coffee ground material, in the afternoon became hypothermal, Discuss[facility] c Dr Rojas, [facility]  Under warming blanket lethargic  MEDICATIONS  (STANDING):  buDESOnide    Inhalation Suspension 0.5 milliGRAM(s) Inhalation every 12 hours  carbamide peroxide Otic Solution 1 Drop(s) Both Ears two times a day  carvedilol 12.5 milliGRAM(s) Oral every 12 hours  dextrose 5% + sodium chloride 0.9%. 1000 milliLiter(s) (100 mL/Hr) IV Continuous <Continuous>  lactated ringers. 1000 milliLiter(s) (50 mL/Hr) IV Continuous <Continuous>  levothyroxine Injectable 44 MICROGram(s) IV Push at bedtime  pantoprazole  Injectable 40 milliGRAM(s) IV Push two times a day  piperacillin/tazobactam IVPB.. 3.375 Gram(s) IV Intermittent every 12 hours  sodium chloride 0.65% Nasal 1 Spray(s) Both Nostrils three times a day    MEDICATIONS  (PRN):  acetaminophen   Tablet .. 650 milliGRAM(s) Oral every 6 hours PRN Mild Pain (1 - 3)  acetaminophen   Tablet .. 650 milliGRAM(s) Oral every 6 hours PRN Mild Pain (1 - 3)  albuterol/ipratropium for Nebulization 3 milliLiter(s) Nebulizer every 6 hours PRN Shortness of Breath and/or Wheezing  hydrALAZINE Injectable 10 milliGRAM(s) IV Push every 6 hours PRN HTN  HYDROmorphone  Injectable 0.5 milliGRAM(s) IV Push every 4 hours PRN mod pain/dyspnea  ondansetron Injectable 4 milliGRAM(s) IV Push every 6 hours PRN Nausea and/or Vomiting      Allergies    No Known Allergies    Intolerances        REVIEW OF SYSTEMS:        lethargic      Vital Signs Last 24 Hrs  T(C): 35 (29 Jan 2020 10:00), Max: 36.4 (29 Jan 2020 00:14)  T(F): 95 (29 Jan 2020 10:00), Max: 97.5 (29 Jan 2020 00:14)  HR: 61 (29 Jan 2020 10:00) (61 - 88)  BP: 108/89 (29 Jan 2020 10:00) (108/89 - 198/69)  BP(mean): --  RR: 16 (29 Jan 2020 10:00) (16 - 21)  SpO2: 99% (29 Jan 2020 10:00) (96% - 99%)    PHYSICAL EXAM:  general       HEENT wnl  CHEST/LUNG: conductive rhonchi  HEART: Regular rate and rhythm; No murmurs, rubs, or gallops  ABDOMEN: Soft, Nontender, Nondistended; Bowel sounds present  EXTREMITIES:  2+ Peripheraledema  LYMPH: No lymphadenopathy noted  SKIN: No rashes or lesions    LABS:                        8.3    6.97  )-----------( 147      ( 28 Jan 2020 09:59 )             26.4               CXR new extensive R infiltrate                    RADIOLOGY & ADDITIONAL TESTS:    Imaging Personally Reviewed:  [y ] YES  [ ] NO    Consultant(s) Notes Reviewed:  [y ] YES  [ ] NO    Care Discussed with Consultants/Other Providers [ ] YES  [ ] NO    PROBLEMS:  CONGESTIVE HEART FAILURE;RSV INFECTION  CONGESTIVE BABB FAILURE;RSV INFECTION  SHORTNESS OF BREATH  CHF (congestive heart failure)  Nausea and vomiting, intractability of vomiting not specified, unspecified vomiting type  Encounter for palliative care  Advanced care planning/counseling discussion  Left hemiparesis  Dementia without behavioral disturbance, unspecified dementia type  Debility  Encephalopathy, unspecified  Hypothyroidism, unspecified type  Gastrointestinal hemorrhage, unspecified gastrointestinal hemorrhage type  Goals of care, counseling/discussion  HTN (hypertension)  Hyperlipidemia  Alzheimers disease  MI, old  Hypothyroidism  RSV infection  CHF (congestive heart failure)  GI bleed  Anemia  Aspiration PNA  FTT  Sepsis    Care discussed with family,         [ x ]   yes  [  ]  No    At length discussed c daughter Katelyn    imp:    stable[ ]    unstable[ x ]     improving [   ]       unchanged  [  ]                Plans:  Continue present AB ID to f/u  CBC to f/u  Pulmonary reevaluation  Prognosis guarded Patient is a 79y old  Female who presents with a chief complaint of sob cough (29 Jan 2020 10:26)      INTERVAL HPI/OVERNIGHT EVENTS:  yesterday cough c coffee ground material, in the afternoon became hypothermal, Discussed c Dr Rojas, [facility]  Under warming blanket lethargic  MEDICATIONS  (STANDING):  buDESOnide    Inhalation Suspension 0.5 milliGRAM(s) Inhalation every 12 hours  carbamide peroxide Otic Solution 1 Drop(s) Both Ears two times a day  carvedilol 12.5 milliGRAM(s) Oral every 12 hours  dextrose 5% + sodium chloride 0.9%. 1000 milliLiter(s) (100 mL/Hr) IV Continuous <Continuous>  lactated ringers. 1000 milliLiter(s) (50 mL/Hr) IV Continuous <Continuous>  levothyroxine Injectable 44 MICROGram(s) IV Push at bedtime  pantoprazole  Injectable 40 milliGRAM(s) IV Push two times a day  piperacillin/tazobactam IVPB.. 3.375 Gram(s) IV Intermittent every 12 hours  sodium chloride 0.65% Nasal 1 Spray(s) Both Nostrils three times a day    MEDICATIONS  (PRN):  acetaminophen   Tablet .. 650 milliGRAM(s) Oral every 6 hours PRN Mild Pain (1 - 3)  acetaminophen   Tablet .. 650 milliGRAM(s) Oral every 6 hours PRN Mild Pain (1 - 3)  albuterol/ipratropium for Nebulization 3 milliLiter(s) Nebulizer every 6 hours PRN Shortness of Breath and/or Wheezing  hydrALAZINE Injectable 10 milliGRAM(s) IV Push every 6 hours PRN HTN  HYDROmorphone  Injectable 0.5 milliGRAM(s) IV Push every 4 hours PRN mod pain/dyspnea  ondansetron Injectable 4 milliGRAM(s) IV Push every 6 hours PRN Nausea and/or Vomiting      Allergies    No Known Allergies    Intolerances        REVIEW OF SYSTEMS:        lethargic      Vital Signs Last 24 Hrs  T(C): 35 (29 Jan 2020 10:00), Max: 36.4 (29 Jan 2020 00:14)  T(F): 95 (29 Jan 2020 10:00), Max: 97.5 (29 Jan 2020 00:14)  HR: 61 (29 Jan 2020 10:00) (61 - 88)  BP: 108/89 (29 Jan 2020 10:00) (108/89 - 198/69)  BP(mean): --  RR: 16 (29 Jan 2020 10:00) (16 - 21)  SpO2: 99% (29 Jan 2020 10:00) (96% - 99%)    PHYSICAL EXAM:  general       HEENT wnl  CHEST/LUNG: conductive rhonchi  HEART: Regular rate and rhythm; No murmurs, rubs, or gallops  ABDOMEN: Soft, Nontender, Nondistended; Bowel sounds present  EXTREMITIES:  2+ Peripheraledema  LYMPH: No lymphadenopathy noted  SKIN: No rashes or lesions    LABS:                        8.3    6.97  )-----------( 147      ( 28 Jan 2020 09:59 )             26.4               CXR new extensive R infiltrate                    RADIOLOGY & ADDITIONAL TESTS:    Imaging Personally Reviewed:  [y ] YES  [ ] NO    Consultant(s) Notes Reviewed:  [y ] YES  [ ] NO    Care Discussed with Consultants/Other Providers [ ] YES  [ ] NO    PROBLEMS:  CONGESTIVE HEART FAILURE;RSV INFECTION  CONGESTIVE BABB FAILURE;RSV INFECTION  SHORTNESS OF BREATH  CHF (congestive heart failure)  Nausea and vomiting, intractability of vomiting not specified, unspecified vomiting type  Encounter for palliative care  Advanced care planning/counseling discussion  Left hemiparesis  Dementia without behavioral disturbance, unspecified dementia type  Debility  Encephalopathy, unspecified  Hypothyroidism, unspecified type  Gastrointestinal hemorrhage, unspecified gastrointestinal hemorrhage type  Goals of care, counseling/discussion  HTN (hypertension)  Hyperlipidemia  Alzheimers disease  MI, old  Hypothyroidism  RSV infection  CHF (congestive heart failure)  GI bleed  Anemia  Aspiration PNA  FTT  Sepsis    Care discussed with family,         [ x ]   yes  [  ]  No    At length discussed c daughter Katelyn    imp:    stable[ ]    unstable[ x ]     improving [   ]       unchanged  [  ]                Plans:  Continue present AB ID to f/u  CBC to f/u  Pulmonary reevaluation  Prognosis guarded  CT head - AMS lethargy

## 2020-01-30 LAB
ALBUMIN SERPL ELPH-MCNC: 1.3 G/DL — LOW (ref 3.3–5)
ALP SERPL-CCNC: 62 U/L — SIGNIFICANT CHANGE UP (ref 40–120)
ALT FLD-CCNC: 11 U/L — LOW (ref 12–78)
ANION GAP SERPL CALC-SCNC: 6 MMOL/L — SIGNIFICANT CHANGE UP (ref 5–17)
ANISOCYTOSIS BLD QL: SLIGHT — SIGNIFICANT CHANGE UP
APPEARANCE UR: ABNORMAL
APTT BLD: 34.2 SEC — SIGNIFICANT CHANGE UP (ref 27.5–36.3)
AST SERPL-CCNC: 17 U/L — SIGNIFICANT CHANGE UP (ref 15–37)
BACTERIA # UR AUTO: ABNORMAL
BASE EXCESS BLDA CALC-SCNC: -5.1 MMOL/L — LOW (ref -2–2)
BASOPHILS # BLD AUTO: 0 K/UL — SIGNIFICANT CHANGE UP (ref 0–0.2)
BASOPHILS NFR BLD AUTO: 0 % — SIGNIFICANT CHANGE UP (ref 0–2)
BILIRUB SERPL-MCNC: 0.5 MG/DL — SIGNIFICANT CHANGE UP (ref 0.2–1.2)
BILIRUB UR-MCNC: NEGATIVE — SIGNIFICANT CHANGE UP
BLOOD GAS COMMENTS: SIGNIFICANT CHANGE UP
BLOOD GAS COMMENTS: SIGNIFICANT CHANGE UP
BLOOD GAS SOURCE: SIGNIFICANT CHANGE UP
BUN SERPL-MCNC: 58 MG/DL — HIGH (ref 7–23)
BURR CELLS BLD QL SMEAR: SLIGHT — SIGNIFICANT CHANGE UP
CALCIUM SERPL-MCNC: 7.8 MG/DL — LOW (ref 8.5–10.1)
CHLORIDE SERPL-SCNC: 123 MMOL/L — HIGH (ref 96–108)
CO2 SERPL-SCNC: 21 MMOL/L — LOW (ref 22–31)
COLOR SPEC: YELLOW — SIGNIFICANT CHANGE UP
COMMENT - URINE: SIGNIFICANT CHANGE UP
CREAT SERPL-MCNC: 2.44 MG/DL — HIGH (ref 0.5–1.3)
CRP SERPL-MCNC: 10.64 MG/DL — HIGH (ref 0–0.4)
DIFF PNL FLD: ABNORMAL
EOSINOPHIL # BLD AUTO: 0 K/UL — SIGNIFICANT CHANGE UP (ref 0–0.5)
EOSINOPHIL NFR BLD AUTO: 0 % — SIGNIFICANT CHANGE UP (ref 0–6)
EPI CELLS # UR: SIGNIFICANT CHANGE UP
ERYTHROCYTE [SEDIMENTATION RATE] IN BLOOD: 7 MM/HR — SIGNIFICANT CHANGE UP (ref 0–20)
GLUCOSE SERPL-MCNC: 78 MG/DL — SIGNIFICANT CHANGE UP (ref 70–99)
GLUCOSE UR QL: NEGATIVE MG/DL — SIGNIFICANT CHANGE UP
HCO3 BLDA-SCNC: 20 MMOL/L — LOW (ref 21–29)
HCT VFR BLD CALC: 31.6 % — LOW (ref 34.5–45)
HGB BLD-MCNC: 10.2 G/DL — LOW (ref 11.5–15.5)
HOROWITZ INDEX BLDA+IHG-RTO: 28 — SIGNIFICANT CHANGE UP
INR BLD: 1.16 RATIO — SIGNIFICANT CHANGE UP (ref 0.88–1.16)
KETONES UR-MCNC: NEGATIVE — SIGNIFICANT CHANGE UP
LEUKOCYTE ESTERASE UR-ACNC: ABNORMAL
LYMPHOCYTES # BLD AUTO: 1.28 K/UL — SIGNIFICANT CHANGE UP (ref 1–3.3)
LYMPHOCYTES # BLD AUTO: 11 % — LOW (ref 13–44)
MACROCYTES BLD QL: SLIGHT — SIGNIFICANT CHANGE UP
MAGNESIUM SERPL-MCNC: 1.6 MG/DL — SIGNIFICANT CHANGE UP (ref 1.6–2.6)
MANUAL SMEAR VERIFICATION: SIGNIFICANT CHANGE UP
MCHC RBC-ENTMCNC: 29.2 PG — SIGNIFICANT CHANGE UP (ref 27–34)
MCHC RBC-ENTMCNC: 32.3 GM/DL — SIGNIFICANT CHANGE UP (ref 32–36)
MCV RBC AUTO: 90.5 FL — SIGNIFICANT CHANGE UP (ref 80–100)
METAMYELOCYTES # FLD: 1 % — HIGH (ref 0–0)
MICROCYTES BLD QL: SLIGHT — SIGNIFICANT CHANGE UP
MONOCYTES # BLD AUTO: 0.7 K/UL — SIGNIFICANT CHANGE UP (ref 0–0.9)
MONOCYTES NFR BLD AUTO: 6 % — SIGNIFICANT CHANGE UP (ref 2–14)
NEUTROPHILS # BLD AUTO: 9.53 K/UL — HIGH (ref 1.8–7.4)
NEUTROPHILS NFR BLD AUTO: 80 % — HIGH (ref 43–77)
NEUTS BAND # BLD: 2 % — SIGNIFICANT CHANGE UP (ref 0–8)
NITRITE UR-MCNC: NEGATIVE — SIGNIFICANT CHANGE UP
NRBC # BLD: 6 /100 — HIGH (ref 0–0)
NRBC # BLD: SIGNIFICANT CHANGE UP /100 WBCS (ref 0–0)
OVALOCYTES BLD QL SMEAR: SLIGHT — SIGNIFICANT CHANGE UP
PCO2 BLDA: 42 MMHG — SIGNIFICANT CHANGE UP (ref 32–46)
PH BLD: 7.31 — LOW (ref 7.35–7.45)
PH UR: 5 — SIGNIFICANT CHANGE UP (ref 5–8)
PHOSPHATE SERPL-MCNC: 2.7 MG/DL — SIGNIFICANT CHANGE UP (ref 2.5–4.5)
PLAT MORPH BLD: NORMAL — SIGNIFICANT CHANGE UP
PLATELET # BLD AUTO: 80 K/UL — LOW (ref 150–400)
PO2 BLDA: 61 MMHG — LOW (ref 74–108)
POIKILOCYTOSIS BLD QL AUTO: SLIGHT — SIGNIFICANT CHANGE UP
POTASSIUM SERPL-MCNC: 3.2 MMOL/L — LOW (ref 3.5–5.3)
POTASSIUM SERPL-SCNC: 3.2 MMOL/L — LOW (ref 3.5–5.3)
PROT SERPL-MCNC: 4.5 GM/DL — LOW (ref 6–8.3)
PROT UR-MCNC: 100 MG/DL
PROTHROM AB SERPL-ACNC: 13 SEC — HIGH (ref 10–12.9)
RBC # BLD: 3.49 M/UL — LOW (ref 3.8–5.2)
RBC # FLD: 19.1 % — HIGH (ref 10.3–14.5)
RBC BLD AUTO: SIGNIFICANT CHANGE UP
RBC CASTS # UR COMP ASSIST: SIGNIFICANT CHANGE UP /HPF (ref 0–4)
SAO2 % BLDA: 92 % — SIGNIFICANT CHANGE UP (ref 92–96)
SODIUM SERPL-SCNC: 150 MMOL/L — HIGH (ref 135–145)
SP GR SPEC: 1.01 — SIGNIFICANT CHANGE UP (ref 1.01–1.02)
UROBILINOGEN FLD QL: NEGATIVE MG/DL — SIGNIFICANT CHANGE UP
VANCOMYCIN TROUGH SERPL-MCNC: 10.2 UG/ML — SIGNIFICANT CHANGE UP (ref 10–20)
WBC # BLD: 11.62 K/UL — HIGH (ref 3.8–10.5)
WBC # FLD AUTO: 11.62 K/UL — HIGH (ref 3.8–10.5)
WBC UR QL: ABNORMAL

## 2020-01-30 PROCEDURE — 99231 SBSQ HOSP IP/OBS SF/LOW 25: CPT

## 2020-01-30 PROCEDURE — 99233 SBSQ HOSP IP/OBS HIGH 50: CPT

## 2020-01-30 RX ORDER — MAGNESIUM SULFATE 500 MG/ML
1 VIAL (ML) INJECTION ONCE
Refills: 0 | Status: COMPLETED | OUTPATIENT
Start: 2020-01-30 | End: 2020-01-30

## 2020-01-30 RX ORDER — SCOPALAMINE 1 MG/3D
1 PATCH, EXTENDED RELEASE TRANSDERMAL
Refills: 0 | Status: DISCONTINUED | OUTPATIENT
Start: 2020-01-30 | End: 2020-02-01

## 2020-01-30 RX ORDER — MAGNESIUM SULFATE 500 MG/ML
2 VIAL (ML) INJECTION ONCE
Refills: 0 | Status: COMPLETED | OUTPATIENT
Start: 2020-01-30 | End: 2020-01-30

## 2020-01-30 RX ORDER — FUROSEMIDE 40 MG
80 TABLET ORAL ONCE
Refills: 0 | Status: COMPLETED | OUTPATIENT
Start: 2020-01-30 | End: 2020-01-30

## 2020-01-30 RX ORDER — VANCOMYCIN HCL 1 G
1250 VIAL (EA) INTRAVENOUS ONCE
Refills: 0 | Status: COMPLETED | OUTPATIENT
Start: 2020-01-30 | End: 2020-01-30

## 2020-01-30 RX ORDER — HYDROMORPHONE HYDROCHLORIDE 2 MG/ML
0.25 INJECTION INTRAMUSCULAR; INTRAVENOUS; SUBCUTANEOUS ONCE
Refills: 0 | Status: DISCONTINUED | OUTPATIENT
Start: 2020-01-30 | End: 2020-01-30

## 2020-01-30 RX ORDER — ACETAMINOPHEN 500 MG
1000 TABLET ORAL ONCE
Refills: 0 | Status: COMPLETED | OUTPATIENT
Start: 2020-01-30 | End: 2020-01-30

## 2020-01-30 RX ORDER — POTASSIUM CHLORIDE 20 MEQ
10 PACKET (EA) ORAL
Refills: 0 | Status: COMPLETED | OUTPATIENT
Start: 2020-01-30 | End: 2020-01-30

## 2020-01-30 RX ORDER — HYDROCORTISONE 20 MG
100 TABLET ORAL EVERY 8 HOURS
Refills: 0 | Status: DISCONTINUED | OUTPATIENT
Start: 2020-01-30 | End: 2020-01-30

## 2020-01-30 RX ADMIN — Medication 44 MICROGRAM(S): at 02:20

## 2020-01-30 RX ADMIN — Medication 100 MILLIEQUIVALENT(S): at 05:47

## 2020-01-30 RX ADMIN — Medication 44 MICROGRAM(S): at 22:46

## 2020-01-30 RX ADMIN — PANTOPRAZOLE SODIUM 40 MILLIGRAM(S): 20 TABLET, DELAYED RELEASE ORAL at 05:46

## 2020-01-30 RX ADMIN — CARBAMIDE PEROXIDE 1 DROP(S): 81.86 SOLUTION/ DROPS AURICULAR (OTIC) at 05:48

## 2020-01-30 RX ADMIN — Medication 100 MILLIGRAM(S): at 02:19

## 2020-01-30 RX ADMIN — Medication 100 MILLIEQUIVALENT(S): at 07:03

## 2020-01-30 RX ADMIN — Medication 100 GRAM(S): at 10:20

## 2020-01-30 RX ADMIN — Medication 80 MILLIGRAM(S): at 00:53

## 2020-01-30 RX ADMIN — HYDROMORPHONE HYDROCHLORIDE 0.25 MILLIGRAM(S): 2 INJECTION INTRAMUSCULAR; INTRAVENOUS; SUBCUTANEOUS at 20:47

## 2020-01-30 RX ADMIN — Medication 0.5 MILLIGRAM(S): at 05:39

## 2020-01-30 RX ADMIN — Medication 50 GRAM(S): at 05:47

## 2020-01-30 RX ADMIN — Medication 1 SPRAY(S): at 01:47

## 2020-01-30 RX ADMIN — Medication 400 MILLIGRAM(S): at 20:32

## 2020-01-30 RX ADMIN — Medication 166.67 MILLIGRAM(S): at 09:44

## 2020-01-30 RX ADMIN — Medication 100 MILLIEQUIVALENT(S): at 08:15

## 2020-01-30 RX ADMIN — Medication 1 SPRAY(S): at 05:47

## 2020-01-30 RX ADMIN — SCOPALAMINE 1 PATCH: 1 PATCH, EXTENDED RELEASE TRANSDERMAL at 20:01

## 2020-01-30 RX ADMIN — HYDROMORPHONE HYDROCHLORIDE 0.25 MILLIGRAM(S): 2 INJECTION INTRAMUSCULAR; INTRAVENOUS; SUBCUTANEOUS at 20:32

## 2020-01-30 RX ADMIN — SCOPALAMINE 1 PATCH: 1 PATCH, EXTENDED RELEASE TRANSDERMAL at 11:07

## 2020-01-30 RX ADMIN — Medication 1000 MILLIGRAM(S): at 20:47

## 2020-01-30 RX ADMIN — PIPERACILLIN AND TAZOBACTAM 25 GRAM(S): 4; .5 INJECTION, POWDER, LYOPHILIZED, FOR SOLUTION INTRAVENOUS at 05:47

## 2020-01-30 RX ADMIN — Medication 100 MILLIGRAM(S): at 05:46

## 2020-01-30 NOTE — PROGRESS NOTE ADULT - PROBLEM SELECTOR PLAN 1
On telemetry / 1B, HGB 8.6 --> 8.3 --> 6.5 --> 10.2 No MMH , Repeat EGD gastric ulcer in Fundus s/p APC / EPI   On IV PPI .Carafate

## 2020-01-30 NOTE — PROGRESS NOTE ADULT - SUBJECTIVE AND OBJECTIVE BOX
Patient is a 79y old  Female who presents with a chief complaint of sob cough (2020 15:08)      Interval History: no change in thyroid status     MEDICATIONS  (STANDING):  levothyroxine Injectable 44 MICROGram(s) IV Push at bedtime  scopolamine   Patch 1 Patch Transdermal every 72 hours    MEDICATIONS  (PRN):  HYDROmorphone  Injectable 0.5 milliGRAM(s) IV Push every 4 hours PRN mod pain/dyspnea  ondansetron Injectable 4 milliGRAM(s) IV Push every 6 hours PRN Nausea and/or Vomiting      Allergies    No Known Allergies    Intolerances        REVIEW OF SYSTEMS:  CONSTITUTIONAL: no changes      Vital Signs Last 24 Hrs  T(C): 37.2 (2020 20:00), Max: 37.3 (2020 16:00)  T(F): 99 (2020 20:00), Max: 99.1 (2020 16:00)  HR: 79 (2020 23:30) (53 - 89)  BP: 93/52 (2020 23:30) (52/36 - 170/71)  BP(mean): 66 (2020 23:30) (42 - 105)  RR: 13 (2020 23:30) (5 - 26)  SpO2: 100% (2020 23:30) (97% - 100%)    PHYSICAL EXAM:  GENERAL:   HEAD: Atraumatic, Normocephalic  EYES: PERRLA, conjunctiva and sclera clear    LABS:    PT/INR - ( 2020 03:37 )   PT: 13.0 sec;   INR: 1.16 ratio         PTT - ( 2020 03:37 )  PTT:34.2 sec  Urinalysis Basic - ( 2020 00:31 )    Color: Yellow / Appearance: very cloudy / S.015 / pH: x  Gluc: x / Ketone: Negative  / Bili: Negative / Urobili: Negative mg/dL   Blood: x / Protein: 100 mg/dL / Nitrite: Negative   Leuk Esterase: Moderate / RBC: 0-2 /HPF / WBC 6-10   Sq Epi: x / Non Sq Epi: Few / Bacteria: Many      CAPILLARY BLOOD GLUCOSE        Lipid panel:       ABG - ( 2020 00:23 )  pH, Arterial: x     pH, Blood: 7.31  /  pCO2: 42    /  pO2: 61    / HCO3: 20    / Base Excess: -5.1  /  SaO2: 92                  Thyroid:  Diabetes Tests:  Parathyroid Panel:  Adrenals:  RADIOLOGY & ADDITIONAL TESTS:    Imaging Personally Reviewed:  [ ] YES  [ ] NO    Consultant(s) Notes Reviewed:  [ ] YES  [ ] NO    Care Discussed with Consultants/Other Providers [ ] YES  [ ] NO

## 2020-01-30 NOTE — PROGRESS NOTE ADULT - ASSESSMENT
78year old female w PMH HTN, CVA residual left weakness, Hypothyroid, Dementia, Achalasia s/p POEMS admitted from rehab with RSV/resp distress hospital course complicated by UGIB requiring ICU stay and EGD x 2. Pt now transferred to medical floor, developed bloody stools transferred to [facility] and had EGD (third this admission) on 1/27 showing ulcer stigmata of recent bleed injected and cauterized. 78year old female w PMH HTN, CVA residual left weakness, Hypothyroid, Dementia, Achalasia s/p POEMS admitted from rehab with RSV/resp distress hospital course complicated by UGIB requiring ICU stay and EGD x 2. Pt now transferred to medical floor, developed bloody stools transferred to tele and had EGD (third this admission) on 1/27 showing ulcer stigmata of recent bleed injected and cauterized. REadmitted to ICU for 3rd time for blood loss anemia, hypothermia, hypotension

## 2020-01-30 NOTE — PROGRESS NOTE ADULT - SUBJECTIVE AND OBJECTIVE BOX
events noted, transferred to ICU    Vital Signs Last 24 Hrs  T(C): 35.6 (2020 05:14), Max: 35.6 (2020 01:00)  T(F): 96 (2020 05:14), Max: 96 (2020 01:00)  HR: 83 (2020 08:30) (53 - 89)  BP: 91/51 (2020 08:30) (70/44 - 170/71)  BP(mean): 64 (2020 08:30) (59 - 105)  RR: 17 (2020 08:30) (5 - 26)  SpO2: 98% (2020 08:30) (97% - 100%)    PHYSICAL EXAM:    general - lethargic  HEENT - No Icterus  CVS - RRR  RS - AE B/L  Abd - soft, NT  Ext - Pulses +        LABS:                        10.2   11.62 )-----------( 80       ( 2020 03:38 )             31.6     01-30    150<H>  |  123<H>  |  58<H>  ----------------------------<  78  3.2<L>   |  21<L>  |  2.44<H>    Ca    7.8<L>      2020 03:38  Phos  2.7     01-30  Mg     1.6     30    TPro  4.5<L>  /  Alb  1.3<L>  /  TBili  0.5  /  DBili  x   /  AST  17  /  ALT  11<L>  /  AlkPhos  62  01-30    PT/INR - ( 2020 03:37 )   PT: 13.0 sec;   INR: 1.16 ratio         PTT - ( 2020 03:37 )  PTT:34.2 sec  Urinalysis Basic - ( 2020 00:31 )    Color: Yellow / Appearance: very cloudy / S.015 / pH: x  Gluc: x / Ketone: Negative  / Bili: Negative / Urobili: Negative mg/dL   Blood: x / Protein: 100 mg/dL / Nitrite: Negative   Leuk Esterase: Moderate / RBC: 0-2 /HPF / WBC 6-10   Sq Epi: x / Non Sq Epi: Few / Bacteria: Many        Culture - Blood (collected 2020 02:05)  Source: .Blood Blood  Gram Stain (prelim) (2020 13:12):    Growth in aerobic bottle: Gram Positive Cocci in Clusters    Growth in anaerobic bottle:    Gram Positive Cocci in Clusters  Preliminary Report (2020 13:12):    Growth in aerobic bottle: Gram Positive Cocci in Clusters    Growth in anaerobic bottle:    Gram Positive Cocci in Clusters    Culture - Blood (collected 2020 02:05)  Source: .Blood Blood  Gram Stain (prelim) (2020 13:11):    Growth in aerobic bottle:    Gram Positive Cocci in Clusters    Growth in anaerobic bottle:    Gram Positive Cocci in Clusters  Preliminary Report (2020 13:11):    Growth in aerobic bottle:    Gram Positive Cocci in Clusters    Growth in anaerobic bottle:    Gram Positive Cocci in Clusters    ***Blood Panel PCR results on this specimen are available    approximately 3 hours after the Gram stain result.***    Gram stain, PCR, and/or culture results may not always    correspond due to difference in methodologies.    ************************************************************    This PCR assay was performed using [employer].    The following targets are tested for: Enterococcus,    vancomycin resistant enterococci, Listeria monocytogenes,    coagulase negative staphylococci, S. aureus,    methicillin resistant S. aureus, Streptococcus agalactiae    (Group B), S. pneumoniae, S. pyogenes (Group A),    Acinetobacter baumannii, Enterobacter cloacae, E. coli,    Klebsiella oxytoca, K. pneumoniae, Proteus sp.,    Serratia marcescens, Haemophilus influenzae,    Neisseria meningitidis, Pseudomonas aeruginosa, Candida    albicans, C. glabrata, C krusei, C parapsilosis,    C. tropicalis and the KPC resistance gene.  Organism: Blood Culture PCR (2020 12:23)  Organism: Blood Culture PCR (2020 12:23)        RADIOLOGY & ADDITIONAL STUDIES:

## 2020-01-30 NOTE — PROGRESS NOTE ADULT - SUBJECTIVE AND OBJECTIVE BOX
Patient is a 79y old  Female who presents with a chief complaint of sob cough (2020 13:08)      HPI:  · HPI Objective Statement: 79 years old female from [facility] rehab c/o coughing sob for three to four days. Pt is sent here to rule out chf vs pneumonia. Pt is alert and oriented to person only unable to give detail hx due to hx of Alzheimer according to the [facility] pt had sat of 91 to 92 %,	    PAST MEDICAL/SURGICAL/FAMILY/SOCIAL HISTORY:    Past Medical History:  Alzheimers disease    CVA (cerebral vascular accident)    Depression    HTN (hypertension)    MI, old.  recently Dx c hypothyroidism, TSH > 111 started Levothyroxin 50mcg qd as per Dr Vigil (2020 11:41)      INTERVAL HPI/OVERNIGHT EVENTS:  Patient  seen earlier today  The nurse denies melena, hematochezia, hematemesis, nausea, vomiting, abdominal pain, constipation, diarrhea, or change in bowel movements   HGB fell to 6.5 -- ( 2 units ) --> 10.2    MEDICATIONS  (STANDING):  levothyroxine Injectable 44 MICROGram(s) IV Push at bedtime  scopolamine   Patch 1 Patch Transdermal every 72 hours    MEDICATIONS  (PRN):  HYDROmorphone  Injectable 0.5 milliGRAM(s) IV Push every 4 hours PRN mod pain/dyspnea  ondansetron Injectable 4 milliGRAM(s) IV Push every 6 hours PRN Nausea and/or Vomiting      FAMILY HISTORY:  Family history of essential hypertension (Child)  Family history of stroke      Allergies    No Known Allergies    Intolerances        PMH/PSH:  Hypothyroidism  Constipation  Iron deficiency anemia  Major depressive disorder  Hyperlipidemia  GI bleed  Depression  Alzheimers disease  MI, old  CVA (cerebral vascular accident)  HTN (hypertension)  No pertinent past medical history  No significant past surgical history        REVIEW OF SYSTEMS:  CONSTITUTIONAL: No fever, weight loss,   EYES: No eye pain, visual disturbances, or discharge  ENMT:  No difficulty hearing, tinnitus, vertigo; No sinus or throat pain  NECK: No pain or stiffness  BREASTS: No pain, masses, or nipple discharge  RESPIRATORY: No cough, wheezing, chills or hemoptysis; No shortness of breath  CARDIOVASCULAR: No chest pain, palpitations, dizziness, or leg swelling  GASTROINTESTINAL: See above  GENITOURINARY: No dysuria, frequency, hematuria, or incontinence  NEUROLOGICAL: No headaches, memory loss, loss of strength, numbness, or tremors  SKIN: No itching, burning, rashes, or lesions   LYMPH NODES: No enlarged glands  ENDOCRINE: No heat or cold intolerance; No hair loss  MUSCULOSKELETAL: No joint pain or swelling; No muscle, back, or extremity pain  PSYCHIATRIC: No depression, anxiety, mood swings, or difficulty sleeping  HEME/LYMPH: No easy bruising, or bleeding gums  ALLERGY AND IMMUNOLOGIC: No hives or eczema    Vital Signs Last 24 Hrs  T(C): 36.7 (2020 07:30), Max: 36.7 (2020 07:30)  T(F): 98 (2020 07:30), Max: 98 (2020 07:30)  HR: 66 (2020 13:30) (53 - 89)  BP: 68/35 (2020 13:30) (52/36 - 170/71)  BP(mean): 42 (2020 13:30) (42 - 105)  RR: 12 (2020 13:30) (5 - 26)  SpO2: 98% (2020 13:30) (97% - 100%)    PHYSICAL EXAM:  GENERAL: NAD, well-groomed, well-developed  HEAD:  Atraumatic, Normocephalic  EYES: EOMI, PERRLA, conjunctiva and sclera clear  NECK: Supple, No JVD, Normal thyroid  NERVOUS SYSTEM:  Lethargic  CHEST/LUNG: Clear to percussion bilaterally; No rales, rhonchi, wheezing, or rubs  HEART: Regular rate and rhythm; No murmurs, rubs, or gallops  ABDOMEN: Soft, Nontender, Nondistended; Bowel sounds present  EXTREMITIES:  2+ Peripheral Pulses, No clubbing, cyanosis, or edema  LYMPH: No lymphadenopathy noted  SKIN: No rashes or lesions    LAB                          10.2   11.62 )-----------( 80       ( 2020 03:38 )             31.6       CBC:   @ 03:38  WBC 11.62   Hgb 10.2   Hct 31.6   Plts 80  MCV 90.5   @ 14:26  WBC 10.98   Hgb 6.5   Hct 20.8   Plts 98  MCV 90.8   @ 09:59  WBC 6.97   Hgb 8.3   Hct 26.4   Plts 147  MCV 86.8   @ 07:18  WBC 7.05   Hgb 8.6   Hct 27.6   Plts 135  MCV 87.6   @ 18:14  WBC 6.70   Hgb 9.2   Hct 29.4   Plts 121  MCV 87.2   @ 09:27  WBC 9.44   Hgb 9.5   Hct 29.9   Plts 153  MCV 87.2   @ 00:59  WBC 8.22   Hgb 10.2   Hct 32.6   Plts 147  MCV 88.8      Chemistry:   @ 03:38  Na+ 150  K+ 3.2  Cl- 123  CO2 21  BUN 58  Cr 2.44      @ 11:59  Na+ 153  K+ 3.0  Cl- 123  CO2 22  BUN 62  Cr 2.43      @ 08:16  Na+ 145  K+ 3.6  Cl- 116  CO2 20  BUN 64  Cr 2.60      @ 00:59  Na+ 144  K+ 3.8  Cl- 116  CO2 20  BUN 63  Cr 2.74         Glucose, Serum: 78 mg/dL ( @ 03:38)  Glucose, Serum: 121 mg/dL ( @ 11:59)  Glucose, Serum: 113 mg/dL ( @ 08:16)  Glucose, Serum: 125 mg/dL ( @ 00:59)      2020 03:38    150    |  123    |  58     ----------------------------<  78     3.2     |  21     |  2.44   2020 11:59    153    |  123    |  62     ----------------------------<  121    3.0     |  22     |  2.43   2020 08:16    145    |  116    |  64     ----------------------------<  113    3.6     |  20     |  2.60   2020 00:59    144    |  116    |  63     ----------------------------<  125    3.8     |  20     |  2.74     Ca    7.8        2020 03:38  Ca    7.5        2020 11:59  Ca    8.1        2020 08:16  Ca    8.0        2020 00:59  Phos  2.7       2020 03:38  Mg     1.6       2020 03:38    TPro  4.5    /  Alb  1.3    /  TBili  0.5    /  DBili  x      /  AST  17     /  ALT  11     /  AlkPhos  62     2020 03:38      PT/INR - ( 2020 03:37 )   PT: 13.0 sec;   INR: 1.16 ratio         PTT - ( 2020 03:37 )  PTT:34.2 sec    Urinalysis Basic - ( 2020 00:31 )    Color: Yellow / Appearance: very cloudy / S.015 / pH: x  Gluc: x / Ketone: Negative  / Bili: Negative / Urobili: Negative mg/dL   Blood: x / Protein: 100 mg/dL / Nitrite: Negative   Leuk Esterase: Moderate / RBC: 0-2 /HPF / WBC 6-10   Sq Epi: x / Non Sq Epi: Few / Bacteria: Many        CAPILLARY BLOOD GLUCOSE          C-Reactive Protein, Serum: 10.64 mg/dL ( @ 09:38)      RADIOLOGY & ADDITIONAL TESTS:    Imaging Personally Reviewed:  [ ] YES  [ ] NO    Consultant(s) Notes Reviewed:  [ ] YES  [ ] NO    Care Discussed with Consultants/Other Providers [ ] YES  [ ] NO

## 2020-01-30 NOTE — PROGRESS NOTE ADULT - SUBJECTIVE AND OBJECTIVE BOX
TMAX - 92.7 - 98    On day # 2 Vanco per level / # 3 Zosyn    Vital Signs Last 24 Hrs  T(C): 36.7 (2020 07:30), Max: 36.7 (2020 07:30)  T(F): 98 (2020 07:30), Max: 98 (2020 07:30)  HR: 79 (2020 12:00) (53 - 89)  BP: 74/47 (2020 12:00) (53/36 - 170/71)  BP(mean): 55 (2020 12:00) (42 - 105)  RR: 19 (2020 12:00) (5 - 26)  SpO2: 100% (2020 12:00) (97% - 100%)  Supplemental O2:  on NC O2      Transferred to CCU now for further care.  Remains hypothermic requiring warming blanket.        PHYSICAL EXAM  General:  lethargic but briefly opened her eyes to command, lying in bed in semi-upright position with warming blanket in place   HEENT: conj pink, sclerae anicteric, no conjunctival hemorrhages seen, PERRLA, no oral lesions noted    Neck: supple, no nodes noted   Heart: RR   Lungs: bilateral moist rhonchi throughout   Abdomen:  BS+, soft, nontender appearance  Back: no CVA or Spinal tenderness noted  Extremities: no edema LE's                    some decrease swelling of the arms and hands with Lt still > Rt.                     no splinter hemorrhages seen                     no palmar or plantar lesions noted    Skin:  warm, dry, no rash noted  Stevenson in place with clear light yellow urine output      I&O's Summary :    2020 07:  -  2020 07:00  --------------------------------------------------------  IN: 684 mL / OUT: 215 mL / NET: 469 mL    2020 07:01  -  2020 13:08  --------------------------------------------------------  IN: 450 mL / OUT: 20 mL / NET: 430 mL        LABS:  CBC Full  -  ( 2020 03:38 )  WBC Count : 11.62 K/uL  RBC Count : 3.49 M/uL  Hemoglobin : 10.2 g/dL  Hematocrit : 31.6 %  Platelet Count - Automated : 80 K/uL  Mean Cell Volume : 90.5 fl  Mean Cell Hemoglobin : 29.2 pg  Mean Cell Hemoglobin Concentration : 32.3 gm/dL  Auto Neutrophil # : 9.53 K/uL  Auto Lymphocyte # : 1.28 K/uL  Auto Monocyte # : 0.70 K/uL  Auto Eosinophil # : 0.00 K/uL  Auto Basophil # : 0.00 K/uL  Auto Neutrophil % : 80.0 %  Auto Lymphocyte % : 11.0 %  Auto Monocyte % : 6.0 %  Auto Eosinophil % : 0.0 %  Auto Basophil % : 0.0 %        150<H>  |  123<H>  |  58<H>  ----------------------------<  78  3.2<L>   |  21<L>  |  2.44<H>    Ca    7.8<L>      2020 03:38  Phos  2.7       Mg     1.6         TPro  4.5<L>  /  Alb  1.3<L>  /  TBili  0.5  /  DBili  x   /  AST  17  /  ALT  11<L>  /  AlkPhos  62  01-30    LIVER FUNCTIONS - ( 2020 03:38 )  Alb: 1.3 g/dL / Pro: 4.5 gm/dL / ALK PHOS: 62 U/L / ALT: 11 U/L / AST: 17 U/L / GGT: x             PT/INR - ( 2020 03:37 )   PT: 13.0 sec;   INR: 1.16 ratio       PTT - ( 2020 03:37 )  PTT:34.2 sec      Urinalysis Basic - ( 2020 00:31 )  Color: Yellow / Appearance: very cloudy / S.015 / pH: x  Gluc: x / Ketone: Negative  / Bili: Negative / Urobili: Negative mg/dL   Blood: x / Protein: 100 mg/dL / Nitrite: Negative   Leuk Esterase: Moderate / RBC: 0-2 /HPF / WBC 6-10   Sq Epi: x / Non Sq Epi: Few / Bacteria: Many      ABG - ( 2020 00:23 )  pH, Arterial: x     pH, Blood: 7.31  /  pCO2: 42    /  pO2: 61    / HCO3: 20    / Base Excess: -5.1  /  SaO2: 92          CRP - 10.64 ( 20 )    Sedimentation Rate, Erythrocyte: 7 mm/hr ( @ 03:38)        Vancomycin Level, Trough: 10.2 ug/mL ( @ 03:38)          MICROBIOLOGY:    Specimen Source: .Blood Blood ( @ 02:05)  Culture Results:   Growth in aerobic and anaerobic bottles: Staphylococcus aureus  -  Methicillin resistant Staphylococcus aureus (MRSA): Detec (20 @ 02:05)      Specimen Source: .Blood Blood ( @ 02:05)  Culture Results:   Growth in aerobic and anaerobic bottles: Staphylococcus aureus  See previous culture [id number] ( @ 02:05)              Radiology:  < from: Xray Chest 1 View-PORTABLE IMMEDIATE (20 @ 22:05) >  EXAM:  XR CHEST PORTABLE IMMED 1V                          PROCEDURE DATE:  2020      INTERPRETATION:  AP semierect chest on 2020 at 9:16 PM. Patient has sepsis and CHF.    Heart is enlarged.    There is significant bilateral infiltrates right greater than left. Congestion versus pneumonia are possible.    There may be some improvement in the right-sided infiltrate compared to .    IMPRESSION: Significant bilateral infiltrates again noted. There may be slight improvement on the right.          Impression:  Remains hypothermic on present ab rx with Zosyn + Vanco per level now for continued rx of Sepsis secondary to MRSA Bacteremia and Bilateral PNA - ? Aspiration, s/p EGD for UGI Bleed with active bleeding ulcer and with hypothyroidism with very elevated TSH.  Note ESR low but with elevated CRP.    Suggestions:  Will continue current ab rx and await further Cx results.  Follow-up temps and labs.  Discussed with family at bedside.

## 2020-01-30 NOTE — PROGRESS NOTE ADULT - SUBJECTIVE AND OBJECTIVE BOX
Subjective: stable hemodynamics. She is quite lethargic. Oliguria reported over 24h.       MEDICATIONS  (STANDING):  buDESOnide    Inhalation Suspension 0.5 milliGRAM(s) Inhalation every 12 hours  carbamide peroxide Otic Solution 1 Drop(s) Both Ears two times a day  carvedilol 12.5 milliGRAM(s) Oral every 12 hours  hydrocortisone sodium succinate Injectable 100 milliGRAM(s) IV Push every 8 hours  levothyroxine Injectable 44 MICROGram(s) IV Push at bedtime  pantoprazole  Injectable 40 milliGRAM(s) IV Push two times a day  piperacillin/tazobactam IVPB.. 3.375 Gram(s) IV Intermittent every 12 hours  potassium chloride  10 mEq/100 mL IVPB 10 milliEquivalent(s) IV Intermittent every 1 hour  sodium chloride 0.65% Nasal 1 Spray(s) Both Nostrils three times a day  sucralfate 1 Gram(s) Oral four times a day    MEDICATIONS  (PRN):  acetaminophen   Tablet .. 650 milliGRAM(s) Oral every 6 hours PRN Mild Pain (1 - 3)  acetaminophen   Tablet .. 650 milliGRAM(s) Oral every 6 hours PRN Mild Pain (1 - 3)  albuterol/ipratropium for Nebulization 3 milliLiter(s) Nebulizer every 6 hours PRN Shortness of Breath and/or Wheezing  HYDROmorphone  Injectable 0.5 milliGRAM(s) IV Push every 4 hours PRN mod pain/dyspnea  ondansetron Injectable 4 milliGRAM(s) IV Push every 6 hours PRN Nausea and/or Vomiting          T(C): 35.6 (20 @ 05:14), Max: 35.6 (20 @ 01:00)  HR: 87 (20 @ 07:00) (53 - 89)  BP: 153/42 (20 @ 07:00) (70/44 - 170/71)  RR: 21 (20 @ 07:00) (5 - 26)  SpO2: 98% (20 @ 07:00) (97% - 100%)  Wt(kg): --    ABG - ( 2020 00:23 )  pH, Arterial: x     pH, Blood: 7.31  /  pCO2: 42    /  pO2: 61    / HCO3: 20    / Base Excess: -5.1  /  SaO2: 92                  I&O's Detail    2020 07:01  -  2020 07:00  --------------------------------------------------------  IN:    Packed Red Blood Cells: 334 mL    Solution: 100 mL    Solution: 250 mL  Total IN: 684 mL    OUT:    Indwelling Catheter - Urethral: 215 mL  Total OUT: 215 mL    Total NET: 469 mL               PHYSICAL EXAM:    GENERAL: lethargic  EYES: EOMI, PERRLA, conjunctiva and sclera clear  NECK: Supple, no inc in JVP  CHEST/LUNG: rhonchi  HEART: S1S2  ABDOMEN: Soft, Nontender, Nondistended; Bowel sounds present  EXTREMITIES:  pos edema.         LABS:  CBC Full  -  ( 2020 03:38 )  WBC Count : 11.62 K/uL  RBC Count : 3.49 M/uL  Hemoglobin : 10.2 g/dL  Hematocrit : 31.6 %  Platelet Count - Automated : 80 K/uL  Mean Cell Volume : 90.5 fl  Mean Cell Hemoglobin : 29.2 pg  Mean Cell Hemoglobin Concentration : 32.3 gm/dL  Auto Neutrophil # : x  Auto Lymphocyte # : x  Auto Monocyte # : x  Auto Eosinophil # : x  Auto Basophil # : x  Auto Neutrophil % : x  Auto Lymphocyte % : x  Auto Monocyte % : x  Auto Eosinophil % : x  Auto Basophil % : x    -30    150<H>  |  123<H>  |  58<H>  ----------------------------<  78  3.2<L>   |  21<L>  |  2.44<H>    Ca    7.8<L>      2020 03:38  Phos  2.7     -  Mg     1.6         TPro  4.5<L>  /  Alb  1.3<L>  /  TBili  0.5  /  DBili  x   /  AST  17  /  ALT  11<L>  /  AlkPhos  62  -30    PT/INR - ( 2020 03:37 )   PT: 13.0 sec;   INR: 1.16 ratio         PTT - ( 2020 03:37 )  PTT:34.2 sec  Urinalysis Basic - ( 2020 00:31 )    Color: Yellow / Appearance: very cloudy / S.015 / pH: x  Gluc: x / Ketone: Negative  / Bili: Negative / Urobili: Negative mg/dL   Blood: x / Protein: 100 mg/dL / Nitrite: Negative   Leuk Esterase: Moderate / RBC: 0-2 /HPF / WBC 6-10   Sq Epi: x / Non Sq Epi: Few / Bacteria: Many      Culture Results:   Growth in aerobic bottle:  Gram Positive Cocci in Clusters  Growth in anaerobic bottle:  Gram Positive Cocci in Clusters  ***Blood Panel PCR results on this specimen are available  approximately 3 hours after the Gram stain result.***  Gram stain, PCR, and/or culture results may not always  correspond due to difference in methodologies.  ************************************************************  This PCR assay was performed using [employer].  The following targets are tested for: Enterococcus,  vancomycin resistant enterococci, Listeria monocytogenes,  coagulase negative staphylococci, S. aureus,  methicillin resistant S. aureus, Streptococcus agalactiae  (Group B), S. pneumoniae, S. pyogenes (Group A),  Acinetobacter baumannii, Enterobacter cloacae, E. coli,  Klebsiella oxytoca, K. pneumoniae, Proteus sp.,  Serratia marcescens, Haemophilus influenzae,  Neisseria meningitidis, Pseudomonas aeruginosa, Candida  albicans, C. glabrata, C krusei, C parapsilosis,  C. tropicalis and the KPC resistance gene. ( @ 02:05)  Culture Results:   Growth in aerobic bottle: Gram Positive Cocci in Clusters  Growth in anaerobic bottle:  Gram Positive Cocci in Clusters ( @ 02:05)          Impression:  JONY - suspected ischemic ATN. CTD/ Vasculitic serologies negative. Cr improved.   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 Cresencio Cr 1.8 2019   Severe pulm HTN, TR      Recommendations:   Keep -150  Monitor Cr daily  Avoid nephrotoxins.   Diuretics PRN increase WOB, oliguria  Taper Hydrocortisone.

## 2020-01-30 NOTE — PROCEDURAL SAFETY CHECKLIST WITH OR WITHOUT SEDATION - NSPOSTCOMMENTFT_GEN_ALL_CORE
central line placement was unsuccessful. bleeding stopped and left femoral gauze dressing placed at site of attempted placement. pt in no distress

## 2020-01-30 NOTE — PROGRESS NOTE ADULT - ASSESSMENT
HPI:  · HPI Objective Statement: 79 years old female from [facility] rehab c/o coughing sob for three to four days. Pt is sent here to rule out chf vs pneumonia. Pt is alert and oriented to person only unable to give detail hx due to hx of Alzheimer according to the [facility] pt had sat of 91 to 92 %,	  ---------------- As Above ---------------------------------------------  Patient is a poor historian. Patient admitted with Bronchitis and UTI. Called for hematemsis. Patient had three episodes of bloody vomitus. ( 1/3 of a cup ) . Patient complaining of mid epigastric / chest pain but patient can not characterize the pain.   Patient has chronic anemia. but HGB fell 8.4 --> 7.2. Was on Carafate, now on IV PPI  KUB pending  HX of CRI    Addendum : Patient had another episode of bloody vomitus    Upper GI Bleed - Secondary to (?). 1) NPO  2)Check KUB  3) IV PPI 4) telemetry  5) NGT 6) EGD 7) Hold Carafate  8) Hold iron  ---Will speak with daughter.

## 2020-01-30 NOTE — PROGRESS NOTE ADULT - SUBJECTIVE AND OBJECTIVE BOX
INTERVAL HPI:  79 year female with HTN, HLD, MI hx, CVA, Gi bleed, Anemia Depression and Alzheimer's dementia.  Sent from [facility] due to SOB. Possibility of pneumonia vs CHF was raised.   In ED with Respiratory distress required BIPAP support and RVP positive for RSV. Pt not able to provide more details due to dementia. Information from transfer papers and ED physician.  Nods no when asked for smoking.  20: With massive GI bleed, intubated and transferred to ICU.  20:  EGD+ Colonoscopy.  01/15/20:  Extubated.  20:  Out of ICU  20:  Back to ICU for upper GI bleed and significant drop in H & H.  20:  EGD, acute gastric ulcer.  20:  Out of ICU.  20:  EGD  20:  Back to ICU with GI bleed and hypotension.    OVERNIGHT EVENTS:  In ICU. hypotensive    Vital Signs Last 24 Hrs  T(C): 36.7 (2020 07:30), Max: 36.7 (2020 07:30)  T(F): 98 (2020 07:30), Max: 98 (2020 07:30)  HR: 79 (2020 12:00) (53 - 89)  BP: 74/47 (2020 12:00) (53/36 - 170/71)  BP(mean): 55 (2020 12:00) (42 - 105)  RR: 19 (2020 12:00) (5 - 26)  SpO2: 100% (2020 12:00) (97% - 100%)    PHYSICAL EXAM:  GEN:         lethargic  HEENT:    Normal.    RESP:        no distress  CVS:             Regular rate and rhythm.   ABD:         Soft, non-tender, non-distended;     MEDICATIONS  (STANDING):  levothyroxine Injectable 44 MICROGram(s) IV Push at bedtime  piperacillin/tazobactam IVPB.. 3.375 Gram(s) IV Intermittent every 12 hours  scopolamine   Patch 1 Patch Transdermal every 72 hours    MEDICATIONS  (PRN):  HYDROmorphone  Injectable 0.5 milliGRAM(s) IV Push every 4 hours PRN mod pain/dyspnea  ondansetron Injectable 4 milliGRAM(s) IV Push every 6 hours PRN Nausea and/or Vomiting    LABS:                        10.2   11.62 )-----------( 80       ( 2020 03:38 )             31.6         150<H>  |  123<H>  |  58<H>  ----------------------------<  78  3.2<L>   |  21<L>  |  2.44<H>    Ca    7.8<L>      2020 03:38  Phos  2.7       Mg     1.6         TPro  4.5<L>  /  Alb  1.3<L>  /  TBili  0.5  /  DBili  x   /  AST  17  /  ALT  11<L>  /  AlkPhos  62      PT/INR - ( 2020 03:37 )   PT: 13.0 sec;   INR: 1.16 ratio      PTT - ( 2020 03:37 )  PTT:34.2 sec   @ 00:23  pH: 7.31  pCO2: 42  pO2: 61  SaO2: 92    Urinalysis Basic - ( 2020 00:31 )    Color: Yellow / Appearance: very cloudy / S.015 / pH: x  Gluc: x / Ketone: Negative  / Bili: Negative / Urobili: Negative mg/dL   Blood: x / Protein: 100 mg/dL / Nitrite: Negative   Leuk Esterase: Moderate / RBC: 0-2 /HPF / WBC 6-10   Sq Epi: x / Non Sq Epi: Few / Bacteria: Many    ASSESSMENT AND PLAN:  ·	S/P Acute hypoxic  Respiratory failure .  ·	RSV tracheobronchitis.  ·	Left pleural effusion.  ·	Hypothyroidism with very high TSH.  ·	Anemia.  ·	VRE UTI  ·	Leukocytosis.  ·	Renal Insuffiencey.  ·	CVA by history.  ·	HTN.  ·	HLD.  ·	Alzheimer's dementia.  ·	GI bleed.  ·	MRSA Bacteremia.  ·	Hypotension.  ·	Hypothermia.    Supportive care with IVF and PRBC.  Discussed with daughter.

## 2020-01-30 NOTE — PROGRESS NOTE ADULT - ATTENDING COMMENTS
I saw and examined the patient and agree with the findings. As per wishes of patient's family, no surgical intervention at this time.

## 2020-01-30 NOTE — PROGRESS NOTE ADULT - SUBJECTIVE AND OBJECTIVE BOX
INTERVAL HPI/OVERNIGHT EVENTS:    Code Status:   Allergies    No Known Allergies    Intolerances    MEDICATIONS  (STANDING):  buDESOnide    Inhalation Suspension 0.5 milliGRAM(s) Inhalation every 12 hours  carbamide peroxide Otic Solution 1 Drop(s) Both Ears two times a day  carvedilol 12.5 milliGRAM(s) Oral every 12 hours  hydrocortisone sodium succinate Injectable 100 milliGRAM(s) IV Push every 8 hours  levothyroxine Injectable 44 MICROGram(s) IV Push at bedtime  magnesium sulfate  IVPB 1 Gram(s) IV Intermittent once  pantoprazole  Injectable 40 milliGRAM(s) IV Push two times a day  piperacillin/tazobactam IVPB.. 3.375 Gram(s) IV Intermittent every 12 hours  potassium chloride  10 mEq/100 mL IVPB 10 milliEquivalent(s) IV Intermittent every 1 hour  sodium chloride 0.65% Nasal 1 Spray(s) Both Nostrils three times a day  sucralfate 1 Gram(s) Oral four times a day  vancomycin  IVPB 1250 milliGRAM(s) IV Intermittent once    MEDICATIONS  (PRN):  acetaminophen   Tablet .. 650 milliGRAM(s) Oral every 6 hours PRN Mild Pain (1 - 3)  acetaminophen   Tablet .. 650 milliGRAM(s) Oral every 6 hours PRN Mild Pain (1 - 3)  albuterol/ipratropium for Nebulization 3 milliLiter(s) Nebulizer every 6 hours PRN Shortness of Breath and/or Wheezing  HYDROmorphone  Injectable 0.5 milliGRAM(s) IV Push every 4 hours PRN mod pain/dyspnea  ondansetron Injectable 4 milliGRAM(s) IV Push every 6 hours PRN Nausea and/or Vomiting      PRESENT SYMPTOMS: [ ]Unable to obtain due to poor mentation   Source if other than patient:  [ ]Family   [ ]Team     Pain (Impact on QOL):    Location:  Severity:  Minimal acceptable level (0-10 scale):       Quality:       Onset:  Duration:  Aggravating factors:  Relieving Factors  Radiation:    Dyspnea:  Yes [ ] No [ ] - [ ]Mild [ ]Moderate [ ]Severe  Anxiety:    Yes [ ] No [ ] - [ ]Mild [ ]Moderate [ ]Severe  Fatigue:    Yes [ ] No [ ] - [ ]Mild [ ]Moderate [ ]Severe  Nausea:    Yes [ ] No [ ] - [ ]Mild [ ]Moderate [ ]Severe                         Loss of appetite: Yes [ ] No [ ] - [ ]Mild [ ]Moderate [ ]Severe             Constipation:  Yes [ ] No [ ] - [ ]Mild [ ]Moderate [ ]Severe  Grief: Yes [ ] No [ ]     PAIN AD Score:	  [email or web address] (Ctrl + left click to view)    Other Symptoms:  [ ]All other review of systems negative     Karnofsky Performance Score/Palliative Performance Status Version 2:         %    http://palliative.info/resource_material/PPSv2.pdf    PHYSICAL EXAM:  Vital Signs Last 24 Hrs  T(C): 35.6 (2020 05:14), Max: 35.6 (2020 01:00)  T(F): 96 (2020 05:14), Max: 96 (2020 01:00)  HR: 87 (2020 07:00) (53 - 89)  BP: 153/42 (2020 07:00) (70/44 - 170/71)  BP(mean): 73 (2020 07:00) (59 - 105)  RR: 21 (2020 07:00) (5 - 26)  SpO2: 98% (2020 07:00) (97% - 100%) I&O's Summary    2020 07:01  -  2020 07:00  --------------------------------------------------------  IN: 684 mL / OUT: 215 mL / NET: 469 mL         GENERAL:  [ ]Alert  [ ]Oriented x   [ ]Lethargic  [ ]Cachexia  [ ]Unarousable  [ ]Verbal  [ ]Non-Verbal  Behavioral:   [ ] Anxiety  [ ] Delirium [ ] Agitation [ ] Other  HEENT:  [ ]Normal   [ ]Dry mouth   [ ]ET Tube/Trach  [ ]Oral lesions  PULMONARY:   [ ]Clear [ ]Tachypnea  [ ]Audible excessive secretions   [ ]Rhonchi        [ ]Right [ ]Left [ ]Bilateral  [ ]Crackles        [ ]Right [ ]Left [ ]Bilateral  [ ]Wheezing     [ ]Right [ ]Left [ ]Bilateral  CARDIOVASCULAR:    [ ]Regular [ ]Irregular [ ]Tachy  [ ]Tolu [ ]Murmur [ ]Other  GASTROINTESTINAL:  [ ]Soft  [ ]Distended   [ ]+BS  [ ]Non tender [ ]Tender  [ ]PEG [ ]OGT/ NGT   Last BM:   20 @ 07:  -  20 @ 07:00  --------------------------------------------------------  OUT: 0 mL    20 @ 07:01  -  20 @ 07:00  --------------------------------------------------------  OUT: 0 mL       GENITOURINARY:  [ ]Normal [ ] Incontinent   [ ]Oliguria/Anuria   [ ][place]  MUSCULOSKELETAL:   [ ]Normal   [ ]Weakness  [ ]Bed/Wheelchair bound [ ]Edema  NEUROLOGIC:   [ ]No focal deficits  [ ] Cognitive impairment  [ ] Dysphagia [ ]Dysarthria [ ] Paresis [ ]Other   SKIN:   [ ]Normal   [ ]Pressure ulcer(s)  [ ]Rash    CRITICAL CARE:  [ ] Shock Present  [ ]Septic [ ]Cardiogenic [ ]Neurologic [ ]Hypovolemic  [ ]  Vasopressors [ ]  Inotropes   [ ] Respiratory failure present  [ ] Acute  [ ] Chronic [ ] Hypoxic  [ ] Hypercarbic [ ] Other  [ ] Other organ failure     LABS:                        10.2   11.62 )-----------( 80       ( 2020 03:38 )             31.6       150<H>  |  123<H>  |  58<H>  ----------------------------<  78  3.2<L>   |  21<L>  |  2.44<H>    Ca    7.8<L>      2020 03:38  Phos  2.7       Mg     1.6         TPro  4.5<L>  /  Alb  1.3<L>  /  TBili  0.5  /  DBili  x   /  AST  17  /  ALT  11<L>  /  AlkPhos  62    PT/INR - ( 2020 03:37 )   PT: 13.0 sec;   INR: 1.16 ratio         PTT - ( 2020 03:37 )  PTT:34.2 sec    Urinalysis Basic - ( 2020 00:31 )    Color: Yellow / Appearance: very cloudy / S.015 / pH: x  Gluc: x / Ketone: Negative  / Bili: Negative / Urobili: Negative mg/dL   Blood: x / Protein: 100 mg/dL / Nitrite: Negative   Leuk Esterase: Moderate / RBC: 0-2 /HPF / WBC 6-10   Sq Epi: x / Non Sq Epi: Few / Bacteria: Many      RADIOLOGY & ADDITIONAL STUDIES:    Protein Calorie Malnutrition Present: [ ] yes [ ] no  [ ] PPSV2 < or = 30%  [ ] significant weight loss [ ] poor nutritional intake [ ] anasarca [ ] catabolic state Albumin, Serum: 1.3 g/dL (20 @ 03:38)      REFERRALS:   [ ]Chaplaincy  [ ] Hospice  [ ]Child Life  [ ]Social Work  [ ]Case management [ ]Holistic Therapy   Goals of Care Document: INTERVAL HPI/OVERNIGHT EVENTS: upgraded to ICU for hypotension, hypothermia, low H/H    Code Status: DNR  Allergies    No Known Allergies    Intolerances    MEDICATIONS  (STANDING):  buDESOnide    Inhalation Suspension 0.5 milliGRAM(s) Inhalation every 12 hours  carbamide peroxide Otic Solution 1 Drop(s) Both Ears two times a day  carvedilol 12.5 milliGRAM(s) Oral every 12 hours  hydrocortisone sodium succinate Injectable 100 milliGRAM(s) IV Push every 8 hours  levothyroxine Injectable 44 MICROGram(s) IV Push at bedtime  magnesium sulfate  IVPB 1 Gram(s) IV Intermittent once  pantoprazole  Injectable 40 milliGRAM(s) IV Push two times a day  piperacillin/tazobactam IVPB.. 3.375 Gram(s) IV Intermittent every 12 hours  potassium chloride  10 mEq/100 mL IVPB 10 milliEquivalent(s) IV Intermittent every 1 hour  sodium chloride 0.65% Nasal 1 Spray(s) Both Nostrils three times a day  sucralfate 1 Gram(s) Oral four times a day  vancomycin  IVPB 1250 milliGRAM(s) IV Intermittent once    MEDICATIONS  (PRN):  acetaminophen   Tablet .. 650 milliGRAM(s) Oral every 6 hours PRN Mild Pain (1 - 3)  acetaminophen   Tablet .. 650 milliGRAM(s) Oral every 6 hours PRN Mild Pain (1 - 3)  albuterol/ipratropium for Nebulization 3 milliLiter(s) Nebulizer every 6 hours PRN Shortness of Breath and/or Wheezing  HYDROmorphone  Injectable 0.5 milliGRAM(s) IV Push every 4 hours PRN mod pain/dyspnea  ondansetron Injectable 4 milliGRAM(s) IV Push every 6 hours PRN Nausea and/or Vomiting      PRESENT SYMPTOMS: [ x]Unable to obtain due to poor mentation   Source if other than patient:  [ ]Family   [ ]Team     Pain (Impact on QOL):    Location:  Severity:  Minimal acceptable level (0-10 scale):       Quality:       Onset:  Duration:  Aggravating factors:  Relieving Factors  Radiation:    Dyspnea:  Yes [ ] No [ ] - [ ]Mild [ ]Moderate [ ]Severe  Anxiety:    Yes [ ] No [ ] - [ ]Mild [ ]Moderate [ ]Severe  Fatigue:    Yes [ ] No [ ] - [ ]Mild [ ]Moderate [ ]Severe  Nausea:    Yes [ ] No [ ] - [ ]Mild [ ]Moderate [ ]Severe                         Loss of appetite: Yes [ ] No [ ] - [ ]Mild [ ]Moderate [ ]Severe             Constipation:  Yes [ ] No [ ] - [ ]Mild [ ]Moderate [ ]Severe  Grief: Yes [ ] No [ ]     PAIN AD Score:	2  [email or web address]/[email or web address] (Ctrl + left click to view)    Other Symptoms:  [x ]All other review of systems negative     Karnofsky Performance Score/Palliative Performance Status Version 2:     10  %    http://palliative.info/resource_material/[email or web address].pdf      PHYSICAL EXAM:  Vital Signs Last 24 Hrs  T(C): 35.6 (2020 05:14), Max: 35.6 (2020 01:00)  T(F): 96 (2020 05:14), Max: 96 (2020 01:00)  HR: 87 (2020 07:00) (53 - 89)  BP: 153/42 (2020 07:00) (70/44 - 170/71)  BP(mean): 73 (2020 07:00) (59 - 105)  RR: 21 (2020 07:00) (5 - 26)  SpO2: 98% (2020 07:00) (97% - 100%) I&O's Summary    2020 07:01  -  2020 07:00  --------------------------------------------------------  IN: 684 mL / OUT: 215 mL / NET: 469 mL         GENERAL:  [ ]Alert  [ ]Oriented x   [ ]Lethargic  [ ]Cachexia  [x]Unarousable  [ ]Verbal  [x]Non-Verbal  Behavioral:   [ ] Anxiety  [ ] Delirium [ ] Agitation [ ] Other  HEENT:  [ ]Normal   [ ]Dry mouth   [ ]ET Tube/Trach  [ ]Oral lesions  PULMONARY: bradypneic  [ ]Clear [ ]Tachypnea  [ xAudible excessive secretions   [ ]Rhonchi        [ ]Right [ ]Left [ ]Bilateral  [x]Crackles        [ ]Right [ ]Left [ xBilateral  [ ]Wheezing     [ ]Right [ ]Left [ ]Bilateral  CARDIOVASCULAR:    [x]Regular [ ]Irregular [ ]Tachy  [ ]Tolu [ ]Murmur [ ]Other  GASTROINTESTINAL:  [ ]Soft  [x]Distended   [ ]+BS  [ ]Non tender [ xTender  [ ]PEG [ ]OGT/ NGT   Last BM: 20 @ 07:01  -  20 @ 07:00  --------------------------------------------------------  OUT: 0 mL    20 @ 07:01  -  20 @ 07:00  --------------------------------------------------------  OUT: 0 mL       GENITOURINARY:  [ ]Normal [ ] Incontinent   [xOliguria/Anuria   [xFoley  MUSCULOSKELETAL:   [ ]Normal   [ ]Weakness  [ xBed/Wheelchair bound [ xEdema  NEUROLOGIC:   [ ]No focal deficits  [ x Cognitive impairment  [x] Dysphagia [ ]Dysarthria [ x Paresis [ ]Other   SKIN:   [ ]Normal   [ xPressure ulcer(s)  [ ]Rash    CRITICAL CARE:  [x] Shock Present  [ ]Septic [ ]Cardiogenic [ ]Neurologic [x]Hypovolemic  [ ]  Vasopressors [ ]  Inotropes   [ ] Respiratory failure present  [ ] Acute  [ ] Chronic [ ] Hypoxic  [ ] Hypercarbic [ ] Other  [x] Other organ failure     LABS:                        10.2   11.62 )-----------( 80       ( 2020 03:38 )             31.6   01-30    150<H>  |  123<H>  |  58<H>  ----------------------------<  78  3.2<L>   |  21<L>  |  2.44<H>    Ca    7.8<L>      2020 03:38  Phos  2.7     -  Mg     1.6         TPro  4.5<L>  /  Alb  1.3<L>  /  TBili  0.5  /  DBili  x   /  AST  17  /  ALT  11<L>  /  AlkPhos  62    PT/INR - ( 2020 03:37 )   PT: 13.0 sec;   INR: 1.16 ratio         PTT - ( 2020 03:37 )  PTT:34.2 sec    Urinalysis Basic - ( 2020 00:31 )    Color: Yellow / Appearance: very cloudy / S.015 / pH: x  Gluc: x / Ketone: Negative  / Bili: Negative / Urobili: Negative mg/dL   Blood: x / Protein: 100 mg/dL / Nitrite: Negative   Leuk Esterase: Moderate / RBC: 0-2 /HPF / WBC 6-10   Sq Epi: x / Non Sq Epi: Few / Bacteria: Many      RADIOLOGY & ADDITIONAL STUDIES:    Protein Calorie Malnutrition Present: [x] yes [ ] no  [x] PPSV2 < or = 30%  [ x significant weight loss [x] poor nutritional intake [ x anasarca [x] catabolic state Albumin, Serum: 1.3 g/dL (20 @ 03:38)      REFERRALS:   [ ]Chaplaincy  [ ] Hospice  [ ]Child Life  [ ]Social Work  [ ]Case management [ ]Holistic Therapy   Goals of Care Document:

## 2020-01-30 NOTE — PROGRESS NOTE ADULT - SUBJECTIVE AND OBJECTIVE BOX
79 years old female from [facility] rehab c/o coughing sob for three to four days. Pt is sent here to rule out chf vs pneumonia. Pt is alert and oriented to person only unable to give detail hx due to hx of Alzheimer according to the [facility] pt had sat of 91 to 92 %,	    PAST MEDICAL/SURGICAL/FAMILY/SOCIAL HISTORY:    Past Medical History:  Alzheimers disease    CVA (cerebral vascular accident)    Depression    HTN (hypertension)    MI, old.  recently Dx c hypothyroidism, TSH > 111 started Levothyroxin 50mcg qd as per Dr Vigil (2020 11:41)      Chief Complaint:  Patient is a 79y old  Female who presents with a chief complaint of sob cough (2020 22:56)      Review of Systems:    General:  No wt loss, fevers, chills, night sweats  Eyes:  Good vision, no reported pain  ENT:  No sore throat, pain, runny nose, dysphagia  CV:  No pain, palpitations, hypo/hypertension  Resp:  dyspnea, cough  GI:  No pain, nausea, vomiting, diarrhea, constipation           Social History/Family History  SOCHX:   tobacco,  -  alcohol    FMHX: FA/MO  - contributory       Discussed with:  PMD, Family    Physical Exam:    Vital Signs:  Vital Signs Last 24 Hrs  T(C): 36.2 (2020 17:06), Max: 36.9 (2020 00:18)  T(F): 97.2 (2020 17:06), Max: 98.5 (2020 00:18)  HR: 74 (2020 21:55) (68 - 87)  BP: 179/78 (2020 17:06) (132/71 - 179/78)  BP(mean): --  RR: 18 (2020 17:06) (18 - 18)  SpO2: 97% (2020 21:55) (94% - 98%)  Daily     Daily Weight in k.8 (2020 04:59)  I&O's Summary    2020 07:01  -  2020 07:00  --------------------------------------------------------  IN: 560 mL / OUT: 0 mL / NET: 560 mL          Chest:  fair air entry bilateral  Cardiovascular:  Regular rhythm, S1, S2, no murmur/rub/S3/S4, no carotid/femoral/abdominal bruit, radial/pedal pulses 2+,   Abdomen:  Soft, non-tender, non-distended, normoactive bowel sounds, no HSM        Laboratory:          138  |  104  |  69<H>  ----------------------------<  111<H>  3.8   |  25  |  3.77<H>    Ca    8.1<L>      2020 07:22          Assessment:  I am asked to assist this patient admitted with shortness of breath  The patient has multifactorial causes of shortness of breath, renal insufficiency, anemia  And now after a diagnostic echocardiogram within normal ejection fraction the reading shows mitral regurgitation and tricuspid regurgitation  After review of the studies this regurgitation is not considered more moderate range  upper GI bleed  GI noted,  EGD noted with gastric ulcer, GI treatment continues  the patient is now transferred back to the MICU  The patient remains in critical condition, prognosis is poor and family aware  Comfort measures initiated, palliation

## 2020-01-30 NOTE — PROGRESS NOTE ADULT - SUBJECTIVE AND OBJECTIVE BOX
Patient seen and examined at bedside in CCU.  Sister at bedside.  Hypotensive.  (55/38)  No emesis or melena overnight per RN.      Vital Signs Last 24 Hrs  T(F): 96 (01-30-20 @ 05:14), Max: 96 (01-30-20 @ 01:00)  HR: 73 (01-30-20 @ 11:00)  BP: 55/38 (01-30-20 @ 11:00)  RR: 15 (01-30-20 @ 11:00)  SpO2: 100% (01-30-20 @ 11:00)      GENERAL: moves to painful stimuli, eyes remain closed, hypotensive  ABDOMEN: +Bowel sounds, soft, Nontender, Nondistended  EXTREMITIES:  no calf tenderness, No edema    I&O's Detail    29 Jan 2020 07:01  -  30 Jan 2020 07:00  --------------------------------------------------------  IN:    Packed Red Blood Cells: 334 mL    Solution: 100 mL    Solution: 250 mL  Total IN: 684 mL    OUT:    Indwelling Catheter - Urethral: 215 mL  Total OUT: 215 mL    Total NET: 469 mL      30 Jan 2020 07:01  -  30 Jan 2020 11:11  --------------------------------------------------------  IN:    Solution: 250 mL    Solution: 200 mL  Total IN: 450 mL    OUT:    Indwelling Catheter - Urethral: 20 mL  Total OUT: 20 mL    Total NET: 430 mL          LABS:                        10.2   11.62 )-----------( 80       ( 30 Jan 2020 03:38 )             31.6     01-30    150<H>  |  123<H>  |  58<H>  ----------------------------<  78  3.2<L>   |  21<L>  |  2.44<H>    Ca    7.8<L>      30 Jan 2020 03:38  Phos  2.7     01-30  Mg     1.6     01-30    TPro  4.5<L>  /  Alb  1.3<L>  /  TBili  0.5  /  DBili  x   /  AST  17  /  ALT  11<L>  /  AlkPhos  62  01-30    PT/INR - ( 30 Jan 2020 03:37 )   PT: 13.0 sec;   INR: 1.16 ratio         PTT - ( 30 Jan 2020 03:37 )  PTT:34.2 sec    Impression: 80 y/o female with PMH Alzheimer's, CVA, Depression, CHF, HTN, MI (remote), recent dx of hypothyroidism with GI Bleed, S/P EGD and Colonoscopy 1/9, s/p 3 units of PRBC total.  S/p RRT 1/18 for new episode of hematemesis and melena, upgraded to ICU, s/p 4u PRBC and 1u FFP.  S/p EGD 1/21: gastric ulcer in fundus s/p biopsy.  Further episodes of emesis.  S/p EGD (3rd of admission):  in fundus s/p APC and EPI.      Readmitted to CCU overnight for hypotension and low H/H s/p transfusion.   No emesis or melena overnight.    Responded to transfusion.  Patient remains hypotensive.  Palliative care explained to sister at bedside that patient is likely in active dying process.      Plan   - Pt remains a very poor surgical candidate.  No surgical intervention.      - Continue ICU medical, supportive/comfort care. Patient seen and examined at bedside in CCU.  Sister at bedside.  Hypotensive.  (55/38)  No emesis or melena overnight per RN.      Vital Signs Last 24 Hrs  T(F): 96 (01-30-20 @ 05:14), Max: 96 (01-30-20 @ 01:00)  HR: 73 (01-30-20 @ 11:00)  BP: 55/38 (01-30-20 @ 11:00)  RR: 15 (01-30-20 @ 11:00)  SpO2: 100% (01-30-20 @ 11:00)      GENERAL: nonresponsive   ABDOMEN: +Bowel sounds, soft, Nontender, Nondistended  EXTREMITIES:  no calf tenderness, No edema    I&O's Detail    29 Jan 2020 07:01  -  30 Jan 2020 07:00  --------------------------------------------------------  IN:    Packed Red Blood Cells: 334 mL    Solution: 100 mL    Solution: 250 mL  Total IN: 684 mL    OUT:    Indwelling Catheter - Urethral: 215 mL  Total OUT: 215 mL    Total NET: 469 mL      30 Jan 2020 07:01  -  30 Jan 2020 11:11  --------------------------------------------------------  IN:    Solution: 250 mL    Solution: 200 mL  Total IN: 450 mL    OUT:    Indwelling Catheter - Urethral: 20 mL  Total OUT: 20 mL    Total NET: 430 mL          LABS:                        10.2   11.62 )-----------( 80       ( 30 Jan 2020 03:38 )             31.6     01-30    150<H>  |  123<H>  |  58<H>  ----------------------------<  78  3.2<L>   |  21<L>  |  2.44<H>    Ca    7.8<L>      30 Jan 2020 03:38  Phos  2.7     01-30  Mg     1.6     01-30    TPro  4.5<L>  /  Alb  1.3<L>  /  TBili  0.5  /  DBili  x   /  AST  17  /  ALT  11<L>  /  AlkPhos  62  01-30    PT/INR - ( 30 Jan 2020 03:37 )   PT: 13.0 sec;   INR: 1.16 ratio         PTT - ( 30 Jan 2020 03:37 )  PTT:34.2 sec    Impression: 78 y/o female with PMH Alzheimer's, CVA, Depression, CHF, HTN, MI (remote), recent dx of hypothyroidism with GI Bleed, S/P EGD and Colonoscopy 1/9, s/p 3 units of PRBC total.  S/p RRT 1/18 for new episode of hematemesis and melena, upgraded to ICU, s/p 4u PRBC and 1u FFP.  S/p EGD 1/21: gastric ulcer in fundus s/p biopsy.  Further episodes of emesis.  S/p EGD (3rd of admission):  in fundus s/p APC and EPI.      Readmitted to CCU overnight for hypotension and low H/H s/p 2UPRBC 1/30.   No emesis or melena overnight.   Responded to transfusion.  Patient remains hypotensive.  Palliative care explained to sister at bedside that patient is likely in active dying process.      Plan   - Pt remains a very poor surgical candidate.  No surgical intervention.      - Continue ICU medical, supportive/comfort care. Patient seen and examined at bedside in CCU.  Sister at bedside.  Hypotensive.  (55/38)  No emesis or melena overnight per RN.      Vital Signs Last 24 Hrs  T(F): 96 (01-30-20 @ 05:14), Max: 96 (01-30-20 @ 01:00)  HR: 73 (01-30-20 @ 11:00)  BP: 55/38 (01-30-20 @ 11:00)  RR: 15 (01-30-20 @ 11:00)  SpO2: 100% (01-30-20 @ 11:00)      GENERAL: nonresponsive   ABDOMEN: +Bowel sounds, soft, Nontender, Nondistended  EXTREMITIES:  no calf tenderness, No edema    I&O's Detail    29 Jan 2020 07:01  -  30 Jan 2020 07:00  --------------------------------------------------------  IN:    Packed Red Blood Cells: 334 mL    Solution: 100 mL    Solution: 250 mL  Total IN: 684 mL    OUT:    Indwelling Catheter - Urethral: 215 mL  Total OUT: 215 mL    Total NET: 469 mL      30 Jan 2020 07:01  -  30 Jan 2020 11:11  --------------------------------------------------------  IN:    Solution: 250 mL    Solution: 200 mL  Total IN: 450 mL    OUT:    Indwelling Catheter - Urethral: 20 mL  Total OUT: 20 mL    Total NET: 430 mL          LABS:                        10.2   11.62 )-----------( 80       ( 30 Jan 2020 03:38 )             31.6     01-30    150<H>  |  123<H>  |  58<H>  ----------------------------<  78  3.2<L>   |  21<L>  |  2.44<H>    Ca    7.8<L>      30 Jan 2020 03:38  Phos  2.7     01-30  Mg     1.6     01-30    TPro  4.5<L>  /  Alb  1.3<L>  /  TBili  0.5  /  DBili  x   /  AST  17  /  ALT  11<L>  /  AlkPhos  62  01-30    PT/INR - ( 30 Jan 2020 03:37 )   PT: 13.0 sec;   INR: 1.16 ratio         PTT - ( 30 Jan 2020 03:37 )  PTT:34.2 sec    Impression: 80 y/o female with PMH Alzheimer's, CVA, Depression, CHF, HTN, MI (remote), recent dx of hypothyroidism with GI Bleed, S/P EGD and Colonoscopy 1/9, s/p 3 units of PRBC total.  S/p RRT 1/18 for new episode of hematemesis and melena, upgraded to ICU, s/p 4u PRBC and 1u FFP.  S/p EGD 1/21: gastric ulcer in fundus s/p biopsy.  Further episodes of emesis.  S/p EGD (3rd of admission):  in fundus s/p APC and EPI.      Readmitted to CCU overnight for hypotension and low H/H s/p 2UPRBC 1/30.   No emesis or melena overnight.   Responded to transfusion.  Patient remains hypotensive.  Palliative care explained to sister at bedside that patient is likely in active dying process.      Plan   - Pt remains a very poor surgical candidate.    - Per the discussion held between palliative care and patient's family, family does not wish to escalate patient's care any further and do not want surgical intervention.    - Continue ICU medical, supportive/comfort care.    - Discussed with Dr. Chaparro.

## 2020-01-30 NOTE — PROGRESS NOTE ADULT - PROBLEM SELECTOR PLAN 7
Called daughter Katelyn- she is entering the hospital now coming to visit- Called daughter Katelyn- she met me at bedside this morning, pt BP and RR dropped as we spoke, I alerted her that her mother is likely to pass soon, offered comfort care, pastoral care.  notified by RN, daughter also reaching out to their  but he is coming from the [place]. Daughter expressed understanding and is staying at bedside. Pt has dilaudid IV and scopolamine ordered. Will not escalate care

## 2020-01-30 NOTE — PHYSICAL THERAPY INITIAL EVALUATION ADULT - ACTIVE RANGE OF MOTION EXAMINATION, REHAB EVAL
bilateral  lower extremity Active ROM was WFL (within functional limits)/Lshoulder flexion 0- 70 degrees and ldecreased left finger flexion /contractures/Right UE Active ROM was WFL (within functional limits)
no

## 2020-01-30 NOTE — PROGRESS NOTE ADULT - PROBLEM SELECTOR PLAN 1
sick euthyroid syndrome may be present   no treatment for now   Check thyroid function tests in  a few days on IV levothyroxine 44 mcg   continue with same   Check thyroid function tests in a few days   The recovery of TSH may lag behind time wise compared to  thyroid hormones like T4 and T3

## 2020-01-30 NOTE — PROGRESS NOTE ADULT - PROBLEM SELECTOR PLAN 1
back in ICU back in ICU  s/p transfusion   H/H improved   BP remains low  recurrent GI blood loss s/p EGD x 3

## 2020-01-31 LAB
-  AMPICILLIN/SULBACTAM: SIGNIFICANT CHANGE UP
-  CEFAZOLIN: SIGNIFICANT CHANGE UP
-  CLINDAMYCIN: SIGNIFICANT CHANGE UP
-  DAPTOMYCIN: SIGNIFICANT CHANGE UP
-  ERYTHROMYCIN: SIGNIFICANT CHANGE UP
-  GENTAMICIN: SIGNIFICANT CHANGE UP
-  LINEZOLID: SIGNIFICANT CHANGE UP
-  OXACILLIN: SIGNIFICANT CHANGE UP
-  PENICILLIN: SIGNIFICANT CHANGE UP
-  RIFAMPIN: SIGNIFICANT CHANGE UP
-  TETRACYCLINE: SIGNIFICANT CHANGE UP
-  TRIMETHOPRIM/SULFAMETHOXAZOLE: SIGNIFICANT CHANGE UP
-  VANCOMYCIN: SIGNIFICANT CHANGE UP
CULTURE RESULTS: SIGNIFICANT CHANGE UP
GRAM STN FLD: SIGNIFICANT CHANGE UP
GRAM STN FLD: SIGNIFICANT CHANGE UP
METHOD TYPE: SIGNIFICANT CHANGE UP
ORGANISM # SPEC MICROSCOPIC CNT: SIGNIFICANT CHANGE UP
SPECIMEN SOURCE: SIGNIFICANT CHANGE UP

## 2020-01-31 PROCEDURE — 99233 SBSQ HOSP IP/OBS HIGH 50: CPT

## 2020-01-31 RX ORDER — HYDROMORPHONE HYDROCHLORIDE 2 MG/ML
1 INJECTION INTRAMUSCULAR; INTRAVENOUS; SUBCUTANEOUS
Refills: 0 | Status: DISCONTINUED | OUTPATIENT
Start: 2020-01-31 | End: 2020-02-01

## 2020-01-31 RX ORDER — HYDROMORPHONE HYDROCHLORIDE 2 MG/ML
0.5 INJECTION INTRAMUSCULAR; INTRAVENOUS; SUBCUTANEOUS EVERY 4 HOURS
Refills: 0 | Status: DISCONTINUED | OUTPATIENT
Start: 2020-01-31 | End: 2020-02-01

## 2020-01-31 RX ORDER — SODIUM CHLORIDE 9 MG/ML
1000 INJECTION, SOLUTION INTRAVENOUS
Refills: 0 | Status: DISCONTINUED | OUTPATIENT
Start: 2020-01-31 | End: 2020-01-31

## 2020-01-31 RX ORDER — ACETAMINOPHEN 500 MG
650 TABLET ORAL EVERY 6 HOURS
Refills: 0 | Status: DISCONTINUED | OUTPATIENT
Start: 2020-01-31 | End: 2020-02-01

## 2020-01-31 RX ADMIN — SCOPALAMINE 1 PATCH: 1 PATCH, EXTENDED RELEASE TRANSDERMAL at 07:30

## 2020-01-31 RX ADMIN — HYDROMORPHONE HYDROCHLORIDE 0.5 MILLIGRAM(S): 2 INJECTION INTRAMUSCULAR; INTRAVENOUS; SUBCUTANEOUS at 18:43

## 2020-01-31 RX ADMIN — Medication 44 MICROGRAM(S): at 21:58

## 2020-01-31 RX ADMIN — Medication 1 PATCH: at 07:30

## 2020-01-31 RX ADMIN — HYDROMORPHONE HYDROCHLORIDE 0.5 MILLIGRAM(S): 2 INJECTION INTRAMUSCULAR; INTRAVENOUS; SUBCUTANEOUS at 22:00

## 2020-01-31 RX ADMIN — HYDROMORPHONE HYDROCHLORIDE 0.5 MILLIGRAM(S): 2 INJECTION INTRAMUSCULAR; INTRAVENOUS; SUBCUTANEOUS at 21:46

## 2020-01-31 RX ADMIN — HYDROMORPHONE HYDROCHLORIDE 0.5 MILLIGRAM(S): 2 INJECTION INTRAMUSCULAR; INTRAVENOUS; SUBCUTANEOUS at 19:33

## 2020-01-31 NOTE — PROGRESS NOTE ADULT - ASSESSMENT
HPI:  · HPI Objective Statement: 79 years old female from [facility] rehab c/o coughing sob for three to four days. Pt is sent here to rule out chf vs pneumonia. Pt is alert and oriented to person only unable to give detail hx due to hx of Alzheimer according to the [facility] pt had sat of 91 to 92 %,	  ---------------- As Above ---------------------------------------------  Patient is a poor historian. Patient admitted with Bronchitis and UTI. Called for hematemsis. Patient had three episodes of bloody vomitus. ( 1/3 of a cup ) . Patient complaining of mid epigastric / chest pain but patient can not characterize the pain.   Patient has chronic anemia. but HGB fell 8.4 --> 7.2. Was on Carafate, now on IV PPI  KUB pending  HX of CRI    Addendum : Patient had another episode of bloody vomitus    Upper GI Bleed - Secondary to (?). 1) NPO  2)Check KUB  3) IV PPI 4) telemetry  5) NGT 6) EGD 7) Hold Carafate  8) Hold iron  ---Will speak with daughter.

## 2020-01-31 NOTE — CHART NOTE - NSCHARTNOTEFT_GEN_A_CORE
Assessment: Pt seen for acute severe malnutrition. Pt with recurrent blood loss & anemia/melena due to GIB currently receiving comfort & palliative care. Pt remains lethargic & frequent oral suction required to thick secretions.     Factors impacting intake: [ ] none [ ] nausea  [ ] vomiting [ ] diarrhea [ ] constipation  [ ]chewing problems [ ] swallowing issues  [x ] other: lethargy, melena & increased thick secretions    Diet Prescription: Diet, NPO (01-30-20 @ 07:25)    Intake: NPO x 4 days    Current Weight: Weight (kg): Wt=69.4kg(1/31), Wt=63.8kg(1/18)  sign wt gain 5.6kg wt gain(9%) x 12 days due to increased edema    Physical appearance: +3 gen edema    Pertinent Medications: MEDICATIONS  (STANDING):  levothyroxine Injectable 44 MICROGram(s) IV Push at bedtime  scopolamine   Patch 1 Patch Transdermal every 72 hours    MEDICATIONS  (PRN):  HYDROmorphone  Injectable 0.5 milliGRAM(s) IV Push every 4 hours PRN mod pain/dyspnea  ondansetron Injectable 4 milliGRAM(s) IV Push every 6 hours PRN Nausea and/or Vomiting    Pertinent Labs: 01-30 Na 150 mmol/L<H> Glu 78 mg/dL K+ 3.2 mmol/L<L> Cr 2.44 mg/dL<H> BUN 58 mg/dL<H> Phos 2.7 mg/dL Alb 1.3 g/dL<L> PAB n/a   Hgb 10.2 g/dL<L> Hct 31.6 %<L> HgA1C n/a     24Hr FS:  Skin: Coccyx stage II    Estimated Needs:   [ ] no change since previous assessment (1/3/2020)  [ ] recalculated:     Previous Nutrition Diagnosis:  Malnutrition; severe malnutrition in context of acute illness   Related to: inadequate protein-energy intake in setting of GI bleed   As evidenced by: < 50% nutrition needs > 5days, physical findings of moderate fat & muscle loss     Goal: nutrition/hydration as preferred/tolerated; N/A (pt NPO status)    NEW GOAL; Pt will be provided with nutrition within her/HCP wishes for care      Nutrition Diagnosis is [x ] ongoing  [ ] resolved  [ ] improved  [ ] not applicable       New Nutrition Diagnosis: [x ] not applicable       Interventions:   Recommend  [x ] Continue: NPO status  [ ] Change Diet To:  [ ] Nutrition Supplement:  [ ] Nutrition Support:  [ ] Other:     Monitoring and Evaluation:   [ ] PO intake [ x ] Tolerance to diet prescription [ x ] weights [ x ] labs[ x ] follow up per protocol  [ ] other:

## 2020-01-31 NOTE — PROGRESS NOTE ADULT - PROVIDER SPECIALTY LIST ADULT
Hypertension is improving with treatment.  Continue current treatment regimen.  Blood pressure will be reassessed at the next regular appointment.   Pulmonology

## 2020-01-31 NOTE — PROGRESS NOTE ADULT - PROBLEM SELECTOR PROBLEM 3
start after surgery
Dementia without behavioral disturbance, unspecified dementia type
Debility
Dementia without behavioral disturbance, unspecified dementia type

## 2020-01-31 NOTE — PROGRESS NOTE ADULT - SUBJECTIVE AND OBJECTIVE BOX
Patient is a 79y old  Female who presents with a chief complaint of sob cough (2020 23:38)      INTERVAL HPI/OVERNIGHT EVENTS: on palliative care confused  Melena , hypotensive    MEDICATIONS  (STANDING):  levothyroxine Injectable 44 MICROGram(s) IV Push at bedtime  scopolamine   Patch 1 Patch Transdermal every 72 hours    MEDICATIONS  (PRN):  HYDROmorphone  Injectable 0.5 milliGRAM(s) IV Push every 4 hours PRN mod pain/dyspnea  ondansetron Injectable 4 milliGRAM(s) IV Push every 6 hours PRN Nausea and/or Vomiting      Allergies    No Known Allergies    Intolerances        REVIEW OF SYSTEMS:              Vital Signs Last 24 Hrs  T(C): 36.1 (2020 07:30), Max: 37.3 (2020 16:00)  T(F): 96.9 (2020 07:30), Max: 99.1 (2020 16:00)  HR: 66 (2020 07:30) (62 - 88)  BP: 84/48 (2020 07:30) (52/36 - 100/66)  BP(mean): 56 (2020 07:30) (42 - 77)  RR: 12 (2020 07:30) (8 - 21)  SpO2: 100% (2020 07:30) (98% - 100%)    PHYSICAL EXAM:  general       HEENTdry mucous membranes  CHEST/LUNG: Clear to percussion bilaterally; < BS at bases  HEART: Regular rate and rhythm; soft distant  ABDOMEN: Soft, Nontender, Nondistended; Bowel sounds present  EXTREMITIES:  2+ Peripheral edema  LYMPH: No lymphadenopathy noted  SKIN: No rashes or lesions    LABS:                        10.2   11.62 )-----------( 80       ( 2020 03:38 )             31.6     01-30    150<H>  |  123<H>  |  58<H>  ----------------------------<  78  3.2<L>   |  21<L>  |  2.44<H>    Ca    7.8<L>      2020 03:38  Phos  2.7     01-30  Mg     1.6     01-30    TPro  4.5<L>  /  Alb  1.3<L>  /  TBili  0.5  /  DBili  x   /  AST  17  /  ALT  11<L>  /  AlkPhos  62  01-30    PT/INR - ( 2020 03:37 )   PT: 13.0 sec;   INR: 1.16 ratio         PTT - ( 2020 03:37 )  PTT:34.2 sec  Urinalysis Basic - ( 2020 00:31 )    Color: Yellow / Appearance: very cloudy / S.015 / pH: x  Gluc: x / Ketone: Negative  / Bili: Negative / Urobili: Negative mg/dL   Blood: x / Protein: 100 mg/dL / Nitrite: Negative   Leuk Esterase: Moderate / RBC: 0-2 /HPF / WBC 6-10   Sq Epi: x / Non Sq Epi: Few / Bacteria: Many      CAPILLARY BLOOD GLUCOSE        ABG - ( 2020 00:23 )  pH, Arterial: x     pH, Blood: 7.31  /  pCO2: 42    /  pO2: 61    / HCO3: 20    / Base Excess: -5.1  /  SaO2: 92                          RADIOLOGY & ADDITIONAL TESTS:    Imaging Personally Reviewed:  [y ] YES  [ ] NO    Consultant(s) Notes Reviewed:  [y ] YES  [ ] NO    Care Discussed with Consultants/Other Providers [ ] YES  [ ] NO    PROBLEMS:  CONGESTIVE HEART FAILURE;RSV INFECTION  CONGESTIVE BABB FAILURE;RSV INFECTION  SHORTNESS OF BREATH  CHF (congestive heart failure)  Nausea and vomiting, intractability of vomiting not specified, unspecified vomiting type  Encounter for palliative care  Advanced care planning/counseling discussion  Left hemiparesis  Dementia without behavioral disturbance, unspecified dementia type  Debility  Encephalopathy, unspecified  Hypothyroidism, unspecified type  Gastrointestinal hemorrhage, unspecified gastrointestinal hemorrhage type  Goals of care, counseling/discussion  HTN (hypertension)  Hyperlipidemia  Alzheimers disease  MI, old  Hypothyroidism  RSV infection  CHF (congestive heart failure)  GI bleed  Anemia      Care discussed with family,         [  ]   yes  [  ]  No    imp:    stable[ ]    unstable[  ]     improving [   ]       unchanged  [  ]                Plans:  ? gentle hydration NS + 20KCL at 50ml/h  prognosis poor

## 2020-01-31 NOTE — GOALS OF CARE CONVERSATION - ADVANCED CARE PLANNING - CONVERSATION DETAILS
Met with pt's dtr Katelyn. Hospice In pt discussed. She declines hospice at this time. She states she will discuss with her sister this evening. She will give me a call if she changes her mind. I left her with my contact info and the hospice folder and consents. Spoke with SAMY Stallworth and she aware of the above. Will f/u as needed.       Rylie Mixon RN

## 2020-01-31 NOTE — PROGRESS NOTE ADULT - SUBJECTIVE AND OBJECTIVE BOX
Patient is a 79y old  Female who presents with a chief complaint of sob cough (2020 09:59)      INTERVAL HPI/OVERNIGHT EVENTS:  pt NAD  now planned for comfort care    MEDICATIONS  (STANDING):  levothyroxine Injectable 44 MICROGram(s) IV Push at bedtime  scopolamine   Patch 1 Patch Transdermal every 72 hours    MEDICATIONS  (PRN):  HYDROmorphone  Injectable 0.5 milliGRAM(s) IV Push every 4 hours PRN mod pain/dyspnea  ondansetron Injectable 4 milliGRAM(s) IV Push every 6 hours PRN Nausea and/or Vomiting      REVIEW OF SYSTEMS:  unobtainable      Vital Signs Last 24 Hrs  T(C): 36.1 (2020 07:30), Max: 37.3 (2020 16:00)  T(F): 96.9 (2020 07:30), Max: 99.1 (2020 16:00)  HR: 66 (2020 10:00) (62 - 88)  BP: 84/46 (2020 10:00) (52/36 - 100/66)  BP(mean): 56 (2020 10:00) (42 - 77)  RR: 10 (2020 10:00) (8 - 21)  SpO2: 99% (2020 10:00) (98% - 100%)    PHYSICAL EXAM:  GENERAL: NAD      LABS:                        10.2   11.62 )-----------( 80       ( 2020 03:38 )             31.6     01-30    150<H>  |  123<H>  |  58<H>  ----------------------------<  78  3.2<L>   |  21<L>  |  2.44<H>    Ca    7.8<L>      2020 03:38  Phos  2.7     01-30  Mg     1.6     01-30    TPro  4.5<L>  /  Alb  1.3<L>  /  TBili  0.5  /  DBili  x   /  AST  17  /  ALT  11<L>  /  AlkPhos  62  01-30    PT/INR - ( 2020 03:37 )   PT: 13.0 sec;   INR: 1.16 ratio         PTT - ( 2020 03:37 )  PTT:34.2 sec  Urinalysis Basic - ( 2020 00:31 )    Color: Yellow / Appearance: very cloudy / S.015 / pH: x  Gluc: x / Ketone: Negative  / Bili: Negative / Urobili: Negative mg/dL   Blood: x / Protein: 100 mg/dL / Nitrite: Negative   Leuk Esterase: Moderate / RBC: 0-2 /HPF / WBC 6-10   Sq Epi: x / Non Sq Epi: Few / Bacteria: Many      CAPILLARY BLOOD GLUCOSE        Lipid panel:       ABG - ( 2020 00:23 )  pH, Arterial: x     pH, Blood: 7.31  /  pCO2: 42    /  pO2: 61    / HCO3: 20    / Base Excess: -5.1  /  SaO2: 92                      RADIOLOGY & ADDITIONAL TESTS:

## 2020-01-31 NOTE — PROGRESS NOTE ADULT - SUBJECTIVE AND OBJECTIVE BOX
lying in bed, events noted, daughter at bedsdie    Vital Signs Last 24 Hrs  T(C): 36.1 (2020 07:30), Max: 37.3 (2020 16:00)  T(F): 96.9 (2020 07:30), Max: 99.1 (2020 16:00)  HR: 72 (2020 09:00) (62 - 88)  BP: 90/55 (2020 09:00) (52/36 - 100/66)  BP(mean): 67 (2020 09:00) (42 - 77)  RR: 10 (2020 09:00) (8 - 21)  SpO2: 100% (2020 09:00) (98% - 100%)    PHYSICAL EXAM:    general - lethargic  HEENT - No Icterus  CVS - RRR  RS - AE B/L  Abd - soft, NT  Ext - Pulses +        LABS:                        10.2   11.62 )-----------( 80       ( 2020 03:38 )             31.6     01-30    150<H>  |  123<H>  |  58<H>  ----------------------------<  78  3.2<L>   |  21<L>  |  2.44<H>    Ca    7.8<L>      2020 03:38  Phos  2.7     01-30  Mg     1.6     01-30    TPro  4.5<L>  /  Alb  1.3<L>  /  TBili  0.5  /  DBili  x   /  AST  17  /  ALT  11<L>  /  AlkPhos  62  01-30    PT/INR - ( 2020 03:37 )   PT: 13.0 sec;   INR: 1.16 ratio         PTT - ( 2020 03:37 )  PTT:34.2 sec  Urinalysis Basic - ( 2020 00:31 )    Color: Yellow / Appearance: very cloudy / S.015 / pH: x  Gluc: x / Ketone: Negative  / Bili: Negative / Urobili: Negative mg/dL   Blood: x / Protein: 100 mg/dL / Nitrite: Negative   Leuk Esterase: Moderate / RBC: 0-2 /HPF / WBC 6-10   Sq Epi: x / Non Sq Epi: Few / Bacteria: Many        Culture - Blood (collected 2020 02:05)  Source: .Blood Blood  Gram Stain (2020 08:19):    Growth in aerobic bottle: Gram Positive Cocci in Clusters    Growth in anaerobic bottle:    Gram Positive Cocci in Clusters  Final Report (2020 08:19):    Growth in aerobic and anaerobic bottles: Methicillin resistant    Staphylococcus aureus    See previous culture [id number]    Culture - Blood (collected 2020 02:05)  Source: .Blood Blood  Gram Stain (2020 08:18):    Growth in aerobic bottle:    Gram Positive Cocci in Clusters    Growth in anaerobic bottle:    Gram Positive Cocci in Clusters  Final Report (2020 08:18):    Growth in aerobic and anaerobic bottles: Methicillin resistant    Staphylococcus aureus    ***Blood Panel PCR results on this specimen are available    approximately 3 hours after the Gram stain result.***    Gram stain, PCR, and/or culture results may notalways    correspond due to difference in methodologies.    ************************************************************    This PCR assay was performed using [employer].    The following targets are tested for: Enterococcus,    vancomycin resistant enterococci, Listeria monocytogenes,    coagulase negative staphylococci, S. aureus,    methicillin resistant S. aureus, Streptococcus agalactiae    (Group B), S. pneumoniae, S. pyogenes (Group A),    Acinetobacter baumannii, Enterobacter cloacae, E. coli,    Klebsiella oxytoca, K. pneumoniae, Proteus sp.,    Serratia marcescens, Haemophilus influenzae,    Neisseria meningitidis, Pseudomonas aeruginosa, Candida    albicans, C. glabrata, C krusei, C parapsilosis,    C. tropicalis and the KPC resistance gene.  Organism: Blood Culture PCR  Methicillin resistant Staphylococcus aureus (2020 08:18)  Organism: Methicillin resistant Staphylococcus aureus (2020 08:18)  Organism: Blood Culture PCR (2020 08:18)        RADIOLOGY & ADDITIONAL STUDIES:

## 2020-01-31 NOTE — CHART NOTE - NSCHARTNOTEFT_GEN_A_CORE
Assessment: Pt seen for follow-up & continues    Factors impacting intake: [ ] none [ ] nausea  [ ] vomiting [ ] diarrhea [ ] constipation  [ ]chewing problems [ ] swallowing issues  [x ] other: lethargic & increased thick secretions    Diet Prescription: Diet, NPO (01-30-20 @ 07:25)    Intake: NPO x 4 days    Current Weight: Weight (kg): 68.2 (01-29 @ 23:36)  % Weight Change    Physical appearance:     Pertinent Medications: MEDICATIONS  (STANDING):  levothyroxine Injectable 44 MICROGram(s) IV Push at bedtime  scopolamine   Patch 1 Patch Transdermal every 72 hours    MEDICATIONS  (PRN):  HYDROmorphone  Injectable 0.5 milliGRAM(s) IV Push every 4 hours PRN mod pain/dyspnea  ondansetron Injectable 4 milliGRAM(s) IV Push every 6 hours PRN Nausea and/or Vomiting    Pertinent Labs: 01-30 Na 150 mmol/L<H> Glu 78 mg/dL K+ 3.2 mmol/L<L> Cr 2.44 mg/dL<H> BUN 58 mg/dL<H> Phos 2.7 mg/dL Alb 1.3 g/dL<L> PAB n/a   Hgb 10.2 g/dL<L> Hct 31.6 %<L> HgA1C n/a     24Hr FS:  Skin:     Estimated Needs:   [ ] no change since previous assessment (1/3/2020 )  [ ] recalculated:     Previous Nutrition Diagnosis: Malnutrition moderate malnutrition in context of chronic illness.     Etiology inadequate protein-energy intake in setting of altered swallow, CVA, alzheimer's disease, cardiac hx.     Signs/Symptoms physical findings of mild/moderate fat & muscle loss.     Goal/Expected Outcome pt to consume >75% of meals & supplement; N/A (pt NPO x 4 days)      Nutrition Diagnosis is [ ] ongoing  [ ] resolved  [ ] improved  [ ] not applicable       New Nutrition Diagnosis: [ ] not applicable       Interventions:   Recommend  [ ] Continue:  [ ] Change Diet To:  [ ] Nutrition Supplement:  [ ] Nutrition Support:  [ ] Other:     Monitoring and Evaluation:   [ ] PO intake [ x ] Tolerance to diet prescription [ x ] weights [ x ] labs[ x ] follow up per protocol  [ ] other:

## 2020-01-31 NOTE — PROGRESS NOTE ADULT - PROBLEM SELECTOR PLAN 1
recurrent GI blood loss s/p EGD x 3  daughter amenable to comfort care, no blood tests, no further transfusions

## 2020-01-31 NOTE — CHART NOTE - NSCHARTNOTEFT_GEN_A_CORE
HPI    Ms. Arteaga is an 78 yo female with a PMH of alzheimers dz, CVA, Depression, CHF, HTN, MI (remote), recent dx of hypothyroidism who was admitted to [facility] on 1/1/2020 for CHF with + RSV who's hospital stay has been complicated by JONY, VRE of urine and anemia who was a RRT today for multiple episodes of hematemasis with associated N/V. Pt was prior on carafate and PPI during this hospitalization. During RRT pt was HDS and HR was NSR (pt not on beta blocker). Patient has been accepted to ICU for urgent blood transfusions and emergent EGD with GI. hospital course complicated by MRSA PNA, E. faecalis UTI, multiple GI bleeds ( gastric ulcer in fundus s/p APC/EPI) requiring ICU stay and EGD x 2. Had been transferred to medical floor, developed bloody stools transferred to tele and had EGD (third this admission) on 1/27 showing ulcer stigmata of recent bleed injected and cauterized. RE - admitted to ICU for 3rd time for blood loss anemia,, multiple MICU stays. Patient seen by palliative care now  DNR. Comfort care measures only. Patient remains comfortable at this time. Transfer to medicine service.    Report given Dr. Stone, PMD    Debbi Null NP-C  critical care

## 2020-01-31 NOTE — PROGRESS NOTE ADULT - SUBJECTIVE AND OBJECTIVE BOX
INTERVAL HPI/OVERNIGHT EVENTS: moved out of ICU    Code Status: DNR  Allergies    No Known Allergies    Intolerances    MEDICATIONS  (STANDING):  HYDROmorphone  Injectable 0.5 milliGRAM(s) IV Push every 4 hours  levothyroxine Injectable 44 MICROGram(s) IV Push at bedtime  scopolamine   Patch 1 Patch Transdermal every 72 hours    MEDICATIONS  (PRN):  acetaminophen  Suppository .. 650 milliGRAM(s) Rectal every 6 hours PRN Temp greater or equal to 38C (100.4F)  HYDROmorphone  Injectable 1 milliGRAM(s) IV Push every 1 hour PRN dyspnea/pain  ondansetron Injectable 4 milliGRAM(s) IV Push every 6 hours PRN Nausea and/or Vomiting      PRESENT SYMPTOMS: [ x]Unable to obtain due to poor mentation   Source if other than patient:  [ ]Family   [ ]Team     Pain (Impact on QOL):    Location:  Severity:  Minimal acceptable level (0-10 scale):       Quality:       Onset:  Duration:  Aggravating factors:  Relieving Factors  Radiation:    Dyspnea:  Yes [ ] No [ ] - [ ]Mild [ ]Moderate [ ]Severe  Anxiety:    Yes [ ] No [ ] - [ ]Mild [ ]Moderate [ ]Severe  Fatigue:    Yes [ ] No [ ] - [ ]Mild [ ]Moderate [ ]Severe  Nausea:    Yes [ ] No [ ] - [ ]Mild [ ]Moderate [ ]Severe                         Loss of appetite: Yes [ ] No [ ] - [ ]Mild [ ]Moderate [ ]Severe             Constipation:  Yes [ ] No [ ] - [ ]Mild [ ]Moderate [ ]Severe  Grief: Yes [ ] No [ ]     PAIN AD Score:	2  [email or web address] (Ctrl + left click to view)    Other Symptoms:  [ ]All other review of systems negative     Karnofsky Performance Score/Palliative Performance Status Version 2:     10    %    http://palliative.info/resource_material/PPSv2.pdf    PHYSICAL EXAM:  Vital Signs Last 24 Hrs  T(C): 36.1 (2020 12:00), Max: 37.2 (2020 20:00)  T(F): 96.9 (2020 12:00), Max: 99 (2020 20:00)  HR: 71 (2020 12:00) (62 - 88)  BP: 83/51 (2020 12:00) (55/44 - 100/66)  BP(mean): 56 (2020 10:00) (47 - 77)  RR: 13 (2020 12:00) (8 - 21)  SpO2: 97% (2020 12:00) (97% - 100%) I&O's Summary    2020 07:  -  2020 07:00  --------------------------------------------------------  IN: 550 mL / OUT: 305 mL / NET: 245 mL    2020 07:  -  2020 16:37  --------------------------------------------------------  IN: 0 mL / OUT: 40 mL / NET: -40 mL         GENERAL:  [ ]Alert  [ ]Oriented x   [ ]Lethargic  [ ]Cachexia  [x ]Unarousable  [ ]Verbal  [x ]Non-Verbal  Behavioral:   [ ] Anxiety  [ ] Delirium [ ] Agitation [ ] Other  HEENT:  [ ]Normal   [x ]Dry mouth   [ ]ET Tube/Trach  [ ]Oral lesions  PULMONARY:   [ ]Clear [ ]Tachypnea  [ ]Audible excessive secretions   [x ]Rhonchi        [ ]Right [ ]Left [x ]Bilateral  [ ]Crackles        [ ]Right [ ]Left [ ]Bilateral  [ ]Wheezing     [ ]Right [ ]Left [ ]Bilateral  CARDIOVASCULAR:    [x ]Regular [ ]Irregular [ ]Tachy  [ ]Tolu [ ]Murmur [ ]Other  GASTROINTESTINAL:  [ ]Soft  [ x]Distended   [ ]+BS  [x ]Non tender [ ]Tender  [ ]PEG [ ]OGT/ NGT   Last BM: 20 @ 07:01  -  20 @ 07:00  --------------------------------------------------------  OUT: 0 mL    01-27-20 @ 07:01  -  20 @ 07:00  --------------------------------------------------------  OUT: 0 mL       GENITOURINARY:  [ ]Normal [x ] Incontinent   [ ]Oliguria/Anuria   [ ]Stevenson  MUSCULOSKELETAL:   [ ]Normal   [ ]Weakness  [ x]Bed/Wheelchair bound [x ]Edema  NEUROLOGIC:   [ ]No focal deficits  [ x] Cognitive impairment  [ x] Dysphagia [x ]Dysarthria [x ] Paresis [ ]Other   SKIN:   [ ]Normal   [x ]Pressure ulcer(s)  [ ]Rash    CRITICAL CARE:  [ ] Shock Present  [ ]Septic [ ]Cardiogenic [ ]Neurologic [ ]Hypovolemic  [ ]  Vasopressors [ ]  Inotropes   [ ] Respiratory failure present  [ ] Acute  [ ] Chronic [ ] Hypoxic  [ ] Hypercarbic [ ] Other  [ ] Other organ failure     LABS:                        10.2   11.62 )-----------( 80       ( 2020 03:38 )             31.6   01-30    150<H>  |  123<H>  |  58<H>  ----------------------------<  78  3.2<L>   |  21<L>  |  2.44<H>    Ca    7.8<L>      2020 03:38  Phos  2.7       Mg     1.6         TPro  4.5<L>  /  Alb  1.3<L>  /  TBili  0.5  /  DBili  x   /  AST  17  /  ALT  11<L>  /  AlkPhos  62  30  PT/INR - ( 2020 03:37 )   PT: 13.0 sec;   INR: 1.16 ratio         PTT - ( 2020 03:37 )  PTT:34.2 sec    Urinalysis Basic - ( 2020 00:31 )    Color: Yellow / Appearance: very cloudy / S.015 / pH: x  Gluc: x / Ketone: Negative  / Bili: Negative / Urobili: Negative mg/dL   Blood: x / Protein: 100 mg/dL / Nitrite: Negative   Leuk Esterase: Moderate / RBC: 0-2 /HPF / WBC 6-10   Sq Epi: x / Non Sq Epi: Few / Bacteria: Many      RADIOLOGY & ADDITIONAL STUDIES:    Protein Calorie Malnutrition Present: [x ] yes [ ] no  [ x] PPSV2 < or = 30%  [ x] significant weight loss [ x] poor nutritional intake [ x] anasarca [x ] catabolic state Albumin, Serum: 1.3 g/dL (20 @ 03:38)      REFERRALS:   [ ]Chaplaincy  [x ] Hospice  [ ]Child Life  [ ]Social Work  [ ]Case management [ ]Holistic Therapy   Goals of Care Document:

## 2020-01-31 NOTE — PROGRESS NOTE ADULT - PROBLEM SELECTOR PLAN 1
On telemetry / 1B, HGB 8.6 --> 8.3 --> 6.5 --> 10.2 ,--> Supportive care only I   On IV PPI .Carafate

## 2020-01-31 NOTE — PROGRESS NOTE ADULT - PROBLEM SELECTOR PLAN 4
reliant for ADLs at present  per daughter pt was using walker and communicating prior to illness
reliant for ADLs   rapid decline in recent weeks during hospital stay
reliant for ADLs at present  per daughter pt was using walker and communicating prior to illness
s/p EGD x2  had melena and hematochezia this am and is being transferred to tele for more   monitoring, follow h/h  GI on board
s/p EGD x2  no current bleeding issues  monitoring h/h

## 2020-01-31 NOTE — PROGRESS NOTE ADULT - SUBJECTIVE AND OBJECTIVE BOX
Patient is a 79y old  Female who presents with a chief complaint of sob cough (2020 14:26)      HPI:  · HPI Objective Statement: 79 years old female from [facility] rehab c/o coughing sob for three to four days. Pt is sent here to rule out chf vs pneumonia. Pt is alert and oriented to person only unable to give detail hx due to hx of Alzheimer according to the [facility] pt had sat of 91 to 92 %,	    PAST MEDICAL/SURGICAL/FAMILY/SOCIAL HISTORY:    Past Medical History:  Alzheimers disease    CVA (cerebral vascular accident)    Depression    HTN (hypertension)    MI, old.  recently Dx c hypothyroidism, TSH > 111 started Levothyroxin 50mcg qd as per Dr Vigil (2020 11:41)      INTERVAL HPI/OVERNIGHT EVENTS:  Patient seen earlier today. Unresponsive. One melenotic BM over night.     MEDICATIONS  (STANDING):  levothyroxine Injectable 44 MICROGram(s) IV Push at bedtime  scopolamine   Patch 1 Patch Transdermal every 72 hours    MEDICATIONS  (PRN):  HYDROmorphone  Injectable 0.5 milliGRAM(s) IV Push every 4 hours PRN mod pain/dyspnea  ondansetron Injectable 4 milliGRAM(s) IV Push every 6 hours PRN Nausea and/or Vomiting      FAMILY HISTORY:  Family history of essential hypertension (Child)  Family history of stroke      Allergies    No Known Allergies    Intolerances        PMH/PSH:  Hypothyroidism  Constipation  Iron deficiency anemia  Major depressive disorder  Hyperlipidemia  GI bleed  Depression  Alzheimers disease  MI, old  CVA (cerebral vascular accident)  HTN (hypertension)  No pertinent past medical history  No significant past surgical history        REVIEW OF SYSTEMS: Unresponsive  CONSTITUTIONAL: No fever,  RESPIRATORY: No cough, wheezing, chills or hemoptysis; No shortness of breath  CARDIOVASCULAR: No chest pain, palpitations, dizziness, or leg swelling  GASTROINTESTINAL:  See above   Vital Signs Last 24 Hrs  T(C): 36.1 (2020 12:00), Max: 37.3 (2020 16:00)  T(F): 96.9 (2020 12:00), Max: 99.1 (2020 16:00)  HR: 71 (2020 12:00) (62 - 88)  BP: 83/51 (2020 12:00) (53/38 - 100/66)  BP(mean): 56 (2020 10:00) (42 - 77)  RR: 13 (2020 12:00) (8 - 21)  SpO2: 97% (2020 12:00) (97% - 100%)    PHYSICAL EXAM:  CHEST/LUNG: Clear to percussion bilaterally; No rales, rhonchi, wheezing, or rubs  HEART: Regular rate and rhythm; No murmurs, rubs, or gallops  ABDOMEN: Soft, Nontender, Nondistended; Bowel sounds present      LAB                          10.2   11.62 )-----------( 80       ( 2020 03:38 )             31.6       CBC:   @ 03:38  WBC 11.62   Hgb 10.2   Hct 31.6   Plts 80  MCV 90.5   @ 14:26  WBC 10.98   Hgb 6.5   Hct 20.8   Plts 98  MCV 90.8   @ 09:59  WBC 6.97   Hgb 8.3   Hct 26.4   Plts 147  MCV 86.8   @ 07:18  WBC 7.05   Hgb 8.6   Hct 27.6   Plts 135  MCV 87.6   @ 18:14  WBC 6.70   Hgb 9.2   Hct 29.4   Plts 121  MCV 87.2   @ 09:27  WBC 9.44   Hgb 9.5   Hct 29.9   Plts 153  MCV 87.2   @ 00:59  WBC 8.22   Hgb 10.2   Hct 32.6   Plts 147  MCV 88.8      Chemistry:   @ 03:38  Na+ 150  K+ 3.2  Cl- 123  CO2 21  BUN 58  Cr 2.44      @ 11:59  Na+ 153  K+ 3.0  Cl- 123  CO2 22  BUN 62  Cr 2.43      @ 08:16  Na+ 145  K+ 3.6  Cl- 116  CO2 20  BUN 64  Cr 2.60      @ 00:59  Na+ 144  K+ 3.8  Cl- 116  CO2 20  BUN 63  Cr 2.74         Glucose, Serum: 78 mg/dL ( @ 03:38)  Glucose, Serum: 121 mg/dL ( @ 11:59)  Glucose, Serum: 113 mg/dL ( @ 08:16)  Glucose, Serum: 125 mg/dL ( @ 00:59)      2020 03:38    150    |  123    |  58     ----------------------------<  78     3.2     |  21     |  2.44   2020 11:59    153    |  123    |  62     ----------------------------<  121    3.0     |  22     |  2.43   2020 08:16    145    |  116    |  64     ----------------------------<  113    3.6     |  20     |  2.60   2020 00:59    144    |  116    |  63     ----------------------------<  125    3.8     |  20     |  2.74     Ca    7.8        2020 03:38  Ca    7.5        2020 11:59  Ca    8.1        2020 08:16  Ca    8.0        2020 00:59  Phos  2.7       2020 03:38  Mg     1.6       2020 03:38    TPro  4.5    /  Alb  1.3    /  TBili  0.5    /  DBili  x      /  AST  17     /  ALT  11     /  AlkPhos  62     2020 03:38      PT/INR - ( 2020 03:37 )   PT: 13.0 sec;   INR: 1.16 ratio         PTT - ( 2020 03:37 )  PTT:34.2 sec    Urinalysis Basic - ( 2020 00:31 )    Color: Yellow / Appearance: very cloudy / S.015 / pH: x  Gluc: x / Ketone: Negative  / Bili: Negative / Urobili: Negative mg/dL   Blood: x / Protein: 100 mg/dL / Nitrite: Negative   Leuk Esterase: Moderate / RBC: 0-2 /HPF / WBC 6-10   Sq Epi: x / Non Sq Epi: Few / Bacteria: Many        CAPILLARY BLOOD GLUCOSE          C-Reactive Protein, Serum: 10.64 mg/dL ( @ 09:38)      RADIOLOGY & ADDITIONAL TESTS:    Imaging Personally Reviewed:  [ ] YES  [ ] NO    Consultant(s) Notes Reviewed:  [ ] YES  [ ] NO    Care Discussed with Consultants/Other Providers [ ] YES  [ ] NO

## 2020-01-31 NOTE — PROGRESS NOTE ADULT - PROBLEM SELECTOR PLAN 6
with edema due to immobility from prior CVA

## 2020-01-31 NOTE — PROGRESS NOTE ADULT - PROBLEM SELECTOR PLAN 5
continue synthroid (give half dose IV)
continue synthroid

## 2020-01-31 NOTE — PROGRESS NOTE ADULT - PROBLEM SELECTOR PROBLEM 4
GI bleed
Dementia without behavioral disturbance, unspecified dementia type
GI bleed

## 2020-01-31 NOTE — PROGRESS NOTE ADULT - SUBJECTIVE AND OBJECTIVE BOX
INTERVAL HPI:  79 year female with HTN, HLD, MI hx, CVA, Gi bleed, Anemia Depression and Alzheimer's dementia.  Sent from [facility] due to SOB. Possibility of pneumonia vs CHF was raised.   In ED with Respiratory distress required BIPAP support and RVP positive for RSV. Pt not able to provide more details due to dementia. Information from transfer papers and ED physician.  Nods no when asked for smoking.  20: With massive GI bleed, intubated and transferred to ICU.  20:  EGD+ Colonoscopy.  01/15/20:  Extubated.  20:  Out of ICU  20:  Back to ICU for upper GI bleed and significant drop in H & H.  20:  EGD, acute gastric ulcer.  20:  Out of ICU.  20:  EGD  20:  Back to ICU with GI bleed and hypotension.  20:  Transferred to medical floor, now comfort care.    OVERNIGHT EVENTS:  Out of ICU, now comfort care only per family.    Vital Signs Last 24 Hrs  T(C): 36.1 (2020 12:00), Max: 37.3 (2020 16:00)  T(F): 96.9 (2020 12:00), Max: 99.1 (2020 16:00)  HR: 71 (2020 12:00) (62 - 88)  BP: 83/51 (2020 12:00) (53/38 - 100/66)  BP(mean): 56 (2020 10:00) (42 - 77)  RR: 13 (2020 12:00) (8 - 21)  SpO2: 97% (2020 12:00) (97% - 100%)    PHYSICAL EXAM:  GEN:       Resting, comfortable.  HEENT:    Normal.    RESP:      no distress.  CVS:          Regular rate and rhythm.   ABD:         Soft, non-tender, non-distended;     MEDICATIONS  (STANDING):  levothyroxine Injectable 44 MICROGram(s) IV Push at bedtime  scopolamine   Patch 1 Patch Transdermal every 72 hours    MEDICATIONS  (PRN):  HYDROmorphone  Injectable 0.5 milliGRAM(s) IV Push every 4 hours PRN mod pain/dyspnea  ondansetron Injectable 4 milliGRAM(s) IV Push every 6 hours PRN Nausea and/or Vomiting    LABS:                        10.2   11.62 )-----------( 80       ( 2020 03:38 )             31.6         150<H>  |  123<H>  |  58<H>  ----------------------------<  78  3.2<L>   |  21<L>  |  2.44<H>    Ca    7.8<L>      2020 03:38  Phos  2.7       Mg     1.6         TPro  4.5<L>  /  Alb  1.3<L>  /  TBili  0.5  /  DBili  x   /  AST  17  /  ALT  11<L>  /  AlkPhos  62  30    PT/INR - ( 2020 03:37 )   PT: 13.0 sec;   INR: 1.16 ratio      PTT - ( 2020 03:37 )  PTT:34.2 sec   @ 00:23  pH: 7.31  pCO2: 42  pO2: 61  SaO2: 92    Urinalysis Basic - ( 2020 00:31 )    Color: Yellow / Appearance: very cloudy / S.015 / pH: x  Gluc: x / Ketone: Negative  / Bili: Negative / Urobili: Negative mg/dL   Blood: x / Protein: 100 mg/dL / Nitrite: Negative   Leuk Esterase: Moderate / RBC: 0-2 /HPF / WBC 6-10   Sq Epi: x / Non Sq Epi: Few / Bacteria: Many    ASSESSMENT AND PLAN:  ·	S/P Acute hypoxic  Respiratory failure .  ·	RSV tracheobronchitis.  ·	Left pleural effusion.  ·	Hypothyroidism with very high TSH.  ·	Anemia.  ·	VRE UTI  ·	Leukocytosis.  ·	Renal Insuffiencey.  ·	CVA by history.  ·	HTN.  ·	HLD.  ·	Alzheimer's dementia.  ·	GI bleed.  ·	MRSA Bacteremia.  ·	Hypotension.  ·	Hypothermia.    Out of ICU, family has decided comfort care only.  Discussed with daughter at bed side.

## 2020-01-31 NOTE — CHART NOTE - NSCHARTNOTEFT_GEN_A_CORE
Call from HCP Katelyn Henderson. Wants only comfort measure, Did not want to start IVF. Call to Dr. Stone made aware, Will Call hospice for evaluation.

## 2020-01-31 NOTE — PROGRESS NOTE ADULT - PROBLEM SELECTOR PROBLEM 7
Advanced care planning/counseling discussion
Encounter for palliative care
Advanced care planning/counseling discussion
Encounter for palliative care
Encounter for palliative care

## 2020-01-31 NOTE — PROGRESS NOTE ADULT - PROBLEM SELECTOR PLAN 2
KUB essentially non specific. Temp 93. NPO / IV fluid
No further N/V KUB essentially non specific. Clear liquid diet May need NGT feeds.  Temp 97.5. Sepsis from non GI origin.
No further N/V KUB essentially non specific. Clear liquid diet May need NGT feeds.  Temp 97.5. Sepsis from non GI origin.
Supportive care only as per daughter. Will follow on a PRN basis.
able to follow commands in Algerian  can shake head yes/ no   occasional one word answers
essentially bedbound at this point
unresponsive   appears to be in active dying process
essentially bedbound at this point
now unresponsive   appears to be in active dying process

## 2020-01-31 NOTE — PROGRESS NOTE ADULT - PROBLEM SELECTOR PLAN 7
Pt is now full comfort care, no interventions only those that provide comfort. Medications and orders reflect this, hospice referral requested by Primary team. Daughter at bedside comforted.

## 2020-01-31 NOTE — PROGRESS NOTE ADULT - PROBLEM SELECTOR PROBLEM 2
Debility
Debility
Encephalopathy, unspecified
Nausea and vomiting, intractability of vomiting not specified, unspecified vomiting type

## 2020-01-31 NOTE — PROGRESS NOTE ADULT - NSHPATTENDINGPLANDISCUSS_GEN_ALL_CORE
Surgical Housestaff.
RN/Team
team
team/ patient.
Surgical housestaff
Surgical housestaff
IDT
IDT
ICU Team
Medicine NP
eTresa Cerda

## 2020-01-31 NOTE — PROGRESS NOTE ADULT - ASSESSMENT
78year old female w PMH HTN, CVA residual left weakness, Hypothyroid, Dementia, Achalasia s/p POEMS admitted from rehab with RSV/resp distress hospital course complicated by UGIB requiring ICU stay and EGD x 2. Pt now transferred to medical floor, developed bloody stools transferred to tele and had EGD (third this admission) on 1/27 showing ulcer stigmata of recent bleed injected and cauterized. REadmitted to ICU for 3rd time for blood loss anemia, hypothermia, hypotension- daughter agrees for comfort care

## 2020-02-01 ENCOUNTER — TRANSCRIPTION ENCOUNTER (OUTPATIENT)
Age: 80
End: 2020-02-01

## 2020-02-01 VITALS
SYSTOLIC BLOOD PRESSURE: 55 MMHG | DIASTOLIC BLOOD PRESSURE: 29 MMHG | RESPIRATION RATE: 18 BRPM | HEART RATE: 62 BPM | OXYGEN SATURATION: 98 % | WEIGHT: 159.61 LBS | TEMPERATURE: 95 F

## 2020-02-01 DIAGNOSIS — K92.2 GASTROINTESTINAL HEMORRHAGE, UNSPECIFIED: ICD-10-CM

## 2020-02-01 RX ADMIN — HYDROMORPHONE HYDROCHLORIDE 0.5 MILLIGRAM(S): 2 INJECTION INTRAMUSCULAR; INTRAVENOUS; SUBCUTANEOUS at 07:06

## 2020-02-01 RX ADMIN — HYDROMORPHONE HYDROCHLORIDE 0.5 MILLIGRAM(S): 2 INJECTION INTRAMUSCULAR; INTRAVENOUS; SUBCUTANEOUS at 06:52

## 2020-02-01 NOTE — PROGRESS NOTE ADULT - REASON FOR ADMISSION
sob cough

## 2020-02-01 NOTE — DISCHARGE NOTE PROVIDER - NSDCMRMEDTOKEN_GEN_ALL_CORE_FT
acetaminophen 325 mg oral tablet: 2 tab(s) orally every 6 hours, As needed, Temp greater or equal to 38C (100.4F), Mild Pain (1 - 3)  atorvastatin 20 mg oral tablet: 1 tab(s) orally once a day (at bedtime)  carvedilol 12.5 mg oral tablet: 1 tab(s) orally every 12 hours  cefTRIAXone 1 g intravenous injection: 1 gram(s) intravenous every 24 hours  cholecalciferol oral tablet: 1000 unit(s) orally once a day  donepezil 10 mg oral tablet: 1 tab(s) orally once a day (at bedtime)  escitalopram 10 mg oral tablet: 1 tab(s) orally once a day  ferrous sulfate 325 mg (65 mg elemental iron) oral tablet: 1 tab(s) orally 3 times a day  heparin: 5000 unit(s) subcutaneous 2 times a day  hydrALAZINE 50 mg oral tablet: 1 tab(s) orally 3 times a day  ipratropium-albuterol 0.5 mg-2.5 mg/3 mLinhalation solution: 3 milliliter(s) inhaled every 6 hours  levothyroxine 50 mcg (0.05 mg) oral tablet: 1 tab(s) orally once a day  Protonix 40 mg oral delayed release tablet: 1 tab(s) orally 2 times a day  Senna 8.6 mg oral tablet: 1 tab(s) orally once a day (at bedtime)  sodium chloride 0.65% nasal spray: nasal 4 times a day  sucralfate 1 g oral tablet: 1 tab(s) orally 4 times a day (before meals and at bedtime)

## 2020-02-01 NOTE — DISCHARGE NOTE PROVIDER - CARE PROVIDER_API CALL
Geraldo Laird)  [place] [address]  Phone: [phone]  Fax: [phone]  Follow Up Time: Routine

## 2020-02-01 NOTE — DISCHARGE NOTE PROVIDER - HOSPITAL COURSE
PNA Multiple GI bleedings , aspiration PNA    Family requested palliativ - hospice care .    Pt  today

## 2020-02-01 NOTE — PROGRESS NOTE ADULT - PROBLEM SELECTOR PLAN 1
continue with current regimen   Check thyroid function tests in a few weeks and correct TSH to 3.0 or so

## 2020-02-01 NOTE — DISCHARGE NOTE FOR THE EXPIRED PATIENT - HOSPITAL COURSE
79 years old female from [facility] rehab c/o coughing sob for three to four days. Pt is sent here to rule out chf vs pneumonia. Pt is alert and oriented to person only unable to give detail hx due to hx of Alzheimer according to the [facility] pt had sat of 91 to 92 %,  CONGESTIVE BABB FAILURE, RSV INFECTION  SHORTNESS OF BREATH  CHF (congestive heart failure)  Nausea and vomiting, intractability of vomiting not specified, unspecified vomiting type  Encounter for palliative care  Advanced care planning/counseling discussion  Left hemiparesis  Dementia without behavioral disturbance, unspecified dementia type  Debility  Encephalopathy, unspecified  Hypothyroidism, unspecified type  Gastrointestinal hemorrhage, unspecified gastrointestinal hemorrhage type  Goals of care, counseling/discussion  HTN (hypertension)  Hyperlipidemia  Alzheimers disease  MI, old  Hypothyroidism  RSV infection  CHF (congestive heart failure)  GI bleed  Anemia  Care discussed with family,         [x ]   yes  [  ]  No  prognosis poor  Hospitalist Medicine PA    Pt is a 79y Female admitted for Sepsis, Notified by RN at 10:00 am to evaluate patient for absence of spontaneous respirations and pulses. Pt seen at bedside and evaluated. Pt is unresponsive to verbal and tactile stimuli, pupils round and fixed, respirations absent, heart sounds and pulses absent. Pronounced at 10:00 AM .  - Dr. Stone notified and aware  - Patient's family will be notified by Dr. Stone.

## 2020-02-01 NOTE — PROGRESS NOTE ADULT - SUBJECTIVE AND OBJECTIVE BOX
Patient is a 79y old  Female who presents with a chief complaint of sob cough (01 Feb 2020 10:40)      Interval History: discharge planning is on   same status     MEDICATIONS  (STANDING):  HYDROmorphone  Injectable 0.5 milliGRAM(s) IV Push every 4 hours  levothyroxine Injectable 44 MICROGram(s) IV Push at bedtime  scopolamine   Patch 1 Patch Transdermal every 72 hours    MEDICATIONS  (PRN):  acetaminophen  Suppository .. 650 milliGRAM(s) Rectal every 6 hours PRN Temp greater or equal to 38C (100.4F)  HYDROmorphone  Injectable 1 milliGRAM(s) IV Push every 1 hour PRN dyspnea/pain  ondansetron Injectable 4 milliGRAM(s) IV Push every 6 hours PRN Nausea and/or Vomiting      Allergies    No Known Allergies    Intolerances        REVIEW OF SYSTEMS:  CONSTITUTIONAL: no changes  EYES: No eye pain, visual disturbances, or discharge  ENMT:  No difficulty hearing, No sinus or throat pain  NECK: No pain or stiffness  RESPIRATORY: No cough, wheezing, chills or hemoptysis; No shortness of breath  CARDIOVASCULAR: No chest pain, palpitations or leg swelling  GASTROINTESTINAL: No abdominal or epigastric pain. No nausea, vomiting, or hematemesis; No diarrhea or constipation. No melena or hematochezia.  GENITOURINARY: No dysuria, frequency, hematuria, or incontinence  NEUROLOGICAL: No headaches, memory loss, loss of strength, numbness, or tremors  SKIN: No itching, burning, rashes, or lesions   ENDOCRINE: No heat or cold intolerance; No hair loss  MUSCULOSKELETAL: No joint pain or swelling; No muscle, back, or extremity pain  PSYCHIATRIC: No depression, anxiety, mood swings, or difficulty sleeping  HEME/LYMPH: No easy bruising, or bleeding gums  ALLERY AND IMMUNOLOGIC: No hives or eczema    Vital Signs Last 24 Hrs  T(C): 34.8 (01 Feb 2020 05:20), Max: 37.2 (31 Jan 2020 19:00)  T(F): 94.6 (01 Feb 2020 05:20), Max: 98.9 (31 Jan 2020 19:00)  HR: 62 (01 Feb 2020 05:20) (62 - 64)  BP: 55/29 (01 Feb 2020 05:20) (41/22 - 132/63)  BP(mean): --  RR: 18 (01 Feb 2020 05:20) (18 - 18)  SpO2: 98% (01 Feb 2020 05:20) (96% - 99%)    PHYSICAL EXAM:  GENERAL:   HEAD: Atraumatic, Normocephalic  EYES: PERRLA, conjunctiva and sclera clear  ENMT: No tonsillar erythema, exudates, or enlargement; Moist mucous membranes, Good dentition, No lesions  NECK: Supple, No JVD, Normal thyroid  NERVOUS SYSTEM:  Alert & Oriented X3, Good concentration; Motor Strength 5/5 B/L upper and lower extremities  CHEST/LUNG: Clear to auscultaion bilaterally; No rales, rhonchi, wheezing, or rubs  HEART: Regular rate and rhythm; No murmurs, rubs, or gallops  ABDOMEN: Soft, Nontender, Nondistended; Bowel sounds present  EXTREMITIES:  2+ Peripheral Pulses, no edema  SKIN: No rashes or lesions    LABS:        CAPILLARY BLOOD GLUCOSE        Lipid panel:           Thyroid:  Diabetes Tests:  Parathyroid Panel:  Adrenals:  RADIOLOGY & ADDITIONAL TESTS:    Imaging Personally Reviewed:  [ ] YES  [ ] NO    Consultant(s) Notes Reviewed:  [ ] YES  [ ] NO    Care Discussed with Consultants/Other Providers [ ] YES  [ ] NO

## 2020-02-01 NOTE — DISCHARGE NOTE PROVIDER - NSDCCPCAREPLAN_GEN_ALL_CORE_FT
PRINCIPAL DISCHARGE DIAGNOSIS  Diagnosis: Aspiration pneumonia  Assessment and Plan of Treatment:       SECONDARY DISCHARGE DIAGNOSES  Diagnosis: RSV infection  Assessment and Plan of Treatment:

## 2020-02-05 DIAGNOSIS — I34.0 NONRHEUMATIC MITRAL (VALVE) INSUFFICIENCY: ICD-10-CM

## 2020-02-05 DIAGNOSIS — D63.1 ANEMIA IN CHRONIC KIDNEY DISEASE: ICD-10-CM

## 2020-02-05 DIAGNOSIS — J12.1 RESPIRATORY SYNCYTIAL VIRUS PNEUMONIA: ICD-10-CM

## 2020-02-05 DIAGNOSIS — E78.5 HYPERLIPIDEMIA, UNSPECIFIED: ICD-10-CM

## 2020-02-05 DIAGNOSIS — E03.9 HYPOTHYROIDISM, UNSPECIFIED: ICD-10-CM

## 2020-02-05 DIAGNOSIS — I07.1 RHEUMATIC TRICUSPID INSUFFICIENCY: ICD-10-CM

## 2020-02-05 DIAGNOSIS — N18.4 CHRONIC KIDNEY DISEASE, STAGE 4 (SEVERE): ICD-10-CM

## 2020-02-05 DIAGNOSIS — E44.0 MODERATE PROTEIN-CALORIE MALNUTRITION: ICD-10-CM

## 2020-02-05 DIAGNOSIS — Z66 DO NOT RESUSCITATE: ICD-10-CM

## 2020-02-05 DIAGNOSIS — Z51.5 ENCOUNTER FOR PALLIATIVE CARE: ICD-10-CM

## 2020-02-05 DIAGNOSIS — I69.354 HEMIPLEGIA AND HEMIPARESIS FOLLOWING CEREBRAL INFARCTION AFFECTING LEFT NON-DOMINANT SIDE: ICD-10-CM

## 2020-02-05 DIAGNOSIS — G93.40 ENCEPHALOPATHY, UNSPECIFIED: ICD-10-CM

## 2020-02-05 DIAGNOSIS — J96.01 ACUTE RESPIRATORY FAILURE WITH HYPOXIA: ICD-10-CM

## 2020-02-05 DIAGNOSIS — K31.811 ANGIODYSPLASIA OF STOMACH AND DUODENUM WITH BLEEDING: ICD-10-CM

## 2020-02-05 DIAGNOSIS — B95.2 ENTEROCOCCUS AS THE CAUSE OF DISEASES CLASSIFIED ELSEWHERE: ICD-10-CM

## 2020-02-05 DIAGNOSIS — G30.9 ALZHEIMER'S DISEASE, UNSPECIFIED: ICD-10-CM

## 2020-02-05 DIAGNOSIS — N39.0 URINARY TRACT INFECTION, SITE NOT SPECIFIED: ICD-10-CM

## 2020-02-05 DIAGNOSIS — J69.0 PNEUMONITIS DUE TO INHALATION OF FOOD AND VOMIT: ICD-10-CM

## 2020-02-05 DIAGNOSIS — F02.80 DEMENTIA IN OTHER DISEASES CLASSIFIED ELSEWHERE, UNSPECIFIED SEVERITY, WITHOUT BEHAVIORAL DISTURBANCE, PSYCHOTIC DISTURBANCE, MOOD DISTURBANCE, AND ANXIETY: ICD-10-CM

## 2020-02-05 DIAGNOSIS — D62 ACUTE POSTHEMORRHAGIC ANEMIA: ICD-10-CM

## 2020-02-05 DIAGNOSIS — I50.9 HEART FAILURE, UNSPECIFIED: ICD-10-CM

## 2020-02-05 DIAGNOSIS — I13.2 HYPERTENSIVE HEART AND CHRONIC KIDNEY DISEASE WITH HEART FAILURE AND WITH STAGE 5 CHRONIC KIDNEY DISEASE, OR END STAGE RENAL DISEASE: ICD-10-CM

## 2020-02-05 DIAGNOSIS — A41.02 SEPSIS DUE TO METHICILLIN RESISTANT STAPHYLOCOCCUS AUREUS: ICD-10-CM

## 2020-02-05 DIAGNOSIS — K25.4 CHRONIC OR UNSPECIFIED GASTRIC ULCER WITH HEMORRHAGE: ICD-10-CM

## 2020-02-05 DIAGNOSIS — I25.2 OLD MYOCARDIAL INFARCTION: ICD-10-CM

## 2020-02-05 DIAGNOSIS — R65.20 SEVERE SEPSIS WITHOUT SEPTIC SHOCK: ICD-10-CM

## 2020-02-05 DIAGNOSIS — N17.0 ACUTE KIDNEY FAILURE WITH TUBULAR NECROSIS: ICD-10-CM

## 2020-02-05 DIAGNOSIS — J15.212 PNEUMONIA DUE TO METHICILLIN RESISTANT STAPHYLOCOCCUS AUREUS: ICD-10-CM

## 2020-02-05 DIAGNOSIS — J40 BRONCHITIS, NOT SPECIFIED AS ACUTE OR CHRONIC: ICD-10-CM

## 2020-02-05 DIAGNOSIS — R68.0 HYPOTHERMIA, NOT ASSOCIATED WITH LOW ENVIRONMENTAL TEMPERATURE: ICD-10-CM

## 2020-05-14 NOTE — DIETITIAN INITIAL EVALUATION ADULT. - PROBLEM SELECTOR PROBLEM 10
Dr. Vicente's office will contact you for a follow up appointment for one week.   Need for prophylactic measure

## 2020-05-17 NOTE — PROVIDER CONTACT NOTE (CRITICAL VALUE NOTIFICATION) - ACTION/TREATMENT ORDERED:
Awaiting MD orders.
PRBC units ordered.
Will follow-up
monitor for now.
potassium 10 meq ivss x1 orderd
yes

## 2020-06-09 NOTE — PATIENT PROFILE ADULT - EQUIPMENT CURRENTLY USED AT HOME
----- Message from Batool Sari sent at 6/9/2020  9:26 AM CDT -----  Contact: Janeth   tel:   [phone]   Caller says she wants to discuss her body with you.   Has some questions.   
yes

## 2020-10-27 NOTE — CHART NOTE - NSCHARTNOTEFT_GEN_A_CORE
Pt was transferred overnight for worsening hypotension, hypothermia, and AMS. She has had prolonged hospital course - initially admitted w/  RSV PNA, also developing MRSA PNA, E. faecalis UTI, multiple GI bleeds ( gastric ulcer in fundus s/p APC/EPI), multiple MICU stays, now with MRSA bacteremia and worsening sepsis. This morning in the unit the pt is hypotensive and unresponsive and appears to be entering the dying process. Dr. Richard has spoken with pt's daughter, Katelyn explaining her critical state at this point. We have decided to focus on comfort measures and will not escalate any further care. No further blood draws or extraneous medication. Scopolamine patch ordered. Dilaudid for comfort. All questions answered. EOMI; PERRL; no drainage or redness

## 2020-11-05 NOTE — ED ADULT NURSE NOTE - NSSEPSISNEWALTERMENTAL_ED_A_ED
SUBJECTIVE:    Patient ID: Dayo Cortes is a 46 y.o. female.    Chief Complaint: Back Pain    She is here to review her lumbar MRI which was done on 11/03/2020 to evaluate her complaint of bilateral low back pain at the lumbosacral junction radiating into the gluteal region.    The MRI is summarized below:    Impression:     Multilevel degenerative changes of the lumbar spine, as described in detail above, most pronounced at L4-5 and contribute to severe left and moderate right neural foraminal stenosis and mild-to-moderate spinal canal stenosis.  Severe bilateral neural foraminal stenosis at L5-S1 noted.      Clinically she is about the same.  Pain level 6/10.  Robaxin did not help her sleep any better.        Past Medical History:   Diagnosis Date    Abnormal Pap smear 2006    once but repeated pap smear and the resulted WNLs.    Abnormal Pap smear of cervix     early 30 's became normal     Social History     Socioeconomic History    Marital status: Single     Spouse name: Not on file    Number of children: Not on file    Years of education: Not on file    Highest education level: Not on file   Occupational History    Not on file   Social Needs    Financial resource strain: Not on file    Food insecurity     Worry: Not on file     Inability: Not on file    Transportation needs     Medical: Not on file     Non-medical: Not on file   Tobacco Use    Smoking status: Never Smoker    Smokeless tobacco: Never Used   Substance and Sexual Activity    Alcohol use: Yes     Frequency: Monthly or less     Drinks per session: Patient refused     Binge frequency: Patient refused     Comment: occasional    Drug use: No    Sexual activity: Yes     Partners: Male     Birth control/protection: Condom   Lifestyle    Physical activity     Days per week: Not on file     Minutes per session: Not on file    Stress: Not on file   Relationships    Social connections     Talks on phone: Not on file     Gets together:  "Not on file     Attends Nondenominational service: Not on file     Active member of club or organization: Not on file     Attends meetings of clubs or organizations: Not on file     Relationship status: Not on file   Other Topics Concern    Not on file   Social History Narrative    Not on file     Past Surgical History:   Procedure Laterality Date    COLONOSCOPY N/A 6/7/2017    Procedure: COLONOSCOPY;  Surgeon: Win Rodriguez MD;  Location: [facility];  Service: Endoscopy;  Laterality: N/A;     Family History   Problem Relation Age of Onset    Breast cancer Mother 40        unconfirmed.    Diabetes Maternal Grandmother     Thyroid disease Maternal Aunt         nodules and hashimotos    Ovarian cancer Neg Hx     Colon cancer Neg Hx      Vitals:    11/05/20 1040   Weight: 73.9 kg (162 lb 14.7 oz)   Height: 5' 6" (1.676 m)       Review of Systems   Constitutional: Negative for chills, diaphoresis, fatigue, fever and unexpected weight change.   HENT: Negative for trouble swallowing.    Eyes: Negative for visual disturbance.   Respiratory: Negative for shortness of breath.    Cardiovascular: Negative for chest pain.   Gastrointestinal: Negative for abdominal pain, constipation, nausea and vomiting.   Genitourinary: Negative for difficulty urinating.   Musculoskeletal: Negative for arthralgias, back pain, gait problem, joint swelling, myalgias, neck pain and neck stiffness.   Neurological: Negative for dizziness, speech difficulty, weakness, light-headedness, numbness and headaches.          Objective:      Physical Exam  Neurological:      Mental Status: She is alert and oriented to person, place, and time.             Assessment:       1. Chronic bilateral low back pain without sciatica           Plan:     we are going to try to get her symptomatic relief with facet injections bilaterally L4-5 and L5-S1.  Follow-up with me after the procedure.  She is going to try some Aleve p.m..  See if that helps with sleep    "   Chronic bilateral low back pain without sciatica           No

## 2020-11-18 NOTE — PROGRESS NOTE ADULT - PROBLEM SELECTOR PROBLEM 1
Gastrointestinal hemorrhage, unspecified gastrointestinal hemorrhage type Epidermal Closure: running cuticular

## 2020-11-22 NOTE — ED ADULT TRIAGE NOTE - HEART RATE (BEATS/MIN)
76 1. I was told the name of the physician that took care of my child while in the hospital.    2. I have been told about any important findings on my child's physical exam and my child's plan of care.    3. The doctor clearly explained my child's diagnosis and other possible diagnoses that were considered.    4. My child's doctor explained all the tests that were done and their results (if available). I understand that some of the test results may not be ready before we go home and I was told how I can get these results. I understand that a summary of my child's hospitalization and important test results will be shared with my child's outpatient doctor.    5. My child's doctor talked to me about what I need to do when we go home.    6. I understand what signs and symptoms to watch for. I understand what symptoms I would need to call my doctor for and/or return to the hospital.    7. I have the phone number to call the hospital for results and/or questions after I leave the hospital.

## 2020-12-09 NOTE — ED ADULT NURSE NOTE - NSIMPLEMENTINTERV_GEN_ALL_ED
Bed: 10  Expected date:   Expected time:   Means of arrival:   Comments:  CFD   Implemented All Fall Risk Interventions:  [place] to call system. Call bell, personal items and telephone within reach. Instruct patient to call for assistance. Room bathroom lighting operational. Non-slip footwear when patient is off stretcher. Physically safe environment: no spills, clutter or unnecessary equipment. Stretcher in lowest position, wheels locked, appropriate side rails in place. Provide visual cue, wrist band, yellow gown, etc. Monitor gait and stability. Monitor for mental status changes and reorient to person, place, and time. Review medications for side effects contributing to fall risk. Reinforce activity limits and safety measures with patient and family.

## 2021-06-14 NOTE — SWALLOW BEDSIDE ASSESSMENT ADULT - MODE OF PRESENTATION
self fed/cup/spoon Bcc Infiltrative Histology Text: There were numerous aggregates of basaloid cells demonstrating an infiltrative pattern.

## 2021-10-06 PROBLEM — I10 ESSENTIAL HYPERTENSION: Status: ACTIVE | Noted: 2018-02-16

## 2021-10-15 NOTE — PROGRESS NOTE ADULT - SUBJECTIVE AND OBJECTIVE BOX
Pt positioned prone on CT table, safety measures in place Patient is a 79y old  Female who presents with a chief complaint of sob cough (13 Jan 2020 18:26)      HPI:  · HPI Objective Statement: 79 years old female from [facility] rehab c/o coughing sob for three to four days. Pt is sent here to rule out chf vs pneumonia. Pt is alert and oriented to person only unable to give detail hx due to hx of Alzheimer according to the [facility] pt had sat of 91 to 92 %,	    PAST MEDICAL/SURGICAL/FAMILY/SOCIAL HISTORY:    Past Medical History:  Alzheimers disease    CVA (cerebral vascular accident)    Depression    HTN (hypertension)    MI, old.  recently Dx c hypothyroidism, TSH > 111 started Levothyroxin 50mcg qd as per Dr Vigil (02 Jan 2020 11:41)      INTERVAL HPI/OVERNIGHT EVENTS: Patient seen earlier today. Intubated,  ICU  The nurse denies melena, hematochezia, hematemesis, nausea, vomiting, abdominal pain, constipation, diarrhea, or change in bowel movements     MEDICATIONS  (STANDING):  buDESOnide    Inhalation Suspension 0.5 milliGRAM(s) Inhalation every 12 hours  chlorhexidine 0.12% Liquid 15 milliLiter(s) Oral Mucosa every 12 hours  chlorhexidine 4% Liquid 1 Application(s) Topical <User Schedule>  cloNIDine 0.1 milliGRAM(s) Oral every 6 hours  hydrALAZINE 50 milliGRAM(s) Oral every 8 hours  levothyroxine Injectable 25 MICROGram(s) IV Push at bedtime  pantoprazole  Injectable 40 milliGRAM(s) IV Push every 12 hours  propofol Infusion 10 MICROgram(s)/kG/Min (3.678 mL/Hr) IV Continuous <Continuous>  sodium chloride 0.65% Nasal 1 Spray(s) Both Nostrils three times a day    MEDICATIONS  (PRN):  albuterol/ipratropium for Nebulization 3 milliLiter(s) Nebulizer every 6 hours PRN Shortness of Breath and/or Wheezing      FAMILY HISTORY:  Family history of essential hypertension (Child)  Family history of stroke      Allergies    No Known Allergies    Intolerances        PMH/PSH:  Hypothyroidism  Constipation  Iron deficiency anemia  Major depressive disorder  Hyperlipidemia  GI bleed  Depression  Alzheimers disease  MI, old  CVA (cerebral vascular accident)  HTN (hypertension)  No pertinent past medical history  No significant past surgical history        REVIEW OF SYSTEMS: Uncommunicative  CONSTITUTIONAL: No fever, weight loss,  EYES: No eye pain, visual disturbances, or discharge  ENMT:  No difficulty hearing, tinnitus, vertigo; No sinus or throat pain  NECK: No pain or stiffness  BREASTS: No pain, masses, or nipple discharge  RESPIRATORY: No cough, wheezing, chills or hemoptysis; No shortness of breath  CARDIOVASCULAR: No chest pain, palpitations, dizziness, or leg swelling  GASTROINTESTINAL: See above.  GENITOURINARY: No dysuria, frequency, hematuria, or incontinence  NEUROLOGICAL: No headaches, memory loss, loss of strength, numbness, or tremors  SKIN: No itching, burning, rashes, or lesions   LYMPH NODES: No enlarged glands  ENDOCRINE: No heat or cold intolerance; No hair loss  MUSCULOSKELETAL: No joint pain or swelling; No muscle, back, or extremity pain  PSYCHIATRIC: No depression, anxiety, mood swings, or difficulty sleeping  HEME/LYMPH: No easy bruising, or bleeding gums  ALLERGY AND IMMUNOLOGIC: No hives or eczema    Vital Signs Last 24 Hrs  T(C): 35.8 (13 Jan 2020 19:40), Max: 36.2 (13 Jan 2020 04:17)  T(F): 96.5 (13 Jan 2020 19:40), Max: 97.1 (13 Jan 2020 04:17)  HR: 66 (13 Jan 2020 21:00) (55 - 90)  BP: 190/66 (13 Jan 2020 21:00) (136/39 - 213/50)  BP(mean): 100 (13 Jan 2020 21:00) (63 - 100)  RR: 16 (13 Jan 2020 21:00) (14 - 19)  SpO2: 100% (13 Jan 2020 21:00) (99% - 100%)    PHYSICAL EXAM:  GENERAL: NAD, well-groomed, well-developed  HEAD:  Atraumatic, Normocephalic  EYES: EOMI, PERRLA, conjunctiva and sclera clear  NECK: Supple, No JVD, Normal thyroid  NERVOUS SYSTEM:  MS unchanged  CHEST/LUNG: Clear to percussion bilaterally; No rales, rhonchi, wheezing, or rubs  HEART: Regular rate and rhythm; No murmurs, rubs, or gallops  ABDOMEN: Soft, Nontender, Nondistended; Bowel sounds present  EXTREMITIES:  2+ Peripheral Pulses, No clubbing, cyanosis, or edema  LYMPH: No lymphadenopathy noted  SKIN: No rashes or lesions    LAB                          8.8    10.29 )-----------( 129      ( 13 Jan 2020 04:22 )             26.2       CBC:  01-13 @ 04:22  WBC 10.29   Hgb 8.8   Hct 26.2   Plts 129  MCV 91.3  01-12 @ 02:36  WBC 10.14   Hgb 9.0   Hct 25.8   Plts 121  MCV 89.6  01-11 @ 16:58  WBC 16.89   Hgb 12.1   Hct 35.4   Plts 181  MCV 89.6  01-11 @ 03:06  WBC 11.89   Hgb 7.7   Hct 22.9   Plts 129  MCV 89.8  01-10 @ 14:21  WBC 13.91   Hgb 8.2   Hct 24.7   Plts 110  MCV 93.9  01-10 @ 03:27  WBC 13.01   Hgb 9.1   Hct 26.5   Plts 132  MCV 88.9  01-09 @ 22:16  WBC 14.12   Hgb 10.6   Hct 30.7   Plts 149  MCV 88.5  01-09 @ 12:58  WBC 7.23   Hgb 7.2   Hct 23.0   Plts 172  MCV 99.1  01-09 @ 08:25  WBC 7.13   Hgb 8.4   Hct 26.8   Plts 160  MCV 97.8  01-08 @ 07:29  WBC 9.46   Hgb 8.2   Hct 26.0   Plts 143  MCV 97.7      Chemistry:  01-13 @ 04:22  Na+ 139  K+ 3.3  Cl- 107  CO2 22  BUN 74  Cr 3.89     01-12 @ 02:36  Na+ 139  K+ 3.6  Cl- 107  CO2 22  BUN 82  Cr 3.88     01-11 @ 03:06  Na+ 137  K+ 2.9  Cl- 105  CO2 21  BUN 79  Cr 3.87     01-10 @ 03:27  Na+ 140  K+ 3.7  Cl- 108  CO2 22  BUN 93  Cr 4.17     01-09 @ 22:16  Na+ 140  K+ 3.5  Cl- 106  CO2 25  BUN 89  Cr 4.11     01-09 @ 12:58  Na+ 139  K+ 4.0  Cl- 106  CO2 25  BUN 85  Cr 4.36     01-09 @ 08:25  Na+ 139  K+ 3.9  Cl- 106  CO2 22  BUN 81  Cr 4.27         Glucose, Serum: 101 mg/dL (01-13 @ 04:22)  Glucose, Serum: 80 mg/dL (01-12 @ 02:36)  Glucose, Serum: 384 mg/dL (01-11 @ 03:06)  Glucose, Serum: 152 mg/dL (01-10 @ 03:27)  Glucose, Serum: 150 mg/dL (01-09 @ 22:16)  Glucose, Serum: 231 mg/dL (01-09 @ 12:58)  Glucose, Serum: 157 mg/dL (01-09 @ 08:25)      13 Jan 2020 04:22    139    |  107    |  74     ----------------------------<  101    3.3     |  22     |  3.89   12 Jan 2020 02:36    139    |  107    |  82     ----------------------------<  80     3.6     |  22     |  3.88   11 Jan 2020 03:06    137    |  105    |  79     ----------------------------<  384    2.9     |  21     |  3.87   10 Ousmane 2020 03:27    140    |  108    |  93     ----------------------------<  152    3.7     |  22     |  4.17   09 Jan 2020 22:16    140    |  106    |  89     ----------------------------<  150    3.5     |  25     |  4.11   09 Jan 2020 12:58    139    |  106    |  85     ----------------------------<  231    4.0     |  25     |  4.36   09 Jan 2020 08:25    139    |  106    |  81     ----------------------------<  157    3.9     |  22     |  4.27     Ca    7.0        13 Jan 2020 04:22  Ca    7.5        12 Jan 2020 02:36  Ca    6.8        11 Jan 2020 03:06  Ca    7.6        10 Jan 2020 03:27  Ca    7.8        09 Jan 2020 22:16  Ca    7.7        09 Jan 2020 12:58  Ca    8.1        09 Jan 2020 08:25  Phos  4.5       13 Jan 2020 04:22  Phos  4.2       12 Jan 2020 02:36  Phos  2.8       11 Jan 2020 03:06  Phos  3.1       09 Jan 2020 22:16  Mg     2.0       13 Jan 2020 04:22  Mg     2.1       12 Jan 2020 02:36  Mg     1.9       11 Jan 2020 03:06  Mg     2.2       10 Ousmane 2020 03:27  Mg     2.3       09 Jan 2020 22:16    TPro  4.4    /  Alb  1.4    /  TBili  0.3    /  DBili  x      /  AST  12     /  ALT  19     /  AlkPhos  56     13 Jan 2020 04:22  TPro  4.2    /  Alb  1.4    /  TBili  0.4    /  DBili  x      /  AST  12     /  ALT  21     /  AlkPhos  55     12 Jan 2020 02:36  TPro  3.7    /  Alb  1.7    /  TBili  x      /  DBili  x      /  AST  x      /  ALT  x      /  AlkPhos  x      11 Jan 2020 12:04  TPro  4.6    /  Alb  1.6    /  TBili  0.6    /  DBili  x      /  AST  15     /  ALT  33     /  AlkPhos  59     10 Jan 2020 03:27  TPro  5.2    /  Alb  1.8    /  TBili  0.7    /  DBili  x      /  AST  21     /  ALT  42     /  AlkPhos  70     09 Jan 2020 22:16              CAPILLARY BLOOD GLUCOSE          C-Reactive Protein, Serum: 2.28 mg/dL (01-09 @ 11:35)      RADIOLOGY & ADDITIONAL TESTS:    Imaging Personally Reviewed:  [ ] YES  [ ] NO    Consultant(s) Notes Reviewed:  [ ] YES  [ ] NO    Care Discussed with Consultants/Other Providers [ ] YES  [ ] NO

## 2021-11-10 NOTE — PROGRESS NOTE ADULT - SUBJECTIVE AND OBJECTIVE BOX
Patient seen and examined in ICU.  No overnight events. Per RN, patient tolerated CPAP yesterday.  Orally intubated with wrist restraints.  No new bleeding episodes.    T(F): 97.1 (01-13-20 @ 04:17), Max: 97.9 (01-12-20 @ 16:16)  HR: 60 (01-13-20 @ 05:29) (53 - 71)  BP: 136/39 (01-13-20 @ 02:00) (135/36 - 213/50)  RR: 14 (01-13-20 @ 02:00) (10 - 20)  SpO2: 100% (01-13-20 @ 05:29) (99% - 100%)    PHYSICAL EXAM:  General: orally intubated  CV: +S1+S2  Lungs: b/l rhonchi  Abdomen: soft, nontender, nondistended. +BS  Extremities: compression devices b/l LE, no edema. Wrist restraints intact.    LABS:                        8.8    10.29 )-----------( 129      ( 13 Jan 2020 04:22 )             26.2     01-13    139  |  107  |  74<H>  ----------------------------<  101<H>  3.3<L>   |  22  |  3.89<H>    Ca    7.0<L>      13 Jan 2020 04:22  Phos  4.5     01-13  Mg     2.0     01-13    TPro  4.4<L>  /  Alb  1.4<L>  /  TBili  0.3  /  DBili  x   /  AST  12<L>  /  ALT  19  /  AlkPhos  56  01-13      Impression: 78 y/o female with PMH Alzheimer's, CVA, Depression, CHF, HTN, MI (remote), recent dx of hypothyroidism with GI Bleed, S/P EGD and Colonoscopy 1/9, s/p 3 units of PRBC total, last on 1/11. No new bleeding episodes  Plan:  -No acute surgical intervention at this time. Trend h/h, monitor for signs of bleeding and transfuse as needed.    -PPI IVP BID    -GI Follow up, for repeat EGD  -Continue medical management and supportive care as per ICU team.   -will discuss with surgical attending detailed exam

## 2021-12-02 NOTE — PROGRESS NOTE ADULT - PROBLEM SELECTOR PLAN 1
----- Message from Dane Dejesus NP sent at 12/2/2021  3:12 PM CST -----  Can you please help Pt arrange f/u with wound care clinic  I placed an order  thx - epogen  - watch H/H and transfuse as necessary  - physical therapy

## 2022-01-19 NOTE — DIETITIAN INITIAL EVALUATION ADULT. - PROBLEM SELECTOR PLAN 2
Attending with admit telemetry, diuretic, beta blocker, O2 PRN, echo, trend troponin  cardiology consult

## 2022-01-27 NOTE — PROGRESS NOTE ADULT - PROBLEM SELECTOR PROBLEM 5
Depression
Depression
Normal vision: sees adequately in most situations; can see medication labels, newsprint

## 2022-05-09 NOTE — PROGRESS NOTE ADULT - PROBLEM SELECTOR PROBLEM 4
Assessment:  1  Other tear of medial meniscus of right knee as current injury, subsequent encounter         Plan:  Right knee meniscus tear  In the presence of lack of relief with non operative modalities and in conjunction with MRI findings and symptoms, quality of life decision to pursue a right knee arthroscopy with partial medial lateral meniscectomies  Patient wishes to pursue surgical intervention as he finds that her symptoms are impacting his daily activities as well as impeding on his quality of life  Risks and benefits of a knee arthroscopy were discussed  Consents obtained  Reasonable expectations of surgical outcomes advised given the amount of arthritis the patient does have  Scheduled surgery date 5/23/2022  The patient has a tear of his medial meniscus of his right knee  Treatment options were discussed  He has opted for elective arthroscopy of his right knee  All risks, complications, benefits were discussed with the patient in great detail including bleeding, infection, blood clots, pain, stiffness, neurovascular damage, fractures, dislocations, the possibility of loss of life limb for surgery, heart attack, stroke, etcetera  His surgery is tentatively scheduled for May 23, 2022  Physical examination shows diffuse medial joint line pain with a positive Juany's and a painful arc motion along that region  Negative Homans  The hamstring pain is now gone  To do next visit:  Return for post-op visit  The above stated was discussed in layman's terms and the patient expressed understanding  All questions were answered to the patient's satisfaction         Scribe Attestation    I,:  Jonathan Serna am acting as a scribe while in the presence of the attending physician :       I,:  Ileana Jo DO personally performed the services described in this documentation    as scribed in my presence :             Subjective:   Kassy Samayoa is a 77 y o  male who presents for follow up of right knee  He is s/p right knee steroid injection with liroberto, 4/18/2022  Today he complains of severe medial right knee pain  The right knee can feel unstable at times  Most activity aggravates while rest alleviates  He has tried steroid injections, activity modification and oral medications with limited benefit          Review of systems negative unless otherwise specified in HPI    Past Medical History:   Diagnosis Date    Arthritis     Diabetes mellitus ([facility] [address] )     Dyslipidemia     GERD (gastroesophageal reflux disease)     Hyperlipidemia     Nodule of left lung 2007    negative for CA    Type 2 diabetes mellitus without complication, without long-term current use of insulin ([facility] [address] ) 1/23/2019       Past Surgical History:   Procedure Laterality Date    COLONOSCOPY      ELBOW SURGERY Bilateral     FOOT TENDON SURGERY      heel    IL REVISE MEDIAN N/CARPAL TUNNEL SURG Left 6/30/2021    Procedure: RELEASE CARPAL TUNNEL;  Surgeon: Jesse Fischer DO;  Location: [address] [place] [address] OR;  Service: Orthopedics       Family History   Problem Relation Age of Onset    COPD Father         80 yrs   [facility] [place] Heart disease Mother         hx MI   [facility] [place] Diabetes Mother     Cancer Brother        Social History     Occupational History    Not on file   Tobacco Use    Smoking status: Former Smoker    Smokeless tobacco: Never Used   Vaping Use    Vaping Use: Never used   Substance and Sexual Activity    Alcohol use: Yes     Comment: occ    Drug use: No    Sexual activity: Yes     Partners: Female     Birth control/protection: None         Current Outpatient Medications:     metFORMIN (GLUCOPHAGE) 500 mg tablet, Take 1 tablet (500 mg total) by mouth 2 (two) times a day with meals, Disp: 180 tablet, Rfl: 1    phentermine (ADIPEX-P) 37 5 MG tablet, Take 1 tablet (37 5 mg total) by mouth daily, Disp: 30 tablet, Rfl: 2    simvastatin (ZOCOR) 20 mg tablet, Take 1 tablet (20 mg total) by mouth daily at bedtime, Disp: 90 tablet, Rfl: 3    tamsulosin (FLOMAX) 0 4 mg, Take 1 capsule (0 4 mg total) by mouth daily with dinner, Disp: 30 capsule, Rfl: 1    No Known Allergies         Vitals:    05/09/22 0933   BP: 148/85   Pulse: 102       Objective:  Physical exam  · General: Awake, Alert, Oriented  · Eyes: Pupils equal, round and reactive to light  · Heart: regular rate and rhythm  · Lungs: No audible wheezing  · Abdomen: soft                    Ortho Exam  Right knee:  TTP over medial joint line  No erythema or ecchymosis  No effusion or swelling  Normal strength  Good ROM   Positive Medial McMurrays  Positive Apleys  Calf compartments soft and supple  Sensation intact  Toes are warm sensate and mobile        Diagnostics, reviewed and taken today if performed as documented: The attending physician has personally reviewed the pertinent films in PACS and interpretation is as follows:  Right knee MRI of 2/2022:  Medial meniscus tear  Procedures, if performed today:    Procedures    None performed      Portions of the record may have been created with voice recognition software  Occasional wrong word or "sound a like" substitutions may have occurred due to the inherent limitations of voice recognition software  Read the chart carefully and recognize, using context, where substitutions have occurred  Hypokalemia

## 2022-06-22 NOTE — CHART NOTE - NSCHARTNOTEFT_GEN_A_CORE
Infectious Diseases  Response for E-Consult     Patient Name: Pedro Ponce Jr.  MRN: [id number]  Primary Care Provider: John Ricci MD   Requesting Provider: Portia Hoover MD      Findings: 81 year old man with E faecalis UTI.  Prior treatment -Macrobid for 10 days and repeat culture -Enterococcus .  He reports allergy to PCN.  Plan - will use Plan A-  3  doses of PO Fosfomycin 3gram every 72 hours .  Plan B -10  days of PO Zyvox 600mg bid.  Both medications might be costly -either of them will work in this case .     I did not speak to the requesting provider verbally about this.     Total time of Consultation: 5 minute    Percentage of time spent on verbal/written discussion: 50%     Thank you for your consult.     Alfred Dodson MD  Infectious Diseases   Upper GI series consistent with achalasia. Spoke with GI, will start on clears. Will follow up regarding medical/surgical options with family.     ADS  [id number]

## 2022-11-17 NOTE — PROGRESS NOTE ADULT - PROBLEM SELECTOR PROBLEM 9
[FreeTextEntry1] : \par \par Patient to follow up in 1 year for annual GYN exam\par Mammogram and bilateral breast US due: now Rx given \par Colonoscopy due: 7/23 \par Bone density due: now Rx given \par Pap ordered\par \par Hemoccult ordered \par All questions answered, patient agreeable with plan.\par \par \par \par \par I Chelsea Wooten FNP-C am scribing for the presence of Dr. Appiah the following sections HISTORY OF PRESENT ILLNESS, PAST MEDICAL/FAMILY/SOCIAL HISTORY; REVIEW OF SYSTEMS; VITAL SIGNS; PHYSICAL EXAM; DISPOSITION. \par \par \par I personally performed the services described in the documentation, reviewed the documentation recorded by the scribe in my presence and it accurately and completely records my words and actions.\par  Discharge planning issues

## 2023-05-09 NOTE — CONSULT NOTE ADULT - PROBLEM SELECTOR RECOMMENDATION 4
Message left on number identified voicemail with PB office number, hours, survey to be mailed and encouragement to return call if desired. I mentioned that I would call again in a week or so if no return call from family by that time.   s/p EGD ulcer in fundus  treated, continue PPI, carafate  diet has been advanced

## 2023-05-20 NOTE — PATIENT PROFILE ADULT. - LANGUAGE ASSISTANCE NEEDED
Opt out No-Patient/Caregiver offered and refused free interpretation services./MANOLO Quiles No-Patient/Caregiver offered and refused free interpretation services.

## 2023-11-20 NOTE — SWALLOW VFSS/MBS ASSESSMENT ADULT - COMMENTS
The patient was seen for an initial clinical evaluation of swallow function this AM; please see chart for full report. Patient is a 77 year old female who was transferred from [place] for achalasia management 1/10/17. An initial clinical evaluation of swallow function was performed this AM; a modified barium swallow study was recommended given known h/o achalasia and aspiration pneumonia; please see chart for full report. Patient is a 77 year old female who was transferred from [place] for achalasia management 1/10/17. An initial clinical evaluation of swallow function was performed this AM; a modified barium swallow study was recommended given known h/o achalasia and aspiration pneumonia; please see chart for full report. It should be noted that the patient required verbal encouragement to maintain participation/acceptance of PO trials during this study. Additionally, an upper GI series was performed following completion of this study; radiologist report pending at this time. Hydroxyzine Counseling: Patient advised that the medication is sedating and not to drive a car after taking this medication.  Patient informed of potential adverse effects including but not limited to dry mouth, urinary retention, and blurry vision.  The patient verbalized understanding of the proper use and possible adverse effects of hydroxyzine.  All of the patient's questions and concerns were addressed.

## 2024-01-08 NOTE — PROGRESS NOTE ADULT - PROBLEM SELECTOR PROBLEM 8
Transitional Care Management  TCM Outreach Date and Time: Filed (1/8/2024 11:46 AM)    Discharge Questions  Actual Discharge Date: 01/06/24  Now that you are home, how are you feeling?: Fair (Per brother and POA, patient is at baseline; no further increase in seizure activity)  Did you receive any new prescriptions?: Yes  Were you able to get them filled?: Yes  Meds to Bed or Pharmacy filled?: Pharmacy  Do you have any questions about your current medications or new medications (Review Med Rec)?: No  Did you have any durable medical equipment ordered?: No  Do you have a follow up appointment scheduled with your PCP?: No  Was an appointment scheduled for the patient?: No  Any issues or paperwork you wish to discuss with your PCP?: No  Are you (patient) able to get to the appointment?: Yes  Reason not scheduled?: Declines (Due to seizure and AV shunt, brother states primary medical care follow up at this time with with neurologist and neuro-surgeon team)  If Home Health was ordered, have they contacted you (Patient): Not Applicable  Did you have enough support after your last discharge?: Yes  Does this patient qualify for the CCM program?: Yes    Transitional Care  Number of attempts made to contact patient: 1  Current or previous attempts competed within two business days of discharge? : Yes  Provided education regarding treatment plan, medications, self-management, ADLs?: No  Has patient completed an Advanced Directive?: No  Has the Care Manager's phone number provided?: No  Is there anything else I can help you with?: No    Discharge Summary  Chief Complaint: Seizures  Admitting Diagnosis: Seizures  Discharge Diagnosis: Seizures         Prophylactic measure

## 2024-05-14 NOTE — DIETITIAN INITIAL EVALUATION ADULT. - ORAL INTAKE PTA
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 per SSI    LANCETS MISC    To check BG 3 times daily, to use with insurance preferred meter    PEN NEEDLE, DIABETIC (BD ULTRA-FINE TRINI PEN NEEDLE) 32 GAUGE X 5/32" NDLE    1 each by Misc.(Non-Drug; Combo Route) route 4 (four) times daily with meals and nightly.    SUPREP BOWEL PREP KIT 17.5-3.13-1.6 GRAM SOLR    TAKE 1 KIT BY MOUTH SPLIT DOSE AS DIRECTED FOR COLOSCOPY   Discontinued Medications    BLOOD SUGAR DIAGNOSTIC STRP    To check BG 3 times daily, to use with insurance preferred meter        Time spent with pt including note preparation: 83 minutes     Ramona Blanca, PhD, MP  Advanced Practice Medical Psychologist  [facility]--[address]  [place] [address]  [phone]   [phone] fax      " Daughter reports that patient takes a regular diet with 2.5meals per day.  Patient eats breakfast, a light lunch, early dinner and evening snack. Daughter reports a decline in intake.  Patient was at nursing home, where intake declined from 3 to 2 meals a day between June and November.  Patient then transferred back home and started with three meals, then decreased to 2.5 meals per day.  Daughter reports that patient eats child size portions, and that overall decline was to approximately 75% of normal.  Daughter reports that patient was taking a regular diet with eating breakfast, a light lunch, early dinner and evening snack.

## 2024-06-10 NOTE — ED ADULT TRIAGE NOTE - WEIGHT METHOD
[FreeTextEntry1] : # Chronic systolic HF ACC/AHA stage C, NYHA II -NICMP/valvular LVEF 25-30% from 18%, LVIDd 5.9cm. Repeat TTE today (report pending). -Euvolemic on exam, warm extremities -GDMT: Continue Toprol-XL 25mg daily, Entresto 24-26mg BID, Spironolactone 25mg daily and Farxiga 10mg daily. -Diuretics: Not indicated at this time -Device: s/p Bi-V. follows with EP (Fabián Arredondo MD). Will attempt to obtain records. -Pt states she exercises at home. Unable to partake in cardiac rehab due to transportation issues. -CPET on 11/18/22 was suboptimal due to poor quality and RER 1.06. AT could not be identified. Independent markers including Ve/VCO2 slope and OUES were abnormal. She demonstrated blunted HR response and spirometry was inconclusive. It seems pt was not engaged with the testing process.  We have discussed the above results.  -She has some concerning features including decline in functional status and low BP. Denies overt HF symptoms. No HF hospitalizations or end organ dysfunction. Again discussed repeating CPET. -Low threshold to schedule RHC should she develop worsening symptoms.   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  # Chronic atrial fibrillation -Valvular -Asymptomatic -AC as above -Follows with Dr. Arredondo, needs to schedule f/u  # Mod-severe TR -TTE done today  RTO in 4 months with Dr. Crockett.
stated

## 2024-08-27 NOTE — PATIENT PROFILE ADULT - HAS THE PATIENT RECEIVED THE INFLUENZA VACCINE THIS SEASON?
Assumed care from admitting physician Assumed care from admitting physician.     # PD catheter associated peritonitis  # PD catheter malfunction  -[facility] nephrology consulted  -Added vancomycin  -Continue ceftriaxone  -Infectious diease consulted  -Follow up on blood and peritnoeal culture.    Rest of the plan per H&P yes...

## 2024-12-08 NOTE — PROGRESS NOTE ADULT - PROBLEM SELECTOR PLAN 1
- likely 2/2 contrast induced nephropathy given pt received contrast on 1/4, developed JONY to 2.19 on 1/8 and peaked on 1/10 to 2.6 with improvement to 1.8 today, also possible contribution from lasix  - c/w maintenance IVF while pt NPO  - avoid nephrotoxic agents  - BMP daily to monitor for improvement
- likely JONY on CKD (atrophic R kidney) 2/2 contrast induced nephropathy  - Cr improved to 1.59 this AM (baseline around 1.2)  - c/w maintenance IVF w/ NS at 75cc/hr for now  - avoid nephrotoxic agents  - BMP daily to monitor for improvement
- likely JONY on CKD (atrophic R kidney) 2/2 contrast induced nephropathy  - Cr improved to 1.6 this AM (baseline around 1.2)  - c/w maintenance IVF w/ D5W at 75cc/hr while NPO  - avoid nephrotoxic agents  - BMP daily to monitor for improvement
- plan for EGD  now that cleared by cards  - NPO after mn, holding DVT ppx, fu GI recs s/p procedure  - Aspiration precautions
- plan for EGD today now that cleared by cards  - fu GI recs s/p procedure  - Aspiration precautions
- plan for EGD tomorrow  - full liquid diet, NPO after MN  - Aspiration precautions  - cards/anesthesia clearance pending
- plan for EGD tomorrow now that cleared by cards  - NPO after mn, holding DVT ppx, fu GI recs s/p procedure  - Aspiration precautions
-S/p S/p Pyloric stenosis balloon dilatation and  Peroral endoscopic myotomy yestderday  Diet advanced to full liquid   GI on board- advise Levquin x 5 days post procedure       - NPO after mn, holding DVT ppx, fu GI recs s/p procedure  - Aspiration precautions
-S/p S/p Pyloric stenosis balloon dilatation and  Peroral endoscopic myotomy yestderday  Pt on clear liquid diet   Await GI recommendations if diet can be advanced      - NPO after mn, holding DVT ppx, fu GI recs s/p procedure  - Aspiration precautions
-S/p S/p Pyloric stenosis balloon dilatation and  Peroral endoscopic myotomy yestderdayAppreciate GI - plan keep on clear liquid diet today, advance to full liquids tomorrow x 3 days, then advance gradually as tolerated.GI dw Daughter re- diet    PPI BID PO  Levaquin 500mg daily - total antibiotic course 5 days  GI on board- advise Levquin x 5 days post procedure
Likely JONY on CKD (baseline SCr 1.2), in setting of atrophic right kidney, due to contrast induced nephropathy. SCr has continued to improve, and now stable.   Can hold off on IVF if patient tolerating diet. BMP daily. Avoid NSAIDs and other nephrotoxic agents. Will sign off - please reconsult as needed.
Pt with EGD on January 9th showing findings consistent with achalasia per Dr. Laird at [facility]  - GI recs appreciated  - NPO for now, but based on speech and swallow can advance diet to mechanical softs with thin liquids will d/w family prior to advancing as patient is unable to make decisions  - Aspiration precautions
Pt with EGD on January 9th showing findings consistent with achalasia per Dr. Laird at [facility]  - GI recs pending d/w family  - patient is on a full liquid diet  - Aspiration precautions
Pt with EGD on January 9th showing findings consistent with achalasia per Dr. Laird at [facility]  - fu GI recs re possible myotomy  - NPO for now, holding all non-essential oral medications, fu w GI re feeding  - Aspiration precautions
Pt with EGD on January 9th showing findings consistent with achalasia per Dr. Laird at [facility]  - GI recs re possible myotomy pending cine, called to have expedited   - NPO for now, holding all non-essential oral medications, fu w GI re feeding  - Aspiration precautions
Pt with EGD on January 9th showing findings consistent with achalasia per Dr. Laird at [facility]  - GI recs appreciated  - NPO for now, but based on speech and swallow can advance diet to mechanical softs with thin liquids will d/w family prior to advancing as patient is unable to make decisions  - Aspiration precautions
Pt with EGD on January 9th showing findings consistent with achalasia per Dr. Laird at [facility]  - GI recs appreciated  - patient is on a full liquid diet, will likely  - fu with GI re plan for procedure  - Aspiration precautions
- plan for EGD monday now that cleared by cards  - NPO after mn on 1/21, fu GI recs s/p procedure  - Aspiration precautions
Pt with EGD on January 9th showing findings consistent with achalasia per Dr. Laird at [facility]  - GI recs appreciated  -patient is on a full liquid diet, will likely need NPO tomorrow night for possible procedure on Wednesday  - Aspiration precautions
Hyperbilirubinemia
Transaminitis

## 2025-02-03 NOTE — PHYSICAL THERAPY INITIAL EVALUATION ADULT - PERTINENT HX OF CURRENT PROBLEM, REHAB EVAL
Follow up with PCP as needed.  2.   Go to the emergency department immediately if symptoms worsen, change, or if you have any concerns.  3. Work note provided as requested.  
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

## 2025-03-18 NOTE — PROGRESS NOTE ADULT - SUBJECTIVE AND OBJECTIVE BOX
"Individual Follow-Up Form    3/18/2025    Quit Date: 2/24/25    Clinical Status of Patient: Outpatient    Length of Service: 60 minutes    Continuing Medication: no    Other Medications:      Target Symptoms: Withdrawal and medication side effects. The following were  rated moderate (3) to severe (4) on TCRS:  Moderate (3): none  Severe (4): none    Comments: Spoke to patient this evening by phone. Patient "happy to report" she still has not smoke and feels good. Congratulated patient on her quit. Patient stated that she is on a  healthier path eating right, back to dating and going out to [employer] on some nights. She has been staying busy at work which helps a lot. She has even reported losing several pounds. Commended patient on her lifestyle changes. completion of TCRS (Tobacco Cessation Rating Scale) reviewed strategies, cues, triggers, high risk situations, lapses, relapses, diet, exercise, stress, relaxation, sleep, habitual behavior, and life style changes. Patient not on any NRT or medication for her quit at this time. The patient denies any abnormal behavioral or mental changes at this time.  The patient will continue with  therapy sessions. 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 The patient is competent. The risks                        and benefits of the procedure and the sedation options and risks were                        discussed with the patient. All questions were answered and informed consent                        was obtained. Patient identification and proposed procedure were verified by                        the physician, the nurse and the anesthesiologist in the procedure room.                        Mental Status Examination: alert and oriented. Airway Examination: normal                        oropharyngeal airway and neck mobility. Respiratory Examination: clear to                        auscultation. CV Examination: normal. Prophylactic Antibiotics: The patient                        does not require prophylactic antibiotics.                        Grade Assessment: . After                        reviewing the risks and benefits, the patient was deemed in satisfactory                        condition to undergo the procedure. The anesthesia plan was to use monitored                        anesthesia care (MAC). Immediately prior to administration of medications,                        the patient was re-assessed for adequacy to receive sedatives. The heart                        rate, respiratory rate, oxygen saturations, blood pressure, adequacy of                        pulmonary ventilation, and response to care were monitored throughout the                        procedure. The physical status of the patient was re-assessed after the   	 	     procedure.                       After I obtained informed consent, the scope was passed under direct vision.                        Throughout the procedure, the patient's blood pressure, pulse, and oxygen                        saturations were monitored continuously. The Colonoscope was introduced                        through the anus and advanced to the terminal ileum, with identification of                        the appendiceal orifice and IC valve. The colonoscopy was performed without                        difficulty. The patient tolerated the procedure well. The quality of the                        bowel preparation was -fair        RECTUM:   SIGMOID:     DESCENDING COLON:     TRANSVERSE COLON:     ASCENDING COLON:    CECUM:     TERMINAL ILEUM:   ----- Black, liquid stool through out colon and TI    The patient tolerated the procedure well.

## 2025-07-02 NOTE — H&P ADULT - PROBLEM SELECTOR PROBLEM 2
Show Applicator Variable?: Yes Duration Of Freeze Thaw-Cycle (Seconds): 0 Consent: The patient's consent was obtained including but not limited to risks of crusting, scabbing, blistering, scarring, darker or lighter pigmentary change, recurrence, incomplete removal and infection. Post-Care Instructions: I reviewed with the patient in detail post-care instructions. Patient is to wear sunprotection, and avoid picking at any of the treated lesions. Pt may apply Vaseline to crusted or scabbing areas. Detail Level: Detailed Number Of Freeze-Thaw Cycles: 3 freeze-thaw cycles Render Note In Bullet Format When Appropriate: No Acute on chronic congestive heart failure, unspecified congestive heart failure type

## 2025-07-19 NOTE — ED ADULT NURSE NOTE - NS PRO AD PATIENT TYPE
No care due was identified.  Health [facility] Embedded Care Due Messages. Reference number: [id number].   7/19/2025 6:52:04 AM CDT   Health Care Proxy (HCP)